# Patient Record
Sex: MALE | Race: WHITE | NOT HISPANIC OR LATINO | Employment: OTHER | URBAN - NONMETROPOLITAN AREA
[De-identification: names, ages, dates, MRNs, and addresses within clinical notes are randomized per-mention and may not be internally consistent; named-entity substitution may affect disease eponyms.]

---

## 2017-01-05 ENCOUNTER — DOCUMENTATION (OUTPATIENT)
Dept: CARDIOLOGY | Facility: CLINIC | Age: 82
End: 2017-01-05

## 2017-03-23 ENCOUNTER — TELEPHONE (OUTPATIENT)
Dept: FAMILY MEDICINE CLINIC | Facility: CLINIC | Age: 82
End: 2017-03-23

## 2017-03-23 RX ORDER — OXYCODONE HYDROCHLORIDE AND ACETAMINOPHEN 5; 325 MG/1; MG/1
TABLET ORAL
Qty: 60 TABLET | Refills: 0 | Status: SHIPPED | OUTPATIENT
Start: 2017-03-23 | End: 2017-05-08 | Stop reason: SDUPTHER

## 2017-03-23 NOTE — TELEPHONE ENCOUNTER
----- Message from Katrina Alanis MA sent at 3/23/2017 10:46 AM CDT -----  NEED REFILL    PERCOCET

## 2017-03-27 ENCOUNTER — LAB (OUTPATIENT)
Dept: LAB | Facility: CLINIC | Age: 82
End: 2017-03-27

## 2017-03-27 ENCOUNTER — OFFICE VISIT (OUTPATIENT)
Dept: FAMILY MEDICINE CLINIC | Facility: CLINIC | Age: 82
End: 2017-03-27

## 2017-03-27 VITALS
SYSTOLIC BLOOD PRESSURE: 142 MMHG | OXYGEN SATURATION: 92 % | TEMPERATURE: 97.8 F | DIASTOLIC BLOOD PRESSURE: 62 MMHG | HEIGHT: 72 IN | HEART RATE: 106 BPM

## 2017-03-27 DIAGNOSIS — R31.9 BLOOD IN URINE: Primary | ICD-10-CM

## 2017-03-27 DIAGNOSIS — R31.9 BLOOD IN URINE: ICD-10-CM

## 2017-03-27 DIAGNOSIS — L03.115 CELLULITIS OF RIGHT LOWER EXTREMITY: ICD-10-CM

## 2017-03-27 DIAGNOSIS — J18.9 PNEUMONIA DUE TO INFECTIOUS ORGANISM, UNSPECIFIED LATERALITY, UNSPECIFIED PART OF LUNG: ICD-10-CM

## 2017-03-27 DIAGNOSIS — R30.0 DYSURIA: ICD-10-CM

## 2017-03-27 PROCEDURE — 87086 URINE CULTURE/COLONY COUNT: CPT | Performed by: FAMILY MEDICINE

## 2017-03-27 PROCEDURE — 99214 OFFICE O/P EST MOD 30 MIN: CPT | Performed by: FAMILY MEDICINE

## 2017-03-27 RX ORDER — LORATADINE 10 MG/1
10 TABLET ORAL DAILY
Qty: 30 TABLET | Refills: 11 | Status: SHIPPED | OUTPATIENT
Start: 2017-03-27 | End: 2017-09-06

## 2017-03-27 RX ORDER — DOXYCYCLINE HYCLATE 100 MG/1
100 CAPSULE ORAL 2 TIMES DAILY
Qty: 30 CAPSULE | Refills: 0 | Status: SHIPPED | OUTPATIENT
Start: 2017-03-27 | End: 2017-04-18

## 2017-03-27 RX ORDER — PHENAZOPYRIDINE HYDROCHLORIDE 100 MG/1
100 TABLET, FILM COATED ORAL 3 TIMES DAILY PRN
Qty: 12 TABLET | Refills: 0 | Status: SHIPPED | OUTPATIENT
Start: 2017-03-27 | End: 2017-04-18

## 2017-03-27 NOTE — PROGRESS NOTES
Max Park is a 86 y.o. male.     History of Present Illness     Patient reports taking one dose of clindamycin for his cellulitis left leg, the powder of the capsule got into his lung and caused pneumonia.  He was admitted [facility] for pneumonia.  He things he is better.  Dr raines monitoring his inr.  Right anterilor lower leg he says is getting worse again.  He is havine burning with urination, taking otc something so cant dip his urine  Poor historian  Report hematuria  Review of Systems   Constitutional: Negative for chills, fatigue and fever.   HENT: Negative for congestion, ear discharge, ear pain, facial swelling, hearing loss, postnasal drip, rhinorrhea, sinus pressure, sore throat, trouble swallowing and voice change.    Eyes: Negative for discharge, redness and visual disturbance.   Respiratory: Negative for cough, chest tightness, shortness of breath and wheezing.    Cardiovascular: Negative for chest pain and palpitations.   Gastrointestinal: Negative for abdominal pain, blood in stool, constipation, diarrhea, nausea and vomiting.   Endocrine: Negative for polydipsia and polyuria.   Genitourinary: Positive for dysuria. Negative for flank pain, hematuria and urgency.   Musculoskeletal: Negative for arthralgias, back pain, joint swelling and myalgias.   Skin: Positive for wound. Negative for rash.   Neurological: Negative for dizziness, weakness, numbness and headaches.   Hematological: Negative for adenopathy.   Psychiatric/Behavioral: Negative for confusion and sleep disturbance. The patient is not nervous/anxious.        Objective   Physical Exam   Constitutional: He is oriented to person, place, and time. He appears well-developed and well-nourished.   HENT:   Head: Normocephalic and atraumatic.   Right Ear: External ear normal.   Left Ear: External ear normal.   Nose: Nose normal.   Mouth/Throat: Oropharynx is clear and moist.   Eyes: Conjunctivae and EOM are normal. Pupils are  equal, round, and reactive to light.   Neck: Normal range of motion. Neck supple.   Cardiovascular: Normal rate, regular rhythm and normal heart sounds.  Exam reveals no gallop and no friction rub.    No murmur heard.  Pulmonary/Chest: Effort normal and breath sounds normal.   Abdominal: Soft. Bowel sounds are normal. He exhibits no distension. There is no tenderness. There is no rebound and no guarding.   Musculoskeletal: Normal range of motion. He exhibits no edema or deformity.   Neurological: He is alert and oriented to person, place, and time. No cranial nerve deficit.   Skin: Skin is warm and dry. No rash noted. No erythema.   Right lower anterior leg, dime size open area with white/yellow adherant material.  Surrounding redness and warmth.    Psychiatric: He has a normal mood and affect. His behavior is normal. Judgment and thought content normal.   Nursing note and vitals reviewed.      Assessment/Plan   Ravi was seen today for difficulty urinating, back pain and leg injury.    Diagnoses and all orders for this visit:    Blood in urine  -     Urine culture (clean catch); Future    Cellulitis of right lower extremity    Dysuria    Pneumonia due to infectious organism, unspecified laterality, unspecified part of lung    Other orders  -     doxycycline (VIBRAMYCIN) 100 MG capsule; Take 1 capsule by mouth 2 (Two) Times a Day.  -     phenazopyridine (PYRIDIUM) 100 MG tablet; Take 1 tablet by mouth 3 (Three) Times a Day As Needed for bladder spasms.  -     loratadine (CLARITIN) 10 MG tablet; Take 1 tablet by mouth Daily.  -     mupirocin (BACTROBAN) 2 % ointment; Apply  topically 3 (Three) Times a Day.      Lungs sound fine.  Allergies contributing to cough.    Doxy for urine and wound, bactroban also.   Return Monday.  Go to er if wound worsens. Let dr raines know of new meds for inr.

## 2017-03-28 ENCOUNTER — APPOINTMENT (OUTPATIENT)
Dept: WOUND CARE | Facility: HOSPITAL | Age: 82
End: 2017-03-28
Attending: PLASTIC SURGERY

## 2017-03-28 ENCOUNTER — ANTICOAGULATION VISIT (OUTPATIENT)
Dept: CARDIOLOGY | Facility: CLINIC | Age: 82
End: 2017-03-28

## 2017-03-28 DIAGNOSIS — I48.20 CHRONIC ATRIAL FIBRILLATION (HCC): Primary | ICD-10-CM

## 2017-03-28 LAB — INR PPP: 1.4

## 2017-03-28 RX ORDER — LEVOTHYROXINE SODIUM 0.05 MG/1
50 TABLET ORAL DAILY
Refills: 5 | COMMUNITY
Start: 2017-02-07 | End: 2017-03-28 | Stop reason: SDUPTHER

## 2017-03-28 RX ORDER — LEVOTHYROXINE SODIUM 0.05 MG/1
50 TABLET ORAL DAILY
Qty: 30 TABLET | Refills: 5 | Status: SHIPPED | OUTPATIENT
Start: 2017-03-28

## 2017-03-28 NOTE — TELEPHONE ENCOUNTER
Refill Request came in by fax from   [facility] - [place], [place] - [address] - [phone]  - [phone] FX  [address] [facility] [address]  [facility] [address]  Phone: [phone] Fax: [phone]   Refill sent to pharmacy via e-scribe.

## 2017-03-29 LAB — BACTERIA SPEC AEROBE CULT: NORMAL

## 2017-03-29 NOTE — PROGRESS NOTES
I have reviewed the notes, assessments, and/or procedures performed by nurse, I concur with her/his documentation of Ravi Park.

## 2017-03-31 ENCOUNTER — OFFICE VISIT (OUTPATIENT)
Dept: PODIATRY | Facility: CLINIC | Age: 82
End: 2017-03-31

## 2017-03-31 VITALS
OXYGEN SATURATION: 94 % | BODY MASS INDEX: 31.07 KG/M2 | WEIGHT: 229.4 LBS | HEIGHT: 72 IN | HEART RATE: 111 BPM | DIASTOLIC BLOOD PRESSURE: 74 MMHG | SYSTOLIC BLOOD PRESSURE: 138 MMHG

## 2017-03-31 DIAGNOSIS — M79.674 PAIN IN TOES OF BOTH FEET: ICD-10-CM

## 2017-03-31 DIAGNOSIS — Z79.01 CHRONIC ANTICOAGULATION: ICD-10-CM

## 2017-03-31 DIAGNOSIS — I73.9 PAD (PERIPHERAL ARTERY DISEASE) (HCC): ICD-10-CM

## 2017-03-31 DIAGNOSIS — B35.1 ONYCHOMYCOSIS: Primary | ICD-10-CM

## 2017-03-31 DIAGNOSIS — M79.675 PAIN IN TOES OF BOTH FEET: ICD-10-CM

## 2017-03-31 PROCEDURE — 11721 DEBRIDE NAIL 6 OR MORE: CPT | Performed by: PODIATRIST

## 2017-03-31 PROCEDURE — 99203 OFFICE O/P NEW LOW 30 MIN: CPT | Performed by: PODIATRIST

## 2017-04-03 ENCOUNTER — OFFICE VISIT (OUTPATIENT)
Dept: FAMILY MEDICINE CLINIC | Facility: CLINIC | Age: 82
End: 2017-04-03

## 2017-04-03 VITALS
SYSTOLIC BLOOD PRESSURE: 110 MMHG | DIASTOLIC BLOOD PRESSURE: 68 MMHG | BODY MASS INDEX: 30.48 KG/M2 | TEMPERATURE: 96.4 F | HEART RATE: 76 BPM | WEIGHT: 225 LBS | HEIGHT: 72 IN | OXYGEN SATURATION: 88 %

## 2017-04-03 DIAGNOSIS — R30.0 DYSURIA: Primary | ICD-10-CM

## 2017-04-03 LAB
BILIRUB BLD-MCNC: NEGATIVE MG/DL
CLARITY, POC: CLEAR
COLOR UR: YELLOW
GLUCOSE UR STRIP-MCNC: NEGATIVE MG/DL
KETONES UR QL: NEGATIVE
LEUKOCYTE EST, POC: ABNORMAL
NITRITE UR-MCNC: NEGATIVE MG/ML
PH UR: 6 [PH] (ref 5–8)
PROT UR STRIP-MCNC: ABNORMAL MG/DL
RBC # UR STRIP: ABNORMAL /UL
SP GR UR: 1.01 (ref 1–1.03)
UROBILINOGEN UR QL: NORMAL

## 2017-04-03 PROCEDURE — 81002 URINALYSIS NONAUTO W/O SCOPE: CPT | Performed by: FAMILY MEDICINE

## 2017-04-03 PROCEDURE — 99213 OFFICE O/P EST LOW 20 MIN: CPT | Performed by: FAMILY MEDICINE

## 2017-04-03 RX ORDER — CEFUROXIME AXETIL 500 MG/1
500 TABLET ORAL 2 TIMES DAILY
Qty: 28 TABLET | Refills: 0 | Status: SHIPPED | OUTPATIENT
Start: 2017-04-03 | End: 2017-04-18

## 2017-04-03 RX ORDER — TAMSULOSIN HYDROCHLORIDE 0.4 MG/1
2 CAPSULE ORAL NIGHTLY
Qty: 60 CAPSULE | Refills: 11 | Status: SHIPPED | OUTPATIENT
Start: 2017-04-03 | End: 2018-01-01 | Stop reason: HOSPADM

## 2017-04-04 ENCOUNTER — DOCUMENTATION (OUTPATIENT)
Dept: CARDIOLOGY | Facility: CLINIC | Age: 82
End: 2017-04-04

## 2017-04-10 ENCOUNTER — LAB (OUTPATIENT)
Dept: LAB | Facility: CLINIC | Age: 82
End: 2017-04-10

## 2017-04-10 ENCOUNTER — OFFICE VISIT (OUTPATIENT)
Dept: FAMILY MEDICINE CLINIC | Facility: CLINIC | Age: 82
End: 2017-04-10

## 2017-04-10 VITALS
WEIGHT: 230.4 LBS | BODY MASS INDEX: 31.21 KG/M2 | OXYGEN SATURATION: 95 % | DIASTOLIC BLOOD PRESSURE: 70 MMHG | HEIGHT: 72 IN | HEART RATE: 94 BPM | TEMPERATURE: 97.4 F | SYSTOLIC BLOOD PRESSURE: 134 MMHG

## 2017-04-10 DIAGNOSIS — R31.9 BLOOD IN URINE: Primary | ICD-10-CM

## 2017-04-10 DIAGNOSIS — R31.9 BLOOD IN URINE: ICD-10-CM

## 2017-04-10 DIAGNOSIS — R30.0 DYSURIA: Primary | ICD-10-CM

## 2017-04-10 LAB
BILIRUB BLD-MCNC: NEGATIVE MG/DL
CLARITY, POC: CLEAR
COLOR UR: ABNORMAL
GLUCOSE UR STRIP-MCNC: NEGATIVE MG/DL
KETONES UR QL: NEGATIVE
LEUKOCYTE EST, POC: ABNORMAL
NITRITE UR-MCNC: NEGATIVE MG/ML
PH UR: 5 [PH] (ref 5–8)
PROT UR STRIP-MCNC: ABNORMAL MG/DL
RBC # UR STRIP: ABNORMAL /UL
SP GR UR: 1.02 (ref 1–1.03)
UROBILINOGEN UR QL: NORMAL

## 2017-04-10 PROCEDURE — 99213 OFFICE O/P EST LOW 20 MIN: CPT | Performed by: FAMILY MEDICINE

## 2017-04-10 PROCEDURE — 81002 URINALYSIS NONAUTO W/O SCOPE: CPT | Performed by: FAMILY MEDICINE

## 2017-04-10 PROCEDURE — 87086 URINE CULTURE/COLONY COUNT: CPT | Performed by: FAMILY MEDICINE

## 2017-04-10 NOTE — PROGRESS NOTES
Max Park is a 86 y.o. male.     History of Present Illness     On doxycyline, no b yvonne, placed on ceftin.  On coumadin as well.  May be a little better.  Still dysuria and back pain.  Blood still leuk esterase and blood.  increaed his flomax to 2 hs.  He says stream better but has to wear depends because when urge hits him he has to go.    Review of Systems   Constitutional: Negative for chills, fatigue and fever.   HENT: Negative for congestion, ear discharge, ear pain, facial swelling, hearing loss, postnasal drip, rhinorrhea, sinus pressure, sore throat, trouble swallowing and voice change.    Eyes: Negative for discharge, redness and visual disturbance.   Respiratory: Negative for cough, chest tightness, shortness of breath and wheezing.    Cardiovascular: Negative for chest pain and palpitations.   Gastrointestinal: Negative for abdominal pain, blood in stool, constipation, diarrhea, nausea and vomiting.   Endocrine: Negative for polydipsia and polyuria.   Genitourinary: Positive for dysuria. Negative for flank pain, hematuria and urgency.   Musculoskeletal: Positive for back pain. Negative for arthralgias, joint swelling and myalgias.   Skin: Negative for rash.   Neurological: Negative for dizziness, weakness, numbness and headaches.   Hematological: Negative for adenopathy.   Psychiatric/Behavioral: Negative for confusion and sleep disturbance. The patient is not nervous/anxious.        Objective   Physical Exam   Constitutional: He is oriented to person, place, and time. He appears well-developed and well-nourished.   HENT:   Head: Normocephalic and atraumatic.   Right Ear: External ear normal.   Left Ear: External ear normal.   Nose: Nose normal.   Eyes: Conjunctivae and EOM are normal. Pupils are equal, round, and reactive to light.   Neck: Normal range of motion.   Pulmonary/Chest: Effort normal.   Musculoskeletal: Normal range of motion.   Neurological: He is alert and oriented to  person, place, and time.   Psychiatric: He has a normal mood and affect. His behavior is normal. Judgment and thought content normal.   Nursing note and vitals reviewed.      Assessment/Plan   Ravi was seen today for follow-up.    Diagnoses and all orders for this visit:    Dysuria  -     Ambulatory Referral to Urology    Blood in urine  -     Ambulatory Referral to Urology    will send to diamico  Urine culture.

## 2017-04-12 LAB — BACTERIA SPEC AEROBE CULT: NORMAL

## 2017-04-18 ENCOUNTER — APPOINTMENT (OUTPATIENT)
Dept: LAB | Facility: HOSPITAL | Age: 82
End: 2017-04-18

## 2017-04-18 ENCOUNTER — OFFICE VISIT (OUTPATIENT)
Dept: CARDIOLOGY | Facility: CLINIC | Age: 82
End: 2017-04-18

## 2017-04-18 ENCOUNTER — ANTICOAGULATION VISIT (OUTPATIENT)
Dept: CARDIOLOGY | Facility: CLINIC | Age: 82
End: 2017-04-18

## 2017-04-18 VITALS
HEIGHT: 70 IN | DIASTOLIC BLOOD PRESSURE: 76 MMHG | HEART RATE: 92 BPM | BODY MASS INDEX: 32.35 KG/M2 | WEIGHT: 226 LBS | SYSTOLIC BLOOD PRESSURE: 142 MMHG

## 2017-04-18 DIAGNOSIS — I48.20 CHRONIC ATRIAL FIBRILLATION (HCC): Primary | ICD-10-CM

## 2017-04-18 DIAGNOSIS — I73.9 PERIPHERAL ARTERIAL DISEASE (HCC): ICD-10-CM

## 2017-04-18 DIAGNOSIS — I10 ESSENTIAL HYPERTENSION: ICD-10-CM

## 2017-04-18 LAB
INR PPP: 1.4
INR PPP: 1.4 (ref 0.8–1.2)

## 2017-04-18 PROCEDURE — 85610 PROTHROMBIN TIME: CPT | Performed by: INTERNAL MEDICINE

## 2017-04-18 PROCEDURE — 99213 OFFICE O/P EST LOW 20 MIN: CPT | Performed by: INTERNAL MEDICINE

## 2017-04-18 RX ORDER — DULOXETIN HYDROCHLORIDE 30 MG/1
30 CAPSULE, DELAYED RELEASE ORAL DAILY
COMMUNITY
End: 2017-09-06

## 2017-04-18 RX ORDER — ASPIRIN 81 MG/1
81 TABLET, CHEWABLE ORAL DAILY
Status: DISCONTINUED | OUTPATIENT
Start: 2017-04-18 | End: 2017-08-10

## 2017-04-18 NOTE — PROGRESS NOTES
[facility]  OFFICE NOTE    Ravi Park  86 y.o. male    04/18/2017  1. Chronic atrial fibrillation    2. Peripheral arterial disease    3. Essential hypertension        Chief complaint -follow-up PVD and atrial fibrillation      History of present Illness- 86-year-old gentleman with history of chronic atrial fibrillation on chronic Coumadin therapy [facility] [place]  He denies any chest pain or shortness of breath.  He recently had pneumonia after he had aspirated and had infection of the right foot after he had a laceration of the right shin.  He was admitted to the hospital and the has recovered and was on antibiotics for about 4-5 days.  He is doing well now he denies any claudication.  He has varicosities  on both legs asked him to wear compression stockings.              Allergies   Allergen Reactions   • Lipitor [Atorvastatin]      Pruritus   • Neomycin      Rash   • Simvastatin Itching     Itching  Zocor  -  Rash         Past Medical History:   Diagnosis Date   • Abnormal gait    • Acute bronchitis    • Acute confusion    • Adverse reaction to drug    • Allergic rhinitis    • Allergic rhinitis due to pollen    • Astigmatism     hyperopic    • Atrial fibrillation    • Backache    • Benign prostatic hyperplasia    • Cataract    • Cellulitis of lower leg    • Cerebrovascular accident    • Cervical arthritis     ?SPINOUS PROCESS?    • Common cold    • Contusion of thumb    • Cough    • Decreased platelet count     Platelet count below reference range    • Disorder of sacroiliac joint    • Dog bite of hand    • Essential hypertension    • Exposure to communicable disease     Risk of exposure to communicable disease    • Gout     vs arthritis    • Herpes zoster    • History of artificial eye lens     Artificial lens in position     • History of colon polyps    • History of colonoscopy 11/05/2013    Colon endoscopy 98537 (3) - Polyps in colon, 90  cm, 50 cm, & 70 cm from entry; all removed by cold biopsy polypectomy.  1 polypo in cecum. Multip[le biopsies taken. Melanosis in colon. Hemorrhoids found    • Hypertensive disorder    • Impacted cerumen    • Insect bite     wound     • Keratoconjunctivitis sicca    • Malaise and fatigue    • Multiple joint pain    • Need for immunization against influenza    • Need for prophylactic vaccination and inoculation against influenza    • Olecranon bursitis     LEFT ELBOW    • Open wound of lesser toe of left foot without damage to nail     initial encounter   • Open wound of lower leg    • Open wound of lower leg with complication    • Open wound of toe    • Otorrhea     post tympanostomy     • Pain in eye     etiology unknown    • Pain in joint involving right lower leg     Pain in right leg    • Pain in thumb joint with movement    • Pain in wrist    • Prostatitis    • Pseudophakia     bilateral    • Pure hypercholesterolemia    • Sensorineural hearing loss    • Serous otitis media    • Shoulder pain    • Sprain of shoulder and upper arm    • Upper respiratory infection    • Vitreous floaters          Past Surgical History:   Procedure Laterality Date   • CATARACT EXTRACTION WITH INTRAOCULAR LENS IMPLANT  12/01/2011    Phacoemulsification of a cataract with intraocular lens implant of left eye, Jin [id number]-[id number], serial no. [id number] diopter. Nuclear   • HERNIA REPAIR  12/20/1989    Inguinal hernia - Reduction and repair of right inguinal hernia. Bilateral inguinal hernias   • INJECTION OF MEDICATION  09/09/2016    Kenalog (5)     • OTHER SURGICAL HISTORY  12/14/2000    INCISION OF EARDRUM GENERAL ANESTHETIC 50863 (2)   • OTHER SURGICAL HISTORY  02/10/2016    Abdomen surgery procedure (1) - Abdominal aortogram with selective bilateral common femoral angiogram with runoff to the lower extremities   • OTHER SURGICAL HISTORY  07/09/2015    Apply Unna Boot 99272 (2) - COLLIN Arzate   • OTHER SURGICAL HISTORY  05/27/2016     Drain/Inject Major Joint 41142 (2) - COLLIN Arzate   • OTHER SURGICAL HISTORY  03/23/2016    Exploration, femoral artery (1) - Left common femoral artery access with contralateral stent placement to the right SGA using CSI atherectomy and stent placement   • OTHER SURGICAL HISTORY  06/03/1997    Forearm or wrist surgery (1) - Excision, Sebaceous cyst. Mass, dorsum, left wrist.   • OTHER SURGICAL HISTORY  03/15/1990    Partial excision of lip sutured 98833 (1) - Vermiliionectomy with advancement of buccal mucosa. Excision of right cheek lesion. Excision of uvula lesion         No family history on file.      Social History     Social History   • Marital status:      Spouse name: N/A   • Number of children: N/A   • Years of education: N/A     Occupational History   • Not on file.     Social History Main Topics   • Smoking status: Former Smoker   • Smokeless tobacco: Not on file   • Alcohol use 1.2 oz/week     1 Cans of beer, 1 Glasses of wine per week      Comment: 1 every other wk   • Drug use: No   • Sexual activity: No     Other Topics Concern   • Not on file     Social History Narrative         Current Outpatient Prescriptions   Medication Sig Dispense Refill   • DULoxetine (CYMBALTA) 30 MG capsule Take 30 mg by mouth Daily.     • albuterol (PROVENTIL HFA;VENTOLIN HFA) 108 (90 BASE) MCG/ACT inhaler Inhale. 2 Puffs 4 times per day as needed.     • finasteride (PROSCAR) 5 MG tablet Take  by mouth.     • fluticasone (FLONASE) 50 MCG/ACT nasal spray 1 spray into each nostril.     • levothyroxine (SYNTHROID, LEVOTHROID) 50 MCG tablet Take 1 tablet by mouth Daily. 30 tablet 5   • loratadine (CLARITIN) 10 MG tablet Take 1 tablet by mouth Daily. 30 tablet 11   • mupirocin (BACTROBAN) 2 % ointment Apply  topically 3 (Three) Times a Day. 1 each 0   • oxyCODONE-acetaminophen (PERCOCET) 5-325 MG per tablet Take 1 or 2 tablets daily as needed. 60 tablet 0   • RaNITidine HCl (ZANTAC PO) Take 1 tablet by mouth 2 (two) times a  "day.     • tamsulosin (FLOMAX) 0.4 MG capsule 24 hr capsule Take 2 capsules by mouth Every Night. 60 capsule 11   • warfarin (COUMADIN) 5 MG tablet Take 5 mg by mouth.       Current Facility-Administered Medications   Medication Dose Route Frequency Provider Last Rate Last Dose   • aspirin chewable tablet 81 mg  81 mg Oral Daily Mikey Hernández MD             Review of Systems     Constitution: Denies any fatigue, fever or chills    HENT: Denies any headache, hearing impairment,     Eyes: Denies any blurring of vision, or photophobia     Cardivascular - As per history of present illness     Respiratory system-denies any COPD, shortness of breath,   sleep apnea.     Endocrine:   history of hyperlipidemia                      Hypothyrodism       Musculoskeletal:  history of arthritis with musculoskeletal problems    Gastrointestinal: No nausea, vomiting, or melena    Genitourinary: History of BPH    Neurological:   No history of seizure disorder, stroke, he has some early memory problems    Psychiatric/Behavioral:        No history of depression,  No history of bipolar disorder or schizophrenia     Hematological- no history of easy bruising or any bleeding diathesis            OBJECTIVE    /76  Pulse 92  Ht 70\" (177.8 cm)  Wt 226 lb (103 kg)  BMI 32.43 kg/m2      Physical Exam     Constitutional: is oriented to person, place, and time.     Skin-warm and dry    Well developed and nourished in no acute distress      Head: Normocephalic and atraumatic.     Eyes: Pupils are equal, round, and reactive to light.     Neck: Neck supple. No bruit in the carotids, no elevation of JVD    Cardiovascular: [place] in the fifth intercostal space   irregular rate, and  Rhythm,    S1 greater than S2, no S3 or S4, no gallop     Pulmonary/Chest:   Air  Entry is equal on both sides  No wheezing or crackles,      Abdominal: Soft.  No hepatosplenomegaly, bowel sounds are present    Musculoskeletal: No kyphoscoliosis, no " significant thickening of the joints    Neurological: is alert and oriented to person, place, and time.    cranial nerve are intact .   No motor or sensory deficit    Extremities- there is varicosities of the veins on both legs with chronic venous stasis changes    Psychiatric: He has a normal mood and affect.                  His behavior is normal.           Procedures      Lab Results   Component Value Date    WBC 4.3 03/24/2016    HGB 12.6 (L) 03/24/2016    HCT 38.0 (L) 03/24/2016    MCV 89.4 03/24/2016     (L) 03/24/2016     Lab Results   Component Value Date    GLUCOSE 136 (H) 03/24/2016    BUN 16 03/24/2016    CREATININE 0.7 03/24/2016    CO2 24 03/24/2016    CALCIUM 8.1 (L) 03/24/2016    ALBUMIN 4.1 12/04/2015    AST 29 12/04/2015    ALT 32 12/04/2015     No results found for: CHOL  Lab Results   Component Value Date    TRIG 64 12/04/2015     No results found for: HDL  Lab Results   Component Value Date    LDLCALC 93 12/04/2015     No results found for: LDL  No results found for: HDLLDLRATIO  No components found for: CHOLHDL  Lab Results   Component Value Date    HGBA1C 5.6 12/04/2015     Lab Results   Component Value Date    TSH 9.95 (H) 12/04/2015                  A/P    Peripheral vascular disease with history of rotational atherectomy and stent placement to right SFA, doing well no claudication, recently had laceration of the right leg shin and was treated with antibiotics and recovered.    Varicose veins on both legs- him to wear compression stockings, takes Synthroid for hypothyroidism.    BPH on Flomax and is doing okay.  Follow-up in 8 months            This document has been electronically signed by Mikey Hernández MD on April 18, 2017 2:46 PM       EMR Dragon/Transcription disclaimer:   Some of this note may be an electronic transcription/translation of spoken language to printed text. The electronic translation of spoken language may permit erroneous, or at times, nonsensical words or  phrases to be inadvertently transcribed; Although I have reviewed the note for such errors, some may still exist.

## 2017-04-18 NOTE — PROGRESS NOTES
DX I48.2 INR 1.4 , pt only taking 1/2 dose due to antibiotics, will resume normal dosing regimen today

## 2017-05-08 ENCOUNTER — TELEPHONE (OUTPATIENT)
Dept: FAMILY MEDICINE CLINIC | Facility: CLINIC | Age: 82
End: 2017-05-08

## 2017-05-08 RX ORDER — OXYCODONE HYDROCHLORIDE AND ACETAMINOPHEN 5; 325 MG/1; MG/1
TABLET ORAL
Qty: 60 TABLET | Refills: 0 | Status: SHIPPED | OUTPATIENT
Start: 2017-05-08 | End: 2017-06-28 | Stop reason: SDUPTHER

## 2017-05-22 ENCOUNTER — HOSPITAL ENCOUNTER (OUTPATIENT)
Dept: CT IMAGING | Facility: HOSPITAL | Age: 82
Discharge: HOME | End: 2017-05-22
Admitting: UROLOGY

## 2017-05-22 DIAGNOSIS — R30.0 BURNING WITH URINATION: ICD-10-CM

## 2017-05-22 PROCEDURE — 74176 CT ABD & PELVIS W/O CONTRAST: CPT

## 2017-06-05 ENCOUNTER — HOSPITAL ENCOUNTER (OUTPATIENT)
Dept: GENERAL RADIOLOGY | Facility: HOSPITAL | Age: 82
Discharge: HOME | End: 2017-06-05
Admitting: UROLOGY

## 2017-06-05 ENCOUNTER — APPOINTMENT (OUTPATIENT)
Dept: LAB | Facility: HOSPITAL | Age: 82
End: 2017-06-05

## 2017-06-05 DIAGNOSIS — R30.0 BURNING WITH URINATION: ICD-10-CM

## 2017-06-05 LAB
BUN BLD-MCNC: 18 MG/DL (ref 7–21)
CREAT BLD-MCNC: 0.96 MG/DL (ref 0.7–1.3)
GFR SERPL CREATININE-BSD FRML MDRD: 74 ML/MIN/1.73 (ref 42–98)

## 2017-06-05 PROCEDURE — 84520 ASSAY OF UREA NITROGEN: CPT | Performed by: RADIOLOGY

## 2017-06-05 PROCEDURE — 82565 ASSAY OF CREATININE: CPT | Performed by: RADIOLOGY

## 2017-06-05 PROCEDURE — 74400 UROGRAPHY IV +-KUB TOMOG: CPT

## 2017-06-05 PROCEDURE — 0 IOPAMIDOL 61 % SOLUTION: Performed by: RADIOLOGY

## 2017-06-05 RX ADMIN — IOPAMIDOL 100 ML: 612 INJECTION, SOLUTION INTRAVENOUS at 10:30

## 2017-06-23 ENCOUNTER — DOCUMENTATION (OUTPATIENT)
Dept: CARDIOLOGY | Facility: CLINIC | Age: 82
End: 2017-06-23

## 2017-06-23 NOTE — PROGRESS NOTES
Pt called stating passing blood in urine, informed pt he was late in getting INR check, pt unable to come today for level, instructed to hold coumadin and come in on Monday and check level, verbalized understanding. Pt instructed to go ER if any problems occur.

## 2017-06-26 ENCOUNTER — ANTICOAGULATION VISIT (OUTPATIENT)
Dept: CARDIOLOGY | Facility: CLINIC | Age: 82
End: 2017-06-26

## 2017-06-26 LAB — INR PPP: 1.5

## 2017-06-26 NOTE — PROGRESS NOTES
I48.91 INR 1.5 pt instructed to go to ER has held coumadin over the weekend, and bleeding from urinary catheter. Will continue to monitor pts status

## 2017-06-27 ENCOUNTER — DOCUMENTATION (OUTPATIENT)
Dept: CARDIOLOGY | Facility: CLINIC | Age: 82
End: 2017-06-27

## 2017-06-27 NOTE — PROGRESS NOTES
Spoke with Dr Hernández about bleeding issues with pt, will hold coumadin until GI work-up complete.

## 2017-06-28 ENCOUNTER — TELEPHONE (OUTPATIENT)
Dept: FAMILY MEDICINE CLINIC | Facility: CLINIC | Age: 82
End: 2017-06-28

## 2017-06-28 RX ORDER — OXYCODONE HYDROCHLORIDE AND ACETAMINOPHEN 5; 325 MG/1; MG/1
TABLET ORAL
Qty: 60 TABLET | Refills: 0 | Status: SHIPPED | OUTPATIENT
Start: 2017-06-28 | End: 2017-08-10 | Stop reason: SDUPTHER

## 2017-08-10 ENCOUNTER — OFFICE VISIT (OUTPATIENT)
Dept: FAMILY MEDICINE CLINIC | Facility: CLINIC | Age: 82
End: 2017-08-10

## 2017-08-10 VITALS
SYSTOLIC BLOOD PRESSURE: 138 MMHG | TEMPERATURE: 97.8 F | OXYGEN SATURATION: 95 % | DIASTOLIC BLOOD PRESSURE: 70 MMHG | WEIGHT: 221 LBS | BODY MASS INDEX: 31.64 KG/M2 | HEIGHT: 70 IN | HEART RATE: 97 BPM

## 2017-08-10 DIAGNOSIS — G89.29 CHRONIC LEFT SHOULDER PAIN: Primary | ICD-10-CM

## 2017-08-10 DIAGNOSIS — M25.512 CHRONIC LEFT SHOULDER PAIN: Primary | ICD-10-CM

## 2017-08-10 PROCEDURE — 20610 DRAIN/INJ JOINT/BURSA W/O US: CPT | Performed by: FAMILY MEDICINE

## 2017-08-10 PROCEDURE — 99213 OFFICE O/P EST LOW 20 MIN: CPT | Performed by: FAMILY MEDICINE

## 2017-08-10 RX ORDER — ALBUTEROL SULFATE 90 UG/1
2 AEROSOL, METERED RESPIRATORY (INHALATION) EVERY 6 HOURS PRN
Qty: 1 INHALER | Refills: 11 | Status: SHIPPED | OUTPATIENT
Start: 2017-08-10

## 2017-08-10 RX ORDER — OXYCODONE HYDROCHLORIDE AND ACETAMINOPHEN 5; 325 MG/1; MG/1
TABLET ORAL
Qty: 60 TABLET | Refills: 0 | Status: SHIPPED | OUTPATIENT
Start: 2017-08-10 | End: 2017-10-19 | Stop reason: SDUPTHER

## 2017-08-10 RX ORDER — ASPIRIN 81 MG/1
81 TABLET, CHEWABLE ORAL DAILY
COMMUNITY
End: 2017-09-06

## 2017-08-10 RX ORDER — LISINOPRIL 5 MG/1
5 TABLET ORAL
COMMUNITY
End: 2017-09-06

## 2017-08-10 NOTE — PROGRESS NOTES
Max Park is a 86 y.o. male.     History of Present Illness     Continued left shoulder pain.  Needs refill percocet and inhaler  Doesn't want pt on shoulder yet or to see ortho.  Wants shot in left shoulder, he says had one in December    Review of Systems   Constitutional: Negative for chills, fatigue and fever.   HENT: Negative for congestion, ear discharge, ear pain, facial swelling, hearing loss, postnasal drip, rhinorrhea, sinus pressure, sore throat, trouble swallowing and voice change.    Eyes: Negative for discharge, redness and visual disturbance.   Respiratory: Negative for cough, chest tightness, shortness of breath and wheezing.    Cardiovascular: Negative for chest pain and palpitations.   Gastrointestinal: Negative for abdominal pain, blood in stool, constipation, diarrhea, nausea and vomiting.   Endocrine: Negative for polydipsia and polyuria.   Genitourinary: Negative for dysuria, flank pain, hematuria and urgency.   Musculoskeletal: Positive for arthralgias. Negative for back pain, joint swelling and myalgias.   Skin: Negative for rash.   Neurological: Negative for dizziness, weakness, numbness and headaches.   Hematological: Negative for adenopathy.   Psychiatric/Behavioral: Negative for confusion and sleep disturbance. The patient is not nervous/anxious.        Objective   Physical Exam   Constitutional: He is oriented to person, place, and time. He appears well-developed and well-nourished.   HENT:   Head: Normocephalic and atraumatic.   Right Ear: External ear normal.   Left Ear: External ear normal.   Nose: Nose normal.   Eyes: Conjunctivae and EOM are normal. Pupils are equal, round, and reactive to light.   Neck: Normal range of motion.   Pulmonary/Chest: Effort normal.   Musculoskeletal: Normal range of motion.   No swelling left shoulder, no erythema.     Neurological: He is alert and oriented to person, place, and time.   Psychiatric: He has a normal mood and affect. His  behavior is normal. Judgment and thought content normal.   Nursing note and vitals reviewed.      Assessment/Plan   Ravi was seen today for arm pain.    Diagnoses and all orders for this visit:    Chronic left shoulder pain    Other orders  -     oxyCODONE-acetaminophen (PERCOCET) 5-325 MG per tablet; Take 1 or 2 tablets daily as needed.  -     albuterol (PROVENTIL HFA;VENTOLIN HFA) 108 (90 BASE) MCG/ACT inhaler; Inhale 2 puffs Every 6 (Six) Hours As Needed for Wheezing. 2 Puffs 4 times per day as needed.      Jared:  [phone]    PROCEDURE NOTE:  LEFT SHOULDER PREPPED BETADINE AND ALLOWED TO DRY.  STERILE TECHNIQUE, 40MG KENALOG AND 4ML 1% LIDOCAINE INJECTED LEFT SHOULDER POSTERIOR APPROACH.  TOLERATED WELL    Offered pt or ortho.

## 2017-08-18 ENCOUNTER — DOCUMENTATION (OUTPATIENT)
Dept: CARDIOLOGY | Facility: CLINIC | Age: 82
End: 2017-08-18

## 2017-08-18 ENCOUNTER — TELEPHONE (OUTPATIENT)
Dept: CARDIOLOGY | Facility: CLINIC | Age: 82
End: 2017-08-18

## 2017-08-18 ENCOUNTER — LAB (OUTPATIENT)
Dept: LAB | Facility: HOSPITAL | Age: 82
End: 2017-08-18

## 2017-08-18 DIAGNOSIS — I48.20 CHRONIC ATRIAL FIBRILLATION (HCC): Primary | ICD-10-CM

## 2017-08-18 LAB
INR PPP: 3.65 (ref 0.8–1.2)
PROTHROMBIN TIME: 36.9 SECONDS (ref 11.1–15.3)

## 2017-08-18 PROCEDURE — 36415 COLL VENOUS BLD VENIPUNCTURE: CPT

## 2017-08-18 PROCEDURE — 85610 PROTHROMBIN TIME: CPT | Performed by: INTERNAL MEDICINE

## 2017-08-28 NOTE — TELEPHONE ENCOUNTER
PT is not getting INr's as ordered per Dr Hernández, matter discussed with pt, and he is not willing to come and get checked, medication discontinued per Dr Hernández. Pharmacy notified

## 2017-09-03 LAB — INR PPP: 1.5

## 2017-09-05 ENCOUNTER — TELEPHONE (OUTPATIENT)
Dept: CARDIOLOGY | Facility: CLINIC | Age: 82
End: 2017-09-05

## 2017-09-06 ENCOUNTER — OUTSIDE FACILITY SERVICE (OUTPATIENT)
Dept: FAMILY MEDICINE CLINIC | Facility: CLINIC | Age: 82
End: 2017-09-06

## 2017-09-06 PROCEDURE — 99304 1ST NF CARE SF/LOW MDM 25: CPT | Performed by: FAMILY MEDICINE

## 2017-09-06 RX ORDER — LOSARTAN POTASSIUM 25 MG/1
25 TABLET ORAL DAILY
Qty: 30 TABLET | Refills: 6 | Status: SHIPPED | OUTPATIENT
Start: 2017-09-06 | End: 2017-11-28 | Stop reason: SDUPTHER

## 2017-09-06 RX ORDER — ASPIRIN 325 MG
325 TABLET ORAL DAILY
Qty: 30 TABLET | Refills: 11 | Status: SHIPPED | OUTPATIENT
Start: 2017-09-06 | End: 2018-01-01 | Stop reason: HOSPADM

## 2017-09-06 RX ORDER — FUROSEMIDE 20 MG/1
20 TABLET ORAL DAILY
Qty: 30 TABLET | Refills: 11 | Status: SHIPPED | OUTPATIENT
Start: 2017-09-06 | End: 2017-12-21

## 2017-09-06 RX ORDER — METOPROLOL SUCCINATE 25 MG/1
25 TABLET, EXTENDED RELEASE ORAL DAILY
Qty: 30 TABLET | Refills: 11 | Status: SHIPPED | OUTPATIENT
Start: 2017-09-06

## 2017-09-06 RX ORDER — POTASSIUM CHLORIDE 750 MG/1
10 TABLET, FILM COATED, EXTENDED RELEASE ORAL DAILY
Qty: 90 TABLET | Refills: 3 | Status: SHIPPED | OUTPATIENT
Start: 2017-09-06 | End: 2017-12-21

## 2017-09-06 RX ORDER — DOXAZOSIN 2 MG/1
2 TABLET ORAL NIGHTLY
Qty: 30 TABLET | Refills: 6 | Status: ON HOLD | OUTPATIENT
Start: 2017-09-06 | End: 2018-01-01 | Stop reason: ALTCHOICE

## 2017-09-21 ENCOUNTER — DOCUMENTATION (OUTPATIENT)
Dept: CARDIOLOGY | Facility: CLINIC | Age: 82
End: 2017-09-21

## 2017-09-21 ENCOUNTER — ANTICOAGULATION VISIT (OUTPATIENT)
Dept: CARDIOLOGY | Facility: CLINIC | Age: 82
End: 2017-09-21

## 2017-09-30 ENCOUNTER — LAB REQUISITION (OUTPATIENT)
Dept: LAB | Facility: HOSPITAL | Age: 82
End: 2017-09-30

## 2017-09-30 DIAGNOSIS — R68.89 OTHER GENERAL SYMPTOMS AND SIGNS: ICD-10-CM

## 2017-09-30 DIAGNOSIS — Z13.0 ENCOUNTER FOR SCREENING FOR DISEASES OF THE BLOOD AND BLOOD-FORMING ORGANS AND CERTAIN DISORDERS INVOLVING THE IMMUNE MECHANISM: ICD-10-CM

## 2017-09-30 LAB
ALBUMIN SERPL-MCNC: 3.7 G/DL (ref 3.4–4.8)
ALBUMIN/GLOB SERPL: 1.2 G/DL (ref 1.1–1.8)
ALP SERPL-CCNC: 96 U/L (ref 38–126)
ALT SERPL W P-5'-P-CCNC: 30 U/L (ref 21–72)
ANION GAP SERPL CALCULATED.3IONS-SCNC: 10 MMOL/L (ref 5–15)
AST SERPL-CCNC: 24 U/L (ref 17–59)
BILIRUB SERPL-MCNC: 0.6 MG/DL (ref 0.2–1.3)
BUN BLD-MCNC: 22 MG/DL (ref 7–21)
BUN/CREAT SERPL: 20.6 (ref 7–25)
CALCIUM SPEC-SCNC: 9.2 MG/DL (ref 8.4–10.2)
CHLORIDE SERPL-SCNC: 99 MMOL/L (ref 95–110)
CO2 SERPL-SCNC: 32 MMOL/L (ref 22–31)
CREAT BLD-MCNC: 1.07 MG/DL (ref 0.7–1.3)
DEPRECATED RDW RBC AUTO: 50.4 FL (ref 35.1–43.9)
ERYTHROCYTE [DISTWIDTH] IN BLOOD BY AUTOMATED COUNT: 16.8 % (ref 11.5–14.5)
GFR SERPL CREATININE-BSD FRML MDRD: 65 ML/MIN/1.73 (ref 42–98)
GLOBULIN UR ELPH-MCNC: 3.1 GM/DL (ref 2.3–3.5)
GLUCOSE BLD-MCNC: 119 MG/DL (ref 60–100)
HCT VFR BLD AUTO: 30.2 % (ref 39–49)
HGB BLD-MCNC: 9.2 G/DL (ref 13.7–17.3)
MCH RBC QN AUTO: 24.9 PG (ref 26.5–34)
MCHC RBC AUTO-ENTMCNC: 30.5 G/DL (ref 31.5–36.3)
MCV RBC AUTO: 81.6 FL (ref 80–98)
PLATELET # BLD AUTO: 177 10*3/MM3 (ref 150–450)
PMV BLD AUTO: 10.3 FL (ref 8–12)
POTASSIUM BLD-SCNC: 4.2 MMOL/L (ref 3.5–5.1)
PROT SERPL-MCNC: 6.8 G/DL (ref 6.3–8.6)
RBC # BLD AUTO: 3.7 10*6/MM3 (ref 4.37–5.74)
SODIUM BLD-SCNC: 141 MMOL/L (ref 137–145)
WBC NRBC COR # BLD: 4.64 10*3/MM3 (ref 3.2–9.8)

## 2017-09-30 PROCEDURE — 85027 COMPLETE CBC AUTOMATED: CPT | Performed by: FAMILY MEDICINE

## 2017-09-30 PROCEDURE — 80053 COMPREHEN METABOLIC PANEL: CPT | Performed by: FAMILY MEDICINE

## 2017-10-19 ENCOUNTER — TELEPHONE (OUTPATIENT)
Dept: FAMILY MEDICINE CLINIC | Facility: CLINIC | Age: 82
End: 2017-10-19

## 2017-10-19 DIAGNOSIS — Z79.899 HIGH RISK MEDICATION USE: Primary | ICD-10-CM

## 2017-10-19 PROCEDURE — 80307 DRUG TEST PRSMV CHEM ANLYZR: CPT | Performed by: FAMILY MEDICINE

## 2017-10-19 PROCEDURE — G0481 DRUG TEST DEF 8-14 CLASSES: HCPCS | Performed by: FAMILY MEDICINE

## 2017-10-19 RX ORDER — OXYCODONE HYDROCHLORIDE AND ACETAMINOPHEN 5; 325 MG/1; MG/1
TABLET ORAL
Qty: 60 TABLET | Refills: 0 | Status: SHIPPED | OUTPATIENT
Start: 2017-10-19 | End: 2017-12-13 | Stop reason: SDUPTHER

## 2017-10-27 LAB — CONV REPORT SUMMARY: NORMAL

## 2017-11-09 ENCOUNTER — OFFICE VISIT (OUTPATIENT)
Dept: FAMILY MEDICINE CLINIC | Facility: CLINIC | Age: 82
End: 2017-11-09

## 2017-11-09 VITALS
HEART RATE: 116 BPM | DIASTOLIC BLOOD PRESSURE: 54 MMHG | HEIGHT: 69 IN | SYSTOLIC BLOOD PRESSURE: 112 MMHG | TEMPERATURE: 98.3 F | WEIGHT: 216.4 LBS | OXYGEN SATURATION: 98 % | BODY MASS INDEX: 32.05 KG/M2

## 2017-11-09 DIAGNOSIS — S81.802A WOUND OF LEFT LOWER EXTREMITY, INITIAL ENCOUNTER: Primary | ICD-10-CM

## 2017-11-09 DIAGNOSIS — R60.0 LEG EDEMA: ICD-10-CM

## 2017-11-09 PROCEDURE — 29580 STRAPPING UNNA BOOT: CPT | Performed by: FAMILY MEDICINE

## 2017-11-09 PROCEDURE — 99213 OFFICE O/P EST LOW 20 MIN: CPT | Performed by: FAMILY MEDICINE

## 2017-11-09 NOTE — PROGRESS NOTES
Max Park is a 87 y.o. male.     History of Present Illness     Home health nurse called, patient had ulcers on leg.  Wanted to know if I wanted to give antibiotic    Review of Systems   Constitutional: Negative for chills, fatigue and fever.   HENT: Negative for congestion, ear discharge, ear pain, facial swelling, hearing loss, postnasal drip, rhinorrhea, sinus pressure, sore throat, trouble swallowing and voice change.    Eyes: Negative for discharge, redness and visual disturbance.   Respiratory: Negative for cough, chest tightness, shortness of breath and wheezing.    Cardiovascular: Negative for chest pain and palpitations.   Gastrointestinal: Negative for abdominal pain, blood in stool, constipation, diarrhea, nausea and vomiting.   Endocrine: Negative for polydipsia and polyuria.   Genitourinary: Negative for dysuria, flank pain, hematuria and urgency.   Musculoskeletal: Negative for arthralgias, back pain, joint swelling and myalgias.   Skin: Negative for rash.   Neurological: Negative for dizziness, weakness, numbness and headaches.   Hematological: Negative for adenopathy.   Psychiatric/Behavioral: Negative for confusion and sleep disturbance. The patient is not nervous/anxious.        Objective   Physical Exam   Constitutional: He is oriented to person, place, and time. He appears well-developed and well-nourished.   HENT:   Head: Normocephalic and atraumatic.   Right Ear: External ear normal.   Left Ear: External ear normal.   Nose: Nose normal.   Eyes: Conjunctivae and EOM are normal. Pupils are equal, round, and reactive to light.   Neck: Normal range of motion.   Pulmonary/Chest: Effort normal.   Musculoskeletal: Normal range of motion.   Neurological: He is alert and oriented to person, place, and time.   Skin:   Posterior right leg, 2 dark scabs in center of moist ulceration one is about 5mm, other about 7mm  Leg has almost 1+ edema   Psychiatric: He has a normal mood and affect.  His behavior is normal. Judgment and thought content normal.   Nursing note and vitals reviewed.      Assessment/Plan   Ravi was seen today for blister.    Diagnoses and all orders for this visit:    Wound of left lower extremity, initial encounter    Leg edema        [place] boot applied right lower leg, mid foot up to knee.  Note for home health to change twice weekly.  He is to call and give us home health nurse name and phone number to also give order over the phone.

## 2017-11-28 RX ORDER — LOSARTAN POTASSIUM 25 MG/1
TABLET ORAL
Qty: 30 TABLET | Refills: 11 | Status: SHIPPED | OUTPATIENT
Start: 2017-11-28 | End: 2017-12-21 | Stop reason: ALTCHOICE

## 2017-12-13 RX ORDER — OXYCODONE HYDROCHLORIDE AND ACETAMINOPHEN 5; 325 MG/1; MG/1
TABLET ORAL
Qty: 60 TABLET | Refills: 0 | Status: SHIPPED | OUTPATIENT
Start: 2017-12-13 | End: 2018-01-01 | Stop reason: SDUPTHER

## 2017-12-19 ENCOUNTER — OFFICE VISIT (OUTPATIENT)
Dept: CARDIOLOGY | Facility: CLINIC | Age: 82
End: 2017-12-19

## 2017-12-19 VITALS
WEIGHT: 217 LBS | HEIGHT: 69 IN | HEART RATE: 88 BPM | BODY MASS INDEX: 32.14 KG/M2 | DIASTOLIC BLOOD PRESSURE: 88 MMHG | SYSTOLIC BLOOD PRESSURE: 130 MMHG

## 2017-12-19 DIAGNOSIS — I48.20 CHRONIC ATRIAL FIBRILLATION (HCC): ICD-10-CM

## 2017-12-19 DIAGNOSIS — I10 ESSENTIAL HYPERTENSION: Primary | ICD-10-CM

## 2017-12-19 DIAGNOSIS — S81.809A NON-HEALING WOUND OF LOWER EXTREMITY, UNSPECIFIED LATERALITY, INITIAL ENCOUNTER: Primary | ICD-10-CM

## 2017-12-19 DIAGNOSIS — I73.9 PERIPHERAL ARTERIAL DISEASE (HCC): ICD-10-CM

## 2017-12-19 PROCEDURE — 99214 OFFICE O/P EST MOD 30 MIN: CPT | Performed by: INTERNAL MEDICINE

## 2017-12-19 NOTE — PROGRESS NOTES
[facility] Cardiology  OFFICE NOTE    Ravi Park  87 y.o. male    12/19/2017  1. Essential hypertension    2. Peripheral arterial disease    3. Chronic atrial fibrillation        Chief complaint -follow-up PVD and Chronic atrial fibrillation      History of present Illness- 87-year-old gentleman with history of chronic atrial fibrillation on chronic Coumadin therapy and had his Coumadin stopped due to bleeding from bladder after seen by Dr. D'Amico.  She is off of Coumadin and only on aspirin for the chronic atrial fibrillation as he required blood transfusions couple of months ago.  Now he has a nonhealing ulcer on the back of the left calf possibly a venous ulcer.  I will refer him to the wound clinic to see Dr. Hamman.  He does have history of PVD and had prior stent placement to right SFA in the past, he does not have any claudication pain when he walks.  He has a lot of back pain in the lower back possibly due to sciatica.          Allergies   Allergen Reactions   • Lipitor [Atorvastatin]      Pruritus   • Neomycin      Rash   • Simvastatin Itching     Itching  Zocor  -  Rash         Past Medical History:   Diagnosis Date   • Abnormal gait    • Acute bronchitis    • Acute confusion    • Adverse reaction to drug    • Allergic rhinitis    • Allergic rhinitis due to pollen    • Astigmatism     hyperopic    • Atrial fibrillation    • Backache    • Benign prostatic hyperplasia    • Cataract    • Cellulitis of lower leg    • Cerebrovascular accident    • Cervical arthritis     ?SPINOUS PROCESS?    • Common cold    • Contusion of thumb    • Cough    • Decreased platelet count     Platelet count below reference range    • Disorder of sacroiliac joint    • Dog bite of hand    • Essential hypertension    • Exposure to communicable disease     Risk of exposure to communicable disease    • Gout     vs arthritis    • Herpes zoster    • History of artificial eye lens     Artificial lens in position     •  History of colon polyps    • History of colonoscopy 11/05/2013    Colon endoscopy 35186 (3) - Polyps in colon, 90 cm, 50 cm, & 70 cm from entry; all removed by cold biopsy polypectomy.  1 polypo in cecum. Multip[le biopsies taken. Melanosis in colon. Hemorrhoids found    • Hypertensive disorder    • Impacted cerumen    • Insect bite     wound     • Keratoconjunctivitis sicca    • Malaise and fatigue    • Multiple joint pain    • Need for immunization against influenza    • Need for prophylactic vaccination and inoculation against influenza    • Olecranon bursitis     LEFT ELBOW    • Open wound of lesser toe of left foot without damage to nail     initial encounter   • Open wound of lower leg    • Open wound of lower leg with complication    • Open wound of toe    • Otorrhea     post tympanostomy     • Pain in eye     etiology unknown    • Pain in joint involving right lower leg     Pain in right leg    • Pain in thumb joint with movement    • Pain in wrist    • Prostatitis    • Pseudophakia     bilateral    • Pure hypercholesterolemia    • Sensorineural hearing loss    • Serous otitis media    • Shoulder pain    • Sprain of shoulder and upper arm    • Upper respiratory infection    • Vitreous floaters          Past Surgical History:   Procedure Laterality Date   • CATARACT EXTRACTION WITH INTRAOCULAR LENS IMPLANT  12/01/2011    Phacoemulsification of a cataract with intraocular lens implant of left eye, Jin SN-60-WF, serial no. [id number]:21.5 diopter. Nuclear   • HERNIA REPAIR  12/20/1989    Inguinal hernia - Reduction and repair of right inguinal hernia. Bilateral inguinal hernias   • INJECTION OF MEDICATION  09/09/2016    Kenalog (5)     • OTHER SURGICAL HISTORY  12/14/2000    INCISION OF EARDRUM GENERAL ANESTHETIC 51915 (2)   • OTHER SURGICAL HISTORY  02/10/2016    Abdomen surgery procedure (1) - Abdominal aortogram with selective bilateral common femoral angiogram with runoff to the lower extremities   •  OTHER SURGICAL HISTORY  07/09/2015    Apply Unna Boot 07103 (2) - COLLIN Arzate   • OTHER SURGICAL HISTORY  05/27/2016    Drain/Inject Major Joint 45993 (2) - COLLIN Arzate   • OTHER SURGICAL HISTORY  03/23/2016    Exploration, femoral artery (1) - Left common femoral artery access with contralateral stent placement to the right SGA using CSI atherectomy and stent placement   • OTHER SURGICAL HISTORY  06/03/1997    Forearm or wrist surgery (1) - Excision, Sebaceous cyst. Mass, dorsum, left wrist.   • OTHER SURGICAL HISTORY  03/15/1990    Partial excision of lip sutured 06830 (1) - Vermiliionectomy with advancement of buccal mucosa. Excision of right cheek lesion. Excision of uvula lesion         History reviewed. No pertinent family history.      Social History     Social History   • Marital status:      Spouse name: N/A   • Number of children: N/A   • Years of education: N/A     Occupational History   • Not on file.     Social History Main Topics   • Smoking status: Former Smoker   • Smokeless tobacco: Never Used   • Alcohol use 1.2 oz/week     1 Glasses of wine, 1 Cans of beer per week      Comment: 1 every other wk   • Drug use: No   • Sexual activity: No     Other Topics Concern   • Not on file     Social History Narrative         Current Outpatient Prescriptions   Medication Sig Dispense Refill   • albuterol (PROVENTIL HFA;VENTOLIN HFA) 108 (90 BASE) MCG/ACT inhaler Inhale 2 puffs Every 6 (Six) Hours As Needed for Wheezing. 2 Puffs 4 times per day as needed. 1 inhaler 11   • aspirin 325 MG tablet Take 1 tablet by mouth Daily. 30 tablet 11   • doxazosin (CARDURA) 2 MG tablet Take 1 tablet by mouth Every Night. 30 tablet 6   • finasteride (PROSCAR) 5 MG tablet Take 5 mg by mouth Daily.     • fluticasone (FLONASE) 50 MCG/ACT nasal spray 1 spray into each nostril.     • furosemide (LASIX) 20 MG tablet Take 1 tablet by mouth Daily. 30 tablet 11   • levothyroxine (SYNTHROID, LEVOTHROID) 50 MCG tablet Take 1 tablet by  "mouth Daily. 30 tablet 5   • losartan (COZAAR) 25 MG tablet TAKE 1 TABLET BY MOUTH EVERY DAY FOR BLOOD PRESSURE 30 tablet 11   • metoprolol succinate XL (TOPROL-XL) 25 MG 24 hr tablet Take 1 tablet by mouth Daily. 30 tablet 11   • mupirocin (BACTROBAN) 2 % ointment Apply  topically 3 (Three) Times a Day. 1 each 0   • oxyCODONE-acetaminophen (PERCOCET) 5-325 MG per tablet Take 1 or 2 tablets daily as needed. 60 tablet 0   • potassium chloride (K-DUR) 10 MEQ CR tablet Take 1 tablet by mouth Daily. 90 tablet 3   • tamsulosin (FLOMAX) 0.4 MG capsule 24 hr capsule Take 2 capsules by mouth Every Night. 60 capsule 11     No current facility-administered medications for this visit.          Review of Systems     Constitution: Denies any fatigue, fever or chills    HENT: Denies any headache, Mild to moderate hearing impairment,     Eyes: Denies any blurring of vision, or photophobia     Cardivascular - As per history of present illness     Respiratory system-denies any COPD, shortness of breath,        Endocrine:   history of hyperlipidemia                      Hypothyrodism       Musculoskeletal:  history of arthritis with musculoskeletal problems Of the low back    Gastrointestinal: No nausea, vomiting, or melena    Genitourinary: History of BPH And had recurrent hematuria    Neurological:   No history of seizure disorder, stroke, he has some early memory problems    Psychiatric/Behavioral:        No history of depression,    Hematological- no history of easy bruising or any bleeding diathesis            OBJECTIVE    /88  Pulse 88  Ht 175.3 cm (69.02\")  Wt 98.4 kg (217 lb)  BMI 32.03 kg/m2      Physical Exam     Constitutional: is oriented to person, place, and time.     Skin-warm and dry    Well developed and nourished in no acute distress      Head: Normocephalic and atraumatic.     Eyes: Pupils are equal, round, and reactive to light.     Neck: Neck supple. No bruit in the carotids,    Cardiovascular: [place] in " the fifth intercostal space   irregular rate, and  Rhythm,    S1 greater than S2, no S3 or S4, no gallop     Pulmonary/Chest:   Air  Entry is equal on both sides  No wheezing or crackles,      Abdominal: Soft.  No hepatosplenomegaly,    Musculoskeletal: No kyphoscoliosis,    Neurological: is alert and oriented to person, place, and time.    cranial nerve are intact .   No motor or sensory deficit    Extremities- there is varicosities of the veins on both legs with chronic venous stasis changes, with a nonhealing ulcer on the posterior aspect of the calf on the right leg              Procedures      Lab Results   Component Value Date    WBC 4.64 09/30/2017    HGB 9.2 (L) 09/30/2017    HCT 30.2 (L) 09/30/2017    MCV 81.6 09/30/2017     09/30/2017     Lab Results   Component Value Date    GLUCOSE 119 (H) 09/30/2017    BUN 22 (H) 09/30/2017    CREATININE 1.07 09/30/2017    EGFRIFNONA 65 09/30/2017    BCR 20.6 09/30/2017    CO2 32.0 (H) 09/30/2017    CALCIUM 9.2 09/30/2017    ALBUMIN 3.70 09/30/2017    LABIL2 1.2 09/30/2017    AST 24 09/30/2017    ALT 30 09/30/2017     No results found for: CHOL  Lab Results   Component Value Date    TRIG 64 12/04/2015     No results found for: HDL  Lab Results   Component Value Date    LDLCALC 93 12/04/2015     No results found for: LDL  No results found for: HDLLDLRATIO  No components found for: CHOLHDL  Lab Results   Component Value Date    HGBA1C 5.6 12/04/2015     Lab Results   Component Value Date    TSH 9.95 (H) 12/04/2015                  A/P    Peripheral vascular disease with history of rotational atherectomy and stent placement to right SFA, doing well no claudication, With a nonhealing ulcer on the right posterior aspect of the calf.  Will refer to wound clinic  Varicose veins on both legs- him to wear compression stockings, takes Synthroid for hypothyroidism.    Chronic atrial fibrillation-off Coumadin due to hematuria requiring blood transfusion    BPH on Flomax and  is doing okay.      Follow-up in 6 months            This document has been electronically signed by Mikey Hernández MD on December 19, 2017 1:40 PM       EMR Dragon/Transcription disclaimer:   Some of this note may be an electronic transcription/translation of spoken language to printed text. The electronic translation of spoken language may permit erroneous, or at times, nonsensical words or phrases to be inadvertently transcribed; Although I have reviewed the note for such errors, some may still exist.

## 2017-12-21 ENCOUNTER — OFFICE VISIT (OUTPATIENT)
Dept: CARDIAC SURGERY | Facility: CLINIC | Age: 82
End: 2017-12-21

## 2017-12-21 VITALS
TEMPERATURE: 97.4 F | SYSTOLIC BLOOD PRESSURE: 120 MMHG | DIASTOLIC BLOOD PRESSURE: 85 MMHG | WEIGHT: 215 LBS | HEIGHT: 72 IN | OXYGEN SATURATION: 90 % | HEART RATE: 64 BPM | BODY MASS INDEX: 29.12 KG/M2

## 2017-12-21 DIAGNOSIS — I73.9 CLAUDICATION (HCC): Primary | ICD-10-CM

## 2017-12-21 DIAGNOSIS — R60.0 LOCALIZED EDEMA: ICD-10-CM

## 2017-12-21 PROCEDURE — 29580 STRAPPING UNNA BOOT: CPT | Performed by: THORACIC SURGERY (CARDIOTHORACIC VASCULAR SURGERY)

## 2017-12-21 PROCEDURE — 99201 PR OFFICE OUTPATIENT NEW 10 MINUTES: CPT | Performed by: THORACIC SURGERY (CARDIOTHORACIC VASCULAR SURGERY)

## 2017-12-24 LAB
BH CV LOWER ARTERIAL LEFT DORSALIS PEDIS SYS MAX: 255 MMHG
BH CV LOWER ARTERIAL LEFT GREAT TOE SYS MAX: 40 MMHG
BH CV LOWER ARTERIAL LEFT POST TIBIAL SYS MAX: 255 MMHG
BH CV LOWER ARTERIAL LEFT TBI RATIO: 0.23
BH CV LOWER ARTERIAL RIGHT DORSALIS PEDIS SYS MAX: 255 MMHG
BH CV LOWER ARTERIAL RIGHT GREAT TOE SYS MAX: 54 MMHG
BH CV LOWER ARTERIAL RIGHT POST TIBIAL SYS MAX: 255 MMHG
BH CV LOWER ARTERIAL RIGHT TBI RATIO: 0.31
BH CV LOWER VASCULAR RIGHT COMMON FEMORAL AUGMENT: NORMAL
BH CV LOWER VASCULAR RIGHT COMMON FEMORAL COMPETENT: NORMAL
BH CV LOWER VASCULAR RIGHT COMMON FEMORAL COMPRESS: NORMAL
BH CV LOWER VASCULAR RIGHT COMMON FEMORAL PHASIC: NORMAL
BH CV LOWER VASCULAR RIGHT COMMON FEMORAL SPONT: NORMAL
BH CV LOWER VASCULAR RIGHT DISTAL FEMORAL AUGMENT: NORMAL
BH CV LOWER VASCULAR RIGHT DISTAL FEMORAL COMPETENT: NORMAL
BH CV LOWER VASCULAR RIGHT DISTAL FEMORAL COMPRESS: NORMAL
BH CV LOWER VASCULAR RIGHT DISTAL FEMORAL PHASIC: NORMAL
BH CV LOWER VASCULAR RIGHT DISTAL FEMORAL SPONT: NORMAL
BH CV LOWER VASCULAR RIGHT GREATER SAPH AK AUGMENT: NORMAL
BH CV LOWER VASCULAR RIGHT GREATER SAPH AK COMPETENT: NORMAL
BH CV LOWER VASCULAR RIGHT GREATER SAPH AK COMPRESS: NORMAL
BH CV LOWER VASCULAR RIGHT GREATER SAPH AK PHASIC: NORMAL
BH CV LOWER VASCULAR RIGHT GREATER SAPH AK SPONT: NORMAL
BH CV LOWER VASCULAR RIGHT LESSER SAPH AUGMENT: NORMAL
BH CV LOWER VASCULAR RIGHT LESSER SAPH COMPETENT: NORMAL
BH CV LOWER VASCULAR RIGHT LESSER SAPH COMPRESS: NORMAL
BH CV LOWER VASCULAR RIGHT MID FEMORAL AUGMENT: NORMAL
BH CV LOWER VASCULAR RIGHT MID FEMORAL COMPETENT: NORMAL
BH CV LOWER VASCULAR RIGHT MID FEMORAL COMPRESS: NORMAL
BH CV LOWER VASCULAR RIGHT MID FEMORAL PHASIC: NORMAL
BH CV LOWER VASCULAR RIGHT MID FEMORAL SPONT: NORMAL
BH CV LOWER VASCULAR RIGHT PERONEAL AUGMENT: NORMAL
BH CV LOWER VASCULAR RIGHT PERONEAL COMPETENT: NORMAL
BH CV LOWER VASCULAR RIGHT PERONEAL COMPRESS: NORMAL
BH CV LOWER VASCULAR RIGHT PERONEAL PHASIC: NORMAL
BH CV LOWER VASCULAR RIGHT PERONEAL SPONT: NORMAL
BH CV LOWER VASCULAR RIGHT POPLITEAL AUGMENT: NORMAL
BH CV LOWER VASCULAR RIGHT POPLITEAL COMPETENT: NORMAL
BH CV LOWER VASCULAR RIGHT POPLITEAL COMPRESS: NORMAL
BH CV LOWER VASCULAR RIGHT POPLITEAL PHASIC: NORMAL
BH CV LOWER VASCULAR RIGHT POPLITEAL SPONT: NORMAL
BH CV LOWER VASCULAR RIGHT POSTERIOR TIBIAL AUGMENT: NORMAL
BH CV LOWER VASCULAR RIGHT POSTERIOR TIBIAL COMPETENT: NORMAL
BH CV LOWER VASCULAR RIGHT POSTERIOR TIBIAL COMPRESS: NORMAL
BH CV LOWER VASCULAR RIGHT POSTERIOR TIBIAL PHASIC: NORMAL
BH CV LOWER VASCULAR RIGHT POSTERIOR TIBIAL SPONT: NORMAL
BH CV LOWER VASCULAR RIGHT PROFUNDA FEMORAL AUGMENT: NORMAL
BH CV LOWER VASCULAR RIGHT PROFUNDA FEMORAL COMPETENT: NORMAL
BH CV LOWER VASCULAR RIGHT PROFUNDA FEMORAL COMPRESS: NORMAL
BH CV LOWER VASCULAR RIGHT PROFUNDA FEMORAL PHASIC: NORMAL
BH CV LOWER VASCULAR RIGHT PROFUNDA FEMORAL SPONT: NORMAL
BH CV LOWER VASCULAR RIGHT PROXIMAL FEMORAL AUGMENT: NORMAL
BH CV LOWER VASCULAR RIGHT PROXIMAL FEMORAL COMPETENT: NORMAL
BH CV LOWER VASCULAR RIGHT PROXIMAL FEMORAL COMPRESS: NORMAL
BH CV LOWER VASCULAR RIGHT PROXIMAL FEMORAL PHASIC: NORMAL
BH CV LOWER VASCULAR RIGHT PROXIMAL FEMORAL SPONT: NORMAL
BH CV LOWER VASCULAR RIGHT SAPHENOFEMORAL JUNCTION AUGMENT: NORMAL
BH CV LOWER VASCULAR RIGHT SAPHENOFEMORAL JUNCTION COMPETENT: NORMAL
BH CV LOWER VASCULAR RIGHT SAPHENOFEMORAL JUNCTION COMPRESS: NORMAL
BH CV LOWER VASCULAR RIGHT SAPHENOFEMORAL JUNCTION PHASIC: NORMAL
BH CV LOWER VASCULAR RIGHT SAPHENOFEMORAL JUNCTION SPONT: NORMAL
UPPER ARTERIAL LEFT ARM BRACHIAL SYS MAX: 168 MMHG
UPPER ARTERIAL RIGHT ARM BRACHIAL SYS MAX: 176 MMHG

## 2017-12-27 DIAGNOSIS — I73.9 PERIPHERAL ARTERIAL DISEASE (HCC): Primary | ICD-10-CM

## 2017-12-27 NOTE — PROGRESS NOTES
Ravi Park  9/9/1930            87 y.o.      12/21/2017    Chief Complaint   Patient presents with   • Leg Pain   PCP Dr Arzate [place]  Cardiology : Dr. Hernández   Patient is seen today for nonhealing ulcer posterior aspect right    The patient has been followed and treated by   for essential hypertension, peripheral arterial disease with previous stent right superficial femoral artery 3/23/16, and chronic atrial fibrillation the patient previously was on anticoagulants with warfarin but developed bleeding from his urinary bladder and warfarin was discontinued        HPI        ROS    Atrial fibrillation, backache due to chronic back pain due to probably due to osteoarthritis, benign prosthetic hyperplasia, essential hypertension, history of gout, history of herpes zoster, hypertension,  hypercholesterolemia,    Current Outpatient Prescriptions:   •  albuterol (PROVENTIL HFA;VENTOLIN HFA) 108 (90 BASE) MCG/ACT inhaler, Inhale 2 puffs Every 6 (Six) Hours As Needed for Wheezing. 2 Puffs 4 times per day as needed., Disp: 1 inhaler, Rfl: 11  •  aspirin 325 MG tablet, Take 1 tablet by mouth Daily., Disp: 30 tablet, Rfl: 11  •  doxazosin (CARDURA) 2 MG tablet, Take 1 tablet by mouth Every Night., Disp: 30 tablet, Rfl: 6  •  finasteride (PROSCAR) 5 MG tablet, Take 5 mg by mouth Daily., Disp: , Rfl:   •  fluticasone (FLONASE) 50 MCG/ACT nasal spray, 1 spray into each nostril., Disp: , Rfl:   •  levothyroxine (SYNTHROID, LEVOTHROID) 50 MCG tablet, Take 1 tablet by mouth Daily., Disp: 30 tablet, Rfl: 5  •  metoprolol succinate XL (TOPROL-XL) 25 MG 24 hr tablet, Take 1 tablet by mouth Daily., Disp: 30 tablet, Rfl: 11  •  oxyCODONE-acetaminophen (PERCOCET) 5-325 MG per tablet, Take 1 or 2 tablets daily as needed., Disp: 60 tablet, Rfl: 0  •  tamsulosin (FLOMAX) 0.4 MG capsule 24 hr capsule, Take 2 capsules by mouth Every Night., Disp: 60 capsule, Rfl: 11    The following portions of the patient's history  "were reviewed and updated as appropriate: allergies, current medications, past family history, past medical history, past social history, past surgical history and problem list.        Past Surgical History:   Procedure Laterality Date   • CATARACT EXTRACTION WITH INTRAOCULAR LENS IMPLANT  12/01/2011    Phacoemulsification of a cataract with intraocular lens implant of left eye, Jin SN-60-WF, serial no. [id number] diopter. Nuclear   • HERNIA REPAIR  12/20/1989    Inguinal hernia - Reduction and repair of right inguinal hernia. Bilateral inguinal hernias   • INJECTION OF MEDICATION  09/09/2016    Kenalog (5)     • OTHER SURGICAL HISTORY  12/14/2000    INCISION OF EARDRUM GENERAL ANESTHETIC 98365 (2)   • OTHER SURGICAL HISTORY  02/10/2016    Abdomen surgery procedure (1) - Abdominal aortogram with selective bilateral common femoral angiogram with runoff to the lower extremities   • OTHER SURGICAL HISTORY  07/09/2015    Apply Unna Boot 86052 (2) - COLLIN Arzate   • OTHER SURGICAL HISTORY  05/27/2016    Drain/Inject Major Joint 17871 (2) - COLLIN Arzate   • OTHER SURGICAL HISTORY  03/23/2016    Exploration, femoral artery (1) - Left common femoral artery access with contralateral stent placement to the right SGA using CSI atherectomy and stent placement   • OTHER SURGICAL HISTORY  06/03/1997    Forearm or wrist surgery (1) - Excision, Sebaceous cyst. Mass, dorsum, left wrist.   • OTHER SURGICAL HISTORY  03/15/1990    Partial excision of lip sutured 19538 (1) - Vermiliionectomy with advancement of buccal mucosa. Excision of right cheek lesion. Excision of uvula lesion           Physical Exam  /85 (BP Location: Left arm)  Pulse 64  Temp 97.4 °F (36.3 °C) (Oral)   Ht 182.9 cm (72\")  Wt 97.5 kg (215 lb)  SpO2 90%  BMI 29.16 kg/m2    HEENT:No masses soft bruits over the carotid arteries    CHEST: Clear to auscultation    HEART: Irregular rhythm    ABDOMEN:    EXTREMITIES: Pulses are not palpable at the ankle " aeration posterior right calf superficial probably resulting from blistering and when the blister  decompressed superficial ulcer remained    VASCULAR LAB STUDIES:RIGHT VENOUS DUPLEX SCAN (12-21-17) No DVT, NO superficial phlebitis    ALEKSANDRA (12-21-17)             ALEKSANDRA RATIOS CANNOT BE CALCULATED DUE TO CALCIFICATION    WAVE FORMS                                    RIGHT                   LEFT                                 CF                          Triphasic               Triphasic                               POP                        Biphasic                Triphasic                                 PT                         Biphasic                 Biphasic                                 DP                         Biphasic                 Biphasic            RADIOLOGY: CT abdomen and pelvis 5/22/17 NO AAA      Claudication [I73.9]    IMPRESSION:  Edema with blistering and subsequent ulceration posterior aspect right leg  12 to 15 days' duration  Distal arterial occlusive disease bilaterally  Status post atherectomy and stent right superficial femoral artery  Arterial perfusion is probably adequate both lower extremities though not  normal                Assessment/Plan  Ravi was seen today for leg pain.    Diagnoses and all orders for this visit:    Claudication  -     Doppler Ankle Brachial Index Single Level CAR    Localized edema  -     Duplex Venous Lower Extremity - Right CAR                Plan   Unna boot right lower extremity base of the toes to below the knee and Unna boots will be changed 3 times weekly by home health  Return 12/28/17              Jack L. Hamman, MD  12/27/2017

## 2017-12-28 ENCOUNTER — OFFICE VISIT (OUTPATIENT)
Dept: CARDIAC SURGERY | Facility: CLINIC | Age: 82
End: 2017-12-28

## 2017-12-28 VITALS
HEIGHT: 72 IN | HEART RATE: 100 BPM | DIASTOLIC BLOOD PRESSURE: 80 MMHG | BODY MASS INDEX: 29.39 KG/M2 | OXYGEN SATURATION: 98 % | WEIGHT: 217 LBS | TEMPERATURE: 98.3 F | SYSTOLIC BLOOD PRESSURE: 122 MMHG

## 2017-12-28 DIAGNOSIS — I73.9 PERIPHERAL ARTERIAL DISEASE (HCC): Primary | ICD-10-CM

## 2017-12-28 PROCEDURE — 29580 STRAPPING UNNA BOOT: CPT | Performed by: THORACIC SURGERY (CARDIOTHORACIC VASCULAR SURGERY)

## 2017-12-28 PROCEDURE — 99212 OFFICE O/P EST SF 10 MIN: CPT | Performed by: THORACIC SURGERY (CARDIOTHORACIC VASCULAR SURGERY)

## 2017-12-30 NOTE — PROGRESS NOTES
Ravi Park  9/9/1930            87 y.o.      12/28/2017    Chief Complaint   Patient presents with   • Claudication     1 week follow up     Chief Complaint   Patient presents with   • Leg Pain   PCP Hugh [facility]  Cardiology : Dr. Hernández   Patient is seen today for nonhealing ulcer posterior aspect right     The patient has been followed and treated by  for essential hypertension, peripheral arterial disease with previous stent right superficial femoral artery 3/23/16, and chronic atrial fibrillation the patient previously was on anticoagulants with warfarin but developed bleeding from his urinary bladder and warfarin was discontinued           HPI  He recently developed edema and blistering of posterior aspect of the right leg and when the blister decompressed left a superficial ulceration which remains present        ROS    Atrial fibrillation, backache with chronic back pain due to probably due to osteoarthritis, benign prosthetic hyperplasia, essential hypertension, history of gout, history of herpes zoster, hypertension,  hypercholesterolemia,          HPI        ROS       Current Outpatient Prescriptions:   •  albuterol (PROVENTIL HFA;VENTOLIN HFA) 108 (90 BASE) MCG/ACT inhaler, Inhale 2 puffs Every 6 (Six) Hours As Needed for Wheezing. 2 Puffs 4 times per day as needed., Disp: 1 inhaler, Rfl: 11  •  aspirin 325 MG tablet, Take 1 tablet by mouth Daily., Disp: 30 tablet, Rfl: 11  •  doxazosin (CARDURA) 2 MG tablet, Take 1 tablet by mouth Every Night., Disp: 30 tablet, Rfl: 6  •  finasteride (PROSCAR) 5 MG tablet, Take 5 mg by mouth Daily., Disp: , Rfl:   •  fluticasone (FLONASE) 50 MCG/ACT nasal spray, 1 spray into each nostril., Disp: , Rfl:   •  levothyroxine (SYNTHROID, LEVOTHROID) 50 MCG tablet, Take 1 tablet by mouth Daily., Disp: 30 tablet, Rfl: 5  •  metoprolol succinate XL (TOPROL-XL) 25 MG 24 hr tablet, Take 1 tablet by mouth Daily., Disp: 30 tablet, Rfl: 11  •   oxyCODONE-acetaminophen (PERCOCET) 5-325 MG per tablet, Take 1 or 2 tablets daily as needed., Disp: 60 tablet, Rfl: 0  •  tamsulosin (FLOMAX) 0.4 MG capsule 24 hr capsule, Take 2 capsules by mouth Every Night., Disp: 60 capsule, Rfl: 11    The following portions of the patient's history were reviewed and updated as appropriate: allergies, current medications, past family history, past medical history, past social history, past surgical history and problem list.        Past Surgical History:   Procedure Laterality Date   • CATARACT EXTRACTION WITH INTRAOCULAR LENS IMPLANT  12/01/2011    Phacoemulsification of a cataract with intraocular lens implant of left eye, Jin SN-60-WF, serial no. [id number] diopter. Nuclear   • HERNIA REPAIR  12/20/1989    Inguinal hernia - Reduction and repair of right inguinal hernia. Bilateral inguinal hernias   • INJECTION OF MEDICATION  09/09/2016    Kenalog (5)     • OTHER SURGICAL HISTORY  12/14/2000    INCISION OF EARDRUM GENERAL ANESTHETIC 76225 (2)   • OTHER SURGICAL HISTORY  02/10/2016    Abdomen surgery procedure (1) - Abdominal aortogram with selective bilateral common femoral angiogram with runoff to the lower extremities   • OTHER SURGICAL HISTORY  07/09/2015    Apply Unna Boot 71336 (2) - COLLIN Arzate   • OTHER SURGICAL HISTORY  05/27/2016    Drain/Inject Major Joint 76814 (2) - COLLIN Arzate   • OTHER SURGICAL HISTORY  03/23/2016    Exploration, femoral artery (1) - Left common femoral artery access with contralateral stent placement to the right SGA using CSI atherectomy and stent placement   • OTHER SURGICAL HISTORY  06/03/1997    Forearm or wrist surgery (1) - Excision, Sebaceous cyst. Mass, dorsum, left wrist.   • OTHER SURGICAL HISTORY  03/15/1990    Partial excision of lip sutured 75927 (1) - Vermiliionectomy with advancement of buccal mucosa. Excision of right cheek lesion. Excision of uvula lesion           Physical Exam  /80 (BP Location: Left arm)  Pulse 100   "Temp 98.3 °F (36.8 °C) (Oral)   Ht 182.9 cm (72\")  Wt 98.4 kg (217 lb)  SpO2 98%  BMI 29.43 kg/m2    HEENT:    CHEST:    HEART:    ABDOMEN:    EXTREMITIES:    VASCULAR LAB STUDIES:Please recall venous duplex scan 12/21/17 demonstrated normal lower extremity venous duplex scan right lower extremity    Surveillance of stent right superficial femoral artery placed 3-23-16 by  remains widely patent.    Recall that ALEKSANDRA dated 12/21/17 demonstrated  distal femoral- popliteal arterial occlusive disease of the right and infrapopliteal occlusive disease on the left.  Despite these occlusions patient's arterial perfusion of both lower extremities is probably adequate at the present time        RADIOLOGY:      Peripheral arterial disease [I73.9]    IMPRESSION:  Multifactorial edema right lower extremity with blistering and when blisters were decompressed resultant  ulceration remained  No evidence of deep vein thrombosis                Assessment/Plan  Ravi was seen today for claudication.    Diagnoses and all orders for this visit:    Peripheral arterial disease  -     Duplex Lower Extremity Art / Grafts - Right CAR                Plan   Continue external compressive wraps, Unna boot right lower extremity  (RIGHT UNNA BOOT PLACED TODAY)  Return 1-11-18  HOME HEALTH ([facility]) will change dressings 3x weekly until return visit 1-11-17    Jack L. Hamman, MD  12/30/2017  "

## 2018-01-01 ENCOUNTER — PATIENT OUTREACH (OUTPATIENT)
Dept: CASE MANAGEMENT | Facility: OTHER | Age: 83
End: 2018-01-01

## 2018-01-01 ENCOUNTER — APPOINTMENT (OUTPATIENT)
Dept: INTERVENTIONAL RADIOLOGY/VASCULAR | Facility: HOSPITAL | Age: 83
End: 2018-01-01

## 2018-01-01 ENCOUNTER — APPOINTMENT (OUTPATIENT)
Dept: ULTRASOUND IMAGING | Facility: HOSPITAL | Age: 83
End: 2018-01-01

## 2018-01-01 ENCOUNTER — PREP FOR SURGERY (OUTPATIENT)
Dept: OTHER | Facility: HOSPITAL | Age: 83
End: 2018-01-01

## 2018-01-01 ENCOUNTER — ANESTHESIA (OUTPATIENT)
Dept: PERIOP | Facility: HOSPITAL | Age: 83
End: 2018-01-01

## 2018-01-01 ENCOUNTER — TELEPHONE (OUTPATIENT)
Dept: FAMILY MEDICINE CLINIC | Facility: CLINIC | Age: 83
End: 2018-01-01

## 2018-01-01 ENCOUNTER — APPOINTMENT (OUTPATIENT)
Dept: GENERAL RADIOLOGY | Facility: HOSPITAL | Age: 83
End: 2018-01-01

## 2018-01-01 ENCOUNTER — HOSPITAL ENCOUNTER (INPATIENT)
Facility: HOSPITAL | Age: 83
LOS: 6 days | Discharge: HOME HEALTH | End: 2018-04-28
Attending: EMERGENCY MEDICINE | Admitting: FAMILY MEDICINE

## 2018-01-01 ENCOUNTER — HOSPITAL ENCOUNTER (OUTPATIENT)
Facility: HOSPITAL | Age: 83
Discharge: HOME | End: 2018-10-31
Attending: INTERNAL MEDICINE | Admitting: INTERNAL MEDICINE

## 2018-01-01 ENCOUNTER — HOSPITAL ENCOUNTER (OUTPATIENT)
Facility: HOSPITAL | Age: 83
Setting detail: OBSERVATION
Discharge: NURSING, REHAB OR LONG-TERM CARE | End: 2018-07-18
Attending: EMERGENCY MEDICINE | Admitting: EMERGENCY MEDICINE

## 2018-01-01 ENCOUNTER — HOSPITAL ENCOUNTER (OUTPATIENT)
Facility: HOSPITAL | Age: 83
Discharge: NURSING, REHAB OR LONG-TERM CARE | End: 2018-06-26
Attending: INTERNAL MEDICINE | Admitting: INTERNAL MEDICINE

## 2018-01-01 ENCOUNTER — APPOINTMENT (OUTPATIENT)
Dept: CT IMAGING | Facility: HOSPITAL | Age: 83
End: 2018-01-01

## 2018-01-01 ENCOUNTER — OFFICE VISIT (OUTPATIENT)
Dept: CARDIAC SURGERY | Facility: CLINIC | Age: 83
End: 2018-01-01

## 2018-01-01 ENCOUNTER — OFFICE VISIT (OUTPATIENT)
Dept: FAMILY MEDICINE CLINIC | Facility: CLINIC | Age: 83
End: 2018-01-01

## 2018-01-01 ENCOUNTER — HOSPITAL ENCOUNTER (INPATIENT)
Facility: HOSPITAL | Age: 83
LOS: 9 days | Discharge: NURSING, REHAB OR LONG-TERM CARE | End: 2018-09-25
Attending: EMERGENCY MEDICINE | Admitting: FAMILY MEDICINE

## 2018-01-01 ENCOUNTER — EPISODE CHANGES (OUTPATIENT)
Dept: CASE MANAGEMENT | Facility: OTHER | Age: 83
End: 2018-01-01

## 2018-01-01 ENCOUNTER — APPOINTMENT (OUTPATIENT)
Dept: NUCLEAR MEDICINE | Facility: HOSPITAL | Age: 83
End: 2018-01-01

## 2018-01-01 ENCOUNTER — ANESTHESIA EVENT (OUTPATIENT)
Dept: PERIOP | Facility: HOSPITAL | Age: 83
End: 2018-01-01

## 2018-01-01 ENCOUNTER — OFFICE VISIT (OUTPATIENT)
Dept: CARDIOLOGY | Facility: CLINIC | Age: 83
End: 2018-01-01

## 2018-01-01 ENCOUNTER — HOSPITAL ENCOUNTER (EMERGENCY)
Facility: HOSPITAL | Age: 83
Discharge: NURSING, REHAB OR LONG-TERM CARE | End: 2018-07-21
Attending: EMERGENCY MEDICINE | Admitting: EMERGENCY MEDICINE

## 2018-01-01 ENCOUNTER — LAB (OUTPATIENT)
Dept: LAB | Facility: CLINIC | Age: 83
End: 2018-01-01

## 2018-01-01 ENCOUNTER — OUTSIDE FACILITY SERVICE (OUTPATIENT)
Dept: FAMILY MEDICINE CLINIC | Facility: CLINIC | Age: 83
End: 2018-01-01

## 2018-01-01 ENCOUNTER — HOSPITAL ENCOUNTER (OUTPATIENT)
Facility: HOSPITAL | Age: 83
Setting detail: HOSPITAL OUTPATIENT SURGERY
Discharge: NURSING, REHAB OR LONG-TERM CARE | End: 2018-07-20
Attending: UROLOGY | Admitting: UROLOGY

## 2018-01-01 ENCOUNTER — APPOINTMENT (OUTPATIENT)
Dept: CARDIOLOGY | Facility: HOSPITAL | Age: 83
End: 2018-01-01

## 2018-01-01 ENCOUNTER — HOSPITAL ENCOUNTER (INPATIENT)
Facility: HOSPITAL | Age: 83
LOS: 13 days | Discharge: NURSING, REHAB OR LONG-TERM CARE | End: 2018-06-07
Attending: EMERGENCY MEDICINE | Admitting: INTERNAL MEDICINE

## 2018-01-01 ENCOUNTER — APPOINTMENT (OUTPATIENT)
Dept: PULMONOLOGY | Facility: HOSPITAL | Age: 83
End: 2018-01-01
Attending: INTERNAL MEDICINE

## 2018-01-01 ENCOUNTER — OFFICE VISIT (OUTPATIENT)
Dept: WOUND CARE | Facility: HOSPITAL | Age: 83
End: 2018-01-01

## 2018-01-01 ENCOUNTER — APPOINTMENT (OUTPATIENT)
Dept: CARDIOLOGY | Facility: HOSPITAL | Age: 83
End: 2018-01-01
Attending: INTERNAL MEDICINE

## 2018-01-01 ENCOUNTER — HOSPITAL ENCOUNTER (INPATIENT)
Facility: HOSPITAL | Age: 83
LOS: 5 days | Discharge: ANOTHER HOSPITAL | End: 2018-11-20
Attending: FAMILY MEDICINE | Admitting: INTERNAL MEDICINE

## 2018-01-01 ENCOUNTER — OFFICE VISIT (OUTPATIENT)
Dept: SLEEP MEDICINE | Facility: HOSPITAL | Age: 83
End: 2018-01-01

## 2018-01-01 ENCOUNTER — OFFICE VISIT (OUTPATIENT)
Dept: PODIATRY | Facility: CLINIC | Age: 83
End: 2018-01-01

## 2018-01-01 ENCOUNTER — LAB REQUISITION (OUTPATIENT)
Dept: LAB | Facility: HOSPITAL | Age: 83
End: 2018-01-01

## 2018-01-01 ENCOUNTER — APPOINTMENT (OUTPATIENT)
Dept: WOUND CARE | Facility: HOSPITAL | Age: 83
End: 2018-01-01

## 2018-01-01 ENCOUNTER — HOSPITAL ENCOUNTER (INPATIENT)
Facility: HOSPITAL | Age: 83
LOS: 6 days | Discharge: NURSING, REHAB OR LONG-TERM CARE | End: 2018-09-14
Attending: FAMILY MEDICINE | Admitting: FAMILY MEDICINE

## 2018-01-01 ENCOUNTER — HOSPITAL ENCOUNTER (INPATIENT)
Facility: HOSPITAL | Age: 83
LOS: 15 days | Discharge: NURSING, REHAB OR LONG-TERM CARE | End: 2018-08-31
Attending: FAMILY MEDICINE | Admitting: FAMILY MEDICINE

## 2018-01-01 ENCOUNTER — APPOINTMENT (OUTPATIENT)
Dept: SLEEP MEDICINE | Facility: HOSPITAL | Age: 83
End: 2018-01-01
Attending: INTERNAL MEDICINE

## 2018-01-01 VITALS
HEART RATE: 72 BPM | OXYGEN SATURATION: 94 % | SYSTOLIC BLOOD PRESSURE: 100 MMHG | RESPIRATION RATE: 18 BRPM | DIASTOLIC BLOOD PRESSURE: 53 MMHG

## 2018-01-01 VITALS
TEMPERATURE: 97.4 F | HEART RATE: 78 BPM | HEIGHT: 72 IN | RESPIRATION RATE: 16 BRPM | WEIGHT: 173.6 LBS | OXYGEN SATURATION: 91 % | BODY MASS INDEX: 23.51 KG/M2 | DIASTOLIC BLOOD PRESSURE: 68 MMHG | SYSTOLIC BLOOD PRESSURE: 134 MMHG

## 2018-01-01 VITALS
HEIGHT: 72 IN | TEMPERATURE: 98.4 F | SYSTOLIC BLOOD PRESSURE: 137 MMHG | DIASTOLIC BLOOD PRESSURE: 75 MMHG | OXYGEN SATURATION: 94 % | BODY MASS INDEX: 28.89 KG/M2 | WEIGHT: 213.3 LBS | HEART RATE: 109 BPM

## 2018-01-01 VITALS
WEIGHT: 159.7 LBS | TEMPERATURE: 97.4 F | OXYGEN SATURATION: 93 % | RESPIRATION RATE: 18 BRPM | SYSTOLIC BLOOD PRESSURE: 124 MMHG | DIASTOLIC BLOOD PRESSURE: 62 MMHG | BODY MASS INDEX: 21.63 KG/M2 | HEIGHT: 72 IN | HEART RATE: 70 BPM

## 2018-01-01 VITALS
OXYGEN SATURATION: 94 % | SYSTOLIC BLOOD PRESSURE: 148 MMHG | HEART RATE: 92 BPM | BODY MASS INDEX: 26.01 KG/M2 | WEIGHT: 192 LBS | RESPIRATION RATE: 18 BRPM | DIASTOLIC BLOOD PRESSURE: 68 MMHG | HEIGHT: 72 IN | TEMPERATURE: 97.4 F

## 2018-01-01 VITALS
SYSTOLIC BLOOD PRESSURE: 135 MMHG | BODY MASS INDEX: 28 KG/M2 | HEIGHT: 71 IN | DIASTOLIC BLOOD PRESSURE: 63 MMHG | RESPIRATION RATE: 18 BRPM | OXYGEN SATURATION: 92 % | WEIGHT: 200 LBS | HEART RATE: 80 BPM | TEMPERATURE: 96.8 F

## 2018-01-01 VITALS
BODY MASS INDEX: 29.39 KG/M2 | TEMPERATURE: 98.4 F | HEART RATE: 61 BPM | OXYGEN SATURATION: 92 % | SYSTOLIC BLOOD PRESSURE: 120 MMHG | WEIGHT: 217 LBS | HEIGHT: 72 IN | DIASTOLIC BLOOD PRESSURE: 65 MMHG

## 2018-01-01 VITALS
OXYGEN SATURATION: 96 % | HEIGHT: 72 IN | HEART RATE: 95 BPM | WEIGHT: 212 LBS | DIASTOLIC BLOOD PRESSURE: 63 MMHG | BODY MASS INDEX: 28.71 KG/M2 | SYSTOLIC BLOOD PRESSURE: 110 MMHG | TEMPERATURE: 97.7 F

## 2018-01-01 VITALS
HEART RATE: 67 BPM | SYSTOLIC BLOOD PRESSURE: 147 MMHG | DIASTOLIC BLOOD PRESSURE: 65 MMHG | OXYGEN SATURATION: 92 % | WEIGHT: 168.2 LBS | TEMPERATURE: 96.7 F | BODY MASS INDEX: 22.78 KG/M2 | RESPIRATION RATE: 18 BRPM | HEIGHT: 72 IN

## 2018-01-01 VITALS
HEART RATE: 53 BPM | HEIGHT: 71 IN | TEMPERATURE: 97 F | BODY MASS INDEX: 24.64 KG/M2 | DIASTOLIC BLOOD PRESSURE: 57 MMHG | RESPIRATION RATE: 18 BRPM | WEIGHT: 176 LBS | OXYGEN SATURATION: 91 % | SYSTOLIC BLOOD PRESSURE: 104 MMHG

## 2018-01-01 VITALS
DIASTOLIC BLOOD PRESSURE: 66 MMHG | HEART RATE: 59 BPM | SYSTOLIC BLOOD PRESSURE: 120 MMHG | OXYGEN SATURATION: 97 % | TEMPERATURE: 98.3 F

## 2018-01-01 VITALS — HEART RATE: 79 BPM | DIASTOLIC BLOOD PRESSURE: 55 MMHG | HEIGHT: 71 IN | SYSTOLIC BLOOD PRESSURE: 105 MMHG

## 2018-01-01 VITALS
DIASTOLIC BLOOD PRESSURE: 68 MMHG | OXYGEN SATURATION: 98 % | TEMPERATURE: 97.7 F | HEART RATE: 78 BPM | SYSTOLIC BLOOD PRESSURE: 140 MMHG

## 2018-01-01 VITALS
HEIGHT: 72 IN | TEMPERATURE: 96.5 F | DIASTOLIC BLOOD PRESSURE: 65 MMHG | RESPIRATION RATE: 18 BRPM | WEIGHT: 168.4 LBS | BODY MASS INDEX: 22.81 KG/M2 | OXYGEN SATURATION: 92 % | HEART RATE: 94 BPM | SYSTOLIC BLOOD PRESSURE: 134 MMHG

## 2018-01-01 VITALS
HEIGHT: 72 IN | BODY MASS INDEX: 28.71 KG/M2 | DIASTOLIC BLOOD PRESSURE: 65 MMHG | HEART RATE: 88 BPM | OXYGEN SATURATION: 96 % | SYSTOLIC BLOOD PRESSURE: 148 MMHG | WEIGHT: 212 LBS

## 2018-01-01 VITALS
WEIGHT: 190.2 LBS | HEART RATE: 78 BPM | HEIGHT: 72 IN | SYSTOLIC BLOOD PRESSURE: 130 MMHG | OXYGEN SATURATION: 94 % | TEMPERATURE: 97 F | BODY MASS INDEX: 25.76 KG/M2 | DIASTOLIC BLOOD PRESSURE: 60 MMHG | RESPIRATION RATE: 18 BRPM

## 2018-01-01 VITALS
OXYGEN SATURATION: 93 % | SYSTOLIC BLOOD PRESSURE: 112 MMHG | HEIGHT: 72 IN | BODY MASS INDEX: 23.98 KG/M2 | HEART RATE: 96 BPM | WEIGHT: 177 LBS | DIASTOLIC BLOOD PRESSURE: 58 MMHG

## 2018-01-01 VITALS
RESPIRATION RATE: 18 BRPM | HEIGHT: 72 IN | HEART RATE: 80 BPM | BODY MASS INDEX: 24.78 KG/M2 | WEIGHT: 182.98 LBS | TEMPERATURE: 97 F | DIASTOLIC BLOOD PRESSURE: 79 MMHG | SYSTOLIC BLOOD PRESSURE: 145 MMHG | OXYGEN SATURATION: 94 %

## 2018-01-01 VITALS
TEMPERATURE: 97.5 F | WEIGHT: 218.2 LBS | HEART RATE: 94 BPM | BODY MASS INDEX: 29.55 KG/M2 | HEIGHT: 72 IN | SYSTOLIC BLOOD PRESSURE: 130 MMHG | OXYGEN SATURATION: 93 % | DIASTOLIC BLOOD PRESSURE: 72 MMHG

## 2018-01-01 VITALS
SYSTOLIC BLOOD PRESSURE: 143 MMHG | WEIGHT: 187.6 LBS | BODY MASS INDEX: 25.41 KG/M2 | HEART RATE: 92 BPM | OXYGEN SATURATION: 94 % | DIASTOLIC BLOOD PRESSURE: 69 MMHG | RESPIRATION RATE: 16 BRPM | HEIGHT: 72 IN | TEMPERATURE: 96.6 F

## 2018-01-01 VITALS
SYSTOLIC BLOOD PRESSURE: 126 MMHG | HEIGHT: 71 IN | HEART RATE: 64 BPM | OXYGEN SATURATION: 93 % | DIASTOLIC BLOOD PRESSURE: 62 MMHG

## 2018-01-01 VITALS
OXYGEN SATURATION: 94 % | SYSTOLIC BLOOD PRESSURE: 110 MMHG | TEMPERATURE: 99.5 F | DIASTOLIC BLOOD PRESSURE: 60 MMHG | HEART RATE: 83 BPM

## 2018-01-01 DIAGNOSIS — Z79.01 CHRONIC ANTICOAGULATION: ICD-10-CM

## 2018-01-01 DIAGNOSIS — I48.20 CHRONIC ATRIAL FIBRILLATION (HCC): ICD-10-CM

## 2018-01-01 DIAGNOSIS — Z74.09 IMPAIRED MOBILITY AND ACTIVITIES OF DAILY LIVING: ICD-10-CM

## 2018-01-01 DIAGNOSIS — R60.0 LOCALIZED EDEMA: Primary | ICD-10-CM

## 2018-01-01 DIAGNOSIS — N20.0 CALCULUS OF KIDNEY: Primary | ICD-10-CM

## 2018-01-01 DIAGNOSIS — N17.9 AKI (ACUTE KIDNEY INJURY) (HCC): ICD-10-CM

## 2018-01-01 DIAGNOSIS — M79.674 PAIN IN TOES OF BOTH FEET: ICD-10-CM

## 2018-01-01 DIAGNOSIS — I48.20 CHRONIC ATRIAL FIBRILLATION (HCC): Primary | Chronic | ICD-10-CM

## 2018-01-01 DIAGNOSIS — M54.2 NECK PAIN: ICD-10-CM

## 2018-01-01 DIAGNOSIS — N18.30 CKD (CHRONIC KIDNEY DISEASE) STAGE 3, GFR 30-59 ML/MIN (HCC): Chronic | ICD-10-CM

## 2018-01-01 DIAGNOSIS — Z78.9 IMPAIRED MOBILITY AND ACTIVITIES OF DAILY LIVING: ICD-10-CM

## 2018-01-01 DIAGNOSIS — R13.12 OROPHARYNGEAL DYSPHAGIA: ICD-10-CM

## 2018-01-01 DIAGNOSIS — I50.9 ACUTE CONGESTIVE HEART FAILURE, UNSPECIFIED CONGESTIVE HEART FAILURE TYPE: Primary | ICD-10-CM

## 2018-01-01 DIAGNOSIS — G47.33 OBSTRUCTIVE SLEEP APNEA, ADULT: Primary | ICD-10-CM

## 2018-01-01 DIAGNOSIS — Z74.09 IMPAIRED FUNCTIONAL MOBILITY, BALANCE, GAIT, AND ENDURANCE: ICD-10-CM

## 2018-01-01 DIAGNOSIS — I50.31 ACUTE DIASTOLIC CONGESTIVE HEART FAILURE (HCC): Primary | ICD-10-CM

## 2018-01-01 DIAGNOSIS — T83.511A URINARY TRACT INFECTION ASSOCIATED WITH INDWELLING URETHRAL CATHETER, INITIAL ENCOUNTER (HCC): Primary | ICD-10-CM

## 2018-01-01 DIAGNOSIS — I70.343: ICD-10-CM

## 2018-01-01 DIAGNOSIS — N30.01 ACUTE CYSTITIS WITH HEMATURIA: Primary | ICD-10-CM

## 2018-01-01 DIAGNOSIS — I48.0 PAROXYSMAL ATRIAL FIBRILLATION (HCC): ICD-10-CM

## 2018-01-01 DIAGNOSIS — R06.02 SHORTNESS OF BREATH: ICD-10-CM

## 2018-01-01 DIAGNOSIS — Z78.9 IMPAIRED MOBILITY AND ADLS: ICD-10-CM

## 2018-01-01 DIAGNOSIS — Z74.09 IMPAIRED FUNCTIONAL MOBILITY AND ENDURANCE: ICD-10-CM

## 2018-01-01 DIAGNOSIS — I73.9 PERIPHERAL ARTERIAL DISEASE (HCC): ICD-10-CM

## 2018-01-01 DIAGNOSIS — N47.8 FORESKIN PROBLEM: Primary | ICD-10-CM

## 2018-01-01 DIAGNOSIS — N18.30 CHRONIC KIDNEY DISEASE, STAGE III (MODERATE) (HCC): ICD-10-CM

## 2018-01-01 DIAGNOSIS — S91.302S OPEN WOUND OF LEFT HEEL, SEQUELA: Primary | ICD-10-CM

## 2018-01-01 DIAGNOSIS — A41.9 SEPSIS, DUE TO UNSPECIFIED ORGANISM: Primary | ICD-10-CM

## 2018-01-01 DIAGNOSIS — J18.9 PNEUMONIA DUE TO INFECTIOUS ORGANISM, UNSPECIFIED LATERALITY, UNSPECIFIED PART OF LUNG: Primary | ICD-10-CM

## 2018-01-01 DIAGNOSIS — I73.9 PAD (PERIPHERAL ARTERY DISEASE) (HCC): ICD-10-CM

## 2018-01-01 DIAGNOSIS — N20.0 NEPHROLITHIASIS: Chronic | ICD-10-CM

## 2018-01-01 DIAGNOSIS — R77.8 ELEVATED TROPONIN: ICD-10-CM

## 2018-01-01 DIAGNOSIS — G89.29 HEEL PAIN, CHRONIC, LEFT: ICD-10-CM

## 2018-01-01 DIAGNOSIS — R13.13 PHARYNGEAL DYSPHAGIA: ICD-10-CM

## 2018-01-01 DIAGNOSIS — I73.9 PVD (PERIPHERAL VASCULAR DISEASE) WITH CLAUDICATION (HCC): ICD-10-CM

## 2018-01-01 DIAGNOSIS — M79.672 HEEL PAIN, CHRONIC, LEFT: ICD-10-CM

## 2018-01-01 DIAGNOSIS — N17.1 ACUTE RENAL FAILURE WITH ACUTE CORTICAL NECROSIS (HCC): ICD-10-CM

## 2018-01-01 DIAGNOSIS — E78.00 PURE HYPERCHOLESTEROLEMIA: ICD-10-CM

## 2018-01-01 DIAGNOSIS — N20.0 CALCULUS OF KIDNEY: ICD-10-CM

## 2018-01-01 DIAGNOSIS — R13.10 DYSPHAGIA, UNSPECIFIED TYPE: ICD-10-CM

## 2018-01-01 DIAGNOSIS — R79.89 ELEVATED BRAIN NATRIURETIC PEPTIDE (BNP) LEVEL: ICD-10-CM

## 2018-01-01 DIAGNOSIS — M54.2 NECK PAIN: Primary | ICD-10-CM

## 2018-01-01 DIAGNOSIS — N39.0 URINARY TRACT INFECTION: ICD-10-CM

## 2018-01-01 DIAGNOSIS — D72.829 LEUKOCYTOSIS, UNSPECIFIED TYPE: ICD-10-CM

## 2018-01-01 DIAGNOSIS — I50.33 ACUTE ON CHRONIC DIASTOLIC HEART FAILURE (HCC): ICD-10-CM

## 2018-01-01 DIAGNOSIS — N17.9 ACUTE RENAL FAILURE, UNSPECIFIED ACUTE RENAL FAILURE TYPE (HCC): ICD-10-CM

## 2018-01-01 DIAGNOSIS — I73.9 PVD (PERIPHERAL VASCULAR DISEASE) WITH CLAUDICATION (HCC): Primary | ICD-10-CM

## 2018-01-01 DIAGNOSIS — R41.82 ALTERED MENTAL STATUS, UNSPECIFIED ALTERED MENTAL STATUS TYPE: ICD-10-CM

## 2018-01-01 DIAGNOSIS — M79.675 PAIN IN TOES OF BOTH FEET: ICD-10-CM

## 2018-01-01 DIAGNOSIS — I21.4 NSTEMI (NON-ST ELEVATED MYOCARDIAL INFARCTION) (HCC): ICD-10-CM

## 2018-01-01 DIAGNOSIS — Z74.09 IMPAIRED MOBILITY AND ADLS: ICD-10-CM

## 2018-01-01 DIAGNOSIS — N13.39 OTHER HYDRONEPHROSIS: Chronic | ICD-10-CM

## 2018-01-01 DIAGNOSIS — T83.512A URINARY TRACT INFECTION ASSOCIATED WITH NEPHROSTOMY CATHETER, INITIAL ENCOUNTER (HCC): ICD-10-CM

## 2018-01-01 DIAGNOSIS — S91.302A NON-HEALING WOUND OF LEFT HEEL: Primary | ICD-10-CM

## 2018-01-01 DIAGNOSIS — S81.802S OPEN WOUND OF LEFT LOWER LEG, SEQUELA: ICD-10-CM

## 2018-01-01 DIAGNOSIS — I50.30 (HFPEF) HEART FAILURE WITH PRESERVED EJECTION FRACTION (HCC): ICD-10-CM

## 2018-01-01 DIAGNOSIS — B35.1 ONYCHOMYCOSIS: Primary | ICD-10-CM

## 2018-01-01 DIAGNOSIS — D64.9 ANEMIA, UNSPECIFIED TYPE: ICD-10-CM

## 2018-01-01 DIAGNOSIS — N39.0 URINARY TRACT INFECTION ASSOCIATED WITH NEPHROSTOMY CATHETER, INITIAL ENCOUNTER (HCC): ICD-10-CM

## 2018-01-01 DIAGNOSIS — Z46.6 ENCOUNTER FOR FOLEY CATHETER REPLACEMENT: ICD-10-CM

## 2018-01-01 DIAGNOSIS — N17.9 AKI (ACUTE KIDNEY INJURY) (HCC): Primary | ICD-10-CM

## 2018-01-01 DIAGNOSIS — R33.9 URINARY RETENTION: Primary | ICD-10-CM

## 2018-01-01 DIAGNOSIS — R53.83 LETHARGIC: ICD-10-CM

## 2018-01-01 DIAGNOSIS — N39.0 URINARY TRACT INFECTION ASSOCIATED WITH INDWELLING URETHRAL CATHETER, INITIAL ENCOUNTER (HCC): Primary | ICD-10-CM

## 2018-01-01 DIAGNOSIS — R09.02 HYPOXIA: ICD-10-CM

## 2018-01-01 LAB
25(OH)D3 SERPL-MCNC: 19.3 NG/ML (ref 30–100)
ABO + RH BLD: NORMAL
ABO GROUP BLD: NORMAL
ABO GROUP BLD: NORMAL
ALBUMIN SERPL-MCNC: 2.4 G/DL (ref 3.4–4.8)
ALBUMIN SERPL-MCNC: 2.6 G/DL (ref 3.4–4.8)
ALBUMIN SERPL-MCNC: 2.7 G/DL (ref 3.4–4.8)
ALBUMIN SERPL-MCNC: 2.8 G/DL (ref 3.4–4.8)
ALBUMIN SERPL-MCNC: 2.9 G/DL (ref 3.4–4.8)
ALBUMIN SERPL-MCNC: 3 G/DL (ref 3.4–4.8)
ALBUMIN SERPL-MCNC: 3.1 G/DL (ref 3.4–4.8)
ALBUMIN SERPL-MCNC: 3.2 G/DL (ref 3.4–4.8)
ALBUMIN SERPL-MCNC: 3.3 G/DL (ref 3.4–4.8)
ALBUMIN SERPL-MCNC: 3.4 G/DL (ref 3.4–4.8)
ALBUMIN SERPL-MCNC: 3.5 G/DL (ref 3.4–4.8)
ALBUMIN SERPL-MCNC: 3.5 G/DL (ref 3.4–4.8)
ALBUMIN SERPL-MCNC: 3.6 G/DL (ref 3.4–4.8)
ALBUMIN SERPL-MCNC: 3.7 G/DL (ref 3.4–4.8)
ALBUMIN SERPL-MCNC: 3.8 G/DL (ref 3.4–4.8)
ALBUMIN/GLOB SERPL: 0.7 G/DL (ref 1.1–1.8)
ALBUMIN/GLOB SERPL: 0.8 G/DL (ref 1.1–1.8)
ALBUMIN/GLOB SERPL: 0.9 G/DL (ref 1.1–1.8)
ALBUMIN/GLOB SERPL: 1 G/DL (ref 1.1–1.8)
ALBUMIN/GLOB SERPL: 1 G/DL (ref 1.1–1.8)
ALP SERPL-CCNC: 100 U/L (ref 38–126)
ALP SERPL-CCNC: 101 U/L (ref 38–126)
ALP SERPL-CCNC: 101 U/L (ref 38–126)
ALP SERPL-CCNC: 102 U/L (ref 38–126)
ALP SERPL-CCNC: 103 U/L (ref 38–126)
ALP SERPL-CCNC: 104 U/L (ref 38–126)
ALP SERPL-CCNC: 105 U/L (ref 38–126)
ALP SERPL-CCNC: 106 U/L (ref 38–126)
ALP SERPL-CCNC: 106 U/L (ref 38–126)
ALP SERPL-CCNC: 108 U/L (ref 38–126)
ALP SERPL-CCNC: 109 U/L (ref 38–126)
ALP SERPL-CCNC: 111 U/L (ref 38–126)
ALP SERPL-CCNC: 111 U/L (ref 38–126)
ALP SERPL-CCNC: 112 U/L (ref 38–126)
ALP SERPL-CCNC: 113 U/L (ref 38–126)
ALP SERPL-CCNC: 114 U/L (ref 38–126)
ALP SERPL-CCNC: 118 U/L (ref 38–126)
ALP SERPL-CCNC: 121 U/L (ref 38–126)
ALP SERPL-CCNC: 122 U/L (ref 38–126)
ALP SERPL-CCNC: 122 U/L (ref 38–126)
ALP SERPL-CCNC: 123 U/L (ref 38–126)
ALP SERPL-CCNC: 124 U/L (ref 38–126)
ALP SERPL-CCNC: 125 U/L (ref 38–126)
ALP SERPL-CCNC: 125 U/L (ref 38–126)
ALP SERPL-CCNC: 128 U/L (ref 38–126)
ALP SERPL-CCNC: 129 U/L (ref 38–126)
ALP SERPL-CCNC: 133 U/L (ref 38–126)
ALP SERPL-CCNC: 135 U/L (ref 38–126)
ALP SERPL-CCNC: 139 U/L (ref 38–126)
ALP SERPL-CCNC: 177 U/L (ref 38–126)
ALP SERPL-CCNC: 256 U/L (ref 38–126)
ALP SERPL-CCNC: 70 U/L (ref 38–126)
ALP SERPL-CCNC: 70 U/L (ref 38–126)
ALP SERPL-CCNC: 73 U/L (ref 38–126)
ALP SERPL-CCNC: 77 U/L (ref 38–126)
ALP SERPL-CCNC: 78 U/L (ref 38–126)
ALP SERPL-CCNC: 79 U/L (ref 38–126)
ALP SERPL-CCNC: 84 U/L (ref 38–126)
ALP SERPL-CCNC: 87 U/L (ref 38–126)
ALP SERPL-CCNC: 88 U/L (ref 38–126)
ALP SERPL-CCNC: 92 U/L (ref 38–126)
ALP SERPL-CCNC: 94 U/L (ref 38–126)
ALP SERPL-CCNC: 94 U/L (ref 38–126)
ALP SERPL-CCNC: 95 U/L (ref 38–126)
ALP SERPL-CCNC: 96 U/L (ref 38–126)
ALP SERPL-CCNC: 96 U/L (ref 38–126)
ALP SERPL-CCNC: 97 U/L (ref 38–126)
ALP SERPL-CCNC: 98 U/L (ref 38–126)
ALP SERPL-CCNC: 99 U/L (ref 38–126)
ALP SERPL-CCNC: 99 U/L (ref 38–126)
ALT SERPL W P-5'-P-CCNC: 10 U/L (ref 21–72)
ALT SERPL W P-5'-P-CCNC: 11 U/L (ref 21–72)
ALT SERPL W P-5'-P-CCNC: 11 U/L (ref 21–72)
ALT SERPL W P-5'-P-CCNC: 12 U/L (ref 21–72)
ALT SERPL W P-5'-P-CCNC: 13 U/L (ref 21–72)
ALT SERPL W P-5'-P-CCNC: 14 U/L (ref 21–72)
ALT SERPL W P-5'-P-CCNC: 15 U/L (ref 21–72)
ALT SERPL W P-5'-P-CCNC: 16 U/L (ref 21–72)
ALT SERPL W P-5'-P-CCNC: 17 U/L (ref 21–72)
ALT SERPL W P-5'-P-CCNC: 18 U/L (ref 21–72)
ALT SERPL W P-5'-P-CCNC: 19 U/L (ref 21–72)
ALT SERPL W P-5'-P-CCNC: 20 U/L (ref 21–72)
ALT SERPL W P-5'-P-CCNC: 21 U/L (ref 21–72)
ALT SERPL W P-5'-P-CCNC: 21 U/L (ref 21–72)
ALT SERPL W P-5'-P-CCNC: 22 U/L (ref 21–72)
ALT SERPL W P-5'-P-CCNC: 23 U/L (ref 21–72)
ALT SERPL W P-5'-P-CCNC: 23 U/L (ref 21–72)
ALT SERPL W P-5'-P-CCNC: 24 U/L (ref 21–72)
ALT SERPL W P-5'-P-CCNC: 25 U/L (ref 21–72)
ALT SERPL W P-5'-P-CCNC: 25 U/L (ref 21–72)
ALT SERPL W P-5'-P-CCNC: 26 U/L (ref 21–72)
ALT SERPL W P-5'-P-CCNC: 27 U/L (ref 21–72)
ALT SERPL W P-5'-P-CCNC: 29 U/L (ref 21–72)
ALT SERPL W P-5'-P-CCNC: 30 U/L (ref 21–72)
ALT SERPL W P-5'-P-CCNC: 30 U/L (ref 21–72)
ALT SERPL W P-5'-P-CCNC: 34 U/L (ref 21–72)
ALT SERPL W P-5'-P-CCNC: 35 U/L (ref 21–72)
ALT SERPL W P-5'-P-CCNC: 37 U/L (ref 21–72)
ALT SERPL W P-5'-P-CCNC: 38 U/L (ref 21–72)
ALT SERPL W P-5'-P-CCNC: 42 U/L (ref 21–72)
ALT SERPL W P-5'-P-CCNC: 48 U/L (ref 21–72)
ALT SERPL W P-5'-P-CCNC: 7 U/L (ref 21–72)
ALT SERPL W P-5'-P-CCNC: 8 U/L (ref 21–72)
ALT SERPL W P-5'-P-CCNC: 8 U/L (ref 21–72)
ALT SERPL W P-5'-P-CCNC: 85 U/L (ref 21–72)
ALT SERPL W P-5'-P-CCNC: <6 U/L (ref 21–72)
ALT SERPL W P-5'-P-CCNC: <6 U/L (ref 21–72)
AMORPH URATE CRY URNS QL MICRO: ABNORMAL /HPF
AMPHET+METHAMPHET UR QL: NEGATIVE
AMPHOTERICIN B ISLT MIC: NORMAL
ANIDULAFUNGIN ISLT MIC: NORMAL
ANION GAP SERPL CALCULATED.3IONS-SCNC: 10 MMOL/L (ref 5–15)
ANION GAP SERPL CALCULATED.3IONS-SCNC: 11 MMOL/L (ref 5–15)
ANION GAP SERPL CALCULATED.3IONS-SCNC: 12 MMOL/L (ref 5–15)
ANION GAP SERPL CALCULATED.3IONS-SCNC: 12 MMOL/L (ref 5–15)
ANION GAP SERPL CALCULATED.3IONS-SCNC: 13 MMOL/L (ref 5–15)
ANION GAP SERPL CALCULATED.3IONS-SCNC: 13 MMOL/L (ref 5–15)
ANION GAP SERPL CALCULATED.3IONS-SCNC: 14 MMOL/L (ref 5–15)
ANION GAP SERPL CALCULATED.3IONS-SCNC: 14 MMOL/L (ref 5–15)
ANION GAP SERPL CALCULATED.3IONS-SCNC: 15 MMOL/L (ref 5–15)
ANION GAP SERPL CALCULATED.3IONS-SCNC: 15 MMOL/L (ref 5–15)
ANION GAP SERPL CALCULATED.3IONS-SCNC: 18 MMOL/L (ref 5–15)
ANION GAP SERPL CALCULATED.3IONS-SCNC: 4 MMOL/L (ref 5–15)
ANION GAP SERPL CALCULATED.3IONS-SCNC: 4 MMOL/L (ref 5–15)
ANION GAP SERPL CALCULATED.3IONS-SCNC: 5 MMOL/L (ref 5–15)
ANION GAP SERPL CALCULATED.3IONS-SCNC: 6 MMOL/L (ref 5–15)
ANION GAP SERPL CALCULATED.3IONS-SCNC: 7 MMOL/L (ref 5–15)
ANION GAP SERPL CALCULATED.3IONS-SCNC: 8 MMOL/L (ref 5–15)
ANION GAP SERPL CALCULATED.3IONS-SCNC: 9 MMOL/L (ref 5–15)
ANISOCYTOSIS BLD QL: ABNORMAL
ANISOCYTOSIS BLD QL: NORMAL
AORTIC DIMENSIONLESS INDEX: 0.5 (DI)
ARTERIAL PATENCY WRIST A: ABNORMAL
ARTERIAL PATENCY WRIST A: POSITIVE
ARTICHOKE IGE QN: 43 MG/DL (ref 1–129)
AST SERPL-CCNC: 14 U/L (ref 17–59)
AST SERPL-CCNC: 14 U/L (ref 17–59)
AST SERPL-CCNC: 16 U/L (ref 17–59)
AST SERPL-CCNC: 17 U/L (ref 17–59)
AST SERPL-CCNC: 17 U/L (ref 17–59)
AST SERPL-CCNC: 18 U/L (ref 17–59)
AST SERPL-CCNC: 19 U/L (ref 17–59)
AST SERPL-CCNC: 20 U/L (ref 17–59)
AST SERPL-CCNC: 22 U/L (ref 17–59)
AST SERPL-CCNC: 23 U/L (ref 17–59)
AST SERPL-CCNC: 24 U/L (ref 17–59)
AST SERPL-CCNC: 25 U/L (ref 17–59)
AST SERPL-CCNC: 26 U/L (ref 17–59)
AST SERPL-CCNC: 27 U/L (ref 17–59)
AST SERPL-CCNC: 27 U/L (ref 17–59)
AST SERPL-CCNC: 28 U/L (ref 17–59)
AST SERPL-CCNC: 29 U/L (ref 17–59)
AST SERPL-CCNC: 29 U/L (ref 17–59)
AST SERPL-CCNC: 30 U/L (ref 17–59)
AST SERPL-CCNC: 31 U/L (ref 17–59)
AST SERPL-CCNC: 33 U/L (ref 17–59)
AST SERPL-CCNC: 34 U/L (ref 17–59)
AST SERPL-CCNC: 34 U/L (ref 17–59)
AST SERPL-CCNC: 35 U/L (ref 17–59)
AST SERPL-CCNC: 37 U/L (ref 17–59)
AST SERPL-CCNC: 37 U/L (ref 17–59)
AST SERPL-CCNC: 40 U/L (ref 17–59)
AST SERPL-CCNC: 41 U/L (ref 17–59)
AST SERPL-CCNC: 41 U/L (ref 17–59)
AST SERPL-CCNC: 42 U/L (ref 17–59)
AST SERPL-CCNC: 59 U/L (ref 17–59)
AST SERPL-CCNC: 61 U/L (ref 17–59)
ATMOSPHERIC PRESS: 745 MMHG
ATMOSPHERIC PRESS: 748 MMHG
ATMOSPHERIC PRESS: 749 MMHG
ATMOSPHERIC PRESS: 753 MMHG
ATMOSPHERIC PRESS: ABNORMAL MMHG
B PERT DNA SPEC QL NAA+PROBE: NOT DETECTED
BACTERIA BLD CULT: ABNORMAL
BACTERIA FLD CULT: ABNORMAL
BACTERIA SPEC AEROBE CULT: ABNORMAL
BACTERIA SPEC AEROBE CULT: NORMAL
BACTERIA UR QL AUTO: ABNORMAL /HPF
BARBITURATES UR QL SCN: NEGATIVE
BASE EXCESS BLDA CALC-SCNC: -0.9 MMOL/L (ref -2.4–2.4)
BASE EXCESS BLDA CALC-SCNC: -1.5 MMOL/L (ref 0–2)
BASE EXCESS BLDA CALC-SCNC: -2.5 MMOL/L (ref 0–2)
BASE EXCESS BLDA CALC-SCNC: 0.2 MMOL/L (ref 0–2)
BASE EXCESS BLDA CALC-SCNC: 4.8 MMOL/L (ref 0–2)
BASOPHILS # BLD AUTO: 0 10*3/MM3 (ref 0–0.2)
BASOPHILS # BLD AUTO: 0.01 10*3/MM3 (ref 0–0.2)
BASOPHILS # BLD AUTO: 0.02 10*3/MM3 (ref 0–0.2)
BASOPHILS # BLD AUTO: 0.03 10*3/MM3 (ref 0–0.2)
BASOPHILS # BLD MANUAL: 0.04 10*3/MM3 (ref 0–0.2)
BASOPHILS NFR BLD AUTO: 0 % (ref 0–2)
BASOPHILS NFR BLD AUTO: 0.1 % (ref 0–2)
BASOPHILS NFR BLD AUTO: 0.2 % (ref 0–2)
BASOPHILS NFR BLD AUTO: 0.3 % (ref 0–2)
BASOPHILS NFR BLD AUTO: 0.4 % (ref 0–2)
BASOPHILS NFR BLD AUTO: 0.4 % (ref 0–2)
BASOPHILS NFR BLD AUTO: 0.5 % (ref 0–2)
BASOPHILS NFR BLD AUTO: 0.6 % (ref 0–2)
BASOPHILS NFR BLD AUTO: 1 % (ref 0–2)
BDY SITE: ABNORMAL
BDY SITE: NORMAL
BENZODIAZ UR QL SCN: NEGATIVE
BH BB BLOOD EXPIRATION DATE: NORMAL
BH BB BLOOD TYPE BARCODE: 6200
BH BB DISPENSE STATUS: NORMAL
BH BB PRODUCT CODE: NORMAL
BH BB UNIT NUMBER: NORMAL
BH CV ECHO MEAS - ACS: 1 CM
BH CV ECHO MEAS - AO ISTHMUS: 2.1 CM
BH CV ECHO MEAS - AO MAX PG (FULL): 11 MMHG
BH CV ECHO MEAS - AO MAX PG: 18 MMHG
BH CV ECHO MEAS - AO MEAN PG (FULL): 7 MMHG
BH CV ECHO MEAS - AO MEAN PG: 10 MMHG
BH CV ECHO MEAS - AO ROOT AREA (BSA CORRECTED): 1.6
BH CV ECHO MEAS - AO ROOT AREA: 8.6 CM^2
BH CV ECHO MEAS - AO ROOT DIAM: 3.3 CM
BH CV ECHO MEAS - AO V2 MAX: 212 CM/SEC
BH CV ECHO MEAS - AO V2 MEAN: 147 CM/SEC
BH CV ECHO MEAS - AO V2 VTI: 41.9 CM
BH CV ECHO MEAS - AVA(I,A): 0.84 CM^2
BH CV ECHO MEAS - AVA(I,D): 1.7 CM^2
BH CV ECHO MEAS - AVA(V,A): 0.96 CM^2
BH CV ECHO MEAS - AVA(V,D): 0.96 CM^2
BH CV ECHO MEAS - BSA(HAYCOCK): 2.1 M^2
BH CV ECHO MEAS - BSA: 2.1 M^2
BH CV ECHO MEAS - BZI_BMI: 26.9 KILOGRAMS/M^2
BH CV ECHO MEAS - BZI_METRIC_HEIGHT: 182.9 CM
BH CV ECHO MEAS - BZI_METRIC_WEIGHT: 89.8 KG
BH CV ECHO MEAS - EDV(CUBED): 58 ML
BH CV ECHO MEAS - EDV(TEICH): 64.7 ML
BH CV ECHO MEAS - EF(CUBED): 93.3 %
BH CV ECHO MEAS - EF(TEICH): 89.5 %
BH CV ECHO MEAS - ESV(CUBED): 3.9 ML
BH CV ECHO MEAS - ESV(TEICH): 6.8 ML
BH CV ECHO MEAS - FS: 59.4 %
BH CV ECHO MEAS - IVS/LVPW: 1.5
BH CV ECHO MEAS - IVSD: 2 CM
BH CV ECHO MEAS - LA DIMENSION: 4.1 CM
BH CV ECHO MEAS - LA/AO: 1.2
BH CV ECHO MEAS - LV MASS(C)D: 259.1 GRAMS
BH CV ECHO MEAS - LV MASS(C)DI: 122.1 GRAMS/M^2
BH CV ECHO MEAS - LV MAX PG: 7 MMHG
BH CV ECHO MEAS - LV MEAN PG: 3 MMHG
BH CV ECHO MEAS - LV V1 MAX: 132 CM/SEC
BH CV ECHO MEAS - LV V1 MEAN: 76.9 CM/SEC
BH CV ECHO MEAS - LV V1 VTI: 22.8 CM
BH CV ECHO MEAS - LVIDD: 3.9 CM
BH CV ECHO MEAS - LVIDS: 1.6 CM
BH CV ECHO MEAS - LVOT AREA (M): 1.5 CM^2
BH CV ECHO MEAS - LVOT AREA: 1.5 CM^2
BH CV ECHO MEAS - LVOT DIAM: 2 CM
BH CV ECHO MEAS - LVPWD: 1.3 CM
BH CV ECHO MEAS - MV DEC SLOPE: 379 CM/SEC^2
BH CV ECHO MEAS - MV MAX PG: 11 MMHG
BH CV ECHO MEAS - MV MEAN PG: 4 MMHG
BH CV ECHO MEAS - MV P1/2T MAX VEL: 167 CM/SEC
BH CV ECHO MEAS - MV P1/2T: 129.1 MSEC
BH CV ECHO MEAS - MV V2 MAX: 166 CM/SEC
BH CV ECHO MEAS - MV V2 MEAN: 83.7 CM/SEC
BH CV ECHO MEAS - MV V2 VTI: 40.3 CM
BH CV ECHO MEAS - MVA P1/2T LCG: 1.3 CM^2
BH CV ECHO MEAS - MVA(P1/2T): 1.7 CM^2
BH CV ECHO MEAS - MVA(VTI): 0.87 CM^2
BH CV ECHO MEAS - PA MAX PG: 18.3 MMHG
BH CV ECHO MEAS - PA V2 MAX: 214 CM/SEC
BH CV ECHO MEAS - PI END-D VEL: 52.8 CM/SEC
BH CV ECHO MEAS - RAP SYSTOLE: 5 MMHG
BH CV ECHO MEAS - RVDD: 2.4 CM
BH CV ECHO MEAS - RVSP: 45 MMHG
BH CV ECHO MEAS - SI(AO): 168.9 ML/M^2
BH CV ECHO MEAS - SI(CUBED): 25.5 ML/M^2
BH CV ECHO MEAS - SI(LVOT): 16.5 ML/M^2
BH CV ECHO MEAS - SI(TEICH): 27.3 ML/M^2
BH CV ECHO MEAS - SV(AO): 358.4 ML
BH CV ECHO MEAS - SV(CUBED): 54.1 ML
BH CV ECHO MEAS - SV(LVOT): 35.1 ML
BH CV ECHO MEAS - SV(TEICH): 57.9 ML
BH CV ECHO MEAS - TAPSE (>1.6): 1.3 CM2
BH CV ECHO MEAS - TR MAX VEL: 317 CM/SEC
BH CV LEA RIGHT CFA PROX EDV: 68 CM/S
BH CV LEA RIGHT CFA PROX PSV: 272 CM/S
BH CV LEA RIGHT DFA PROX EDV: 0 CM/S
BH CV LEA RIGHT DFA PROX PSV: 131 CM/S
BH CV LEA RIGHT POPITEAL A  DISTAL EDV: 16 CM/S
BH CV LEA RIGHT POPITEAL A  DISTAL PSV: 92 CM/S
BH CV LEA RIGHT POPITEAL A  MID EDV: 9 CM/S
BH CV LEA RIGHT POPITEAL A  MID PSV: 51 CM/S
BH CV LEA RIGHT POPITEAL A  PROX EDV: 10 CM/S
BH CV LEA RIGHT POPITEAL A  PROX PSV: 89 CM/S
BH CV LEA RIGHT SFA DISTAL EDV: 11 CM/S
BH CV LEA RIGHT SFA DISTAL PSV: 94 CM/S
BH CV LEA RIGHT SFA MID EDV: 12 CM/S
BH CV LEA RIGHT SFA MID PSV: 130 CM/S
BH CV LEA RIGHT SFA PROX EDV: 28 CM/S
BH CV LEA RIGHT SFA PROX PSV: 246 CM/S
BH CV LOWER ARTERIAL LEFT DORSALIS PEDIS SYS MAX: 255 MMHG
BH CV LOWER ARTERIAL LEFT POST TIBIAL SYS MAX: 255 MMHG
BH CV LOWER ARTERIAL LEFT TBI RATIO: 0.15
BH CV LOWER ARTERIAL RIGHT DORSALIS PEDIS SYS MAX: 255 MMHG
BH CV LOWER ARTERIAL RIGHT POST TIBIAL SYS MAX: 255 MMHG
BH CV LOWER ARTERIAL RIGHT TBI RATIO: 0.32
BH CV STRESS BP STAGE 1: NORMAL
BH CV STRESS COMMENTS STAGE 1: NORMAL
BH CV STRESS DOSE REGADENOSON STAGE 1: 0.4
BH CV STRESS DURATION MIN STAGE 1: 0
BH CV STRESS DURATION SEC STAGE 1: 10
BH CV STRESS HR STAGE 1: 97
BH CV STRESS PROTOCOL 1: NORMAL
BH CV STRESS RECOVERY BP: NORMAL MMHG
BH CV STRESS RECOVERY HR: 108 BPM
BH CV STRESS STAGE 1: 1
BILIRUB SERPL-MCNC: 0.2 MG/DL (ref 0.2–1.3)
BILIRUB SERPL-MCNC: 0.3 MG/DL (ref 0.2–1.3)
BILIRUB SERPL-MCNC: 0.4 MG/DL (ref 0.2–1.3)
BILIRUB SERPL-MCNC: 0.5 MG/DL (ref 0.2–1.3)
BILIRUB SERPL-MCNC: 0.6 MG/DL (ref 0.2–1.3)
BILIRUB SERPL-MCNC: 0.7 MG/DL (ref 0.2–1.3)
BILIRUB SERPL-MCNC: 0.8 MG/DL (ref 0.2–1.3)
BILIRUB SERPL-MCNC: 0.8 MG/DL (ref 0.2–1.3)
BILIRUB SERPL-MCNC: 0.9 MG/DL (ref 0.2–1.3)
BILIRUB SERPL-MCNC: 0.9 MG/DL (ref 0.2–1.3)
BILIRUB SERPL-MCNC: 1 MG/DL (ref 0.2–1.3)
BILIRUB UR QL STRIP: ABNORMAL
BILIRUB UR QL STRIP: ABNORMAL
BILIRUB UR QL STRIP: NEGATIVE
BLD GP AB SCN SERPL QL: NEGATIVE
BLD GP AB SCN SERPL QL: NEGATIVE
BUN BLD-MCNC: 16 MG/DL (ref 7–21)
BUN BLD-MCNC: 17 MG/DL (ref 7–21)
BUN BLD-MCNC: 18 MG/DL (ref 7–21)
BUN BLD-MCNC: 19 MG/DL (ref 7–21)
BUN BLD-MCNC: 21 MG/DL (ref 7–21)
BUN BLD-MCNC: 22 MG/DL (ref 7–21)
BUN BLD-MCNC: 23 MG/DL (ref 7–21)
BUN BLD-MCNC: 24 MG/DL (ref 7–21)
BUN BLD-MCNC: 25 MG/DL (ref 7–21)
BUN BLD-MCNC: 26 MG/DL (ref 7–21)
BUN BLD-MCNC: 27 MG/DL (ref 7–21)
BUN BLD-MCNC: 28 MG/DL (ref 7–21)
BUN BLD-MCNC: 29 MG/DL (ref 7–21)
BUN BLD-MCNC: 29 MG/DL (ref 7–21)
BUN BLD-MCNC: 30 MG/DL (ref 7–21)
BUN BLD-MCNC: 31 MG/DL (ref 7–21)
BUN BLD-MCNC: 31 MG/DL (ref 7–21)
BUN BLD-MCNC: 32 MG/DL (ref 7–21)
BUN BLD-MCNC: 33 MG/DL (ref 7–21)
BUN BLD-MCNC: 34 MG/DL (ref 7–21)
BUN BLD-MCNC: 35 MG/DL (ref 7–21)
BUN BLD-MCNC: 36 MG/DL (ref 7–21)
BUN BLD-MCNC: 37 MG/DL (ref 7–21)
BUN BLD-MCNC: 39 MG/DL (ref 7–21)
BUN BLD-MCNC: 39 MG/DL (ref 7–21)
BUN BLD-MCNC: 40 MG/DL (ref 7–21)
BUN BLD-MCNC: 41 MG/DL (ref 7–21)
BUN BLD-MCNC: 43 MG/DL (ref 7–21)
BUN BLD-MCNC: 44 MG/DL (ref 7–21)
BUN BLD-MCNC: 45 MG/DL (ref 7–21)
BUN BLD-MCNC: 51 MG/DL (ref 7–21)
BUN BLD-MCNC: 52 MG/DL (ref 7–21)
BUN BLD-MCNC: 56 MG/DL (ref 7–21)
BUN BLD-MCNC: 62 MG/DL (ref 7–21)
BUN/CREAT SERPL: 14.5 (ref 7–25)
BUN/CREAT SERPL: 14.5 (ref 7–25)
BUN/CREAT SERPL: 15 (ref 7–25)
BUN/CREAT SERPL: 15.5 (ref 7–25)
BUN/CREAT SERPL: 15.8 (ref 7–25)
BUN/CREAT SERPL: 16.2 (ref 7–25)
BUN/CREAT SERPL: 16.5 (ref 7–25)
BUN/CREAT SERPL: 16.7 (ref 7–25)
BUN/CREAT SERPL: 16.8 (ref 7–25)
BUN/CREAT SERPL: 16.8 (ref 7–25)
BUN/CREAT SERPL: 17 (ref 7–25)
BUN/CREAT SERPL: 17.2 (ref 7–25)
BUN/CREAT SERPL: 17.3 (ref 7–25)
BUN/CREAT SERPL: 17.9 (ref 7–25)
BUN/CREAT SERPL: 18.1 (ref 7–25)
BUN/CREAT SERPL: 18.2 (ref 7–25)
BUN/CREAT SERPL: 18.5 (ref 7–25)
BUN/CREAT SERPL: 18.8 (ref 7–25)
BUN/CREAT SERPL: 19 (ref 7–25)
BUN/CREAT SERPL: 19.2 (ref 7–25)
BUN/CREAT SERPL: 19.4 (ref 7–25)
BUN/CREAT SERPL: 19.7 (ref 7–25)
BUN/CREAT SERPL: 20 (ref 7–25)
BUN/CREAT SERPL: 20.4 (ref 7–25)
BUN/CREAT SERPL: 20.4 (ref 7–25)
BUN/CREAT SERPL: 20.8 (ref 7–25)
BUN/CREAT SERPL: 21 (ref 7–25)
BUN/CREAT SERPL: 21.1 (ref 7–25)
BUN/CREAT SERPL: 21.5 (ref 7–25)
BUN/CREAT SERPL: 21.7 (ref 7–25)
BUN/CREAT SERPL: 21.7 (ref 7–25)
BUN/CREAT SERPL: 22.5 (ref 7–25)
BUN/CREAT SERPL: 22.6 (ref 7–25)
BUN/CREAT SERPL: 22.6 (ref 7–25)
BUN/CREAT SERPL: 22.9 (ref 7–25)
BUN/CREAT SERPL: 23 (ref 7–25)
BUN/CREAT SERPL: 23.1 (ref 7–25)
BUN/CREAT SERPL: 23.3 (ref 7–25)
BUN/CREAT SERPL: 23.3 (ref 7–25)
BUN/CREAT SERPL: 23.4 (ref 7–25)
BUN/CREAT SERPL: 23.9 (ref 7–25)
BUN/CREAT SERPL: 23.9 (ref 7–25)
BUN/CREAT SERPL: 24 (ref 7–25)
BUN/CREAT SERPL: 24.2 (ref 7–25)
BUN/CREAT SERPL: 24.5 (ref 7–25)
BUN/CREAT SERPL: 24.5 (ref 7–25)
BUN/CREAT SERPL: 24.7 (ref 7–25)
BUN/CREAT SERPL: 24.8 (ref 7–25)
BUN/CREAT SERPL: 25.2 (ref 7–25)
BUN/CREAT SERPL: 25.4 (ref 7–25)
BUN/CREAT SERPL: 25.5 (ref 7–25)
BUN/CREAT SERPL: 25.5 (ref 7–25)
BUN/CREAT SERPL: 25.8 (ref 7–25)
BUN/CREAT SERPL: 25.9 (ref 7–25)
BUN/CREAT SERPL: 26.5 (ref 7–25)
BUN/CREAT SERPL: 26.6 (ref 7–25)
BUN/CREAT SERPL: 27.5 (ref 7–25)
BUN/CREAT SERPL: 27.6 (ref 7–25)
BUN/CREAT SERPL: 28.4 (ref 7–25)
BUN/CREAT SERPL: 28.6 (ref 7–25)
BUN/CREAT SERPL: 28.7 (ref 7–25)
BUN/CREAT SERPL: 28.8 (ref 7–25)
BUN/CREAT SERPL: 30.4 (ref 7–25)
BUN/CREAT SERPL: 30.5 (ref 7–25)
BUN/CREAT SERPL: 30.9 (ref 7–25)
BUN/CREAT SERPL: 31.2 (ref 7–25)
BUN/CREAT SERPL: 32.8 (ref 7–25)
BUN/CREAT SERPL: 33 (ref 7–25)
BUN/CREAT SERPL: 33.9 (ref 7–25)
BUN/CREAT SERPL: 34.5 (ref 7–25)
BUN/CREAT SERPL: 35 (ref 7–25)
BUN/CREAT SERPL: 35.3 (ref 7–25)
BUN/CREAT SERPL: 37.1 (ref 7–25)
BUN/CREAT SERPL: 37.4 (ref 7–25)
BUN/CREAT SERPL: 40.2 (ref 7–25)
BUN/CREAT SERPL: 41.9 (ref 7–25)
BUN/CREAT SERPL: 42.5 (ref 7–25)
BUN/CREAT SERPL: 42.7 (ref 7–25)
BUN/CREAT SERPL: 48.8 (ref 7–25)
C PNEUM DNA NPH QL NAA+NON-PROBE: NOT DETECTED
CA-I BLD-MCNC: 4.5 MG/DL (ref 4.5–4.9)
CA-I BLD-MCNC: 4.69 MG/DL (ref 4.6–5.6)
CALCIUM SPEC-SCNC: 8.1 MG/DL (ref 8.4–10.2)
CALCIUM SPEC-SCNC: 8.2 MG/DL (ref 8.4–10.2)
CALCIUM SPEC-SCNC: 8.2 MG/DL (ref 8.4–10.2)
CALCIUM SPEC-SCNC: 8.3 MG/DL (ref 8.4–10.2)
CALCIUM SPEC-SCNC: 8.3 MG/DL (ref 8.4–10.2)
CALCIUM SPEC-SCNC: 8.4 MG/DL (ref 8.4–10.2)
CALCIUM SPEC-SCNC: 8.5 MG/DL (ref 8.4–10.2)
CALCIUM SPEC-SCNC: 8.6 MG/DL (ref 8.4–10.2)
CALCIUM SPEC-SCNC: 8.7 MG/DL (ref 8.4–10.2)
CALCIUM SPEC-SCNC: 8.8 MG/DL (ref 8.4–10.2)
CALCIUM SPEC-SCNC: 8.9 MG/DL (ref 8.4–10.2)
CALCIUM SPEC-SCNC: 9 MG/DL (ref 8.4–10.2)
CALCIUM SPEC-SCNC: 9.1 MG/DL (ref 8.4–10.2)
CALCIUM SPEC-SCNC: 9.2 MG/DL (ref 8.4–10.2)
CALCIUM SPEC-SCNC: 9.4 MG/DL (ref 8.4–10.2)
CALCIUM SPEC-SCNC: 9.7 MG/DL (ref 8.4–10.2)
CANNABINOIDS SERPL QL: NEGATIVE
CASPOFUNGIN [SUSCEPTIBILITY] BY MINIMUM INHIBITORY CONCENTRATION (MIC): NORMAL
CHLORIDE SERPL-SCNC: 100 MMOL/L (ref 95–110)
CHLORIDE SERPL-SCNC: 100 MMOL/L (ref 95–110)
CHLORIDE SERPL-SCNC: 101 MMOL/L (ref 95–110)
CHLORIDE SERPL-SCNC: 102 MMOL/L (ref 95–110)
CHLORIDE SERPL-SCNC: 103 MMOL/L (ref 95–110)
CHLORIDE SERPL-SCNC: 104 MMOL/L (ref 95–110)
CHLORIDE SERPL-SCNC: 105 MMOL/L (ref 95–110)
CHLORIDE SERPL-SCNC: 106 MMOL/L (ref 95–110)
CHLORIDE SERPL-SCNC: 107 MMOL/L (ref 95–110)
CHLORIDE SERPL-SCNC: 108 MMOL/L (ref 95–110)
CHLORIDE SERPL-SCNC: 109 MMOL/L (ref 95–110)
CHLORIDE SERPL-SCNC: 110 MMOL/L (ref 95–110)
CHLORIDE SERPL-SCNC: 111 MMOL/L (ref 95–110)
CHLORIDE SERPL-SCNC: 111 MMOL/L (ref 95–110)
CHLORIDE SERPL-SCNC: 115 MMOL/L (ref 95–110)
CHLORIDE SERPL-SCNC: 93 MMOL/L (ref 95–110)
CHLORIDE SERPL-SCNC: 94 MMOL/L (ref 95–110)
CHLORIDE SERPL-SCNC: 94 MMOL/L (ref 95–110)
CHLORIDE SERPL-SCNC: 96 MMOL/L (ref 95–110)
CHLORIDE SERPL-SCNC: 96 MMOL/L (ref 95–110)
CHLORIDE SERPL-SCNC: 98 MMOL/L (ref 95–110)
CHLORIDE SERPL-SCNC: 99 MMOL/L (ref 95–110)
CHOLEST SERPL-MCNC: 93 MG/DL (ref 0–199)
CK MB SERPL-CCNC: 0.9 NG/ML (ref 0–5)
CK MB SERPL-CCNC: 1.74 NG/ML (ref 0–5)
CK MB SERPL-CCNC: 1.92 NG/ML (ref 0–5)
CK MB SERPL-CCNC: 5.82 NG/ML (ref 0–5)
CK SERPL-CCNC: 185 U/L (ref 55–170)
CK SERPL-CCNC: 691 U/L (ref 55–170)
CK SERPL-CCNC: 85 U/L (ref 55–170)
CK SERPL-CCNC: <20 U/L (ref 55–170)
CLARITY UR: ABNORMAL
CLARITY UR: CLEAR
CO2 BLDA-SCNC: 23.7 MMOL/L (ref 23–27)
CO2 SERPL-SCNC: 20 MMOL/L (ref 22–31)
CO2 SERPL-SCNC: 21 MMOL/L (ref 22–31)
CO2 SERPL-SCNC: 21 MMOL/L (ref 22–31)
CO2 SERPL-SCNC: 22 MMOL/L (ref 22–31)
CO2 SERPL-SCNC: 23 MMOL/L (ref 22–31)
CO2 SERPL-SCNC: 24 MMOL/L (ref 22–31)
CO2 SERPL-SCNC: 25 MMOL/L (ref 22–31)
CO2 SERPL-SCNC: 26 MMOL/L (ref 22–31)
CO2 SERPL-SCNC: 27 MMOL/L (ref 22–31)
CO2 SERPL-SCNC: 28 MMOL/L (ref 22–31)
CO2 SERPL-SCNC: 29 MMOL/L (ref 22–31)
CO2 SERPL-SCNC: 30 MMOL/L (ref 22–31)
CO2 SERPL-SCNC: 31 MMOL/L (ref 22–31)
CO2 SERPL-SCNC: 31 MMOL/L (ref 22–31)
CO2 SERPL-SCNC: 32 MMOL/L (ref 22–31)
CO2 SERPL-SCNC: 34 MMOL/L (ref 22–31)
COCAINE UR QL: NEGATIVE
COHGB MFR BLD: 1 % (ref 0–5)
COLOR UR: ABNORMAL
COLOR UR: YELLOW
CONV REPORT SUMMARY: NORMAL
CRE SCREEN PCR: NOT DETECTED
CREAT BLD-MCNC: 0.94 MG/DL (ref 0.7–1.3)
CREAT BLD-MCNC: 0.97 MG/DL (ref 0.7–1.3)
CREAT BLD-MCNC: 0.97 MG/DL (ref 0.7–1.3)
CREAT BLD-MCNC: 0.98 MG/DL (ref 0.7–1.3)
CREAT BLD-MCNC: 1.02 MG/DL (ref 0.7–1.3)
CREAT BLD-MCNC: 1.02 MG/DL (ref 0.7–1.3)
CREAT BLD-MCNC: 1.03 MG/DL (ref 0.7–1.3)
CREAT BLD-MCNC: 1.03 MG/DL (ref 0.7–1.3)
CREAT BLD-MCNC: 1.04 MG/DL (ref 0.7–1.3)
CREAT BLD-MCNC: 1.04 MG/DL (ref 0.7–1.3)
CREAT BLD-MCNC: 1.05 MG/DL (ref 0.7–1.3)
CREAT BLD-MCNC: 1.07 MG/DL (ref 0.7–1.3)
CREAT BLD-MCNC: 1.09 MG/DL (ref 0.7–1.3)
CREAT BLD-MCNC: 1.1 MG/DL (ref 0.7–1.3)
CREAT BLD-MCNC: 1.1 MG/DL (ref 0.7–1.3)
CREAT BLD-MCNC: 1.12 MG/DL (ref 0.7–1.3)
CREAT BLD-MCNC: 1.14 MG/DL (ref 0.7–1.3)
CREAT BLD-MCNC: 1.15 MG/DL (ref 0.7–1.3)
CREAT BLD-MCNC: 1.16 MG/DL (ref 0.7–1.3)
CREAT BLD-MCNC: 1.17 MG/DL (ref 0.7–1.3)
CREAT BLD-MCNC: 1.19 MG/DL (ref 0.7–1.3)
CREAT BLD-MCNC: 1.2 MG/DL (ref 0.7–1.3)
CREAT BLD-MCNC: 1.2 MG/DL (ref 0.7–1.3)
CREAT BLD-MCNC: 1.21 MG/DL (ref 0.7–1.3)
CREAT BLD-MCNC: 1.24 MG/DL (ref 0.7–1.3)
CREAT BLD-MCNC: 1.25 MG/DL (ref 0.7–1.3)
CREAT BLD-MCNC: 1.26 MG/DL (ref 0.7–1.3)
CREAT BLD-MCNC: 1.27 MG/DL (ref 0.7–1.3)
CREAT BLD-MCNC: 1.28 MG/DL (ref 0.7–1.3)
CREAT BLD-MCNC: 1.29 MG/DL (ref 0.7–1.3)
CREAT BLD-MCNC: 1.3 MG/DL (ref 0.7–1.3)
CREAT BLD-MCNC: 1.31 MG/DL (ref 0.7–1.3)
CREAT BLD-MCNC: 1.33 MG/DL (ref 0.7–1.3)
CREAT BLD-MCNC: 1.35 MG/DL (ref 0.7–1.3)
CREAT BLD-MCNC: 1.36 MG/DL (ref 0.7–1.3)
CREAT BLD-MCNC: 1.36 MG/DL (ref 0.7–1.3)
CREAT BLD-MCNC: 1.37 MG/DL (ref 0.7–1.3)
CREAT BLD-MCNC: 1.39 MG/DL (ref 0.7–1.3)
CREAT BLD-MCNC: 1.39 MG/DL (ref 0.7–1.3)
CREAT BLD-MCNC: 1.4 MG/DL (ref 0.7–1.3)
CREAT BLD-MCNC: 1.43 MG/DL (ref 0.7–1.3)
CREAT BLD-MCNC: 1.43 MG/DL (ref 0.7–1.3)
CREAT BLD-MCNC: 1.45 MG/DL (ref 0.7–1.3)
CREAT BLD-MCNC: 1.47 MG/DL (ref 0.7–1.3)
CREAT BLD-MCNC: 1.48 MG/DL (ref 0.7–1.3)
CREAT BLD-MCNC: 1.49 MG/DL (ref 0.7–1.3)
CREAT BLD-MCNC: 1.51 MG/DL (ref 0.7–1.3)
CREAT BLD-MCNC: 1.52 MG/DL (ref 0.7–1.3)
CREAT BLD-MCNC: 1.55 MG/DL (ref 0.7–1.3)
CREAT BLD-MCNC: 1.6 MG/DL (ref 0.7–1.3)
CREAT BLD-MCNC: 1.61 MG/DL (ref 0.7–1.3)
CREAT BLD-MCNC: 1.63 MG/DL (ref 0.7–1.3)
CREAT BLD-MCNC: 1.67 MG/DL (ref 0.7–1.3)
CREAT BLD-MCNC: 1.75 MG/DL (ref 0.7–1.3)
CREAT BLD-MCNC: 1.76 MG/DL (ref 0.7–1.3)
CREAT BLD-MCNC: 1.79 MG/DL (ref 0.7–1.3)
CREAT BLD-MCNC: 2.04 MG/DL (ref 0.7–1.3)
CREAT BLD-MCNC: 2.18 MG/DL (ref 0.7–1.3)
CREAT BLD-MCNC: 2.21 MG/DL (ref 0.7–1.3)
CREAT BLD-MCNC: 2.49 MG/DL (ref 0.7–1.3)
CREAT UR-MCNC: 127.9 MG/DL
CREAT UR-MCNC: 18.2 MG/DL
CREAT UR-MCNC: 57.9 MG/DL
D-DIMER, QUANTITATIVE (MAD,POW, STR): >4000 NG/ML (FEU) (ref 0–470)
D-LACTATE SERPL-SCNC: 1 MMOL/L (ref 0.5–2)
D-LACTATE SERPL-SCNC: 1.2 MMOL/L (ref 0.5–2)
D-LACTATE SERPL-SCNC: 1.5 MMOL/L (ref 0.5–2)
D-LACTATE SERPL-SCNC: 1.7 MMOL/L (ref 0.5–2)
D-LACTATE SERPL-SCNC: 1.9 MMOL/L (ref 0.5–2)
D-LACTATE SERPL-SCNC: 2 MMOL/L (ref 0.5–2)
D-LACTATE SERPL-SCNC: 2.2 MMOL/L (ref 0.5–2)
D-LACTATE SERPL-SCNC: 2.3 MMOL/L (ref 0.5–2)
D-LACTATE SERPL-SCNC: 2.8 MMOL/L (ref 0.5–2)
D-LACTATE SERPL-SCNC: 2.9 MMOL/L (ref 0.5–2)
D-LACTATE SERPL-SCNC: 3 MMOL/L (ref 0.5–2)
D-LACTATE SERPL-SCNC: 3.9 MMOL/L (ref 0.5–2)
D-LACTATE SERPL-SCNC: 6.5 MMOL/L (ref 0.5–2)
DEPRECATED RDW RBC AUTO: 50 FL (ref 35.1–43.9)
DEPRECATED RDW RBC AUTO: 50.1 FL (ref 35.1–43.9)
DEPRECATED RDW RBC AUTO: 50.2 FL (ref 35.1–43.9)
DEPRECATED RDW RBC AUTO: 50.9 FL (ref 35.1–43.9)
DEPRECATED RDW RBC AUTO: 51.3 FL (ref 35.1–43.9)
DEPRECATED RDW RBC AUTO: 51.5 FL (ref 35.1–43.9)
DEPRECATED RDW RBC AUTO: 51.7 FL (ref 35.1–43.9)
DEPRECATED RDW RBC AUTO: 51.9 FL (ref 35.1–43.9)
DEPRECATED RDW RBC AUTO: 52 FL (ref 35.1–43.9)
DEPRECATED RDW RBC AUTO: 52.3 FL (ref 35.1–43.9)
DEPRECATED RDW RBC AUTO: 52.4 FL (ref 35.1–43.9)
DEPRECATED RDW RBC AUTO: 52.5 FL (ref 35.1–43.9)
DEPRECATED RDW RBC AUTO: 52.7 FL (ref 35.1–43.9)
DEPRECATED RDW RBC AUTO: 52.9 FL (ref 35.1–43.9)
DEPRECATED RDW RBC AUTO: 53 FL (ref 35.1–43.9)
DEPRECATED RDW RBC AUTO: 53.2 FL (ref 35.1–43.9)
DEPRECATED RDW RBC AUTO: 53.5 FL (ref 35.1–43.9)
DEPRECATED RDW RBC AUTO: 53.7 FL (ref 35.1–43.9)
DEPRECATED RDW RBC AUTO: 53.7 FL (ref 35.1–43.9)
DEPRECATED RDW RBC AUTO: 54.3 FL (ref 35.1–43.9)
DEPRECATED RDW RBC AUTO: 54.4 FL (ref 35.1–43.9)
DEPRECATED RDW RBC AUTO: 54.5 FL (ref 35.1–43.9)
DEPRECATED RDW RBC AUTO: 54.5 FL (ref 35.1–43.9)
DEPRECATED RDW RBC AUTO: 55.4 FL (ref 35.1–43.9)
DEPRECATED RDW RBC AUTO: 55.5 FL (ref 35.1–43.9)
DEPRECATED RDW RBC AUTO: 55.7 FL (ref 35.1–43.9)
DEPRECATED RDW RBC AUTO: 56.4 FL (ref 35.1–43.9)
DEPRECATED RDW RBC AUTO: 56.6 FL (ref 35.1–43.9)
DEPRECATED RDW RBC AUTO: 56.9 FL (ref 35.1–43.9)
DEPRECATED RDW RBC AUTO: 57 FL (ref 35.1–43.9)
DEPRECATED RDW RBC AUTO: 58.3 FL (ref 35.1–43.9)
DEPRECATED RDW RBC AUTO: 58.4 FL (ref 35.1–43.9)
DEPRECATED RDW RBC AUTO: 58.4 FL (ref 35.1–43.9)
DEPRECATED RDW RBC AUTO: 58.5 FL (ref 35.1–43.9)
DEPRECATED RDW RBC AUTO: 58.5 FL (ref 35.1–43.9)
DEPRECATED RDW RBC AUTO: 59.5 FL (ref 35.1–43.9)
DEPRECATED RDW RBC AUTO: 60.6 FL (ref 35.1–43.9)
DEPRECATED RDW RBC AUTO: 60.7 FL (ref 35.1–43.9)
DEPRECATED RDW RBC AUTO: 61.2 FL (ref 35.1–43.9)
DEPRECATED RDW RBC AUTO: 61.9 FL (ref 35.1–43.9)
DEPRECATED RDW RBC AUTO: 62.1 FL (ref 35.1–43.9)
DEPRECATED RDW RBC AUTO: 62.2 FL (ref 35.1–43.9)
DEPRECATED RDW RBC AUTO: 62.4 FL (ref 35.1–43.9)
DEPRECATED RDW RBC AUTO: 62.5 FL (ref 35.1–43.9)
DEPRECATED RDW RBC AUTO: 63.3 FL (ref 35.1–43.9)
DEPRECATED RDW RBC AUTO: 63.5 FL (ref 35.1–43.9)
DEPRECATED RDW RBC AUTO: 63.6 FL (ref 35.1–43.9)
DEPRECATED RDW RBC AUTO: 64.6 FL (ref 35.1–43.9)
DEPRECATED RDW RBC AUTO: 64.8 FL (ref 35.1–43.9)
DEPRECATED RDW RBC AUTO: 65.5 FL (ref 35.1–43.9)
DEPRECATED RDW RBC AUTO: 65.8 FL (ref 35.1–43.9)
DEPRECATED RDW RBC AUTO: 69.3 FL (ref 35.1–43.9)
DEPRECATED RDW RBC AUTO: 69.7 FL (ref 35.1–43.9)
DEPRECATED RDW RBC AUTO: 69.8 FL (ref 35.1–43.9)
DEPRECATED RDW RBC AUTO: 69.9 FL (ref 35.1–43.9)
DEPRECATED RDW RBC AUTO: 70.1 FL (ref 35.1–43.9)
DEPRECATED RDW RBC AUTO: 70.1 FL (ref 35.1–43.9)
DEPRECATED RDW RBC AUTO: 70.6 FL (ref 35.1–43.9)
DEPRECATED RDW RBC AUTO: 71.4 FL (ref 35.1–43.9)
DEPRECATED RDW RBC AUTO: 71.5 FL (ref 35.1–43.9)
DEPRECATED RDW RBC AUTO: 71.8 FL (ref 35.1–43.9)
DEPRECATED RDW RBC AUTO: 71.9 FL (ref 35.1–43.9)
DEPRECATED RDW RBC AUTO: 71.9 FL (ref 35.1–43.9)
DEPRECATED RDW RBC AUTO: 72.4 FL (ref 35.1–43.9)
DEPRECATED RDW RBC AUTO: 72.5 FL (ref 35.1–43.9)
DEPRECATED RDW RBC AUTO: 72.6 FL (ref 35.1–43.9)
DEPRECATED RDW RBC AUTO: 72.8 FL (ref 35.1–43.9)
DEPRECATED RDW RBC AUTO: 73.2 FL (ref 35.1–43.9)
DEPRECATED RDW RBC AUTO: 73.3 FL (ref 35.1–43.9)
DEPRECATED RDW RBC AUTO: 73.8 FL (ref 35.1–43.9)
DEPRECATED RDW RBC AUTO: 73.9 FL (ref 35.1–43.9)
DEPRECATED RDW RBC AUTO: 74.1 FL (ref 35.1–43.9)
DEPRECATED RDW RBC AUTO: 74.4 FL (ref 35.1–43.9)
DEPRECATED RDW RBC AUTO: 75.3 FL (ref 35.1–43.9)
DEPRECATED RDW RBC AUTO: 75.5 FL (ref 35.1–43.9)
EOSINOPHIL # BLD AUTO: 0 10*3/MM3 (ref 0–0.7)
EOSINOPHIL # BLD AUTO: 0 10*3/MM3 (ref 0–0.7)
EOSINOPHIL # BLD AUTO: 0.01 10*3/MM3 (ref 0–0.7)
EOSINOPHIL # BLD AUTO: 0.04 10*3/MM3 (ref 0–0.7)
EOSINOPHIL # BLD AUTO: 0.05 10*3/MM3 (ref 0–0.7)
EOSINOPHIL # BLD AUTO: 0.07 10*3/MM3 (ref 0–0.7)
EOSINOPHIL # BLD AUTO: 0.07 10*3/MM3 (ref 0–0.7)
EOSINOPHIL # BLD AUTO: 0.08 10*3/MM3 (ref 0–0.7)
EOSINOPHIL # BLD AUTO: 0.1 10*3/MM3 (ref 0–0.7)
EOSINOPHIL # BLD AUTO: 0.11 10*3/MM3 (ref 0–0.7)
EOSINOPHIL # BLD AUTO: 0.14 10*3/MM3 (ref 0–0.7)
EOSINOPHIL # BLD AUTO: 0.14 10*3/MM3 (ref 0–0.7)
EOSINOPHIL # BLD AUTO: 0.15 10*3/MM3 (ref 0–0.7)
EOSINOPHIL # BLD AUTO: 0.16 10*3/MM3 (ref 0–0.7)
EOSINOPHIL # BLD AUTO: 0.16 10*3/MM3 (ref 0–0.7)
EOSINOPHIL # BLD AUTO: 0.18 10*3/MM3 (ref 0–0.7)
EOSINOPHIL # BLD AUTO: 0.19 10*3/MM3 (ref 0–0.7)
EOSINOPHIL # BLD AUTO: 0.2 10*3/MM3 (ref 0–0.7)
EOSINOPHIL # BLD AUTO: 0.21 10*3/MM3 (ref 0–0.7)
EOSINOPHIL # BLD AUTO: 0.21 10*3/MM3 (ref 0–0.7)
EOSINOPHIL # BLD AUTO: 0.22 10*3/MM3 (ref 0–0.7)
EOSINOPHIL # BLD AUTO: 0.22 10*3/MM3 (ref 0–0.7)
EOSINOPHIL # BLD AUTO: 0.23 10*3/MM3 (ref 0–0.7)
EOSINOPHIL # BLD AUTO: 0.24 10*3/MM3 (ref 0–0.7)
EOSINOPHIL # BLD AUTO: 0.25 10*3/MM3 (ref 0–0.7)
EOSINOPHIL # BLD AUTO: 0.26 10*3/MM3 (ref 0–0.7)
EOSINOPHIL # BLD AUTO: 0.27 10*3/MM3 (ref 0–0.7)
EOSINOPHIL # BLD AUTO: 0.29 10*3/MM3 (ref 0–0.7)
EOSINOPHIL # BLD AUTO: 0.29 10*3/MM3 (ref 0–0.7)
EOSINOPHIL # BLD AUTO: 0.3 10*3/MM3 (ref 0–0.7)
EOSINOPHIL # BLD AUTO: 0.3 10*3/MM3 (ref 0–0.7)
EOSINOPHIL # BLD AUTO: 0.33 10*3/MM3 (ref 0–0.7)
EOSINOPHIL # BLD AUTO: 0.33 10*3/MM3 (ref 0–0.7)
EOSINOPHIL # BLD AUTO: 0.34 10*3/MM3 (ref 0–0.7)
EOSINOPHIL # BLD AUTO: 0.34 10*3/MM3 (ref 0–0.7)
EOSINOPHIL # BLD AUTO: 0.37 10*3/MM3 (ref 0–0.7)
EOSINOPHIL # BLD AUTO: 0.38 10*3/MM3 (ref 0–0.7)
EOSINOPHIL # BLD AUTO: 0.41 10*3/MM3 (ref 0–0.7)
EOSINOPHIL # BLD AUTO: 0.57 10*3/MM3 (ref 0–0.7)
EOSINOPHIL # BLD AUTO: 0.61 10*3/MM3 (ref 0–0.7)
EOSINOPHIL # BLD MANUAL: 0.08 10*3/MM3 (ref 0–0.7)
EOSINOPHIL # BLD MANUAL: 0.25 10*3/MM3 (ref 0–0.7)
EOSINOPHIL # BLD MANUAL: 0.34 10*3/MM3 (ref 0–0.7)
EOSINOPHIL NFR BLD AUTO: 0 % (ref 0–7)
EOSINOPHIL NFR BLD AUTO: 0 % (ref 0–7)
EOSINOPHIL NFR BLD AUTO: 0.1 % (ref 0–7)
EOSINOPHIL NFR BLD AUTO: 0.2 % (ref 0–7)
EOSINOPHIL NFR BLD AUTO: 0.4 % (ref 0–7)
EOSINOPHIL NFR BLD AUTO: 0.5 % (ref 0–7)
EOSINOPHIL NFR BLD AUTO: 0.7 % (ref 0–7)
EOSINOPHIL NFR BLD AUTO: 1 % (ref 0–7)
EOSINOPHIL NFR BLD AUTO: 1.1 % (ref 0–7)
EOSINOPHIL NFR BLD AUTO: 1.3 % (ref 0–7)
EOSINOPHIL NFR BLD AUTO: 1.5 % (ref 0–7)
EOSINOPHIL NFR BLD AUTO: 1.6 % (ref 0–7)
EOSINOPHIL NFR BLD AUTO: 1.6 % (ref 0–7)
EOSINOPHIL NFR BLD AUTO: 1.9 % (ref 0–7)
EOSINOPHIL NFR BLD AUTO: 1.9 % (ref 0–7)
EOSINOPHIL NFR BLD AUTO: 10.1 % (ref 0–7)
EOSINOPHIL NFR BLD AUTO: 2 % (ref 0–7)
EOSINOPHIL NFR BLD AUTO: 2.1 % (ref 0–7)
EOSINOPHIL NFR BLD AUTO: 2.2 % (ref 0–7)
EOSINOPHIL NFR BLD AUTO: 2.4 % (ref 0–7)
EOSINOPHIL NFR BLD AUTO: 2.4 % (ref 0–7)
EOSINOPHIL NFR BLD AUTO: 2.5 % (ref 0–7)
EOSINOPHIL NFR BLD AUTO: 2.5 % (ref 0–7)
EOSINOPHIL NFR BLD AUTO: 2.6 % (ref 0–7)
EOSINOPHIL NFR BLD AUTO: 2.7 % (ref 0–7)
EOSINOPHIL NFR BLD AUTO: 2.8 % (ref 0–7)
EOSINOPHIL NFR BLD AUTO: 2.9 % (ref 0–7)
EOSINOPHIL NFR BLD AUTO: 2.9 % (ref 0–7)
EOSINOPHIL NFR BLD AUTO: 3.3 % (ref 0–7)
EOSINOPHIL NFR BLD AUTO: 3.5 % (ref 0–7)
EOSINOPHIL NFR BLD AUTO: 3.6 % (ref 0–7)
EOSINOPHIL NFR BLD AUTO: 3.6 % (ref 0–7)
EOSINOPHIL NFR BLD AUTO: 3.7 % (ref 0–7)
EOSINOPHIL NFR BLD AUTO: 3.8 % (ref 0–7)
EOSINOPHIL NFR BLD AUTO: 3.8 % (ref 0–7)
EOSINOPHIL NFR BLD AUTO: 3.9 % (ref 0–7)
EOSINOPHIL NFR BLD AUTO: 3.9 % (ref 0–7)
EOSINOPHIL NFR BLD AUTO: 4 % (ref 0–7)
EOSINOPHIL NFR BLD AUTO: 4 % (ref 0–7)
EOSINOPHIL NFR BLD AUTO: 4.1 % (ref 0–7)
EOSINOPHIL NFR BLD AUTO: 4.3 % (ref 0–7)
EOSINOPHIL NFR BLD AUTO: 4.4 % (ref 0–7)
EOSINOPHIL NFR BLD AUTO: 4.6 % (ref 0–7)
EOSINOPHIL NFR BLD AUTO: 4.7 % (ref 0–7)
EOSINOPHIL NFR BLD AUTO: 5 % (ref 0–7)
EOSINOPHIL NFR BLD AUTO: 5.5 % (ref 0–7)
EOSINOPHIL NFR BLD AUTO: 5.5 % (ref 0–7)
EOSINOPHIL NFR BLD AUTO: 5.6 % (ref 0–7)
EOSINOPHIL NFR BLD AUTO: 5.7 % (ref 0–7)
EOSINOPHIL NFR BLD AUTO: 6.1 % (ref 0–7)
EOSINOPHIL NFR BLD AUTO: 6.7 % (ref 0–7)
EOSINOPHIL NFR BLD AUTO: 7.1 % (ref 0–7)
EOSINOPHIL NFR BLD AUTO: 8.1 % (ref 0–7)
EOSINOPHIL NFR BLD MANUAL: 1 % (ref 0–7)
EOSINOPHIL NFR BLD MANUAL: 2 % (ref 0–7)
EOSINOPHIL NFR BLD MANUAL: 7 % (ref 0–7)
ERYTHROCYTE [DISTWIDTH] IN BLOOD BY AUTOMATED COUNT: 15.7 % (ref 11.5–14.5)
ERYTHROCYTE [DISTWIDTH] IN BLOOD BY AUTOMATED COUNT: 15.7 % (ref 11.5–14.5)
ERYTHROCYTE [DISTWIDTH] IN BLOOD BY AUTOMATED COUNT: 15.8 % (ref 11.5–14.5)
ERYTHROCYTE [DISTWIDTH] IN BLOOD BY AUTOMATED COUNT: 15.9 % (ref 11.5–14.5)
ERYTHROCYTE [DISTWIDTH] IN BLOOD BY AUTOMATED COUNT: 16 % (ref 11.5–14.5)
ERYTHROCYTE [DISTWIDTH] IN BLOOD BY AUTOMATED COUNT: 16.2 % (ref 11.5–14.5)
ERYTHROCYTE [DISTWIDTH] IN BLOOD BY AUTOMATED COUNT: 16.3 % (ref 11.5–14.5)
ERYTHROCYTE [DISTWIDTH] IN BLOOD BY AUTOMATED COUNT: 16.4 % (ref 11.5–14.5)
ERYTHROCYTE [DISTWIDTH] IN BLOOD BY AUTOMATED COUNT: 16.6 % (ref 11.5–14.5)
ERYTHROCYTE [DISTWIDTH] IN BLOOD BY AUTOMATED COUNT: 16.6 % (ref 11.5–14.5)
ERYTHROCYTE [DISTWIDTH] IN BLOOD BY AUTOMATED COUNT: 16.9 % (ref 11.5–14.5)
ERYTHROCYTE [DISTWIDTH] IN BLOOD BY AUTOMATED COUNT: 17.4 % (ref 11.5–14.5)
ERYTHROCYTE [DISTWIDTH] IN BLOOD BY AUTOMATED COUNT: 17.4 % (ref 11.5–14.5)
ERYTHROCYTE [DISTWIDTH] IN BLOOD BY AUTOMATED COUNT: 17.6 % (ref 11.5–14.5)
ERYTHROCYTE [DISTWIDTH] IN BLOOD BY AUTOMATED COUNT: 17.7 % (ref 11.5–14.5)
ERYTHROCYTE [DISTWIDTH] IN BLOOD BY AUTOMATED COUNT: 17.8 % (ref 11.5–14.5)
ERYTHROCYTE [DISTWIDTH] IN BLOOD BY AUTOMATED COUNT: 17.8 % (ref 11.5–14.5)
ERYTHROCYTE [DISTWIDTH] IN BLOOD BY AUTOMATED COUNT: 17.9 % (ref 11.5–14.5)
ERYTHROCYTE [DISTWIDTH] IN BLOOD BY AUTOMATED COUNT: 18 % (ref 11.5–14.5)
ERYTHROCYTE [DISTWIDTH] IN BLOOD BY AUTOMATED COUNT: 18 % (ref 11.5–14.5)
ERYTHROCYTE [DISTWIDTH] IN BLOOD BY AUTOMATED COUNT: 18.3 % (ref 11.5–14.5)
ERYTHROCYTE [DISTWIDTH] IN BLOOD BY AUTOMATED COUNT: 18.5 % (ref 11.5–14.5)
ERYTHROCYTE [DISTWIDTH] IN BLOOD BY AUTOMATED COUNT: 18.9 % (ref 11.5–14.5)
ERYTHROCYTE [DISTWIDTH] IN BLOOD BY AUTOMATED COUNT: 19 % (ref 11.5–14.5)
ERYTHROCYTE [DISTWIDTH] IN BLOOD BY AUTOMATED COUNT: 19.2 % (ref 11.5–14.5)
ERYTHROCYTE [DISTWIDTH] IN BLOOD BY AUTOMATED COUNT: 19.3 % (ref 11.5–14.5)
ERYTHROCYTE [DISTWIDTH] IN BLOOD BY AUTOMATED COUNT: 19.4 % (ref 11.5–14.5)
ERYTHROCYTE [DISTWIDTH] IN BLOOD BY AUTOMATED COUNT: 19.5 % (ref 11.5–14.5)
ERYTHROCYTE [DISTWIDTH] IN BLOOD BY AUTOMATED COUNT: 19.5 % (ref 11.5–14.5)
ERYTHROCYTE [DISTWIDTH] IN BLOOD BY AUTOMATED COUNT: 19.7 % (ref 11.5–14.5)
ERYTHROCYTE [DISTWIDTH] IN BLOOD BY AUTOMATED COUNT: 19.8 % (ref 11.5–14.5)
ERYTHROCYTE [DISTWIDTH] IN BLOOD BY AUTOMATED COUNT: 19.8 % (ref 11.5–14.5)
ERYTHROCYTE [DISTWIDTH] IN BLOOD BY AUTOMATED COUNT: 20.1 % (ref 11.5–14.5)
ERYTHROCYTE [DISTWIDTH] IN BLOOD BY AUTOMATED COUNT: 20.3 % (ref 11.5–14.5)
ERYTHROCYTE [DISTWIDTH] IN BLOOD BY AUTOMATED COUNT: 20.4 % (ref 11.5–14.5)
ERYTHROCYTE [DISTWIDTH] IN BLOOD BY AUTOMATED COUNT: 20.8 % (ref 11.5–14.5)
ERYTHROCYTE [DISTWIDTH] IN BLOOD BY AUTOMATED COUNT: 24 % (ref 11.5–14.5)
ERYTHROCYTE [DISTWIDTH] IN BLOOD BY AUTOMATED COUNT: 24.1 % (ref 11.5–14.5)
ERYTHROCYTE [DISTWIDTH] IN BLOOD BY AUTOMATED COUNT: 24.3 % (ref 11.5–14.5)
ERYTHROCYTE [DISTWIDTH] IN BLOOD BY AUTOMATED COUNT: 24.4 % (ref 11.5–14.5)
ERYTHROCYTE [DISTWIDTH] IN BLOOD BY AUTOMATED COUNT: 24.5 % (ref 11.5–14.5)
ERYTHROCYTE [DISTWIDTH] IN BLOOD BY AUTOMATED COUNT: 24.7 % (ref 11.5–14.5)
ERYTHROCYTE [DISTWIDTH] IN BLOOD BY AUTOMATED COUNT: 24.7 % (ref 11.5–14.5)
ERYTHROCYTE [DISTWIDTH] IN BLOOD BY AUTOMATED COUNT: 24.8 % (ref 11.5–14.5)
ERYTHROCYTE [DISTWIDTH] IN BLOOD BY AUTOMATED COUNT: 24.9 % (ref 11.5–14.5)
ERYTHROCYTE [DISTWIDTH] IN BLOOD BY AUTOMATED COUNT: 25.4 % (ref 11.5–14.5)
ERYTHROCYTE [DISTWIDTH] IN BLOOD BY AUTOMATED COUNT: 25.5 % (ref 11.5–14.5)
ERYTHROCYTE [DISTWIDTH] IN BLOOD BY AUTOMATED COUNT: 25.6 % (ref 11.5–14.5)
ERYTHROCYTE [DISTWIDTH] IN BLOOD BY AUTOMATED COUNT: 25.6 % (ref 11.5–14.5)
ERYTHROCYTE [DISTWIDTH] IN BLOOD BY AUTOMATED COUNT: 25.7 % (ref 11.5–14.5)
ERYTHROCYTE [DISTWIDTH] IN BLOOD BY AUTOMATED COUNT: 25.8 % (ref 11.5–14.5)
ERYTHROCYTE [DISTWIDTH] IN BLOOD BY AUTOMATED COUNT: 25.9 % (ref 11.5–14.5)
ERYTHROCYTE [DISTWIDTH] IN BLOOD BY AUTOMATED COUNT: 26.2 % (ref 11.5–14.5)
ERYTHROCYTE [DISTWIDTH] IN BLOOD BY AUTOMATED COUNT: 26.3 % (ref 11.5–14.5)
FERRITIN SERPL-MCNC: 208 NG/ML (ref 17.9–464)
FERRITIN SERPL-MCNC: 53.5 NG/ML (ref 17.9–464)
FLUAV AG NPH QL: NEGATIVE
FLUAV AG NPH QL: NEGATIVE
FLUAV H1 2009 PAND RNA NPH QL NAA+PROBE: NOT DETECTED
FLUAV H1 HA GENE NPH QL NAA+PROBE: NOT DETECTED
FLUAV H3 RNA NPH QL NAA+PROBE: NOT DETECTED
FLUAV SUBTYP SPEC NAA+PROBE: NOT DETECTED
FLUBV AG NPH QL IA: NEGATIVE
FLUBV AG NPH QL IA: NEGATIVE
FLUBV RNA ISLT QL NAA+PROBE: NOT DETECTED
FLUCONAZOLE [SUSCEPTIBILITY] BY MINIMUM INHIBITORY CONCENTRATION (MIC): NORMAL
FOLATE SERPL-MCNC: 5.99 NG/ML (ref 2.76–21)
FOLATE SERPL-MCNC: >20 NG/ML (ref 2.76–21)
GAS FLOW AIRWAY: 2 LPM
GAS FLOW AIRWAY: 2.5 LPM
GAS FLOW AIRWAY: 3 LPM
GAS FLOW AIRWAY: 7 LPM
GFR SERPL CREATININE-BSD FRML MDRD: 25 ML/MIN/1.73 (ref 42–98)
GFR SERPL CREATININE-BSD FRML MDRD: 28 ML/MIN/1.73 (ref 42–98)
GFR SERPL CREATININE-BSD FRML MDRD: 29 ML/MIN/1.73 (ref 42–98)
GFR SERPL CREATININE-BSD FRML MDRD: 31 ML/MIN/1.73 (ref 42–98)
GFR SERPL CREATININE-BSD FRML MDRD: 36 ML/MIN/1.73
GFR SERPL CREATININE-BSD FRML MDRD: 37 ML/MIN/1.73
GFR SERPL CREATININE-BSD FRML MDRD: 37 ML/MIN/1.73 (ref 42–98)
GFR SERPL CREATININE-BSD FRML MDRD: 39 ML/MIN/1.73 (ref 42–98)
GFR SERPL CREATININE-BSD FRML MDRD: 40 ML/MIN/1.73 (ref 42–98)
GFR SERPL CREATININE-BSD FRML MDRD: 41 ML/MIN/1.73 (ref 42–98)
GFR SERPL CREATININE-BSD FRML MDRD: 41 ML/MIN/1.73 (ref 42–98)
GFR SERPL CREATININE-BSD FRML MDRD: 43 ML/MIN/1.73 (ref 42–98)
GFR SERPL CREATININE-BSD FRML MDRD: 44 ML/MIN/1.73 (ref 42–98)
GFR SERPL CREATININE-BSD FRML MDRD: 44 ML/MIN/1.73 (ref 42–98)
GFR SERPL CREATININE-BSD FRML MDRD: 45 ML/MIN/1.73 (ref 42–98)
GFR SERPL CREATININE-BSD FRML MDRD: 46 ML/MIN/1.73 (ref 60–98)
GFR SERPL CREATININE-BSD FRML MDRD: 47 ML/MIN/1.73 (ref 42–98)
GFR SERPL CREATININE-BSD FRML MDRD: 47 ML/MIN/1.73 (ref 42–98)
GFR SERPL CREATININE-BSD FRML MDRD: 48 ML/MIN/1.73 (ref 42–98)
GFR SERPL CREATININE-BSD FRML MDRD: 49 ML/MIN/1.73 (ref 42–98)
GFR SERPL CREATININE-BSD FRML MDRD: 49 ML/MIN/1.73 (ref 42–98)
GFR SERPL CREATININE-BSD FRML MDRD: 50 ML/MIN/1.73 (ref 42–98)
GFR SERPL CREATININE-BSD FRML MDRD: 50 ML/MIN/1.73 (ref 60–98)
GFR SERPL CREATININE-BSD FRML MDRD: 51 ML/MIN/1.73 (ref 42–98)
GFR SERPL CREATININE-BSD FRML MDRD: 52 ML/MIN/1.73 (ref 42–98)
GFR SERPL CREATININE-BSD FRML MDRD: 52 ML/MIN/1.73 (ref 42–98)
GFR SERPL CREATININE-BSD FRML MDRD: 53 ML/MIN/1.73 (ref 42–98)
GFR SERPL CREATININE-BSD FRML MDRD: 53 ML/MIN/1.73 (ref 42–98)
GFR SERPL CREATININE-BSD FRML MDRD: 54 ML/MIN/1.73 (ref 42–98)
GFR SERPL CREATININE-BSD FRML MDRD: 54 ML/MIN/1.73 (ref 42–98)
GFR SERPL CREATININE-BSD FRML MDRD: 55 ML/MIN/1.73 (ref 42–98)
GFR SERPL CREATININE-BSD FRML MDRD: 57 ML/MIN/1.73 (ref 42–98)
GFR SERPL CREATININE-BSD FRML MDRD: 58 ML/MIN/1.73 (ref 42–98)
GFR SERPL CREATININE-BSD FRML MDRD: 59 ML/MIN/1.73 (ref 42–98)
GFR SERPL CREATININE-BSD FRML MDRD: 59 ML/MIN/1.73 (ref 42–98)
GFR SERPL CREATININE-BSD FRML MDRD: 60 ML/MIN/1.73 (ref 42–98)
GFR SERPL CREATININE-BSD FRML MDRD: 61 ML/MIN/1.73 (ref 42–98)
GFR SERPL CREATININE-BSD FRML MDRD: 62 ML/MIN/1.73 (ref 42–98)
GFR SERPL CREATININE-BSD FRML MDRD: 63 ML/MIN/1.73 (ref 42–98)
GFR SERPL CREATININE-BSD FRML MDRD: 63 ML/MIN/1.73 (ref 42–98)
GFR SERPL CREATININE-BSD FRML MDRD: 64 ML/MIN/1.73 (ref 42–98)
GFR SERPL CREATININE-BSD FRML MDRD: 65 ML/MIN/1.73 (ref 42–98)
GFR SERPL CREATININE-BSD FRML MDRD: 67 ML/MIN/1.73 (ref 42–98)
GFR SERPL CREATININE-BSD FRML MDRD: 68 ML/MIN/1.73 (ref 42–98)
GFR SERPL CREATININE-BSD FRML MDRD: 68 ML/MIN/1.73 (ref 42–98)
GFR SERPL CREATININE-BSD FRML MDRD: 69 ML/MIN/1.73 (ref 42–98)
GFR SERPL CREATININE-BSD FRML MDRD: 69 ML/MIN/1.73 (ref 42–98)
GFR SERPL CREATININE-BSD FRML MDRD: 72 ML/MIN/1.73 (ref 42–98)
GFR SERPL CREATININE-BSD FRML MDRD: 73 ML/MIN/1.73 (ref 42–98)
GFR SERPL CREATININE-BSD FRML MDRD: 73 ML/MIN/1.73 (ref 42–98)
GFR SERPL CREATININE-BSD FRML MDRD: 76 ML/MIN/1.73 (ref 42–98)
GLOBULIN UR ELPH-MCNC: 3.3 GM/DL (ref 2.3–3.5)
GLOBULIN UR ELPH-MCNC: 3.4 GM/DL (ref 2.3–3.5)
GLOBULIN UR ELPH-MCNC: 3.5 GM/DL (ref 2.3–3.5)
GLOBULIN UR ELPH-MCNC: 3.6 GM/DL (ref 2.3–3.5)
GLOBULIN UR ELPH-MCNC: 3.7 GM/DL (ref 2.3–3.5)
GLOBULIN UR ELPH-MCNC: 3.8 GM/DL (ref 2.3–3.5)
GLOBULIN UR ELPH-MCNC: 3.9 GM/DL (ref 2.3–3.5)
GLOBULIN UR ELPH-MCNC: 4 GM/DL (ref 2.3–3.5)
GLOBULIN UR ELPH-MCNC: 4.1 GM/DL (ref 2.3–3.5)
GLOBULIN UR ELPH-MCNC: 4.2 GM/DL (ref 2.3–3.5)
GLOBULIN UR ELPH-MCNC: 4.2 GM/DL (ref 2.3–3.5)
GLOBULIN UR ELPH-MCNC: 4.3 GM/DL (ref 2.3–3.5)
GLOBULIN UR ELPH-MCNC: 4.4 GM/DL (ref 2.3–3.5)
GLOBULIN UR ELPH-MCNC: 5.1 GM/DL (ref 2.3–3.5)
GLUCOSE BLD-MCNC: 100 MG/DL (ref 60–100)
GLUCOSE BLD-MCNC: 101 MG/DL (ref 60–100)
GLUCOSE BLD-MCNC: 101 MG/DL (ref 60–100)
GLUCOSE BLD-MCNC: 103 MG/DL (ref 60–100)
GLUCOSE BLD-MCNC: 105 MG/DL (ref 60–100)
GLUCOSE BLD-MCNC: 105 MG/DL (ref 60–100)
GLUCOSE BLD-MCNC: 107 MG/DL (ref 60–100)
GLUCOSE BLD-MCNC: 108 MG/DL (ref 60–100)
GLUCOSE BLD-MCNC: 108 MG/DL (ref 60–100)
GLUCOSE BLD-MCNC: 109 MG/DL (ref 60–100)
GLUCOSE BLD-MCNC: 111 MG/DL (ref 60–100)
GLUCOSE BLD-MCNC: 112 MG/DL (ref 60–100)
GLUCOSE BLD-MCNC: 112 MG/DL (ref 60–100)
GLUCOSE BLD-MCNC: 113 MG/DL (ref 60–100)
GLUCOSE BLD-MCNC: 115 MG/DL (ref 60–100)
GLUCOSE BLD-MCNC: 121 MG/DL (ref 60–100)
GLUCOSE BLD-MCNC: 121 MG/DL (ref 60–100)
GLUCOSE BLD-MCNC: 124 MG/DL (ref 60–100)
GLUCOSE BLD-MCNC: 130 MG/DL (ref 60–100)
GLUCOSE BLD-MCNC: 134 MG/DL (ref 60–100)
GLUCOSE BLD-MCNC: 134 MG/DL (ref 60–100)
GLUCOSE BLD-MCNC: 138 MG/DL (ref 60–100)
GLUCOSE BLD-MCNC: 143 MG/DL (ref 60–100)
GLUCOSE BLD-MCNC: 144 MG/DL (ref 60–100)
GLUCOSE BLD-MCNC: 152 MG/DL (ref 60–100)
GLUCOSE BLD-MCNC: 154 MG/DL (ref 60–100)
GLUCOSE BLD-MCNC: 75 MG/DL (ref 60–100)
GLUCOSE BLD-MCNC: 76 MG/DL (ref 60–100)
GLUCOSE BLD-MCNC: 79 MG/DL (ref 60–100)
GLUCOSE BLD-MCNC: 80 MG/DL (ref 60–100)
GLUCOSE BLD-MCNC: 82 MG/DL (ref 60–100)
GLUCOSE BLD-MCNC: 82 MG/DL (ref 60–100)
GLUCOSE BLD-MCNC: 83 MG/DL (ref 60–100)
GLUCOSE BLD-MCNC: 83 MG/DL (ref 60–100)
GLUCOSE BLD-MCNC: 84 MG/DL (ref 60–100)
GLUCOSE BLD-MCNC: 85 MG/DL (ref 60–100)
GLUCOSE BLD-MCNC: 86 MG/DL (ref 60–100)
GLUCOSE BLD-MCNC: 87 MG/DL (ref 60–100)
GLUCOSE BLD-MCNC: 88 MG/DL (ref 60–100)
GLUCOSE BLD-MCNC: 89 MG/DL (ref 60–100)
GLUCOSE BLD-MCNC: 90 MG/DL (ref 60–100)
GLUCOSE BLD-MCNC: 90 MG/DL (ref 60–100)
GLUCOSE BLD-MCNC: 91 MG/DL (ref 60–100)
GLUCOSE BLD-MCNC: 91 MG/DL (ref 60–100)
GLUCOSE BLD-MCNC: 92 MG/DL (ref 60–100)
GLUCOSE BLD-MCNC: 93 MG/DL (ref 60–100)
GLUCOSE BLD-MCNC: 94 MG/DL (ref 60–100)
GLUCOSE BLD-MCNC: 95 MG/DL (ref 60–100)
GLUCOSE BLD-MCNC: 96 MG/DL (ref 60–100)
GLUCOSE BLD-MCNC: 97 MG/DL (ref 60–100)
GLUCOSE BLD-MCNC: 98 MG/DL (ref 60–100)
GLUCOSE BLD-MCNC: 98 MG/DL (ref 60–100)
GLUCOSE BLD-MCNC: 99 MG/DL (ref 60–100)
GLUCOSE BLD-MCNC: 99 MG/DL (ref 60–100)
GLUCOSE BLDA-MCNC: 120 MMOL/L (ref 65–95)
GLUCOSE BLDA-MCNC: 160 MMOL/L
GLUCOSE BLDC GLUCOMTR-MCNC: 103 MG/DL (ref 70–130)
GLUCOSE BLDC GLUCOMTR-MCNC: 105 MG/DL (ref 70–130)
GLUCOSE BLDC GLUCOMTR-MCNC: 108 MG/DL (ref 70–130)
GLUCOSE BLDC GLUCOMTR-MCNC: 118 MG/DL (ref 70–130)
GLUCOSE BLDC GLUCOMTR-MCNC: 120 MG/DL (ref 70–130)
GLUCOSE BLDC GLUCOMTR-MCNC: 123 MG/DL (ref 70–130)
GLUCOSE BLDC GLUCOMTR-MCNC: 126 MG/DL (ref 70–130)
GLUCOSE BLDC GLUCOMTR-MCNC: 127 MG/DL (ref 70–130)
GLUCOSE BLDC GLUCOMTR-MCNC: 127 MG/DL (ref 70–130)
GLUCOSE BLDC GLUCOMTR-MCNC: 128 MG/DL (ref 70–130)
GLUCOSE BLDC GLUCOMTR-MCNC: 133 MG/DL (ref 70–130)
GLUCOSE BLDC GLUCOMTR-MCNC: 144 MG/DL (ref 70–130)
GLUCOSE BLDC GLUCOMTR-MCNC: 146 MG/DL (ref 70–130)
GLUCOSE BLDC GLUCOMTR-MCNC: 149 MG/DL (ref 70–130)
GLUCOSE BLDC GLUCOMTR-MCNC: 151 MG/DL (ref 70–130)
GLUCOSE BLDC GLUCOMTR-MCNC: 157 MG/DL (ref 70–130)
GLUCOSE BLDC GLUCOMTR-MCNC: 166 MG/DL (ref 70–130)
GLUCOSE BLDC GLUCOMTR-MCNC: 166 MG/DL (ref 70–130)
GLUCOSE BLDC GLUCOMTR-MCNC: 170 MG/DL (ref 70–130)
GLUCOSE BLDC GLUCOMTR-MCNC: 173 MG/DL (ref 70–130)
GLUCOSE BLDC GLUCOMTR-MCNC: 180 MG/DL (ref 70–130)
GLUCOSE BLDC GLUCOMTR-MCNC: 181 MG/DL (ref 70–130)
GLUCOSE BLDC GLUCOMTR-MCNC: 188 MG/DL (ref 70–130)
GLUCOSE BLDC GLUCOMTR-MCNC: 221 MG/DL (ref 70–130)
GLUCOSE BLDC GLUCOMTR-MCNC: 242 MG/DL (ref 70–130)
GLUCOSE UR STRIP-MCNC: NEGATIVE MG/DL
GRAM STN SPEC: ABNORMAL
GRAM STN SPEC: NORMAL
GRAM STN SPEC: NORMAL
GRAN CASTS URNS QL MICRO: ABNORMAL /LPF
HADV DNA SPEC NAA+PROBE: NOT DETECTED
HANSEL STAIN: NEGATIVE
HANSEL STAIN: NEGATIVE
HBA1C MFR BLD: 5.9 % (ref 4–5.6)
HCO3 BLDA-SCNC: 21.9 MMOL/L (ref 20–26)
HCO3 BLDA-SCNC: 22.5 MMOL/L (ref 20–26)
HCO3 BLDA-SCNC: 22.7 MMOL/L (ref 22–26)
HCO3 BLDA-SCNC: 24.4 MMOL/L (ref 20–26)
HCO3 BLDA-SCNC: 29.2 MMOL/L (ref 20–26)
HCOV 229E RNA SPEC QL NAA+PROBE: NOT DETECTED
HCOV HKU1 RNA SPEC QL NAA+PROBE: NOT DETECTED
HCOV NL63 RNA SPEC QL NAA+PROBE: NOT DETECTED
HCOV OC43 RNA SPEC QL NAA+PROBE: NOT DETECTED
HCT VFR BLD AUTO: 22.4 % (ref 39–49)
HCT VFR BLD AUTO: 22.9 % (ref 39–49)
HCT VFR BLD AUTO: 23.3 % (ref 39–49)
HCT VFR BLD AUTO: 24.9 % (ref 39–49)
HCT VFR BLD AUTO: 25.3 % (ref 39–49)
HCT VFR BLD AUTO: 25.5 % (ref 39–49)
HCT VFR BLD AUTO: 25.6 % (ref 39–49)
HCT VFR BLD AUTO: 25.7 % (ref 39–49)
HCT VFR BLD AUTO: 25.8 % (ref 39–49)
HCT VFR BLD AUTO: 25.9 % (ref 39–49)
HCT VFR BLD AUTO: 25.9 % (ref 39–49)
HCT VFR BLD AUTO: 26 % (ref 39–49)
HCT VFR BLD AUTO: 26.1 % (ref 39–49)
HCT VFR BLD AUTO: 26.4 % (ref 39–49)
HCT VFR BLD AUTO: 26.6 % (ref 39–49)
HCT VFR BLD AUTO: 26.7 % (ref 39–49)
HCT VFR BLD AUTO: 27.3 % (ref 39–49)
HCT VFR BLD AUTO: 27.4 % (ref 39–49)
HCT VFR BLD AUTO: 27.4 % (ref 39–49)
HCT VFR BLD AUTO: 27.9 % (ref 39–49)
HCT VFR BLD AUTO: 28.1 % (ref 39–49)
HCT VFR BLD AUTO: 28.3 % (ref 39–49)
HCT VFR BLD AUTO: 28.4 % (ref 39–49)
HCT VFR BLD AUTO: 28.5 % (ref 39–49)
HCT VFR BLD AUTO: 28.5 % (ref 39–49)
HCT VFR BLD AUTO: 28.6 % (ref 39–49)
HCT VFR BLD AUTO: 28.7 % (ref 39–49)
HCT VFR BLD AUTO: 28.8 % (ref 39–49)
HCT VFR BLD AUTO: 28.8 % (ref 39–49)
HCT VFR BLD AUTO: 28.9 % (ref 39–49)
HCT VFR BLD AUTO: 29.2 % (ref 39–49)
HCT VFR BLD AUTO: 29.2 % (ref 39–49)
HCT VFR BLD AUTO: 29.3 % (ref 39–49)
HCT VFR BLD AUTO: 29.3 % (ref 39–49)
HCT VFR BLD AUTO: 29.5 % (ref 39–49)
HCT VFR BLD AUTO: 29.5 % (ref 39–49)
HCT VFR BLD AUTO: 29.6 % (ref 39–49)
HCT VFR BLD AUTO: 29.6 % (ref 39–49)
HCT VFR BLD AUTO: 29.7 % (ref 39–49)
HCT VFR BLD AUTO: 29.8 % (ref 39–49)
HCT VFR BLD AUTO: 29.9 % (ref 39–49)
HCT VFR BLD AUTO: 30.4 % (ref 39–49)
HCT VFR BLD AUTO: 30.4 % (ref 39–49)
HCT VFR BLD AUTO: 30.6 % (ref 39–49)
HCT VFR BLD AUTO: 30.6 % (ref 39–49)
HCT VFR BLD AUTO: 30.7 % (ref 39–49)
HCT VFR BLD AUTO: 30.8 % (ref 39–49)
HCT VFR BLD AUTO: 31 % (ref 39–49)
HCT VFR BLD AUTO: 31 % (ref 39–49)
HCT VFR BLD AUTO: 31.1 % (ref 39–49)
HCT VFR BLD AUTO: 31.1 % (ref 39–49)
HCT VFR BLD AUTO: 31.2 % (ref 39–49)
HCT VFR BLD AUTO: 31.2 % (ref 39–49)
HCT VFR BLD AUTO: 31.3 % (ref 39–49)
HCT VFR BLD AUTO: 31.5 % (ref 39–49)
HCT VFR BLD AUTO: 31.6 % (ref 39–49)
HCT VFR BLD AUTO: 31.7 % (ref 39–49)
HCT VFR BLD AUTO: 31.7 % (ref 39–49)
HCT VFR BLD AUTO: 31.9 % (ref 39–49)
HCT VFR BLD AUTO: 31.9 % (ref 39–49)
HCT VFR BLD AUTO: 32.3 % (ref 39–49)
HCT VFR BLD AUTO: 32.6 % (ref 39–49)
HCT VFR BLD AUTO: 32.8 % (ref 39–49)
HCT VFR BLD AUTO: 33 % (ref 39–49)
HCT VFR BLD AUTO: 33 % (ref 39–49)
HCT VFR BLD AUTO: 34.6 % (ref 39–49)
HCT VFR BLD CALC: 22 % (ref 40–54)
HCT VFR BLD CALC: 30 % (ref 38–51)
HDLC SERPL-MCNC: 24 MG/DL (ref 60–200)
HEMOCCULT STL QL: NEGATIVE
HGB BLD-MCNC: 10 G/DL (ref 13.7–17.3)
HGB BLD-MCNC: 10.1 G/DL (ref 13.7–17.3)
HGB BLD-MCNC: 10.2 G/DL (ref 13.7–17.3)
HGB BLD-MCNC: 10.2 G/DL (ref 13.7–17.3)
HGB BLD-MCNC: 10.4 G/DL (ref 13.7–17.3)
HGB BLD-MCNC: 10.7 G/DL (ref 13.7–17.3)
HGB BLD-MCNC: 10.8 G/DL (ref 13.7–17.3)
HGB BLD-MCNC: 10.8 G/DL (ref 13.7–17.3)
HGB BLD-MCNC: 11.2 G/DL (ref 13.7–17.3)
HGB BLD-MCNC: 6.8 G/DL (ref 13.7–17.3)
HGB BLD-MCNC: 7.2 G/DL (ref 13.7–17.3)
HGB BLD-MCNC: 7.2 G/DL (ref 13.7–17.3)
HGB BLD-MCNC: 7.7 G/DL (ref 13.7–17.3)
HGB BLD-MCNC: 7.8 G/DL (ref 13.7–17.3)
HGB BLD-MCNC: 7.9 G/DL (ref 13.7–17.3)
HGB BLD-MCNC: 8.1 G/DL (ref 13.7–17.3)
HGB BLD-MCNC: 8.3 G/DL (ref 13.7–17.3)
HGB BLD-MCNC: 8.4 G/DL (ref 13.7–17.3)
HGB BLD-MCNC: 8.5 G/DL (ref 13.7–17.3)
HGB BLD-MCNC: 8.7 G/DL (ref 13.7–17.3)
HGB BLD-MCNC: 8.8 G/DL (ref 13.7–17.3)
HGB BLD-MCNC: 8.8 G/DL (ref 13.7–17.3)
HGB BLD-MCNC: 8.9 G/DL (ref 13.7–17.3)
HGB BLD-MCNC: 9 G/DL (ref 13.7–17.3)
HGB BLD-MCNC: 9.1 G/DL (ref 13.7–17.3)
HGB BLD-MCNC: 9.2 G/DL (ref 13.7–17.3)
HGB BLD-MCNC: 9.3 G/DL (ref 13.7–17.3)
HGB BLD-MCNC: 9.3 G/DL (ref 13.7–17.3)
HGB BLD-MCNC: 9.4 G/DL (ref 13.7–17.3)
HGB BLD-MCNC: 9.5 G/DL (ref 13.7–17.3)
HGB BLD-MCNC: 9.6 G/DL (ref 13.7–17.3)
HGB BLD-MCNC: 9.7 G/DL (ref 13.7–17.3)
HGB BLD-MCNC: 9.7 G/DL (ref 13.7–17.3)
HGB BLD-MCNC: 9.8 G/DL (ref 13.7–17.3)
HGB BLD-MCNC: 9.9 G/DL (ref 13.7–17.3)
HGB BLDA-MCNC: 7.4 G/DL (ref 14–18)
HGB BLDA-MCNC: 9.8 G/DL (ref 14–18)
HGB UR QL STRIP.AUTO: ABNORMAL
HGB UR QL STRIP.AUTO: NEGATIVE
HMPV RNA NPH QL NAA+NON-PROBE: NOT DETECTED
HOLD SPECIMEN: NORMAL
HOROWITZ INDEX BLD+IHG-RTO: 24 %
HPIV1 RNA SPEC QL NAA+PROBE: NOT DETECTED
HPIV2 RNA SPEC QL NAA+PROBE: NOT DETECTED
HPIV3 RNA NPH QL NAA+PROBE: NOT DETECTED
HPIV4 P GENE NPH QL NAA+PROBE: NOT DETECTED
HYALINE CASTS UR QL AUTO: ABNORMAL /LPF
HYPOCHROMIA BLD QL: ABNORMAL
HYPOCHROMIA BLD QL: NORMAL
HYPOCHROMIA BLD QL: NORMAL
IMM GRANULOCYTES # BLD: 0.01 10*3/MM3 (ref 0–0.02)
IMM GRANULOCYTES # BLD: 0.02 10*3/MM3 (ref 0–0.02)
IMM GRANULOCYTES # BLD: 0.03 10*3/MM3 (ref 0–0.02)
IMM GRANULOCYTES # BLD: 0.04 10*3/MM3 (ref 0–0.02)
IMM GRANULOCYTES # BLD: 0.05 10*3/MM3 (ref 0–0.02)
IMM GRANULOCYTES # BLD: 0.06 10*3/MM3 (ref 0–0.02)
IMM GRANULOCYTES # BLD: 0.06 10*3/MM3 (ref 0–0.02)
IMM GRANULOCYTES # BLD: 0.07 10*3/MM3 (ref 0–0.02)
IMM GRANULOCYTES # BLD: 0.08 10*3/MM3 (ref 0–0.02)
IMM GRANULOCYTES # BLD: 0.09 10*3/MM3 (ref 0–0.02)
IMM GRANULOCYTES # BLD: 0.09 10*3/MM3 (ref 0–0.02)
IMM GRANULOCYTES # BLD: 0.1 10*3/MM3 (ref 0–0.02)
IMM GRANULOCYTES # BLD: 0.12 10*3/MM3 (ref 0–0.02)
IMM GRANULOCYTES # BLD: 0.22 10*3/MM3 (ref 0–0.02)
IMM GRANULOCYTES NFR BLD: 0.1 % (ref 0–0.5)
IMM GRANULOCYTES NFR BLD: 0.2 % (ref 0–0.5)
IMM GRANULOCYTES NFR BLD: 0.3 % (ref 0–0.5)
IMM GRANULOCYTES NFR BLD: 0.4 % (ref 0–0.5)
IMM GRANULOCYTES NFR BLD: 0.5 % (ref 0–0.5)
IMM GRANULOCYTES NFR BLD: 0.6 % (ref 0–0.5)
IMM GRANULOCYTES NFR BLD: 0.7 % (ref 0–0.5)
IMM GRANULOCYTES NFR BLD: 0.8 % (ref 0–0.5)
IMM GRANULOCYTES NFR BLD: 0.9 % (ref 0–0.5)
IMM GRANULOCYTES NFR BLD: 0.9 % (ref 0–0.5)
IMM GRANULOCYTES NFR BLD: 1.1 % (ref 0–0.5)
IMM GRANULOCYTES NFR BLD: 1.3 % (ref 0–0.5)
IMM GRANULOCYTES NFR BLD: 1.9 % (ref 0–0.5)
IMP STRAIN: NOT DETECTED
INR PPP: 1.14 (ref 0.8–1.2)
INR PPP: 1.17 (ref 0.8–1.2)
INR PPP: 1.24 (ref 0.8–1.2)
INR PPP: 1.25 (ref 0.8–1.2)
INR PPP: 1.43 (ref 0.8–1.2)
IRON 24H UR-MRATE: <10 MCG/DL (ref 49–181)
IRON 24H UR-MRATE: <10 MCG/DL (ref 49–181)
IRON SATN MFR SERPL: ABNORMAL % (ref 20–55)
IRON SATN MFR SERPL: ABNORMAL % (ref 20–55)
ISOLATED FROM: ABNORMAL
ITRACONAZOLE [SUSCEPTIBILITY] BY MINIMUM INHIBITORY CONCENTRATION (MIC): NORMAL
KETONES UR QL STRIP: ABNORMAL
KETONES UR QL STRIP: NEGATIVE
KPC STRAIN: NOT DETECTED
L PNEUMO1 AG UR QL IA: NEGATIVE
LAB AP CASE REPORT: NORMAL
LDLC/HDLC SERPL: 2.33 {RATIO} (ref 0–3.55)
LEFT ATRIUM VOLUME INDEX: 49 ML/M2
LEUKOCYTE ESTERASE UR QL STRIP.AUTO: ABNORMAL
LIPASE SERPL-CCNC: 44 U/L (ref 23–300)
LIPASE SERPL-CCNC: 65 U/L (ref 23–300)
LIPASE SERPL-CCNC: 71 U/L (ref 23–300)
LV EF NUC BP: 59 %
LYMPHOCYTES # BLD AUTO: 0.5 10*3/MM3 (ref 0.6–4.2)
LYMPHOCYTES # BLD AUTO: 0.52 10*3/MM3 (ref 0.6–4.2)
LYMPHOCYTES # BLD AUTO: 0.52 10*3/MM3 (ref 0.6–4.2)
LYMPHOCYTES # BLD AUTO: 0.58 10*3/MM3 (ref 0.6–4.2)
LYMPHOCYTES # BLD AUTO: 0.67 10*3/MM3 (ref 0.6–4.2)
LYMPHOCYTES # BLD AUTO: 0.73 10*3/MM3 (ref 0.6–4.2)
LYMPHOCYTES # BLD AUTO: 0.73 10*3/MM3 (ref 0.6–4.2)
LYMPHOCYTES # BLD AUTO: 0.74 10*3/MM3 (ref 0.6–4.2)
LYMPHOCYTES # BLD AUTO: 0.77 10*3/MM3 (ref 0.6–4.2)
LYMPHOCYTES # BLD AUTO: 0.77 10*3/MM3 (ref 0.6–4.2)
LYMPHOCYTES # BLD AUTO: 0.79 10*3/MM3 (ref 0.6–4.2)
LYMPHOCYTES # BLD AUTO: 0.81 10*3/MM3 (ref 0.6–4.2)
LYMPHOCYTES # BLD AUTO: 0.82 10*3/MM3 (ref 0.6–4.2)
LYMPHOCYTES # BLD AUTO: 0.84 10*3/MM3 (ref 0.6–4.2)
LYMPHOCYTES # BLD AUTO: 0.84 10*3/MM3 (ref 0.6–4.2)
LYMPHOCYTES # BLD AUTO: 0.87 10*3/MM3 (ref 0.6–4.2)
LYMPHOCYTES # BLD AUTO: 0.88 10*3/MM3 (ref 0.6–4.2)
LYMPHOCYTES # BLD AUTO: 0.96 10*3/MM3 (ref 0.6–4.2)
LYMPHOCYTES # BLD AUTO: 0.98 10*3/MM3 (ref 0.6–4.2)
LYMPHOCYTES # BLD AUTO: 1.01 10*3/MM3 (ref 0.6–4.2)
LYMPHOCYTES # BLD AUTO: 1.02 10*3/MM3 (ref 0.6–4.2)
LYMPHOCYTES # BLD AUTO: 1.08 10*3/MM3 (ref 0.6–4.2)
LYMPHOCYTES # BLD AUTO: 1.09 10*3/MM3 (ref 0.6–4.2)
LYMPHOCYTES # BLD AUTO: 1.12 10*3/MM3 (ref 0.6–4.2)
LYMPHOCYTES # BLD AUTO: 1.12 10*3/MM3 (ref 0.6–4.2)
LYMPHOCYTES # BLD AUTO: 1.15 10*3/MM3 (ref 0.6–4.2)
LYMPHOCYTES # BLD AUTO: 1.17 10*3/MM3 (ref 0.6–4.2)
LYMPHOCYTES # BLD AUTO: 1.18 10*3/MM3 (ref 0.6–4.2)
LYMPHOCYTES # BLD AUTO: 1.18 10*3/MM3 (ref 0.6–4.2)
LYMPHOCYTES # BLD AUTO: 1.2 10*3/MM3 (ref 0.6–4.2)
LYMPHOCYTES # BLD AUTO: 1.22 10*3/MM3 (ref 0.6–4.2)
LYMPHOCYTES # BLD AUTO: 1.22 10*3/MM3 (ref 0.6–4.2)
LYMPHOCYTES # BLD AUTO: 1.24 10*3/MM3 (ref 0.6–4.2)
LYMPHOCYTES # BLD AUTO: 1.25 10*3/MM3 (ref 0.6–4.2)
LYMPHOCYTES # BLD AUTO: 1.25 10*3/MM3 (ref 0.6–4.2)
LYMPHOCYTES # BLD AUTO: 1.29 10*3/MM3 (ref 0.6–4.2)
LYMPHOCYTES # BLD AUTO: 1.3 10*3/MM3 (ref 0.6–4.2)
LYMPHOCYTES # BLD AUTO: 1.32 10*3/MM3 (ref 0.6–4.2)
LYMPHOCYTES # BLD AUTO: 1.33 10*3/MM3 (ref 0.6–4.2)
LYMPHOCYTES # BLD AUTO: 1.37 10*3/MM3 (ref 0.6–4.2)
LYMPHOCYTES # BLD AUTO: 1.42 10*3/MM3 (ref 0.6–4.2)
LYMPHOCYTES # BLD AUTO: 1.44 10*3/MM3 (ref 0.6–4.2)
LYMPHOCYTES # BLD AUTO: 1.45 10*3/MM3 (ref 0.6–4.2)
LYMPHOCYTES # BLD AUTO: 1.45 10*3/MM3 (ref 0.6–4.2)
LYMPHOCYTES # BLD AUTO: 1.46 10*3/MM3 (ref 0.6–4.2)
LYMPHOCYTES # BLD AUTO: 1.47 10*3/MM3 (ref 0.6–4.2)
LYMPHOCYTES # BLD AUTO: 1.48 10*3/MM3 (ref 0.6–4.2)
LYMPHOCYTES # BLD AUTO: 1.54 10*3/MM3 (ref 0.6–4.2)
LYMPHOCYTES # BLD AUTO: 1.54 10*3/MM3 (ref 0.6–4.2)
LYMPHOCYTES # BLD AUTO: 1.57 10*3/MM3 (ref 0.6–4.2)
LYMPHOCYTES # BLD AUTO: 1.59 10*3/MM3 (ref 0.6–4.2)
LYMPHOCYTES # BLD AUTO: 1.6 10*3/MM3 (ref 0.6–4.2)
LYMPHOCYTES # BLD AUTO: 1.62 10*3/MM3 (ref 0.6–4.2)
LYMPHOCYTES # BLD AUTO: 1.64 10*3/MM3 (ref 0.6–4.2)
LYMPHOCYTES # BLD AUTO: 1.65 10*3/MM3 (ref 0.6–4.2)
LYMPHOCYTES # BLD AUTO: 1.66 10*3/MM3 (ref 0.6–4.2)
LYMPHOCYTES # BLD AUTO: 1.67 10*3/MM3 (ref 0.6–4.2)
LYMPHOCYTES # BLD AUTO: 1.73 10*3/MM3 (ref 0.6–4.2)
LYMPHOCYTES # BLD AUTO: 1.74 10*3/MM3 (ref 0.6–4.2)
LYMPHOCYTES # BLD AUTO: 1.83 10*3/MM3 (ref 0.6–4.2)
LYMPHOCYTES # BLD AUTO: 1.84 10*3/MM3 (ref 0.6–4.2)
LYMPHOCYTES # BLD AUTO: 1.9 10*3/MM3 (ref 0.6–4.2)
LYMPHOCYTES # BLD AUTO: 1.91 10*3/MM3 (ref 0.6–4.2)
LYMPHOCYTES # BLD AUTO: 2.41 10*3/MM3 (ref 0.6–4.2)
LYMPHOCYTES # BLD MANUAL: 0.48 10*3/MM3 (ref 0.6–4.2)
LYMPHOCYTES # BLD MANUAL: 0.67 10*3/MM3 (ref 0.6–4.2)
LYMPHOCYTES # BLD MANUAL: 0.7 10*3/MM3 (ref 0.6–4.2)
LYMPHOCYTES # BLD MANUAL: 1.06 10*3/MM3 (ref 0.6–4.2)
LYMPHOCYTES # BLD MANUAL: 1.31 10*3/MM3 (ref 0.6–4.2)
LYMPHOCYTES NFR BLD AUTO: 10.1 % (ref 10–50)
LYMPHOCYTES NFR BLD AUTO: 10.2 % (ref 10–50)
LYMPHOCYTES NFR BLD AUTO: 10.5 % (ref 10–50)
LYMPHOCYTES NFR BLD AUTO: 10.5 % (ref 10–50)
LYMPHOCYTES NFR BLD AUTO: 11.1 % (ref 10–50)
LYMPHOCYTES NFR BLD AUTO: 12.1 % (ref 10–50)
LYMPHOCYTES NFR BLD AUTO: 12.1 % (ref 10–50)
LYMPHOCYTES NFR BLD AUTO: 12.6 % (ref 10–50)
LYMPHOCYTES NFR BLD AUTO: 14.2 % (ref 10–50)
LYMPHOCYTES NFR BLD AUTO: 14.6 % (ref 10–50)
LYMPHOCYTES NFR BLD AUTO: 14.8 % (ref 10–50)
LYMPHOCYTES NFR BLD AUTO: 14.9 % (ref 10–50)
LYMPHOCYTES NFR BLD AUTO: 14.9 % (ref 10–50)
LYMPHOCYTES NFR BLD AUTO: 15.7 % (ref 10–50)
LYMPHOCYTES NFR BLD AUTO: 15.8 % (ref 10–50)
LYMPHOCYTES NFR BLD AUTO: 16 % (ref 10–50)
LYMPHOCYTES NFR BLD AUTO: 16.3 % (ref 10–50)
LYMPHOCYTES NFR BLD AUTO: 16.3 % (ref 10–50)
LYMPHOCYTES NFR BLD AUTO: 16.7 % (ref 10–50)
LYMPHOCYTES NFR BLD AUTO: 16.8 % (ref 10–50)
LYMPHOCYTES NFR BLD AUTO: 18.4 % (ref 10–50)
LYMPHOCYTES NFR BLD AUTO: 19 % (ref 10–50)
LYMPHOCYTES NFR BLD AUTO: 19.6 % (ref 10–50)
LYMPHOCYTES NFR BLD AUTO: 20.5 % (ref 10–50)
LYMPHOCYTES NFR BLD AUTO: 20.9 % (ref 10–50)
LYMPHOCYTES NFR BLD AUTO: 21.9 % (ref 10–50)
LYMPHOCYTES NFR BLD AUTO: 22 % (ref 10–50)
LYMPHOCYTES NFR BLD AUTO: 22.3 % (ref 10–50)
LYMPHOCYTES NFR BLD AUTO: 22.4 % (ref 10–50)
LYMPHOCYTES NFR BLD AUTO: 23.4 % (ref 10–50)
LYMPHOCYTES NFR BLD AUTO: 23.6 % (ref 10–50)
LYMPHOCYTES NFR BLD AUTO: 23.8 % (ref 10–50)
LYMPHOCYTES NFR BLD AUTO: 24.1 % (ref 10–50)
LYMPHOCYTES NFR BLD AUTO: 24.1 % (ref 10–50)
LYMPHOCYTES NFR BLD AUTO: 24.4 % (ref 10–50)
LYMPHOCYTES NFR BLD AUTO: 24.4 % (ref 10–50)
LYMPHOCYTES NFR BLD AUTO: 25.2 % (ref 10–50)
LYMPHOCYTES NFR BLD AUTO: 25.3 % (ref 10–50)
LYMPHOCYTES NFR BLD AUTO: 25.4 % (ref 10–50)
LYMPHOCYTES NFR BLD AUTO: 26 % (ref 10–50)
LYMPHOCYTES NFR BLD AUTO: 26.9 % (ref 10–50)
LYMPHOCYTES NFR BLD AUTO: 27.2 % (ref 10–50)
LYMPHOCYTES NFR BLD AUTO: 27.7 % (ref 10–50)
LYMPHOCYTES NFR BLD AUTO: 28.2 % (ref 10–50)
LYMPHOCYTES NFR BLD AUTO: 28.3 % (ref 10–50)
LYMPHOCYTES NFR BLD AUTO: 3.6 % (ref 10–50)
LYMPHOCYTES NFR BLD AUTO: 3.6 % (ref 10–50)
LYMPHOCYTES NFR BLD AUTO: 30 % (ref 10–50)
LYMPHOCYTES NFR BLD AUTO: 30.5 % (ref 10–50)
LYMPHOCYTES NFR BLD AUTO: 31.6 % (ref 10–50)
LYMPHOCYTES NFR BLD AUTO: 33 % (ref 10–50)
LYMPHOCYTES NFR BLD AUTO: 33.1 % (ref 10–50)
LYMPHOCYTES NFR BLD AUTO: 33.9 % (ref 10–50)
LYMPHOCYTES NFR BLD AUTO: 34.5 % (ref 10–50)
LYMPHOCYTES NFR BLD AUTO: 39.6 % (ref 10–50)
LYMPHOCYTES NFR BLD AUTO: 42.1 % (ref 10–50)
LYMPHOCYTES NFR BLD AUTO: 43.1 % (ref 10–50)
LYMPHOCYTES NFR BLD AUTO: 5 % (ref 10–50)
LYMPHOCYTES NFR BLD AUTO: 5.4 % (ref 10–50)
LYMPHOCYTES NFR BLD AUTO: 5.7 % (ref 10–50)
LYMPHOCYTES NFR BLD AUTO: 7.1 % (ref 10–50)
LYMPHOCYTES NFR BLD AUTO: 7.1 % (ref 10–50)
LYMPHOCYTES NFR BLD AUTO: 7.4 % (ref 10–50)
LYMPHOCYTES NFR BLD AUTO: 8 % (ref 10–50)
LYMPHOCYTES NFR BLD AUTO: 9 % (ref 10–50)
LYMPHOCYTES NFR BLD AUTO: 9.9 % (ref 10–50)
LYMPHOCYTES NFR BLD MANUAL: 1 % (ref 0–12)
LYMPHOCYTES NFR BLD MANUAL: 2 % (ref 0–12)
LYMPHOCYTES NFR BLD MANUAL: 2 % (ref 10–50)
LYMPHOCYTES NFR BLD MANUAL: 2 % (ref 10–50)
LYMPHOCYTES NFR BLD MANUAL: 23 % (ref 0–12)
LYMPHOCYTES NFR BLD MANUAL: 23 % (ref 0–12)
LYMPHOCYTES NFR BLD MANUAL: 30 % (ref 10–50)
LYMPHOCYTES NFR BLD MANUAL: 32 % (ref 10–50)
LYMPHOCYTES NFR BLD MANUAL: 5 % (ref 10–50)
LYMPHOCYTES NFR BLD MANUAL: 6 % (ref 0–12)
Lab: ABNORMAL
Lab: NORMAL
M PNEUMO IGG SER IA-ACNC: NOT DETECTED
MAGNESIUM SERPL-MCNC: 1.8 MG/DL (ref 1.6–2.3)
MAGNESIUM SERPL-MCNC: 1.8 MG/DL (ref 1.6–2.3)
MAGNESIUM SERPL-MCNC: 1.9 MG/DL (ref 1.6–2.3)
MAGNESIUM SERPL-MCNC: 2 MG/DL (ref 1.6–2.3)
MAGNESIUM SERPL-MCNC: 2.1 MG/DL (ref 1.6–2.3)
MAXIMAL PREDICTED HEART RATE: 133 BPM
MAXIMAL PREDICTED HEART RATE: 133 BPM
MCH RBC QN AUTO: 21.1 PG (ref 26.5–34)
MCH RBC QN AUTO: 21.4 PG (ref 26.5–34)
MCH RBC QN AUTO: 22.3 PG (ref 26.5–34)
MCH RBC QN AUTO: 22.4 PG (ref 26.5–34)
MCH RBC QN AUTO: 24.4 PG (ref 26.5–34)
MCH RBC QN AUTO: 24.5 PG (ref 26.5–34)
MCH RBC QN AUTO: 24.6 PG (ref 26.5–34)
MCH RBC QN AUTO: 24.6 PG (ref 26.5–34)
MCH RBC QN AUTO: 24.7 PG (ref 26.5–34)
MCH RBC QN AUTO: 24.7 PG (ref 26.5–34)
MCH RBC QN AUTO: 24.8 PG (ref 26.5–34)
MCH RBC QN AUTO: 25 PG (ref 26.5–34)
MCH RBC QN AUTO: 25 PG (ref 26.5–34)
MCH RBC QN AUTO: 25.1 PG (ref 26.5–34)
MCH RBC QN AUTO: 25.2 PG (ref 26.5–34)
MCH RBC QN AUTO: 25.3 PG (ref 26.5–34)
MCH RBC QN AUTO: 25.4 PG (ref 26.5–34)
MCH RBC QN AUTO: 25.4 PG (ref 26.5–34)
MCH RBC QN AUTO: 25.6 PG (ref 26.5–34)
MCH RBC QN AUTO: 25.7 PG (ref 26.5–34)
MCH RBC QN AUTO: 25.8 PG (ref 26.5–34)
MCH RBC QN AUTO: 26.1 PG (ref 26.5–34)
MCH RBC QN AUTO: 26.9 PG (ref 26.5–34)
MCH RBC QN AUTO: 27.2 PG (ref 26.5–34)
MCH RBC QN AUTO: 27.4 PG (ref 26.5–34)
MCH RBC QN AUTO: 27.4 PG (ref 26.5–34)
MCH RBC QN AUTO: 27.5 PG (ref 26.5–34)
MCH RBC QN AUTO: 27.7 PG (ref 26.5–34)
MCH RBC QN AUTO: 27.8 PG (ref 26.5–34)
MCH RBC QN AUTO: 27.9 PG (ref 26.5–34)
MCH RBC QN AUTO: 27.9 PG (ref 26.5–34)
MCH RBC QN AUTO: 28 PG (ref 26.5–34)
MCH RBC QN AUTO: 28.1 PG (ref 26.5–34)
MCH RBC QN AUTO: 28.2 PG (ref 26.5–34)
MCH RBC QN AUTO: 28.3 PG (ref 26.5–34)
MCH RBC QN AUTO: 28.3 PG (ref 26.5–34)
MCH RBC QN AUTO: 28.4 PG (ref 26.5–34)
MCH RBC QN AUTO: 28.5 PG (ref 26.5–34)
MCH RBC QN AUTO: 28.7 PG (ref 26.5–34)
MCH RBC QN AUTO: 28.8 PG (ref 26.5–34)
MCH RBC QN AUTO: 28.9 PG (ref 26.5–34)
MCH RBC QN AUTO: 29 PG (ref 26.5–34)
MCH RBC QN AUTO: 29.1 PG (ref 26.5–34)
MCH RBC QN AUTO: 29.2 PG (ref 26.5–34)
MCH RBC QN AUTO: 29.3 PG (ref 26.5–34)
MCH RBC QN AUTO: 29.4 PG (ref 26.5–34)
MCH RBC QN AUTO: 30 PG (ref 26.5–34)
MCHC RBC AUTO-ENTMCNC: 29.7 G/DL (ref 31.5–36.3)
MCHC RBC AUTO-ENTMCNC: 30.4 G/DL (ref 31.5–36.3)
MCHC RBC AUTO-ENTMCNC: 30.5 G/DL (ref 31.5–36.3)
MCHC RBC AUTO-ENTMCNC: 30.6 G/DL (ref 31.5–36.3)
MCHC RBC AUTO-ENTMCNC: 30.8 G/DL (ref 31.5–36.3)
MCHC RBC AUTO-ENTMCNC: 30.9 G/DL (ref 31.5–36.3)
MCHC RBC AUTO-ENTMCNC: 31 G/DL (ref 31.5–36.3)
MCHC RBC AUTO-ENTMCNC: 31.1 G/DL (ref 31.5–36.3)
MCHC RBC AUTO-ENTMCNC: 31.1 G/DL (ref 31.5–36.3)
MCHC RBC AUTO-ENTMCNC: 31.3 G/DL (ref 31.5–36.3)
MCHC RBC AUTO-ENTMCNC: 31.4 G/DL (ref 31.5–36.3)
MCHC RBC AUTO-ENTMCNC: 31.5 G/DL (ref 31.5–36.3)
MCHC RBC AUTO-ENTMCNC: 31.6 G/DL (ref 31.5–36.3)
MCHC RBC AUTO-ENTMCNC: 31.7 G/DL (ref 31.5–36.3)
MCHC RBC AUTO-ENTMCNC: 31.8 G/DL (ref 31.5–36.3)
MCHC RBC AUTO-ENTMCNC: 31.9 G/DL (ref 31.5–36.3)
MCHC RBC AUTO-ENTMCNC: 32 G/DL (ref 31.5–36.3)
MCHC RBC AUTO-ENTMCNC: 32.1 G/DL (ref 31.5–36.3)
MCHC RBC AUTO-ENTMCNC: 32.2 G/DL (ref 31.5–36.3)
MCHC RBC AUTO-ENTMCNC: 32.2 G/DL (ref 31.5–36.3)
MCHC RBC AUTO-ENTMCNC: 32.3 G/DL (ref 31.5–36.3)
MCHC RBC AUTO-ENTMCNC: 32.4 G/DL (ref 31.5–36.3)
MCHC RBC AUTO-ENTMCNC: 32.5 G/DL (ref 31.5–36.3)
MCHC RBC AUTO-ENTMCNC: 32.6 G/DL (ref 31.5–36.3)
MCHC RBC AUTO-ENTMCNC: 32.7 G/DL (ref 31.5–36.3)
MCHC RBC AUTO-ENTMCNC: 32.8 G/DL (ref 31.5–36.3)
MCHC RBC AUTO-ENTMCNC: 32.9 G/DL (ref 31.5–36.3)
MCHC RBC AUTO-ENTMCNC: 33.1 G/DL (ref 31.5–36.3)
MCHC RBC AUTO-ENTMCNC: 33.2 G/DL (ref 31.5–36.3)
MCHC RBC AUTO-ENTMCNC: 33.2 G/DL (ref 31.5–36.3)
MCHC RBC AUTO-ENTMCNC: 33.3 G/DL (ref 31.5–36.3)
MCHC RBC AUTO-ENTMCNC: 33.4 G/DL (ref 31.5–36.3)
MCHC RBC AUTO-ENTMCNC: 33.5 G/DL (ref 31.5–36.3)
MCHC RBC AUTO-ENTMCNC: 33.7 G/DL (ref 31.5–36.3)
MCV RBC AUTO: 70.4 FL (ref 80–98)
MCV RBC AUTO: 71 FL (ref 80–98)
MCV RBC AUTO: 71.2 FL (ref 80–98)
MCV RBC AUTO: 72.2 FL (ref 80–98)
MCV RBC AUTO: 77.7 FL (ref 80–98)
MCV RBC AUTO: 78.8 FL (ref 80–98)
MCV RBC AUTO: 78.8 FL (ref 80–98)
MCV RBC AUTO: 79.1 FL (ref 80–98)
MCV RBC AUTO: 79.1 FL (ref 80–98)
MCV RBC AUTO: 79.2 FL (ref 80–98)
MCV RBC AUTO: 79.4 FL (ref 80–98)
MCV RBC AUTO: 79.4 FL (ref 80–98)
MCV RBC AUTO: 79.6 FL (ref 80–98)
MCV RBC AUTO: 79.6 FL (ref 80–98)
MCV RBC AUTO: 80.1 FL (ref 80–98)
MCV RBC AUTO: 80.1 FL (ref 80–98)
MCV RBC AUTO: 80.2 FL (ref 80–98)
MCV RBC AUTO: 80.4 FL (ref 80–98)
MCV RBC AUTO: 81 FL (ref 80–98)
MCV RBC AUTO: 81 FL (ref 80–98)
MCV RBC AUTO: 81.1 FL (ref 80–98)
MCV RBC AUTO: 81.1 FL (ref 80–98)
MCV RBC AUTO: 81.2 FL (ref 80–98)
MCV RBC AUTO: 82 FL (ref 80–98)
MCV RBC AUTO: 82.9 FL (ref 80–98)
MCV RBC AUTO: 84 FL (ref 80–98)
MCV RBC AUTO: 84.6 FL (ref 80–98)
MCV RBC AUTO: 85.4 FL (ref 80–98)
MCV RBC AUTO: 85.7 FL (ref 80–98)
MCV RBC AUTO: 85.8 FL (ref 80–98)
MCV RBC AUTO: 86.5 FL (ref 80–98)
MCV RBC AUTO: 87 FL (ref 80–98)
MCV RBC AUTO: 87.2 FL (ref 80–98)
MCV RBC AUTO: 87.2 FL (ref 80–98)
MCV RBC AUTO: 87.4 FL (ref 80–98)
MCV RBC AUTO: 87.5 FL (ref 80–98)
MCV RBC AUTO: 87.6 FL (ref 80–98)
MCV RBC AUTO: 87.6 FL (ref 80–98)
MCV RBC AUTO: 87.8 FL (ref 80–98)
MCV RBC AUTO: 88 FL (ref 80–98)
MCV RBC AUTO: 88 FL (ref 80–98)
MCV RBC AUTO: 88.1 FL (ref 80–98)
MCV RBC AUTO: 88.2 FL (ref 80–98)
MCV RBC AUTO: 88.3 FL (ref 80–98)
MCV RBC AUTO: 88.4 FL (ref 80–98)
MCV RBC AUTO: 88.4 FL (ref 80–98)
MCV RBC AUTO: 88.5 FL (ref 80–98)
MCV RBC AUTO: 88.6 FL (ref 80–98)
MCV RBC AUTO: 88.7 FL (ref 80–98)
MCV RBC AUTO: 88.9 FL (ref 80–98)
MCV RBC AUTO: 89 FL (ref 80–98)
MCV RBC AUTO: 89.1 FL (ref 80–98)
MCV RBC AUTO: 89.3 FL (ref 80–98)
MCV RBC AUTO: 89.5 FL (ref 80–98)
MCV RBC AUTO: 89.5 FL (ref 80–98)
MCV RBC AUTO: 89.7 FL (ref 80–98)
MCV RBC AUTO: 89.9 FL (ref 80–98)
MCV RBC AUTO: 89.9 FL (ref 80–98)
MCV RBC AUTO: 90.1 FL (ref 80–98)
MCV RBC AUTO: 90.6 FL (ref 80–98)
MCV RBC AUTO: 91 FL (ref 80–98)
MCV RBC AUTO: 91.5 FL (ref 80–98)
MCV RBC AUTO: 92.1 FL (ref 80–98)
MCV RBC AUTO: 92.1 FL (ref 80–98)
METHADONE UR QL SCN: NEGATIVE
METHGB BLD QL: 0.5 % (ref 0–3)
MICAFUNGIN ISLT MIC: NORMAL
MICROCYTES BLD QL: ABNORMAL
MODALITY: ABNORMAL
MODALITY: NORMAL
MONOCYTES # BLD AUTO: 0.06 10*3/MM3 (ref 0–0.9)
MONOCYTES # BLD AUTO: 0.24 10*3/MM3 (ref 0–0.9)
MONOCYTES # BLD AUTO: 0.38 10*3/MM3 (ref 0–0.9)
MONOCYTES # BLD AUTO: 0.39 10*3/MM3 (ref 0–0.9)
MONOCYTES # BLD AUTO: 0.41 10*3/MM3 (ref 0–0.9)
MONOCYTES # BLD AUTO: 0.42 10*3/MM3 (ref 0–0.9)
MONOCYTES # BLD AUTO: 0.43 10*3/MM3 (ref 0–0.9)
MONOCYTES # BLD AUTO: 0.47 10*3/MM3 (ref 0–0.9)
MONOCYTES # BLD AUTO: 0.48 10*3/MM3 (ref 0–0.9)
MONOCYTES # BLD AUTO: 0.48 10*3/MM3 (ref 0–0.9)
MONOCYTES # BLD AUTO: 0.49 10*3/MM3 (ref 0–0.9)
MONOCYTES # BLD AUTO: 0.49 10*3/MM3 (ref 0–0.9)
MONOCYTES # BLD AUTO: 0.5 10*3/MM3 (ref 0–0.9)
MONOCYTES # BLD AUTO: 0.5 10*3/MM3 (ref 0–0.9)
MONOCYTES # BLD AUTO: 0.51 10*3/MM3 (ref 0–0.9)
MONOCYTES # BLD AUTO: 0.53 10*3/MM3 (ref 0–0.9)
MONOCYTES # BLD AUTO: 0.53 10*3/MM3 (ref 0–0.9)
MONOCYTES # BLD AUTO: 0.54 10*3/MM3 (ref 0–0.9)
MONOCYTES # BLD AUTO: 0.55 10*3/MM3 (ref 0–0.9)
MONOCYTES # BLD AUTO: 0.56 10*3/MM3 (ref 0–0.9)
MONOCYTES # BLD AUTO: 0.57 10*3/MM3 (ref 0–0.9)
MONOCYTES # BLD AUTO: 0.58 10*3/MM3 (ref 0–0.9)
MONOCYTES # BLD AUTO: 0.6 10*3/MM3 (ref 0–0.9)
MONOCYTES # BLD AUTO: 0.61 10*3/MM3 (ref 0–0.9)
MONOCYTES # BLD AUTO: 0.61 10*3/MM3 (ref 0–0.9)
MONOCYTES # BLD AUTO: 0.62 10*3/MM3 (ref 0–0.9)
MONOCYTES # BLD AUTO: 0.64 10*3/MM3 (ref 0–0.9)
MONOCYTES # BLD AUTO: 0.64 10*3/MM3 (ref 0–0.9)
MONOCYTES # BLD AUTO: 0.65 10*3/MM3 (ref 0–0.9)
MONOCYTES # BLD AUTO: 0.65 10*3/MM3 (ref 0–0.9)
MONOCYTES # BLD AUTO: 0.66 10*3/MM3 (ref 0–0.9)
MONOCYTES # BLD AUTO: 0.67 10*3/MM3 (ref 0–0.9)
MONOCYTES # BLD AUTO: 0.67 10*3/MM3 (ref 0–0.9)
MONOCYTES # BLD AUTO: 0.68 10*3/MM3 (ref 0–0.9)
MONOCYTES # BLD AUTO: 0.68 10*3/MM3 (ref 0–0.9)
MONOCYTES # BLD AUTO: 0.7 10*3/MM3 (ref 0–0.9)
MONOCYTES # BLD AUTO: 0.71 10*3/MM3 (ref 0–0.9)
MONOCYTES # BLD AUTO: 0.73 10*3/MM3 (ref 0–0.9)
MONOCYTES # BLD AUTO: 0.74 10*3/MM3 (ref 0–0.9)
MONOCYTES # BLD AUTO: 0.76 10*3/MM3 (ref 0–0.9)
MONOCYTES # BLD AUTO: 0.77 10*3/MM3 (ref 0–0.9)
MONOCYTES # BLD AUTO: 0.77 10*3/MM3 (ref 0–0.9)
MONOCYTES # BLD AUTO: 0.78 10*3/MM3 (ref 0–0.9)
MONOCYTES # BLD AUTO: 0.8 10*3/MM3 (ref 0–0.9)
MONOCYTES # BLD AUTO: 0.81 10*3/MM3 (ref 0–0.9)
MONOCYTES # BLD AUTO: 0.82 10*3/MM3 (ref 0–0.9)
MONOCYTES # BLD AUTO: 0.84 10*3/MM3 (ref 0–0.9)
MONOCYTES # BLD AUTO: 0.86 10*3/MM3 (ref 0–0.9)
MONOCYTES # BLD AUTO: 0.86 10*3/MM3 (ref 0–0.9)
MONOCYTES # BLD AUTO: 0.88 10*3/MM3 (ref 0–0.9)
MONOCYTES # BLD AUTO: 0.89 10*3/MM3 (ref 0–0.9)
MONOCYTES # BLD AUTO: 0.91 10*3/MM3 (ref 0–0.9)
MONOCYTES # BLD AUTO: 0.91 10*3/MM3 (ref 0–0.9)
MONOCYTES # BLD AUTO: 0.94 10*3/MM3 (ref 0–0.9)
MONOCYTES # BLD AUTO: 0.94 10*3/MM3 (ref 0–0.9)
MONOCYTES # BLD AUTO: 0.99 10*3/MM3 (ref 0–0.9)
MONOCYTES # BLD AUTO: 1 10*3/MM3 (ref 0–0.9)
MONOCYTES # BLD AUTO: 1.03 10*3/MM3 (ref 0–0.9)
MONOCYTES # BLD AUTO: 1.06 10*3/MM3 (ref 0–0.9)
MONOCYTES # BLD AUTO: 1.15 10*3/MM3 (ref 0–0.9)
MONOCYTES # BLD AUTO: 1.15 10*3/MM3 (ref 0–0.9)
MONOCYTES # BLD AUTO: 1.25 10*3/MM3 (ref 0–0.9)
MONOCYTES # BLD AUTO: 1.32 10*3/MM3 (ref 0–0.9)
MONOCYTES NFR BLD AUTO: 0.6 % (ref 0–12)
MONOCYTES NFR BLD AUTO: 10.2 % (ref 0–12)
MONOCYTES NFR BLD AUTO: 10.3 % (ref 0–12)
MONOCYTES NFR BLD AUTO: 10.6 % (ref 0–12)
MONOCYTES NFR BLD AUTO: 10.6 % (ref 0–12)
MONOCYTES NFR BLD AUTO: 10.9 % (ref 0–12)
MONOCYTES NFR BLD AUTO: 11 % (ref 0–12)
MONOCYTES NFR BLD AUTO: 11.2 % (ref 0–12)
MONOCYTES NFR BLD AUTO: 11.6 % (ref 0–12)
MONOCYTES NFR BLD AUTO: 12.4 % (ref 0–12)
MONOCYTES NFR BLD AUTO: 13 % (ref 0–12)
MONOCYTES NFR BLD AUTO: 13 % (ref 0–12)
MONOCYTES NFR BLD AUTO: 13.2 % (ref 0–12)
MONOCYTES NFR BLD AUTO: 13.2 % (ref 0–12)
MONOCYTES NFR BLD AUTO: 13.3 % (ref 0–12)
MONOCYTES NFR BLD AUTO: 13.3 % (ref 0–12)
MONOCYTES NFR BLD AUTO: 13.4 % (ref 0–12)
MONOCYTES NFR BLD AUTO: 13.9 % (ref 0–12)
MONOCYTES NFR BLD AUTO: 14 % (ref 0–12)
MONOCYTES NFR BLD AUTO: 14.2 % (ref 0–12)
MONOCYTES NFR BLD AUTO: 14.3 % (ref 0–12)
MONOCYTES NFR BLD AUTO: 14.3 % (ref 0–12)
MONOCYTES NFR BLD AUTO: 14.4 % (ref 0–12)
MONOCYTES NFR BLD AUTO: 15.7 % (ref 0–12)
MONOCYTES NFR BLD AUTO: 16 % (ref 0–12)
MONOCYTES NFR BLD AUTO: 16.7 % (ref 0–12)
MONOCYTES NFR BLD AUTO: 17.2 % (ref 0–12)
MONOCYTES NFR BLD AUTO: 17.8 % (ref 0–12)
MONOCYTES NFR BLD AUTO: 17.9 % (ref 0–12)
MONOCYTES NFR BLD AUTO: 20.5 % (ref 0–12)
MONOCYTES NFR BLD AUTO: 22.2 % (ref 0–12)
MONOCYTES NFR BLD AUTO: 3.5 % (ref 0–12)
MONOCYTES NFR BLD AUTO: 3.7 % (ref 0–12)
MONOCYTES NFR BLD AUTO: 4.7 % (ref 0–12)
MONOCYTES NFR BLD AUTO: 4.9 % (ref 0–12)
MONOCYTES NFR BLD AUTO: 5.3 % (ref 0–12)
MONOCYTES NFR BLD AUTO: 5.4 % (ref 0–12)
MONOCYTES NFR BLD AUTO: 5.4 % (ref 0–12)
MONOCYTES NFR BLD AUTO: 5.6 % (ref 0–12)
MONOCYTES NFR BLD AUTO: 5.9 % (ref 0–12)
MONOCYTES NFR BLD AUTO: 6.3 % (ref 0–12)
MONOCYTES NFR BLD AUTO: 6.6 % (ref 0–12)
MONOCYTES NFR BLD AUTO: 6.8 % (ref 0–12)
MONOCYTES NFR BLD AUTO: 7 % (ref 0–12)
MONOCYTES NFR BLD AUTO: 7.4 % (ref 0–12)
MONOCYTES NFR BLD AUTO: 7.5 % (ref 0–12)
MONOCYTES NFR BLD AUTO: 7.7 % (ref 0–12)
MONOCYTES NFR BLD AUTO: 7.8 % (ref 0–12)
MONOCYTES NFR BLD AUTO: 7.9 % (ref 0–12)
MONOCYTES NFR BLD AUTO: 7.9 % (ref 0–12)
MONOCYTES NFR BLD AUTO: 8.3 % (ref 0–12)
MONOCYTES NFR BLD AUTO: 8.3 % (ref 0–12)
MONOCYTES NFR BLD AUTO: 8.4 % (ref 0–12)
MONOCYTES NFR BLD AUTO: 8.8 % (ref 0–12)
MONOCYTES NFR BLD AUTO: 9 % (ref 0–12)
MONOCYTES NFR BLD AUTO: 9.2 % (ref 0–12)
MONOCYTES NFR BLD AUTO: 9.6 % (ref 0–12)
MONOCYTES NFR BLD AUTO: 9.6 % (ref 0–12)
MONOCYTES NFR BLD AUTO: 9.8 % (ref 0–12)
MRSA DNA SPEC QL NAA+PROBE: POSITIVE
NDM STRAIN: NOT DETECTED
NEUTROPHILS # BLD AUTO: 0.87 10*3/MM3 (ref 2–8.6)
NEUTROPHILS # BLD AUTO: 1.13 10*3/MM3 (ref 2–8.6)
NEUTROPHILS # BLD AUTO: 1.32 10*3/MM3 (ref 2–8.6)
NEUTROPHILS # BLD AUTO: 1.34 10*3/MM3 (ref 2–8.6)
NEUTROPHILS # BLD AUTO: 1.69 10*3/MM3 (ref 2–8.6)
NEUTROPHILS # BLD AUTO: 1.72 10*3/MM3 (ref 2–8.6)
NEUTROPHILS # BLD AUTO: 1.76 10*3/MM3 (ref 2–8.6)
NEUTROPHILS # BLD AUTO: 1.8 10*3/MM3 (ref 2–8.6)
NEUTROPHILS # BLD AUTO: 1.84 10*3/MM3 (ref 2–8.6)
NEUTROPHILS # BLD AUTO: 1.94 10*3/MM3 (ref 2–8.6)
NEUTROPHILS # BLD AUTO: 12.04 10*3/MM3 (ref 2–8.6)
NEUTROPHILS # BLD AUTO: 12.33 10*3/MM3 (ref 2–8.6)
NEUTROPHILS # BLD AUTO: 12.53 10*3/MM3 (ref 2–8.6)
NEUTROPHILS # BLD AUTO: 13.93 10*3/MM3 (ref 2–8.6)
NEUTROPHILS # BLD AUTO: 14.69 10*3/MM3 (ref 2–8.6)
NEUTROPHILS # BLD AUTO: 2.05 10*3/MM3 (ref 2–8.6)
NEUTROPHILS # BLD AUTO: 2.12 10*3/MM3 (ref 2–8.6)
NEUTROPHILS # BLD AUTO: 2.57 10*3/MM3 (ref 2–8.6)
NEUTROPHILS # BLD AUTO: 2.65 10*3/MM3 (ref 2–8.6)
NEUTROPHILS # BLD AUTO: 2.69 10*3/MM3 (ref 2–8.6)
NEUTROPHILS # BLD AUTO: 2.85 10*3/MM3 (ref 2–8.6)
NEUTROPHILS # BLD AUTO: 2.87 10*3/MM3 (ref 2–8.6)
NEUTROPHILS # BLD AUTO: 2.92 10*3/MM3 (ref 2–8.6)
NEUTROPHILS # BLD AUTO: 23.1 10*3/MM3 (ref 2–8.6)
NEUTROPHILS # BLD AUTO: 24.28 10*3/MM3 (ref 2–8.6)
NEUTROPHILS # BLD AUTO: 3.15 10*3/MM3 (ref 2–8.6)
NEUTROPHILS # BLD AUTO: 3.19 10*3/MM3 (ref 2–8.6)
NEUTROPHILS # BLD AUTO: 3.35 10*3/MM3 (ref 2–8.6)
NEUTROPHILS # BLD AUTO: 3.38 10*3/MM3 (ref 2–8.6)
NEUTROPHILS # BLD AUTO: 3.49 10*3/MM3 (ref 2–8.6)
NEUTROPHILS # BLD AUTO: 3.54 10*3/MM3 (ref 2–8.6)
NEUTROPHILS # BLD AUTO: 3.56 10*3/MM3 (ref 2–8.6)
NEUTROPHILS # BLD AUTO: 3.69 10*3/MM3 (ref 2–8.6)
NEUTROPHILS # BLD AUTO: 3.7 10*3/MM3 (ref 2–8.6)
NEUTROPHILS # BLD AUTO: 3.84 10*3/MM3 (ref 2–8.6)
NEUTROPHILS # BLD AUTO: 3.85 10*3/MM3 (ref 2–8.6)
NEUTROPHILS # BLD AUTO: 3.92 10*3/MM3 (ref 2–8.6)
NEUTROPHILS # BLD AUTO: 3.96 10*3/MM3 (ref 2–8.6)
NEUTROPHILS # BLD AUTO: 32.03 10*3/MM3 (ref 2–8.6)
NEUTROPHILS # BLD AUTO: 4.08 10*3/MM3 (ref 2–8.6)
NEUTROPHILS # BLD AUTO: 4.21 10*3/MM3 (ref 2–8.6)
NEUTROPHILS # BLD AUTO: 4.23 10*3/MM3 (ref 2–8.6)
NEUTROPHILS # BLD AUTO: 4.29 10*3/MM3 (ref 2–8.6)
NEUTROPHILS # BLD AUTO: 4.35 10*3/MM3 (ref 2–8.6)
NEUTROPHILS # BLD AUTO: 4.38 10*3/MM3 (ref 2–8.6)
NEUTROPHILS # BLD AUTO: 4.47 10*3/MM3 (ref 2–8.6)
NEUTROPHILS # BLD AUTO: 4.5 10*3/MM3 (ref 2–8.6)
NEUTROPHILS # BLD AUTO: 4.53 10*3/MM3 (ref 2–8.6)
NEUTROPHILS # BLD AUTO: 4.57 10*3/MM3 (ref 2–8.6)
NEUTROPHILS # BLD AUTO: 4.94 10*3/MM3 (ref 2–8.6)
NEUTROPHILS # BLD AUTO: 5.05 10*3/MM3 (ref 2–8.6)
NEUTROPHILS # BLD AUTO: 5.54 10*3/MM3 (ref 2–8.6)
NEUTROPHILS # BLD AUTO: 5.73 10*3/MM3 (ref 2–8.6)
NEUTROPHILS # BLD AUTO: 5.84 10*3/MM3 (ref 2–8.6)
NEUTROPHILS # BLD AUTO: 6.29 10*3/MM3 (ref 2–8.6)
NEUTROPHILS # BLD AUTO: 6.31 10*3/MM3 (ref 2–8.6)
NEUTROPHILS # BLD AUTO: 6.38 10*3/MM3 (ref 2–8.6)
NEUTROPHILS # BLD AUTO: 6.65 10*3/MM3 (ref 2–8.6)
NEUTROPHILS # BLD AUTO: 6.84 10*3/MM3 (ref 2–8.6)
NEUTROPHILS # BLD AUTO: 7.03 10*3/MM3 (ref 2–8.6)
NEUTROPHILS # BLD AUTO: 7.14 10*3/MM3 (ref 2–8.6)
NEUTROPHILS # BLD AUTO: 7.76 10*3/MM3 (ref 2–8.6)
NEUTROPHILS # BLD AUTO: 7.88 10*3/MM3 (ref 2–8.6)
NEUTROPHILS # BLD AUTO: 7.98 10*3/MM3 (ref 2–8.6)
NEUTROPHILS # BLD AUTO: 8 10*3/MM3 (ref 2–8.6)
NEUTROPHILS # BLD AUTO: 8.12 10*3/MM3 (ref 2–8.6)
NEUTROPHILS # BLD AUTO: 9.17 10*3/MM3 (ref 2–8.6)
NEUTROPHILS # BLD AUTO: 9.28 10*3/MM3 (ref 2–8.6)
NEUTROPHILS # BLD AUTO: 9.59 10*3/MM3 (ref 2–8.6)
NEUTROPHILS # BLD AUTO: 9.77 10*3/MM3 (ref 2–8.6)
NEUTROPHILS # BLD AUTO: 9.94 10*3/MM3 (ref 2–8.6)
NEUTROPHILS NFR BLD AUTO: 26 % (ref 37–80)
NEUTROPHILS NFR BLD AUTO: 33.4 % (ref 37–80)
NEUTROPHILS NFR BLD AUTO: 38.3 % (ref 37–80)
NEUTROPHILS NFR BLD AUTO: 39.9 % (ref 37–80)
NEUTROPHILS NFR BLD AUTO: 45.3 % (ref 37–80)
NEUTROPHILS NFR BLD AUTO: 46.7 % (ref 37–80)
NEUTROPHILS NFR BLD AUTO: 46.8 % (ref 37–80)
NEUTROPHILS NFR BLD AUTO: 53.2 % (ref 37–80)
NEUTROPHILS NFR BLD AUTO: 53.8 % (ref 37–80)
NEUTROPHILS NFR BLD AUTO: 54.3 % (ref 37–80)
NEUTROPHILS NFR BLD AUTO: 54.5 % (ref 37–80)
NEUTROPHILS NFR BLD AUTO: 55 % (ref 37–80)
NEUTROPHILS NFR BLD AUTO: 55.8 % (ref 37–80)
NEUTROPHILS NFR BLD AUTO: 55.8 % (ref 37–80)
NEUTROPHILS NFR BLD AUTO: 56.3 % (ref 37–80)
NEUTROPHILS NFR BLD AUTO: 56.6 % (ref 37–80)
NEUTROPHILS NFR BLD AUTO: 57.2 % (ref 37–80)
NEUTROPHILS NFR BLD AUTO: 57.4 % (ref 37–80)
NEUTROPHILS NFR BLD AUTO: 57.9 % (ref 37–80)
NEUTROPHILS NFR BLD AUTO: 58.6 % (ref 37–80)
NEUTROPHILS NFR BLD AUTO: 58.7 % (ref 37–80)
NEUTROPHILS NFR BLD AUTO: 59 % (ref 37–80)
NEUTROPHILS NFR BLD AUTO: 59.5 % (ref 37–80)
NEUTROPHILS NFR BLD AUTO: 60.2 % (ref 37–80)
NEUTROPHILS NFR BLD AUTO: 61.8 % (ref 37–80)
NEUTROPHILS NFR BLD AUTO: 62.2 % (ref 37–80)
NEUTROPHILS NFR BLD AUTO: 63.7 % (ref 37–80)
NEUTROPHILS NFR BLD AUTO: 63.7 % (ref 37–80)
NEUTROPHILS NFR BLD AUTO: 64.2 % (ref 37–80)
NEUTROPHILS NFR BLD AUTO: 64.2 % (ref 37–80)
NEUTROPHILS NFR BLD AUTO: 64.6 % (ref 37–80)
NEUTROPHILS NFR BLD AUTO: 65.2 % (ref 37–80)
NEUTROPHILS NFR BLD AUTO: 66.4 % (ref 37–80)
NEUTROPHILS NFR BLD AUTO: 66.8 % (ref 37–80)
NEUTROPHILS NFR BLD AUTO: 67 % (ref 37–80)
NEUTROPHILS NFR BLD AUTO: 67 % (ref 37–80)
NEUTROPHILS NFR BLD AUTO: 67.7 % (ref 37–80)
NEUTROPHILS NFR BLD AUTO: 67.8 % (ref 37–80)
NEUTROPHILS NFR BLD AUTO: 70.6 % (ref 37–80)
NEUTROPHILS NFR BLD AUTO: 71 % (ref 37–80)
NEUTROPHILS NFR BLD AUTO: 71.8 % (ref 37–80)
NEUTROPHILS NFR BLD AUTO: 72.6 % (ref 37–80)
NEUTROPHILS NFR BLD AUTO: 72.9 % (ref 37–80)
NEUTROPHILS NFR BLD AUTO: 74.4 % (ref 37–80)
NEUTROPHILS NFR BLD AUTO: 75.1 % (ref 37–80)
NEUTROPHILS NFR BLD AUTO: 75.2 % (ref 37–80)
NEUTROPHILS NFR BLD AUTO: 75.4 % (ref 37–80)
NEUTROPHILS NFR BLD AUTO: 75.6 % (ref 37–80)
NEUTROPHILS NFR BLD AUTO: 75.7 % (ref 37–80)
NEUTROPHILS NFR BLD AUTO: 76.4 % (ref 37–80)
NEUTROPHILS NFR BLD AUTO: 77.3 % (ref 37–80)
NEUTROPHILS NFR BLD AUTO: 78.6 % (ref 37–80)
NEUTROPHILS NFR BLD AUTO: 78.8 % (ref 37–80)
NEUTROPHILS NFR BLD AUTO: 79.5 % (ref 37–80)
NEUTROPHILS NFR BLD AUTO: 79.7 % (ref 37–80)
NEUTROPHILS NFR BLD AUTO: 80.4 % (ref 37–80)
NEUTROPHILS NFR BLD AUTO: 81.8 % (ref 37–80)
NEUTROPHILS NFR BLD AUTO: 82.4 % (ref 37–80)
NEUTROPHILS NFR BLD AUTO: 84.4 % (ref 37–80)
NEUTROPHILS NFR BLD AUTO: 84.8 % (ref 37–80)
NEUTROPHILS NFR BLD AUTO: 86.7 % (ref 37–80)
NEUTROPHILS NFR BLD AUTO: 87.1 % (ref 37–80)
NEUTROPHILS NFR BLD AUTO: 88.4 % (ref 37–80)
NEUTROPHILS NFR BLD AUTO: 90.4 % (ref 37–80)
NEUTROPHILS NFR BLD AUTO: 90.8 % (ref 37–80)
NEUTROPHILS NFR BLD AUTO: 91.3 % (ref 37–80)
NEUTROPHILS NFR BLD MANUAL: 38 % (ref 37–80)
NEUTROPHILS NFR BLD MANUAL: 42 % (ref 37–80)
NEUTROPHILS NFR BLD MANUAL: 89 % (ref 37–80)
NEUTROPHILS NFR BLD MANUAL: 90 % (ref 37–80)
NEUTROPHILS NFR BLD MANUAL: 91 % (ref 37–80)
NEUTS BAND NFR BLD MANUAL: 5 % (ref 0–5)
NEUTS BAND NFR BLD MANUAL: 6 % (ref 0–5)
NEUTS VAC BLD QL SMEAR: ABNORMAL
NITRITE UR QL STRIP: NEGATIVE
NITRITE UR QL STRIP: POSITIVE
NRBC BLD MANUAL-RTO: 0 /100 WBC (ref 0–0)
NRBC BLD MANUAL-RTO: 0.8 /100 WBC (ref 0–0)
NRBC BLD MANUAL-RTO: 1.1 /100 WBC (ref 0–0)
NT-PROBNP SERPL-MCNC: 2230 PG/ML (ref 0–1800)
NT-PROBNP SERPL-MCNC: 2700 PG/ML (ref 0–1800)
NT-PROBNP SERPL-MCNC: 4310 PG/ML (ref 0–1800)
NT-PROBNP SERPL-MCNC: 4470 PG/ML (ref 0–1800)
NT-PROBNP SERPL-MCNC: 5270 PG/ML (ref 0–1800)
OPIATES UR QL: NEGATIVE
OVALOCYTES BLD QL SMEAR: ABNORMAL
OXA 48 STRAIN: NOT DETECTED
OXYCODONE UR QL SCN: POSITIVE
OXYHGB MFR BLDV: 95.8 % (ref 94–99)
PATH REPORT.FINAL DX SPEC: NORMAL
PATH REPORT.GROSS SPEC: NORMAL
PCO2 BLDA: 32.7 MM HG (ref 35–45)
PCO2 BLDA: 32.8 MM HG (ref 35–45)
PCO2 BLDA: 37 MM HG (ref 35–45)
PCO2 BLDA: 38.6 MM HG (ref 35–45)
PCO2 BLDA: 41.6 MM HG (ref 35–45)
PCO2 TEMP ADJ BLD: ABNORMAL MM HG (ref 35–48)
PERCENT MAX PREDICTED HR: 84.96 %
PH BLDA: 7.37 PH UNITS (ref 7.35–7.45)
PH BLDA: 7.43 PH UNITS (ref 7.35–7.45)
PH BLDA: 7.43 PH UNITS (ref 7.35–7.45)
PH BLDA: 7.45 PH UNITS (ref 7.35–7.45)
PH BLDA: 7.46 PH UNITS (ref 7.35–7.45)
PH UR STRIP.AUTO: 5.5 [PH] (ref 5–9)
PH UR STRIP.AUTO: 6 [PH] (ref 5–9)
PH UR STRIP.AUTO: 6.5 [PH] (ref 5–9)
PH UR STRIP.AUTO: <=5 [PH] (ref 5–9)
PH, TEMP CORRECTED: ABNORMAL PH UNITS
PHOSPHATE SERPL-MCNC: 3.2 MG/DL (ref 2.4–4.4)
PLAT MORPH BLD: NORMAL
PLAT MORPH BLD: NORMAL
PLATELET # BLD AUTO: 147 10*3/MM3 (ref 150–450)
PLATELET # BLD AUTO: 151 10*3/MM3 (ref 150–450)
PLATELET # BLD AUTO: 158 10*3/MM3 (ref 150–450)
PLATELET # BLD AUTO: 160 10*3/MM3 (ref 150–450)
PLATELET # BLD AUTO: 167 10*3/MM3 (ref 150–450)
PLATELET # BLD AUTO: 171 10*3/MM3 (ref 150–450)
PLATELET # BLD AUTO: 176 10*3/MM3 (ref 150–450)
PLATELET # BLD AUTO: 177 10*3/MM3 (ref 150–450)
PLATELET # BLD AUTO: 177 10*3/MM3 (ref 150–450)
PLATELET # BLD AUTO: 183 10*3/MM3 (ref 150–450)
PLATELET # BLD AUTO: 184 10*3/MM3 (ref 150–450)
PLATELET # BLD AUTO: 184 10*3/MM3 (ref 150–450)
PLATELET # BLD AUTO: 186 10*3/MM3 (ref 150–450)
PLATELET # BLD AUTO: 186 10*3/MM3 (ref 150–450)
PLATELET # BLD AUTO: 189 10*3/MM3 (ref 150–450)
PLATELET # BLD AUTO: 190 10*3/MM3 (ref 150–450)
PLATELET # BLD AUTO: 190 10*3/MM3 (ref 150–450)
PLATELET # BLD AUTO: 192 10*3/MM3 (ref 150–450)
PLATELET # BLD AUTO: 195 10*3/MM3 (ref 150–450)
PLATELET # BLD AUTO: 196 10*3/MM3 (ref 150–450)
PLATELET # BLD AUTO: 197 10*3/MM3 (ref 150–450)
PLATELET # BLD AUTO: 203 10*3/MM3 (ref 150–450)
PLATELET # BLD AUTO: 203 10*3/MM3 (ref 150–450)
PLATELET # BLD AUTO: 205 10*3/MM3 (ref 150–450)
PLATELET # BLD AUTO: 206 10*3/MM3 (ref 150–450)
PLATELET # BLD AUTO: 207 10*3/MM3 (ref 150–450)
PLATELET # BLD AUTO: 209 10*3/MM3 (ref 150–450)
PLATELET # BLD AUTO: 210 10*3/MM3 (ref 150–450)
PLATELET # BLD AUTO: 211 10*3/MM3 (ref 150–450)
PLATELET # BLD AUTO: 211 10*3/MM3 (ref 150–450)
PLATELET # BLD AUTO: 212 10*3/MM3 (ref 150–450)
PLATELET # BLD AUTO: 214 10*3/MM3 (ref 150–450)
PLATELET # BLD AUTO: 214 10*3/MM3 (ref 150–450)
PLATELET # BLD AUTO: 215 10*3/MM3 (ref 150–450)
PLATELET # BLD AUTO: 219 10*3/MM3 (ref 150–450)
PLATELET # BLD AUTO: 219 10*3/MM3 (ref 150–450)
PLATELET # BLD AUTO: 220 10*3/MM3 (ref 150–450)
PLATELET # BLD AUTO: 222 10*3/MM3 (ref 150–450)
PLATELET # BLD AUTO: 223 10*3/MM3 (ref 150–450)
PLATELET # BLD AUTO: 224 10*3/MM3 (ref 150–450)
PLATELET # BLD AUTO: 227 10*3/MM3 (ref 150–450)
PLATELET # BLD AUTO: 227 10*3/MM3 (ref 150–450)
PLATELET # BLD AUTO: 228 10*3/MM3 (ref 150–450)
PLATELET # BLD AUTO: 228 10*3/MM3 (ref 150–450)
PLATELET # BLD AUTO: 229 10*3/MM3 (ref 150–450)
PLATELET # BLD AUTO: 232 10*3/MM3 (ref 150–450)
PLATELET # BLD AUTO: 234 10*3/MM3 (ref 150–450)
PLATELET # BLD AUTO: 237 10*3/MM3 (ref 150–450)
PLATELET # BLD AUTO: 238 10*3/MM3 (ref 150–450)
PLATELET # BLD AUTO: 239 10*3/MM3 (ref 150–450)
PLATELET # BLD AUTO: 242 10*3/MM3 (ref 150–450)
PLATELET # BLD AUTO: 243 10*3/MM3 (ref 150–450)
PLATELET # BLD AUTO: 245 10*3/MM3 (ref 150–450)
PLATELET # BLD AUTO: 246 10*3/MM3 (ref 150–450)
PLATELET # BLD AUTO: 247 10*3/MM3 (ref 150–450)
PLATELET # BLD AUTO: 248 10*3/MM3 (ref 150–450)
PLATELET # BLD AUTO: 250 10*3/MM3 (ref 150–450)
PLATELET # BLD AUTO: 252 10*3/MM3 (ref 150–450)
PLATELET # BLD AUTO: 260 10*3/MM3 (ref 150–450)
PLATELET # BLD AUTO: 269 10*3/MM3 (ref 150–450)
PLATELET # BLD AUTO: 270 10*3/MM3 (ref 150–450)
PLATELET # BLD AUTO: 276 10*3/MM3 (ref 150–450)
PLATELET # BLD AUTO: 282 10*3/MM3 (ref 150–450)
PLATELET # BLD AUTO: 282 10*3/MM3 (ref 150–450)
PLATELET # BLD AUTO: 285 10*3/MM3 (ref 150–450)
PLATELET # BLD AUTO: 288 10*3/MM3 (ref 150–450)
PLATELET # BLD AUTO: 289 10*3/MM3 (ref 150–450)
PLATELET # BLD AUTO: 299 10*3/MM3 (ref 150–450)
PLATELET # BLD AUTO: 317 10*3/MM3 (ref 150–450)
PLATELET # BLD AUTO: 330 10*3/MM3 (ref 150–450)
PLATELET # BLD AUTO: 335 10*3/MM3 (ref 150–450)
PMV BLD AUTO: 10 FL (ref 8–12)
PMV BLD AUTO: 10.1 FL (ref 8–12)
PMV BLD AUTO: 10.1 FL (ref 8–12)
PMV BLD AUTO: 8.3 FL (ref 8–12)
PMV BLD AUTO: 8.5 FL (ref 8–12)
PMV BLD AUTO: 8.6 FL (ref 8–12)
PMV BLD AUTO: 8.7 FL (ref 8–12)
PMV BLD AUTO: 8.7 FL (ref 8–12)
PMV BLD AUTO: 8.8 FL (ref 8–12)
PMV BLD AUTO: 8.9 FL (ref 8–12)
PMV BLD AUTO: 9 FL (ref 8–12)
PMV BLD AUTO: 9.1 FL (ref 8–12)
PMV BLD AUTO: 9.2 FL (ref 8–12)
PMV BLD AUTO: 9.3 FL (ref 8–12)
PMV BLD AUTO: 9.4 FL (ref 8–12)
PMV BLD AUTO: 9.5 FL (ref 8–12)
PMV BLD AUTO: 9.6 FL (ref 8–12)
PMV BLD AUTO: 9.7 FL (ref 8–12)
PMV BLD AUTO: 9.8 FL (ref 8–12)
PMV BLD AUTO: 9.9 FL (ref 8–12)
PO2 BLDA: 103 MM HG (ref 83–108)
PO2 BLDA: 54.7 MM HG (ref 83–108)
PO2 BLDA: 75.2 MM HG (ref 80–105)
PO2 BLDA: 84.3 MM HG (ref 83–108)
PO2 BLDA: 90.4 MM HG (ref 83–108)
PO2 TEMP ADJ BLD: ABNORMAL MM HG (ref 83–108)
POSACONAZOLE ISLT MIC: NORMAL
POTASSIUM BLD-SCNC: 3.2 MMOL/L (ref 3.5–5.1)
POTASSIUM BLD-SCNC: 3.4 MMOL/L (ref 3.5–5.1)
POTASSIUM BLD-SCNC: 3.5 MMOL/L (ref 3.5–5.1)
POTASSIUM BLD-SCNC: 3.5 MMOL/L (ref 3.5–5.1)
POTASSIUM BLD-SCNC: 3.6 MMOL/L (ref 3.5–5.1)
POTASSIUM BLD-SCNC: 3.7 MMOL/L (ref 3.5–5.1)
POTASSIUM BLD-SCNC: 3.8 MMOL/L (ref 3.5–5.1)
POTASSIUM BLD-SCNC: 3.8 MMOL/L (ref 3.5–5.1)
POTASSIUM BLD-SCNC: 3.9 MMOL/L (ref 3.5–5.1)
POTASSIUM BLD-SCNC: 3.9 MMOL/L (ref 3.5–5.1)
POTASSIUM BLD-SCNC: 4 MMOL/L (ref 3.5–5.1)
POTASSIUM BLD-SCNC: 4.1 MMOL/L (ref 3.5–5.1)
POTASSIUM BLD-SCNC: 4.2 MMOL/L (ref 3.5–5.1)
POTASSIUM BLD-SCNC: 4.3 MMOL/L (ref 3.5–5.1)
POTASSIUM BLD-SCNC: 4.4 MMOL/L (ref 3.5–5.1)
POTASSIUM BLD-SCNC: 4.5 MMOL/L (ref 3.5–5.1)
POTASSIUM BLD-SCNC: 4.6 MMOL/L (ref 3.5–5.1)
POTASSIUM BLD-SCNC: 4.7 MMOL/L (ref 3.5–5.1)
POTASSIUM BLD-SCNC: 4.7 MMOL/L (ref 3.5–5.1)
POTASSIUM BLD-SCNC: 4.8 MMOL/L (ref 3.5–5.1)
POTASSIUM BLD-SCNC: 4.8 MMOL/L (ref 3.5–5.1)
POTASSIUM BLD-SCNC: 4.9 MMOL/L (ref 3.5–5.1)
POTASSIUM BLD-SCNC: 5 MMOL/L (ref 3.5–5.1)
POTASSIUM BLD-SCNC: 5.3 MMOL/L (ref 3.5–5.1)
POTASSIUM BLDA-SCNC: 3.8 MMOL/L (ref 3.4–4.5)
POTASSIUM BLDA-SCNC: 4.11 MMOL/L (ref 3.6–4.9)
PROT SERPL-MCNC: 5.8 G/DL (ref 6.3–8.6)
PROT SERPL-MCNC: 6.1 G/DL (ref 6.3–8.6)
PROT SERPL-MCNC: 6.1 G/DL (ref 6.3–8.6)
PROT SERPL-MCNC: 6.3 G/DL (ref 6.3–8.6)
PROT SERPL-MCNC: 6.4 G/DL (ref 6.3–8.6)
PROT SERPL-MCNC: 6.5 G/DL (ref 6.3–8.6)
PROT SERPL-MCNC: 6.6 G/DL (ref 6.3–8.6)
PROT SERPL-MCNC: 6.7 G/DL (ref 6.3–8.6)
PROT SERPL-MCNC: 6.8 G/DL (ref 6.3–8.6)
PROT SERPL-MCNC: 6.9 G/DL (ref 6.3–8.6)
PROT SERPL-MCNC: 7 G/DL (ref 6.3–8.6)
PROT SERPL-MCNC: 7.1 G/DL (ref 6.3–8.6)
PROT SERPL-MCNC: 7.2 G/DL (ref 6.3–8.6)
PROT SERPL-MCNC: 7.2 G/DL (ref 6.3–8.6)
PROT SERPL-MCNC: 7.3 G/DL (ref 6.3–8.6)
PROT SERPL-MCNC: 7.3 G/DL (ref 6.3–8.6)
PROT SERPL-MCNC: 7.4 G/DL (ref 6.3–8.6)
PROT SERPL-MCNC: 7.6 G/DL (ref 6.3–8.6)
PROT SERPL-MCNC: 7.7 G/DL (ref 6.3–8.6)
PROT SERPL-MCNC: 7.9 G/DL (ref 6.3–8.6)
PROT SERPL-MCNC: 8 G/DL (ref 6.3–8.6)
PROT SERPL-MCNC: 8.7 G/DL (ref 6.3–8.6)
PROT UR QL STRIP: ABNORMAL
PROT UR QL STRIP: NEGATIVE
PROTHROMBIN TIME: 14.3 SECONDS (ref 11.1–15.3)
PROTHROMBIN TIME: 14.6 SECONDS (ref 11.1–15.3)
PROTHROMBIN TIME: 15.3 SECONDS (ref 11.1–15.3)
PROTHROMBIN TIME: 15.4 SECONDS (ref 11.1–15.3)
PROTHROMBIN TIME: 17 SECONDS (ref 11.1–15.3)
RBC # BLD AUTO: 2.56 10*6/MM3 (ref 4.37–5.74)
RBC # BLD AUTO: 2.56 10*6/MM3 (ref 4.37–5.74)
RBC # BLD AUTO: 2.78 10*6/MM3 (ref 4.37–5.74)
RBC # BLD AUTO: 2.8 10*6/MM3 (ref 4.37–5.74)
RBC # BLD AUTO: 2.82 10*6/MM3 (ref 4.37–5.74)
RBC # BLD AUTO: 2.86 10*6/MM3 (ref 4.37–5.74)
RBC # BLD AUTO: 2.87 10*6/MM3 (ref 4.37–5.74)
RBC # BLD AUTO: 2.89 10*6/MM3 (ref 4.37–5.74)
RBC # BLD AUTO: 2.9 10*6/MM3 (ref 4.37–5.74)
RBC # BLD AUTO: 2.96 10*6/MM3 (ref 4.37–5.74)
RBC # BLD AUTO: 2.97 10*6/MM3 (ref 4.37–5.74)
RBC # BLD AUTO: 3.02 10*6/MM3 (ref 4.37–5.74)
RBC # BLD AUTO: 3.07 10*6/MM3 (ref 4.37–5.74)
RBC # BLD AUTO: 3.1 10*6/MM3 (ref 4.37–5.74)
RBC # BLD AUTO: 3.12 10*6/MM3 (ref 4.37–5.74)
RBC # BLD AUTO: 3.16 10*6/MM3 (ref 4.37–5.74)
RBC # BLD AUTO: 3.17 10*6/MM3 (ref 4.37–5.74)
RBC # BLD AUTO: 3.18 10*6/MM3 (ref 4.37–5.74)
RBC # BLD AUTO: 3.21 10*6/MM3 (ref 4.37–5.74)
RBC # BLD AUTO: 3.23 10*6/MM3 (ref 4.37–5.74)
RBC # BLD AUTO: 3.23 10*6/MM3 (ref 4.37–5.74)
RBC # BLD AUTO: 3.24 10*6/MM3 (ref 4.37–5.74)
RBC # BLD AUTO: 3.26 10*6/MM3 (ref 4.37–5.74)
RBC # BLD AUTO: 3.27 10*6/MM3 (ref 4.37–5.74)
RBC # BLD AUTO: 3.28 10*6/MM3 (ref 4.37–5.74)
RBC # BLD AUTO: 3.31 10*6/MM3 (ref 4.37–5.74)
RBC # BLD AUTO: 3.32 10*6/MM3 (ref 4.37–5.74)
RBC # BLD AUTO: 3.32 10*6/MM3 (ref 4.37–5.74)
RBC # BLD AUTO: 3.34 10*6/MM3 (ref 4.37–5.74)
RBC # BLD AUTO: 3.34 10*6/MM3 (ref 4.37–5.74)
RBC # BLD AUTO: 3.35 10*6/MM3 (ref 4.37–5.74)
RBC # BLD AUTO: 3.36 10*6/MM3 (ref 4.37–5.74)
RBC # BLD AUTO: 3.36 10*6/MM3 (ref 4.37–5.74)
RBC # BLD AUTO: 3.4 10*6/MM3 (ref 4.37–5.74)
RBC # BLD AUTO: 3.42 10*6/MM3 (ref 4.37–5.74)
RBC # BLD AUTO: 3.44 10*6/MM3 (ref 4.37–5.74)
RBC # BLD AUTO: 3.46 10*6/MM3 (ref 4.37–5.74)
RBC # BLD AUTO: 3.47 10*6/MM3 (ref 4.37–5.74)
RBC # BLD AUTO: 3.49 10*6/MM3 (ref 4.37–5.74)
RBC # BLD AUTO: 3.51 10*6/MM3 (ref 4.37–5.74)
RBC # BLD AUTO: 3.53 10*6/MM3 (ref 4.37–5.74)
RBC # BLD AUTO: 3.54 10*6/MM3 (ref 4.37–5.74)
RBC # BLD AUTO: 3.55 10*6/MM3 (ref 4.37–5.74)
RBC # BLD AUTO: 3.58 10*6/MM3 (ref 4.37–5.74)
RBC # BLD AUTO: 3.64 10*6/MM3 (ref 4.37–5.74)
RBC # BLD AUTO: 3.65 10*6/MM3 (ref 4.37–5.74)
RBC # BLD AUTO: 3.65 10*6/MM3 (ref 4.37–5.74)
RBC # BLD AUTO: 3.67 10*6/MM3 (ref 4.37–5.74)
RBC # BLD AUTO: 3.72 10*6/MM3 (ref 4.37–5.74)
RBC # BLD AUTO: 3.72 10*6/MM3 (ref 4.37–5.74)
RBC # BLD AUTO: 3.73 10*6/MM3 (ref 4.37–5.74)
RBC # BLD AUTO: 3.75 10*6/MM3 (ref 4.37–5.74)
RBC # BLD AUTO: 3.82 10*6/MM3 (ref 4.37–5.74)
RBC # BLD AUTO: 3.82 10*6/MM3 (ref 4.37–5.74)
RBC # BLD AUTO: 3.84 10*6/MM3 (ref 4.37–5.74)
RBC # BLD AUTO: 3.88 10*6/MM3 (ref 4.37–5.74)
RBC # BLD AUTO: 3.88 10*6/MM3 (ref 4.37–5.74)
RBC # BLD AUTO: 3.89 10*6/MM3 (ref 4.37–5.74)
RBC # BLD AUTO: 3.91 10*6/MM3 (ref 4.37–5.74)
RBC # BLD AUTO: 3.92 10*6/MM3 (ref 4.37–5.74)
RBC # BLD AUTO: 3.94 10*6/MM3 (ref 4.37–5.74)
RBC # BLD AUTO: 3.94 10*6/MM3 (ref 4.37–5.74)
RBC # BLD AUTO: 3.96 10*6/MM3 (ref 4.37–5.74)
RBC # BLD AUTO: 3.98 10*6/MM3 (ref 4.37–5.74)
RBC # BLD AUTO: 3.99 10*6/MM3 (ref 4.37–5.74)
RBC # BLD AUTO: 3.99 10*6/MM3 (ref 4.37–5.74)
RBC # BLD AUTO: 4 10*6/MM3 (ref 4.37–5.74)
RBC # BLD AUTO: 4.02 10*6/MM3 (ref 4.37–5.74)
RBC # BLD AUTO: 4.07 10*6/MM3 (ref 4.37–5.74)
RBC # BLD AUTO: 4.08 10*6/MM3 (ref 4.37–5.74)
RBC # BLD AUTO: 4.1 10*6/MM3 (ref 4.37–5.74)
RBC # BLD AUTO: 4.12 10*6/MM3 (ref 4.37–5.74)
RBC # UR: ABNORMAL /HPF
RBC MORPH BLD: NORMAL
RBC MORPH BLD: NORMAL
REF LAB TEST METHOD: ABNORMAL
RENAL EPI CELLS #/AREA URNS HPF: ABNORMAL /HPF
RH BLD: POSITIVE
RH BLD: POSITIVE
RHINOVIRUS RNA SPEC NAA+PROBE: NOT DETECTED
RSV RNA NPH QL NAA+NON-PROBE: NOT DETECTED
S PNEUM AG SPEC QL LA: NEGATIVE
SAO2 % BLDCOA: 86.7 % (ref 94–99)
SAO2 % BLDCOA: 94 %
SAO2 % BLDCOA: 97.1 % (ref 94–99)
SAO2 % BLDCOA: 97.3 % (ref 94–99)
SAO2 % BLDCOA: 98.3 % (ref 94–99)
SMALL PLATELETS BLD QL SMEAR: ADEQUATE
SODIUM BLD-SCNC: 133 MMOL/L (ref 137–145)
SODIUM BLD-SCNC: 135 MMOL/L (ref 137–145)
SODIUM BLD-SCNC: 136 MMOL/L (ref 137–145)
SODIUM BLD-SCNC: 137 MMOL/L (ref 137–145)
SODIUM BLD-SCNC: 138 MMOL/L (ref 137–145)
SODIUM BLD-SCNC: 139 MMOL/L (ref 137–145)
SODIUM BLD-SCNC: 140 MMOL/L (ref 137–145)
SODIUM BLD-SCNC: 141 MMOL/L (ref 137–145)
SODIUM BLD-SCNC: 142 MMOL/L (ref 137–145)
SODIUM BLD-SCNC: 143 MMOL/L (ref 137–145)
SODIUM BLD-SCNC: 144 MMOL/L (ref 137–145)
SODIUM BLD-SCNC: 144 MMOL/L (ref 137–145)
SODIUM BLD-SCNC: 146 MMOL/L (ref 137–145)
SODIUM BLDA-SCNC: 135 MMOL/L (ref 136–146)
SODIUM BLDA-SCNC: 135.4 MMOL/L (ref 138–146)
SODIUM UR-SCNC: 61 MMOL/L (ref 30–90)
SODIUM UR-SCNC: 81 MMOL/L (ref 30–90)
SP GR UR STRIP: 1.01 (ref 1–1.03)
SP GR UR STRIP: 1.02 (ref 1–1.03)
SQUAMOUS #/AREA URNS HPF: ABNORMAL /HPF
STRESS BASELINE BP: NORMAL MMHG
STRESS BASELINE HR: 102 BPM
STRESS PERCENT HR: 100 %
STRESS POST ESTIMATED WORKLOAD: 1 METS
STRESS POST PEAK BP: NORMAL MMHG
STRESS POST PEAK HR: 113 BPM
STRESS TARGET HR: 113 BPM
STRESS TARGET HR: 113 BPM
T&S EXPIRATION DATE: NORMAL
T&S EXPIRATION DATE: NORMAL
TIBC SERPL-MCNC: 256 MCG/DL (ref 261–462)
TIBC SERPL-MCNC: 335 MCG/DL (ref 261–462)
TRIGL SERPL-MCNC: 65 MG/DL (ref 20–199)
TROPONIN I SERPL-MCNC: 0.02 NG/ML
TROPONIN I SERPL-MCNC: 0.03 NG/ML
TROPONIN I SERPL-MCNC: 0.03 NG/ML
TROPONIN I SERPL-MCNC: 0.04 NG/ML
TROPONIN I SERPL-MCNC: 0.06 NG/ML
TROPONIN I SERPL-MCNC: 0.07 NG/ML
TROPONIN I SERPL-MCNC: 0.07 NG/ML
TROPONIN I SERPL-MCNC: 0.08 NG/ML
TROPONIN I SERPL-MCNC: 0.11 NG/ML
TROPONIN I SERPL-MCNC: 0.12 NG/ML
TROPONIN I SERPL-MCNC: 0.21 NG/ML
TROPONIN I SERPL-MCNC: 0.81 NG/ML
TROPONIN I SERPL-MCNC: 0.89 NG/ML
TROPONIN I SERPL-MCNC: 0.97 NG/ML
TROPONIN I SERPL-MCNC: 1.05 NG/ML
TROPONIN I SERPL-MCNC: 1.44 NG/ML
TROPONIN I SERPL-MCNC: 1.53 NG/ML
TROPONIN I SERPL-MCNC: <0.012 NG/ML
TSH SERPL DL<=0.05 MIU/L-ACNC: 2.56 MIU/ML (ref 0.46–4.68)
UNIT  ABO: NORMAL
UNIT  RH: NORMAL
UPPER ARTERIAL LEFT ARM BRACHIAL SYS MAX: 161 MMHG
UPPER ARTERIAL RIGHT ARM BRACHIAL SYS MAX: 150 MMHG
URATE SERPL-MCNC: 6.1 MG/DL (ref 2.5–8.5)
UROBILINOGEN UR QL STRIP: ABNORMAL
UUN 24H UR-MCNC: 218 MG/DL
UUN 24H UR-MCNC: 800 MG/DL
VANCOMYCIN PEAK SERPL-MCNC: 25.56 MCG/ML (ref 30–40)
VANCOMYCIN TROUGH SERPL-MCNC: <5 MCG/ML (ref 10–15)
VARIANT LYMPHS NFR BLD MANUAL: 2 % (ref 0–5)
VENTILATOR MODE: ABNORMAL
VENTILATOR MODE: NORMAL
VIM STRAIN: NOT DETECTED
VIT B12 BLD-MCNC: 792 PG/ML (ref 239–931)
VIT B12 BLD-MCNC: 933 PG/ML (ref 239–931)
VORICONAZOLE SERPL-MCNC: NORMAL UG/ML
WAXY CASTS #/AREA URNS LPF: ABNORMAL /LPF
WBC MORPH BLD: NORMAL
WBC NRBC COR # BLD: 10.31 10*3/MM3 (ref 3.2–9.8)
WBC NRBC COR # BLD: 10.33 10*3/MM3 (ref 3.2–9.8)
WBC NRBC COR # BLD: 10.35 10*3/MM3 (ref 3.2–9.8)
WBC NRBC COR # BLD: 10.42 10*3/MM3 (ref 3.2–9.8)
WBC NRBC COR # BLD: 10.49 10*3/MM3 (ref 3.2–9.8)
WBC NRBC COR # BLD: 10.8 10*3/MM3 (ref 3.2–9.8)
WBC NRBC COR # BLD: 11.67 10*3/MM3 (ref 3.2–9.8)
WBC NRBC COR # BLD: 11.77 10*3/MM3 (ref 3.2–9.8)
WBC NRBC COR # BLD: 12.14 10*3/MM3 (ref 3.2–9.8)
WBC NRBC COR # BLD: 12.92 10*3/MM3 (ref 3.2–9.8)
WBC NRBC COR # BLD: 13.94 10*3/MM3 (ref 3.2–9.8)
WBC NRBC COR # BLD: 14.08 10*3/MM3 (ref 3.2–9.8)
WBC NRBC COR # BLD: 15.11 10*3/MM3 (ref 3.2–9.8)
WBC NRBC COR # BLD: 15.34 10*3/MM3 (ref 3.2–9.8)
WBC NRBC COR # BLD: 17.81 10*3/MM3 (ref 3.2–9.8)
WBC NRBC COR # BLD: 2.96 10*3/MM3 (ref 3.2–9.8)
WBC NRBC COR # BLD: 2.99 10*3/MM3 (ref 3.2–9.8)
WBC NRBC COR # BLD: 23.81 10*3/MM3 (ref 3.2–9.8)
WBC NRBC COR # BLD: 26.85 10*3/MM3 (ref 3.2–9.8)
WBC NRBC COR # BLD: 3.24 10*3/MM3 (ref 3.2–9.8)
WBC NRBC COR # BLD: 3.34 10*3/MM3 (ref 3.2–9.8)
WBC NRBC COR # BLD: 3.53 10*3/MM3 (ref 3.2–9.8)
WBC NRBC COR # BLD: 3.56 10*3/MM3 (ref 3.2–9.8)
WBC NRBC COR # BLD: 3.94 10*3/MM3 (ref 3.2–9.8)
WBC NRBC COR # BLD: 3.97 10*3/MM3 (ref 3.2–9.8)
WBC NRBC COR # BLD: 33.72 10*3/MM3 (ref 3.2–9.8)
WBC NRBC COR # BLD: 4.09 10*3/MM3 (ref 3.2–9.8)
WBC NRBC COR # BLD: 4.27 10*3/MM3 (ref 3.2–9.8)
WBC NRBC COR # BLD: 4.39 10*3/MM3 (ref 3.2–9.8)
WBC NRBC COR # BLD: 4.47 10*3/MM3 (ref 3.2–9.8)
WBC NRBC COR # BLD: 4.54 10*3/MM3 (ref 3.2–9.8)
WBC NRBC COR # BLD: 4.62 10*3/MM3 (ref 3.2–9.8)
WBC NRBC COR # BLD: 4.78 10*3/MM3 (ref 3.2–9.8)
WBC NRBC COR # BLD: 4.81 10*3/MM3 (ref 3.2–9.8)
WBC NRBC COR # BLD: 4.82 10*3/MM3 (ref 3.2–9.8)
WBC NRBC COR # BLD: 4.83 10*3/MM3 (ref 3.2–9.8)
WBC NRBC COR # BLD: 4.86 10*3/MM3 (ref 3.2–9.8)
WBC NRBC COR # BLD: 5 10*3/MM3 (ref 3.2–9.8)
WBC NRBC COR # BLD: 5.23 10*3/MM3 (ref 3.2–9.8)
WBC NRBC COR # BLD: 5.26 10*3/MM3 (ref 3.2–9.8)
WBC NRBC COR # BLD: 5.26 10*3/MM3 (ref 3.2–9.8)
WBC NRBC COR # BLD: 5.3 10*3/MM3 (ref 3.2–9.8)
WBC NRBC COR # BLD: 5.36 10*3/MM3 (ref 3.2–9.8)
WBC NRBC COR # BLD: 5.53 10*3/MM3 (ref 3.2–9.8)
WBC NRBC COR # BLD: 5.71 10*3/MM3 (ref 3.2–9.8)
WBC NRBC COR # BLD: 6.05 10*3/MM3 (ref 3.2–9.8)
WBC NRBC COR # BLD: 6.2 10*3/MM3 (ref 3.2–9.8)
WBC NRBC COR # BLD: 6.22 10*3/MM3 (ref 3.2–9.8)
WBC NRBC COR # BLD: 6.23 10*3/MM3 (ref 3.2–9.8)
WBC NRBC COR # BLD: 6.3 10*3/MM3 (ref 3.2–9.8)
WBC NRBC COR # BLD: 6.34 10*3/MM3 (ref 3.2–9.8)
WBC NRBC COR # BLD: 6.39 10*3/MM3 (ref 3.2–9.8)
WBC NRBC COR # BLD: 6.49 10*3/MM3 (ref 3.2–9.8)
WBC NRBC COR # BLD: 6.52 10*3/MM3 (ref 3.2–9.8)
WBC NRBC COR # BLD: 6.56 10*3/MM3 (ref 3.2–9.8)
WBC NRBC COR # BLD: 6.74 10*3/MM3 (ref 3.2–9.8)
WBC NRBC COR # BLD: 6.77 10*3/MM3 (ref 3.2–9.8)
WBC NRBC COR # BLD: 6.78 10*3/MM3 (ref 3.2–9.8)
WBC NRBC COR # BLD: 6.83 10*3/MM3 (ref 3.2–9.8)
WBC NRBC COR # BLD: 6.86 10*3/MM3 (ref 3.2–9.8)
WBC NRBC COR # BLD: 6.87 10*3/MM3 (ref 3.2–9.8)
WBC NRBC COR # BLD: 6.89 10*3/MM3 (ref 3.2–9.8)
WBC NRBC COR # BLD: 7.01 10*3/MM3 (ref 3.2–9.8)
WBC NRBC COR # BLD: 7.28 10*3/MM3 (ref 3.2–9.8)
WBC NRBC COR # BLD: 7.41 10*3/MM3 (ref 3.2–9.8)
WBC NRBC COR # BLD: 7.45 10*3/MM3 (ref 3.2–9.8)
WBC NRBC COR # BLD: 7.6 10*3/MM3 (ref 3.2–9.8)
WBC NRBC COR # BLD: 7.73 10*3/MM3 (ref 3.2–9.8)
WBC NRBC COR # BLD: 7.84 10*3/MM3 (ref 3.2–9.8)
WBC NRBC COR # BLD: 8.37 10*3/MM3 (ref 3.2–9.8)
WBC NRBC COR # BLD: 8.85 10*3/MM3 (ref 3.2–9.8)
WBC NRBC COR # BLD: 9.07 10*3/MM3 (ref 3.2–9.8)
WBC NRBC COR # BLD: 9.16 10*3/MM3 (ref 3.2–9.8)
WBC NRBC COR # BLD: 9.28 10*3/MM3 (ref 3.2–9.8)
WBC NRBC COR # BLD: 9.33 10*3/MM3 (ref 3.2–9.8)
WBC NRBC COR # BLD: 9.64 10*3/MM3 (ref 3.2–9.8)
WBC NRBC COR # BLD: 9.64 10*3/MM3 (ref 3.2–9.8)
WBC UR QL AUTO: ABNORMAL /HPF
WHOLE BLOOD HOLD SPECIMEN: NORMAL
YEAST SPEC QL CULT: NORMAL
YEAST URNS QL MICRO: ABNORMAL /HPF

## 2018-01-01 PROCEDURE — 85007 BL SMEAR W/DIFF WBC COUNT: CPT | Performed by: INTERNAL MEDICINE

## 2018-01-01 PROCEDURE — 87186 SC STD MICRODIL/AGAR DIL: CPT | Performed by: STUDENT IN AN ORGANIZED HEALTH CARE EDUCATION/TRAINING PROGRAM

## 2018-01-01 PROCEDURE — 80048 BASIC METABOLIC PNL TOTAL CA: CPT | Performed by: NURSE PRACTITIONER

## 2018-01-01 PROCEDURE — 63710000001 FINASTERIDE 5 MG TABLET: Performed by: INTERNAL MEDICINE

## 2018-01-01 PROCEDURE — 25010000002 MORPHINE SULFATE (PF) 8 MG/ML SOLUTION

## 2018-01-01 PROCEDURE — 87086 URINE CULTURE/COLONY COUNT: CPT | Performed by: EMERGENCY MEDICINE

## 2018-01-01 PROCEDURE — 87077 CULTURE AEROBIC IDENTIFY: CPT | Performed by: STUDENT IN AN ORGANIZED HEALTH CARE EDUCATION/TRAINING PROGRAM

## 2018-01-01 PROCEDURE — 97110 THERAPEUTIC EXERCISES: CPT

## 2018-01-01 PROCEDURE — 25010000002 MEROPENEM PER 100 MG: Performed by: NURSE PRACTITIONER

## 2018-01-01 PROCEDURE — 25010000002 CEFTRIAXONE: Performed by: NURSE PRACTITIONER

## 2018-01-01 PROCEDURE — 87040 BLOOD CULTURE FOR BACTERIA: CPT | Performed by: EMERGENCY MEDICINE

## 2018-01-01 PROCEDURE — 80053 COMPREHEN METABOLIC PANEL: CPT | Performed by: PHYSICIAN ASSISTANT

## 2018-01-01 PROCEDURE — 81001 URINALYSIS AUTO W/SCOPE: CPT | Performed by: NURSE PRACTITIONER

## 2018-01-01 PROCEDURE — 93005 ELECTROCARDIOGRAM TRACING: CPT | Performed by: FAMILY MEDICINE

## 2018-01-01 PROCEDURE — 97535 SELF CARE MNGMENT TRAINING: CPT

## 2018-01-01 PROCEDURE — 29580 STRAPPING UNNA BOOT: CPT | Performed by: THORACIC SURGERY (CARDIOTHORACIC VASCULAR SURGERY)

## 2018-01-01 PROCEDURE — 25010000002 CEFTRIAXONE PER 250 MG: Performed by: PHYSICIAN ASSISTANT

## 2018-01-01 PROCEDURE — 80053 COMPREHEN METABOLIC PANEL: CPT | Performed by: INTERNAL MEDICINE

## 2018-01-01 PROCEDURE — 25010000002 MEROPENEM: Performed by: NURSE PRACTITIONER

## 2018-01-01 PROCEDURE — 80048 BASIC METABOLIC PNL TOTAL CA: CPT | Performed by: INTERNAL MEDICINE

## 2018-01-01 PROCEDURE — G8979 MOBILITY GOAL STATUS: HCPCS

## 2018-01-01 PROCEDURE — G8997 SWALLOW GOAL STATUS: HCPCS | Performed by: SPEECH-LANGUAGE PATHOLOGIST

## 2018-01-01 PROCEDURE — 71045 X-RAY EXAM CHEST 1 VIEW: CPT

## 2018-01-01 PROCEDURE — 85025 COMPLETE CBC W/AUTO DIFF WBC: CPT | Performed by: INTERNAL MEDICINE

## 2018-01-01 PROCEDURE — G8988 SELF CARE GOAL STATUS: HCPCS

## 2018-01-01 PROCEDURE — C1758 CATHETER, URETERAL: HCPCS | Performed by: UROLOGY

## 2018-01-01 PROCEDURE — G0378 HOSPITAL OBSERVATION PER HR: HCPCS

## 2018-01-01 PROCEDURE — 80053 COMPREHEN METABOLIC PANEL: CPT | Performed by: FAMILY MEDICINE

## 2018-01-01 PROCEDURE — 97530 THERAPEUTIC ACTIVITIES: CPT

## 2018-01-01 PROCEDURE — 97166 OT EVAL MOD COMPLEX 45 MIN: CPT

## 2018-01-01 PROCEDURE — 87086 URINE CULTURE/COLONY COUNT: CPT | Performed by: NURSE PRACTITIONER

## 2018-01-01 PROCEDURE — G8978 MOBILITY CURRENT STATUS: HCPCS

## 2018-01-01 PROCEDURE — 87086 URINE CULTURE/COLONY COUNT: CPT | Performed by: UROLOGY

## 2018-01-01 PROCEDURE — 99232 SBSQ HOSP IP/OBS MODERATE 35: CPT | Performed by: INTERNAL MEDICINE

## 2018-01-01 PROCEDURE — 97116 GAIT TRAINING THERAPY: CPT

## 2018-01-01 PROCEDURE — A9270 NON-COVERED ITEM OR SERVICE: HCPCS | Performed by: INTERNAL MEDICINE

## 2018-01-01 PROCEDURE — 88300 SURGICAL PATH GROSS: CPT | Performed by: PATHOLOGY

## 2018-01-01 PROCEDURE — 94760 N-INVAS EAR/PLS OXIMETRY 1: CPT

## 2018-01-01 PROCEDURE — 99232 SBSQ HOSP IP/OBS MODERATE 35: CPT | Performed by: NURSE PRACTITIONER

## 2018-01-01 PROCEDURE — 83605 ASSAY OF LACTIC ACID: CPT | Performed by: FAMILY MEDICINE

## 2018-01-01 PROCEDURE — 92526 ORAL FUNCTION THERAPY: CPT | Performed by: SPEECH-LANGUAGE PATHOLOGIST

## 2018-01-01 PROCEDURE — 87086 URINE CULTURE/COLONY COUNT: CPT | Performed by: FAMILY MEDICINE

## 2018-01-01 PROCEDURE — 85025 COMPLETE CBC W/AUTO DIFF WBC: CPT | Performed by: HOSPITALIST

## 2018-01-01 PROCEDURE — 25010000002 HEPARIN LOCK FLUSH 10 UNIT/ML SOLUTION: Performed by: INTERNAL MEDICINE

## 2018-01-01 PROCEDURE — C1729 CATH, DRAINAGE: HCPCS

## 2018-01-01 PROCEDURE — 80307 DRUG TEST PRSMV CHEM ANLYZR: CPT | Performed by: STUDENT IN AN ORGANIZED HEALTH CARE EDUCATION/TRAINING PROGRAM

## 2018-01-01 PROCEDURE — 99203 OFFICE O/P NEW LOW 30 MIN: CPT | Performed by: INTERNAL MEDICINE

## 2018-01-01 PROCEDURE — 94799 UNLISTED PULMONARY SVC/PX: CPT

## 2018-01-01 PROCEDURE — 25010000002 LEVOFLOXACIN PER 250 MG: Performed by: EMERGENCY MEDICINE

## 2018-01-01 PROCEDURE — 85025 COMPLETE CBC W/AUTO DIFF WBC: CPT | Performed by: NURSE PRACTITIONER

## 2018-01-01 PROCEDURE — C1894 INTRO/SHEATH, NON-LASER: HCPCS | Performed by: INTERNAL MEDICINE

## 2018-01-01 PROCEDURE — 99285 EMERGENCY DEPT VISIT HI MDM: CPT

## 2018-01-01 PROCEDURE — 78452 HT MUSCLE IMAGE SPECT MULT: CPT | Performed by: INTERNAL MEDICINE

## 2018-01-01 PROCEDURE — 84484 ASSAY OF TROPONIN QUANT: CPT | Performed by: HOSPITALIST

## 2018-01-01 PROCEDURE — 83735 ASSAY OF MAGNESIUM: CPT | Performed by: INTERNAL MEDICINE

## 2018-01-01 PROCEDURE — 80053 COMPREHEN METABOLIC PANEL: CPT | Performed by: NURSE PRACTITIONER

## 2018-01-01 PROCEDURE — 85025 COMPLETE CBC W/AUTO DIFF WBC: CPT | Performed by: EMERGENCY MEDICINE

## 2018-01-01 PROCEDURE — 85025 COMPLETE CBC W/AUTO DIFF WBC: CPT | Performed by: PHYSICIAN ASSISTANT

## 2018-01-01 PROCEDURE — 25010000002 CEFEPIME PER 500 MG: Performed by: NURSE PRACTITIONER

## 2018-01-01 PROCEDURE — 25010000002 FENTANYL CITRATE (PF) 100 MCG/2ML SOLUTION: Performed by: RADIOLOGY

## 2018-01-01 PROCEDURE — G8987 SELF CARE CURRENT STATUS: HCPCS

## 2018-01-01 PROCEDURE — 82803 BLOOD GASES ANY COMBINATION: CPT

## 2018-01-01 PROCEDURE — 25010000002 VANCOMYCIN: Performed by: FAMILY MEDICINE

## 2018-01-01 PROCEDURE — 94729 DIFFUSING CAPACITY: CPT

## 2018-01-01 PROCEDURE — 25010000002 IOPAMIDOL 61 % SOLUTION: Performed by: UROLOGY

## 2018-01-01 PROCEDURE — 84300 ASSAY OF URINE SODIUM: CPT | Performed by: NURSE PRACTITIONER

## 2018-01-01 PROCEDURE — 80053 COMPREHEN METABOLIC PANEL: CPT | Performed by: HOSPITALIST

## 2018-01-01 PROCEDURE — 83605 ASSAY OF LACTIC ACID: CPT | Performed by: EMERGENCY MEDICINE

## 2018-01-01 PROCEDURE — 29580 STRAPPING UNNA BOOT: CPT | Performed by: NURSE PRACTITIONER

## 2018-01-01 PROCEDURE — 87641 MR-STAPH DNA AMP PROBE: CPT | Performed by: NURSE PRACTITIONER

## 2018-01-01 PROCEDURE — C1769 GUIDE WIRE: HCPCS | Performed by: UROLOGY

## 2018-01-01 PROCEDURE — 51702 INSERT TEMP BLADDER CATH: CPT

## 2018-01-01 PROCEDURE — 87077 CULTURE AEROBIC IDENTIFY: CPT | Performed by: FAMILY MEDICINE

## 2018-01-01 PROCEDURE — 83605 ASSAY OF LACTIC ACID: CPT | Performed by: NURSE PRACTITIONER

## 2018-01-01 PROCEDURE — 76775 US EXAM ABDO BACK WALL LIM: CPT

## 2018-01-01 PROCEDURE — 25010000002 LEVOFLOXACIN PER 250 MG: Performed by: NURSE PRACTITIONER

## 2018-01-01 PROCEDURE — 82803 BLOOD GASES ANY COMBINATION: CPT | Performed by: FAMILY MEDICINE

## 2018-01-01 PROCEDURE — 87186 SC STD MICRODIL/AGAR DIL: CPT | Performed by: UROLOGY

## 2018-01-01 PROCEDURE — 87186 SC STD MICRODIL/AGAR DIL: CPT | Performed by: FAMILY MEDICINE

## 2018-01-01 PROCEDURE — 25010000002 FUROSEMIDE PER 20 MG: Performed by: INTERNAL MEDICINE

## 2018-01-01 PROCEDURE — 87205 SMEAR GRAM STAIN: CPT | Performed by: NURSE PRACTITIONER

## 2018-01-01 PROCEDURE — 82962 GLUCOSE BLOOD TEST: CPT

## 2018-01-01 PROCEDURE — 82570 ASSAY OF URINE CREATININE: CPT | Performed by: NURSE PRACTITIONER

## 2018-01-01 PROCEDURE — 84484 ASSAY OF TROPONIN QUANT: CPT | Performed by: EMERGENCY MEDICINE

## 2018-01-01 PROCEDURE — 25010000002 CEFTRIAXONE PER 250 MG: Performed by: NURSE PRACTITIONER

## 2018-01-01 PROCEDURE — 25010000002 NA FERRIC GLUC CPLX PER 12.5 MG: Performed by: INTERNAL MEDICINE

## 2018-01-01 PROCEDURE — 63710000001 LEVOTHYROXINE 50 MCG TABLET: Performed by: INTERNAL MEDICINE

## 2018-01-01 PROCEDURE — 99215 OFFICE O/P EST HI 40 MIN: CPT | Performed by: INTERNAL MEDICINE

## 2018-01-01 PROCEDURE — 25010000002 LEVOFLOXACIN PER 250 MG: Performed by: HOSPITALIST

## 2018-01-01 PROCEDURE — 75716 ARTERY X-RAYS ARMS/LEGS: CPT | Performed by: INTERNAL MEDICINE

## 2018-01-01 PROCEDURE — 86923 COMPATIBILITY TEST ELECTRIC: CPT

## 2018-01-01 PROCEDURE — 83540 ASSAY OF IRON: CPT | Performed by: PHYSICIAN ASSISTANT

## 2018-01-01 PROCEDURE — 25010000002 PIPERACILLIN SOD-TAZOBACTAM PER 1 G: Performed by: FAMILY MEDICINE

## 2018-01-01 PROCEDURE — 83690 ASSAY OF LIPASE: CPT | Performed by: EMERGENCY MEDICINE

## 2018-01-01 PROCEDURE — 86900 BLOOD TYPING SEROLOGIC ABO: CPT | Performed by: PHYSICIAN ASSISTANT

## 2018-01-01 PROCEDURE — 36600 WITHDRAWAL OF ARTERIAL BLOOD: CPT

## 2018-01-01 PROCEDURE — 82746 ASSAY OF FOLIC ACID SERUM: CPT | Performed by: PHYSICIAN ASSISTANT

## 2018-01-01 PROCEDURE — 83880 ASSAY OF NATRIURETIC PEPTIDE: CPT | Performed by: EMERGENCY MEDICINE

## 2018-01-01 PROCEDURE — 87798 DETECT AGENT NOS DNA AMP: CPT | Performed by: NURSE PRACTITIONER

## 2018-01-01 PROCEDURE — 81001 URINALYSIS AUTO W/SCOPE: CPT | Performed by: EMERGENCY MEDICINE

## 2018-01-01 PROCEDURE — 0T778DZ DILATION OF LEFT URETER WITH INTRALUMINAL DEVICE, VIA NATURAL OR ARTIFICIAL OPENING ENDOSCOPIC: ICD-10-PCS | Performed by: UROLOGY

## 2018-01-01 PROCEDURE — C1887 CATHETER, GUIDING: HCPCS | Performed by: INTERNAL MEDICINE

## 2018-01-01 PROCEDURE — 82553 CREATINE MB FRACTION: CPT | Performed by: PHYSICIAN ASSISTANT

## 2018-01-01 PROCEDURE — 25010000002 HEPARIN (PORCINE) PER 1000 UNITS: Performed by: HOSPITALIST

## 2018-01-01 PROCEDURE — 25010000002 MORPHINE PER 10 MG: Performed by: FAMILY MEDICINE

## 2018-01-01 PROCEDURE — 25010000002 PROPOFOL 10 MG/ML EMULSION: Performed by: NURSE ANESTHETIST, CERTIFIED REGISTERED

## 2018-01-01 PROCEDURE — 25010000002 INFLUENZA VAC SUBUNIT QUAD 0.5 ML SUSPENSION PREFILLED SYRINGE: Performed by: NURSE PRACTITIONER

## 2018-01-01 PROCEDURE — 80202 ASSAY OF VANCOMYCIN: CPT | Performed by: FAMILY MEDICINE

## 2018-01-01 PROCEDURE — C1894 INTRO/SHEATH, NON-LASER: HCPCS | Performed by: UROLOGY

## 2018-01-01 PROCEDURE — 37228 PR REVSC OPN/PRQ TIB/PERO W/ANGIOPLASTY UNI: CPT | Performed by: INTERNAL MEDICINE

## 2018-01-01 PROCEDURE — 25010000002 FENTANYL CITRATE (PF) 100 MCG/2ML SOLUTION: Performed by: NURSE ANESTHETIST, CERTIFIED REGISTERED

## 2018-01-01 PROCEDURE — 63710000001 CLOPIDOGREL 75 MG TABLET: Performed by: INTERNAL MEDICINE

## 2018-01-01 PROCEDURE — P9612 CATHETERIZE FOR URINE SPEC: HCPCS

## 2018-01-01 PROCEDURE — 80053 COMPREHEN METABOLIC PANEL: CPT | Performed by: EMERGENCY MEDICINE

## 2018-01-01 PROCEDURE — 0TP98DZ REMOVAL OF INTRALUMINAL DEVICE FROM URETER, VIA NATURAL OR ARTIFICIAL OPENING ENDOSCOPIC: ICD-10-PCS | Performed by: UROLOGY

## 2018-01-01 PROCEDURE — 63710000001 ASPIRIN 81 MG CHEWABLE TABLET: Performed by: INTERNAL MEDICINE

## 2018-01-01 PROCEDURE — 93017 CV STRESS TEST TRACING ONLY: CPT

## 2018-01-01 PROCEDURE — 93306 TTE W/DOPPLER COMPLETE: CPT | Performed by: INTERNAL MEDICINE

## 2018-01-01 PROCEDURE — 80053 COMPREHEN METABOLIC PANEL: CPT | Performed by: STUDENT IN AN ORGANIZED HEALTH CARE EDUCATION/TRAINING PROGRAM

## 2018-01-01 PROCEDURE — 25010000002 VANCOMYCIN PER 500 MG: Performed by: PHYSICIAN ASSISTANT

## 2018-01-01 PROCEDURE — 85379 FIBRIN DEGRADATION QUANT: CPT | Performed by: INTERNAL MEDICINE

## 2018-01-01 PROCEDURE — 83880 ASSAY OF NATRIURETIC PEPTIDE: CPT | Performed by: INTERNAL MEDICINE

## 2018-01-01 PROCEDURE — C1751 CATH, INF, PER/CENT/MIDLINE: HCPCS

## 2018-01-01 PROCEDURE — 25010000002 ONDANSETRON PER 1 MG: Performed by: NURSE ANESTHETIST, CERTIFIED REGISTERED

## 2018-01-01 PROCEDURE — P9016 RBC LEUKOCYTES REDUCED: HCPCS

## 2018-01-01 PROCEDURE — 0 IOPAMIDOL PER 1 ML: Performed by: INTERNAL MEDICINE

## 2018-01-01 PROCEDURE — 87899 AGENT NOS ASSAY W/OPTIC: CPT | Performed by: NURSE PRACTITIONER

## 2018-01-01 PROCEDURE — 87075 CULTR BACTERIA EXCEPT BLOOD: CPT | Performed by: RADIOLOGY

## 2018-01-01 PROCEDURE — 82550 ASSAY OF CK (CPK): CPT | Performed by: PHYSICIAN ASSISTANT

## 2018-01-01 PROCEDURE — 82306 VITAMIN D 25 HYDROXY: CPT | Performed by: INTERNAL MEDICINE

## 2018-01-01 PROCEDURE — 25010000002 MEROPENEM PER 100 MG: Performed by: FAMILY MEDICINE

## 2018-01-01 PROCEDURE — G8978 MOBILITY CURRENT STATUS: HCPCS | Performed by: PHYSICAL THERAPIST

## 2018-01-01 PROCEDURE — 37225 PR REVSC OPN/PRQ FEM/POP W/ATHRC/ANGIOP SM VSL: CPT | Performed by: INTERNAL MEDICINE

## 2018-01-01 PROCEDURE — 25010000002 LEVOFLOXACIN PER 250 MG: Performed by: UROLOGY

## 2018-01-01 PROCEDURE — 93970 EXTREMITY STUDY: CPT

## 2018-01-01 PROCEDURE — 83050 HGB METHEMOGLOBIN QUAN: CPT

## 2018-01-01 PROCEDURE — 25010000002 MEROPENEM PER 100 MG: Performed by: HOSPITALIST

## 2018-01-01 PROCEDURE — 81001 URINALYSIS AUTO W/SCOPE: CPT | Performed by: FAMILY MEDICINE

## 2018-01-01 PROCEDURE — 74176 CT ABD & PELVIS W/O CONTRAST: CPT

## 2018-01-01 PROCEDURE — 84484 ASSAY OF TROPONIN QUANT: CPT | Performed by: PHYSICIAN ASSISTANT

## 2018-01-01 PROCEDURE — 25010000002 CEFTRIAXONE: Performed by: INTERNAL MEDICINE

## 2018-01-01 PROCEDURE — 85007 BL SMEAR W/DIFF WBC COUNT: CPT | Performed by: NURSE PRACTITIONER

## 2018-01-01 PROCEDURE — 25010000002 PHENYLEPHRINE PER 1 ML: Performed by: NURSE ANESTHETIST, CERTIFIED REGISTERED

## 2018-01-01 PROCEDURE — 76937 US GUIDE VASCULAR ACCESS: CPT

## 2018-01-01 PROCEDURE — 81001 URINALYSIS AUTO W/SCOPE: CPT | Performed by: UROLOGY

## 2018-01-01 PROCEDURE — 99233 SBSQ HOSP IP/OBS HIGH 50: CPT | Performed by: INTERNAL MEDICINE

## 2018-01-01 PROCEDURE — 85018 HEMOGLOBIN: CPT | Performed by: FAMILY MEDICINE

## 2018-01-01 PROCEDURE — 25010000002 VANCOMYCIN: Performed by: NURSE PRACTITIONER

## 2018-01-01 PROCEDURE — 93005 ELECTROCARDIOGRAM TRACING: CPT | Performed by: EMERGENCY MEDICINE

## 2018-01-01 PROCEDURE — 93005 ELECTROCARDIOGRAM TRACING: CPT

## 2018-01-01 PROCEDURE — G0008 ADMIN INFLUENZA VIRUS VAC: HCPCS | Performed by: NURSE PRACTITIONER

## 2018-01-01 PROCEDURE — 87633 RESP VIRUS 12-25 TARGETS: CPT | Performed by: NURSE PRACTITIONER

## 2018-01-01 PROCEDURE — 84484 ASSAY OF TROPONIN QUANT: CPT | Performed by: STUDENT IN AN ORGANIZED HEALTH CARE EDUCATION/TRAINING PROGRAM

## 2018-01-01 PROCEDURE — 63710000001 INSULIN ASPART PER 5 UNITS: Performed by: PHYSICIAN ASSISTANT

## 2018-01-01 PROCEDURE — 25010000002 PIPERACILLIN SOD-TAZOBACTAM PER 1 G: Performed by: EMERGENCY MEDICINE

## 2018-01-01 PROCEDURE — G8989 SELF CARE D/C STATUS: HCPCS

## 2018-01-01 PROCEDURE — 25010000002 PIPERACILLIN SOD-TAZOBACTAM PER 1 G: Performed by: INTERNAL MEDICINE

## 2018-01-01 PROCEDURE — 85014 HEMATOCRIT: CPT | Performed by: FAMILY MEDICINE

## 2018-01-01 PROCEDURE — 87081 CULTURE SCREEN ONLY: CPT | Performed by: INTERNAL MEDICINE

## 2018-01-01 PROCEDURE — 83735 ASSAY OF MAGNESIUM: CPT | Performed by: FAMILY MEDICINE

## 2018-01-01 PROCEDURE — 87804 INFLUENZA ASSAY W/OPTIC: CPT | Performed by: FAMILY MEDICINE

## 2018-01-01 PROCEDURE — 83880 ASSAY OF NATRIURETIC PEPTIDE: CPT | Performed by: STUDENT IN AN ORGANIZED HEALTH CARE EDUCATION/TRAINING PROGRAM

## 2018-01-01 PROCEDURE — 82553 CREATINE MB FRACTION: CPT | Performed by: INTERNAL MEDICINE

## 2018-01-01 PROCEDURE — 99213 OFFICE O/P EST LOW 20 MIN: CPT | Performed by: FAMILY MEDICINE

## 2018-01-01 PROCEDURE — 87015 SPECIMEN INFECT AGNT CONCNTJ: CPT | Performed by: RADIOLOGY

## 2018-01-01 PROCEDURE — 94010 BREATHING CAPACITY TEST: CPT

## 2018-01-01 PROCEDURE — 84443 ASSAY THYROID STIM HORMONE: CPT | Performed by: PHYSICIAN ASSISTANT

## 2018-01-01 PROCEDURE — C2623 CATH, TRANSLUMIN, DRUG-COAT: HCPCS | Performed by: INTERNAL MEDICINE

## 2018-01-01 PROCEDURE — 25010000002 DEXAMETHASONE PER 1 MG: Performed by: NURSE ANESTHETIST, CERTIFIED REGISTERED

## 2018-01-01 PROCEDURE — 87081 CULTURE SCREEN ONLY: CPT | Performed by: FAMILY MEDICINE

## 2018-01-01 PROCEDURE — 94640 AIRWAY INHALATION TREATMENT: CPT

## 2018-01-01 PROCEDURE — 0TF78ZZ FRAGMENTATION IN LEFT URETER, VIA NATURAL OR ARTIFICIAL OPENING ENDOSCOPIC: ICD-10-PCS | Performed by: UROLOGY

## 2018-01-01 PROCEDURE — 74018 RADEX ABDOMEN 1 VIEW: CPT

## 2018-01-01 PROCEDURE — 25010000002 HEPARIN (PORCINE) PER 1000 UNITS: Performed by: INTERNAL MEDICINE

## 2018-01-01 PROCEDURE — 25010000002 VANCOMYCIN PER 500 MG: Performed by: HOSPITALIST

## 2018-01-01 PROCEDURE — 0T9130Z DRAINAGE OF LEFT KIDNEY WITH DRAINAGE DEVICE, PERCUTANEOUS APPROACH: ICD-10-PCS | Performed by: INTERNAL MEDICINE

## 2018-01-01 PROCEDURE — C1724 CATH, TRANS ATHEREC,ROTATION: HCPCS | Performed by: INTERNAL MEDICINE

## 2018-01-01 PROCEDURE — 73650 X-RAY EXAM OF HEEL: CPT

## 2018-01-01 PROCEDURE — 36415 COLL VENOUS BLD VENIPUNCTURE: CPT | Performed by: FAMILY MEDICINE

## 2018-01-01 PROCEDURE — 82375 ASSAY CARBOXYHB QUANT: CPT

## 2018-01-01 PROCEDURE — 0 TECHNETIUM SESTAMIBI: Performed by: INTERNAL MEDICINE

## 2018-01-01 PROCEDURE — 82272 OCCULT BLD FECES 1-3 TESTS: CPT | Performed by: FAMILY MEDICINE

## 2018-01-01 PROCEDURE — 96523 IRRIG DRUG DELIVERY DEVICE: CPT

## 2018-01-01 PROCEDURE — 84484 ASSAY OF TROPONIN QUANT: CPT | Performed by: INTERNAL MEDICINE

## 2018-01-01 PROCEDURE — 85018 HEMOGLOBIN: CPT | Performed by: NURSE PRACTITIONER

## 2018-01-01 PROCEDURE — 36415 COLL VENOUS BLD VENIPUNCTURE: CPT

## 2018-01-01 PROCEDURE — 84484 ASSAY OF TROPONIN QUANT: CPT | Performed by: FAMILY MEDICINE

## 2018-01-01 PROCEDURE — 71275 CT ANGIOGRAPHY CHEST: CPT

## 2018-01-01 PROCEDURE — 96365 THER/PROPH/DIAG IV INF INIT: CPT

## 2018-01-01 PROCEDURE — 25010000002 AZITHROMYCIN PER 500 MG: Performed by: EMERGENCY MEDICINE

## 2018-01-01 PROCEDURE — 90661 CCIIV3 VAC ABX FR 0.5 ML IM: CPT | Performed by: NURSE PRACTITIONER

## 2018-01-01 PROCEDURE — 93005 ELECTROCARDIOGRAM TRACING: CPT | Performed by: INTERNAL MEDICINE

## 2018-01-01 PROCEDURE — 82728 ASSAY OF FERRITIN: CPT | Performed by: PHYSICIAN ASSISTANT

## 2018-01-01 PROCEDURE — 82553 CREATINE MB FRACTION: CPT | Performed by: EMERGENCY MEDICINE

## 2018-01-01 PROCEDURE — 80307 DRUG TEST PRSMV CHEM ANLYZR: CPT | Performed by: FAMILY MEDICINE

## 2018-01-01 PROCEDURE — 96366 THER/PROPH/DIAG IV INF ADDON: CPT

## 2018-01-01 PROCEDURE — P9021 RED BLOOD CELLS UNIT: HCPCS

## 2018-01-01 PROCEDURE — 86900 BLOOD TYPING SEROLOGIC ABO: CPT

## 2018-01-01 PROCEDURE — 63710000001 OXYCODONE-ACETAMINOPHEN 7.5-325 MG TABLET: Performed by: INTERNAL MEDICINE

## 2018-01-01 PROCEDURE — 02HV33Z INSERTION OF INFUSION DEVICE INTO SUPERIOR VENA CAVA, PERCUTANEOUS APPROACH: ICD-10-PCS | Performed by: UROLOGY

## 2018-01-01 PROCEDURE — 93971 EXTREMITY STUDY: CPT

## 2018-01-01 PROCEDURE — 85027 COMPLETE CBC AUTOMATED: CPT | Performed by: INTERNAL MEDICINE

## 2018-01-01 PROCEDURE — 87081 CULTURE SCREEN ONLY: CPT | Performed by: NURSE PRACTITIONER

## 2018-01-01 PROCEDURE — 99304 1ST NF CARE SF/LOW MDM 25: CPT | Performed by: FAMILY MEDICINE

## 2018-01-01 PROCEDURE — 25010000002 VANCOMYCIN 1 G RECONSTITUTED SOLUTION: Performed by: NURSE PRACTITIONER

## 2018-01-01 PROCEDURE — 97162 PT EVAL MOD COMPLEX 30 MIN: CPT | Performed by: PHYSICAL THERAPIST

## 2018-01-01 PROCEDURE — 0TJB8ZZ INSPECTION OF BLADDER, VIA NATURAL OR ARTIFICIAL OPENING ENDOSCOPIC: ICD-10-PCS | Performed by: UROLOGY

## 2018-01-01 PROCEDURE — 99212 OFFICE O/P EST SF 10 MIN: CPT | Performed by: THORACIC SURGERY (CARDIOTHORACIC VASCULAR SURGERY)

## 2018-01-01 PROCEDURE — 85014 HEMATOCRIT: CPT | Performed by: NURSE PRACTITIONER

## 2018-01-01 PROCEDURE — C1769 GUIDE WIRE: HCPCS | Performed by: INTERNAL MEDICINE

## 2018-01-01 PROCEDURE — 63710000001 TAMSULOSIN 0.4 MG CAPSULE: Performed by: INTERNAL MEDICINE

## 2018-01-01 PROCEDURE — BT1FZZZ FLUOROSCOPY OF LEFT KIDNEY, URETER AND BLADDER: ICD-10-PCS | Performed by: UROLOGY

## 2018-01-01 PROCEDURE — 70450 CT HEAD/BRAIN W/O DYE: CPT

## 2018-01-01 PROCEDURE — 25010000002 ENOXAPARIN PER 10 MG: Performed by: NURSE PRACTITIONER

## 2018-01-01 PROCEDURE — 82607 VITAMIN B-12: CPT | Performed by: PHYSICIAN ASSISTANT

## 2018-01-01 PROCEDURE — 92610 EVALUATE SWALLOWING FUNCTION: CPT | Performed by: SPEECH-LANGUAGE PATHOLOGIST

## 2018-01-01 PROCEDURE — 25010000002 LEVOFLOXACIN PER 250 MG: Performed by: INTERNAL MEDICINE

## 2018-01-01 PROCEDURE — 85610 PROTHROMBIN TIME: CPT | Performed by: INTERNAL MEDICINE

## 2018-01-01 PROCEDURE — 80048 BASIC METABOLIC PNL TOTAL CA: CPT | Performed by: EMERGENCY MEDICINE

## 2018-01-01 PROCEDURE — 93010 ELECTROCARDIOGRAM REPORT: CPT | Performed by: INTERNAL MEDICINE

## 2018-01-01 PROCEDURE — 25010000002 CEFTRIAXONE PER 250 MG: Performed by: EMERGENCY MEDICINE

## 2018-01-01 PROCEDURE — 87186 SC STD MICRODIL/AGAR DIL: CPT | Performed by: NURSE PRACTITIONER

## 2018-01-01 PROCEDURE — 25010000002 MIDAZOLAM PER 1 MG: Performed by: RADIOLOGY

## 2018-01-01 PROCEDURE — 85007 BL SMEAR W/DIFF WBC COUNT: CPT | Performed by: EMERGENCY MEDICINE

## 2018-01-01 PROCEDURE — 25010000002 FUROSEMIDE PER 20 MG: Performed by: EMERGENCY MEDICINE

## 2018-01-01 PROCEDURE — B548ZZA ULTRASONOGRAPHY OF SUPERIOR VENA CAVA, GUIDANCE: ICD-10-PCS | Performed by: UROLOGY

## 2018-01-01 PROCEDURE — 87186 SC STD MICRODIL/AGAR DIL: CPT | Performed by: RADIOLOGY

## 2018-01-01 PROCEDURE — 82550 ASSAY OF CK (CPK): CPT | Performed by: FAMILY MEDICINE

## 2018-01-01 PROCEDURE — 63710000001 FAMOTIDINE 40 MG TABLET: Performed by: INTERNAL MEDICINE

## 2018-01-01 PROCEDURE — 83550 IRON BINDING TEST: CPT | Performed by: PHYSICIAN ASSISTANT

## 2018-01-01 PROCEDURE — 73630 X-RAY EXAM OF FOOT: CPT

## 2018-01-01 PROCEDURE — 25010000002 ZIPRASIDONE MESYLATE PER 10 MG: Performed by: INTERNAL MEDICINE

## 2018-01-01 PROCEDURE — 84540 ASSAY OF URINE/UREA-N: CPT | Performed by: NURSE PRACTITIONER

## 2018-01-01 PROCEDURE — 11721 DEBRIDE NAIL 6 OR MORE: CPT | Performed by: PODIATRIST

## 2018-01-01 PROCEDURE — 63710000001 METOPROLOL SUCCINATE XL 25 MG TABLET SUSTAINED-RELEASE 24 HOUR: Performed by: INTERNAL MEDICINE

## 2018-01-01 PROCEDURE — 99212 OFFICE O/P EST SF 10 MIN: CPT | Performed by: INTERNAL MEDICINE

## 2018-01-01 PROCEDURE — 87040 BLOOD CULTURE FOR BACTERIA: CPT | Performed by: NURSE PRACTITIONER

## 2018-01-01 PROCEDURE — 84550 ASSAY OF BLOOD/URIC ACID: CPT | Performed by: PHYSICIAN ASSISTANT

## 2018-01-01 PROCEDURE — 25010000002 PIPERACILLIN-TAZOBACTAM: Performed by: STUDENT IN AN ORGANIZED HEALTH CARE EDUCATION/TRAINING PROGRAM

## 2018-01-01 PROCEDURE — 82805 BLOOD GASES W/O2 SATURATION: CPT

## 2018-01-01 PROCEDURE — 87040 BLOOD CULTURE FOR BACTERIA: CPT | Performed by: FAMILY MEDICINE

## 2018-01-01 PROCEDURE — C2617 STENT, NON-COR, TEM W/O DEL: HCPCS | Performed by: UROLOGY

## 2018-01-01 PROCEDURE — 82550 ASSAY OF CK (CPK): CPT | Performed by: EMERGENCY MEDICINE

## 2018-01-01 PROCEDURE — 25010000002 MIDAZOLAM PER 1 MG: Performed by: INTERNAL MEDICINE

## 2018-01-01 PROCEDURE — 63710000001 DIPHENHYDRAMINE PER 50 MG: Performed by: HOSPITALIST

## 2018-01-01 PROCEDURE — 80202 ASSAY OF VANCOMYCIN: CPT | Performed by: NURSE PRACTITIONER

## 2018-01-01 PROCEDURE — 25010000002 CEFTRIAXONE: Performed by: EMERGENCY MEDICINE

## 2018-01-01 PROCEDURE — 0TP5X0Z REMOVAL OF DRAINAGE DEVICE FROM KIDNEY, EXTERNAL APPROACH: ICD-10-PCS | Performed by: UROLOGY

## 2018-01-01 PROCEDURE — 25010000002 FENTANYL CITRATE (PF) 100 MCG/2ML SOLUTION: Performed by: INTERNAL MEDICINE

## 2018-01-01 PROCEDURE — G8996 SWALLOW CURRENT STATUS: HCPCS | Performed by: SPEECH-LANGUAGE PATHOLOGIST

## 2018-01-01 PROCEDURE — 99284 EMERGENCY DEPT VISIT MOD MDM: CPT

## 2018-01-01 PROCEDURE — 83036 HEMOGLOBIN GLYCOSYLATED A1C: CPT | Performed by: PHYSICIAN ASSISTANT

## 2018-01-01 PROCEDURE — 0T9B80Z DRAINAGE OF BLADDER WITH DRAINAGE DEVICE, VIA NATURAL OR ARTIFICIAL OPENING ENDOSCOPIC: ICD-10-PCS | Performed by: UROLOGY

## 2018-01-01 PROCEDURE — 74420 UROGRAPHY RTRGR +-KUB: CPT

## 2018-01-01 PROCEDURE — 86901 BLOOD TYPING SEROLOGIC RH(D): CPT | Performed by: PHYSICIAN ASSISTANT

## 2018-01-01 PROCEDURE — 97163 PT EVAL HIGH COMPLEX 45 MIN: CPT

## 2018-01-01 PROCEDURE — 71046 X-RAY EXAM CHEST 2 VIEWS: CPT

## 2018-01-01 PROCEDURE — 36430 TRANSFUSION BLD/BLD COMPNT: CPT

## 2018-01-01 PROCEDURE — 85610 PROTHROMBIN TIME: CPT | Performed by: RADIOLOGY

## 2018-01-01 PROCEDURE — 87077 CULTURE AEROBIC IDENTIFY: CPT | Performed by: UROLOGY

## 2018-01-01 PROCEDURE — 86901 BLOOD TYPING SEROLOGIC RH(D): CPT | Performed by: FAMILY MEDICINE

## 2018-01-01 PROCEDURE — 0T9430Z DRAINAGE OF LEFT KIDNEY PELVIS WITH DRAINAGE DEVICE, PERCUTANEOUS APPROACH: ICD-10-PCS | Performed by: RADIOLOGY

## 2018-01-01 PROCEDURE — 85610 PROTHROMBIN TIME: CPT | Performed by: EMERGENCY MEDICINE

## 2018-01-01 PROCEDURE — 87205 SMEAR GRAM STAIN: CPT | Performed by: RADIOLOGY

## 2018-01-01 PROCEDURE — BT1F1ZZ FLUOROSCOPY OF LEFT KIDNEY, URETER AND BLADDER USING LOW OSMOLAR CONTRAST: ICD-10-PCS | Performed by: UROLOGY

## 2018-01-01 PROCEDURE — 93016 CV STRESS TEST SUPVJ ONLY: CPT | Performed by: INTERNAL MEDICINE

## 2018-01-01 PROCEDURE — 81003 URINALYSIS AUTO W/O SCOPE: CPT | Performed by: FAMILY MEDICINE

## 2018-01-01 PROCEDURE — 86850 RBC ANTIBODY SCREEN: CPT | Performed by: PHYSICIAN ASSISTANT

## 2018-01-01 PROCEDURE — 85025 COMPLETE CBC W/AUTO DIFF WBC: CPT | Performed by: STUDENT IN AN ORGANIZED HEALTH CARE EDUCATION/TRAINING PROGRAM

## 2018-01-01 PROCEDURE — 99214 OFFICE O/P EST MOD 30 MIN: CPT | Performed by: INTERNAL MEDICINE

## 2018-01-01 PROCEDURE — 87077 CULTURE AEROBIC IDENTIFY: CPT | Performed by: RADIOLOGY

## 2018-01-01 PROCEDURE — 85610 PROTHROMBIN TIME: CPT | Performed by: UROLOGY

## 2018-01-01 PROCEDURE — 87077 CULTURE AEROBIC IDENTIFY: CPT | Performed by: NURSE PRACTITIONER

## 2018-01-01 PROCEDURE — 87581 M.PNEUMON DNA AMP PROBE: CPT | Performed by: NURSE PRACTITIONER

## 2018-01-01 PROCEDURE — 93923 UPR/LXTR ART STDY 3+ LVLS: CPT

## 2018-01-01 PROCEDURE — C1725 CATH, TRANSLUMIN NON-LASER: HCPCS | Performed by: INTERNAL MEDICINE

## 2018-01-01 PROCEDURE — 87486 CHLMYD PNEUM DNA AMP PROBE: CPT | Performed by: NURSE PRACTITIONER

## 2018-01-01 PROCEDURE — G0180 MD CERTIFICATION HHA PATIENT: HCPCS | Performed by: FAMILY MEDICINE

## 2018-01-01 PROCEDURE — 96372 THER/PROPH/DIAG INJ SC/IM: CPT

## 2018-01-01 PROCEDURE — 99497 ADVNCD CARE PLAN 30 MIN: CPT | Performed by: INTERNAL MEDICINE

## 2018-01-01 PROCEDURE — 85027 COMPLETE CBC AUTOMATED: CPT | Performed by: NURSE PRACTITIONER

## 2018-01-01 PROCEDURE — 99223 1ST HOSP IP/OBS HIGH 75: CPT | Performed by: INTERNAL MEDICINE

## 2018-01-01 PROCEDURE — 97162 PT EVAL MOD COMPLEX 30 MIN: CPT

## 2018-01-01 PROCEDURE — 81001 URINALYSIS AUTO W/SCOPE: CPT | Performed by: STUDENT IN AN ORGANIZED HEALTH CARE EDUCATION/TRAINING PROGRAM

## 2018-01-01 PROCEDURE — 94010 BREATHING CAPACITY TEST: CPT | Performed by: INTERNAL MEDICINE

## 2018-01-01 PROCEDURE — 83735 ASSAY OF MAGNESIUM: CPT | Performed by: STUDENT IN AN ORGANIZED HEALTH CARE EDUCATION/TRAINING PROGRAM

## 2018-01-01 PROCEDURE — 86850 RBC ANTIBODY SCREEN: CPT | Performed by: FAMILY MEDICINE

## 2018-01-01 PROCEDURE — 87086 URINE CULTURE/COLONY COUNT: CPT | Performed by: STUDENT IN AN ORGANIZED HEALTH CARE EDUCATION/TRAINING PROGRAM

## 2018-01-01 PROCEDURE — 80069 RENAL FUNCTION PANEL: CPT | Performed by: FAMILY MEDICINE

## 2018-01-01 PROCEDURE — G8979 MOBILITY GOAL STATUS: HCPCS | Performed by: PHYSICAL THERAPIST

## 2018-01-01 PROCEDURE — 25010000002 ALTEPLASE 2 MG RECONSTITUTED SOLUTION: Performed by: NURSE PRACTITIONER

## 2018-01-01 PROCEDURE — 93306 TTE W/DOPPLER COMPLETE: CPT

## 2018-01-01 PROCEDURE — 82565 ASSAY OF CREATININE: CPT | Performed by: NURSE PRACTITIONER

## 2018-01-01 PROCEDURE — 85025 COMPLETE CBC W/AUTO DIFF WBC: CPT | Performed by: FAMILY MEDICINE

## 2018-01-01 PROCEDURE — 86900 BLOOD TYPING SEROLOGIC ABO: CPT | Performed by: FAMILY MEDICINE

## 2018-01-01 PROCEDURE — 99308 SBSQ NF CARE LOW MDM 20: CPT | Performed by: FAMILY MEDICINE

## 2018-01-01 PROCEDURE — 94729 DIFFUSING CAPACITY: CPT | Performed by: INTERNAL MEDICINE

## 2018-01-01 PROCEDURE — 80061 LIPID PANEL: CPT | Performed by: PHYSICIAN ASSISTANT

## 2018-01-01 PROCEDURE — A9500 TC99M SESTAMIBI: HCPCS | Performed by: INTERNAL MEDICINE

## 2018-01-01 PROCEDURE — 99213 OFFICE O/P EST LOW 20 MIN: CPT | Performed by: THORACIC SURGERY (CARDIOTHORACIC VASCULAR SURGERY)

## 2018-01-01 PROCEDURE — 25010000002 METHYLPREDNISOLONE PER 125 MG: Performed by: EMERGENCY MEDICINE

## 2018-01-01 PROCEDURE — 93005 ELECTROCARDIOGRAM TRACING: CPT | Performed by: STUDENT IN AN ORGANIZED HEALTH CARE EDUCATION/TRAINING PROGRAM

## 2018-01-01 PROCEDURE — 85007 BL SMEAR W/DIFF WBC COUNT: CPT | Performed by: FAMILY MEDICINE

## 2018-01-01 PROCEDURE — 25010000002 MEROPENEM PER 100 MG: Performed by: PHYSICIAN ASSISTANT

## 2018-01-01 PROCEDURE — 87150 DNA/RNA AMPLIFIED PROBE: CPT | Performed by: FAMILY MEDICINE

## 2018-01-01 PROCEDURE — 82550 ASSAY OF CK (CPK): CPT | Performed by: INTERNAL MEDICINE

## 2018-01-01 PROCEDURE — 93018 CV STRESS TEST I&R ONLY: CPT | Performed by: INTERNAL MEDICINE

## 2018-01-01 PROCEDURE — 78452 HT MUSCLE IMAGE SPECT MULT: CPT

## 2018-01-01 PROCEDURE — G0463 HOSPITAL OUTPT CLINIC VISIT: HCPCS

## 2018-01-01 PROCEDURE — 74022 RADEX COMPL AQT ABD SERIES: CPT

## 2018-01-01 PROCEDURE — 88300 SURGICAL PATH GROSS: CPT | Performed by: UROLOGY

## 2018-01-01 PROCEDURE — 25010000002 REGADENOSON 0.4 MG/5ML SOLUTION: Performed by: INTERNAL MEDICINE

## 2018-01-01 PROCEDURE — 82553 CREATINE MB FRACTION: CPT | Performed by: FAMILY MEDICINE

## 2018-01-01 PROCEDURE — 83735 ASSAY OF MAGNESIUM: CPT | Performed by: NURSE PRACTITIONER

## 2018-01-01 PROCEDURE — G0481 DRUG TEST DEF 8-14 CLASSES: HCPCS | Performed by: FAMILY MEDICINE

## 2018-01-01 PROCEDURE — 87070 CULTURE OTHR SPECIMN AEROBIC: CPT | Performed by: RADIOLOGY

## 2018-01-01 DEVICE — URETERAL STENT
Type: IMPLANTABLE DEVICE | Status: FUNCTIONAL
Brand: PERCUFLEX™ PLUS

## 2018-01-01 RX ORDER — OXYCODONE AND ACETAMINOPHEN 7.5; 325 MG/1; MG/1
1 TABLET ORAL EVERY 6 HOURS PRN
Status: DISCONTINUED | OUTPATIENT
Start: 2018-01-01 | End: 2018-01-01 | Stop reason: HOSPADM

## 2018-01-01 RX ORDER — ACETAMINOPHEN 650 MG/1
650 SUPPOSITORY RECTAL ONCE AS NEEDED
Status: DISCONTINUED | OUTPATIENT
Start: 2018-01-01 | End: 2018-01-01 | Stop reason: HOSPADM

## 2018-01-01 RX ORDER — METOPROLOL SUCCINATE 25 MG/1
25 TABLET, EXTENDED RELEASE ORAL DAILY
Status: DISCONTINUED | OUTPATIENT
Start: 2018-01-01 | End: 2018-01-01 | Stop reason: HOSPADM

## 2018-01-01 RX ORDER — ASPIRIN 81 MG/1
81 TABLET ORAL DAILY
Status: DISCONTINUED | OUTPATIENT
Start: 2018-01-01 | End: 2018-01-01

## 2018-01-01 RX ORDER — LEVOFLOXACIN 5 MG/ML
250 INJECTION, SOLUTION INTRAVENOUS EVERY 24 HOURS
Status: COMPLETED | OUTPATIENT
Start: 2018-01-01 | End: 2018-01-01

## 2018-01-01 RX ORDER — ONDANSETRON 2 MG/ML
4 INJECTION INTRAMUSCULAR; INTRAVENOUS EVERY 6 HOURS PRN
Status: DISCONTINUED | OUTPATIENT
Start: 2018-01-01 | End: 2018-01-01 | Stop reason: HOSPADM

## 2018-01-01 RX ORDER — NITROGLYCERIN 0.4 MG/1
0.4 TABLET SUBLINGUAL
Status: DISCONTINUED | OUTPATIENT
Start: 2018-01-01 | End: 2018-01-01

## 2018-01-01 RX ORDER — DEXTROSE MONOHYDRATE 50 MG/ML
75 INJECTION, SOLUTION INTRAVENOUS CONTINUOUS
Status: DISPENSED | OUTPATIENT
Start: 2018-01-01 | End: 2018-01-01

## 2018-01-01 RX ORDER — FAMOTIDINE 40 MG/1
40 TABLET, FILM COATED ORAL DAILY
Status: DISCONTINUED | OUTPATIENT
Start: 2018-01-01 | End: 2018-01-01 | Stop reason: HOSPADM

## 2018-01-01 RX ORDER — TIZANIDINE 4 MG/1
2-4 TABLET ORAL NIGHTLY PRN
Qty: 15 TABLET | Refills: 0 | Status: SHIPPED | OUTPATIENT
Start: 2018-01-01 | End: 2018-01-01

## 2018-01-01 RX ORDER — SODIUM CHLORIDE 9 MG/ML
INJECTION, SOLUTION INTRAVENOUS
Status: DISPENSED
Start: 2018-01-01 | End: 2018-01-01

## 2018-01-01 RX ORDER — SODIUM CHLORIDE 9 MG/ML
125 INJECTION, SOLUTION INTRAVENOUS CONTINUOUS
Start: 2018-01-01

## 2018-01-01 RX ORDER — FENTANYL CITRATE 50 UG/ML
INJECTION, SOLUTION INTRAMUSCULAR; INTRAVENOUS AS NEEDED
Status: DISCONTINUED | OUTPATIENT
Start: 2018-01-01 | End: 2018-01-01 | Stop reason: SURG

## 2018-01-01 RX ORDER — OXYCODONE HYDROCHLORIDE AND ACETAMINOPHEN 5; 325 MG/1; MG/1
1 TABLET ORAL EVERY 4 HOURS PRN
Status: DISCONTINUED | OUTPATIENT
Start: 2018-01-01 | End: 2018-01-01

## 2018-01-01 RX ORDER — FINASTERIDE 5 MG/1
5 TABLET, FILM COATED ORAL DAILY
Status: DISCONTINUED | OUTPATIENT
Start: 2018-01-01 | End: 2018-01-01 | Stop reason: HOSPADM

## 2018-01-01 RX ORDER — SODIUM CHLORIDE 0.9 % (FLUSH) 0.9 %
10 SYRINGE (ML) INJECTION EVERY 8 HOURS
Status: DISCONTINUED | OUTPATIENT
Start: 2018-01-01 | End: 2018-01-01 | Stop reason: HOSPADM

## 2018-01-01 RX ORDER — ASPIRIN 81 MG/1
81 TABLET, CHEWABLE ORAL DAILY
Status: DISCONTINUED | OUTPATIENT
Start: 2018-01-01 | End: 2018-01-01 | Stop reason: HOSPADM

## 2018-01-01 RX ORDER — SODIUM CHLORIDE 0.9 % (FLUSH) 0.9 %
1-10 SYRINGE (ML) INJECTION AS NEEDED
Status: DISCONTINUED | OUTPATIENT
Start: 2018-01-01 | End: 2018-01-01 | Stop reason: HOSPADM

## 2018-01-01 RX ORDER — MORPHINE SULFATE 2 MG/ML
1 INJECTION, SOLUTION INTRAMUSCULAR; INTRAVENOUS EVERY 6 HOURS PRN
Status: DISCONTINUED | OUTPATIENT
Start: 2018-01-01 | End: 2018-01-01 | Stop reason: HOSPADM

## 2018-01-01 RX ORDER — SODIUM CHLORIDE 0.9 % (FLUSH) 0.9 %
10 SYRINGE (ML) INJECTION AS NEEDED
Status: DISCONTINUED | OUTPATIENT
Start: 2018-01-01 | End: 2018-01-01 | Stop reason: HOSPADM

## 2018-01-01 RX ORDER — FUROSEMIDE 10 MG/ML
20 INJECTION INTRAMUSCULAR; INTRAVENOUS ONCE
Status: COMPLETED | OUTPATIENT
Start: 2018-01-01 | End: 2018-01-01

## 2018-01-01 RX ORDER — FENTANYL CITRATE 50 UG/ML
INJECTION, SOLUTION INTRAMUSCULAR; INTRAVENOUS AS NEEDED
Status: DISCONTINUED | OUTPATIENT
Start: 2018-01-01 | End: 2018-01-01 | Stop reason: HOSPADM

## 2018-01-01 RX ORDER — CLOPIDOGREL BISULFATE 75 MG/1
75 TABLET ORAL DAILY
Qty: 30 TABLET | Refills: 3 | Status: SHIPPED | OUTPATIENT
Start: 2018-01-01

## 2018-01-01 RX ORDER — ZIPRASIDONE MESYLATE 20 MG/ML
10 INJECTION, POWDER, LYOPHILIZED, FOR SOLUTION INTRAMUSCULAR ONCE
Status: COMPLETED | OUTPATIENT
Start: 2018-01-01 | End: 2018-01-01

## 2018-01-01 RX ORDER — DIPHENHYDRAMINE HCL 25 MG
25 CAPSULE ORAL ONCE
Status: COMPLETED | OUTPATIENT
Start: 2018-01-01 | End: 2018-01-01

## 2018-01-01 RX ORDER — BISACODYL 10 MG
10 SUPPOSITORY, RECTAL RECTAL DAILY
Status: DISCONTINUED | OUTPATIENT
Start: 2018-01-01 | End: 2018-01-01 | Stop reason: HOSPADM

## 2018-01-01 RX ORDER — OXYCODONE HYDROCHLORIDE AND ACETAMINOPHEN 5; 325 MG/1; MG/1
1 TABLET ORAL EVERY 4 HOURS PRN
Status: DISCONTINUED | OUTPATIENT
Start: 2018-01-01 | End: 2018-01-01 | Stop reason: HOSPADM

## 2018-01-01 RX ORDER — ERGOCALCIFEROL 1.25 MG/1
50000 CAPSULE ORAL
COMMUNITY

## 2018-01-01 RX ORDER — ACETAMINOPHEN 325 MG/1
650 TABLET ORAL ONCE AS NEEDED
Status: DISCONTINUED | OUTPATIENT
Start: 2018-01-01 | End: 2018-01-01 | Stop reason: HOSPADM

## 2018-01-01 RX ORDER — TIZANIDINE 2 MG/1
2 TABLET ORAL EVERY 8 HOURS PRN
Status: DISCONTINUED | OUTPATIENT
Start: 2018-01-01 | End: 2018-01-01 | Stop reason: HOSPADM

## 2018-01-01 RX ORDER — OXYCODONE AND ACETAMINOPHEN 7.5; 325 MG/1; MG/1
1 TABLET ORAL EVERY 4 HOURS PRN
Status: DISPENSED | OUTPATIENT
Start: 2018-01-01 | End: 2018-01-01

## 2018-01-01 RX ORDER — FLUTICASONE PROPIONATE 50 MCG
1 SPRAY, SUSPENSION (ML) NASAL DAILY
Status: DISCONTINUED | OUTPATIENT
Start: 2018-01-01 | End: 2018-01-01 | Stop reason: HOSPADM

## 2018-01-01 RX ORDER — ESMOLOL HYDROCHLORIDE 10 MG/ML
50-300 INJECTION, SOLUTION INTRAVENOUS
Status: DISCONTINUED | OUTPATIENT
Start: 2018-01-01 | End: 2018-01-01

## 2018-01-01 RX ORDER — NALOXONE HCL 0.4 MG/ML
0.4 VIAL (ML) INJECTION
Status: DISCONTINUED | OUTPATIENT
Start: 2018-01-01 | End: 2018-01-01 | Stop reason: HOSPADM

## 2018-01-01 RX ORDER — BISACODYL 10 MG
10 SUPPOSITORY, RECTAL RECTAL ONCE
Status: COMPLETED | OUTPATIENT
Start: 2018-01-01 | End: 2018-01-01

## 2018-01-01 RX ORDER — BISACODYL 10 MG
10 SUPPOSITORY, RECTAL RECTAL DAILY PRN
Status: DISCONTINUED | OUTPATIENT
Start: 2018-01-01 | End: 2018-01-01 | Stop reason: HOSPADM

## 2018-01-01 RX ORDER — IPRATROPIUM BROMIDE AND ALBUTEROL SULFATE 2.5; .5 MG/3ML; MG/3ML
3 SOLUTION RESPIRATORY (INHALATION)
Status: DISCONTINUED | OUTPATIENT
Start: 2018-01-01 | End: 2018-01-01 | Stop reason: HOSPADM

## 2018-01-01 RX ORDER — TIZANIDINE 2 MG/1
2 TABLET ORAL DAILY PRN
Status: DISCONTINUED | OUTPATIENT
Start: 2018-01-01 | End: 2018-01-01 | Stop reason: HOSPADM

## 2018-01-01 RX ORDER — SODIUM CHLORIDE 9 MG/ML
80 INJECTION, SOLUTION INTRAVENOUS CONTINUOUS
Status: DISCONTINUED | OUTPATIENT
Start: 2018-01-01 | End: 2018-01-01

## 2018-01-01 RX ORDER — CLOPIDOGREL BISULFATE 75 MG/1
TABLET ORAL AS NEEDED
Status: DISCONTINUED | OUTPATIENT
Start: 2018-01-01 | End: 2018-01-01 | Stop reason: HOSPADM

## 2018-01-01 RX ORDER — ONDANSETRON 2 MG/ML
INJECTION INTRAMUSCULAR; INTRAVENOUS AS NEEDED
Status: DISCONTINUED | OUTPATIENT
Start: 2018-01-01 | End: 2018-01-01 | Stop reason: SURG

## 2018-01-01 RX ORDER — OXYCODONE AND ACETAMINOPHEN 7.5; 325 MG/1; MG/1
1 TABLET ORAL EVERY 6 HOURS PRN
Qty: 18 TABLET | Refills: 0 | Status: SHIPPED | OUTPATIENT
Start: 2018-01-01 | End: 2018-01-01 | Stop reason: SDUPTHER

## 2018-01-01 RX ORDER — MEPERIDINE HYDROCHLORIDE 50 MG/ML
12.5 INJECTION INTRAMUSCULAR; INTRAVENOUS; SUBCUTANEOUS
Status: DISCONTINUED | OUTPATIENT
Start: 2018-01-01 | End: 2018-01-01 | Stop reason: HOSPADM

## 2018-01-01 RX ORDER — SODIUM CHLORIDE 9 MG/ML
80 INJECTION, SOLUTION INTRAVENOUS CONTINUOUS
Status: CANCELLED | OUTPATIENT
Start: 2018-01-01

## 2018-01-01 RX ORDER — LANOLIN ALCOHOL/MO/W.PET/CERES
1000 CREAM (GRAM) TOPICAL DAILY
Status: DISCONTINUED | OUTPATIENT
Start: 2018-01-01 | End: 2018-01-01 | Stop reason: HOSPADM

## 2018-01-01 RX ORDER — SODIUM CHLORIDE 0.9 % (FLUSH) 0.9 %
3 SYRINGE (ML) INJECTION EVERY 12 HOURS
Status: DISCONTINUED | OUTPATIENT
Start: 2018-01-01 | End: 2018-01-01 | Stop reason: HOSPADM

## 2018-01-01 RX ORDER — LEVOTHYROXINE SODIUM 0.05 MG/1
50 TABLET ORAL
Status: DISCONTINUED | OUTPATIENT
Start: 2018-01-01 | End: 2018-01-01 | Stop reason: HOSPADM

## 2018-01-01 RX ORDER — ALBUTEROL SULFATE 2.5 MG/3ML
2.5 SOLUTION RESPIRATORY (INHALATION) EVERY 6 HOURS PRN
Status: DISCONTINUED | OUTPATIENT
Start: 2018-01-01 | End: 2018-01-01 | Stop reason: HOSPADM

## 2018-01-01 RX ORDER — METOPROLOL SUCCINATE 25 MG/1
25 TABLET, EXTENDED RELEASE ORAL
Status: DISCONTINUED | OUTPATIENT
Start: 2018-01-01 | End: 2018-01-01 | Stop reason: HOSPADM

## 2018-01-01 RX ORDER — TAMSULOSIN HYDROCHLORIDE 0.4 MG/1
0.8 CAPSULE ORAL NIGHTLY
Status: DISCONTINUED | OUTPATIENT
Start: 2018-01-01 | End: 2018-01-01 | Stop reason: HOSPADM

## 2018-01-01 RX ORDER — TERAZOSIN 2 MG/1
2 CAPSULE ORAL NIGHTLY
Status: DISCONTINUED | OUTPATIENT
Start: 2018-01-01 | End: 2018-01-01 | Stop reason: HOSPADM

## 2018-01-01 RX ORDER — SODIUM CHLORIDE 0.9 % (FLUSH) 0.9 %
10 SYRINGE (ML) INJECTION EVERY 8 HOURS SCHEDULED
Status: DISCONTINUED | OUTPATIENT
Start: 2018-01-01 | End: 2018-01-01 | Stop reason: HOSPADM

## 2018-01-01 RX ORDER — ASPIRIN 81 MG/1
81 TABLET, CHEWABLE ORAL DAILY
Start: 2018-01-01

## 2018-01-01 RX ORDER — FOLIC ACID 1 MG/1
1 TABLET ORAL DAILY
Status: DISCONTINUED | OUTPATIENT
Start: 2018-01-01 | End: 2018-01-01 | Stop reason: HOSPADM

## 2018-01-01 RX ORDER — ASPIRIN 81 MG/1
324 TABLET, CHEWABLE ORAL ONCE
Status: COMPLETED | OUTPATIENT
Start: 2018-01-01 | End: 2018-01-01

## 2018-01-01 RX ORDER — DIPHENHYDRAMINE HYDROCHLORIDE 50 MG/ML
12.5 INJECTION INTRAMUSCULAR; INTRAVENOUS
Status: DISCONTINUED | OUTPATIENT
Start: 2018-01-01 | End: 2018-01-01 | Stop reason: HOSPADM

## 2018-01-01 RX ORDER — SODIUM CHLORIDE 9 MG/ML
50 INJECTION, SOLUTION INTRAVENOUS CONTINUOUS
Status: DISCONTINUED | OUTPATIENT
Start: 2018-01-01 | End: 2018-01-01 | Stop reason: HOSPADM

## 2018-01-01 RX ORDER — CETIRIZINE HYDROCHLORIDE 10 MG/1
10 TABLET ORAL DAILY
Status: DISCONTINUED | OUTPATIENT
Start: 2018-01-01 | End: 2018-01-01 | Stop reason: HOSPADM

## 2018-01-01 RX ORDER — DILTIAZEM HYDROCHLORIDE 120 MG/1
120 CAPSULE, COATED, EXTENDED RELEASE ORAL
Status: DISCONTINUED | OUTPATIENT
Start: 2018-01-01 | End: 2018-01-01 | Stop reason: HOSPADM

## 2018-01-01 RX ORDER — FERROUS SULFATE TAB EC 324 MG (65 MG FE EQUIVALENT) 324 (65 FE) MG
324 TABLET DELAYED RESPONSE ORAL
Status: DISCONTINUED | OUTPATIENT
Start: 2018-01-01 | End: 2018-01-01 | Stop reason: HOSPADM

## 2018-01-01 RX ORDER — DILTIAZEM HYDROCHLORIDE 60 MG/1
60 TABLET, FILM COATED ORAL 4 TIMES DAILY
Qty: 60 TABLET | Refills: 0 | Status: SHIPPED | OUTPATIENT
Start: 2018-01-01 | End: 2018-01-01

## 2018-01-01 RX ORDER — FENTANYL CITRATE 50 UG/ML
INJECTION, SOLUTION INTRAMUSCULAR; INTRAVENOUS
Status: COMPLETED | OUTPATIENT
Start: 2018-01-01 | End: 2018-01-01

## 2018-01-01 RX ORDER — DILTIAZEM HYDROCHLORIDE 60 MG/1
60 TABLET, FILM COATED ORAL EVERY 6 HOURS SCHEDULED
Status: DISCONTINUED | OUTPATIENT
Start: 2018-01-01 | End: 2018-01-01 | Stop reason: HOSPADM

## 2018-01-01 RX ORDER — SODIUM CHLORIDE 9 MG/ML
75 INJECTION, SOLUTION INTRAVENOUS CONTINUOUS
Status: DISCONTINUED | OUTPATIENT
Start: 2018-01-01 | End: 2018-01-01

## 2018-01-01 RX ORDER — BUMETANIDE 1 MG/1
1 TABLET ORAL DAILY
Status: DISCONTINUED | OUTPATIENT
Start: 2018-01-01 | End: 2018-01-01

## 2018-01-01 RX ORDER — SODIUM CHLORIDE 9 MG/ML
100 INJECTION, SOLUTION INTRAVENOUS CONTINUOUS
Status: DISCONTINUED | OUTPATIENT
Start: 2018-01-01 | End: 2018-01-01

## 2018-01-01 RX ORDER — LEVOFLOXACIN 5 MG/ML
500 INJECTION, SOLUTION INTRAVENOUS EVERY 24 HOURS
Status: DISCONTINUED | OUTPATIENT
Start: 2018-01-01 | End: 2018-01-01

## 2018-01-01 RX ORDER — POTASSIUM CHLORIDE 750 MG/1
20 CAPSULE, EXTENDED RELEASE ORAL ONCE
Status: COMPLETED | OUTPATIENT
Start: 2018-01-01 | End: 2018-01-01

## 2018-01-01 RX ORDER — ASPIRIN 325 MG
325 TABLET ORAL ONCE
Status: DISCONTINUED | OUTPATIENT
Start: 2018-01-01 | End: 2018-01-01

## 2018-01-01 RX ORDER — ACETAMINOPHEN 325 MG/1
650 TABLET ORAL EVERY 6 HOURS PRN
Status: DISCONTINUED | OUTPATIENT
Start: 2018-01-01 | End: 2018-01-01 | Stop reason: HOSPADM

## 2018-01-01 RX ORDER — MUSCLE RUB CREAM 100; 150 MG/G; MG/G
CREAM TOPICAL
Status: DISCONTINUED | OUTPATIENT
Start: 2018-01-01 | End: 2018-01-01 | Stop reason: HOSPADM

## 2018-01-01 RX ORDER — DILTIAZEM HYDROCHLORIDE 60 MG/1
60 TABLET, FILM COATED ORAL 4 TIMES DAILY
Status: DISCONTINUED | OUTPATIENT
Start: 2018-01-01 | End: 2018-01-01 | Stop reason: HOSPADM

## 2018-01-01 RX ORDER — DOBUTAMINE HYDROCHLORIDE 200 MG/100ML
2-20 INJECTION INTRAVENOUS
Status: DISCONTINUED | OUTPATIENT
Start: 2018-01-01 | End: 2018-01-01

## 2018-01-01 RX ORDER — MORPHINE SULFATE 2 MG/ML
1 INJECTION, SOLUTION INTRAMUSCULAR; INTRAVENOUS EVERY 4 HOURS PRN
Status: DISPENSED | OUTPATIENT
Start: 2018-01-01 | End: 2018-01-01

## 2018-01-01 RX ORDER — EPHEDRINE SULFATE 50 MG/ML
5 INJECTION, SOLUTION INTRAVENOUS ONCE AS NEEDED
Status: DISCONTINUED | OUTPATIENT
Start: 2018-01-01 | End: 2018-01-01 | Stop reason: HOSPADM

## 2018-01-01 RX ORDER — SODIUM CHLORIDE 0.9 % (FLUSH) 0.9 %
3-10 SYRINGE (ML) INJECTION AS NEEDED
Status: DISCONTINUED | OUTPATIENT
Start: 2018-01-01 | End: 2018-01-01 | Stop reason: HOSPADM

## 2018-01-01 RX ORDER — DOCUSATE SODIUM 100 MG/1
100 CAPSULE, LIQUID FILLED ORAL 2 TIMES DAILY
Status: DISCONTINUED | OUTPATIENT
Start: 2018-01-01 | End: 2018-01-01 | Stop reason: HOSPADM

## 2018-01-01 RX ORDER — METHYLPREDNISOLONE SODIUM SUCCINATE 125 MG/2ML
125 INJECTION, POWDER, LYOPHILIZED, FOR SOLUTION INTRAMUSCULAR; INTRAVENOUS ONCE
Status: COMPLETED | OUTPATIENT
Start: 2018-01-01 | End: 2018-01-01

## 2018-01-01 RX ORDER — NITROFURANTOIN 25; 75 MG/1; MG/1
100 CAPSULE ORAL 2 TIMES DAILY
COMMUNITY
Start: 2018-01-01 | End: 2018-01-01 | Stop reason: HOSPADM

## 2018-01-01 RX ORDER — MIDAZOLAM HYDROCHLORIDE 1 MG/ML
INJECTION INTRAMUSCULAR; INTRAVENOUS
Status: COMPLETED | OUTPATIENT
Start: 2018-01-01 | End: 2018-01-01

## 2018-01-01 RX ORDER — SODIUM CHLORIDE 9 MG/ML
50 INJECTION, SOLUTION INTRAVENOUS CONTINUOUS
Status: DISCONTINUED | OUTPATIENT
Start: 2018-01-01 | End: 2018-01-01

## 2018-01-01 RX ORDER — CYCLOBENZAPRINE HCL 5 MG
5 TABLET ORAL 2 TIMES DAILY PRN
Status: DISCONTINUED | OUTPATIENT
Start: 2018-01-01 | End: 2018-01-01 | Stop reason: HOSPADM

## 2018-01-01 RX ORDER — ONDANSETRON 2 MG/ML
4 INJECTION INTRAMUSCULAR; INTRAVENOUS ONCE AS NEEDED
Status: DISCONTINUED | OUTPATIENT
Start: 2018-01-01 | End: 2018-01-01 | Stop reason: HOSPADM

## 2018-01-01 RX ORDER — BUMETANIDE 1 MG/1
1 TABLET ORAL DAILY
Status: DISCONTINUED | OUTPATIENT
Start: 2018-01-01 | End: 2018-01-01 | Stop reason: HOSPADM

## 2018-01-01 RX ORDER — METOPROLOL SUCCINATE 25 MG/1
25 TABLET, EXTENDED RELEASE ORAL
Status: DISCONTINUED | OUTPATIENT
Start: 2018-01-01 | End: 2018-01-01

## 2018-01-01 RX ORDER — FUROSEMIDE 10 MG/ML
40 INJECTION INTRAMUSCULAR; INTRAVENOUS ONCE
Status: COMPLETED | OUTPATIENT
Start: 2018-01-01 | End: 2018-01-01

## 2018-01-01 RX ORDER — NALOXONE HCL 0.4 MG/ML
0.2 VIAL (ML) INJECTION AS NEEDED
Status: DISCONTINUED | OUTPATIENT
Start: 2018-01-01 | End: 2018-01-01 | Stop reason: HOSPADM

## 2018-01-01 RX ORDER — FLUCONAZOLE 100 MG/1
100 TABLET ORAL DAILY
COMMUNITY
Start: 2018-01-01 | End: 2018-01-01

## 2018-01-01 RX ORDER — TAMSULOSIN HYDROCHLORIDE 0.4 MG/1
0.8 CAPSULE ORAL NIGHTLY
Qty: 30 CAPSULE
Start: 2018-01-01

## 2018-01-01 RX ORDER — LABETALOL HYDROCHLORIDE 5 MG/ML
5 INJECTION, SOLUTION INTRAVENOUS
Status: DISCONTINUED | OUTPATIENT
Start: 2018-01-01 | End: 2018-01-01 | Stop reason: HOSPADM

## 2018-01-01 RX ORDER — OXYCODONE HYDROCHLORIDE AND ACETAMINOPHEN 5; 325 MG/1; MG/1
1 TABLET ORAL EVERY 4 HOURS PRN
Qty: 18 TABLET | Refills: 0 | Status: SHIPPED | OUTPATIENT
Start: 2018-01-01 | End: 2018-01-01 | Stop reason: SDUPTHER

## 2018-01-01 RX ORDER — FLUCONAZOLE 200 MG/1
200 TABLET ORAL DAILY
Qty: 4 TABLET | Refills: 0 | Status: SHIPPED | OUTPATIENT
Start: 2018-01-01 | End: 2018-01-01

## 2018-01-01 RX ORDER — PANTOPRAZOLE SODIUM 40 MG/1
40 TABLET, DELAYED RELEASE ORAL
Status: DISCONTINUED | OUTPATIENT
Start: 2018-01-01 | End: 2018-01-01 | Stop reason: HOSPADM

## 2018-01-01 RX ORDER — ALBUTEROL SULFATE 2.5 MG/3ML
2.5 SOLUTION RESPIRATORY (INHALATION) EVERY 4 HOURS PRN
Status: DISCONTINUED | OUTPATIENT
Start: 2018-01-01 | End: 2018-01-01 | Stop reason: HOSPADM

## 2018-01-01 RX ORDER — OXYCODONE HYDROCHLORIDE AND ACETAMINOPHEN 5; 325 MG/1; MG/1
TABLET ORAL
Qty: 60 TABLET | Refills: 0 | Status: SHIPPED | OUTPATIENT
Start: 2018-01-01 | End: 2018-01-01 | Stop reason: SDUPTHER

## 2018-01-01 RX ORDER — LIDOCAINE HYDROCHLORIDE 20 MG/ML
INJECTION, SOLUTION INFILTRATION; PERINEURAL AS NEEDED
Status: DISCONTINUED | OUTPATIENT
Start: 2018-01-01 | End: 2018-01-01 | Stop reason: SURG

## 2018-01-01 RX ORDER — DIAPER,BRIEF,INFANT-TODD,DISP
EACH MISCELLANEOUS DAILY
Start: 2018-01-01 | End: 2018-01-01

## 2018-01-01 RX ORDER — LANOLIN ALCOHOL/MO/W.PET/CERES
1000 CREAM (GRAM) TOPICAL DAILY
COMMUNITY

## 2018-01-01 RX ORDER — LEVOFLOXACIN 5 MG/ML
750 INJECTION, SOLUTION INTRAVENOUS ONCE
Status: COMPLETED | OUTPATIENT
Start: 2018-01-01 | End: 2018-01-01

## 2018-01-01 RX ORDER — BUMETANIDE 0.25 MG/ML
0.5 INJECTION INTRAMUSCULAR; INTRAVENOUS AS NEEDED
Status: DISCONTINUED | OUTPATIENT
Start: 2018-01-01 | End: 2018-01-01

## 2018-01-01 RX ORDER — IPRATROPIUM BROMIDE AND ALBUTEROL SULFATE 2.5; .5 MG/3ML; MG/3ML
3 SOLUTION RESPIRATORY (INHALATION)
Status: DISCONTINUED | OUTPATIENT
Start: 2018-01-01 | End: 2018-01-01 | Stop reason: SDUPTHER

## 2018-01-01 RX ORDER — MEROPENEM 1 G/1
1 INJECTION, POWDER, FOR SOLUTION INTRAVENOUS EVERY 8 HOURS SCHEDULED
Qty: 27000 MG | Refills: 0 | Status: SHIPPED | OUTPATIENT
Start: 2018-01-01 | End: 2018-01-01 | Stop reason: HOSPADM

## 2018-01-01 RX ORDER — LEVOFLOXACIN 5 MG/ML
750 INJECTION, SOLUTION INTRAVENOUS
Status: DISCONTINUED | OUTPATIENT
Start: 2018-01-01 | End: 2018-01-01 | Stop reason: HOSPADM

## 2018-01-01 RX ORDER — FLUCONAZOLE 150 MG/1
150 TABLET ORAL DAILY
Status: DISCONTINUED | OUTPATIENT
Start: 2018-01-01 | End: 2018-01-01

## 2018-01-01 RX ORDER — PROPOFOL 10 MG/ML
VIAL (ML) INTRAVENOUS AS NEEDED
Status: DISCONTINUED | OUTPATIENT
Start: 2018-01-01 | End: 2018-01-01 | Stop reason: SURG

## 2018-01-01 RX ORDER — LEVOFLOXACIN 5 MG/ML
500 INJECTION, SOLUTION INTRAVENOUS ONCE
Status: CANCELLED | OUTPATIENT
Start: 2018-01-01 | End: 2018-01-01

## 2018-01-01 RX ORDER — BUMETANIDE 1 MG/1
1 TABLET ORAL DAILY
Qty: 30 TABLET | Refills: 1 | Status: SHIPPED | OUTPATIENT
Start: 2018-01-01

## 2018-01-01 RX ORDER — DOXYCYCLINE HYCLATE 100 MG/1
100 CAPSULE ORAL DAILY
Qty: 7 CAPSULE | Refills: 0 | Status: SHIPPED | OUTPATIENT
Start: 2018-01-01 | End: 2018-01-01

## 2018-01-01 RX ORDER — LIDOCAINE HYDROCHLORIDE 20 MG/ML
INJECTION, SOLUTION INFILTRATION; PERINEURAL AS NEEDED
Status: DISCONTINUED | OUTPATIENT
Start: 2018-01-01 | End: 2018-01-01 | Stop reason: HOSPADM

## 2018-01-01 RX ORDER — OXYCODONE HYDROCHLORIDE AND ACETAMINOPHEN 5; 325 MG/1; MG/1
1 TABLET ORAL EVERY 4 HOURS PRN
Qty: 18 TABLET | Refills: 0 | Status: SHIPPED | OUTPATIENT
Start: 2018-01-01 | End: 2018-01-01 | Stop reason: DRUGHIGH

## 2018-01-01 RX ORDER — FLUMAZENIL 0.1 MG/ML
0.2 INJECTION INTRAVENOUS AS NEEDED
Status: DISCONTINUED | OUTPATIENT
Start: 2018-01-01 | End: 2018-01-01 | Stop reason: HOSPADM

## 2018-01-01 RX ORDER — LEVOFLOXACIN 5 MG/ML
250 INJECTION, SOLUTION INTRAVENOUS EVERY 24 HOURS
Status: DISCONTINUED | OUTPATIENT
Start: 2018-01-01 | End: 2018-01-01

## 2018-01-01 RX ORDER — FINASTERIDE 5 MG/1
5 TABLET, FILM COATED ORAL DAILY
COMMUNITY

## 2018-01-01 RX ORDER — LEVOTHYROXINE SODIUM 0.05 MG/1
50 TABLET ORAL DAILY
Status: DISCONTINUED | OUTPATIENT
Start: 2018-01-01 | End: 2018-01-01 | Stop reason: HOSPADM

## 2018-01-01 RX ORDER — BUMETANIDE 0.25 MG/ML
1 INJECTION INTRAMUSCULAR; INTRAVENOUS DAILY
Status: DISCONTINUED | OUTPATIENT
Start: 2018-01-01 | End: 2018-01-01

## 2018-01-01 RX ORDER — FLUCONAZOLE 100 MG/1
100 TABLET ORAL DAILY
Status: DISPENSED | OUTPATIENT
Start: 2018-01-01 | End: 2018-01-01

## 2018-01-01 RX ORDER — SODIUM CHLORIDE 9 MG/ML
1000 INJECTION, SOLUTION INTRAVENOUS CONTINUOUS
Status: DISCONTINUED | OUTPATIENT
Start: 2018-01-01 | End: 2018-01-01 | Stop reason: HOSPADM

## 2018-01-01 RX ORDER — OXYCODONE AND ACETAMINOPHEN 7.5; 325 MG/1; MG/1
1-2 TABLET ORAL EVERY 4 HOURS PRN
Qty: 10 TABLET | Refills: 0 | Status: SHIPPED | OUTPATIENT
Start: 2018-01-01 | End: 2018-01-01 | Stop reason: HOSPADM

## 2018-01-01 RX ORDER — ECHINACEA PURPUREA EXTRACT 125 MG
2 TABLET ORAL AS NEEDED
Status: DISCONTINUED | OUTPATIENT
Start: 2018-01-01 | End: 2018-01-01 | Stop reason: HOSPADM

## 2018-01-01 RX ORDER — ACETAMINOPHEN 325 MG/1
650 TABLET ORAL EVERY 4 HOURS PRN
Status: DISCONTINUED | OUTPATIENT
Start: 2018-01-01 | End: 2018-01-01 | Stop reason: HOSPADM

## 2018-01-01 RX ORDER — POTASSIUM CHLORIDE 750 MG/1
10 CAPSULE, EXTENDED RELEASE ORAL DAILY
Status: DISCONTINUED | OUTPATIENT
Start: 2018-01-01 | End: 2018-01-01 | Stop reason: SDUPTHER

## 2018-01-01 RX ORDER — OXYCODONE AND ACETAMINOPHEN 7.5; 325 MG/1; MG/1
1 TABLET ORAL EVERY 6 HOURS PRN
Status: DISPENSED | OUTPATIENT
Start: 2018-01-01 | End: 2018-01-01

## 2018-01-01 RX ORDER — HEPARIN SODIUM 5000 [USP'U]/ML
5000 INJECTION, SOLUTION INTRAVENOUS; SUBCUTANEOUS EVERY 12 HOURS SCHEDULED
Status: DISCONTINUED | OUTPATIENT
Start: 2018-01-01 | End: 2018-01-01 | Stop reason: HOSPADM

## 2018-01-01 RX ORDER — FLUCONAZOLE 100 MG/1
100 TABLET ORAL EVERY 24 HOURS
Status: DISCONTINUED | OUTPATIENT
Start: 2018-01-01 | End: 2018-01-01 | Stop reason: HOSPADM

## 2018-01-01 RX ORDER — SODIUM CHLORIDE, SODIUM GLUCONATE, SODIUM ACETATE, POTASSIUM CHLORIDE, AND MAGNESIUM CHLORIDE 526; 502; 368; 37; 30 MG/100ML; MG/100ML; MG/100ML; MG/100ML; MG/100ML
INJECTION, SOLUTION INTRAVENOUS CONTINUOUS PRN
Status: DISCONTINUED | OUTPATIENT
Start: 2018-01-01 | End: 2018-01-01 | Stop reason: SURG

## 2018-01-01 RX ORDER — SODIUM CHLORIDE 9 MG/ML
INJECTION, SOLUTION INTRAVENOUS
Status: COMPLETED
Start: 2018-01-01 | End: 2018-01-01

## 2018-01-01 RX ORDER — CEFUROXIME AXETIL 500 MG/1
500 TABLET ORAL 2 TIMES DAILY
COMMUNITY
Start: 2018-01-01 | End: 2018-01-01 | Stop reason: HOSPADM

## 2018-01-01 RX ORDER — HEPARIN SODIUM,PORCINE 10 UNIT/ML
30 VIAL (ML) INTRAVENOUS EVERY 8 HOURS SCHEDULED
Status: DISCONTINUED | OUTPATIENT
Start: 2018-01-01 | End: 2018-01-01 | Stop reason: HOSPADM

## 2018-01-01 RX ORDER — TIZANIDINE HYDROCHLORIDE 2 MG/1
2 CAPSULE, GELATIN COATED ORAL DAILY PRN
COMMUNITY

## 2018-01-01 RX ORDER — LIDOCAINE HYDROCHLORIDE 10 MG/ML
INJECTION, SOLUTION EPIDURAL; INFILTRATION; INTRACAUDAL; PERINEURAL AS NEEDED
Status: DISCONTINUED | OUTPATIENT
Start: 2018-01-01 | End: 2018-01-01 | Stop reason: HOSPADM

## 2018-01-01 RX ORDER — DEXTROSE MONOHYDRATE 25 G/50ML
25 INJECTION, SOLUTION INTRAVENOUS
Status: DISCONTINUED | OUTPATIENT
Start: 2018-01-01 | End: 2018-01-01 | Stop reason: HOSPADM

## 2018-01-01 RX ORDER — BISACODYL 5 MG/1
5 TABLET, DELAYED RELEASE ORAL DAILY PRN
Status: DISCONTINUED | OUTPATIENT
Start: 2018-01-01 | End: 2018-01-01 | Stop reason: HOSPADM

## 2018-01-01 RX ORDER — FUROSEMIDE 10 MG/ML
20 INJECTION INTRAMUSCULAR; INTRAVENOUS ONCE
Status: DISCONTINUED | OUTPATIENT
Start: 2018-01-01 | End: 2018-01-01 | Stop reason: HOSPADM

## 2018-01-01 RX ORDER — ALBUTEROL SULFATE 2.5 MG/3ML
2.5 SOLUTION RESPIRATORY (INHALATION) ONCE AS NEEDED
Status: DISCONTINUED | OUTPATIENT
Start: 2018-01-01 | End: 2018-01-01 | Stop reason: HOSPADM

## 2018-01-01 RX ORDER — MORPHINE SULFATE 8 MG/ML
INJECTION, SOLUTION INTRAMUSCULAR; INTRAVENOUS
Status: COMPLETED
Start: 2018-01-01 | End: 2018-01-01

## 2018-01-01 RX ORDER — ONDANSETRON 4 MG/1
4 TABLET, FILM COATED ORAL EVERY 6 HOURS PRN
Status: DISCONTINUED | OUTPATIENT
Start: 2018-01-01 | End: 2018-01-01 | Stop reason: HOSPADM

## 2018-01-01 RX ORDER — POTASSIUM CHLORIDE 750 MG/1
20 CAPSULE, EXTENDED RELEASE ORAL DAILY
Status: DISCONTINUED | OUTPATIENT
Start: 2018-01-01 | End: 2018-01-01 | Stop reason: HOSPADM

## 2018-01-01 RX ORDER — MIDAZOLAM HYDROCHLORIDE 1 MG/ML
INJECTION INTRAMUSCULAR; INTRAVENOUS AS NEEDED
Status: DISCONTINUED | OUTPATIENT
Start: 2018-01-01 | End: 2018-01-01 | Stop reason: HOSPADM

## 2018-01-01 RX ORDER — ACETAMINOPHEN 650 MG/1
650 SUPPOSITORY RECTAL EVERY 4 HOURS PRN
Status: DISCONTINUED | OUTPATIENT
Start: 2018-01-01 | End: 2018-01-01 | Stop reason: HOSPADM

## 2018-01-01 RX ORDER — ASPIRIN 81 MG/1
81 TABLET ORAL DAILY
Status: DISCONTINUED | OUTPATIENT
Start: 2018-01-01 | End: 2018-01-01 | Stop reason: HOSPADM

## 2018-01-01 RX ORDER — LORATADINE 10 MG/1
10 TABLET ORAL DAILY
COMMUNITY

## 2018-01-01 RX ORDER — ASPIRIN 325 MG
325 TABLET ORAL DAILY
Status: DISCONTINUED | OUTPATIENT
Start: 2018-01-01 | End: 2018-01-01

## 2018-01-01 RX ORDER — BISACODYL 5 MG/1
10 TABLET, DELAYED RELEASE ORAL DAILY
Status: DISCONTINUED | OUTPATIENT
Start: 2018-01-01 | End: 2018-01-01 | Stop reason: HOSPADM

## 2018-01-01 RX ORDER — CLOPIDOGREL BISULFATE 75 MG/1
75 TABLET ORAL DAILY
Status: DISCONTINUED | OUTPATIENT
Start: 2018-01-01 | End: 2018-01-01 | Stop reason: HOSPADM

## 2018-01-01 RX ORDER — FLUCONAZOLE 200 MG/1
200 TABLET ORAL DAILY
Status: DISCONTINUED | OUTPATIENT
Start: 2018-01-01 | End: 2018-01-01 | Stop reason: HOSPADM

## 2018-01-01 RX ORDER — FUROSEMIDE 10 MG/ML
40 INJECTION INTRAMUSCULAR; INTRAVENOUS EVERY 12 HOURS
Status: DISCONTINUED | OUTPATIENT
Start: 2018-01-01 | End: 2018-01-01

## 2018-01-01 RX ORDER — DOBUTAMINE HYDROCHLORIDE 100 MG/100ML
2-20 INJECTION INTRAVENOUS
Status: DISCONTINUED | OUTPATIENT
Start: 2018-01-01 | End: 2018-01-01

## 2018-01-01 RX ORDER — ACETAMINOPHEN 325 MG/1
650 TABLET ORAL EVERY 6 HOURS PRN
COMMUNITY

## 2018-01-01 RX ORDER — OXYCODONE AND ACETAMINOPHEN 7.5; 325 MG/1; MG/1
1 TABLET ORAL EVERY 6 HOURS PRN
Qty: 120 TABLET | Refills: 0 | Status: SHIPPED | OUTPATIENT
Start: 2018-01-01

## 2018-01-01 RX ORDER — ASPIRIN 81 MG/1
81 TABLET, CHEWABLE ORAL DAILY
Status: DISCONTINUED | OUTPATIENT
Start: 2018-01-01 | End: 2018-01-01 | Stop reason: SDUPTHER

## 2018-01-01 RX ORDER — FLUCONAZOLE 100 MG/1
100 TABLET ORAL EVERY 24 HOURS
Qty: 5 TABLET | Refills: 0 | Status: SHIPPED | OUTPATIENT
Start: 2018-01-01 | End: 2018-01-01

## 2018-01-01 RX ORDER — HYDRALAZINE HYDROCHLORIDE 20 MG/ML
10 INJECTION INTRAMUSCULAR; INTRAVENOUS EVERY 6 HOURS PRN
Status: DISCONTINUED | OUTPATIENT
Start: 2018-01-01 | End: 2018-01-01

## 2018-01-01 RX ORDER — SODIUM CHLORIDE 0.9 % (FLUSH) 0.9 %
3 SYRINGE (ML) INJECTION EVERY 12 HOURS SCHEDULED
Status: DISCONTINUED | OUTPATIENT
Start: 2018-01-01 | End: 2018-01-01 | Stop reason: HOSPADM

## 2018-01-01 RX ORDER — FOLIC ACID 1 MG/1
1 TABLET ORAL DAILY
Qty: 30 TABLET | Refills: 1 | Status: SHIPPED | OUTPATIENT
Start: 2018-01-01

## 2018-01-01 RX ORDER — ACETAMINOPHEN 325 MG/1
650 TABLET ORAL EVERY 6 HOURS PRN
Status: DISCONTINUED | OUTPATIENT
Start: 2018-01-01 | End: 2018-01-01

## 2018-01-01 RX ORDER — PSEUDOEPHEDRINE HCL 30 MG
100 TABLET ORAL 2 TIMES DAILY
Start: 2018-01-01

## 2018-01-01 RX ORDER — METOPROLOL SUCCINATE 50 MG/1
50 TABLET, EXTENDED RELEASE ORAL DAILY
Status: DISCONTINUED | OUTPATIENT
Start: 2018-01-01 | End: 2018-01-01

## 2018-01-01 RX ORDER — SODIUM CHLORIDE 0.9 % (FLUSH) 0.9 %
3 SYRINGE (ML) INJECTION AS NEEDED
Status: DISCONTINUED | OUTPATIENT
Start: 2018-01-01 | End: 2018-01-01 | Stop reason: HOSPADM

## 2018-01-01 RX ORDER — OXYCODONE AND ACETAMINOPHEN 7.5; 325 MG/1; MG/1
1 TABLET ORAL EVERY 6 HOURS PRN
COMMUNITY
End: 2018-01-01 | Stop reason: HOSPADM

## 2018-01-01 RX ORDER — SODIUM CHLORIDE 9 MG/ML
125 INJECTION, SOLUTION INTRAVENOUS CONTINUOUS
Status: DISCONTINUED | OUTPATIENT
Start: 2018-01-01 | End: 2018-01-01 | Stop reason: HOSPADM

## 2018-01-01 RX ORDER — NICOTINE POLACRILEX 4 MG
15 LOZENGE BUCCAL
Status: DISCONTINUED | OUTPATIENT
Start: 2018-01-01 | End: 2018-01-01 | Stop reason: HOSPADM

## 2018-01-01 RX ORDER — FAMOTIDINE 40 MG/1
40 TABLET, FILM COATED ORAL DAILY
Start: 2018-01-01

## 2018-01-01 RX ORDER — OXYCODONE HYDROCHLORIDE AND ACETAMINOPHEN 5; 325 MG/1; MG/1
1 TABLET ORAL ONCE
Status: COMPLETED | OUTPATIENT
Start: 2018-01-01 | End: 2018-01-01

## 2018-01-01 RX ORDER — DEXAMETHASONE SODIUM PHOSPHATE 4 MG/ML
INJECTION, SOLUTION INTRA-ARTICULAR; INTRALESIONAL; INTRAMUSCULAR; INTRAVENOUS; SOFT TISSUE AS NEEDED
Status: DISCONTINUED | OUTPATIENT
Start: 2018-01-01 | End: 2018-01-01 | Stop reason: SURG

## 2018-01-01 RX ORDER — 0.9 % SODIUM CHLORIDE 0.9 %
10 VIAL (ML) INJECTION AS NEEDED
Status: DISCONTINUED | OUTPATIENT
Start: 2018-01-01 | End: 2018-01-01 | Stop reason: HOSPADM

## 2018-01-01 RX ORDER — POVIDONE-IODINE 10 MG/G
OINTMENT TOPICAL DAILY
Status: DISCONTINUED | OUTPATIENT
Start: 2018-01-01 | End: 2018-01-01 | Stop reason: HOSPADM

## 2018-01-01 RX ORDER — OXYCODONE HYDROCHLORIDE AND ACETAMINOPHEN 5; 325 MG/1; MG/1
TABLET ORAL
Qty: 60 TABLET | Refills: 0 | Status: SHIPPED | OUTPATIENT
Start: 2018-01-01 | End: 2018-01-01 | Stop reason: HOSPADM

## 2018-01-01 RX ORDER — HEPARIN SODIUM 1000 [USP'U]/ML
INJECTION, SOLUTION INTRAVENOUS; SUBCUTANEOUS AS NEEDED
Status: DISCONTINUED | OUTPATIENT
Start: 2018-01-01 | End: 2018-01-01 | Stop reason: HOSPADM

## 2018-01-01 RX ORDER — MORPHINE SULFATE 8 MG/ML
2 INJECTION INTRAMUSCULAR; INTRAVENOUS; SUBCUTANEOUS ONCE
Status: COMPLETED | OUTPATIENT
Start: 2018-01-01 | End: 2018-01-01

## 2018-01-01 RX ORDER — CEPHALEXIN 500 MG/1
500 CAPSULE ORAL 3 TIMES DAILY
Qty: 15 CAPSULE | Refills: 0 | Status: SHIPPED | OUTPATIENT
Start: 2018-01-01 | End: 2018-01-01

## 2018-01-01 RX ORDER — NITROGLYCERIN 20 MG/100ML
5-200 INJECTION INTRAVENOUS
Status: DISCONTINUED | OUTPATIENT
Start: 2018-01-01 | End: 2018-01-01

## 2018-01-01 RX ORDER — TIZANIDINE 2 MG/1
2 TABLET ORAL EVERY 6 HOURS PRN
Status: DISCONTINUED | OUTPATIENT
Start: 2018-01-01 | End: 2018-01-01 | Stop reason: HOSPADM

## 2018-01-01 RX ORDER — LEVOFLOXACIN 5 MG/ML
500 INJECTION, SOLUTION INTRAVENOUS ONCE
Status: COMPLETED | OUTPATIENT
Start: 2018-01-01 | End: 2018-01-01

## 2018-01-01 RX ORDER — FLUTICASONE PROPIONATE 50 MCG
2 SPRAY, SUSPENSION (ML) NASAL DAILY
Status: DISCONTINUED | OUTPATIENT
Start: 2018-01-01 | End: 2018-01-01 | Stop reason: HOSPADM

## 2018-01-01 RX ORDER — ONDANSETRON 2 MG/ML
4 INJECTION INTRAMUSCULAR; INTRAVENOUS EVERY 6 HOURS PRN
Status: DISCONTINUED | OUTPATIENT
Start: 2018-01-01 | End: 2018-01-01

## 2018-01-01 RX ORDER — ENALAPRIL MALEATE 2.5 MG/1
2.5 TABLET ORAL
Status: DISCONTINUED | OUTPATIENT
Start: 2018-01-01 | End: 2018-01-01

## 2018-01-01 RX ORDER — DOXYCYCLINE 100 MG/1
100 CAPSULE ORAL EVERY 12 HOURS SCHEDULED
Status: DISCONTINUED | OUTPATIENT
Start: 2018-01-01 | End: 2018-01-01

## 2018-01-01 RX ORDER — DILTIAZEM HYDROCHLORIDE 120 MG/1
120 CAPSULE, COATED, EXTENDED RELEASE ORAL
Status: DISCONTINUED | OUTPATIENT
Start: 2018-01-01 | End: 2018-01-01

## 2018-01-01 RX ORDER — CEPHALEXIN 500 MG/1
500 CAPSULE ORAL 3 TIMES DAILY
Status: DISCONTINUED | OUTPATIENT
Start: 2018-01-01 | End: 2018-01-01 | Stop reason: HOSPADM

## 2018-01-01 RX ORDER — HEPARIN SODIUM 5000 [USP'U]/ML
5000 INJECTION, SOLUTION INTRAVENOUS; SUBCUTANEOUS EVERY 8 HOURS SCHEDULED
Status: DISCONTINUED | OUTPATIENT
Start: 2018-01-01 | End: 2018-01-01 | Stop reason: HOSPADM

## 2018-01-01 RX ORDER — DOXYCYCLINE HYCLATE 100 MG/1
100 TABLET, DELAYED RELEASE ORAL 2 TIMES DAILY
Qty: 14 TABLET | Refills: 0 | Status: SHIPPED | OUTPATIENT
Start: 2018-01-01 | End: 2018-01-01 | Stop reason: HOSPADM

## 2018-01-01 RX ORDER — OXYCODONE HYDROCHLORIDE AND ACETAMINOPHEN 5; 325 MG/1; MG/1
1 TABLET ORAL EVERY 4 HOURS PRN
Qty: 20 TABLET | Refills: 0 | Status: ON HOLD | OUTPATIENT
Start: 2018-01-01 | End: 2018-01-01

## 2018-01-01 RX ORDER — POTASSIUM CHLORIDE 750 MG/1
5 TABLET, EXTENDED RELEASE ORAL DAILY
COMMUNITY

## 2018-01-01 RX ORDER — DILTIAZEM HYDROCHLORIDE 120 MG/1
120 CAPSULE, COATED, EXTENDED RELEASE ORAL
Qty: 30 CAPSULE | Refills: 1 | Status: SHIPPED | OUTPATIENT
Start: 2018-01-01 | End: 2018-01-01 | Stop reason: HOSPADM

## 2018-01-01 RX ORDER — SODIUM CHLORIDE 0.9 % (FLUSH) 0.9 %
3 SYRINGE (ML) INJECTION EVERY 12 HOURS SCHEDULED
Status: CANCELLED | OUTPATIENT
Start: 2018-01-01

## 2018-01-01 RX ORDER — OXYCODONE HYDROCHLORIDE AND ACETAMINOPHEN 5; 325 MG/1; MG/1
1 TABLET ORAL EVERY 6 HOURS PRN
Status: DISCONTINUED | OUTPATIENT
Start: 2018-01-01 | End: 2018-01-01 | Stop reason: HOSPADM

## 2018-01-01 RX ORDER — POTASSIUM CHLORIDE 1.5 G/1.77G
40 POWDER, FOR SOLUTION ORAL DAILY
Status: DISCONTINUED | OUTPATIENT
Start: 2018-01-01 | End: 2018-01-01 | Stop reason: HOSPADM

## 2018-01-01 RX ORDER — IPRATROPIUM BROMIDE AND ALBUTEROL SULFATE 2.5; .5 MG/3ML; MG/3ML
3 SOLUTION RESPIRATORY (INHALATION) ONCE
Status: COMPLETED | OUTPATIENT
Start: 2018-01-01 | End: 2018-01-01

## 2018-01-01 RX ORDER — POTASSIUM CHLORIDE 750 MG/1
40 CAPSULE, EXTENDED RELEASE ORAL DAILY
Status: DISCONTINUED | OUTPATIENT
Start: 2018-01-01 | End: 2018-01-01

## 2018-01-01 RX ORDER — ACETAMINOPHEN 325 MG/1
650 TABLET ORAL ONCE
Status: DISCONTINUED | OUTPATIENT
Start: 2018-01-01 | End: 2018-01-01

## 2018-01-01 RX ORDER — SODIUM CHLORIDE 0.9 % (FLUSH) 0.9 %
3-10 SYRINGE (ML) INJECTION AS NEEDED
Status: CANCELLED | OUTPATIENT
Start: 2018-01-01

## 2018-01-01 RX ORDER — SODIUM CHLORIDE 0.9 % (FLUSH) 0.9 %
20 SYRINGE (ML) INJECTION AS NEEDED
Status: DISCONTINUED | OUTPATIENT
Start: 2018-01-01 | End: 2018-01-01 | Stop reason: HOSPADM

## 2018-01-01 RX ORDER — SODIUM CHLORIDE 9 MG/ML
75 INJECTION, SOLUTION INTRAVENOUS CONTINUOUS
Status: ACTIVE | OUTPATIENT
Start: 2018-01-01 | End: 2018-01-01

## 2018-01-01 RX ORDER — DIAPER,BRIEF,INFANT-TODD,DISP
EACH MISCELLANEOUS DAILY
Status: DISCONTINUED | OUTPATIENT
Start: 2018-01-01 | End: 2018-01-01 | Stop reason: HOSPADM

## 2018-01-01 RX ORDER — MORPHINE SULFATE 8 MG/ML
1 INJECTION INTRAMUSCULAR; INTRAVENOUS; SUBCUTANEOUS ONCE
Status: COMPLETED | OUTPATIENT
Start: 2018-01-01 | End: 2018-01-01

## 2018-01-01 RX ORDER — MORPHINE SULFATE 2 MG/ML
1 INJECTION, SOLUTION INTRAMUSCULAR; INTRAVENOUS EVERY 4 HOURS PRN
Status: DISCONTINUED | OUTPATIENT
Start: 2018-01-01 | End: 2018-01-01 | Stop reason: HOSPADM

## 2018-01-01 RX ADMIN — OXYCODONE HYDROCHLORIDE AND ACETAMINOPHEN 1 TABLET: 5; 325 TABLET ORAL at 08:13

## 2018-01-01 RX ADMIN — POTASSIUM CHLORIDE 40 MEQ: 1.5 POWDER, FOR SOLUTION ORAL at 08:59

## 2018-01-01 RX ADMIN — FUROSEMIDE 40 MG: 10 INJECTION, SOLUTION INTRAMUSCULAR; INTRAVENOUS at 17:19

## 2018-01-01 RX ADMIN — ACETAMINOPHEN 650 MG: 325 TABLET ORAL at 20:53

## 2018-01-01 RX ADMIN — FOLIC ACID 1 MG: 1 TABLET ORAL at 09:47

## 2018-01-01 RX ADMIN — OXYCODONE HYDROCHLORIDE AND ACETAMINOPHEN 1 TABLET: 7.5; 325 TABLET ORAL at 10:13

## 2018-01-01 RX ADMIN — ACETAMINOPHEN 650 MG: 325 TABLET ORAL at 21:11

## 2018-01-01 RX ADMIN — METOPROLOL TARTRATE 25 MG: 25 TABLET ORAL at 20:23

## 2018-01-01 RX ADMIN — FENTANYL CITRATE 25 MCG: 50 INJECTION, SOLUTION INTRAMUSCULAR; INTRAVENOUS at 19:56

## 2018-01-01 RX ADMIN — BISACODYL 5 MG: 5 TABLET, COATED ORAL at 20:14

## 2018-01-01 RX ADMIN — FINASTERIDE 5 MG: 5 TABLET, FILM COATED ORAL at 09:50

## 2018-01-01 RX ADMIN — FINASTERIDE 5 MG: 5 TABLET, FILM COATED ORAL at 08:00

## 2018-01-01 RX ADMIN — METOPROLOL SUCCINATE 25 MG: 50 TABLET, EXTENDED RELEASE ORAL at 09:13

## 2018-01-01 RX ADMIN — SODIUM CHLORIDE 50 ML/HR: 9 INJECTION, SOLUTION INTRAVENOUS at 06:41

## 2018-01-01 RX ADMIN — Medication 1 APPLICATION: at 08:53

## 2018-01-01 RX ADMIN — NYSTATIN: 100000 CREAM TOPICAL at 09:17

## 2018-01-01 RX ADMIN — OXYCODONE HYDROCHLORIDE AND ACETAMINOPHEN 1 TABLET: 5; 325 TABLET ORAL at 07:44

## 2018-01-01 RX ADMIN — Medication: at 10:17

## 2018-01-01 RX ADMIN — DILTIAZEM HYDROCHLORIDE 60 MG: 60 TABLET, FILM COATED ORAL at 17:30

## 2018-01-01 RX ADMIN — FLUTICASONE PROPIONATE 1 SPRAY: 50 SPRAY, METERED NASAL at 10:58

## 2018-01-01 RX ADMIN — FENTANYL CITRATE 25 MCG: 50 INJECTION, SOLUTION INTRAMUSCULAR; INTRAVENOUS at 16:35

## 2018-01-01 RX ADMIN — ASPIRIN 81 MG CHEWABLE TABLET 81 MG: 81 TABLET CHEWABLE at 09:44

## 2018-01-01 RX ADMIN — FENTANYL CITRATE 50 MCG: 50 INJECTION, SOLUTION INTRAMUSCULAR; INTRAVENOUS at 18:46

## 2018-01-01 RX ADMIN — MEROPENEM 1 G: 1 INJECTION, POWDER, FOR SOLUTION INTRAVENOUS at 22:19

## 2018-01-01 RX ADMIN — POLYETHYLENE GLYCOL (3350) 17 G: 17 POWDER, FOR SOLUTION ORAL at 14:46

## 2018-01-01 RX ADMIN — MEROPENEM 1 G: 1 INJECTION, POWDER, FOR SOLUTION INTRAVENOUS at 12:59

## 2018-01-01 RX ADMIN — METOPROLOL SUCCINATE 25 MG: 25 TABLET, EXTENDED RELEASE ORAL at 08:34

## 2018-01-01 RX ADMIN — BISACODYL 10 MG: 10 SUPPOSITORY RECTAL at 08:34

## 2018-01-01 RX ADMIN — TERAZOSIN HYDROCHLORIDE ANHYDROUS 2 MG: 2 CAPSULE ORAL at 20:59

## 2018-01-01 RX ADMIN — FLUCONAZOLE 100 MG: 100 TABLET ORAL at 15:57

## 2018-01-01 RX ADMIN — FAMOTIDINE 40 MG: 40 TABLET ORAL at 09:42

## 2018-01-01 RX ADMIN — DILTIAZEM HYDROCHLORIDE 60 MG: 60 TABLET, FILM COATED ORAL at 21:32

## 2018-01-01 RX ADMIN — IPRATROPIUM BROMIDE AND ALBUTEROL SULFATE 3 ML: 2.5; .5 SOLUTION RESPIRATORY (INHALATION) at 06:59

## 2018-01-01 RX ADMIN — OXYCODONE HYDROCHLORIDE AND ACETAMINOPHEN 1 TABLET: 7.5; 325 TABLET ORAL at 18:36

## 2018-01-01 RX ADMIN — OXYCODONE HYDROCHLORIDE AND ACETAMINOPHEN 1 TABLET: 5; 325 TABLET ORAL at 00:02

## 2018-01-01 RX ADMIN — HYDROCORTISONE: 1 CREAM TOPICAL at 09:50

## 2018-01-01 RX ADMIN — DILTIAZEM HYDROCHLORIDE 120 MG: 120 CAPSULE, COATED, EXTENDED RELEASE ORAL at 09:29

## 2018-01-01 RX ADMIN — MEROPENEM 500 MG: 500 INJECTION, POWDER, FOR SOLUTION INTRAVENOUS at 16:24

## 2018-01-01 RX ADMIN — POLYETHYLENE GLYCOL 3350 17 G: 17 POWDER, FOR SOLUTION ORAL at 09:01

## 2018-01-01 RX ADMIN — OXYCODONE HYDROCHLORIDE AND ACETAMINOPHEN 1 TABLET: 7.5; 325 TABLET ORAL at 09:37

## 2018-01-01 RX ADMIN — MUPIROCIN: 20 OINTMENT TOPICAL at 08:33

## 2018-01-01 RX ADMIN — FLUTICASONE PROPIONATE 1 SPRAY: 50 SPRAY, METERED NASAL at 09:14

## 2018-01-01 RX ADMIN — LEVOTHYROXINE SODIUM 50 MCG: 50 TABLET ORAL at 05:50

## 2018-01-01 RX ADMIN — ASPIRIN 81 MG: 81 TABLET, COATED ORAL at 12:55

## 2018-01-01 RX ADMIN — DOCUSATE SODIUM 100 MG: 100 CAPSULE, LIQUID FILLED ORAL at 09:46

## 2018-01-01 RX ADMIN — TERAZOSIN HYDROCHLORIDE ANHYDROUS 2 MG: 2 CAPSULE ORAL at 20:27

## 2018-01-01 RX ADMIN — DILTIAZEM HYDROCHLORIDE 60 MG: 60 TABLET, FILM COATED ORAL at 13:51

## 2018-01-01 RX ADMIN — SODIUM CHLORIDE 500 ML: 9 INJECTION, SOLUTION INTRAVENOUS at 16:45

## 2018-01-01 RX ADMIN — FINASTERIDE 5 MG: 5 TABLET, FILM COATED ORAL at 10:17

## 2018-01-01 RX ADMIN — CYANOCOBALAMIN TAB 1000 MCG 1000 MCG: 1000 TAB at 08:00

## 2018-01-01 RX ADMIN — FOLIC ACID 1 MG: 1 TABLET ORAL at 08:58

## 2018-01-01 RX ADMIN — DOCUSATE SODIUM 100 MG: 100 CAPSULE, LIQUID FILLED ORAL at 20:26

## 2018-01-01 RX ADMIN — NITROGLYCERIN 0.4 MG: 0.4 TABLET SUBLINGUAL at 16:45

## 2018-01-01 RX ADMIN — IPRATROPIUM BROMIDE AND ALBUTEROL SULFATE 3 ML: 2.5; .5 SOLUTION RESPIRATORY (INHALATION) at 18:47

## 2018-01-01 RX ADMIN — METOPROLOL SUCCINATE 25 MG: 25 TABLET, EXTENDED RELEASE ORAL at 16:26

## 2018-01-01 RX ADMIN — METOPROLOL SUCCINATE 25 MG: 25 TABLET, EXTENDED RELEASE ORAL at 09:08

## 2018-01-01 RX ADMIN — INSULIN ASPART 2 UNITS: 100 INJECTION, SOLUTION INTRAVENOUS; SUBCUTANEOUS at 21:51

## 2018-01-01 RX ADMIN — OXYCODONE HYDROCHLORIDE AND ACETAMINOPHEN 1 TABLET: 5; 325 TABLET ORAL at 02:34

## 2018-01-01 RX ADMIN — HEPARIN SODIUM 5000 UNITS: 5000 INJECTION, SOLUTION INTRAVENOUS; SUBCUTANEOUS at 13:46

## 2018-01-01 RX ADMIN — SODIUM CHLORIDE 125 MG: 9 INJECTION, SOLUTION INTRAVENOUS at 17:05

## 2018-01-01 RX ADMIN — MEROPENEM 500 MG: 500 INJECTION, POWDER, FOR SOLUTION INTRAVENOUS at 23:30

## 2018-01-01 RX ADMIN — OXYCODONE HYDROCHLORIDE AND ACETAMINOPHEN 1 TABLET: 5; 325 TABLET ORAL at 22:47

## 2018-01-01 RX ADMIN — TAMSULOSIN HYDROCHLORIDE 0.8 MG: 0.4 CAPSULE ORAL at 20:28

## 2018-01-01 RX ADMIN — SODIUM CHLORIDE 50 ML/HR: 900 INJECTION, SOLUTION INTRAVENOUS at 23:55

## 2018-01-01 RX ADMIN — CYCLOBENZAPRINE HYDROCHLORIDE 5 MG: 5 TABLET, FILM COATED ORAL at 21:38

## 2018-01-01 RX ADMIN — METOPROLOL SUCCINATE 25 MG: 25 TABLET, EXTENDED RELEASE ORAL at 10:56

## 2018-01-01 RX ADMIN — IPRATROPIUM BROMIDE AND ALBUTEROL SULFATE 3 ML: 2.5; .5 SOLUTION RESPIRATORY (INHALATION) at 20:23

## 2018-01-01 RX ADMIN — FINASTERIDE 5 MG: 5 TABLET, FILM COATED ORAL at 09:46

## 2018-01-01 RX ADMIN — CYANOCOBALAMIN TAB 1000 MCG 1000 MCG: 1000 TAB at 09:12

## 2018-01-01 RX ADMIN — SODIUM CHLORIDE 50 ML/HR: 9 INJECTION, SOLUTION INTRAVENOUS at 14:52

## 2018-01-01 RX ADMIN — MEROPENEM 500 MG: 500 INJECTION, POWDER, FOR SOLUTION INTRAVENOUS at 23:22

## 2018-01-01 RX ADMIN — FINASTERIDE 5 MG: 5 TABLET, FILM COATED ORAL at 08:34

## 2018-01-01 RX ADMIN — ASPIRIN 81 MG CHEWABLE TABLET 81 MG: 81 TABLET CHEWABLE at 09:01

## 2018-01-01 RX ADMIN — FUROSEMIDE 40 MG: 10 INJECTION, SOLUTION INTRAMUSCULAR; INTRAVENOUS at 06:21

## 2018-01-01 RX ADMIN — MEROPENEM 500 MG: 500 INJECTION, POWDER, FOR SOLUTION INTRAVENOUS at 15:52

## 2018-01-01 RX ADMIN — TERAZOSIN HYDROCHLORIDE ANHYDROUS 2 MG: 2 CAPSULE ORAL at 22:02

## 2018-01-01 RX ADMIN — FINASTERIDE 5 MG: 5 TABLET, FILM COATED ORAL at 10:19

## 2018-01-01 RX ADMIN — OXYCODONE HYDROCHLORIDE AND ACETAMINOPHEN 1 TABLET: 5; 325 TABLET ORAL at 20:28

## 2018-01-01 RX ADMIN — CLOPIDOGREL BISULFATE 75 MG: 75 TABLET ORAL at 08:48

## 2018-01-01 RX ADMIN — POTASSIUM CHLORIDE 40 MEQ: 1.5 POWDER, FOR SOLUTION ORAL at 09:48

## 2018-01-01 RX ADMIN — MEROPENEM 500 MG: 500 INJECTION, POWDER, FOR SOLUTION INTRAVENOUS at 15:17

## 2018-01-01 RX ADMIN — DOCUSATE SODIUM 100 MG: 100 CAPSULE, LIQUID FILLED ORAL at 08:34

## 2018-01-01 RX ADMIN — TAMSULOSIN HYDROCHLORIDE 0.8 MG: 0.4 CAPSULE ORAL at 20:52

## 2018-01-01 RX ADMIN — FAMOTIDINE 40 MG: 40 TABLET ORAL at 09:00

## 2018-01-01 RX ADMIN — OXYCODONE HYDROCHLORIDE AND ACETAMINOPHEN 1 TABLET: 5; 325 TABLET ORAL at 20:52

## 2018-01-01 RX ADMIN — POLYETHYLENE GLYCOL 3350 17 G: 17 POWDER, FOR SOLUTION ORAL at 10:24

## 2018-01-01 RX ADMIN — BACITRACIN: 500 OINTMENT TOPICAL at 09:55

## 2018-01-01 RX ADMIN — HYDROCORTISONE: 1 CREAM TOPICAL at 08:30

## 2018-01-01 RX ADMIN — SODIUM CHLORIDE 250 MG: 9 INJECTION, SOLUTION INTRAVENOUS at 11:53

## 2018-01-01 RX ADMIN — POTASSIUM CHLORIDE 20 MEQ: 750 CAPSULE, EXTENDED RELEASE ORAL at 11:23

## 2018-01-01 RX ADMIN — FOLIC ACID 1 MG: 1 TABLET ORAL at 09:52

## 2018-01-01 RX ADMIN — MEROPENEM 1 G: 1 INJECTION, POWDER, FOR SOLUTION INTRAVENOUS at 13:59

## 2018-01-01 RX ADMIN — OXYCODONE HYDROCHLORIDE AND ACETAMINOPHEN 1 TABLET: 5; 325 TABLET ORAL at 15:04

## 2018-01-01 RX ADMIN — DOCUSATE SODIUM 100 MG: 100 CAPSULE, LIQUID FILLED ORAL at 20:01

## 2018-01-01 RX ADMIN — OXYCODONE HYDROCHLORIDE AND ACETAMINOPHEN 1 TABLET: 5; 325 TABLET ORAL at 06:05

## 2018-01-01 RX ADMIN — SODIUM CHLORIDE 50 ML/HR: 900 INJECTION, SOLUTION INTRAVENOUS at 06:12

## 2018-01-01 RX ADMIN — METOPROLOL SUCCINATE 25 MG: 25 TABLET, EXTENDED RELEASE ORAL at 08:28

## 2018-01-01 RX ADMIN — OXYCODONE HYDROCHLORIDE AND ACETAMINOPHEN 1 TABLET: 5; 325 TABLET ORAL at 10:23

## 2018-01-01 RX ADMIN — PANTOPRAZOLE SODIUM 40 MG: 40 TABLET, DELAYED RELEASE ORAL at 06:41

## 2018-01-01 RX ADMIN — DOCUSATE SODIUM 100 MG: 100 CAPSULE, LIQUID FILLED ORAL at 20:45

## 2018-01-01 RX ADMIN — FOLIC ACID 1 MG: 1 TABLET ORAL at 08:46

## 2018-01-01 RX ADMIN — METOPROLOL TARTRATE 25 MG: 25 TABLET ORAL at 08:01

## 2018-01-01 RX ADMIN — FAMOTIDINE 40 MG: 40 TABLET ORAL at 09:08

## 2018-01-01 RX ADMIN — OXYCODONE HYDROCHLORIDE AND ACETAMINOPHEN 1 TABLET: 5; 325 TABLET ORAL at 22:10

## 2018-01-01 RX ADMIN — FLUCONAZOLE 200 MG: 200 TABLET ORAL at 09:29

## 2018-01-01 RX ADMIN — DILTIAZEM HYDROCHLORIDE 60 MG: 60 TABLET, FILM COATED ORAL at 11:17

## 2018-01-01 RX ADMIN — HEPARIN SODIUM 5000 UNITS: 5000 INJECTION, SOLUTION INTRAVENOUS; SUBCUTANEOUS at 06:40

## 2018-01-01 RX ADMIN — FINASTERIDE 5 MG: 5 TABLET, FILM COATED ORAL at 09:48

## 2018-01-01 RX ADMIN — METOPROLOL SUCCINATE 25 MG: 25 TABLET, EXTENDED RELEASE ORAL at 10:02

## 2018-01-01 RX ADMIN — CETIRIZINE HYDROCHLORIDE 10 MG: 10 TABLET, FILM COATED ORAL at 08:48

## 2018-01-01 RX ADMIN — CETIRIZINE HYDROCHLORIDE 10 MG: 10 TABLET, FILM COATED ORAL at 08:59

## 2018-01-01 RX ADMIN — HYDROCORTISONE: 1 CREAM TOPICAL at 21:08

## 2018-01-01 RX ADMIN — FINASTERIDE 5 MG: 5 TABLET, FILM COATED ORAL at 09:07

## 2018-01-01 RX ADMIN — CLOPIDOGREL BISULFATE 75 MG: 75 TABLET ORAL at 09:36

## 2018-01-01 RX ADMIN — FLUCONAZOLE 100 MG: 100 TABLET ORAL at 14:52

## 2018-01-01 RX ADMIN — MEROPENEM 500 MG: 500 INJECTION, POWDER, FOR SOLUTION INTRAVENOUS at 00:00

## 2018-01-01 RX ADMIN — HYDROCORTISONE: 1 CREAM TOPICAL at 10:12

## 2018-01-01 RX ADMIN — BUMETANIDE 1 MG: 1 TABLET ORAL at 10:19

## 2018-01-01 RX ADMIN — OXYCODONE HYDROCHLORIDE AND ACETAMINOPHEN 1 TABLET: 5; 325 TABLET ORAL at 18:37

## 2018-01-01 RX ADMIN — BISACODYL 5 MG: 5 TABLET, COATED ORAL at 21:11

## 2018-01-01 RX ADMIN — SODIUM CHLORIDE 2721 ML: 9 INJECTION, SOLUTION INTRAVENOUS at 15:23

## 2018-01-01 RX ADMIN — PHENYLEPHRINE HYDROCHLORIDE 100 MCG: 10 INJECTION INTRAVENOUS at 19:18

## 2018-01-01 RX ADMIN — TERAZOSIN HYDROCHLORIDE ANHYDROUS 2 MG: 2 CAPSULE ORAL at 20:42

## 2018-01-01 RX ADMIN — CETIRIZINE HYDROCHLORIDE 10 MG: 10 TABLET, FILM COATED ORAL at 08:25

## 2018-01-01 RX ADMIN — PHENYLEPHRINE HYDROCHLORIDE 100 MCG: 10 INJECTION INTRAVENOUS at 18:56

## 2018-01-01 RX ADMIN — FLUTICASONE PROPIONATE 1 SPRAY: 50 SPRAY, METERED NASAL at 08:44

## 2018-01-01 RX ADMIN — NYSTATIN: 100000 CREAM TOPICAL at 09:01

## 2018-01-01 RX ADMIN — FINASTERIDE 5 MG: 5 TABLET, FILM COATED ORAL at 08:45

## 2018-01-01 RX ADMIN — POTASSIUM CHLORIDE 20 MEQ: 750 CAPSULE, EXTENDED RELEASE ORAL at 08:52

## 2018-01-01 RX ADMIN — CYANOCOBALAMIN TAB 1000 MCG 1000 MCG: 1000 TAB at 08:13

## 2018-01-01 RX ADMIN — IPRATROPIUM BROMIDE AND ALBUTEROL SULFATE 3 ML: 2.5; .5 SOLUTION RESPIRATORY (INHALATION) at 23:12

## 2018-01-01 RX ADMIN — Medication 1 G: at 08:48

## 2018-01-01 RX ADMIN — OXYCODONE HYDROCHLORIDE AND ACETAMINOPHEN 1 TABLET: 7.5; 325 TABLET ORAL at 01:15

## 2018-01-01 RX ADMIN — LEVOFLOXACIN 750 MG: 5 INJECTION, SOLUTION INTRAVENOUS at 01:38

## 2018-01-01 RX ADMIN — Medication: at 08:24

## 2018-01-01 RX ADMIN — TAMSULOSIN HYDROCHLORIDE 0.8 MG: 0.4 CAPSULE ORAL at 21:49

## 2018-01-01 RX ADMIN — OXYCODONE HYDROCHLORIDE AND ACETAMINOPHEN 1 TABLET: 7.5; 325 TABLET ORAL at 17:25

## 2018-01-01 RX ADMIN — SODIUM CHLORIDE 2505 ML: 900 INJECTION, SOLUTION INTRAVENOUS at 11:29

## 2018-01-01 RX ADMIN — PROPOFOL 80 MG: 10 INJECTION, EMULSION INTRAVENOUS at 10:16

## 2018-01-01 RX ADMIN — METOPROLOL SUCCINATE 25 MG: 25 TABLET, EXTENDED RELEASE ORAL at 08:52

## 2018-01-01 RX ADMIN — Medication 1 APPLICATION: at 08:31

## 2018-01-01 RX ADMIN — POTASSIUM CHLORIDE 20 MEQ: 750 CAPSULE, EXTENDED RELEASE ORAL at 09:12

## 2018-01-01 RX ADMIN — METHYLPREDNISOLONE SODIUM SUCCINATE 125 MG: 125 INJECTION, POWDER, FOR SOLUTION INTRAMUSCULAR; INTRAVENOUS at 23:04

## 2018-01-01 RX ADMIN — MEROPENEM 1 G: 1 INJECTION, POWDER, FOR SOLUTION INTRAVENOUS at 06:04

## 2018-01-01 RX ADMIN — TERAZOSIN HYDROCHLORIDE ANHYDROUS 2 MG: 2 CAPSULE ORAL at 21:37

## 2018-01-01 RX ADMIN — IPRATROPIUM BROMIDE AND ALBUTEROL SULFATE 3 ML: 2.5; .5 SOLUTION RESPIRATORY (INHALATION) at 20:12

## 2018-01-01 RX ADMIN — METOPROLOL SUCCINATE 25 MG: 25 TABLET, EXTENDED RELEASE ORAL at 09:12

## 2018-01-01 RX ADMIN — ASPIRIN 81 MG CHEWABLE TABLET 81 MG: 81 TABLET CHEWABLE at 09:37

## 2018-01-01 RX ADMIN — POTASSIUM CHLORIDE 10 MEQ: 750 CAPSULE, EXTENDED RELEASE ORAL at 09:48

## 2018-01-01 RX ADMIN — OXYCODONE HYDROCHLORIDE AND ACETAMINOPHEN 1 TABLET: 5; 325 TABLET ORAL at 13:12

## 2018-01-01 RX ADMIN — TERAZOSIN HYDROCHLORIDE ANHYDROUS 2 MG: 2 CAPSULE ORAL at 20:13

## 2018-01-01 RX ADMIN — PIPERACILLIN SODIUM,TAZOBACTAM SODIUM 3.38 G: 3; .375 INJECTION, POWDER, FOR SOLUTION INTRAVENOUS at 12:25

## 2018-01-01 RX ADMIN — MUPIROCIN: 20 OINTMENT TOPICAL at 20:31

## 2018-01-01 RX ADMIN — MORPHINE SULFATE 2 MG: 8 INJECTION INTRAMUSCULAR; INTRAVENOUS; SUBCUTANEOUS at 02:27

## 2018-01-01 RX ADMIN — LEVOTHYROXINE SODIUM 50 MCG: 50 TABLET ORAL at 08:57

## 2018-01-01 RX ADMIN — MEROPENEM 1 G: 1 INJECTION, POWDER, FOR SOLUTION INTRAVENOUS at 21:00

## 2018-01-01 RX ADMIN — FOLIC ACID 1 MG: 1 TABLET ORAL at 10:53

## 2018-01-01 RX ADMIN — IPRATROPIUM BROMIDE AND ALBUTEROL SULFATE 3 ML: 2.5; .5 SOLUTION RESPIRATORY (INHALATION) at 19:44

## 2018-01-01 RX ADMIN — DILTIAZEM HYDROCHLORIDE 60 MG: 60 TABLET, FILM COATED ORAL at 00:55

## 2018-01-01 RX ADMIN — MEROPENEM 500 MG: 500 INJECTION, POWDER, FOR SOLUTION INTRAVENOUS at 15:47

## 2018-01-01 RX ADMIN — DOCUSATE SODIUM 100 MG: 100 CAPSULE, LIQUID FILLED ORAL at 20:33

## 2018-01-01 RX ADMIN — NYSTATIN: 100000 CREAM TOPICAL at 20:13

## 2018-01-01 RX ADMIN — IPRATROPIUM BROMIDE AND ALBUTEROL SULFATE 3 ML: 2.5; .5 SOLUTION RESPIRATORY (INHALATION) at 14:30

## 2018-01-01 RX ADMIN — TERAZOSIN HYDROCHLORIDE ANHYDROUS 2 MG: 2 CAPSULE ORAL at 03:42

## 2018-01-01 RX ADMIN — METOPROLOL SUCCINATE 25 MG: 25 TABLET, EXTENDED RELEASE ORAL at 09:29

## 2018-01-01 RX ADMIN — POTASSIUM CHLORIDE 10 MEQ: 750 CAPSULE, EXTENDED RELEASE ORAL at 09:57

## 2018-01-01 RX ADMIN — OXYCODONE HYDROCHLORIDE AND ACETAMINOPHEN 1 TABLET: 5; 325 TABLET ORAL at 18:11

## 2018-01-01 RX ADMIN — OXYCODONE HYDROCHLORIDE AND ACETAMINOPHEN 1 TABLET: 5; 325 TABLET ORAL at 20:30

## 2018-01-01 RX ADMIN — POLYETHYLENE GLYCOL 3350 17 G: 17 POWDER, FOR SOLUTION ORAL at 10:20

## 2018-01-01 RX ADMIN — LEVOTHYROXINE SODIUM 50 MCG: 50 TABLET ORAL at 05:37

## 2018-01-01 RX ADMIN — TAMSULOSIN HYDROCHLORIDE 0.8 MG: 0.4 CAPSULE ORAL at 21:23

## 2018-01-01 RX ADMIN — FINASTERIDE 5 MG: 5 TABLET, FILM COATED ORAL at 09:13

## 2018-01-01 RX ADMIN — FERROUS SULFATE TAB EC 324 MG (65 MG FE EQUIVALENT) 324 MG: 324 (65 FE) TABLET DELAYED RESPONSE at 09:01

## 2018-01-01 RX ADMIN — DIPHENHYDRAMINE HYDROCHLORIDE 25 MG: 25 CAPSULE ORAL at 16:30

## 2018-01-01 RX ADMIN — LEVOTHYROXINE SODIUM 50 MCG: 50 TABLET ORAL at 06:24

## 2018-01-01 RX ADMIN — METOPROLOL SUCCINATE 25 MG: 25 TABLET, EXTENDED RELEASE ORAL at 08:59

## 2018-01-01 RX ADMIN — DILTIAZEM HYDROCHLORIDE 60 MG: 60 TABLET, FILM COATED ORAL at 05:59

## 2018-01-01 RX ADMIN — FLUTICASONE PROPIONATE 1 SPRAY: 50 SPRAY, METERED NASAL at 08:58

## 2018-01-01 RX ADMIN — CYANOCOBALAMIN TAB 1000 MCG 1000 MCG: 1000 TAB at 09:57

## 2018-01-01 RX ADMIN — POLYETHYLENE GLYCOL 3350 17 G: 17 POWDER, FOR SOLUTION ORAL at 08:53

## 2018-01-01 RX ADMIN — NYSTATIN: 100000 CREAM TOPICAL at 21:27

## 2018-01-01 RX ADMIN — Medication: at 08:34

## 2018-01-01 RX ADMIN — DOCUSATE SODIUM 100 MG: 100 CAPSULE, LIQUID FILLED ORAL at 12:13

## 2018-01-01 RX ADMIN — CEPHALEXIN 500 MG: 500 CAPSULE ORAL at 21:51

## 2018-01-01 RX ADMIN — ASPIRIN 81 MG CHEWABLE TABLET 81 MG: 81 TABLET CHEWABLE at 08:25

## 2018-01-01 RX ADMIN — ENALAPRIL MALEATE 2.5 MG: 2.5 TABLET ORAL at 11:23

## 2018-01-01 RX ADMIN — METOPROLOL SUCCINATE 25 MG: 25 TABLET, EXTENDED RELEASE ORAL at 09:49

## 2018-01-01 RX ADMIN — FLUCONAZOLE 100 MG: 100 TABLET ORAL at 16:18

## 2018-01-01 RX ADMIN — FAMOTIDINE 40 MG: 40 TABLET ORAL at 10:56

## 2018-01-01 RX ADMIN — FOLIC ACID 1 MG: 1 TABLET ORAL at 08:48

## 2018-01-01 RX ADMIN — PROPOFOL 30 MG: 10 INJECTION, EMULSION INTRAVENOUS at 20:02

## 2018-01-01 RX ADMIN — FUROSEMIDE 5 MG/HR: 10 INJECTION, SOLUTION INTRAVENOUS at 09:20

## 2018-01-01 RX ADMIN — OXYCODONE HYDROCHLORIDE AND ACETAMINOPHEN 1 TABLET: 5; 325 TABLET ORAL at 01:01

## 2018-01-01 RX ADMIN — Medication 3 ML: at 21:36

## 2018-01-01 RX ADMIN — HYDROCORTISONE: 1 CREAM TOPICAL at 22:00

## 2018-01-01 RX ADMIN — FINASTERIDE 5 MG: 5 TABLET, FILM COATED ORAL at 09:29

## 2018-01-01 RX ADMIN — MORPHINE SULFATE 1 MG: 2 INJECTION, SOLUTION INTRAMUSCULAR; INTRAVENOUS at 03:37

## 2018-01-01 RX ADMIN — LIDOCAINE HYDROCHLORIDE 60 MG: 20 INJECTION, SOLUTION INFILTRATION; PERINEURAL at 16:26

## 2018-01-01 RX ADMIN — CETIRIZINE HYDROCHLORIDE 10 MG: 10 TABLET, FILM COATED ORAL at 09:45

## 2018-01-01 RX ADMIN — DILTIAZEM HYDROCHLORIDE 60 MG: 60 TABLET, FILM COATED ORAL at 10:17

## 2018-01-01 RX ADMIN — Medication 3 ML: at 20:53

## 2018-01-01 RX ADMIN — OXYCODONE HYDROCHLORIDE AND ACETAMINOPHEN 1 TABLET: 7.5; 325 TABLET ORAL at 02:54

## 2018-01-01 RX ADMIN — OXYCODONE HYDROCHLORIDE AND ACETAMINOPHEN 1 TABLET: 5; 325 TABLET ORAL at 15:38

## 2018-01-01 RX ADMIN — MUPIROCIN: 20 OINTMENT TOPICAL at 08:41

## 2018-01-01 RX ADMIN — DILTIAZEM HYDROCHLORIDE 120 MG: 120 CAPSULE, COATED, EXTENDED RELEASE ORAL at 08:51

## 2018-01-01 RX ADMIN — TAZOBACTAM SODIUM AND PIPERACILLIN SODIUM 2.25 G: 250; 2 INJECTION, SOLUTION INTRAVENOUS at 02:18

## 2018-01-01 RX ADMIN — Medication 1 G: at 21:35

## 2018-01-01 RX ADMIN — DILTIAZEM HYDROCHLORIDE 60 MG: 60 TABLET, FILM COATED ORAL at 05:48

## 2018-01-01 RX ADMIN — Medication 10 ML: at 15:57

## 2018-01-01 RX ADMIN — OXYCODONE HYDROCHLORIDE AND ACETAMINOPHEN 1 TABLET: 5; 325 TABLET ORAL at 05:59

## 2018-01-01 RX ADMIN — LEVOTHYROXINE SODIUM 50 MCG: 50 TABLET ORAL at 06:33

## 2018-01-01 RX ADMIN — FOLIC ACID 1 MG: 1 TABLET ORAL at 08:32

## 2018-01-01 RX ADMIN — DILTIAZEM HYDROCHLORIDE 120 MG: 120 CAPSULE, COATED, EXTENDED RELEASE ORAL at 09:36

## 2018-01-01 RX ADMIN — TERAZOSIN HYDROCHLORIDE ANHYDROUS 2 MG: 2 CAPSULE ORAL at 21:51

## 2018-01-01 RX ADMIN — FERROUS SULFATE TAB EC 324 MG (65 MG FE EQUIVALENT) 324 MG: 324 (65 FE) TABLET DELAYED RESPONSE at 12:20

## 2018-01-01 RX ADMIN — FOLIC ACID 1 MG: 1 TABLET ORAL at 09:01

## 2018-01-01 RX ADMIN — TAZOBACTAM SODIUM AND PIPERACILLIN SODIUM 2.25 G: 250; 2 INJECTION, SOLUTION INTRAVENOUS at 06:06

## 2018-01-01 RX ADMIN — CEFEPIME HYDROCHLORIDE 2 G: 2 INJECTION, POWDER, FOR SOLUTION INTRAVENOUS at 12:20

## 2018-01-01 RX ADMIN — METOPROLOL SUCCINATE 25 MG: 50 TABLET, EXTENDED RELEASE ORAL at 09:48

## 2018-01-01 RX ADMIN — OXYCODONE HYDROCHLORIDE AND ACETAMINOPHEN 1 TABLET: 5; 325 TABLET ORAL at 13:03

## 2018-01-01 RX ADMIN — OXYCODONE HYDROCHLORIDE AND ACETAMINOPHEN 1 TABLET: 5; 325 TABLET ORAL at 04:11

## 2018-01-01 RX ADMIN — NYSTATIN: 100000 CREAM TOPICAL at 20:25

## 2018-01-01 RX ADMIN — TERAZOSIN HYDROCHLORIDE ANHYDROUS 2 MG: 2 CAPSULE ORAL at 20:32

## 2018-01-01 RX ADMIN — OXYCODONE HYDROCHLORIDE AND ACETAMINOPHEN 1 TABLET: 5; 325 TABLET ORAL at 22:30

## 2018-01-01 RX ADMIN — DOCUSATE SODIUM 100 MG: 100 CAPSULE, LIQUID FILLED ORAL at 20:47

## 2018-01-01 RX ADMIN — POLYETHYLENE GLYCOL (3350) 17 G: 17 POWDER, FOR SOLUTION ORAL at 08:35

## 2018-01-01 RX ADMIN — POTASSIUM CHLORIDE 10 MEQ: 750 CAPSULE, EXTENDED RELEASE ORAL at 10:56

## 2018-01-01 RX ADMIN — HEPARIN SODIUM 5000 UNITS: 5000 INJECTION, SOLUTION INTRAVENOUS; SUBCUTANEOUS at 06:04

## 2018-01-01 RX ADMIN — LEVOTHYROXINE SODIUM 50 MCG: 50 TABLET ORAL at 06:41

## 2018-01-01 RX ADMIN — TERAZOSIN HYDROCHLORIDE ANHYDROUS 2 MG: 2 CAPSULE ORAL at 20:47

## 2018-01-01 RX ADMIN — OXYCODONE HYDROCHLORIDE AND ACETAMINOPHEN 1 TABLET: 5; 325 TABLET ORAL at 09:07

## 2018-01-01 RX ADMIN — OXYCODONE HYDROCHLORIDE AND ACETAMINOPHEN 1 TABLET: 5; 325 TABLET ORAL at 05:47

## 2018-01-01 RX ADMIN — METOPROLOL SUCCINATE 25 MG: 25 TABLET, EXTENDED RELEASE ORAL at 17:13

## 2018-01-01 RX ADMIN — TAMSULOSIN HYDROCHLORIDE 0.8 MG: 0.4 CAPSULE ORAL at 20:12

## 2018-01-01 RX ADMIN — MEROPENEM 500 MG: 500 INJECTION, POWDER, FOR SOLUTION INTRAVENOUS at 15:15

## 2018-01-01 RX ADMIN — DOCUSATE SODIUM 100 MG: 100 CAPSULE, LIQUID FILLED ORAL at 08:19

## 2018-01-01 RX ADMIN — IPRATROPIUM BROMIDE AND ALBUTEROL SULFATE 3 ML: 2.5; .5 SOLUTION RESPIRATORY (INHALATION) at 14:47

## 2018-01-01 RX ADMIN — LEVOTHYROXINE SODIUM 50 MCG: 50 TABLET ORAL at 05:52

## 2018-01-01 RX ADMIN — TAMSULOSIN HYDROCHLORIDE 0.8 MG: 0.4 CAPSULE ORAL at 20:40

## 2018-01-01 RX ADMIN — CEFTRIAXONE SODIUM 1 G: 1 INJECTION, POWDER, FOR SOLUTION INTRAMUSCULAR; INTRAVENOUS at 10:20

## 2018-01-01 RX ADMIN — MEROPENEM 1 G: 1 INJECTION, POWDER, FOR SOLUTION INTRAVENOUS at 00:00

## 2018-01-01 RX ADMIN — POVIDONE-IODINE: 100 OINTMENT TOPICAL at 09:49

## 2018-01-01 RX ADMIN — CEFEPIME HYDROCHLORIDE 2 G: 2 INJECTION, POWDER, FOR SOLUTION INTRAVENOUS at 01:10

## 2018-01-01 RX ADMIN — FINASTERIDE 5 MG: 5 TABLET, FILM COATED ORAL at 09:11

## 2018-01-01 RX ADMIN — CEFTRIAXONE 1 G: 1 INJECTION, POWDER, FOR SOLUTION INTRAMUSCULAR; INTRAVENOUS at 01:16

## 2018-01-01 RX ADMIN — CETIRIZINE HYDROCHLORIDE 10 MG: 10 TABLET, FILM COATED ORAL at 09:48

## 2018-01-01 RX ADMIN — IPRATROPIUM BROMIDE AND ALBUTEROL SULFATE 3 ML: 2.5; .5 SOLUTION RESPIRATORY (INHALATION) at 14:18

## 2018-01-01 RX ADMIN — POTASSIUM CHLORIDE 20 MEQ: 750 CAPSULE, EXTENDED RELEASE ORAL at 08:12

## 2018-01-01 RX ADMIN — OXYCODONE HYDROCHLORIDE AND ACETAMINOPHEN 1 TABLET: 5; 325 TABLET ORAL at 13:34

## 2018-01-01 RX ADMIN — METOPROLOL SUCCINATE 25 MG: 25 TABLET, EXTENDED RELEASE ORAL at 09:53

## 2018-01-01 RX ADMIN — POTASSIUM CHLORIDE 20 MEQ: 750 CAPSULE, EXTENDED RELEASE ORAL at 10:19

## 2018-01-01 RX ADMIN — IPRATROPIUM BROMIDE AND ALBUTEROL SULFATE 3 ML: 2.5; .5 SOLUTION RESPIRATORY (INHALATION) at 11:28

## 2018-01-01 RX ADMIN — FOLIC ACID 1 MG: 1 TABLET ORAL at 09:57

## 2018-01-01 RX ADMIN — TERAZOSIN HYDROCHLORIDE ANHYDROUS 2 MG: 2 CAPSULE ORAL at 20:01

## 2018-01-01 RX ADMIN — CEFTRIAXONE SODIUM 1 G: 1 INJECTION, POWDER, FOR SOLUTION INTRAMUSCULAR; INTRAVENOUS at 09:12

## 2018-01-01 RX ADMIN — IPRATROPIUM BROMIDE AND ALBUTEROL SULFATE 3 ML: 2.5; .5 SOLUTION RESPIRATORY (INHALATION) at 07:15

## 2018-01-01 RX ADMIN — DOCUSATE SODIUM 100 MG: 100 CAPSULE, LIQUID FILLED ORAL at 09:44

## 2018-01-01 RX ADMIN — SODIUM CHLORIDE 75 ML/HR: 900 INJECTION, SOLUTION INTRAVENOUS at 01:38

## 2018-01-01 RX ADMIN — POTASSIUM CHLORIDE 20 MEQ: 750 CAPSULE, EXTENDED RELEASE ORAL at 08:33

## 2018-01-01 RX ADMIN — CLOPIDOGREL BISULFATE 75 MG: 75 TABLET ORAL at 09:49

## 2018-01-01 RX ADMIN — SODIUM CHLORIDE 125 MG: 9 INJECTION, SOLUTION INTRAVENOUS at 17:48

## 2018-01-01 RX ADMIN — CYANOCOBALAMIN TAB 1000 MCG 1000 MCG: 1000 TAB at 09:09

## 2018-01-01 RX ADMIN — MEROPENEM 500 MG: 500 INJECTION, POWDER, FOR SOLUTION INTRAVENOUS at 00:30

## 2018-01-01 RX ADMIN — OXYCODONE HYDROCHLORIDE AND ACETAMINOPHEN 1 TABLET: 5; 325 TABLET ORAL at 22:02

## 2018-01-01 RX ADMIN — IPRATROPIUM BROMIDE AND ALBUTEROL SULFATE 3 ML: 2.5; .5 SOLUTION RESPIRATORY (INHALATION) at 20:07

## 2018-01-01 RX ADMIN — ASPIRIN 81 MG: 81 TABLET, COATED ORAL at 08:13

## 2018-01-01 RX ADMIN — LEVOFLOXACIN 250 MG: 5 INJECTION, SOLUTION INTRAVENOUS at 02:00

## 2018-01-01 RX ADMIN — DOCUSATE SODIUM 100 MG: 100 CAPSULE, LIQUID FILLED ORAL at 21:38

## 2018-01-01 RX ADMIN — Medication: at 08:51

## 2018-01-01 RX ADMIN — FOLIC ACID 1 MG: 1 TABLET ORAL at 08:28

## 2018-01-01 RX ADMIN — Medication: at 08:45

## 2018-01-01 RX ADMIN — BISACODYL 10 MG: 10 SUPPOSITORY RECTAL at 10:20

## 2018-01-01 RX ADMIN — TECHNETIUM TC 99M SESTAMIBI 1 DOSE: 1 INJECTION INTRAVENOUS at 11:59

## 2018-01-01 RX ADMIN — Medication 1 G: at 20:56

## 2018-01-01 RX ADMIN — FAMOTIDINE 40 MG: 40 TABLET ORAL at 08:25

## 2018-01-01 RX ADMIN — IPRATROPIUM BROMIDE AND ALBUTEROL SULFATE 3 ML: 2.5; .5 SOLUTION RESPIRATORY (INHALATION) at 19:39

## 2018-01-01 RX ADMIN — DOCUSATE SODIUM 100 MG: 100 CAPSULE, LIQUID FILLED ORAL at 10:20

## 2018-01-01 RX ADMIN — CLOPIDOGREL BISULFATE 75 MG: 75 TABLET ORAL at 08:54

## 2018-01-01 RX ADMIN — DILTIAZEM HYDROCHLORIDE 60 MG: 60 TABLET, FILM COATED ORAL at 15:08

## 2018-01-01 RX ADMIN — DILTIAZEM HYDROCHLORIDE 60 MG: 60 TABLET, FILM COATED ORAL at 11:58

## 2018-01-01 RX ADMIN — CETIRIZINE HYDROCHLORIDE 10 MG: 10 TABLET, FILM COATED ORAL at 08:54

## 2018-01-01 RX ADMIN — Medication 3 ML: at 20:45

## 2018-01-01 RX ADMIN — LEVOTHYROXINE SODIUM 50 MCG: 50 TABLET ORAL at 06:36

## 2018-01-01 RX ADMIN — FLUTICASONE PROPIONATE 2 SPRAY: 50 SPRAY, METERED NASAL at 09:30

## 2018-01-01 RX ADMIN — LEVOTHYROXINE SODIUM 50 MCG: 50 TABLET ORAL at 06:04

## 2018-01-01 RX ADMIN — TAMSULOSIN HYDROCHLORIDE 0.8 MG: 0.4 CAPSULE ORAL at 20:34

## 2018-01-01 RX ADMIN — FAMOTIDINE 40 MG: 40 TABLET ORAL at 09:57

## 2018-01-01 RX ADMIN — METOPROLOL SUCCINATE 25 MG: 25 TABLET, EXTENDED RELEASE ORAL at 09:11

## 2018-01-01 RX ADMIN — OXYCODONE HYDROCHLORIDE AND ACETAMINOPHEN 1 TABLET: 7.5; 325 TABLET ORAL at 13:05

## 2018-01-01 RX ADMIN — SODIUM CHLORIDE 1000 ML: 900 INJECTION, SOLUTION INTRAVENOUS at 13:59

## 2018-01-01 RX ADMIN — POTASSIUM CHLORIDE 20 MEQ: 750 CAPSULE, EXTENDED RELEASE ORAL at 07:43

## 2018-01-01 RX ADMIN — SODIUM CHLORIDE 75 ML/HR: 900 INJECTION, SOLUTION INTRAVENOUS at 06:41

## 2018-01-01 RX ADMIN — MEROPENEM 500 MG: 500 INJECTION, POWDER, FOR SOLUTION INTRAVENOUS at 19:58

## 2018-01-01 RX ADMIN — SODIUM CHLORIDE, SODIUM GLUCONATE, SODIUM ACETATE, POTASSIUM CHLORIDE, AND MAGNESIUM CHLORIDE: 526; 502; 368; 37; 30 INJECTION, SOLUTION INTRAVENOUS at 10:13

## 2018-01-01 RX ADMIN — SODIUM CHLORIDE 50 ML/HR: 900 INJECTION, SOLUTION INTRAVENOUS at 01:45

## 2018-01-01 RX ADMIN — TAMSULOSIN HYDROCHLORIDE 0.8 MG: 0.4 CAPSULE ORAL at 20:47

## 2018-01-01 RX ADMIN — Medication: at 08:02

## 2018-01-01 RX ADMIN — OXYCODONE HYDROCHLORIDE AND ACETAMINOPHEN 1 TABLET: 5; 325 TABLET ORAL at 06:50

## 2018-01-01 RX ADMIN — HYDROCORTISONE: 1 CREAM TOPICAL at 09:34

## 2018-01-01 RX ADMIN — DOCUSATE SODIUM 100 MG: 100 CAPSULE, LIQUID FILLED ORAL at 20:50

## 2018-01-01 RX ADMIN — FOLIC ACID 1 MG: 1 TABLET ORAL at 09:07

## 2018-01-01 RX ADMIN — OXYCODONE HYDROCHLORIDE AND ACETAMINOPHEN 1 TABLET: 5; 325 TABLET ORAL at 20:27

## 2018-01-01 RX ADMIN — METOPROLOL SUCCINATE 25 MG: 25 TABLET, FILM COATED, EXTENDED RELEASE ORAL at 08:28

## 2018-01-01 RX ADMIN — MEROPENEM 500 MG: 500 INJECTION, POWDER, FOR SOLUTION INTRAVENOUS at 23:55

## 2018-01-01 RX ADMIN — FENTANYL CITRATE 50 MCG: 50 INJECTION, SOLUTION INTRAMUSCULAR; INTRAVENOUS at 18:42

## 2018-01-01 RX ADMIN — Medication 3 ML: at 20:40

## 2018-01-01 RX ADMIN — MEROPENEM 500 MG: 500 INJECTION, POWDER, FOR SOLUTION INTRAVENOUS at 09:47

## 2018-01-01 RX ADMIN — FOLIC ACID 1 MG: 1 TABLET ORAL at 10:10

## 2018-01-01 RX ADMIN — BUMETANIDE 1 MG: 1 TABLET ORAL at 08:00

## 2018-01-01 RX ADMIN — MEROPENEM 500 MG: 500 INJECTION, POWDER, FOR SOLUTION INTRAVENOUS at 08:40

## 2018-01-01 RX ADMIN — CEPHALEXIN 500 MG: 500 CAPSULE ORAL at 09:31

## 2018-01-01 RX ADMIN — POTASSIUM CHLORIDE 20 MEQ: 750 CAPSULE, EXTENDED RELEASE ORAL at 08:47

## 2018-01-01 RX ADMIN — FAMOTIDINE 40 MG: 40 TABLET ORAL at 08:59

## 2018-01-01 RX ADMIN — NYSTATIN: 100000 CREAM TOPICAL at 21:37

## 2018-01-01 RX ADMIN — TERAZOSIN HYDROCHLORIDE ANHYDROUS 2 MG: 2 CAPSULE ORAL at 20:26

## 2018-01-01 RX ADMIN — LEVOTHYROXINE SODIUM 50 MCG: 50 TABLET ORAL at 09:20

## 2018-01-01 RX ADMIN — Medication 3 ML: at 09:48

## 2018-01-01 RX ADMIN — MEROPENEM 1 G: 1 INJECTION, POWDER, FOR SOLUTION INTRAVENOUS at 12:44

## 2018-01-01 RX ADMIN — TAMSULOSIN HYDROCHLORIDE 0.8 MG: 0.4 CAPSULE ORAL at 21:46

## 2018-01-01 RX ADMIN — MEROPENEM 1 G: 1 INJECTION, POWDER, FOR SOLUTION INTRAVENOUS at 22:10

## 2018-01-01 RX ADMIN — LEVOFLOXACIN 250 MG: 5 INJECTION, SOLUTION INTRAVENOUS at 02:23

## 2018-01-01 RX ADMIN — Medication 1 G: at 20:35

## 2018-01-01 RX ADMIN — METOPROLOL SUCCINATE 25 MG: 25 TABLET, EXTENDED RELEASE ORAL at 08:14

## 2018-01-01 RX ADMIN — TERAZOSIN HYDROCHLORIDE ANHYDROUS 2 MG: 2 CAPSULE ORAL at 22:19

## 2018-01-01 RX ADMIN — ASPIRIN 81 MG: 81 TABLET, COATED ORAL at 08:34

## 2018-01-01 RX ADMIN — MUPIROCIN: 20 OINTMENT TOPICAL at 10:20

## 2018-01-01 RX ADMIN — PANTOPRAZOLE SODIUM 40 MG: 40 TABLET, DELAYED RELEASE ORAL at 06:21

## 2018-01-01 RX ADMIN — IPRATROPIUM BROMIDE AND ALBUTEROL SULFATE 3 ML: 2.5; .5 SOLUTION RESPIRATORY (INHALATION) at 07:54

## 2018-01-01 RX ADMIN — MEROPENEM 1 G: 1 INJECTION, POWDER, FOR SOLUTION INTRAVENOUS at 12:45

## 2018-01-01 RX ADMIN — AZITHROMYCIN 500 MG: 500 INJECTION, POWDER, LYOPHILIZED, FOR SOLUTION INTRAVENOUS at 18:07

## 2018-01-01 RX ADMIN — MUPIROCIN: 20 OINTMENT TOPICAL at 21:35

## 2018-01-01 RX ADMIN — OXYCODONE HYDROCHLORIDE AND ACETAMINOPHEN 1 TABLET: 5; 325 TABLET ORAL at 14:06

## 2018-01-01 RX ADMIN — TAMSULOSIN HYDROCHLORIDE 0.8 MG: 0.4 CAPSULE ORAL at 21:00

## 2018-01-01 RX ADMIN — FINASTERIDE 5 MG: 5 TABLET, FILM COATED ORAL at 10:09

## 2018-01-01 RX ADMIN — FINASTERIDE 5 MG: 5 TABLET, FILM COATED ORAL at 09:12

## 2018-01-01 RX ADMIN — DILTIAZEM HYDROCHLORIDE 120 MG: 120 CAPSULE, COATED, EXTENDED RELEASE ORAL at 10:24

## 2018-01-01 RX ADMIN — PANTOPRAZOLE SODIUM 40 MG: 40 TABLET, DELAYED RELEASE ORAL at 06:38

## 2018-01-01 RX ADMIN — LEVOTHYROXINE SODIUM 50 MCG: 50 TABLET ORAL at 09:07

## 2018-01-01 RX ADMIN — OXYCODONE HYDROCHLORIDE AND ACETAMINOPHEN 1 TABLET: 5; 325 TABLET ORAL at 22:20

## 2018-01-01 RX ADMIN — CYANOCOBALAMIN TAB 1000 MCG 1000 MCG: 1000 TAB at 09:13

## 2018-01-01 RX ADMIN — LEVOTHYROXINE SODIUM 50 MCG: 50 TABLET ORAL at 05:44

## 2018-01-01 RX ADMIN — POLYETHYLENE GLYCOL 3350 17 G: 17 POWDER, FOR SOLUTION ORAL at 12:13

## 2018-01-01 RX ADMIN — ENOXAPARIN SODIUM 40 MG: 40 INJECTION SUBCUTANEOUS at 13:30

## 2018-01-01 RX ADMIN — CEFEPIME HYDROCHLORIDE 2 G: 2 INJECTION, POWDER, FOR SOLUTION INTRAVENOUS at 10:27

## 2018-01-01 RX ADMIN — CLOPIDOGREL BISULFATE 75 MG: 75 TABLET ORAL at 10:55

## 2018-01-01 RX ADMIN — OXYCODONE HYDROCHLORIDE AND ACETAMINOPHEN 1 TABLET: 7.5; 325 TABLET ORAL at 20:40

## 2018-01-01 RX ADMIN — TERAZOSIN HYDROCHLORIDE ANHYDROUS 2 MG: 2 CAPSULE ORAL at 21:09

## 2018-01-01 RX ADMIN — FOLIC ACID 1 MG: 1 TABLET ORAL at 08:55

## 2018-01-01 RX ADMIN — OXYCODONE HYDROCHLORIDE AND ACETAMINOPHEN 1 TABLET: 5; 325 TABLET ORAL at 09:12

## 2018-01-01 RX ADMIN — BISACODYL 10 MG: 5 TABLET, COATED ORAL at 10:25

## 2018-01-01 RX ADMIN — LEVOTHYROXINE SODIUM 50 MCG: 50 TABLET ORAL at 06:17

## 2018-01-01 RX ADMIN — ASPIRIN 81 MG CHEWABLE TABLET 81 MG: 81 TABLET CHEWABLE at 10:03

## 2018-01-01 RX ADMIN — MEROPENEM 500 MG: 500 INJECTION, POWDER, FOR SOLUTION INTRAVENOUS at 22:43

## 2018-01-01 RX ADMIN — FINASTERIDE 5 MG: 5 TABLET, FILM COATED ORAL at 08:52

## 2018-01-01 RX ADMIN — DOCUSATE SODIUM 100 MG: 100 CAPSULE, LIQUID FILLED ORAL at 20:53

## 2018-01-01 RX ADMIN — FOLIC ACID 1 MG: 1 TABLET ORAL at 09:48

## 2018-01-01 RX ADMIN — TERAZOSIN HYDROCHLORIDE ANHYDROUS 2 MG: 2 CAPSULE ORAL at 21:26

## 2018-01-01 RX ADMIN — OXYCODONE HYDROCHLORIDE AND ACETAMINOPHEN 1 TABLET: 7.5; 325 TABLET ORAL at 15:02

## 2018-01-01 RX ADMIN — LEVOTHYROXINE SODIUM 50 MCG: 50 TABLET ORAL at 09:47

## 2018-01-01 RX ADMIN — CYANOCOBALAMIN TAB 1000 MCG 1000 MCG: 1000 TAB at 08:31

## 2018-01-01 RX ADMIN — FERROUS SULFATE TAB EC 324 MG (65 MG FE EQUIVALENT) 324 MG: 324 (65 FE) TABLET DELAYED RESPONSE at 18:02

## 2018-01-01 RX ADMIN — HEPARIN SODIUM 5000 UNITS: 5000 INJECTION, SOLUTION INTRAVENOUS; SUBCUTANEOUS at 06:00

## 2018-01-01 RX ADMIN — VANCOMYCIN HYDROCHLORIDE 1250 MG: 5 INJECTION, POWDER, LYOPHILIZED, FOR SOLUTION INTRAVENOUS at 20:53

## 2018-01-01 RX ADMIN — BISACODYL 10 MG: 5 TABLET, COATED ORAL at 09:04

## 2018-01-01 RX ADMIN — TERAZOSIN HYDROCHLORIDE ANHYDROUS 2 MG: 2 CAPSULE ORAL at 20:50

## 2018-01-01 RX ADMIN — SODIUM CHLORIDE 75 ML/HR: 900 INJECTION, SOLUTION INTRAVENOUS at 13:40

## 2018-01-01 RX ADMIN — ASPIRIN 81 MG: 81 TABLET, COATED ORAL at 18:28

## 2018-01-01 RX ADMIN — LEVOTHYROXINE SODIUM 50 MCG: 50 TABLET ORAL at 16:26

## 2018-01-01 RX ADMIN — OXYCODONE HYDROCHLORIDE AND ACETAMINOPHEN 1 TABLET: 7.5; 325 TABLET ORAL at 00:47

## 2018-01-01 RX ADMIN — Medication: at 16:51

## 2018-01-01 RX ADMIN — MEROPENEM 500 MG: 500 INJECTION, POWDER, FOR SOLUTION INTRAVENOUS at 00:04

## 2018-01-01 RX ADMIN — CETIRIZINE HYDROCHLORIDE 10 MG: 10 TABLET, FILM COATED ORAL at 09:51

## 2018-01-01 RX ADMIN — POTASSIUM CHLORIDE 10 MEQ: 750 CAPSULE, EXTENDED RELEASE ORAL at 09:12

## 2018-01-01 RX ADMIN — OXYCODONE HYDROCHLORIDE AND ACETAMINOPHEN 1 TABLET: 5; 325 TABLET ORAL at 13:23

## 2018-01-01 RX ADMIN — FLUCONAZOLE 200 MG: 200 TABLET ORAL at 09:13

## 2018-01-01 RX ADMIN — HYDROCORTISONE: 1 CREAM TOPICAL at 20:53

## 2018-01-01 RX ADMIN — HYDROCORTISONE: 1 CREAM TOPICAL at 20:30

## 2018-01-01 RX ADMIN — CYANOCOBALAMIN TAB 1000 MCG 1000 MCG: 1000 TAB at 08:45

## 2018-01-01 RX ADMIN — DOCUSATE SODIUM 100 MG: 100 CAPSULE, LIQUID FILLED ORAL at 20:52

## 2018-01-01 RX ADMIN — ASPIRIN 81 MG CHEWABLE TABLET 81 MG: 81 TABLET CHEWABLE at 08:19

## 2018-01-01 RX ADMIN — METOPROLOL SUCCINATE 25 MG: 25 TABLET, EXTENDED RELEASE ORAL at 09:30

## 2018-01-01 RX ADMIN — BACITRACIN: 500 OINTMENT TOPICAL at 10:23

## 2018-01-01 RX ADMIN — PHENYLEPHRINE HYDROCHLORIDE 100 MCG: 10 INJECTION INTRAVENOUS at 16:40

## 2018-01-01 RX ADMIN — DILTIAZEM HYDROCHLORIDE 120 MG: 120 CAPSULE, COATED, EXTENDED RELEASE ORAL at 08:50

## 2018-01-01 RX ADMIN — Medication: at 09:32

## 2018-01-01 RX ADMIN — CEPHALEXIN 500 MG: 500 CAPSULE ORAL at 17:13

## 2018-01-01 RX ADMIN — FAMOTIDINE 40 MG: 40 TABLET ORAL at 09:51

## 2018-01-01 RX ADMIN — Medication: at 08:30

## 2018-01-01 RX ADMIN — BUMETANIDE 1 MG: 1 TABLET ORAL at 10:24

## 2018-01-01 RX ADMIN — FOLIC ACID 1 MG: 1 TABLET ORAL at 08:25

## 2018-01-01 RX ADMIN — BUMETANIDE 1 MG: 1 TABLET ORAL at 09:53

## 2018-01-01 RX ADMIN — COLLAGENASE SANTYL: 250 OINTMENT TOPICAL at 08:52

## 2018-01-01 RX ADMIN — HYDROCORTISONE: 1 CREAM TOPICAL at 21:21

## 2018-01-01 RX ADMIN — Medication: at 17:29

## 2018-01-01 RX ADMIN — CLOPIDOGREL BISULFATE 75 MG: 75 TABLET ORAL at 08:45

## 2018-01-01 RX ADMIN — MEROPENEM 500 MG: 500 INJECTION, POWDER, FOR SOLUTION INTRAVENOUS at 15:23

## 2018-01-01 RX ADMIN — Medication: at 08:00

## 2018-01-01 RX ADMIN — DOCUSATE SODIUM 100 MG: 100 CAPSULE, LIQUID FILLED ORAL at 08:00

## 2018-01-01 RX ADMIN — BISACODYL 10 MG: 5 TABLET, COATED ORAL at 08:54

## 2018-01-01 RX ADMIN — LEVOTHYROXINE SODIUM 50 MCG: 50 TABLET ORAL at 05:45

## 2018-01-01 RX ADMIN — ASPIRIN 81 MG CHEWABLE TABLET 81 MG: 81 TABLET CHEWABLE at 08:54

## 2018-01-01 RX ADMIN — TAMSULOSIN HYDROCHLORIDE 0.8 MG: 0.4 CAPSULE ORAL at 22:00

## 2018-01-01 RX ADMIN — POTASSIUM CHLORIDE 20 MEQ: 750 CAPSULE, EXTENDED RELEASE ORAL at 09:57

## 2018-01-01 RX ADMIN — TIZANIDINE 2 MG: 2 TABLET ORAL at 11:45

## 2018-01-01 RX ADMIN — OXYCODONE HYDROCHLORIDE AND ACETAMINOPHEN 1 TABLET: 5; 325 TABLET ORAL at 13:02

## 2018-01-01 RX ADMIN — FERROUS SULFATE TAB EC 324 MG (65 MG FE EQUIVALENT) 324 MG: 324 (65 FE) TABLET DELAYED RESPONSE at 11:29

## 2018-01-01 RX ADMIN — DILTIAZEM HYDROCHLORIDE 120 MG: 120 CAPSULE, COATED, EXTENDED RELEASE ORAL at 08:32

## 2018-01-01 RX ADMIN — OXYCODONE HYDROCHLORIDE AND ACETAMINOPHEN 1 TABLET: 5; 325 TABLET ORAL at 10:28

## 2018-01-01 RX ADMIN — CYANOCOBALAMIN TAB 1000 MCG 1000 MCG: 1000 TAB at 08:54

## 2018-01-01 RX ADMIN — CYANOCOBALAMIN TAB 1000 MCG 1000 MCG: 1000 TAB at 12:54

## 2018-01-01 RX ADMIN — MUPIROCIN: 20 OINTMENT TOPICAL at 21:00

## 2018-01-01 RX ADMIN — FAMOTIDINE 40 MG: 40 TABLET ORAL at 08:55

## 2018-01-01 RX ADMIN — CYANOCOBALAMIN TAB 1000 MCG 1000 MCG: 1000 TAB at 09:30

## 2018-01-01 RX ADMIN — FINASTERIDE 5 MG: 5 TABLET, FILM COATED ORAL at 10:20

## 2018-01-01 RX ADMIN — SODIUM CHLORIDE 50 ML/HR: 900 INJECTION, SOLUTION INTRAVENOUS at 19:09

## 2018-01-01 RX ADMIN — TAMSULOSIN HYDROCHLORIDE 0.8 MG: 0.4 CAPSULE ORAL at 20:45

## 2018-01-01 RX ADMIN — NITROGLYCERIN 10 MCG/MIN: 20 INJECTION INTRAVENOUS at 10:44

## 2018-01-01 RX ADMIN — HYDROCORTISONE: 1 CREAM TOPICAL at 20:28

## 2018-01-01 RX ADMIN — BACITRACIN: 500 OINTMENT TOPICAL at 08:34

## 2018-01-01 RX ADMIN — OXYCODONE HYDROCHLORIDE AND ACETAMINOPHEN 1 TABLET: 7.5; 325 TABLET ORAL at 18:24

## 2018-01-01 RX ADMIN — NYSTATIN: 100000 CREAM TOPICAL at 22:10

## 2018-01-01 RX ADMIN — DILTIAZEM HYDROCHLORIDE 60 MG: 60 TABLET, FILM COATED ORAL at 22:02

## 2018-01-01 RX ADMIN — MEROPENEM 1 G: 1 INJECTION, POWDER, FOR SOLUTION INTRAVENOUS at 05:48

## 2018-01-01 RX ADMIN — Medication 3 ML: at 08:30

## 2018-01-01 RX ADMIN — CLOPIDOGREL BISULFATE 75 MG: 75 TABLET ORAL at 09:45

## 2018-01-01 RX ADMIN — DEXAMETHASONE SODIUM PHOSPHATE 4 MG: 4 INJECTION, SOLUTION INTRAMUSCULAR; INTRAVENOUS at 10:58

## 2018-01-01 RX ADMIN — Medication 1 APPLICATION: at 07:44

## 2018-01-01 RX ADMIN — TERAZOSIN HYDROCHLORIDE ANHYDROUS 2 MG: 2 CAPSULE ORAL at 20:31

## 2018-01-01 RX ADMIN — TAMSULOSIN HYDROCHLORIDE 0.8 MG: 0.4 CAPSULE ORAL at 20:13

## 2018-01-01 RX ADMIN — TERAZOSIN HYDROCHLORIDE ANHYDROUS 2 MG: 2 CAPSULE ORAL at 20:52

## 2018-01-01 RX ADMIN — BUMETANIDE 1 MG: 1 TABLET ORAL at 10:42

## 2018-01-01 RX ADMIN — TAMSULOSIN HYDROCHLORIDE 0.8 MG: 0.4 CAPSULE ORAL at 20:59

## 2018-01-01 RX ADMIN — MEROPENEM 500 MG: 500 INJECTION, POWDER, FOR SOLUTION INTRAVENOUS at 08:31

## 2018-01-01 RX ADMIN — FERROUS SULFATE TAB EC 324 MG (65 MG FE EQUIVALENT) 324 MG: 324 (65 FE) TABLET DELAYED RESPONSE at 11:13

## 2018-01-01 RX ADMIN — OXYCODONE HYDROCHLORIDE AND ACETAMINOPHEN 1 TABLET: 5; 325 TABLET ORAL at 05:49

## 2018-01-01 RX ADMIN — FAMOTIDINE 40 MG: 40 TABLET ORAL at 09:37

## 2018-01-01 RX ADMIN — OXYCODONE HYDROCHLORIDE AND ACETAMINOPHEN 1 TABLET: 7.5; 325 TABLET ORAL at 17:43

## 2018-01-01 RX ADMIN — FOLIC ACID 1 MG: 1 TABLET ORAL at 08:34

## 2018-01-01 RX ADMIN — BUMETANIDE 1 MG: 1 TABLET ORAL at 13:03

## 2018-01-01 RX ADMIN — METOPROLOL SUCCINATE 25 MG: 25 TABLET, EXTENDED RELEASE ORAL at 09:00

## 2018-01-01 RX ADMIN — SODIUM CHLORIDE 250 ML: 9 INJECTION, SOLUTION INTRAVENOUS at 00:27

## 2018-01-01 RX ADMIN — OXYCODONE HYDROCHLORIDE AND ACETAMINOPHEN 1 TABLET: 5; 325 TABLET ORAL at 20:19

## 2018-01-01 RX ADMIN — OXYCODONE HYDROCHLORIDE AND ACETAMINOPHEN 1 TABLET: 5; 325 TABLET ORAL at 10:16

## 2018-01-01 RX ADMIN — Medication 1 G: at 08:30

## 2018-01-01 RX ADMIN — FINASTERIDE 5 MG: 5 TABLET, FILM COATED ORAL at 08:13

## 2018-01-01 RX ADMIN — Medication: at 08:29

## 2018-01-01 RX ADMIN — BUMETANIDE 1 MG: 1 TABLET ORAL at 08:53

## 2018-01-01 RX ADMIN — FINASTERIDE 5 MG: 5 TABLET, FILM COATED ORAL at 09:37

## 2018-01-01 RX ADMIN — MEROPENEM 500 MG: 500 INJECTION, POWDER, FOR SOLUTION INTRAVENOUS at 15:40

## 2018-01-01 RX ADMIN — BUMETANIDE 1 MG: 1 TABLET ORAL at 09:57

## 2018-01-01 RX ADMIN — TAMSULOSIN HYDROCHLORIDE 0.8 MG: 0.4 CAPSULE ORAL at 20:33

## 2018-01-01 RX ADMIN — BISACODYL 10 MG: 5 TABLET, COATED ORAL at 09:29

## 2018-01-01 RX ADMIN — OXYCODONE HYDROCHLORIDE AND ACETAMINOPHEN 1 TABLET: 5; 325 TABLET ORAL at 14:15

## 2018-01-01 RX ADMIN — FAMOTIDINE 40 MG: 40 TABLET ORAL at 09:53

## 2018-01-01 RX ADMIN — TERAZOSIN HYDROCHLORIDE ANHYDROUS 2 MG: 2 CAPSULE ORAL at 21:19

## 2018-01-01 RX ADMIN — FERROUS SULFATE TAB EC 324 MG (65 MG FE EQUIVALENT) 324 MG: 324 (65 FE) TABLET DELAYED RESPONSE at 13:31

## 2018-01-01 RX ADMIN — DILTIAZEM HYDROCHLORIDE 60 MG: 60 TABLET, FILM COATED ORAL at 17:54

## 2018-01-01 RX ADMIN — POLYETHYLENE GLYCOL 3350 17 G: 17 POWDER, FOR SOLUTION ORAL at 08:54

## 2018-01-01 RX ADMIN — LEVOTHYROXINE SODIUM 50 MCG: 50 TABLET ORAL at 06:30

## 2018-01-01 RX ADMIN — LEVOTHYROXINE SODIUM 50 MCG: 50 TABLET ORAL at 05:54

## 2018-01-01 RX ADMIN — Medication 1 G: at 20:50

## 2018-01-01 RX ADMIN — Medication 1 G: at 20:29

## 2018-01-01 RX ADMIN — MEROPENEM 1 G: 1 INJECTION, POWDER, FOR SOLUTION INTRAVENOUS at 23:20

## 2018-01-01 RX ADMIN — HYDROCORTISONE: 1 CREAM TOPICAL at 09:44

## 2018-01-01 RX ADMIN — LEVOTHYROXINE SODIUM 50 MCG: 50 TABLET ORAL at 05:55

## 2018-01-01 RX ADMIN — LEVOTHYROXINE SODIUM 50 MCG: 50 TABLET ORAL at 05:27

## 2018-01-01 RX ADMIN — BUMETANIDE 1 MG: 1 TABLET ORAL at 08:48

## 2018-01-01 RX ADMIN — POTASSIUM CHLORIDE 10 MEQ: 750 CAPSULE, EXTENDED RELEASE ORAL at 08:55

## 2018-01-01 RX ADMIN — DOCUSATE SODIUM 100 MG: 100 CAPSULE, LIQUID FILLED ORAL at 07:44

## 2018-01-01 RX ADMIN — POLYETHYLENE GLYCOL 3350 17 G: 17 POWDER, FOR SOLUTION ORAL at 09:44

## 2018-01-01 RX ADMIN — ACETAMINOPHEN 650 MG: 325 TABLET ORAL at 07:10

## 2018-01-01 RX ADMIN — Medication: at 08:49

## 2018-01-01 RX ADMIN — CYANOCOBALAMIN TAB 1000 MCG 1000 MCG: 1000 TAB at 08:28

## 2018-01-01 RX ADMIN — TAMSULOSIN HYDROCHLORIDE 0.8 MG: 0.4 CAPSULE ORAL at 20:31

## 2018-01-01 RX ADMIN — FAMOTIDINE 40 MG: 40 TABLET ORAL at 08:38

## 2018-01-01 RX ADMIN — TERAZOSIN HYDROCHLORIDE ANHYDROUS 2 MG: 2 CAPSULE ORAL at 20:46

## 2018-01-01 RX ADMIN — TAMSULOSIN HYDROCHLORIDE 0.8 MG: 0.4 CAPSULE ORAL at 22:19

## 2018-01-01 RX ADMIN — FOLIC ACID 1 MG: 1 TABLET ORAL at 08:45

## 2018-01-01 RX ADMIN — Medication 1 G: at 08:31

## 2018-01-01 RX ADMIN — OXYCODONE HYDROCHLORIDE AND ACETAMINOPHEN 1 TABLET: 7.5; 325 TABLET ORAL at 18:12

## 2018-01-01 RX ADMIN — Medication 1 APPLICATION: at 08:48

## 2018-01-01 RX ADMIN — TAMSULOSIN HYDROCHLORIDE 0.8 MG: 0.4 CAPSULE ORAL at 20:42

## 2018-01-01 RX ADMIN — Medication 1 G: at 08:07

## 2018-01-01 RX ADMIN — CEFTRIAXONE SODIUM 1 G: 1 INJECTION, POWDER, FOR SOLUTION INTRAMUSCULAR; INTRAVENOUS at 14:02

## 2018-01-01 RX ADMIN — MEROPENEM 1 G: 1 INJECTION, POWDER, FOR SOLUTION INTRAVENOUS at 13:46

## 2018-01-01 RX ADMIN — SODIUM CHLORIDE 50 ML/HR: 900 INJECTION, SOLUTION INTRAVENOUS at 19:58

## 2018-01-01 RX ADMIN — IPRATROPIUM BROMIDE AND ALBUTEROL SULFATE 3 ML: 2.5; .5 SOLUTION RESPIRATORY (INHALATION) at 15:48

## 2018-01-01 RX ADMIN — FOLIC ACID 1 MG: 1 TABLET ORAL at 08:00

## 2018-01-01 RX ADMIN — ACETAMINOPHEN 650 MG: 325 TABLET ORAL at 14:53

## 2018-01-01 RX ADMIN — DOCUSATE SODIUM 100 MG: 100 CAPSULE, LIQUID FILLED ORAL at 10:58

## 2018-01-01 RX ADMIN — CEFTRIAXONE SODIUM 1 G: 1 INJECTION, POWDER, FOR SOLUTION INTRAMUSCULAR; INTRAVENOUS at 16:00

## 2018-01-01 RX ADMIN — DOCUSATE SODIUM 100 MG: 100 CAPSULE, LIQUID FILLED ORAL at 08:24

## 2018-01-01 RX ADMIN — SODIUM CHLORIDE 75 ML/HR: 9 INJECTION, SOLUTION INTRAVENOUS at 09:01

## 2018-01-01 RX ADMIN — POLYETHYLENE GLYCOL 3350 17 G: 17 POWDER, FOR SOLUTION ORAL at 08:33

## 2018-01-01 RX ADMIN — OXYCODONE HYDROCHLORIDE AND ACETAMINOPHEN 1 TABLET: 7.5; 325 TABLET ORAL at 07:57

## 2018-01-01 RX ADMIN — MEROPENEM 500 MG: 500 INJECTION, POWDER, FOR SOLUTION INTRAVENOUS at 08:51

## 2018-01-01 RX ADMIN — DILTIAZEM HYDROCHLORIDE 60 MG: 60 TABLET, FILM COATED ORAL at 23:21

## 2018-01-01 RX ADMIN — TERAZOSIN HYDROCHLORIDE ANHYDROUS 2 MG: 2 CAPSULE ORAL at 22:37

## 2018-01-01 RX ADMIN — DILTIAZEM HYDROCHLORIDE 120 MG: 120 CAPSULE, COATED, EXTENDED RELEASE ORAL at 09:53

## 2018-01-01 RX ADMIN — DILTIAZEM HYDROCHLORIDE 60 MG: 60 TABLET, FILM COATED ORAL at 11:21

## 2018-01-01 RX ADMIN — HYDROCORTISONE: 1 CREAM TOPICAL at 09:48

## 2018-01-01 RX ADMIN — DOCUSATE SODIUM 100 MG: 100 CAPSULE, LIQUID FILLED ORAL at 21:19

## 2018-01-01 RX ADMIN — IPRATROPIUM BROMIDE AND ALBUTEROL SULFATE 3 ML: 2.5; .5 SOLUTION RESPIRATORY (INHALATION) at 14:44

## 2018-01-01 RX ADMIN — FLUCONAZOLE 100 MG: 100 TABLET ORAL at 09:47

## 2018-01-01 RX ADMIN — IPRATROPIUM BROMIDE AND ALBUTEROL SULFATE 3 ML: 2.5; .5 SOLUTION RESPIRATORY (INHALATION) at 06:45

## 2018-01-01 RX ADMIN — Medication: at 09:39

## 2018-01-01 RX ADMIN — SODIUM CHLORIDE 100 ML/HR: 9 INJECTION, SOLUTION INTRAVENOUS at 16:26

## 2018-01-01 RX ADMIN — MEROPENEM 500 MG: 500 INJECTION, POWDER, FOR SOLUTION INTRAVENOUS at 08:33

## 2018-01-01 RX ADMIN — HYDROCORTISONE: 1 CREAM TOPICAL at 20:45

## 2018-01-01 RX ADMIN — ASPIRIN 81 MG CHEWABLE TABLET 81 MG: 81 TABLET CHEWABLE at 08:57

## 2018-01-01 RX ADMIN — METOPROLOL SUCCINATE 25 MG: 25 TABLET, EXTENDED RELEASE ORAL at 09:37

## 2018-01-01 RX ADMIN — DOCUSATE SODIUM 100 MG: 100 CAPSULE, LIQUID FILLED ORAL at 10:55

## 2018-01-01 RX ADMIN — TERAZOSIN HYDROCHLORIDE ANHYDROUS 2 MG: 2 CAPSULE ORAL at 21:00

## 2018-01-01 RX ADMIN — BISACODYL 10 MG: 10 SUPPOSITORY RECTAL at 09:11

## 2018-01-01 RX ADMIN — FAMOTIDINE 40 MG: 40 TABLET ORAL at 08:52

## 2018-01-01 RX ADMIN — FLUTICASONE PROPIONATE 1 SPRAY: 50 SPRAY, METERED NASAL at 09:01

## 2018-01-01 RX ADMIN — BUMETANIDE 1 MG: 1 TABLET ORAL at 09:51

## 2018-01-01 RX ADMIN — HEPARIN SODIUM 5000 UNITS: 5000 INJECTION, SOLUTION INTRAVENOUS; SUBCUTANEOUS at 00:45

## 2018-01-01 RX ADMIN — HYDROCORTISONE: 1 CREAM TOPICAL at 08:56

## 2018-01-01 RX ADMIN — Medication: at 08:40

## 2018-01-01 RX ADMIN — LIDOCAINE HYDROCHLORIDE 50 MG: 20 INJECTION, SOLUTION INFILTRATION; PERINEURAL at 18:48

## 2018-01-01 RX ADMIN — BUMETANIDE 1 MG: 1 TABLET ORAL at 08:46

## 2018-01-01 RX ADMIN — FOLIC ACID 1 MG: 1 TABLET ORAL at 09:50

## 2018-01-01 RX ADMIN — FLUCONAZOLE 100 MG: 100 TABLET ORAL at 09:07

## 2018-01-01 RX ADMIN — DILTIAZEM HYDROCHLORIDE 60 MG: 60 TABLET, FILM COATED ORAL at 17:18

## 2018-01-01 RX ADMIN — FAMOTIDINE 40 MG: 40 TABLET ORAL at 08:53

## 2018-01-01 RX ADMIN — OXYCODONE HYDROCHLORIDE AND ACETAMINOPHEN 1 TABLET: 5; 325 TABLET ORAL at 16:30

## 2018-01-01 RX ADMIN — DOCUSATE SODIUM 100 MG: 100 CAPSULE, LIQUID FILLED ORAL at 21:00

## 2018-01-01 RX ADMIN — Medication: at 09:48

## 2018-01-01 RX ADMIN — MEROPENEM 500 MG: 500 INJECTION, POWDER, FOR SOLUTION INTRAVENOUS at 08:44

## 2018-01-01 RX ADMIN — TAZOBACTAM SODIUM AND PIPERACILLIN SODIUM 2.25 G: 250; 2 INJECTION, SOLUTION INTRAVENOUS at 06:09

## 2018-01-01 RX ADMIN — SODIUM CHLORIDE 1000 ML: 9 INJECTION, SOLUTION INTRAVENOUS at 14:22

## 2018-01-01 RX ADMIN — FOLIC ACID 1 MG: 1 TABLET ORAL at 19:09

## 2018-01-01 RX ADMIN — MEROPENEM 500 MG: 500 INJECTION, POWDER, FOR SOLUTION INTRAVENOUS at 00:22

## 2018-01-01 RX ADMIN — CYANOCOBALAMIN TAB 1000 MCG 1000 MCG: 1000 TAB at 08:52

## 2018-01-01 RX ADMIN — OXYCODONE HYDROCHLORIDE AND ACETAMINOPHEN 1 TABLET: 5; 325 TABLET ORAL at 12:13

## 2018-01-01 RX ADMIN — LEVOTHYROXINE SODIUM 50 MCG: 50 TABLET ORAL at 05:26

## 2018-01-01 RX ADMIN — NYSTATIN: 100000 CREAM TOPICAL at 10:18

## 2018-01-01 RX ADMIN — LEVOTHYROXINE SODIUM 50 MCG: 50 TABLET ORAL at 05:40

## 2018-01-01 RX ADMIN — DILTIAZEM HYDROCHLORIDE 60 MG: 60 TABLET, FILM COATED ORAL at 21:26

## 2018-01-01 RX ADMIN — DILTIAZEM HYDROCHLORIDE 120 MG: 120 CAPSULE, COATED, EXTENDED RELEASE ORAL at 09:08

## 2018-01-01 RX ADMIN — MEROPENEM 1 G: 1 INJECTION, POWDER, FOR SOLUTION INTRAVENOUS at 23:50

## 2018-01-01 RX ADMIN — METOPROLOL SUCCINATE 25 MG: 25 TABLET, EXTENDED RELEASE ORAL at 08:50

## 2018-01-01 RX ADMIN — FLUCONAZOLE 100 MG: 100 TABLET ORAL at 09:13

## 2018-01-01 RX ADMIN — POLYETHYLENE GLYCOL 3350 17 G: 17 POWDER, FOR SOLUTION ORAL at 09:12

## 2018-01-01 RX ADMIN — Medication 3 ML: at 10:57

## 2018-01-01 RX ADMIN — OXYCODONE HYDROCHLORIDE AND ACETAMINOPHEN 1 TABLET: 7.5; 325 TABLET ORAL at 10:54

## 2018-01-01 RX ADMIN — FAMOTIDINE 40 MG: 40 TABLET ORAL at 09:01

## 2018-01-01 RX ADMIN — NYSTATIN: 100000 CREAM TOPICAL at 21:50

## 2018-01-01 RX ADMIN — FINASTERIDE 5 MG: 5 TABLET, FILM COATED ORAL at 08:37

## 2018-01-01 RX ADMIN — ASPIRIN 81 MG CHEWABLE TABLET 81 MG: 81 TABLET CHEWABLE at 09:10

## 2018-01-01 RX ADMIN — AZITHROMYCIN MONOHYDRATE 500 MG: 500 INJECTION, POWDER, LYOPHILIZED, FOR SOLUTION INTRAVENOUS at 04:15

## 2018-01-01 RX ADMIN — LEVOTHYROXINE SODIUM 50 MCG: 50 TABLET ORAL at 06:34

## 2018-01-01 RX ADMIN — ASPIRIN 81 MG CHEWABLE TABLET 81 MG: 81 TABLET CHEWABLE at 08:45

## 2018-01-01 RX ADMIN — BISACODYL 10 MG: 5 TABLET, COATED ORAL at 08:36

## 2018-01-01 RX ADMIN — Medication 3 ML: at 22:14

## 2018-01-01 RX ADMIN — ASPIRIN 81 MG CHEWABLE TABLET 81 MG: 81 TABLET CHEWABLE at 08:52

## 2018-01-01 RX ADMIN — BACITRACIN: 500 OINTMENT TOPICAL at 17:26

## 2018-01-01 RX ADMIN — MEROPENEM 1 G: 1 INJECTION, POWDER, FOR SOLUTION INTRAVENOUS at 13:09

## 2018-01-01 RX ADMIN — INSULIN ASPART 2 UNITS: 100 INJECTION, SOLUTION INTRAVENOUS; SUBCUTANEOUS at 21:09

## 2018-01-01 RX ADMIN — MUPIROCIN: 20 OINTMENT TOPICAL at 09:15

## 2018-01-01 RX ADMIN — Medication: at 09:56

## 2018-01-01 RX ADMIN — FOLIC ACID 1 MG: 1 TABLET ORAL at 09:29

## 2018-01-01 RX ADMIN — LEVOTHYROXINE SODIUM 50 MCG: 50 TABLET ORAL at 05:15

## 2018-01-01 RX ADMIN — CYANOCOBALAMIN TAB 1000 MCG 1000 MCG: 1000 TAB at 07:43

## 2018-01-01 RX ADMIN — POLYETHYLENE GLYCOL 3350 17 G: 17 POWDER, FOR SOLUTION ORAL at 11:19

## 2018-01-01 RX ADMIN — FLUTICASONE PROPIONATE 1 SPRAY: 50 SPRAY, METERED NASAL at 09:44

## 2018-01-01 RX ADMIN — MEROPENEM 500 MG: 500 INJECTION, POWDER, FOR SOLUTION INTRAVENOUS at 10:42

## 2018-01-01 RX ADMIN — MEROPENEM 1 G: 1 INJECTION, POWDER, FOR SOLUTION INTRAVENOUS at 11:37

## 2018-01-01 RX ADMIN — CYCLOBENZAPRINE HYDROCHLORIDE 5 MG: 5 TABLET, FILM COATED ORAL at 23:07

## 2018-01-01 RX ADMIN — OXYCODONE HYDROCHLORIDE AND ACETAMINOPHEN 1 TABLET: 5; 325 TABLET ORAL at 06:24

## 2018-01-01 RX ADMIN — HEPARIN SODIUM 5000 UNITS: 5000 INJECTION, SOLUTION INTRAVENOUS; SUBCUTANEOUS at 21:19

## 2018-01-01 RX ADMIN — CEFTRIAXONE 1 G: 1 INJECTION, POWDER, FOR SOLUTION INTRAMUSCULAR; INTRAVENOUS at 15:56

## 2018-01-01 RX ADMIN — FAMOTIDINE 40 MG: 40 TABLET ORAL at 10:55

## 2018-01-01 RX ADMIN — LEVOTHYROXINE SODIUM 50 MCG: 50 TABLET ORAL at 05:58

## 2018-01-01 RX ADMIN — HYDROCORTISONE: 1 CREAM TOPICAL at 20:04

## 2018-01-01 RX ADMIN — FINASTERIDE 5 MG: 5 TABLET, FILM COATED ORAL at 12:55

## 2018-01-01 RX ADMIN — PIPERACILLIN SODIUM,TAZOBACTAM SODIUM 3.38 G: 3; .375 INJECTION, POWDER, FOR SOLUTION INTRAVENOUS at 19:40

## 2018-01-01 RX ADMIN — OXYCODONE HYDROCHLORIDE AND ACETAMINOPHEN 1 TABLET: 5; 325 TABLET ORAL at 21:08

## 2018-01-01 RX ADMIN — TAMSULOSIN HYDROCHLORIDE 0.8 MG: 0.4 CAPSULE ORAL at 20:32

## 2018-01-01 RX ADMIN — MEROPENEM 1 G: 1 INJECTION, POWDER, FOR SOLUTION INTRAVENOUS at 12:20

## 2018-01-01 RX ADMIN — FLUTICASONE PROPIONATE 1 SPRAY: 50 SPRAY, METERED NASAL at 09:57

## 2018-01-01 RX ADMIN — METOPROLOL SUCCINATE 25 MG: 25 TABLET, EXTENDED RELEASE ORAL at 10:17

## 2018-01-01 RX ADMIN — METOPROLOL SUCCINATE 25 MG: 25 TABLET, EXTENDED RELEASE ORAL at 08:45

## 2018-01-01 RX ADMIN — OXYCODONE HYDROCHLORIDE AND ACETAMINOPHEN 1 TABLET: 7.5; 325 TABLET ORAL at 03:00

## 2018-01-01 RX ADMIN — SODIUM CHLORIDE 75 ML/HR: 900 INJECTION, SOLUTION INTRAVENOUS at 23:07

## 2018-01-01 RX ADMIN — ASPIRIN 81 MG: 81 TABLET, COATED ORAL at 08:00

## 2018-01-01 RX ADMIN — FOLIC ACID 1 MG: 1 TABLET ORAL at 08:52

## 2018-01-01 RX ADMIN — BUMETANIDE 1 MG: 1 TABLET ORAL at 09:08

## 2018-01-01 RX ADMIN — FERROUS SULFATE TAB EC 324 MG (65 MG FE EQUIVALENT) 324 MG: 324 (65 FE) TABLET DELAYED RESPONSE at 09:48

## 2018-01-01 RX ADMIN — TAMSULOSIN HYDROCHLORIDE 0.8 MG: 0.4 CAPSULE ORAL at 21:05

## 2018-01-01 RX ADMIN — TERAZOSIN HYDROCHLORIDE ANHYDROUS 2 MG: 2 CAPSULE ORAL at 21:24

## 2018-01-01 RX ADMIN — METOPROLOL SUCCINATE 25 MG: 25 TABLET, EXTENDED RELEASE ORAL at 08:32

## 2018-01-01 RX ADMIN — TAMSULOSIN HYDROCHLORIDE 0.8 MG: 0.4 CAPSULE ORAL at 20:49

## 2018-01-01 RX ADMIN — DOCUSATE SODIUM 100 MG: 100 CAPSULE, LIQUID FILLED ORAL at 20:13

## 2018-01-01 RX ADMIN — METOPROLOL SUCCINATE 25 MG: 50 TABLET, EXTENDED RELEASE ORAL at 10:20

## 2018-01-01 RX ADMIN — FERROUS SULFATE TAB EC 324 MG (65 MG FE EQUIVALENT) 324 MG: 324 (65 FE) TABLET DELAYED RESPONSE at 20:20

## 2018-01-01 RX ADMIN — FINASTERIDE 5 MG: 5 TABLET, FILM COATED ORAL at 09:01

## 2018-01-01 RX ADMIN — OXYCODONE HYDROCHLORIDE AND ACETAMINOPHEN 1 TABLET: 7.5; 325 TABLET ORAL at 15:12

## 2018-01-01 RX ADMIN — OXYCODONE HYDROCHLORIDE AND ACETAMINOPHEN 1 TABLET: 5; 325 TABLET ORAL at 04:22

## 2018-01-01 RX ADMIN — OXYCODONE HYDROCHLORIDE AND ACETAMINOPHEN 1 TABLET: 5; 325 TABLET ORAL at 05:42

## 2018-01-01 RX ADMIN — Medication 2 SPRAY: at 08:23

## 2018-01-01 RX ADMIN — FLUTICASONE PROPIONATE 1 SPRAY: 50 SPRAY, METERED NASAL at 09:50

## 2018-01-01 RX ADMIN — MEROPENEM 1 G: 1 INJECTION, POWDER, FOR SOLUTION INTRAVENOUS at 21:38

## 2018-01-01 RX ADMIN — BUMETANIDE 1 MG: 1 TABLET ORAL at 08:34

## 2018-01-01 RX ADMIN — FERROUS SULFATE TAB EC 324 MG (65 MG FE EQUIVALENT) 324 MG: 324 (65 FE) TABLET DELAYED RESPONSE at 09:50

## 2018-01-01 RX ADMIN — ONDANSETRON 4 MG: 2 INJECTION INTRAMUSCULAR; INTRAVENOUS at 10:58

## 2018-01-01 RX ADMIN — OXYCODONE HYDROCHLORIDE AND ACETAMINOPHEN 1 TABLET: 5; 325 TABLET ORAL at 12:38

## 2018-01-01 RX ADMIN — CEFTRIAXONE SODIUM 1 G: 1 INJECTION, POWDER, FOR SOLUTION INTRAMUSCULAR; INTRAVENOUS at 17:33

## 2018-01-01 RX ADMIN — DOCUSATE SODIUM 100 MG: 100 CAPSULE, LIQUID FILLED ORAL at 11:19

## 2018-01-01 RX ADMIN — FINASTERIDE 5 MG: 5 TABLET, FILM COATED ORAL at 08:12

## 2018-01-01 RX ADMIN — BUMETANIDE 1 MG: 1 TABLET ORAL at 09:30

## 2018-01-01 RX ADMIN — DOCUSATE SODIUM 100 MG: 100 CAPSULE, LIQUID FILLED ORAL at 20:32

## 2018-01-01 RX ADMIN — TERAZOSIN HYDROCHLORIDE ANHYDROUS 2 MG: 2 CAPSULE ORAL at 20:49

## 2018-01-01 RX ADMIN — POTASSIUM CHLORIDE 20 MEQ: 750 CAPSULE, EXTENDED RELEASE ORAL at 09:10

## 2018-01-01 RX ADMIN — LEVOFLOXACIN 500 MG: 5 INJECTION, SOLUTION INTRAVENOUS at 16:31

## 2018-01-01 RX ADMIN — IPRATROPIUM BROMIDE AND ALBUTEROL SULFATE 3 ML: 2.5; .5 SOLUTION RESPIRATORY (INHALATION) at 20:44

## 2018-01-01 RX ADMIN — POLYETHYLENE GLYCOL (3350) 17 G: 17 POWDER, FOR SOLUTION ORAL at 09:57

## 2018-01-01 RX ADMIN — HEPARIN SODIUM 5000 UNITS: 5000 INJECTION, SOLUTION INTRAVENOUS; SUBCUTANEOUS at 20:44

## 2018-01-01 RX ADMIN — OXYCODONE HYDROCHLORIDE AND ACETAMINOPHEN 1 TABLET: 7.5; 325 TABLET ORAL at 00:52

## 2018-01-01 RX ADMIN — DOCUSATE SODIUM 100 MG: 100 CAPSULE, LIQUID FILLED ORAL at 20:46

## 2018-01-01 RX ADMIN — LIDOCAINE HYDROCHLORIDE 50 MG: 20 INJECTION, SOLUTION INFILTRATION; PERINEURAL at 10:16

## 2018-01-01 RX ADMIN — OXYCODONE HYDROCHLORIDE AND ACETAMINOPHEN 1 TABLET: 7.5; 325 TABLET ORAL at 04:28

## 2018-01-01 RX ADMIN — TERAZOSIN HYDROCHLORIDE ANHYDROUS 2 MG: 2 CAPSULE ORAL at 20:05

## 2018-01-01 RX ADMIN — CYANOCOBALAMIN TAB 1000 MCG 1000 MCG: 1000 TAB at 09:47

## 2018-01-01 RX ADMIN — BUMETANIDE 1 MG: 1 TABLET ORAL at 08:12

## 2018-01-01 RX ADMIN — TERAZOSIN HYDROCHLORIDE ANHYDROUS 2 MG: 2 CAPSULE ORAL at 22:03

## 2018-01-01 RX ADMIN — OXYCODONE HYDROCHLORIDE AND ACETAMINOPHEN 1 TABLET: 5; 325 TABLET ORAL at 14:47

## 2018-01-01 RX ADMIN — METOPROLOL SUCCINATE 25 MG: 25 TABLET, EXTENDED RELEASE ORAL at 09:42

## 2018-01-01 RX ADMIN — POLYETHYLENE GLYCOL 3350 17 G: 17 POWDER, FOR SOLUTION ORAL at 09:48

## 2018-01-01 RX ADMIN — TAMSULOSIN HYDROCHLORIDE 0.8 MG: 0.4 CAPSULE ORAL at 22:02

## 2018-01-01 RX ADMIN — FINASTERIDE 5 MG: 5 TABLET, FILM COATED ORAL at 09:31

## 2018-01-01 RX ADMIN — POLYETHYLENE GLYCOL 3350 17 G: 17 POWDER, FOR SOLUTION ORAL at 09:04

## 2018-01-01 RX ADMIN — TECHNETIUM TC 99M SESTAMIBI 1 DOSE: 1 INJECTION INTRAVENOUS at 09:55

## 2018-01-01 RX ADMIN — DOCUSATE SODIUM 100 MG: 100 CAPSULE, LIQUID FILLED ORAL at 09:57

## 2018-01-01 RX ADMIN — OXYCODONE HYDROCHLORIDE AND ACETAMINOPHEN 1 TABLET: 7.5; 325 TABLET ORAL at 10:25

## 2018-01-01 RX ADMIN — DILTIAZEM HYDROCHLORIDE 60 MG: 60 TABLET, FILM COATED ORAL at 09:42

## 2018-01-01 RX ADMIN — ASPIRIN 81 MG: 81 TABLET, COATED ORAL at 10:00

## 2018-01-01 RX ADMIN — MIDAZOLAM 0.5 MG: 1 INJECTION INTRAMUSCULAR; INTRAVENOUS at 13:33

## 2018-01-01 RX ADMIN — MUPIROCIN: 20 OINTMENT TOPICAL at 08:32

## 2018-01-01 RX ADMIN — FINASTERIDE 5 MG: 5 TABLET, FILM COATED ORAL at 10:25

## 2018-01-01 RX ADMIN — LEVOTHYROXINE SODIUM 50 MCG: 50 TABLET ORAL at 06:40

## 2018-01-01 RX ADMIN — FLUTICASONE PROPIONATE 2 SPRAY: 50 SPRAY, METERED NASAL at 08:13

## 2018-01-01 RX ADMIN — FAMOTIDINE 40 MG: 40 TABLET ORAL at 08:19

## 2018-01-01 RX ADMIN — OXYCODONE HYDROCHLORIDE AND ACETAMINOPHEN 1 TABLET: 7.5; 325 TABLET ORAL at 09:17

## 2018-01-01 RX ADMIN — FINASTERIDE 5 MG: 5 TABLET, FILM COATED ORAL at 08:28

## 2018-01-01 RX ADMIN — VANCOMYCIN HYDROCHLORIDE 1000 MG: 1 INJECTION, POWDER, LYOPHILIZED, FOR SOLUTION INTRAVENOUS at 16:41

## 2018-01-01 RX ADMIN — MEROPENEM 500 MG: 500 INJECTION, POWDER, FOR SOLUTION INTRAVENOUS at 15:12

## 2018-01-01 RX ADMIN — IPRATROPIUM BROMIDE AND ALBUTEROL SULFATE 3 ML: 2.5; .5 SOLUTION RESPIRATORY (INHALATION) at 10:52

## 2018-01-01 RX ADMIN — IOPAMIDOL 92 ML: 755 INJECTION, SOLUTION INTRAVENOUS at 15:29

## 2018-01-01 RX ADMIN — TERAZOSIN HYDROCHLORIDE ANHYDROUS 2 MG: 2 CAPSULE ORAL at 21:49

## 2018-01-01 RX ADMIN — Medication: at 09:01

## 2018-01-01 RX ADMIN — BUMETANIDE 1 MG: 1 TABLET ORAL at 09:13

## 2018-01-01 RX ADMIN — IPRATROPIUM BROMIDE AND ALBUTEROL SULFATE 3 ML: 2.5; .5 SOLUTION RESPIRATORY (INHALATION) at 06:30

## 2018-01-01 RX ADMIN — FAMOTIDINE 40 MG: 40 TABLET ORAL at 09:49

## 2018-01-01 RX ADMIN — BACITRACIN: 500 OINTMENT TOPICAL at 08:46

## 2018-01-01 RX ADMIN — METOPROLOL SUCCINATE 25 MG: 25 TABLET, EXTENDED RELEASE ORAL at 08:47

## 2018-01-01 RX ADMIN — INSULIN ASPART 2 UNITS: 100 INJECTION, SOLUTION INTRAVENOUS; SUBCUTANEOUS at 20:28

## 2018-01-01 RX ADMIN — IPRATROPIUM BROMIDE AND ALBUTEROL SULFATE 3 ML: 2.5; .5 SOLUTION RESPIRATORY (INHALATION) at 07:11

## 2018-01-01 RX ADMIN — FINASTERIDE 5 MG: 5 TABLET, FILM COATED ORAL at 09:47

## 2018-01-01 RX ADMIN — LEVOTHYROXINE SODIUM 50 MCG: 50 TABLET ORAL at 05:51

## 2018-01-01 RX ADMIN — TERAZOSIN HYDROCHLORIDE ANHYDROUS 2 MG: 2 CAPSULE ORAL at 21:30

## 2018-01-01 RX ADMIN — CLOPIDOGREL BISULFATE 75 MG: 75 TABLET ORAL at 10:09

## 2018-01-01 RX ADMIN — TAZOBACTAM SODIUM AND PIPERACILLIN SODIUM 4.5 G: 500; 4 INJECTION, SOLUTION INTRAVENOUS at 15:25

## 2018-01-01 RX ADMIN — POLYETHYLENE GLYCOL 3350 17 G: 17 POWDER, FOR SOLUTION ORAL at 08:32

## 2018-01-01 RX ADMIN — MEROPENEM 1 G: 1 INJECTION, POWDER, FOR SOLUTION INTRAVENOUS at 13:12

## 2018-01-01 RX ADMIN — Medication 1 G: at 08:13

## 2018-01-01 RX ADMIN — IPRATROPIUM BROMIDE AND ALBUTEROL SULFATE 3 ML: 2.5; .5 SOLUTION RESPIRATORY (INHALATION) at 14:46

## 2018-01-01 RX ADMIN — LEVOFLOXACIN 500 MG: 5 INJECTION, SOLUTION INTRAVENOUS at 04:15

## 2018-01-01 RX ADMIN — Medication 3 ML: at 21:21

## 2018-01-01 RX ADMIN — METOPROLOL TARTRATE 25 MG: 25 TABLET ORAL at 15:30

## 2018-01-01 RX ADMIN — CEPHALEXIN 500 MG: 500 CAPSULE ORAL at 17:48

## 2018-01-01 RX ADMIN — FINASTERIDE 5 MG: 5 TABLET, FILM COATED ORAL at 10:54

## 2018-01-01 RX ADMIN — CEFTRIAXONE SODIUM 1 G: 1 INJECTION, POWDER, FOR SOLUTION INTRAMUSCULAR; INTRAVENOUS at 08:34

## 2018-01-01 RX ADMIN — CEFTRIAXONE 1 G: 1 INJECTION, POWDER, FOR SOLUTION INTRAMUSCULAR; INTRAVENOUS at 02:22

## 2018-01-01 RX ADMIN — BUMETANIDE 1 MG: 1 TABLET ORAL at 09:48

## 2018-01-01 RX ADMIN — IPRATROPIUM BROMIDE AND ALBUTEROL SULFATE 3 ML: 2.5; .5 SOLUTION RESPIRATORY (INHALATION) at 10:41

## 2018-01-01 RX ADMIN — Medication 3 ML: at 09:52

## 2018-01-01 RX ADMIN — OXYCODONE HYDROCHLORIDE AND ACETAMINOPHEN 1 TABLET: 5; 325 TABLET ORAL at 08:51

## 2018-01-01 RX ADMIN — MEROPENEM 500 MG: 500 INJECTION, POWDER, FOR SOLUTION INTRAVENOUS at 16:21

## 2018-01-01 RX ADMIN — OXYCODONE HYDROCHLORIDE AND ACETAMINOPHEN 1 TABLET: 5; 325 TABLET ORAL at 21:46

## 2018-01-01 RX ADMIN — TERAZOSIN HYDROCHLORIDE ANHYDROUS 2 MG: 2 CAPSULE ORAL at 20:00

## 2018-01-01 RX ADMIN — DILTIAZEM HYDROCHLORIDE 60 MG: 60 TABLET, FILM COATED ORAL at 06:04

## 2018-01-01 RX ADMIN — MEROPENEM 500 MG: 500 INJECTION, POWDER, FOR SOLUTION INTRAVENOUS at 08:53

## 2018-01-01 RX ADMIN — DOCUSATE SODIUM 100 MG: 100 CAPSULE, LIQUID FILLED ORAL at 21:30

## 2018-01-01 RX ADMIN — MUPIROCIN: 20 OINTMENT TOPICAL at 21:31

## 2018-01-01 RX ADMIN — ACETAMINOPHEN 650 MG: 650 SUPPOSITORY RECTAL at 01:10

## 2018-01-01 RX ADMIN — POTASSIUM CHLORIDE 20 MEQ: 750 CAPSULE, EXTENDED RELEASE ORAL at 08:34

## 2018-01-01 RX ADMIN — INSULIN ASPART 4 UNITS: 100 INJECTION, SOLUTION INTRAVENOUS; SUBCUTANEOUS at 21:05

## 2018-01-01 RX ADMIN — TAMSULOSIN HYDROCHLORIDE 0.8 MG: 0.4 CAPSULE ORAL at 20:53

## 2018-01-01 RX ADMIN — CYANOCOBALAMIN TAB 1000 MCG 1000 MCG: 1000 TAB at 08:35

## 2018-01-01 RX ADMIN — SODIUM CHLORIDE 100 ML/HR: 9 INJECTION, SOLUTION INTRAVENOUS at 02:30

## 2018-01-01 RX ADMIN — BACITRACIN: 500 OINTMENT TOPICAL at 09:09

## 2018-01-01 RX ADMIN — BACITRACIN: 500 OINTMENT TOPICAL at 15:51

## 2018-01-01 RX ADMIN — FLUCONAZOLE 100 MG: 100 TABLET ORAL at 13:31

## 2018-01-01 RX ADMIN — DOCUSATE SODIUM 100 MG: 100 CAPSULE, LIQUID FILLED ORAL at 21:26

## 2018-01-01 RX ADMIN — FENTANYL CITRATE 25 MCG: 50 INJECTION, SOLUTION INTRAMUSCULAR; INTRAVENOUS at 13:33

## 2018-01-01 RX ADMIN — Medication 3 ML: at 20:34

## 2018-01-01 RX ADMIN — MORPHINE SULFATE 1 MG: 2 INJECTION, SOLUTION INTRAMUSCULAR; INTRAVENOUS at 20:58

## 2018-01-01 RX ADMIN — HEPARIN SODIUM 5000 UNITS: 5000 INJECTION, SOLUTION INTRAVENOUS; SUBCUTANEOUS at 05:48

## 2018-01-01 RX ADMIN — FINASTERIDE 5 MG: 5 TABLET, FILM COATED ORAL at 08:48

## 2018-01-01 RX ADMIN — FLUTICASONE PROPIONATE 1 SPRAY: 50 SPRAY, METERED NASAL at 08:59

## 2018-01-01 RX ADMIN — HEPARIN SODIUM 5000 UNITS: 5000 INJECTION, SOLUTION INTRAVENOUS; SUBCUTANEOUS at 21:37

## 2018-01-01 RX ADMIN — MEROPENEM 1 G: 1 INJECTION, POWDER, FOR SOLUTION INTRAVENOUS at 18:00

## 2018-01-01 RX ADMIN — OXYCODONE HYDROCHLORIDE AND ACETAMINOPHEN 1 TABLET: 7.5; 325 TABLET ORAL at 21:20

## 2018-01-01 RX ADMIN — FINASTERIDE 5 MG: 5 TABLET, FILM COATED ORAL at 13:28

## 2018-01-01 RX ADMIN — CEFTRIAXONE SODIUM 1 G: 1 INJECTION, POWDER, FOR SOLUTION INTRAMUSCULAR; INTRAVENOUS at 11:59

## 2018-01-01 RX ADMIN — DILTIAZEM HYDROCHLORIDE 60 MG: 60 TABLET, FILM COATED ORAL at 09:37

## 2018-01-01 RX ADMIN — TERAZOSIN HYDROCHLORIDE ANHYDROUS 2 MG: 2 CAPSULE ORAL at 20:53

## 2018-01-01 RX ADMIN — FINASTERIDE 5 MG: 5 TABLET, FILM COATED ORAL at 08:47

## 2018-01-01 RX ADMIN — DILTIAZEM HYDROCHLORIDE 60 MG: 60 TABLET, FILM COATED ORAL at 13:12

## 2018-01-01 RX ADMIN — DILTIAZEM HYDROCHLORIDE 60 MG: 60 TABLET, FILM COATED ORAL at 09:10

## 2018-01-01 RX ADMIN — IPRATROPIUM BROMIDE AND ALBUTEROL SULFATE 3 ML: 2.5; .5 SOLUTION RESPIRATORY (INHALATION) at 11:53

## 2018-01-01 RX ADMIN — ESMOLOL HYDROCHLORIDE 50 MCG/KG/MIN: 10 INJECTION INTRAVENOUS at 12:13

## 2018-01-01 RX ADMIN — CYANOCOBALAMIN TAB 1000 MCG 1000 MCG: 1000 TAB at 19:09

## 2018-01-01 RX ADMIN — CEFTRIAXONE 1 G: 1 INJECTION, POWDER, FOR SOLUTION INTRAMUSCULAR; INTRAVENOUS at 14:52

## 2018-01-01 RX ADMIN — METOPROLOL SUCCINATE 25 MG: 25 TABLET, EXTENDED RELEASE ORAL at 09:47

## 2018-01-01 RX ADMIN — FINASTERIDE 5 MG: 5 TABLET, FILM COATED ORAL at 08:50

## 2018-01-01 RX ADMIN — IPRATROPIUM BROMIDE AND ALBUTEROL SULFATE 3 ML: 2.5; .5 SOLUTION RESPIRATORY (INHALATION) at 14:48

## 2018-01-01 RX ADMIN — IPRATROPIUM BROMIDE AND ALBUTEROL SULFATE 3 ML: 2.5; .5 SOLUTION RESPIRATORY (INHALATION) at 08:04

## 2018-01-01 RX ADMIN — IPRATROPIUM BROMIDE AND ALBUTEROL SULFATE 3 ML: 2.5; .5 SOLUTION RESPIRATORY (INHALATION) at 08:33

## 2018-01-01 RX ADMIN — LEVOTHYROXINE SODIUM 50 MCG: 50 TABLET ORAL at 06:26

## 2018-01-01 RX ADMIN — FERROUS SULFATE TAB EC 324 MG (65 MG FE EQUIVALENT) 324 MG: 324 (65 FE) TABLET DELAYED RESPONSE at 10:10

## 2018-01-01 RX ADMIN — METOPROLOL SUCCINATE 25 MG: 50 TABLET, EXTENDED RELEASE ORAL at 09:20

## 2018-01-01 RX ADMIN — HYDROCORTISONE: 1 CREAM TOPICAL at 21:37

## 2018-01-01 RX ADMIN — BISACODYL 10 MG: 5 TABLET, COATED ORAL at 08:52

## 2018-01-01 RX ADMIN — FENTANYL CITRATE 50 MCG: 50 INJECTION, SOLUTION INTRAMUSCULAR; INTRAVENOUS at 10:16

## 2018-01-01 RX ADMIN — MUPIROCIN: 20 OINTMENT TOPICAL at 21:40

## 2018-01-01 RX ADMIN — LEVOTHYROXINE SODIUM 50 MCG: 50 TABLET ORAL at 05:39

## 2018-01-01 RX ADMIN — HYDROCORTISONE: 1 CREAM TOPICAL at 22:14

## 2018-01-01 RX ADMIN — ASPIRIN 81 MG CHEWABLE TABLET 81 MG: 81 TABLET CHEWABLE at 10:23

## 2018-01-01 RX ADMIN — ALBUTEROL SULFATE 10 MG: 2.5 SOLUTION RESPIRATORY (INHALATION) at 09:43

## 2018-01-01 RX ADMIN — DOCUSATE SODIUM 100 MG: 100 CAPSULE, LIQUID FILLED ORAL at 08:49

## 2018-01-01 RX ADMIN — CLOPIDOGREL BISULFATE 75 MG: 75 TABLET ORAL at 09:48

## 2018-01-01 RX ADMIN — FAMOTIDINE 40 MG: 40 TABLET ORAL at 08:45

## 2018-01-01 RX ADMIN — CEFTRIAXONE SODIUM 1 G: 1 INJECTION, POWDER, FOR SOLUTION INTRAMUSCULAR; INTRAVENOUS at 12:30

## 2018-01-01 RX ADMIN — FINASTERIDE 5 MG: 5 TABLET, FILM COATED ORAL at 08:19

## 2018-01-01 RX ADMIN — LEVOTHYROXINE SODIUM 50 MCG: 50 TABLET ORAL at 10:21

## 2018-01-01 RX ADMIN — BACITRACIN: 500 OINTMENT TOPICAL at 09:30

## 2018-01-01 RX ADMIN — BUMETANIDE 1 MG: 1 TABLET ORAL at 08:52

## 2018-01-01 RX ADMIN — IPRATROPIUM BROMIDE AND ALBUTEROL SULFATE 3 ML: 2.5; .5 SOLUTION RESPIRATORY (INHALATION) at 06:54

## 2018-01-01 RX ADMIN — ZIPRASIDONE MESYLATE 10 MG: 20 INJECTION, POWDER, LYOPHILIZED, FOR SOLUTION INTRAMUSCULAR at 21:48

## 2018-01-01 RX ADMIN — DOCUSATE SODIUM 100 MG: 100 CAPSULE, LIQUID FILLED ORAL at 09:20

## 2018-01-01 RX ADMIN — TAMSULOSIN HYDROCHLORIDE 0.8 MG: 0.4 CAPSULE ORAL at 20:00

## 2018-01-01 RX ADMIN — DILTIAZEM HYDROCHLORIDE 120 MG: 120 CAPSULE, COATED, EXTENDED RELEASE ORAL at 19:08

## 2018-01-01 RX ADMIN — CYANOCOBALAMIN TAB 1000 MCG 1000 MCG: 1000 TAB at 08:12

## 2018-01-01 RX ADMIN — HEPARIN SODIUM 5000 UNITS: 5000 INJECTION, SOLUTION INTRAVENOUS; SUBCUTANEOUS at 08:58

## 2018-01-01 RX ADMIN — DILTIAZEM HYDROCHLORIDE 60 MG: 60 TABLET, FILM COATED ORAL at 01:04

## 2018-01-01 RX ADMIN — LEVOTHYROXINE SODIUM 50 MCG: 50 TABLET ORAL at 09:46

## 2018-01-01 RX ADMIN — MEROPENEM 500 MG: 500 INJECTION, POWDER, FOR SOLUTION INTRAVENOUS at 15:53

## 2018-01-01 RX ADMIN — HYDROCORTISONE: 1 CREAM TOPICAL at 08:52

## 2018-01-01 RX ADMIN — CYANOCOBALAMIN TAB 1000 MCG 1000 MCG: 1000 TAB at 09:00

## 2018-01-01 RX ADMIN — LEVOTHYROXINE SODIUM 50 MCG: 50 TABLET ORAL at 05:43

## 2018-01-01 RX ADMIN — MEROPENEM 1 G: 1 INJECTION, POWDER, FOR SOLUTION INTRAVENOUS at 21:19

## 2018-01-01 RX ADMIN — FOLIC ACID 1 MG: 1 TABLET ORAL at 10:24

## 2018-01-01 RX ADMIN — DOCUSATE SODIUM 100 MG: 100 CAPSULE, LIQUID FILLED ORAL at 08:52

## 2018-01-01 RX ADMIN — TAMSULOSIN HYDROCHLORIDE 0.8 MG: 0.4 CAPSULE ORAL at 21:14

## 2018-01-01 RX ADMIN — ACETAMINOPHEN 650 MG: 325 TABLET ORAL at 12:13

## 2018-01-01 RX ADMIN — SODIUM CHLORIDE 75 ML/HR: 9 INJECTION, SOLUTION INTRAVENOUS at 12:27

## 2018-01-01 RX ADMIN — Medication 1 APPLICATION: at 08:13

## 2018-01-01 RX ADMIN — OXYCODONE HYDROCHLORIDE AND ACETAMINOPHEN 1 TABLET: 5; 325 TABLET ORAL at 16:14

## 2018-01-01 RX ADMIN — HEPARIN SODIUM 5000 UNITS: 5000 INJECTION, SOLUTION INTRAVENOUS; SUBCUTANEOUS at 15:49

## 2018-01-01 RX ADMIN — FLUCONAZOLE 100 MG: 100 TABLET ORAL at 13:54

## 2018-01-01 RX ADMIN — POLYETHYLENE GLYCOL (3350) 17 G: 17 POWDER, FOR SOLUTION ORAL at 09:12

## 2018-01-01 RX ADMIN — Medication 10 ML: at 09:00

## 2018-01-01 RX ADMIN — NYSTATIN: 100000 CREAM TOPICAL at 08:23

## 2018-01-01 RX ADMIN — VANCOMYCIN HYDROCHLORIDE 1000 MG: 1 INJECTION, POWDER, LYOPHILIZED, FOR SOLUTION INTRAVENOUS at 13:30

## 2018-01-01 RX ADMIN — BACITRACIN: 500 OINTMENT TOPICAL at 09:06

## 2018-01-01 RX ADMIN — FERROUS SULFATE TAB EC 324 MG (65 MG FE EQUIVALENT) 324 MG: 324 (65 FE) TABLET DELAYED RESPONSE at 12:11

## 2018-01-01 RX ADMIN — TERAZOSIN HYDROCHLORIDE ANHYDROUS 2 MG: 2 CAPSULE ORAL at 20:56

## 2018-01-01 RX ADMIN — FINASTERIDE 5 MG: 5 TABLET, FILM COATED ORAL at 07:43

## 2018-01-01 RX ADMIN — FAMOTIDINE 40 MG: 40 TABLET ORAL at 09:48

## 2018-01-01 RX ADMIN — TAMSULOSIN HYDROCHLORIDE 0.8 MG: 0.4 CAPSULE ORAL at 20:26

## 2018-01-01 RX ADMIN — DILTIAZEM HYDROCHLORIDE 120 MG: 120 CAPSULE, COATED, EXTENDED RELEASE ORAL at 09:14

## 2018-01-01 RX ADMIN — Medication 1 G: at 08:49

## 2018-01-01 RX ADMIN — METOPROLOL TARTRATE 25 MG: 25 TABLET ORAL at 08:13

## 2018-01-01 RX ADMIN — HYDROCORTISONE: 1 CREAM TOPICAL at 22:56

## 2018-01-01 RX ADMIN — METOPROLOL SUCCINATE 25 MG: 25 TABLET, EXTENDED RELEASE ORAL at 08:46

## 2018-01-01 RX ADMIN — Medication 1 G: at 09:57

## 2018-01-01 RX ADMIN — POTASSIUM CHLORIDE 10 MEQ: 750 CAPSULE, EXTENDED RELEASE ORAL at 09:01

## 2018-01-01 RX ADMIN — LEVOTHYROXINE SODIUM 50 MCG: 50 TABLET ORAL at 05:23

## 2018-01-01 RX ADMIN — DILTIAZEM HYDROCHLORIDE 60 MG: 60 TABLET, FILM COATED ORAL at 06:40

## 2018-01-01 RX ADMIN — IPRATROPIUM BROMIDE AND ALBUTEROL SULFATE 3 ML: 2.5; .5 SOLUTION RESPIRATORY (INHALATION) at 20:50

## 2018-01-01 RX ADMIN — TAMSULOSIN HYDROCHLORIDE 0.8 MG: 0.4 CAPSULE ORAL at 22:37

## 2018-01-01 RX ADMIN — Medication: at 08:56

## 2018-01-01 RX ADMIN — FOLIC ACID 1 MG: 1 TABLET ORAL at 08:47

## 2018-01-01 RX ADMIN — NYSTATIN: 100000 CREAM TOPICAL at 09:44

## 2018-01-01 RX ADMIN — FINASTERIDE 5 MG: 5 TABLET, FILM COATED ORAL at 08:58

## 2018-01-01 RX ADMIN — HEPARIN SODIUM 5000 UNITS: 5000 INJECTION, SOLUTION INTRAVENOUS; SUBCUTANEOUS at 21:46

## 2018-01-01 RX ADMIN — IPRATROPIUM BROMIDE AND ALBUTEROL SULFATE 3 ML: 2.5; .5 SOLUTION RESPIRATORY (INHALATION) at 15:03

## 2018-01-01 RX ADMIN — METOPROLOL SUCCINATE 25 MG: 25 TABLET, EXTENDED RELEASE ORAL at 19:08

## 2018-01-01 RX ADMIN — DILTIAZEM HYDROCHLORIDE 60 MG: 60 TABLET, FILM COATED ORAL at 10:03

## 2018-01-01 RX ADMIN — Medication 10 ML: at 08:34

## 2018-01-01 RX ADMIN — DILTIAZEM HYDROCHLORIDE 60 MG: 60 TABLET, FILM COATED ORAL at 05:56

## 2018-01-01 RX ADMIN — LEVOTHYROXINE SODIUM 50 MCG: 50 TABLET ORAL at 20:42

## 2018-01-01 RX ADMIN — MUPIROCIN: 20 OINTMENT TOPICAL at 08:00

## 2018-01-01 RX ADMIN — FERROUS SULFATE TAB EC 324 MG (65 MG FE EQUIVALENT) 324 MG: 324 (65 FE) TABLET DELAYED RESPONSE at 14:22

## 2018-01-01 RX ADMIN — DOCUSATE SODIUM 100 MG: 100 CAPSULE, LIQUID FILLED ORAL at 08:37

## 2018-01-01 RX ADMIN — ACETAMINOPHEN 650 MG: 325 TABLET ORAL at 21:51

## 2018-01-01 RX ADMIN — TAMSULOSIN HYDROCHLORIDE 0.8 MG: 0.4 CAPSULE ORAL at 21:26

## 2018-01-01 RX ADMIN — TERAZOSIN HYDROCHLORIDE ANHYDROUS 2 MG: 2 CAPSULE ORAL at 21:32

## 2018-01-01 RX ADMIN — SODIUM CHLORIDE 50 ML/HR: 900 INJECTION, SOLUTION INTRAVENOUS at 00:46

## 2018-01-01 RX ADMIN — FOLIC ACID 1 MG: 1 TABLET ORAL at 08:13

## 2018-01-01 RX ADMIN — DILTIAZEM HYDROCHLORIDE 60 MG: 60 TABLET, FILM COATED ORAL at 12:59

## 2018-01-01 RX ADMIN — CYANOCOBALAMIN TAB 1000 MCG 1000 MCG: 1000 TAB at 09:40

## 2018-01-01 RX ADMIN — OXYCODONE HYDROCHLORIDE AND ACETAMINOPHEN 1 TABLET: 5; 325 TABLET ORAL at 09:11

## 2018-01-01 RX ADMIN — IPRATROPIUM BROMIDE AND ALBUTEROL SULFATE 3 ML: 2.5; .5 SOLUTION RESPIRATORY (INHALATION) at 15:10

## 2018-01-01 RX ADMIN — CEFEPIME HYDROCHLORIDE 2 G: 2 INJECTION, POWDER, FOR SOLUTION INTRAVENOUS at 12:25

## 2018-01-01 RX ADMIN — Medication 1 G: at 21:21

## 2018-01-01 RX ADMIN — TAMSULOSIN HYDROCHLORIDE 0.8 MG: 0.4 CAPSULE ORAL at 21:32

## 2018-01-01 RX ADMIN — FOLIC ACID 1 MG: 1 TABLET ORAL at 10:57

## 2018-01-01 RX ADMIN — DILTIAZEM HYDROCHLORIDE 60 MG: 60 TABLET, FILM COATED ORAL at 17:28

## 2018-01-01 RX ADMIN — POLYETHYLENE GLYCOL 3350 17 G: 17 POWDER, FOR SOLUTION ORAL at 10:42

## 2018-01-01 RX ADMIN — IPRATROPIUM BROMIDE AND ALBUTEROL SULFATE 3 ML: 2.5; .5 SOLUTION RESPIRATORY (INHALATION) at 10:53

## 2018-01-01 RX ADMIN — BACITRACIN: 500 OINTMENT TOPICAL at 08:55

## 2018-01-01 RX ADMIN — FLUTICASONE PROPIONATE 1 SPRAY: 50 SPRAY, METERED NASAL at 08:49

## 2018-01-01 RX ADMIN — DILTIAZEM HYDROCHLORIDE 60 MG: 60 TABLET, FILM COATED ORAL at 13:52

## 2018-01-01 RX ADMIN — BUMETANIDE 1 MG: 1 TABLET ORAL at 08:32

## 2018-01-01 RX ADMIN — FINASTERIDE 5 MG: 5 TABLET, FILM COATED ORAL at 09:57

## 2018-01-01 RX ADMIN — DILTIAZEM HYDROCHLORIDE 60 MG: 60 TABLET, FILM COATED ORAL at 12:20

## 2018-01-01 RX ADMIN — FAMOTIDINE 40 MG: 40 TABLET ORAL at 10:17

## 2018-01-01 RX ADMIN — FOLIC ACID 1 MG: 1 TABLET ORAL at 12:55

## 2018-01-01 RX ADMIN — Medication 10 ML: at 10:19

## 2018-01-01 RX ADMIN — LEVOTHYROXINE SODIUM 50 MCG: 50 TABLET ORAL at 08:28

## 2018-01-01 RX ADMIN — HEPARIN SODIUM 5000 UNITS: 5000 INJECTION, SOLUTION INTRAVENOUS; SUBCUTANEOUS at 21:24

## 2018-01-01 RX ADMIN — FAMOTIDINE 40 MG: 40 TABLET ORAL at 09:11

## 2018-01-01 RX ADMIN — POLYETHYLENE GLYCOL 3350 17 G: 17 POWDER, FOR SOLUTION ORAL at 09:15

## 2018-01-01 RX ADMIN — DOCUSATE SODIUM 100 MG: 100 CAPSULE, LIQUID FILLED ORAL at 08:53

## 2018-01-01 RX ADMIN — BISACODYL 10 MG: 5 TABLET, COATED ORAL at 08:33

## 2018-01-01 RX ADMIN — DILTIAZEM HYDROCHLORIDE 60 MG: 60 TABLET, FILM COATED ORAL at 12:43

## 2018-01-01 RX ADMIN — IPRATROPIUM BROMIDE AND ALBUTEROL SULFATE 3 ML: 2.5; .5 SOLUTION RESPIRATORY (INHALATION) at 10:13

## 2018-01-01 RX ADMIN — ASPIRIN 81 MG: 81 TABLET, COATED ORAL at 09:29

## 2018-01-01 RX ADMIN — LEVOTHYROXINE SODIUM 50 MCG: 50 TABLET ORAL at 06:05

## 2018-01-01 RX ADMIN — OXYCODONE HYDROCHLORIDE AND ACETAMINOPHEN 1 TABLET: 5; 325 TABLET ORAL at 09:42

## 2018-01-01 RX ADMIN — HEPARIN SODIUM 5000 UNITS: 5000 INJECTION, SOLUTION INTRAVENOUS; SUBCUTANEOUS at 06:42

## 2018-01-01 RX ADMIN — Medication 3 ML: at 08:56

## 2018-01-01 RX ADMIN — FINASTERIDE 5 MG: 5 TABLET, FILM COATED ORAL at 08:59

## 2018-01-01 RX ADMIN — ASPIRIN 81 MG CHEWABLE TABLET 81 MG: 81 TABLET CHEWABLE at 09:42

## 2018-01-01 RX ADMIN — DILTIAZEM HYDROCHLORIDE 60 MG: 60 TABLET, FILM COATED ORAL at 17:21

## 2018-01-01 RX ADMIN — FLUCONAZOLE 100 MG: 100 TABLET ORAL at 15:04

## 2018-01-01 RX ADMIN — BACITRACIN: 500 OINTMENT TOPICAL at 10:24

## 2018-01-01 RX ADMIN — Medication 10 ML: at 09:15

## 2018-01-01 RX ADMIN — FLUCONAZOLE 150 MG: 150 TABLET ORAL at 09:24

## 2018-01-01 RX ADMIN — CYANOCOBALAMIN TAB 1000 MCG 1000 MCG: 1000 TAB at 09:20

## 2018-01-01 RX ADMIN — FAMOTIDINE 40 MG: 40 TABLET ORAL at 08:48

## 2018-01-01 RX ADMIN — MEROPENEM 500 MG: 500 INJECTION, POWDER, FOR SOLUTION INTRAVENOUS at 23:26

## 2018-01-01 RX ADMIN — FLUTICASONE PROPIONATE 1 SPRAY: 50 SPRAY, METERED NASAL at 09:47

## 2018-01-01 RX ADMIN — DOCUSATE SODIUM 100 MG: 100 CAPSULE, LIQUID FILLED ORAL at 08:45

## 2018-01-01 RX ADMIN — MEROPENEM 500 MG: 500 INJECTION, POWDER, FOR SOLUTION INTRAVENOUS at 06:30

## 2018-01-01 RX ADMIN — CYANOCOBALAMIN TAB 1000 MCG 1000 MCG: 1000 TAB at 08:36

## 2018-01-01 RX ADMIN — FINASTERIDE 5 MG: 5 TABLET, FILM COATED ORAL at 08:54

## 2018-01-01 RX ADMIN — MEROPENEM 500 MG: 500 INJECTION, POWDER, FOR SOLUTION INTRAVENOUS at 10:24

## 2018-01-01 RX ADMIN — POTASSIUM CHLORIDE 40 MEQ: 1.5 POWDER, FOR SOLUTION ORAL at 10:08

## 2018-01-01 RX ADMIN — METOPROLOL SUCCINATE 25 MG: 25 TABLET, EXTENDED RELEASE ORAL at 09:01

## 2018-01-01 RX ADMIN — CETIRIZINE HYDROCHLORIDE 10 MG: 10 TABLET, FILM COATED ORAL at 09:49

## 2018-01-01 RX ADMIN — METOPROLOL SUCCINATE 25 MG: 25 TABLET, EXTENDED RELEASE ORAL at 09:51

## 2018-01-01 RX ADMIN — OXYCODONE HYDROCHLORIDE AND ACETAMINOPHEN 1 TABLET: 7.5; 325 TABLET ORAL at 10:01

## 2018-01-01 RX ADMIN — METOPROLOL SUCCINATE 25 MG: 25 TABLET, EXTENDED RELEASE ORAL at 08:19

## 2018-01-01 RX ADMIN — DILTIAZEM HYDROCHLORIDE 60 MG: 60 TABLET, FILM COATED ORAL at 18:00

## 2018-01-01 RX ADMIN — FINASTERIDE 5 MG: 5 TABLET, FILM COATED ORAL at 16:26

## 2018-01-01 RX ADMIN — HYDROCORTISONE: 1 CREAM TOPICAL at 10:26

## 2018-01-01 RX ADMIN — ASPIRIN 81 MG CHEWABLE TABLET 81 MG: 81 TABLET CHEWABLE at 10:55

## 2018-01-01 RX ADMIN — METOPROLOL SUCCINATE 25 MG: 25 TABLET, EXTENDED RELEASE ORAL at 08:48

## 2018-01-01 RX ADMIN — MEROPENEM 500 MG: 500 INJECTION, POWDER, FOR SOLUTION INTRAVENOUS at 22:30

## 2018-01-01 RX ADMIN — MORPHINE SULFATE 1 MG: 8 INJECTION INTRAVENOUS at 11:02

## 2018-01-01 RX ADMIN — SODIUM CHLORIDE, PRESERVATIVE FREE 10 ML: 5 INJECTION INTRAVENOUS at 11:58

## 2018-01-01 RX ADMIN — FAMOTIDINE 40 MG: 40 TABLET ORAL at 10:04

## 2018-01-01 RX ADMIN — DILTIAZEM HYDROCHLORIDE 60 MG: 60 TABLET, FILM COATED ORAL at 13:09

## 2018-01-01 RX ADMIN — ACETAMINOPHEN 650 MG: 325 TABLET ORAL at 21:03

## 2018-01-01 RX ADMIN — Medication 1 G: at 21:30

## 2018-01-01 RX ADMIN — BUMETANIDE 1 MG: 1 TABLET ORAL at 09:49

## 2018-01-01 RX ADMIN — BACITRACIN: 500 OINTMENT TOPICAL at 08:32

## 2018-01-01 RX ADMIN — POTASSIUM CHLORIDE 20 MEQ: 750 CAPSULE, EXTENDED RELEASE ORAL at 08:28

## 2018-01-01 RX ADMIN — OXYCODONE HYDROCHLORIDE AND ACETAMINOPHEN 1 TABLET: 7.5; 325 TABLET ORAL at 20:44

## 2018-01-01 RX ADMIN — CEPHALEXIN 500 MG: 500 CAPSULE ORAL at 08:50

## 2018-01-01 RX ADMIN — Medication: at 09:49

## 2018-01-01 RX ADMIN — POTASSIUM CHLORIDE 20 MEQ: 750 CAPSULE, EXTENDED RELEASE ORAL at 08:00

## 2018-01-01 RX ADMIN — FOLIC ACID 1 MG: 1 TABLET ORAL at 09:13

## 2018-01-01 RX ADMIN — DOCUSATE SODIUM 100 MG: 100 CAPSULE, LIQUID FILLED ORAL at 09:50

## 2018-01-01 RX ADMIN — Medication: at 09:02

## 2018-01-01 RX ADMIN — IPRATROPIUM BROMIDE AND ALBUTEROL SULFATE 3 ML: 2.5; .5 SOLUTION RESPIRATORY (INHALATION) at 20:20

## 2018-01-01 RX ADMIN — REGADENOSON 0.4 MG: 0.08 INJECTION, SOLUTION INTRAVENOUS at 11:58

## 2018-01-01 RX ADMIN — VANCOMYCIN HYDROCHLORIDE 1000 MG: 1 INJECTION, POWDER, LYOPHILIZED, FOR SOLUTION INTRAVENOUS at 15:11

## 2018-01-01 RX ADMIN — DILTIAZEM HYDROCHLORIDE 60 MG: 60 TABLET, FILM COATED ORAL at 21:46

## 2018-01-01 RX ADMIN — POLYETHYLENE GLYCOL 3350 17 G: 17 POWDER, FOR SOLUTION ORAL at 08:45

## 2018-01-01 RX ADMIN — FINASTERIDE 5 MG: 5 TABLET, FILM COATED ORAL at 09:09

## 2018-01-01 RX ADMIN — SODIUM CHLORIDE 125 MG: 9 INJECTION, SOLUTION INTRAVENOUS at 17:13

## 2018-01-01 RX ADMIN — DOCUSATE SODIUM 100 MG: 100 CAPSULE, LIQUID FILLED ORAL at 20:28

## 2018-01-01 RX ADMIN — DILTIAZEM HYDROCHLORIDE 60 MG: 60 TABLET, FILM COATED ORAL at 08:45

## 2018-01-01 RX ADMIN — OXYCODONE HYDROCHLORIDE AND ACETAMINOPHEN 1 TABLET: 5; 325 TABLET ORAL at 08:19

## 2018-01-01 RX ADMIN — FINASTERIDE 5 MG: 5 TABLET, FILM COATED ORAL at 08:55

## 2018-01-01 RX ADMIN — LEVOTHYROXINE SODIUM 50 MCG: 50 TABLET ORAL at 06:00

## 2018-01-01 RX ADMIN — DOCUSATE SODIUM 100 MG: 100 CAPSULE, LIQUID FILLED ORAL at 09:52

## 2018-01-01 RX ADMIN — FLUCONAZOLE 100 MG: 100 TABLET ORAL at 18:02

## 2018-01-01 RX ADMIN — Medication: at 10:11

## 2018-01-01 RX ADMIN — DOCUSATE SODIUM 100 MG: 100 CAPSULE, LIQUID FILLED ORAL at 09:12

## 2018-01-01 RX ADMIN — OXYCODONE HYDROCHLORIDE AND ACETAMINOPHEN 1 TABLET: 5; 325 TABLET ORAL at 19:06

## 2018-01-01 RX ADMIN — POTASSIUM CHLORIDE 10 MEQ: 750 CAPSULE, EXTENDED RELEASE ORAL at 08:25

## 2018-01-01 RX ADMIN — MEROPENEM 500 MG: 500 INJECTION, POWDER, FOR SOLUTION INTRAVENOUS at 09:07

## 2018-01-01 RX ADMIN — TERAZOSIN HYDROCHLORIDE ANHYDROUS 2 MG: 2 CAPSULE ORAL at 20:25

## 2018-01-01 RX ADMIN — DOCUSATE SODIUM 100 MG: 100 CAPSULE, LIQUID FILLED ORAL at 20:59

## 2018-01-01 RX ADMIN — MUPIROCIN: 20 OINTMENT TOPICAL at 09:58

## 2018-01-01 RX ADMIN — MUPIROCIN: 20 OINTMENT TOPICAL at 21:21

## 2018-01-01 RX ADMIN — ASPIRIN 81 MG 324 MG: 81 TABLET ORAL at 16:47

## 2018-01-01 RX ADMIN — FAMOTIDINE 40 MG: 40 TABLET ORAL at 08:47

## 2018-01-01 RX ADMIN — CYANOCOBALAMIN TAB 1000 MCG 1000 MCG: 1000 TAB at 09:50

## 2018-01-01 RX ADMIN — TAZOBACTAM SODIUM AND PIPERACILLIN SODIUM 2.25 G: 250; 2 INJECTION, SOLUTION INTRAVENOUS at 14:36

## 2018-01-01 RX ADMIN — CEFTRIAXONE SODIUM 1 G: 1 INJECTION, POWDER, FOR SOLUTION INTRAMUSCULAR; INTRAVENOUS at 14:22

## 2018-01-01 RX ADMIN — FAMOTIDINE 40 MG: 40 TABLET ORAL at 09:29

## 2018-01-01 RX ADMIN — FOLIC ACID 1 MG: 1 TABLET ORAL at 09:08

## 2018-01-01 RX ADMIN — MORPHINE SULFATE 1 MG: 2 INJECTION, SOLUTION INTRAMUSCULAR; INTRAVENOUS at 01:44

## 2018-01-01 RX ADMIN — OXYCODONE HYDROCHLORIDE AND ACETAMINOPHEN 1 TABLET: 7.5; 325 TABLET ORAL at 14:30

## 2018-01-01 RX ADMIN — IPRATROPIUM BROMIDE AND ALBUTEROL SULFATE 3 ML: 2.5; .5 SOLUTION RESPIRATORY (INHALATION) at 19:52

## 2018-01-01 RX ADMIN — BISACODYL 10 MG: 10 SUPPOSITORY RECTAL at 13:15

## 2018-01-01 RX ADMIN — IPRATROPIUM BROMIDE AND ALBUTEROL SULFATE 3 ML: 2.5; .5 SOLUTION RESPIRATORY (INHALATION) at 21:52

## 2018-01-01 RX ADMIN — DILTIAZEM HYDROCHLORIDE 60 MG: 60 TABLET, FILM COATED ORAL at 20:13

## 2018-01-01 RX ADMIN — POLYETHYLENE GLYCOL (3350) 17 G: 17 POWDER, FOR SOLUTION ORAL at 09:00

## 2018-01-01 RX ADMIN — FLUCONAZOLE 100 MG: 100 TABLET ORAL at 13:30

## 2018-01-01 RX ADMIN — LEVOTHYROXINE SODIUM 50 MCG: 50 TABLET ORAL at 05:42

## 2018-01-01 RX ADMIN — TAMSULOSIN HYDROCHLORIDE 0.8 MG: 0.4 CAPSULE ORAL at 20:39

## 2018-01-01 RX ADMIN — FENTANYL CITRATE 25 MCG: 50 INJECTION, SOLUTION INTRAMUSCULAR; INTRAVENOUS at 09:24

## 2018-01-01 RX ADMIN — OXYCODONE HYDROCHLORIDE AND ACETAMINOPHEN 1 TABLET: 7.5; 325 TABLET ORAL at 03:03

## 2018-01-01 RX ADMIN — POTASSIUM CHLORIDE 10 MEQ: 750 CAPSULE, EXTENDED RELEASE ORAL at 09:52

## 2018-01-01 RX ADMIN — IPRATROPIUM BROMIDE AND ALBUTEROL SULFATE 3 ML: 2.5; .5 SOLUTION RESPIRATORY (INHALATION) at 07:45

## 2018-01-01 RX ADMIN — LEVOTHYROXINE SODIUM 50 MCG: 50 TABLET ORAL at 05:29

## 2018-01-01 RX ADMIN — MUPIROCIN: 20 OINTMENT TOPICAL at 20:50

## 2018-01-01 RX ADMIN — METOPROLOL TARTRATE 25 MG: 25 TABLET ORAL at 12:55

## 2018-01-01 RX ADMIN — BUMETANIDE 1 MG: 1 TABLET ORAL at 08:45

## 2018-01-01 RX ADMIN — HYDROCORTISONE: 1 CREAM TOPICAL at 09:56

## 2018-01-01 RX ADMIN — FINASTERIDE 5 MG: 5 TABLET, FILM COATED ORAL at 10:04

## 2018-01-01 RX ADMIN — BUMETANIDE 1 MG: 1 TABLET ORAL at 08:50

## 2018-01-01 RX ADMIN — OXYCODONE HYDROCHLORIDE AND ACETAMINOPHEN 1 TABLET: 7.5; 325 TABLET ORAL at 08:53

## 2018-01-01 RX ADMIN — FINASTERIDE 5 MG: 5 TABLET, FILM COATED ORAL at 19:08

## 2018-01-01 RX ADMIN — DOCUSATE SODIUM 100 MG: 100 CAPSULE, LIQUID FILLED ORAL at 22:18

## 2018-01-01 RX ADMIN — MUPIROCIN: 20 OINTMENT TOPICAL at 08:53

## 2018-01-01 RX ADMIN — BISACODYL 10 MG: 5 TABLET, COATED ORAL at 17:06

## 2018-01-01 RX ADMIN — IPRATROPIUM BROMIDE AND ALBUTEROL SULFATE 3 ML: 2.5; .5 SOLUTION RESPIRATORY (INHALATION) at 16:02

## 2018-01-01 RX ADMIN — METOPROLOL SUCCINATE 25 MG: 50 TABLET, EXTENDED RELEASE ORAL at 09:46

## 2018-01-01 RX ADMIN — NYSTATIN: 100000 CREAM TOPICAL at 09:09

## 2018-01-01 RX ADMIN — DILTIAZEM HYDROCHLORIDE 60 MG: 60 TABLET, FILM COATED ORAL at 16:51

## 2018-01-01 RX ADMIN — HYDROCORTISONE: 1 CREAM TOPICAL at 22:18

## 2018-01-01 RX ADMIN — HYDROCORTISONE: 1 CREAM TOPICAL at 08:15

## 2018-01-01 RX ADMIN — DILTIAZEM HYDROCHLORIDE 60 MG: 60 TABLET, FILM COATED ORAL at 18:11

## 2018-01-01 RX ADMIN — TAMSULOSIN HYDROCHLORIDE 0.8 MG: 0.4 CAPSULE ORAL at 23:13

## 2018-01-01 RX ADMIN — IPRATROPIUM BROMIDE AND ALBUTEROL SULFATE 3 ML: 2.5; .5 SOLUTION RESPIRATORY (INHALATION) at 15:06

## 2018-01-01 RX ADMIN — ASPIRIN 81 MG CHEWABLE TABLET 81 MG: 81 TABLET CHEWABLE at 09:16

## 2018-01-01 RX ADMIN — FINASTERIDE 5 MG: 5 TABLET, FILM COATED ORAL at 21:24

## 2018-01-01 RX ADMIN — DILTIAZEM HYDROCHLORIDE 60 MG: 60 TABLET, FILM COATED ORAL at 00:07

## 2018-01-01 RX ADMIN — LEVOTHYROXINE SODIUM 50 MCG: 50 TABLET ORAL at 06:37

## 2018-01-01 RX ADMIN — IPRATROPIUM BROMIDE AND ALBUTEROL SULFATE 3 ML: 2.5; .5 SOLUTION RESPIRATORY (INHALATION) at 13:53

## 2018-01-01 RX ADMIN — TERAZOSIN HYDROCHLORIDE ANHYDROUS 2 MG: 2 CAPSULE ORAL at 21:35

## 2018-01-01 RX ADMIN — FUROSEMIDE 40 MG: 10 INJECTION, SOLUTION INTRAMUSCULAR; INTRAVENOUS at 16:07

## 2018-01-01 RX ADMIN — CEPHALEXIN 500 MG: 500 CAPSULE ORAL at 15:05

## 2018-01-01 RX ADMIN — OXYCODONE HYDROCHLORIDE AND ACETAMINOPHEN 1 TABLET: 5; 325 TABLET ORAL at 16:37

## 2018-01-01 RX ADMIN — FLUTICASONE PROPIONATE 1 SPRAY: 50 SPRAY, METERED NASAL at 08:55

## 2018-01-01 RX ADMIN — SODIUM CHLORIDE 50 ML/HR: 900 INJECTION, SOLUTION INTRAVENOUS at 09:08

## 2018-01-01 RX ADMIN — PANTOPRAZOLE SODIUM 40 MG: 40 TABLET, DELAYED RELEASE ORAL at 06:33

## 2018-01-01 RX ADMIN — OXYCODONE HYDROCHLORIDE AND ACETAMINOPHEN 1 TABLET: 5; 325 TABLET ORAL at 11:46

## 2018-01-01 RX ADMIN — POLYETHYLENE GLYCOL 3350 17 G: 17 POWDER, FOR SOLUTION ORAL at 09:51

## 2018-01-01 RX ADMIN — METOPROLOL SUCCINATE 25 MG: 25 TABLET, EXTENDED RELEASE ORAL at 08:57

## 2018-01-01 RX ADMIN — Medication 1 G: at 09:00

## 2018-01-01 RX ADMIN — FERROUS SULFATE TAB EC 324 MG (65 MG FE EQUIVALENT) 324 MG: 324 (65 FE) TABLET DELAYED RESPONSE at 12:25

## 2018-01-01 RX ADMIN — CEFTRIAXONE SODIUM 1 G: 1 INJECTION, POWDER, FOR SOLUTION INTRAMUSCULAR; INTRAVENOUS at 08:48

## 2018-01-01 RX ADMIN — DEXTROSE MONOHYDRATE 75 ML/HR: 50 INJECTION, SOLUTION INTRAVENOUS at 12:17

## 2018-01-01 RX ADMIN — CYANOCOBALAMIN TAB 1000 MCG 1000 MCG: 1000 TAB at 09:53

## 2018-01-01 RX ADMIN — OXYCODONE HYDROCHLORIDE AND ACETAMINOPHEN 1 TABLET: 5; 325 TABLET ORAL at 08:52

## 2018-01-01 RX ADMIN — MEROPENEM 1 G: 1 INJECTION, POWDER, FOR SOLUTION INTRAVENOUS at 06:18

## 2018-01-01 RX ADMIN — CYANOCOBALAMIN TAB 1000 MCG 1000 MCG: 1000 TAB at 08:50

## 2018-01-01 RX ADMIN — HEPARIN SODIUM 5000 UNITS: 5000 INJECTION, SOLUTION INTRAVENOUS; SUBCUTANEOUS at 20:47

## 2018-01-01 RX ADMIN — IPRATROPIUM BROMIDE AND ALBUTEROL SULFATE 3 ML: 2.5; .5 SOLUTION RESPIRATORY (INHALATION) at 11:25

## 2018-01-01 RX ADMIN — Medication 1 G: at 08:35

## 2018-01-01 RX ADMIN — POVIDONE-IODINE: 100 OINTMENT TOPICAL at 09:08

## 2018-01-01 RX ADMIN — FERROUS SULFATE TAB EC 324 MG (65 MG FE EQUIVALENT) 324 MG: 324 (65 FE) TABLET DELAYED RESPONSE at 10:55

## 2018-01-01 RX ADMIN — FENTANYL CITRATE 25 MCG: 50 INJECTION, SOLUTION INTRAMUSCULAR; INTRAVENOUS at 19:50

## 2018-01-01 RX ADMIN — MEROPENEM 500 MG: 500 INJECTION, POWDER, FOR SOLUTION INTRAVENOUS at 09:33

## 2018-01-01 RX ADMIN — FOLIC ACID 1 MG: 1 TABLET ORAL at 09:49

## 2018-01-01 RX ADMIN — SODIUM CHLORIDE 125 MG: 9 INJECTION, SOLUTION INTRAVENOUS at 18:02

## 2018-01-01 RX ADMIN — POTASSIUM CHLORIDE 20 MEQ: 750 CAPSULE, EXTENDED RELEASE ORAL at 08:35

## 2018-01-01 RX ADMIN — ASPIRIN 81 MG CHEWABLE TABLET 81 MG: 81 TABLET CHEWABLE at 08:59

## 2018-01-01 RX ADMIN — MUPIROCIN: 20 OINTMENT TOPICAL at 20:23

## 2018-01-01 RX ADMIN — FERROUS SULFATE TAB EC 324 MG (65 MG FE EQUIVALENT) 324 MG: 324 (65 FE) TABLET DELAYED RESPONSE at 08:55

## 2018-01-01 RX ADMIN — METOPROLOL SUCCINATE 25 MG: 25 TABLET, EXTENDED RELEASE ORAL at 09:48

## 2018-01-01 RX ADMIN — DEXTROSE MONOHYDRATE 75 ML/HR: 50 INJECTION, SOLUTION INTRAVENOUS at 13:34

## 2018-01-01 RX ADMIN — DOCUSATE SODIUM 100 MG: 100 CAPSULE, LIQUID FILLED ORAL at 22:37

## 2018-01-01 RX ADMIN — SODIUM CHLORIDE 50 ML/HR: 900 INJECTION, SOLUTION INTRAVENOUS at 16:54

## 2018-01-01 RX ADMIN — BISACODYL 10 MG: 5 TABLET, COATED ORAL at 10:42

## 2018-01-01 RX ADMIN — DOCUSATE SODIUM 100 MG: 100 CAPSULE, LIQUID FILLED ORAL at 20:42

## 2018-01-01 RX ADMIN — DOCUSATE SODIUM 100 MG: 100 CAPSULE, LIQUID FILLED ORAL at 10:24

## 2018-01-01 RX ADMIN — LEVOTHYROXINE SODIUM 50 MCG: 50 TABLET ORAL at 09:37

## 2018-01-01 RX ADMIN — POLYETHYLENE GLYCOL 3350 17 G: 17 POWDER, FOR SOLUTION ORAL at 09:47

## 2018-01-01 RX ADMIN — MORPHINE SULFATE 2 MG: 8 INJECTION INTRAVENOUS at 02:27

## 2018-01-01 RX ADMIN — METOPROLOL SUCCINATE 25 MG: 25 TABLET, EXTENDED RELEASE ORAL at 08:55

## 2018-01-01 RX ADMIN — TIZANIDINE 2 MG: 2 TABLET ORAL at 07:10

## 2018-01-01 RX ADMIN — POTASSIUM CHLORIDE 10 MEQ: 750 CAPSULE, EXTENDED RELEASE ORAL at 08:48

## 2018-01-01 RX ADMIN — FLUCONAZOLE 100 MG: 100 TABLET ORAL at 09:46

## 2018-01-01 RX ADMIN — DILTIAZEM HYDROCHLORIDE 60 MG: 60 TABLET, FILM COATED ORAL at 20:25

## 2018-01-01 RX ADMIN — OXYCODONE HYDROCHLORIDE AND ACETAMINOPHEN 1 TABLET: 7.5; 325 TABLET ORAL at 22:13

## 2018-01-01 RX ADMIN — FAMOTIDINE 40 MG: 40 TABLET ORAL at 09:46

## 2018-01-01 RX ADMIN — MEROPENEM 1 G: 1 INJECTION, POWDER, FOR SOLUTION INTRAVENOUS at 21:05

## 2018-01-01 RX ADMIN — OXYCODONE HYDROCHLORIDE AND ACETAMINOPHEN 1 TABLET: 5; 325 TABLET ORAL at 03:11

## 2018-01-01 RX ADMIN — DOCUSATE SODIUM 100 MG: 100 CAPSULE, LIQUID FILLED ORAL at 20:34

## 2018-01-01 RX ADMIN — MEROPENEM 1 G: 1 INJECTION, POWDER, FOR SOLUTION INTRAVENOUS at 06:41

## 2018-01-01 RX ADMIN — FINASTERIDE 5 MG: 5 TABLET, FILM COATED ORAL at 09:08

## 2018-01-01 RX ADMIN — SODIUM CHLORIDE 75 ML/HR: 900 INJECTION, SOLUTION INTRAVENOUS at 04:58

## 2018-01-01 RX ADMIN — CEFTRIAXONE SODIUM 1 G: 1 INJECTION, POWDER, FOR SOLUTION INTRAMUSCULAR; INTRAVENOUS at 10:56

## 2018-01-01 RX ADMIN — OXYCODONE HYDROCHLORIDE AND ACETAMINOPHEN 1 TABLET: 5; 325 TABLET ORAL at 20:23

## 2018-01-01 RX ADMIN — DOCUSATE SODIUM 100 MG: 100 CAPSULE, LIQUID FILLED ORAL at 22:58

## 2018-01-01 RX ADMIN — CYANOCOBALAMIN TAB 1000 MCG 1000 MCG: 1000 TAB at 10:24

## 2018-01-01 RX ADMIN — TAMSULOSIN HYDROCHLORIDE 0.8 MG: 0.4 CAPSULE ORAL at 20:46

## 2018-01-01 RX ADMIN — BACITRACIN: 500 OINTMENT TOPICAL at 09:51

## 2018-01-01 RX ADMIN — OXYCODONE HYDROCHLORIDE AND ACETAMINOPHEN 1 TABLET: 7.5; 325 TABLET ORAL at 22:34

## 2018-01-01 RX ADMIN — CLOPIDOGREL BISULFATE 75 MG: 75 TABLET ORAL at 09:12

## 2018-01-01 RX ADMIN — CETIRIZINE HYDROCHLORIDE 10 MG: 10 TABLET, FILM COATED ORAL at 08:46

## 2018-01-01 RX ADMIN — FERROUS SULFATE TAB EC 324 MG (65 MG FE EQUIVALENT) 324 MG: 324 (65 FE) TABLET DELAYED RESPONSE at 18:36

## 2018-01-01 RX ADMIN — FINASTERIDE 5 MG: 5 TABLET, FILM COATED ORAL at 08:57

## 2018-01-01 RX ADMIN — PIPERACILLIN SODIUM,TAZOBACTAM SODIUM 3.38 G: 3; .375 INJECTION, POWDER, FOR SOLUTION INTRAVENOUS at 02:10

## 2018-01-01 RX ADMIN — SODIUM CHLORIDE, SODIUM GLUCONATE, SODIUM ACETATE, POTASSIUM CHLORIDE, AND MAGNESIUM CHLORIDE: 526; 502; 368; 37; 30 INJECTION, SOLUTION INTRAVENOUS at 19:47

## 2018-01-01 RX ADMIN — Medication 1 G: at 21:00

## 2018-01-01 RX ADMIN — IPRATROPIUM BROMIDE AND ALBUTEROL SULFATE 3 ML: 2.5; .5 SOLUTION RESPIRATORY (INHALATION) at 07:44

## 2018-01-01 RX ADMIN — FERROUS SULFATE TAB EC 324 MG (65 MG FE EQUIVALENT) 324 MG: 324 (65 FE) TABLET DELAYED RESPONSE at 11:58

## 2018-01-01 RX ADMIN — EPHEDRINE SULFATE 10 MG: 50 INJECTION INTRAMUSCULAR; INTRAVENOUS; SUBCUTANEOUS at 18:59

## 2018-01-01 RX ADMIN — DOCUSATE SODIUM 100 MG: 100 CAPSULE, LIQUID FILLED ORAL at 09:49

## 2018-01-01 RX ADMIN — ACETAMINOPHEN 650 MG: 325 TABLET ORAL at 12:54

## 2018-01-01 RX ADMIN — LEVOFLOXACIN 250 MG: 5 INJECTION, SOLUTION INTRAVENOUS at 13:11

## 2018-01-01 RX ADMIN — Medication 1 G: at 10:20

## 2018-01-01 RX ADMIN — METOPROLOL TARTRATE 25 MG: 25 TABLET ORAL at 16:10

## 2018-01-01 RX ADMIN — DILTIAZEM HYDROCHLORIDE 60 MG: 60 TABLET, FILM COATED ORAL at 09:07

## 2018-01-01 RX ADMIN — Medication: at 08:15

## 2018-01-01 RX ADMIN — OXYCODONE HYDROCHLORIDE AND ACETAMINOPHEN 1 TABLET: 5; 325 TABLET ORAL at 09:00

## 2018-01-01 RX ADMIN — OXYCODONE HYDROCHLORIDE AND ACETAMINOPHEN 1 TABLET: 5; 325 TABLET ORAL at 02:08

## 2018-01-01 RX ADMIN — Medication 3 ML: at 09:14

## 2018-01-01 RX ADMIN — NYSTATIN: 100000 CREAM TOPICAL at 09:00

## 2018-01-01 RX ADMIN — Medication 2 SPRAY: at 21:27

## 2018-01-01 RX ADMIN — DILTIAZEM HYDROCHLORIDE 60 MG: 60 TABLET, FILM COATED ORAL at 12:44

## 2018-01-01 RX ADMIN — DILTIAZEM HYDROCHLORIDE 60 MG: 60 TABLET, FILM COATED ORAL at 22:00

## 2018-01-01 RX ADMIN — FAMOTIDINE 40 MG: 40 TABLET ORAL at 09:36

## 2018-01-01 RX ADMIN — OXYCODONE HYDROCHLORIDE AND ACETAMINOPHEN 1 TABLET: 7.5; 325 TABLET ORAL at 20:27

## 2018-01-01 RX ADMIN — OXYCODONE HYDROCHLORIDE AND ACETAMINOPHEN 1 TABLET: 5; 325 TABLET ORAL at 22:39

## 2018-01-01 RX ADMIN — OXYCODONE HYDROCHLORIDE AND ACETAMINOPHEN 1 TABLET: 5; 325 TABLET ORAL at 16:35

## 2018-01-01 RX ADMIN — Medication 1 G: at 20:23

## 2018-01-01 RX ADMIN — FERROUS SULFATE TAB EC 324 MG (65 MG FE EQUIVALENT) 324 MG: 324 (65 FE) TABLET DELAYED RESPONSE at 08:59

## 2018-01-01 RX ADMIN — SODIUM CHLORIDE 125 MG: 9 INJECTION, SOLUTION INTRAVENOUS at 17:19

## 2018-01-01 RX ADMIN — FERROUS SULFATE TAB EC 324 MG (65 MG FE EQUIVALENT) 324 MG: 324 (65 FE) TABLET DELAYED RESPONSE at 17:37

## 2018-01-01 RX ADMIN — POVIDONE-IODINE: 100 OINTMENT TOPICAL at 17:55

## 2018-01-01 RX ADMIN — OXYCODONE HYDROCHLORIDE AND ACETAMINOPHEN 1 TABLET: 7.5; 325 TABLET ORAL at 10:35

## 2018-01-01 RX ADMIN — MENTHOL, METHYL SALICYLATE: 10; 15 CREAM TOPICAL at 05:45

## 2018-01-01 RX ADMIN — TAMSULOSIN HYDROCHLORIDE 0.8 MG: 0.4 CAPSULE ORAL at 22:58

## 2018-01-01 RX ADMIN — IPRATROPIUM BROMIDE AND ALBUTEROL SULFATE 3 ML: 2.5; .5 SOLUTION RESPIRATORY (INHALATION) at 19:50

## 2018-01-01 RX ADMIN — FINASTERIDE 5 MG: 5 TABLET, FILM COATED ORAL at 08:33

## 2018-01-01 RX ADMIN — CYANOCOBALAMIN TAB 1000 MCG 1000 MCG: 1000 TAB at 18:04

## 2018-01-01 RX ADMIN — TERAZOSIN HYDROCHLORIDE ANHYDROUS 2 MG: 2 CAPSULE ORAL at 22:00

## 2018-01-01 RX ADMIN — OXYCODONE HYDROCHLORIDE AND ACETAMINOPHEN 1 TABLET: 7.5; 325 TABLET ORAL at 15:23

## 2018-01-01 RX ADMIN — DOCUSATE SODIUM 100 MG: 100 CAPSULE, LIQUID FILLED ORAL at 09:07

## 2018-01-01 RX ADMIN — FINASTERIDE 5 MG: 5 TABLET, FILM COATED ORAL at 09:20

## 2018-01-01 RX ADMIN — FOLIC ACID 1 MG: 1 TABLET ORAL at 09:12

## 2018-01-01 RX ADMIN — BUMETANIDE 1 MG: 1 TABLET ORAL at 09:00

## 2018-01-01 RX ADMIN — DILTIAZEM HYDROCHLORIDE 120 MG: 120 CAPSULE, COATED, EXTENDED RELEASE ORAL at 08:33

## 2018-01-01 RX ADMIN — ASPIRIN 81 MG CHEWABLE TABLET 81 MG: 81 TABLET CHEWABLE at 09:36

## 2018-01-01 RX ADMIN — FINASTERIDE 5 MG: 5 TABLET, FILM COATED ORAL at 09:42

## 2018-01-01 RX ADMIN — BISACODYL 10 MG: 5 TABLET, COATED ORAL at 11:19

## 2018-01-01 RX ADMIN — DOCUSATE SODIUM 100 MG: 100 CAPSULE, LIQUID FILLED ORAL at 08:33

## 2018-01-01 RX ADMIN — FAMOTIDINE 40 MG: 40 TABLET ORAL at 08:32

## 2018-01-01 RX ADMIN — TAMSULOSIN HYDROCHLORIDE 0.8 MG: 0.4 CAPSULE ORAL at 00:45

## 2018-01-01 RX ADMIN — HYDROCORTISONE: 1 CREAM TOPICAL at 08:45

## 2018-01-01 RX ADMIN — DOCUSATE SODIUM 100 MG: 100 CAPSULE, LIQUID FILLED ORAL at 09:11

## 2018-01-01 RX ADMIN — METOPROLOL SUCCINATE 25 MG: 25 TABLET, EXTENDED RELEASE ORAL at 10:24

## 2018-01-01 RX ADMIN — HYDROCORTISONE: 1 CREAM TOPICAL at 03:09

## 2018-01-01 RX ADMIN — Medication: at 10:26

## 2018-01-01 RX ADMIN — OXYCODONE HYDROCHLORIDE AND ACETAMINOPHEN 1 TABLET: 5; 325 TABLET ORAL at 17:18

## 2018-01-01 RX ADMIN — TAMSULOSIN HYDROCHLORIDE 0.8 MG: 0.4 CAPSULE ORAL at 21:37

## 2018-01-01 RX ADMIN — SODIUM CHLORIDE 75 ML/HR: 900 INJECTION, SOLUTION INTRAVENOUS at 22:32

## 2018-01-01 RX ADMIN — FLUTICASONE PROPIONATE 1 SPRAY: 50 SPRAY, METERED NASAL at 10:11

## 2018-01-01 RX ADMIN — Medication 1 G: at 08:53

## 2018-01-01 RX ADMIN — VANCOMYCIN HYDROCHLORIDE 1250 MG: 5 INJECTION, POWDER, LYOPHILIZED, FOR SOLUTION INTRAVENOUS at 21:38

## 2018-01-01 RX ADMIN — MUPIROCIN: 20 OINTMENT TOPICAL at 07:45

## 2018-01-01 RX ADMIN — METOPROLOL SUCCINATE 25 MG: 25 TABLET, EXTENDED RELEASE ORAL at 10:09

## 2018-01-01 RX ADMIN — ASPIRIN 81 MG CHEWABLE TABLET 81 MG: 81 TABLET CHEWABLE at 09:54

## 2018-01-01 RX ADMIN — Medication 3 ML: at 22:19

## 2018-01-01 RX ADMIN — LEVOFLOXACIN 750 MG: 5 INJECTION, SOLUTION INTRAVENOUS at 11:29

## 2018-01-01 RX ADMIN — OXYCODONE HYDROCHLORIDE AND ACETAMINOPHEN 1 TABLET: 5; 325 TABLET ORAL at 09:33

## 2018-01-01 RX ADMIN — OXYCODONE HYDROCHLORIDE AND ACETAMINOPHEN 1 TABLET: 7.5; 325 TABLET ORAL at 13:58

## 2018-01-01 RX ADMIN — MEROPENEM 500 MG: 500 INJECTION, POWDER, FOR SOLUTION INTRAVENOUS at 00:15

## 2018-01-01 RX ADMIN — BACITRACIN: 500 OINTMENT TOPICAL at 10:55

## 2018-01-01 RX ADMIN — HEPARIN SODIUM 5000 UNITS: 5000 INJECTION, SOLUTION INTRAVENOUS; SUBCUTANEOUS at 05:56

## 2018-01-01 RX ADMIN — FOLIC ACID 1 MG: 1 TABLET ORAL at 08:59

## 2018-01-01 RX ADMIN — FINASTERIDE 5 MG: 5 TABLET, FILM COATED ORAL at 09:24

## 2018-01-01 RX ADMIN — Medication 3 ML: at 20:13

## 2018-01-01 RX ADMIN — Medication 3 ML: at 08:47

## 2018-01-01 RX ADMIN — LEVOTHYROXINE SODIUM 50 MCG: 50 TABLET ORAL at 06:19

## 2018-01-01 RX ADMIN — DOCUSATE SODIUM 100 MG: 100 CAPSULE, LIQUID FILLED ORAL at 20:05

## 2018-01-01 RX ADMIN — ASPIRIN 81 MG CHEWABLE TABLET 81 MG: 81 TABLET CHEWABLE at 09:48

## 2018-01-01 RX ADMIN — POVIDONE-IODINE: 100 OINTMENT TOPICAL at 09:23

## 2018-01-01 RX ADMIN — Medication: at 18:00

## 2018-01-01 RX ADMIN — CYANOCOBALAMIN TAB 1000 MCG 1000 MCG: 1000 TAB at 08:34

## 2018-01-01 RX ADMIN — IPRATROPIUM BROMIDE AND ALBUTEROL SULFATE 3 ML: 2.5; .5 SOLUTION RESPIRATORY (INHALATION) at 11:07

## 2018-01-01 RX ADMIN — HEPARIN SODIUM 5000 UNITS: 5000 INJECTION, SOLUTION INTRAVENOUS; SUBCUTANEOUS at 13:09

## 2018-01-01 RX ADMIN — TAMSULOSIN HYDROCHLORIDE 0.8 MG: 0.4 CAPSULE ORAL at 20:50

## 2018-01-01 RX ADMIN — LEVOTHYROXINE SODIUM 50 MCG: 50 TABLET ORAL at 06:50

## 2018-01-01 RX ADMIN — BISACODYL 10 MG: 10 SUPPOSITORY RECTAL at 08:35

## 2018-01-01 RX ADMIN — Medication 1 APPLICATION: at 10:20

## 2018-01-01 RX ADMIN — FINASTERIDE 5 MG: 5 TABLET, FILM COATED ORAL at 09:56

## 2018-01-01 RX ADMIN — TAMSULOSIN HYDROCHLORIDE 0.8 MG: 0.4 CAPSULE ORAL at 22:13

## 2018-01-01 RX ADMIN — MEROPENEM 1 G: 1 INJECTION, POWDER, FOR SOLUTION INTRAVENOUS at 10:28

## 2018-01-01 RX ADMIN — SODIUM CHLORIDE 500 ML/HR: 900 INJECTION, SOLUTION INTRAVENOUS at 19:40

## 2018-01-01 RX ADMIN — POLYETHYLENE GLYCOL 3350 17 G: 17 POWDER, FOR SOLUTION ORAL at 08:24

## 2018-01-01 RX ADMIN — CEFTRIAXONE 1 G: 1 INJECTION, POWDER, FOR SOLUTION INTRAMUSCULAR; INTRAVENOUS at 14:48

## 2018-01-01 RX ADMIN — VANCOMYCIN HYDROCHLORIDE 1250 MG: 5 INJECTION, POWDER, LYOPHILIZED, FOR SOLUTION INTRAVENOUS at 21:20

## 2018-01-01 RX ADMIN — IPRATROPIUM BROMIDE AND ALBUTEROL SULFATE 3 ML: 2.5; .5 SOLUTION RESPIRATORY (INHALATION) at 14:58

## 2018-01-01 RX ADMIN — PANTOPRAZOLE SODIUM 40 MG: 40 TABLET, DELAYED RELEASE ORAL at 06:19

## 2018-01-01 RX ADMIN — LEVOTHYROXINE SODIUM 50 MCG: 50 TABLET ORAL at 05:57

## 2018-01-01 RX ADMIN — NYSTATIN: 100000 CREAM TOPICAL at 10:01

## 2018-01-01 RX ADMIN — BISACODYL 10 MG: 10 SUPPOSITORY RECTAL at 08:19

## 2018-01-01 RX ADMIN — LEVOTHYROXINE SODIUM 50 MCG: 50 TABLET ORAL at 05:35

## 2018-01-01 RX ADMIN — PHENYLEPHRINE HYDROCHLORIDE 100 MCG: 10 INJECTION INTRAVENOUS at 19:02

## 2018-01-01 RX ADMIN — FERROUS SULFATE TAB EC 324 MG (65 MG FE EQUIVALENT) 324 MG: 324 (65 FE) TABLET DELAYED RESPONSE at 08:46

## 2018-01-01 RX ADMIN — OXYCODONE HYDROCHLORIDE AND ACETAMINOPHEN 1 TABLET: 5; 325 TABLET ORAL at 08:59

## 2018-01-01 RX ADMIN — INSULIN ASPART 2 UNITS: 100 INJECTION, SOLUTION INTRAVENOUS; SUBCUTANEOUS at 12:22

## 2018-01-01 RX ADMIN — BUMETANIDE 1 MG: 1 TABLET ORAL at 09:29

## 2018-01-01 RX ADMIN — LEVOTHYROXINE SODIUM 50 MCG: 50 TABLET ORAL at 06:32

## 2018-01-01 RX ADMIN — LEVOTHYROXINE SODIUM 50 MCG: 50 TABLET ORAL at 05:41

## 2018-01-01 RX ADMIN — MEROPENEM 500 MG: 500 INJECTION, POWDER, FOR SOLUTION INTRAVENOUS at 14:38

## 2018-01-01 RX ADMIN — COLLAGENASE SANTYL: 250 OINTMENT TOPICAL at 17:13

## 2018-01-01 RX ADMIN — CYANOCOBALAMIN TAB 1000 MCG 1000 MCG: 1000 TAB at 10:20

## 2018-01-01 RX ADMIN — FLUCONAZOLE 100 MG: 100 TABLET ORAL at 10:21

## 2018-01-01 RX ADMIN — OXYCODONE HYDROCHLORIDE AND ACETAMINOPHEN 1 TABLET: 5; 325 TABLET ORAL at 17:49

## 2018-01-01 RX ADMIN — CLOPIDOGREL BISULFATE 75 MG: 75 TABLET ORAL at 08:59

## 2018-01-01 RX ADMIN — TAMSULOSIN HYDROCHLORIDE 0.8 MG: 0.4 CAPSULE ORAL at 21:19

## 2018-01-01 RX ADMIN — DOCUSATE SODIUM 100 MG: 100 CAPSULE, LIQUID FILLED ORAL at 20:39

## 2018-01-01 RX ADMIN — DOCUSATE SODIUM 100 MG: 100 CAPSULE, LIQUID FILLED ORAL at 09:04

## 2018-01-01 RX ADMIN — LEVOFLOXACIN 500 MG: 5 INJECTION, SOLUTION INTRAVENOUS at 14:46

## 2018-01-01 RX ADMIN — FINASTERIDE 5 MG: 5 TABLET, FILM COATED ORAL at 10:57

## 2018-01-01 RX ADMIN — MUPIROCIN: 20 OINTMENT TOPICAL at 21:50

## 2018-01-01 RX ADMIN — FOLIC ACID 1 MG: 1 TABLET ORAL at 18:04

## 2018-01-01 RX ADMIN — MEROPENEM 500 MG: 500 INJECTION, POWDER, FOR SOLUTION INTRAVENOUS at 23:16

## 2018-01-01 RX ADMIN — ACETAMINOPHEN 650 MG: 325 TABLET ORAL at 16:51

## 2018-01-01 RX ADMIN — FLUCONAZOLE 200 MG: 200 TABLET ORAL at 10:22

## 2018-01-01 RX ADMIN — BUMETANIDE 1 MG: 1 TABLET ORAL at 08:51

## 2018-01-01 RX ADMIN — FINASTERIDE 5 MG: 5 TABLET, FILM COATED ORAL at 08:25

## 2018-01-01 RX ADMIN — METOPROLOL SUCCINATE 25 MG: 25 TABLET, EXTENDED RELEASE ORAL at 07:44

## 2018-01-01 RX ADMIN — POLYETHYLENE GLYCOL 3350 17 G: 17 POWDER, FOR SOLUTION ORAL at 08:48

## 2018-01-01 RX ADMIN — OXYCODONE HYDROCHLORIDE AND ACETAMINOPHEN 1 TABLET: 7.5; 325 TABLET ORAL at 06:00

## 2018-01-01 RX ADMIN — DILTIAZEM HYDROCHLORIDE 120 MG: 120 CAPSULE, COATED, EXTENDED RELEASE ORAL at 09:04

## 2018-01-01 RX ADMIN — IPRATROPIUM BROMIDE AND ALBUTEROL SULFATE 3 ML: 2.5; .5 SOLUTION RESPIRATORY (INHALATION) at 07:38

## 2018-01-01 RX ADMIN — FERROUS SULFATE TAB EC 324 MG (65 MG FE EQUIVALENT) 324 MG: 324 (65 FE) TABLET DELAYED RESPONSE at 12:00

## 2018-01-01 RX ADMIN — Medication 3 ML: at 20:39

## 2018-01-01 RX ADMIN — VANCOMYCIN HYDROCHLORIDE 1000 MG: 1 INJECTION, POWDER, LYOPHILIZED, FOR SOLUTION INTRAVENOUS at 15:44

## 2018-01-01 RX ADMIN — DILTIAZEM HYDROCHLORIDE 60 MG: 60 TABLET, FILM COATED ORAL at 21:49

## 2018-01-01 RX ADMIN — OXYCODONE HYDROCHLORIDE AND ACETAMINOPHEN 1 TABLET: 5; 325 TABLET ORAL at 22:07

## 2018-01-01 RX ADMIN — LEVOFLOXACIN 500 MG: 5 INJECTION, SOLUTION INTRAVENOUS at 12:38

## 2018-01-01 RX ADMIN — LEVOTHYROXINE SODIUM 50 MCG: 50 TABLET ORAL at 06:25

## 2018-01-01 RX ADMIN — FERROUS SULFATE TAB EC 324 MG (65 MG FE EQUIVALENT) 324 MG: 324 (65 FE) TABLET DELAYED RESPONSE at 13:53

## 2018-01-01 RX ADMIN — Medication: at 09:50

## 2018-01-01 RX ADMIN — DILTIAZEM HYDROCHLORIDE 120 MG: 120 CAPSULE, COATED, EXTENDED RELEASE ORAL at 08:46

## 2018-01-01 RX ADMIN — TERAZOSIN HYDROCHLORIDE ANHYDROUS 2 MG: 2 CAPSULE ORAL at 21:46

## 2018-01-01 RX ADMIN — MAGNESIUM HYDROXIDE 10 ML: 2400 SUSPENSION ORAL at 12:48

## 2018-01-01 RX ADMIN — LEVOTHYROXINE SODIUM 50 MCG: 50 TABLET ORAL at 05:38

## 2018-01-01 RX ADMIN — FINASTERIDE 5 MG: 5 TABLET, FILM COATED ORAL at 08:46

## 2018-01-01 RX ADMIN — POLYETHYLENE GLYCOL (3350) 17 G: 17 POWDER, FOR SOLUTION ORAL at 08:34

## 2018-01-01 RX ADMIN — MEROPENEM 1 G: 1 INJECTION, POWDER, FOR SOLUTION INTRAVENOUS at 14:17

## 2018-01-01 RX ADMIN — MUPIROCIN: 20 OINTMENT TOPICAL at 20:29

## 2018-01-01 RX ADMIN — FINASTERIDE 5 MG: 5 TABLET, FILM COATED ORAL at 09:36

## 2018-01-01 RX ADMIN — MUPIROCIN: 20 OINTMENT TOPICAL at 08:29

## 2018-01-01 RX ADMIN — CEPHALEXIN 500 MG: 500 CAPSULE ORAL at 22:03

## 2018-01-01 RX ADMIN — FLUCONAZOLE 200 MG: 200 TABLET ORAL at 08:52

## 2018-01-01 RX ADMIN — IPRATROPIUM BROMIDE AND ALBUTEROL SULFATE 3 ML: 2.5; .5 SOLUTION RESPIRATORY (INHALATION) at 10:34

## 2018-01-01 RX ADMIN — HYDROCORTISONE: 1 CREAM TOPICAL at 08:51

## 2018-01-01 RX ADMIN — POTASSIUM CHLORIDE 20 MEQ: 750 CAPSULE, EXTENDED RELEASE ORAL at 16:10

## 2018-01-01 RX ADMIN — Medication 10 ML: at 08:01

## 2018-01-01 RX ADMIN — POVIDONE-IODINE: 100 OINTMENT TOPICAL at 09:12

## 2018-01-01 RX ADMIN — CETIRIZINE HYDROCHLORIDE 10 MG: 10 TABLET, FILM COATED ORAL at 10:55

## 2018-01-01 RX ADMIN — DILTIAZEM HYDROCHLORIDE 60 MG: 60 TABLET, FILM COATED ORAL at 18:03

## 2018-01-01 RX ADMIN — HYDROCORTISONE: 1 CREAM TOPICAL at 20:41

## 2018-01-01 RX ADMIN — NYSTATIN: 100000 CREAM TOPICAL at 08:47

## 2018-01-01 RX ADMIN — ASPIRIN 81 MG CHEWABLE TABLET 81 MG: 81 TABLET CHEWABLE at 10:10

## 2018-01-01 RX ADMIN — METOPROLOL TARTRATE 25 MG: 25 TABLET ORAL at 20:01

## 2018-01-01 RX ADMIN — NYSTATIN: 100000 CREAM TOPICAL at 09:38

## 2018-01-01 RX ADMIN — FENTANYL CITRATE 25 MCG: 50 INJECTION, SOLUTION INTRAMUSCULAR; INTRAVENOUS at 16:40

## 2018-01-01 RX ADMIN — FOLIC ACID 1 MG: 1 TABLET ORAL at 09:00

## 2018-01-01 RX ADMIN — OXYCODONE HYDROCHLORIDE AND ACETAMINOPHEN 1 TABLET: 5; 325 TABLET ORAL at 21:32

## 2018-01-01 RX ADMIN — CETIRIZINE HYDROCHLORIDE 10 MG: 10 TABLET, FILM COATED ORAL at 09:57

## 2018-01-01 RX ADMIN — CETIRIZINE HYDROCHLORIDE 10 MG: 10 TABLET, FILM COATED ORAL at 09:13

## 2018-01-01 RX ADMIN — FUROSEMIDE 20 MG: 10 INJECTION, SOLUTION INTRAMUSCULAR; INTRAVENOUS at 17:31

## 2018-01-01 RX ADMIN — DOCUSATE SODIUM 100 MG: 100 CAPSULE, LIQUID FILLED ORAL at 09:47

## 2018-01-01 RX ADMIN — TERAZOSIN HYDROCHLORIDE ANHYDROUS 2 MG: 2 CAPSULE ORAL at 21:14

## 2018-01-01 RX ADMIN — LEVOFLOXACIN 750 MG: 5 INJECTION, SOLUTION INTRAVENOUS at 14:27

## 2018-01-01 RX ADMIN — DOCUSATE SODIUM 100 MG: 100 CAPSULE, LIQUID FILLED ORAL at 09:29

## 2018-01-01 RX ADMIN — SODIUM CHLORIDE 75 ML/HR: 9 INJECTION, SOLUTION INTRAVENOUS at 02:28

## 2018-01-01 RX ADMIN — TERAZOSIN HYDROCHLORIDE ANHYDROUS 2 MG: 2 CAPSULE ORAL at 20:23

## 2018-01-01 RX ADMIN — OXYCODONE HYDROCHLORIDE AND ACETAMINOPHEN 1 TABLET: 5; 325 TABLET ORAL at 17:39

## 2018-01-01 RX ADMIN — ACETAMINOPHEN 650 MG: 325 TABLET ORAL at 06:00

## 2018-01-01 RX ADMIN — DILTIAZEM HYDROCHLORIDE 120 MG: 120 CAPSULE, COATED, EXTENDED RELEASE ORAL at 08:28

## 2018-01-01 RX ADMIN — CYANOCOBALAMIN TAB 1000 MCG 1000 MCG: 1000 TAB at 09:10

## 2018-01-01 RX ADMIN — OXYCODONE HYDROCHLORIDE AND ACETAMINOPHEN 1 TABLET: 5; 325 TABLET ORAL at 15:19

## 2018-01-01 RX ADMIN — SODIUM CHLORIDE 125 ML/HR: 9 INJECTION, SOLUTION INTRAVENOUS at 21:27

## 2018-01-01 RX ADMIN — CEFTRIAXONE SODIUM 1 G: 1 INJECTION, POWDER, FOR SOLUTION INTRAMUSCULAR; INTRAVENOUS at 11:13

## 2018-01-01 RX ADMIN — OXYCODONE HYDROCHLORIDE AND ACETAMINOPHEN 1 TABLET: 5; 325 TABLET ORAL at 09:27

## 2018-01-01 RX ADMIN — LEVOTHYROXINE SODIUM 50 MCG: 50 TABLET ORAL at 05:28

## 2018-01-01 RX ADMIN — FOLIC ACID 1 MG: 1 TABLET ORAL at 09:31

## 2018-01-01 RX ADMIN — DILTIAZEM HYDROCHLORIDE 60 MG: 60 TABLET, FILM COATED ORAL at 11:38

## 2018-01-01 RX ADMIN — IPRATROPIUM BROMIDE AND ALBUTEROL SULFATE 3 ML: .5; 3 SOLUTION RESPIRATORY (INHALATION) at 16:14

## 2018-01-01 RX ADMIN — LEVOTHYROXINE SODIUM 50 MCG: 50 TABLET ORAL at 09:13

## 2018-01-01 RX ADMIN — COLLAGENASE SANTYL: 250 OINTMENT TOPICAL at 08:53

## 2018-01-01 RX ADMIN — INFLUENZA A VIRUS A/SINGAPORE/GP1908/2015 IVR-180 (H1N1) ANTIGEN (MDCK CELL DERIVED, PROPIOLACTONE INACTIVATED), INFLUENZA A VIRUS A/NORTH CAROLINA/04/2016 (H3N2) HEMAGGLUTININ ANTIGEN (MDCK CELL DERIVED, PROPIOLACTONE INACTIVATED), INFLUENZA B VIRUS B/IOWA/06/2017 HEMAGGLUTININ ANTIGEN (MDCK CELL DERIVED, PROPIOLACTONE INACTIVATED), INFLUENZA B VIRUS B/SINGAPORE/INFTT-16-0610/2016 HEMAGGLUTININ ANTIGEN (MDCK CELL DERIVED, PROPIOLACTONE INACTIVATED) 0.5 ML: 15; 15; 15; 15 INJECTION, SUSPENSION INTRAMUSCULAR at 13:22

## 2018-01-01 RX ADMIN — LEVOTHYROXINE SODIUM 50 MCG: 50 TABLET ORAL at 05:36

## 2018-01-01 RX ADMIN — FLUTICASONE PROPIONATE 1 SPRAY: 50 SPRAY, METERED NASAL at 09:11

## 2018-01-01 RX ADMIN — FLUCONAZOLE 100 MG: 100 TABLET ORAL at 15:11

## 2018-01-01 RX ADMIN — HYDROCORTISONE: 1 CREAM TOPICAL at 08:01

## 2018-01-01 RX ADMIN — DILTIAZEM HYDROCHLORIDE 60 MG: 60 TABLET, FILM COATED ORAL at 17:32

## 2018-01-01 RX ADMIN — FOLIC ACID 1 MG: 1 TABLET ORAL at 08:37

## 2018-01-01 RX ADMIN — FINASTERIDE 5 MG: 5 TABLET, FILM COATED ORAL at 08:32

## 2018-01-01 RX ADMIN — FAMOTIDINE 40 MG: 40 TABLET ORAL at 09:13

## 2018-01-01 RX ADMIN — VANCOMYCIN HYDROCHLORIDE 1750 MG: 1 INJECTION, POWDER, LYOPHILIZED, FOR SOLUTION INTRAVENOUS at 14:14

## 2018-01-01 RX ADMIN — LEVOTHYROXINE SODIUM 50 MCG: 50 TABLET ORAL at 06:01

## 2018-01-01 RX ADMIN — DOCUSATE SODIUM 100 MG: 100 CAPSULE, LIQUID FILLED ORAL at 12:48

## 2018-01-01 RX ADMIN — TAZOBACTAM SODIUM AND PIPERACILLIN SODIUM 2.25 G: 250; 2 INJECTION, SOLUTION INTRAVENOUS at 23:05

## 2018-01-01 RX ADMIN — FERROUS SULFATE TAB EC 324 MG (65 MG FE EQUIVALENT) 324 MG: 324 (65 FE) TABLET DELAYED RESPONSE at 08:25

## 2018-01-01 RX ADMIN — HYDROCORTISONE: 1 CREAM TOPICAL at 09:49

## 2018-01-01 RX ADMIN — Medication 10 ML: at 18:05

## 2018-01-01 RX ADMIN — MEROPENEM 500 MG: 500 INJECTION, POWDER, FOR SOLUTION INTRAVENOUS at 09:01

## 2018-01-01 RX ADMIN — HYDROCORTISONE: 1 CREAM TOPICAL at 20:13

## 2018-01-01 RX ADMIN — DILTIAZEM HYDROCHLORIDE 120 MG: 120 CAPSULE, COATED, EXTENDED RELEASE ORAL at 08:54

## 2018-01-01 RX ADMIN — HYDROCORTISONE: 1 CREAM TOPICAL at 21:26

## 2018-01-01 RX ADMIN — DOCUSATE SODIUM 100 MG: 100 CAPSULE, LIQUID FILLED ORAL at 09:01

## 2018-01-01 RX ADMIN — NYSTATIN: 100000 CREAM TOPICAL at 22:03

## 2018-01-01 RX ADMIN — ACETAMINOPHEN 650 MG: 325 TABLET ORAL at 00:27

## 2018-01-01 RX ADMIN — CEFTRIAXONE 1 G: 1 INJECTION, POWDER, FOR SOLUTION INTRAMUSCULAR; INTRAVENOUS at 01:21

## 2018-01-01 RX ADMIN — DOCUSATE SODIUM 100 MG: 100 CAPSULE, LIQUID FILLED ORAL at 09:13

## 2018-01-01 RX ADMIN — FLUTICASONE PROPIONATE 1 SPRAY: 50 SPRAY, METERED NASAL at 08:24

## 2018-01-01 RX ADMIN — IPRATROPIUM BROMIDE AND ALBUTEROL SULFATE 3 ML: 2.5; .5 SOLUTION RESPIRATORY (INHALATION) at 08:55

## 2018-01-01 RX ADMIN — POLYETHYLENE GLYCOL (3350) 17 G: 17 POWDER, FOR SOLUTION ORAL at 07:43

## 2018-01-01 RX ADMIN — OXYCODONE HYDROCHLORIDE AND ACETAMINOPHEN 1 TABLET: 5; 325 TABLET ORAL at 14:50

## 2018-01-01 RX ADMIN — FOLIC ACID 1 MG: 1 TABLET ORAL at 09:20

## 2018-01-01 RX ADMIN — DILTIAZEM HYDROCHLORIDE 120 MG: 120 CAPSULE, COATED, EXTENDED RELEASE ORAL at 08:45

## 2018-01-01 RX ADMIN — TERAZOSIN HYDROCHLORIDE ANHYDROUS 2 MG: 2 CAPSULE ORAL at 20:55

## 2018-01-01 RX ADMIN — FAMOTIDINE 40 MG: 40 TABLET ORAL at 10:25

## 2018-01-01 RX ADMIN — FINASTERIDE 5 MG: 5 TABLET, FILM COATED ORAL at 08:35

## 2018-01-01 RX ADMIN — FERROUS SULFATE TAB EC 324 MG (65 MG FE EQUIVALENT) 324 MG: 324 (65 FE) TABLET DELAYED RESPONSE at 17:07

## 2018-01-01 RX ADMIN — CLOPIDOGREL BISULFATE 75 MG: 75 TABLET ORAL at 08:25

## 2018-01-01 RX ADMIN — OXYCODONE HYDROCHLORIDE AND ACETAMINOPHEN 1 TABLET: 7.5; 325 TABLET ORAL at 01:27

## 2018-01-01 RX ADMIN — MEROPENEM 1 G: 1 INJECTION, POWDER, FOR SOLUTION INTRAVENOUS at 20:46

## 2018-01-01 RX ADMIN — OXYCODONE HYDROCHLORIDE AND ACETAMINOPHEN 1 TABLET: 5; 325 TABLET ORAL at 08:28

## 2018-01-01 RX ADMIN — FERROUS SULFATE TAB EC 324 MG (65 MG FE EQUIVALENT) 324 MG: 324 (65 FE) TABLET DELAYED RESPONSE at 18:01

## 2018-01-01 RX ADMIN — METOPROLOL SUCCINATE 25 MG: 25 TABLET, EXTENDED RELEASE ORAL at 08:25

## 2018-01-01 RX ADMIN — PROPOFOL 50 MG: 10 INJECTION, EMULSION INTRAVENOUS at 19:56

## 2018-01-01 RX ADMIN — PROPOFOL 20 MG: 10 INJECTION, EMULSION INTRAVENOUS at 19:58

## 2018-01-01 RX ADMIN — CLOPIDOGREL BISULFATE 75 MG: 75 TABLET ORAL at 09:57

## 2018-01-01 RX ADMIN — FAMOTIDINE 40 MG: 40 TABLET ORAL at 16:27

## 2018-01-01 RX ADMIN — CYANOCOBALAMIN TAB 1000 MCG 1000 MCG: 1000 TAB at 09:29

## 2018-01-01 RX ADMIN — DOCUSATE SODIUM 100 MG: 100 CAPSULE, LIQUID FILLED ORAL at 21:06

## 2018-01-01 RX ADMIN — OXYCODONE HYDROCHLORIDE AND ACETAMINOPHEN 1 TABLET: 5; 325 TABLET ORAL at 03:35

## 2018-01-01 RX ADMIN — BUMETANIDE 1 MG: 1 TABLET ORAL at 08:25

## 2018-01-01 RX ADMIN — LEVOTHYROXINE SODIUM 50 MCG: 50 TABLET ORAL at 06:14

## 2018-01-01 RX ADMIN — MEROPENEM 500 MG: 500 INJECTION, POWDER, FOR SOLUTION INTRAVENOUS at 00:18

## 2018-01-01 RX ADMIN — DOCUSATE SODIUM 100 MG: 100 CAPSULE, LIQUID FILLED ORAL at 08:32

## 2018-01-01 RX ADMIN — BUMETANIDE 1 MG: 1 TABLET ORAL at 17:17

## 2018-01-01 RX ADMIN — ENOXAPARIN SODIUM 40 MG: 40 INJECTION SUBCUTANEOUS at 10:09

## 2018-01-01 RX ADMIN — LEVOTHYROXINE SODIUM 50 MCG: 50 TABLET ORAL at 06:38

## 2018-01-01 RX ADMIN — BUMETANIDE 1 MG: 1 TABLET ORAL at 08:28

## 2018-01-01 RX ADMIN — HYDROCORTISONE: 1 CREAM TOPICAL at 09:01

## 2018-01-01 RX ADMIN — MUPIROCIN: 20 OINTMENT TOPICAL at 08:13

## 2018-01-01 RX ADMIN — NYSTATIN: 100000 CREAM TOPICAL at 22:00

## 2018-01-01 RX ADMIN — FAMOTIDINE 40 MG: 40 TABLET ORAL at 19:09

## 2018-01-01 RX ADMIN — BISACODYL 10 MG: 10 SUPPOSITORY RECTAL at 18:49

## 2018-01-01 RX ADMIN — ASPIRIN 81 MG CHEWABLE TABLET 81 MG: 81 TABLET CHEWABLE at 09:12

## 2018-01-01 RX ADMIN — MUPIROCIN: 20 OINTMENT TOPICAL at 08:35

## 2018-01-01 RX ADMIN — BISACODYL 10 MG: 5 TABLET, COATED ORAL at 09:08

## 2018-01-01 RX ADMIN — MEROPENEM 500 MG: 500 INJECTION, POWDER, FOR SOLUTION INTRAVENOUS at 14:44

## 2018-01-01 RX ADMIN — IPRATROPIUM BROMIDE AND ALBUTEROL SULFATE 3 ML: 2.5; .5 SOLUTION RESPIRATORY (INHALATION) at 11:48

## 2018-01-01 RX ADMIN — OXYCODONE HYDROCHLORIDE AND ACETAMINOPHEN 1 TABLET: 7.5; 325 TABLET ORAL at 12:29

## 2018-01-01 RX ADMIN — DOCUSATE SODIUM 100 MG: 100 CAPSULE, LIQUID FILLED ORAL at 20:00

## 2018-01-01 RX ADMIN — POLYETHYLENE GLYCOL 3350 17 G: 17 POWDER, FOR SOLUTION ORAL at 09:29

## 2018-01-01 RX ADMIN — Medication 1 APPLICATION: at 08:32

## 2018-01-01 RX ADMIN — CETIRIZINE HYDROCHLORIDE 10 MG: 10 TABLET, FILM COATED ORAL at 10:09

## 2018-01-01 RX ADMIN — BUMETANIDE 1 MG: 1 TABLET ORAL at 10:57

## 2018-01-01 RX ADMIN — FLUTICASONE PROPIONATE 1 SPRAY: 50 SPRAY, METERED NASAL at 09:13

## 2018-01-01 RX ADMIN — MUPIROCIN: 20 OINTMENT TOPICAL at 20:55

## 2018-01-01 RX ADMIN — DOCUSATE SODIUM 100 MG: 100 CAPSULE, LIQUID FILLED ORAL at 21:37

## 2018-01-01 RX ADMIN — METOPROLOL SUCCINATE 25 MG: 25 TABLET, EXTENDED RELEASE ORAL at 08:00

## 2018-01-01 RX ADMIN — Medication 1 APPLICATION: at 08:35

## 2018-01-01 RX ADMIN — Medication 1 G: at 08:41

## 2018-01-01 RX ADMIN — Medication 10 ML: at 08:37

## 2018-01-01 RX ADMIN — COLLAGENASE SANTYL: 250 OINTMENT TOPICAL at 09:34

## 2018-01-01 RX ADMIN — Medication 1 APPLICATION: at 09:57

## 2018-01-01 RX ADMIN — MEROPENEM 1 G: 1 INJECTION, POWDER, FOR SOLUTION INTRAVENOUS at 22:30

## 2018-01-01 RX ADMIN — METOPROLOL SUCCINATE 25 MG: 25 TABLET, EXTENDED RELEASE ORAL at 09:04

## 2018-01-01 RX ADMIN — ACETAMINOPHEN 650 MG: 325 TABLET ORAL at 21:37

## 2018-01-01 RX ADMIN — INSULIN ASPART 2 UNITS: 100 INJECTION, SOLUTION INTRAVENOUS; SUBCUTANEOUS at 08:13

## 2018-01-01 RX ADMIN — IPRATROPIUM BROMIDE AND ALBUTEROL SULFATE 3 ML: 2.5; .5 SOLUTION RESPIRATORY (INHALATION) at 07:01

## 2018-01-01 RX ADMIN — ASPIRIN 81 MG CHEWABLE TABLET 81 MG: 81 TABLET CHEWABLE at 09:51

## 2018-01-01 RX ADMIN — HYDROCORTISONE: 1 CREAM TOPICAL at 09:02

## 2018-01-01 RX ADMIN — FUROSEMIDE 40 MG: 10 INJECTION, SOLUTION INTRAMUSCULAR; INTRAVENOUS at 09:21

## 2018-01-01 RX ADMIN — PROPOFOL 60 MG: 10 INJECTION, EMULSION INTRAVENOUS at 18:48

## 2018-01-01 RX ADMIN — SODIUM CHLORIDE 80 ML/HR: 9 INJECTION, SOLUTION INTRAVENOUS at 07:17

## 2018-01-01 RX ADMIN — DOCUSATE SODIUM 100 MG: 100 CAPSULE, LIQUID FILLED ORAL at 21:14

## 2018-01-01 RX ADMIN — CEFEPIME HYDROCHLORIDE 2 G: 2 INJECTION, POWDER, FOR SOLUTION INTRAVENOUS at 22:45

## 2018-01-01 RX ADMIN — FAMOTIDINE 40 MG: 40 TABLET ORAL at 10:09

## 2018-01-01 RX ADMIN — CYANOCOBALAMIN TAB 1000 MCG 1000 MCG: 1000 TAB at 10:19

## 2018-01-01 RX ADMIN — HEPARIN SODIUM 5000 UNITS: 5000 INJECTION, SOLUTION INTRAVENOUS; SUBCUTANEOUS at 21:05

## 2018-01-01 RX ADMIN — MUPIROCIN: 20 OINTMENT TOPICAL at 08:48

## 2018-01-01 RX ADMIN — INSULIN ASPART 4 UNITS: 100 INJECTION, SOLUTION INTRAVENOUS; SUBCUTANEOUS at 11:23

## 2018-01-01 RX ADMIN — FERROUS SULFATE TAB EC 324 MG (65 MG FE EQUIVALENT) 324 MG: 324 (65 FE) TABLET DELAYED RESPONSE at 09:46

## 2018-01-01 RX ADMIN — FAMOTIDINE 40 MG: 40 TABLET ORAL at 09:12

## 2018-01-01 RX ADMIN — DOCUSATE SODIUM 100 MG: 100 CAPSULE, LIQUID FILLED ORAL at 20:31

## 2018-01-01 RX ADMIN — OXYCODONE HYDROCHLORIDE AND ACETAMINOPHEN 1 TABLET: 7.5; 325 TABLET ORAL at 03:20

## 2018-01-01 RX ADMIN — Medication: at 09:15

## 2018-01-01 RX ADMIN — DILTIAZEM HYDROCHLORIDE 60 MG: 60 TABLET, FILM COATED ORAL at 16:35

## 2018-01-01 RX ADMIN — Medication 1 G: at 07:44

## 2018-01-01 RX ADMIN — BISACODYL 10 MG: 5 TABLET, COATED ORAL at 10:00

## 2018-01-01 RX ADMIN — FOLIC ACID 1 MG: 1 TABLET ORAL at 08:54

## 2018-01-01 RX ADMIN — METOPROLOL SUCCINATE 25 MG: 25 TABLET, EXTENDED RELEASE ORAL at 10:20

## 2018-01-01 RX ADMIN — SODIUM CHLORIDE 500 ML: 9 INJECTION, SOLUTION INTRAVENOUS at 09:00

## 2018-01-01 RX ADMIN — PHENYLEPHRINE HYDROCHLORIDE 50 MCG: 10 INJECTION INTRAVENOUS at 16:50

## 2018-01-01 RX ADMIN — POLYETHYLENE GLYCOL (3350) 17 G: 17 POWDER, FOR SOLUTION ORAL at 08:05

## 2018-01-01 RX ADMIN — POLYETHYLENE GLYCOL 3350 17 G: 17 POWDER, FOR SOLUTION ORAL at 09:07

## 2018-01-01 RX ADMIN — OXYCODONE HYDROCHLORIDE AND ACETAMINOPHEN 1 TABLET: 7.5; 325 TABLET ORAL at 22:57

## 2018-01-01 RX ADMIN — OXYCODONE HYDROCHLORIDE AND ACETAMINOPHEN 1 TABLET: 5; 325 TABLET ORAL at 01:04

## 2018-01-01 RX ADMIN — DILTIAZEM HYDROCHLORIDE 60 MG: 60 TABLET, FILM COATED ORAL at 08:19

## 2018-01-01 RX ADMIN — CLOPIDOGREL BISULFATE 75 MG: 75 TABLET ORAL at 09:51

## 2018-01-01 RX ADMIN — FINASTERIDE 5 MG: 5 TABLET, FILM COATED ORAL at 09:53

## 2018-01-01 RX ADMIN — HYDROCORTISONE: 1 CREAM TOPICAL at 20:39

## 2018-01-01 RX ADMIN — LEVOFLOXACIN 250 MG: 5 INJECTION, SOLUTION INTRAVENOUS at 13:15

## 2018-01-01 RX ADMIN — CYANOCOBALAMIN TAB 1000 MCG 1000 MCG: 1000 TAB at 09:07

## 2018-01-01 RX ADMIN — SODIUM CHLORIDE 125 ML/HR: 9 INJECTION, SOLUTION INTRAVENOUS at 06:20

## 2018-01-01 RX ADMIN — DOCUSATE SODIUM 100 MG: 100 CAPSULE, LIQUID FILLED ORAL at 21:51

## 2018-01-01 RX ADMIN — HYDROCORTISONE: 1 CREAM TOPICAL at 08:24

## 2018-01-01 RX ADMIN — Medication 10 ML: at 09:56

## 2018-01-01 RX ADMIN — CLOPIDOGREL BISULFATE 75 MG: 75 TABLET ORAL at 09:01

## 2018-01-01 RX ADMIN — DILTIAZEM HYDROCHLORIDE 60 MG: 60 TABLET, FILM COATED ORAL at 08:59

## 2018-01-01 RX ADMIN — DOCUSATE SODIUM 100 MG: 100 CAPSULE, LIQUID FILLED ORAL at 20:49

## 2018-01-01 RX ADMIN — DILTIAZEM HYDROCHLORIDE 60 MG: 60 TABLET, FILM COATED ORAL at 17:22

## 2018-01-01 RX ADMIN — CETIRIZINE HYDROCHLORIDE 10 MG: 10 TABLET, FILM COATED ORAL at 09:01

## 2018-01-01 RX ADMIN — METOPROLOL SUCCINATE 25 MG: 25 TABLET, EXTENDED RELEASE ORAL at 08:53

## 2018-01-01 RX ADMIN — TERAZOSIN HYDROCHLORIDE ANHYDROUS 2 MG: 2 CAPSULE ORAL at 21:06

## 2018-01-01 RX ADMIN — DOCUSATE SODIUM 100 MG: 100 CAPSULE, LIQUID FILLED ORAL at 08:55

## 2018-01-01 RX ADMIN — TAZOBACTAM SODIUM AND PIPERACILLIN SODIUM 2.25 G: 250; 2 INJECTION, SOLUTION INTRAVENOUS at 14:55

## 2018-01-01 RX ADMIN — Medication 10 ML: at 08:51

## 2018-01-01 RX ADMIN — PHENYLEPHRINE HYDROCHLORIDE 50 MCG: 10 INJECTION INTRAVENOUS at 16:55

## 2018-01-01 RX ADMIN — Medication 10 ML: at 18:01

## 2018-01-01 RX ADMIN — METOPROLOL SUCCINATE 25 MG: 25 TABLET, EXTENDED RELEASE ORAL at 09:57

## 2018-01-01 RX ADMIN — CYANOCOBALAMIN TAB 1000 MCG 1000 MCG: 1000 TAB at 10:54

## 2018-01-01 RX ADMIN — FUROSEMIDE 40 MG: 10 INJECTION, SOLUTION INTRAMUSCULAR; INTRAVENOUS at 06:33

## 2018-01-01 RX ADMIN — MEROPENEM 1 G: 1 INJECTION, POWDER, FOR SOLUTION INTRAVENOUS at 02:38

## 2018-01-01 RX ADMIN — MEROPENEM 1 G: 1 INJECTION, POWDER, FOR SOLUTION INTRAVENOUS at 16:42

## 2018-01-01 RX ADMIN — COLLAGENASE SANTYL: 250 OINTMENT TOPICAL at 10:49

## 2018-01-01 RX ADMIN — HEPARIN SODIUM 5000 UNITS: 5000 INJECTION, SOLUTION INTRAVENOUS; SUBCUTANEOUS at 08:28

## 2018-01-01 RX ADMIN — ASPIRIN 81 MG CHEWABLE TABLET 81 MG: 81 TABLET CHEWABLE at 09:57

## 2018-01-01 RX ADMIN — OXYCODONE HYDROCHLORIDE AND ACETAMINOPHEN 1 TABLET: 5; 325 TABLET ORAL at 10:55

## 2018-01-01 RX ADMIN — FAMOTIDINE 40 MG: 40 TABLET ORAL at 08:57

## 2018-01-01 RX ADMIN — MEROPENEM 1 G: 1 INJECTION, POWDER, FOR SOLUTION INTRAVENOUS at 12:10

## 2018-01-01 RX ADMIN — OXYCODONE HYDROCHLORIDE AND ACETAMINOPHEN 1 TABLET: 5; 325 TABLET ORAL at 22:37

## 2018-01-01 RX ADMIN — TAMSULOSIN HYDROCHLORIDE 0.8 MG: 0.4 CAPSULE ORAL at 20:56

## 2018-01-01 RX ADMIN — Medication 1 G: at 21:50

## 2018-01-01 RX ADMIN — FERROUS SULFATE TAB EC 324 MG (65 MG FE EQUIVALENT) 324 MG: 324 (65 FE) TABLET DELAYED RESPONSE at 11:00

## 2018-01-01 RX ADMIN — METOPROLOL SUCCINATE 50 MG: 50 TABLET, EXTENDED RELEASE ORAL at 21:24

## 2018-01-01 RX ADMIN — FLUTICASONE PROPIONATE 2 SPRAY: 50 SPRAY, METERED NASAL at 08:50

## 2018-01-01 RX ADMIN — POLYETHYLENE GLYCOL 3350 17 G: 17 POWDER, FOR SOLUTION ORAL at 10:56

## 2018-01-01 RX ADMIN — DOCUSATE SODIUM 100 MG: 100 CAPSULE, LIQUID FILLED ORAL at 09:08

## 2018-01-01 RX ADMIN — BUMETANIDE 1 MG: 1 TABLET ORAL at 08:55

## 2018-01-01 RX ADMIN — Medication 10 ML: at 08:49

## 2018-01-01 RX ADMIN — FLUTICASONE PROPIONATE 2 SPRAY: 50 SPRAY, METERED NASAL at 20:23

## 2018-01-01 RX ADMIN — MIDAZOLAM 0.5 MG: 1 INJECTION INTRAMUSCULAR; INTRAVENOUS at 09:24

## 2018-01-01 RX ADMIN — FAMOTIDINE 40 MG: 40 TABLET ORAL at 08:34

## 2018-01-01 RX ADMIN — FINASTERIDE 5 MG: 5 TABLET, FILM COATED ORAL at 09:51

## 2018-01-01 RX ADMIN — DOCUSATE SODIUM 100 MG: 100 CAPSULE, LIQUID FILLED ORAL at 14:53

## 2018-01-01 RX ADMIN — Medication 3 ML: at 20:04

## 2018-01-01 RX ADMIN — FOLIC ACID 1 MG: 1 TABLET ORAL at 10:20

## 2018-01-01 RX ADMIN — DILTIAZEM HYDROCHLORIDE 60 MG: 60 TABLET, FILM COATED ORAL at 11:07

## 2018-01-01 RX ADMIN — OXYCODONE HYDROCHLORIDE AND ACETAMINOPHEN 1 TABLET: 5; 325 TABLET ORAL at 05:35

## 2018-01-01 RX ADMIN — ASPIRIN 81 MG: 81 TABLET, COATED ORAL at 09:31

## 2018-01-01 RX ADMIN — METOPROLOL SUCCINATE 25 MG: 25 TABLET, EXTENDED RELEASE ORAL at 08:33

## 2018-01-01 RX ADMIN — POLYETHYLENE GLYCOL 3350 17 G: 17 POWDER, FOR SOLUTION ORAL at 08:36

## 2018-01-01 RX ADMIN — TERAZOSIN HYDROCHLORIDE ANHYDROUS 2 MG: 2 CAPSULE ORAL at 21:38

## 2018-01-01 RX ADMIN — Medication: at 17:22

## 2018-01-01 RX ADMIN — POTASSIUM CHLORIDE 40 MEQ: 1.5 POWDER, FOR SOLUTION ORAL at 10:01

## 2018-01-01 RX ADMIN — DILTIAZEM HYDROCHLORIDE 60 MG: 60 TABLET, FILM COATED ORAL at 08:57

## 2018-01-01 RX ADMIN — CYANOCOBALAMIN TAB 1000 MCG 1000 MCG: 1000 TAB at 08:48

## 2018-01-01 RX ADMIN — METOPROLOL SUCCINATE 25 MG: 50 TABLET, EXTENDED RELEASE ORAL at 09:07

## 2018-01-01 RX ADMIN — LEVOTHYROXINE SODIUM 50 MCG: 50 TABLET ORAL at 05:47

## 2018-01-01 RX ADMIN — PROPOFOL 100 MG: 10 INJECTION, EMULSION INTRAVENOUS at 16:26

## 2018-01-01 RX ADMIN — SODIUM CHLORIDE 75 ML/HR: 900 INJECTION, SOLUTION INTRAVENOUS at 13:11

## 2018-01-01 RX ADMIN — LEVOTHYROXINE SODIUM 50 MCG: 50 TABLET ORAL at 06:06

## 2018-01-01 RX ADMIN — DILTIAZEM HYDROCHLORIDE 60 MG: 60 TABLET, FILM COATED ORAL at 12:06

## 2018-01-01 RX ADMIN — BISACODYL 10 MG: 5 TABLET, COATED ORAL at 08:31

## 2018-01-01 RX ADMIN — SODIUM CHLORIDE 50 ML/HR: 900 INJECTION, SOLUTION INTRAVENOUS at 04:38

## 2018-01-12 NOTE — PROGRESS NOTES
Ravi Park  9/9/1930            87 y.o.      1/11/2018    Chief Complaint   Patient presents with   • Peripheral Vascular Disease     follow up   The patient is followed for having  Developed edema both lower extremities multifactorial but due to the large part tumor congestive heart failure with blistering and resultant superficial ulceration of the right leg and tiny ulceration of the left leg. Edema persists right leg with ulceration which is now healing with residual crusts  Measures about 2x2 cm.and tiny ulceration anterior left leg 0.5x0.5cm      HPI        ROS       Current Outpatient Prescriptions:   •  albuterol (PROVENTIL HFA;VENTOLIN HFA) 108 (90 BASE) MCG/ACT inhaler, Inhale 2 puffs Every 6 (Six) Hours As Needed for Wheezing. 2 Puffs 4 times per day as needed., Disp: 1 inhaler, Rfl: 11  •  aspirin 325 MG tablet, Take 1 tablet by mouth Daily., Disp: 30 tablet, Rfl: 11  •  doxazosin (CARDURA) 2 MG tablet, Take 1 tablet by mouth Every Night., Disp: 30 tablet, Rfl: 6  •  finasteride (PROSCAR) 5 MG tablet, Take 5 mg by mouth Daily., Disp: , Rfl:   •  fluticasone (FLONASE) 50 MCG/ACT nasal spray, 1 spray into each nostril., Disp: , Rfl:   •  levothyroxine (SYNTHROID, LEVOTHROID) 50 MCG tablet, Take 1 tablet by mouth Daily., Disp: 30 tablet, Rfl: 5  •  metoprolol succinate XL (TOPROL-XL) 25 MG 24 hr tablet, Take 1 tablet by mouth Daily., Disp: 30 tablet, Rfl: 11  •  oxyCODONE-acetaminophen (PERCOCET) 5-325 MG per tablet, Take 1 or 2 tablets daily as needed., Disp: 60 tablet, Rfl: 0  •  tamsulosin (FLOMAX) 0.4 MG capsule 24 hr capsule, Take 2 capsules by mouth Every Night., Disp: 60 capsule, Rfl: 11    The following portions of the patient's history were reviewed and updated as appropriate: allergies, current medications, past family history, past medical history, past social history, past surgical history and problem list.        Past Surgical History:   Procedure Laterality Date   • CATARACT EXTRACTION WITH  "INTRAOCULAR LENS IMPLANT  12/01/2011    Phacoemulsification of a cataract with intraocular lens implant of left eye, Jin SN-60-WF, serial no. [id number] diopter. Nuclear   • HERNIA REPAIR  12/20/1989    Inguinal hernia - Reduction and repair of right inguinal hernia. Bilateral inguinal hernias   • INJECTION OF MEDICATION  09/09/2016    Kenalog (5)     • OTHER SURGICAL HISTORY  12/14/2000    INCISION OF EARDRUM GENERAL ANESTHETIC 94234 (2)   • OTHER SURGICAL HISTORY  02/10/2016    Abdomen surgery procedure (1) - Abdominal aortogram with selective bilateral common femoral angiogram with runoff to the lower extremities   • OTHER SURGICAL HISTORY  07/09/2015    Apply Unna Boot 29869 (2) - COLLIN Arzate   • OTHER SURGICAL HISTORY  05/27/2016    Drain/Inject Major Joint 29537 (2) - COLLIN Arzate   • OTHER SURGICAL HISTORY  03/23/2016    Exploration, femoral artery (1) - Left common femoral artery access with contralateral stent placement to the right SGA using CSI atherectomy and stent placement   • OTHER SURGICAL HISTORY  06/03/1997    Forearm or wrist surgery (1) - Excision, Sebaceous cyst. Mass, dorsum, left wrist.   • OTHER SURGICAL HISTORY  03/15/1990    Partial excision of lip sutured 71740 (1) - Vermiliionectomy with advancement of buccal mucosa. Excision of right cheek lesion. Excision of uvula lesion           Physical Exam  /65 (BP Location: Right arm)  Pulse 61  Temp 98.4 °F (36.9 °C) (Oral)   Ht 182.9 cm (72\")  Wt 98.4 kg (217 lb)  SpO2 92%  BMI 29.43 kg/m2    HEENT:    CHEST:    HEART:    ABDOMEN:    EXTREMITIES:Arterial perfusion both lower extremities adequate.  Patient measured bilaterally for Circ-Aid external compression garments     VASCULAR LAB STUDIES:            RADIOLOGY:      Localized edema [R60.0]    IMPRESSION:  Edema with ulceration right lower extremity  Edema with tiny tiny ulceration left lower extremity edema not as  severe as on the right                Assessment/Plan  Rodrigues was " seen today for peripheral vascular disease.    Diagnoses and all orders for this visit:    Localized edema                Plan   1.  Measurement for Circ-Aid devices for both lower extremities  2. Application of unna boot right leg. (UNNA BOOT APPLIED RIGHT LEG TODAY)  3.RETURN 1-15-18              Jack L. Hamman, MD  1/12/2018

## 2018-01-18 NOTE — PROGRESS NOTES
Ravi Park  9/9/1930            87 y.o.      1/18/2018    Chief Complaint   Patient presents with   • Dressing Change     Belle Solorio R leg     Chief Complaint   Patient presents with   • Peripheral Vascular Disease       follow up   The patient is followed for having  developed edema both lower extremities multifactorial but due to the large part to  congestive heart failure with blistering and resultant superficial ulceration of the right leg and tiny ulceration of the left leg. Edema persists right leg with ulceration which is now healing with residual crusts  Measures about 2x2 cm.and tiny ulceration anterior left leg 0.5x0.5cm      Upon return 1-18-18 wounds of the right leg are now completely healed.  Circ-Aid devices have been ordered for long term copmpression therapy but have not yet arrived.  Belle solorio reapplied today right lower extremityh. for edema control until his Circ-aid devices are available/.All wounds are healed        HPI        ROS       Current Outpatient Prescriptions:   •  albuterol (PROVENTIL HFA;VENTOLIN HFA) 108 (90 BASE) MCG/ACT inhaler, Inhale 2 puffs Every 6 (Six) Hours As Needed for Wheezing. 2 Puffs 4 times per day as needed., Disp: 1 inhaler, Rfl: 11  •  aspirin 325 MG tablet, Take 1 tablet by mouth Daily., Disp: 30 tablet, Rfl: 11  •  doxazosin (CARDURA) 2 MG tablet, Take 1 tablet by mouth Every Night., Disp: 30 tablet, Rfl: 6  •  finasteride (PROSCAR) 5 MG tablet, Take 5 mg by mouth Daily., Disp: , Rfl:   •  fluticasone (FLONASE) 50 MCG/ACT nasal spray, 1 spray into each nostril., Disp: , Rfl:   •  levothyroxine (SYNTHROID, LEVOTHROID) 50 MCG tablet, Take 1 tablet by mouth Daily., Disp: 30 tablet, Rfl: 5  •  metoprolol succinate XL (TOPROL-XL) 25 MG 24 hr tablet, Take 1 tablet by mouth Daily., Disp: 30 tablet, Rfl: 11  •  oxyCODONE-acetaminophen (PERCOCET) 5-325 MG per tablet, Take 1 or 2 tablets daily as needed., Disp: 60 tablet, Rfl: 0  •  tamsulosin (FLOMAX) 0.4 MG capsule 24  "hr capsule, Take 2 capsules by mouth Every Night., Disp: 60 capsule, Rfl: 11    The following portions of the patient's history were reviewed and updated as appropriate: allergies, current medications, past family history, past medical history, past social history, past surgical history and problem list.        Past Surgical History:   Procedure Laterality Date   • CATARACT EXTRACTION WITH INTRAOCULAR LENS IMPLANT  12/01/2011    Phacoemulsification of a cataract with intraocular lens implant of left eye, Jin SN-60-WF, serial no. [id number] diopter. Nuclear   • HERNIA REPAIR  12/20/1989    Inguinal hernia - Reduction and repair of right inguinal hernia. Bilateral inguinal hernias   • INJECTION OF MEDICATION  09/09/2016    Kenalog (5)     • OTHER SURGICAL HISTORY  12/14/2000    INCISION OF EARDRUM GENERAL ANESTHETIC 78067 (2)   • OTHER SURGICAL HISTORY  02/10/2016    Abdomen surgery procedure (1) - Abdominal aortogram with selective bilateral common femoral angiogram with runoff to the lower extremities   • OTHER SURGICAL HISTORY  07/09/2015    Apply Unna Boot 87244 (2) - COLLIN Arzate   • OTHER SURGICAL HISTORY  05/27/2016    Drain/Inject Major Joint 41062 (2) - COLLIN Arzate   • OTHER SURGICAL HISTORY  03/23/2016    Exploration, femoral artery (1) - Left common femoral artery access with contralateral stent placement to the right SGA using CSI atherectomy and stent placement   • OTHER SURGICAL HISTORY  06/03/1997    Forearm or wrist surgery (1) - Excision, Sebaceous cyst. Mass, dorsum, left wrist.   • OTHER SURGICAL HISTORY  03/15/1990    Partial excision of lip sutured 24561 (1) - Vermiliionectomy with advancement of buccal mucosa. Excision of right cheek lesion. Excision of uvula lesion           Physical Exam  /75 (BP Location: Left arm)  Pulse 109  Temp 98.4 °F (36.9 °C) (Temporal Artery )   Ht 182.9 cm (72\")  Wt 96.8 kg (213 lb 4.8 oz)  SpO2 94%  BMI 28.93 " kg/m2    HEENT:    CHEST:    HEART:    ABDOMEN:    EXTREMITIES:arterial perfusion satisfactory both lower extremities/.    VASCULAR LAB STUDIES:            RADIOLOGY:      Localized edema [R60.0]    IMPRESSION:                  Assessment/Plan  Ravi was seen today for dressing change.    Diagnoses and all orders for this visit:    Localized edema                Plan     Application of unna boot right. (UNNA BOOT APPLIED RIGHT LOWER EXTREMITY TODAY(  Chief Complaint   Patient presents with   • Peripheral Vascular Disease       follow up                       Jack L. Hamman, MD  1/18/2018

## 2018-01-26 NOTE — PROGRESS NOTES
Ravi Park  9/9/1930            87 y.o.      1/25/2018    Chief Complaint   Patient presents with   • Leg Swelling     right leg unnaboot      The patient is followed for having  developed edema both lower extremities multifactorial but due to the large part to  congestive heart failure with blistering and resultant superficial ulceration of the right leg and tiny ulceration of the left leg. Edema persists right leg with ulceration which is now healing with residual crusts  Measures about 2x2 cm.and tiny ulceration anterior left leg 0.5x0.5cm Circ-Aid devices have been ordered for each lower extremity         Upon return 1-18-18 wounds of the right leg are now completely healed.  Circ-Aid devices have been ordered for long term copmpression therapy but have not yet arrived.  Belle solorio reapplied today right lower extremityh. for edema control until his Circ-aid devices are available/.All wounds are healed         HPI        ROS       Current Outpatient Prescriptions:   •  albuterol (PROVENTIL HFA;VENTOLIN HFA) 108 (90 BASE) MCG/ACT inhaler, Inhale 2 puffs Every 6 (Six) Hours As Needed for Wheezing. 2 Puffs 4 times per day as needed., Disp: 1 inhaler, Rfl: 11  •  aspirin 325 MG tablet, Take 1 tablet by mouth Daily., Disp: 30 tablet, Rfl: 11  •  doxazosin (CARDURA) 2 MG tablet, Take 1 tablet by mouth Every Night., Disp: 30 tablet, Rfl: 6  •  finasteride (PROSCAR) 5 MG tablet, Take 5 mg by mouth Daily., Disp: , Rfl:   •  fluticasone (FLONASE) 50 MCG/ACT nasal spray, 1 spray into each nostril., Disp: , Rfl:   •  levothyroxine (SYNTHROID, LEVOTHROID) 50 MCG tablet, Take 1 tablet by mouth Daily., Disp: 30 tablet, Rfl: 5  •  metoprolol succinate XL (TOPROL-XL) 25 MG 24 hr tablet, Take 1 tablet by mouth Daily., Disp: 30 tablet, Rfl: 11  •  oxyCODONE-acetaminophen (PERCOCET) 5-325 MG per tablet, Take 1 or 2 tablets daily as needed., Disp: 60 tablet, Rfl: 0  •  tamsulosin (FLOMAX) 0.4 MG capsule 24 hr capsule, Take 2  "capsules by mouth Every Night., Disp: 60 capsule, Rfl: 11    The following portions of the patient's history were reviewed and updated as appropriate: allergies, current medications, past family history, past medical history, past social history, past surgical history and problem list.        Past Surgical History:   Procedure Laterality Date   • CATARACT EXTRACTION WITH INTRAOCULAR LENS IMPLANT  12/01/2011    Phacoemulsification of a cataract with intraocular lens implant of left eye, Jin SN-60-WF, serial no. [id number]:21.5 diopter. Nuclear   • HERNIA REPAIR  12/20/1989    Inguinal hernia - Reduction and repair of right inguinal hernia. Bilateral inguinal hernias   • INJECTION OF MEDICATION  09/09/2016    Kenalog (5)     • OTHER SURGICAL HISTORY  12/14/2000    INCISION OF EARDRUM GENERAL ANESTHETIC 09170 (2)   • OTHER SURGICAL HISTORY  02/10/2016    Abdomen surgery procedure (1) - Abdominal aortogram with selective bilateral common femoral angiogram with runoff to the lower extremities   • OTHER SURGICAL HISTORY  07/09/2015    Apply Unna Boot 03630 (2) - COLLIN Arzate   • OTHER SURGICAL HISTORY  05/27/2016    Drain/Inject Major Joint 95239 (2) - COLLIN Arzate   • OTHER SURGICAL HISTORY  03/23/2016    Exploration, femoral artery (1) - Left common femoral artery access with contralateral stent placement to the right SGA using CSI atherectomy and stent placement   • OTHER SURGICAL HISTORY  06/03/1997    Forearm or wrist surgery (1) - Excision, Sebaceous cyst. Mass, dorsum, left wrist.   • OTHER SURGICAL HISTORY  03/15/1990    Partial excision of lip sutured 88188 (1) - Vermiliionectomy with advancement of buccal mucosa. Excision of right cheek lesion. Excision of uvula lesion           Physical Exam  /63 (BP Location: Left arm)  Pulse 95  Temp 97.7 °F (36.5 °C) (Oral)   Ht 182.9 cm (72\")  Wt 96.2 kg (212 lb)  SpO2 96%  BMI 28.75 kg/m2    HEENT:    CHEST:    HEART:    ABDOMEN:    EXTREMITIES:Edema right leg well " controlled with external wraps.  Patient will attempt to wear more loosely fitted compression stocking for the left leg until his CircAid devices are available    VASCULAR LAB STUDIES:            RADIOLOGY:      Localized edema [R60.0]    IMPRESSION:  Edema multifactorial both lower extremities previous bilateral ulcerations which are now healed                    Assessment/Plan  Ravi was seen today for leg swelling.    Diagnoses and all orders for this visit:    Localized edema                Plan   Unna boot reapplied right lower extremity base of toes below the knee until his CircAid devices are available (RIGHT UNNA BOOT APPLIED TODAY)  Return 1-29-18              Jack L. Hamman, MD  1/25/2018

## 2018-02-02 NOTE — PROGRESS NOTES
"Ravi Park  9/9/1930            87 y.o.      2/2/2018    No chief complaint on file.  EDEMA  Complaint   Patient presents with   • Leg Swelling       right leg unnaboot     Patient returns for now long-term care of edema both lower extremities which is multifactorial with a but related to chronic chronic congestive heart failure.  We had ordered Circ-aid devices for both lower extremities for edema control./  Patient brought in a parcel which he had received at home. m It proved to be a knee brace which after length questioning it now seems that the patient ordered the brace himself after being \"merchandized by a telephone call\"  We will once again attempt to 9r isrrael CIRC-aid devices for both lower extremities.      The patient is followed for having  developed edema both lower extremities multifactorial but due to the large part to  congestive heart failure with blistering and resultant superficial ulceration of the right leg and tiny ulceration of the left leg. Edema persists right leg with ulceration which is now healing with residual crusts  Measures about 2x2 cm.and tiny ulceration anterior left leg 0.5x0.5cm Circ-Aid devices have been ordered for each lower extremity          Upon return 1-18-18 wounds of the right leg are now completely healed.  Circ-Aid devices have been ordered for long term copmpression therapy but have not yet arrived.  Belle solorio reapplied today right lower extremityh. for edema control until his Circ-aid devices are available/.All wounds are healed           HPI           HPI        ROS       Current Outpatient Prescriptions:   •  albuterol (PROVENTIL HFA;VENTOLIN HFA) 108 (90 BASE) MCG/ACT inhaler, Inhale 2 puffs Every 6 (Six) Hours As Needed for Wheezing. 2 Puffs 4 times per day as needed., Disp: 1 inhaler, Rfl: 11  •  aspirin 325 MG tablet, Take 1 tablet by mouth Daily., Disp: 30 tablet, Rfl: 11  •  doxazosin (CARDURA) 2 MG tablet, Take 1 tablet by mouth Every Night., Disp: 30 " tablet, Rfl: 6  •  finasteride (PROSCAR) 5 MG tablet, Take 5 mg by mouth Daily., Disp: , Rfl:   •  fluticasone (FLONASE) 50 MCG/ACT nasal spray, 1 spray into each nostril., Disp: , Rfl:   •  levothyroxine (SYNTHROID, LEVOTHROID) 50 MCG tablet, Take 1 tablet by mouth Daily., Disp: 30 tablet, Rfl: 5  •  metoprolol succinate XL (TOPROL-XL) 25 MG 24 hr tablet, Take 1 tablet by mouth Daily., Disp: 30 tablet, Rfl: 11  •  oxyCODONE-acetaminophen (PERCOCET) 5-325 MG per tablet, Take 1 or 2 tablets daily as needed., Disp: 60 tablet, Rfl: 0  •  tamsulosin (FLOMAX) 0.4 MG capsule 24 hr capsule, Take 2 capsules by mouth Every Night., Disp: 60 capsule, Rfl: 11    The following portions of the patient's history were reviewed and updated as appropriate: allergies, current medications, past family history, past medical history, past social history, past surgical history and problem list.        Past Surgical History:   Procedure Laterality Date   • CATARACT EXTRACTION WITH INTRAOCULAR LENS IMPLANT  12/01/2011    Phacoemulsification of a cataract with intraocular lens implant of left eye, Jin SN-60-WF, serial no. [id number] diopter. Nuclear   • HERNIA REPAIR  12/20/1989    Inguinal hernia - Reduction and repair of right inguinal hernia. Bilateral inguinal hernias   • INJECTION OF MEDICATION  09/09/2016    Kenalog (5)     • OTHER SURGICAL HISTORY  12/14/2000    INCISION OF EARDRUM GENERAL ANESTHETIC 66616 (2)   • OTHER SURGICAL HISTORY  02/10/2016    Abdomen surgery procedure (1) - Abdominal aortogram with selective bilateral common femoral angiogram with runoff to the lower extremities   • OTHER SURGICAL HISTORY  07/09/2015    Apply Unna Boot 18195 (2) - COLLIN Arzate   • OTHER SURGICAL HISTORY  05/27/2016    Drain/Inject Major Joint 67623 (2) - COLLIN Arzate   • OTHER SURGICAL HISTORY  03/23/2016    Exploration, femoral artery (1) - Left common femoral artery access with contralateral stent placement to the right SGA using CSI  atherectomy and stent placement   • OTHER SURGICAL HISTORY  06/03/1997    Forearm or wrist surgery (1) - Excision, Sebaceous cyst. Mass, dorsum, left wrist.   • OTHER SURGICAL HISTORY  03/15/1990    Partial excision of lip sutured 66239 (1) - Vermiliionectomy with advancement of buccal mucosa. Excision of right cheek lesion. Excision of uvula lesion           Physical Exam  There were no vitals taken for this visit.    HEENT:    CHEST:    HEART:    ABDOMEN:    EXTREMITIES:Arterial perfusion both lower extremities is adequate.  He  is fitted with knee-length compression stockings of the Tien variety which he can wear until his CircAid devices become available    VASCULAR LAB STUDIES:            RADIOLOGY:      Localized edema [R60.0]    IMPRESSION:  Continue lower extremity multifactorial lower extremity edema primarily due to congestive heart failure                Assessment/Plan  Diagnoses and all orders for this visit:    Localized edema                Plan     Continue to wear large size TEDs stockings until his CircAid devices are available which time he will notify us by telephone in return on when necessary basis.            Jack L. Hamman, MD  2/2/2018

## 2018-02-07 NOTE — PROGRESS NOTES
Max Park is a 87 y.o. male.     History of Present Illness     Crick in neck.  3 weeks ago a rocking chair fell on him?  No numbness/tingling/weakness extremities.     Review of Systems   Constitutional: Negative for chills, fatigue and fever.   HENT: Negative for congestion, ear discharge, ear pain, facial swelling, hearing loss, postnasal drip, rhinorrhea, sinus pressure, sore throat, trouble swallowing and voice change.    Eyes: Negative for discharge, redness and visual disturbance.   Respiratory: Negative for cough, chest tightness, shortness of breath and wheezing.    Cardiovascular: Negative for chest pain and palpitations.   Gastrointestinal: Negative for abdominal pain, blood in stool, constipation, diarrhea, nausea and vomiting.   Endocrine: Negative for polydipsia and polyuria.   Genitourinary: Negative for dysuria, flank pain, hematuria and urgency.   Musculoskeletal: Positive for neck pain. Negative for arthralgias, back pain, joint swelling and myalgias.   Skin: Negative for rash.   Neurological: Negative for dizziness, weakness, numbness and headaches.   Hematological: Negative for adenopathy.   Psychiatric/Behavioral: Negative for confusion and sleep disturbance. The patient is not nervous/anxious.        Objective   Physical Exam   Constitutional: He is oriented to person, place, and time. He appears well-developed and well-nourished.   HENT:   Head: Normocephalic and atraumatic.   Right Ear: External ear normal.   Left Ear: External ear normal.   Nose: Nose normal.   Eyes: Conjunctivae and EOM are normal. Pupils are equal, round, and reactive to light.   Neck: Normal range of motion.   Pulmonary/Chest: Effort normal.   Musculoskeletal: Normal range of motion.   Neurological: He is alert and oriented to person, place, and time.   Psychiatric: He has a normal mood and affect. His behavior is normal. Judgment and thought content normal.   Nursing note and vitals  reviewed.      Assessment/Plan   Ravi was seen today for sore throat and neck pain.    Diagnoses and all orders for this visit:    Neck pain  -     XR Spine Cervical Complete 4 or 5 View    Other orders  -     tiZANidine (ZANAFLEX) 4 MG tablet; Take 0.5-1 tablets by mouth At Night As Needed for Muscle Spasms.      Degenerative changes in neck essentially same as 2013    Cervical collar.  If makes worse, stop.     zanaflex only at night.

## 2018-02-13 NOTE — PROGRESS NOTES
Ravi Park  9/9/1930  87 y.o. male   Patient presents for nail care on bilateral feet today.    02/13/2018  Chief Complaint   Patient presents with   • Left Foot - Nail care   • Right Foot - Nail care           History of Present Illness    Ravi Park is a 87 y.o. male            Past Medical History:   Diagnosis Date   • Abnormal gait    • Acute bronchitis    • Acute confusion    • Adverse reaction to drug    • Allergic rhinitis    • Allergic rhinitis due to pollen    • Astigmatism     hyperopic    • Atrial fibrillation    • Backache    • Benign prostatic hyperplasia    • Cataract    • Cellulitis of lower leg    • Cerebrovascular accident    • Cervical arthritis     ?SPINOUS PROCESS?    • Common cold    • Contusion of thumb    • Cough    • Decreased platelet count     Platelet count below reference range    • Disorder of sacroiliac joint    • Dog bite of hand    • Essential hypertension    • Exposure to communicable disease     Risk of exposure to communicable disease    • Gout     vs arthritis    • Herpes zoster    • History of artificial eye lens     Artificial lens in position     • History of colon polyps    • History of colonoscopy 11/05/2013    Colon endoscopy 37600 (3) - Polyps in colon, 90 cm, 50 cm, & 70 cm from entry; all removed by cold biopsy polypectomy.  1 polypo in cecum. Multip[le biopsies taken. Melanosis in colon. Hemorrhoids found    • Hypertensive disorder    • Impacted cerumen    • Insect bite     wound     • Keratoconjunctivitis sicca    • Malaise and fatigue    • Multiple joint pain    • Need for immunization against influenza    • Need for prophylactic vaccination and inoculation against influenza    • Olecranon bursitis     LEFT ELBOW    • Open wound of lesser toe of left foot without damage to nail     initial encounter   • Open wound of lower leg    • Open wound of lower leg with complication    • Open wound of toe    • Otorrhea     post tympanostomy     • Pain in eye     etiology  unknown    • Pain in joint involving right lower leg     Pain in right leg    • Pain in thumb joint with movement    • Pain in wrist    • Prostatitis    • Pseudophakia     bilateral    • Pure hypercholesterolemia    • Sensorineural hearing loss    • Serous otitis media    • Shoulder pain    • Sprain of shoulder and upper arm    • Upper respiratory infection    • Vitreous floaters          Past Surgical History:   Procedure Laterality Date   • CATARACT EXTRACTION WITH INTRAOCULAR LENS IMPLANT  12/01/2011    Phacoemulsification of a cataract with intraocular lens implant of left eye, Jin SN-60-WF, serial no. [id number] diopter. Nuclear   • HERNIA REPAIR  12/20/1989    Inguinal hernia - Reduction and repair of right inguinal hernia. Bilateral inguinal hernias   • INJECTION OF MEDICATION  09/09/2016    Kenalog (5)     • OTHER SURGICAL HISTORY  12/14/2000    INCISION OF EARDRUM GENERAL ANESTHETIC 31234 (2)   • OTHER SURGICAL HISTORY  02/10/2016    Abdomen surgery procedure (1) - Abdominal aortogram with selective bilateral common femoral angiogram with runoff to the lower extremities   • OTHER SURGICAL HISTORY  07/09/2015    Apply Unna Boot 46786 (2) - COLLIN Arzate   • OTHER SURGICAL HISTORY  05/27/2016    Drain/Inject Major Joint 00800 (2) - COLLIN Arzate   • OTHER SURGICAL HISTORY  03/23/2016    Exploration, femoral artery (1) - Left common femoral artery access with contralateral stent placement to the right SGA using CSI atherectomy and stent placement   • OTHER SURGICAL HISTORY  06/03/1997    Forearm or wrist surgery (1) - Excision, Sebaceous cyst. Mass, dorsum, left wrist.   • OTHER SURGICAL HISTORY  03/15/1990    Partial excision of lip sutured 27859 (1) - Vermiliionectomy with advancement of buccal mucosa. Excision of right cheek lesion. Excision of uvula lesion         Family History   Problem Relation Age of Onset   • Cancer Mother          Social History     Social History   • Marital status:      Spouse  "name: N/A   • Number of children: N/A   • Years of education: N/A     Occupational History   • Not on file.     Social History Main Topics   • Smoking status: Former Smoker   • Smokeless tobacco: Never Used   • Alcohol use 1.2 oz/week     1 Glasses of wine, 1 Cans of beer per week      Comment: 1 every other wk   • Drug use: No   • Sexual activity: No     Other Topics Concern   • Not on file     Social History Narrative         Current Outpatient Prescriptions   Medication Sig Dispense Refill   • albuterol (PROVENTIL HFA;VENTOLIN HFA) 108 (90 BASE) MCG/ACT inhaler Inhale 2 puffs Every 6 (Six) Hours As Needed for Wheezing. 2 Puffs 4 times per day as needed. 1 inhaler 11   • aspirin 325 MG tablet Take 1 tablet by mouth Daily. 30 tablet 11   • doxazosin (CARDURA) 2 MG tablet Take 1 tablet by mouth Every Night. 30 tablet 6   • finasteride (PROSCAR) 5 MG tablet Take 5 mg by mouth Daily.     • fluticasone (FLONASE) 50 MCG/ACT nasal spray 1 spray into each nostril.     • levothyroxine (SYNTHROID, LEVOTHROID) 50 MCG tablet Take 1 tablet by mouth Daily. 30 tablet 5   • metoprolol succinate XL (TOPROL-XL) 25 MG 24 hr tablet Take 1 tablet by mouth Daily. 30 tablet 11   • oxyCODONE-acetaminophen (PERCOCET) 5-325 MG per tablet Take 1 or 2 tablets daily as needed. 60 tablet 0   • tamsulosin (FLOMAX) 0.4 MG capsule 24 hr capsule Take 2 capsules by mouth Every Night. 60 capsule 11   • tiZANidine (ZANAFLEX) 4 MG tablet Take 0.5-1 tablets by mouth At Night As Needed for Muscle Spasms. 15 tablet 0     No current facility-administered medications for this visit.          OBJECTIVE    /65  Pulse 88  Ht 182.9 cm (72\")  Wt 96.2 kg (212 lb)  SpO2 96%  BMI 28.75 kg/m2      Review of Systems   Constitutional: Negative.    HENT: Positive for trouble swallowing.    Eyes: Negative.    Respiratory: Negative.    Cardiovascular: Negative.    Gastrointestinal: Negative.    Endocrine: Negative.    Genitourinary: Positive for frequency.   "   Musculoskeletal: Positive for arthralgias.   Allergic/Immunologic: Negative.    Neurological: Negative.    Hematological: Negative.    Psychiatric/Behavioral: Negative.          ***            ASSESSMENT AND PLAN    There are no diagnoses linked to this encounter.          This document has been electronically signed by Katie Cavazos MA on February 13, 2018 1:32 PM     EMR Dragon/Transcription disclaimer:   Much of this encounter note is an electronic transcription/translation of spoken language to printed text. The electronic translation of spoken language may permit erroneous, or at times, nonsensical words or phrases to be inadvertently transcribed; Although I have reviewed the note for such errors, some may still exist.    Katie Cavazos MA  2/13/2018  1:32 PM

## 2018-02-19 NOTE — PROGRESS NOTES
Ravi Park  9/9/1930  87 y.o. male   Patient presents today for nail care.    02/13/2018    Chief Complaint   Patient presents with   • Left Foot - Nail care   • Right Foot - Nail care           History of Present Illness    Mr Park is a 88 y/o male who presents for foot exam and complaint of painful nails. States his nails are thickened and elongated, causing pain in closed toed shoes. The pain is achy. He does have difficulty caring for his feet on his own. He is not diabetic, does have history of PAD and lower extremity wounds.         Past Medical History:   Diagnosis Date   • Abnormal gait    • Acute bronchitis    • Acute confusion    • Adverse reaction to drug    • Allergic rhinitis    • Allergic rhinitis due to pollen    • Astigmatism     hyperopic    • Atrial fibrillation    • Backache    • Benign prostatic hyperplasia    • Cataract    • Cellulitis of lower leg    • Cerebrovascular accident    • Cervical arthritis     ?SPINOUS PROCESS?    • Common cold    • Contusion of thumb    • Cough    • Decreased platelet count     Platelet count below reference range    • Disorder of sacroiliac joint    • Dog bite of hand    • Essential hypertension    • Exposure to communicable disease     Risk of exposure to communicable disease    • Gout     vs arthritis    • Herpes zoster    • History of artificial eye lens     Artificial lens in position     • History of colon polyps    • History of colonoscopy 11/05/2013    Colon endoscopy 74841 (3) - Polyps in colon, 90 cm, 50 cm, & 70 cm from entry; all removed by cold biopsy polypectomy.  1 polypo in cecum. Multip[le biopsies taken. Melanosis in colon. Hemorrhoids found    • Hypertensive disorder    • Impacted cerumen    • Insect bite     wound     • Keratoconjunctivitis sicca    • Malaise and fatigue    • Multiple joint pain    • Need for immunization against influenza    • Need for prophylactic vaccination and inoculation against influenza    • Olecranon bursitis      LEFT ELBOW    • Open wound of lesser toe of left foot without damage to nail     initial encounter   • Open wound of lower leg    • Open wound of lower leg with complication    • Open wound of toe    • Otorrhea     post tympanostomy     • Pain in eye     etiology unknown    • Pain in joint involving right lower leg     Pain in right leg    • Pain in thumb joint with movement    • Pain in wrist    • Prostatitis    • Pseudophakia     bilateral    • Pure hypercholesterolemia    • Sensorineural hearing loss    • Serous otitis media    • Shoulder pain    • Sprain of shoulder and upper arm    • Upper respiratory infection    • Vitreous floaters          Past Surgical History:   Procedure Laterality Date   • CATARACT EXTRACTION WITH INTRAOCULAR LENS IMPLANT  12/01/2011    Phacoemulsification of a cataract with intraocular lens implant of left eye, Jin SN-60-WF, serial no. [id number] diopter. Nuclear   • HERNIA REPAIR  12/20/1989    Inguinal hernia - Reduction and repair of right inguinal hernia. Bilateral inguinal hernias   • INJECTION OF MEDICATION  09/09/2016    Kenalog (5)     • OTHER SURGICAL HISTORY  12/14/2000    INCISION OF EARDRUM GENERAL ANESTHETIC 40187 (2)   • OTHER SURGICAL HISTORY  02/10/2016    Abdomen surgery procedure (1) - Abdominal aortogram with selective bilateral common femoral angiogram with runoff to the lower extremities   • OTHER SURGICAL HISTORY  07/09/2015    Apply Unna Boot 12960 (2) - COLLIN Arzate   • OTHER SURGICAL HISTORY  05/27/2016    Drain/Inject Major Joint 54563 (2) - COLLIN Arzate   • OTHER SURGICAL HISTORY  03/23/2016    Exploration, femoral artery (1) - Left common femoral artery access with contralateral stent placement to the right SGA using CSI atherectomy and stent placement   • OTHER SURGICAL HISTORY  06/03/1997    Forearm or wrist surgery (1) - Excision, Sebaceous cyst. Mass, dorsum, left wrist.   • OTHER SURGICAL HISTORY  03/15/1990    Partial excision of lip sutured 07431 (1) -  "Vermiliionectomy with advancement of buccal mucosa. Excision of right cheek lesion. Excision of uvula lesion         Family History   Problem Relation Age of Onset   • Cancer Mother          Social History     Social History   • Marital status:      Spouse name: N/A   • Number of children: N/A   • Years of education: N/A     Occupational History   • Not on file.     Social History Main Topics   • Smoking status: Former Smoker   • Smokeless tobacco: Never Used   • Alcohol use 1.2 oz/week     1 Glasses of wine, 1 Cans of beer per week      Comment: 1 every other wk   • Drug use: No   • Sexual activity: No     Other Topics Concern   • Not on file     Social History Narrative         Current Outpatient Prescriptions   Medication Sig Dispense Refill   • albuterol (PROVENTIL HFA;VENTOLIN HFA) 108 (90 BASE) MCG/ACT inhaler Inhale 2 puffs Every 6 (Six) Hours As Needed for Wheezing. 2 Puffs 4 times per day as needed. 1 inhaler 11   • aspirin 325 MG tablet Take 1 tablet by mouth Daily. 30 tablet 11   • doxazosin (CARDURA) 2 MG tablet Take 1 tablet by mouth Every Night. 30 tablet 6   • finasteride (PROSCAR) 5 MG tablet Take 5 mg by mouth Daily.     • fluticasone (FLONASE) 50 MCG/ACT nasal spray 1 spray into each nostril.     • levothyroxine (SYNTHROID, LEVOTHROID) 50 MCG tablet Take 1 tablet by mouth Daily. 30 tablet 5   • metoprolol succinate XL (TOPROL-XL) 25 MG 24 hr tablet Take 1 tablet by mouth Daily. 30 tablet 11   • oxyCODONE-acetaminophen (PERCOCET) 5-325 MG per tablet Take 1 or 2 tablets daily as needed. 60 tablet 0   • tamsulosin (FLOMAX) 0.4 MG capsule 24 hr capsule Take 2 capsules by mouth Every Night. 60 capsule 11   • tiZANidine (ZANAFLEX) 4 MG tablet Take 0.5-1 tablets by mouth At Night As Needed for Muscle Spasms. 15 tablet 0     No current facility-administered medications for this visit.          OBJECTIVE    /65  Pulse 88  Ht 182.9 cm (72\")  Wt 96.2 kg (212 lb)  SpO2 96%  BMI 28.75 " kg/m2      Review of Systems   Constitutional:  Denies recent weight loss, weight gain, fever or chills, no change in exercise tolerance  Musculoskeletal: Toe pain.   Skin:  Thickened nails. Shin discoloration.  Neurological:  Burning sensations to feet b/l.  Psychiatric/Behavioral: Denies depression      Physical Exam   Constitutional: he appears well-developed and well-nourished.   HEENT: Normocephalic. Atraumatic  CV: No tenderness. RRR  Resp: Non-labored respiration. No wheezes.   Psychiatric: he has a normal mood and affect. his   behavior is normal.      Lower Extremity Exam:  Vascular: DP pulses palpable 1+. PT non-palpable   Negative hair growth.   Minimal perimalleolar edema  Toes cool  Neuro: Protective sensation diminished to lesser toes, b/l.  DTRs intact  Integument: No open wounds or lesions.  Atrophic skin noted b/l with chronic venous stasis dermatitis changes  Web spaces c/d/i  No masses  Musculoskeletal: LE muscle strength 5/5.   Gaitslowed  Semi-rigid, mild hammertoe deformity toes 2-5 b/l.  Nails 1-5 b/l thickened, elongated with subungual debris. +pain on palpation              ASSESSMENT AND PLAN    Ravi was seen today for nail care and nail care.    Diagnoses and all orders for this visit:    Onychomycosis    Pain in toes of both feet    PAD (peripheral artery disease)    Chronic anticoagulation      -Comprehensive foot exam performed. Pt educated on importance of daily foot checks.   -Pt educated on proper motion control shoe gear  -Nails 1-5 b/l debrided in length and thickness with nail nipper to decrease pain, fungal load and risk of infection  -Follow up 3 months PRN          This document has been electronically signed by Terrell Reyes DPM on February 18, 2018 9:33 PM     Terrell Reyes DPM  2/18/2018  9:33 PM

## 2018-03-08 NOTE — PROGRESS NOTES
Subjective   Ravi Park is a 87 y.o. male.     History of Present Illness     Still has neck pain.  Like a crick in neck.  Has pain medicine.  Not using cervical collar?  Went to chiropractor and wouldn't touch his neck.   xr shows degenerative changes.    Review of Systems   Constitutional: Negative for chills, fatigue and fever.   HENT: Negative for congestion, ear discharge, ear pain, facial swelling, hearing loss, postnasal drip, rhinorrhea, sinus pressure, sore throat, trouble swallowing and voice change.    Eyes: Negative for discharge, redness and visual disturbance.   Respiratory: Negative for cough, chest tightness, shortness of breath and wheezing.    Cardiovascular: Negative for chest pain and palpitations.   Gastrointestinal: Negative for abdominal pain, blood in stool, constipation, diarrhea, nausea and vomiting.   Endocrine: Negative for polydipsia and polyuria.   Genitourinary: Negative for dysuria, flank pain, hematuria and urgency.   Musculoskeletal: Positive for neck pain. Negative for arthralgias, back pain, joint swelling and myalgias.   Skin: Negative for rash.   Neurological: Negative for dizziness, weakness, numbness and headaches.   Hematological: Negative for adenopathy.   Psychiatric/Behavioral: Negative for confusion and sleep disturbance. The patient is not nervous/anxious.        Objective   Physical Exam   Constitutional: He is oriented to person, place, and time. He appears well-developed and well-nourished.   HENT:   Head: Normocephalic and atraumatic.   Right Ear: External ear normal.   Left Ear: External ear normal.   Nose: Nose normal.   Eyes: Conjunctivae and EOM are normal. Pupils are equal, round, and reactive to light.   Neck: Normal range of motion.   Pulmonary/Chest: Effort normal.   Musculoskeletal: Normal range of motion.   Neurological: He is alert and oriented to person, place, and time.   Psychiatric: He has a normal mood and affect. His behavior is normal. Judgment  and thought content normal.   Nursing note and vitals reviewed.      Assessment/Plan   Ravi was seen today for neck pain.    Diagnoses and all orders for this visit:    Neck pain      Recommend massage therapy.  Name of person given to him.

## 2018-03-26 NOTE — TELEPHONE ENCOUNTER
Wants a referral to Dr Mcdonald/ was told by urologist that he needs to be cirumsized to help him urinate  Call [phone]

## 2018-04-22 PROBLEM — I50.9 ACUTE CONGESTIVE HEART FAILURE (HCC): Status: ACTIVE | Noted: 2018-01-01

## 2018-04-23 PROBLEM — I50.33 ACUTE ON CHRONIC DIASTOLIC HEART FAILURE (HCC): Status: ACTIVE | Noted: 2018-01-01

## 2018-04-23 PROBLEM — N17.9 AKI (ACUTE KIDNEY INJURY) (HCC): Status: ACTIVE | Noted: 2018-01-01

## 2018-04-23 PROBLEM — D62 ACUTE BLOOD LOSS ANEMIA: Status: ACTIVE | Noted: 2018-01-01

## 2018-05-21 PROBLEM — Z87.891 PERSONAL HISTORY OF TOBACCO USE, PRESENTING HAZARDS TO HEALTH: Status: ACTIVE | Noted: 2018-01-01

## 2018-05-21 PROBLEM — R33.9 URINARY RETENTION: Status: ACTIVE | Noted: 2018-01-01

## 2018-05-21 PROBLEM — I50.30 (HFPEF) HEART FAILURE WITH PRESERVED EJECTION FRACTION (HCC): Status: ACTIVE | Noted: 2018-01-01

## 2018-05-24 NOTE — PROGRESS NOTES
Max Park is a 87 y.o. male.     History of Present Illness   /60 (BP Location: Left arm, Patient Position: Sitting, Cuff Size: Adult)   Pulse 83   Temp 99.5 °F (37.5 °C) (Temporal Artery )   SpO2 94%     Released [facility] (from where patient described location).  Admitted 4/22/18 chf.  He says nursing home to build strength and gave him option to go home.  He has home health and says he is doing fine.  He is frail, he has a [place], he is to see fellows this coming Thursday to have removed  He didn't bring his medicines, he doesn't know what he is taking  He does not want to be in nursing home    Review of Systems   Constitutional: Negative for chills, fatigue and fever.   HENT: Negative for congestion, ear discharge, ear pain, facial swelling, hearing loss, postnasal drip, rhinorrhea, sinus pressure, sore throat, trouble swallowing and voice change.    Eyes: Negative for discharge, redness and visual disturbance.   Respiratory: Negative for cough, chest tightness, shortness of breath and wheezing.    Cardiovascular: Negative for chest pain and palpitations.   Gastrointestinal: Negative for abdominal pain, blood in stool, constipation, diarrhea, nausea and vomiting.   Endocrine: Negative for polydipsia and polyuria.   Genitourinary: Negative for dysuria, flank pain, hematuria and urgency.   Musculoskeletal: Negative for arthralgias, back pain, joint swelling and myalgias.   Skin: Negative for rash.   Neurological: Negative for dizziness, weakness, numbness and headaches.   Hematological: Negative for adenopathy.   Psychiatric/Behavioral: Negative for confusion and sleep disturbance. The patient is not nervous/anxious.        Objective   Physical Exam   Constitutional: He is oriented to person, place, and time. He appears well-developed and well-nourished.   HENT:   Head: Normocephalic and atraumatic.   Right Ear: External ear normal.   Left Ear: External ear normal.   Nose: Nose  normal.   Eyes: Conjunctivae and EOM are normal. Pupils are equal, round, and reactive to light.   Neck: Normal range of motion.   Pulmonary/Chest: Effort normal.   Musculoskeletal: Normal range of motion.   Neurological: He is alert and oriented to person, place, and time.   Skin:   Tense edema lower legs, mild redness (about baseline)      Psychiatric: He has a normal mood and affect. His behavior is normal. Judgment and thought content normal.   Nursing note and vitals reviewed.      Assessment/Plan   Ravi was seen today for follow-up.    Diagnoses and all orders for this visit:    Acute diastolic congestive heart failure    I told him I would put him back in our nursing home if he thought he needed to?  He declined.  He is to have his home health nurse call and go over his meds with me.

## 2018-05-25 PROBLEM — N13.30 HYDRONEPHROSIS: Chronic | Status: ACTIVE | Noted: 2018-01-01

## 2018-05-25 PROBLEM — R33.9 URINARY RETENTION: Chronic | Status: ACTIVE | Noted: 2018-01-01

## 2018-05-25 PROBLEM — T83.511A URINARY TRACT INFECTION ASSOCIATED WITH INDWELLING URETHRAL CATHETER (HCC): Status: ACTIVE | Noted: 2018-01-01

## 2018-05-25 PROBLEM — N39.0 URINARY TRACT INFECTION ASSOCIATED WITH INDWELLING URETHRAL CATHETER (HCC): Status: ACTIVE | Noted: 2018-01-01

## 2018-05-25 PROBLEM — N20.0 NEPHROLITHIASIS: Chronic | Status: ACTIVE | Noted: 2018-01-01

## 2018-05-27 NOTE — ANESTHESIA PROCEDURE NOTES
Airway  Urgency: elective    Airway not difficult    General Information and Staff    Patient location during procedure: OR  CRNA: ESTEBAN COREAS    Indications and Patient Condition  Indications for airway management: airway protection    Preoxygenated: yes  Mask difficulty assessment: 1 - vent by mask    Final Airway Details  Final airway type: supraglottic airway      Successful airway: classic  Size 4    Number of attempts at approach: 1

## 2018-05-27 NOTE — ANESTHESIA PREPROCEDURE EVALUATION
" Anesthesia Evaluation     Patient summary reviewed and Nursing notes reviewed   no history of anesthetic complications:  NPO Solid Status: > 8 hours  NPO Liquid Status: > 8 hours           Airway   Mallampati: II  TM distance: >3 FB  Neck ROM: full  possible difficult intubation  Dental    (+) poor dentation    Comment: Few remaining upper and lower hopeless dentition;\"Snags\".    Pulmonary - normal exam    breath sounds clear to auscultation  (+) a smoker Former, COPD mild, recent URI resolved, decreased breath sounds,   Cardiovascular - normal exam    Rhythm: irregular  Rate: abnormal    (+) hypertension 2 medications or greater, dysrhythmias Atrial Fib, CHF, murmur (Grade II-III/VI systolic), PVD, hyperlipidemia,   (-) carotid bruits      Neuro/Psych  (+) CVA residual symptoms, numbness (Lumbar radiculopathy.),     GI/Hepatic/Renal/Endo    (+)   hypothyroidism,     Musculoskeletal     (+) back pain, neck pain,       ROS comment: Previous posterior cervical and lumbar discectomy.  Abdominal    Substance History - negative use     OB/GYN negative ob/gyn ROS         Other   (+) arthritis (DDD cervical and lumbar.)                     Anesthesia Plan    ASA 4     general     intravenous induction   Anesthetic plan and risks discussed with patient.      "

## 2018-05-27 NOTE — ANESTHESIA POSTPROCEDURE EVALUATION
Patient: Ravi Kirkpatrickgley    Procedure Summary     Date:  05/27/18 Room / Location:  [facility] OR 02 / [facility] OR    Anesthesia Start:  1013 Anesthesia Stop:  1110    Procedure:  CYSTOSCOPY URETEROSCOPY RETROGRADE PYELOGRAM HOLMIUM LASER STENT INSERTION (Left ) Diagnosis:       Nephrolithiasis      (Nephrolithiasis [N20.0])    Surgeon:  Stephen Serrano MD Provider:  Rodney Ruby MD    Anesthesia Type:  general ASA Status:  4          Anesthesia Type: general  Last vitals  BP   139/62 (05/27/18 0747)   Temp   97.6 °F (36.4 °C) (05/27/18 0747)   Pulse   80 (05/27/18 0841)   Resp   18 (05/27/18 0747)     SpO2   92 % (05/27/18 0747)     Post Anesthesia Care and Evaluation    Patient location during evaluation: PACU  Patient participation: complete - patient participated  Level of consciousness: awake  Pain score: 0  Pain management: adequate  Airway patency: patent  Anesthetic complications: No anesthetic complications  PONV Status: none  Cardiovascular status: acceptable  Respiratory status: acceptable  Hydration status: acceptable

## 2018-06-01 NOTE — THERAPY TREATMENT NOTE
Acute Care - Occupational Therapy Treatment Note  [facility]     Patient Name: Ravi Park  : 1930  MRN: [id number]  Today's Date: 2018  Onset of Illness/Injury or Date of Surgery: 18  Date of Referral to OT: 18  Referring Physician: JUSTIN Russo    Admit Date: 2018       ICD-10-CM ICD-9-CM   1. Urinary tract infection associated with indwelling urethral catheter, initial encounter T83.511A 996.64    N39.0 599.0   2. Hypoxia R09.02 799.02   3. Chronic atrial fibrillation I48.2 427.31   4. Impaired functional mobility and endurance Z74.09 V49.89   5. Nephrolithiasis N20.0 592.0   6. Impaired mobility and activities of daily living Z74.09 799.89     Patient Active Problem List   Diagnosis   • Peripheral arterial disease   • Chronic atrial fibrillation   • Backache   • Lumbar radiculopathy   • Edema   • Acute congestive heart failure   • Acute blood loss anemia   • Acute on chronic diastolic heart failure   • YESENIA (acute kidney injury)   • (HFpEF) heart failure with preserved ejection fraction   • Urinary retention   • Personal history of tobacco use, presenting hazards to health   • Vitreous floaters   • Upper respiratory infection   • Sprain of shoulder and upper arm   • Shoulder pain   • Serous otitis media   • Sensorineural hearing loss   • Pure hypercholesterolemia   • Pseudophakia   • Prostatitis   • Pain in wrist   • Pain in thumb joint with movement   • Pain in joint involving right lower leg   • Pain in eye   • Otorrhea   • Open wound of toe   • Open wound of lower leg with complication   • Open wound of lower leg   • Open wound of lesser toe of left foot without damage to nail   • Olecranon bursitis   • Need for prophylactic vaccination and inoculation against influenza   • Need for immunization against influenza   • Multiple joint pain   • Malaise and fatigue   • Keratoconjunctivitis sicca   • Insect bite   • Impacted cerumen   • Hypertensive disorder   • History of  colonoscopy   • History of colon polyps   • History of artificial eye lens   • Herpes zoster   • Gout   • Exposure to communicable disease   • Dog bite of hand   • Disorder of sacroiliac joint   • Decreased platelet count   • Cough   • Contusion of thumb   • Common cold   • Cervical arthritis   • Cerebrovascular accident   • Cellulitis of lower leg   • Cataract   • Benign prostatic hyperplasia   • Atrial fibrillation   • Astigmatism   • Allergic rhinitis due to pollen   • Allergic rhinitis   • Adverse reaction to drug   • Acute confusion   • Acute bronchitis   • Abnormal gait   • Urinary tract infection associated with indwelling urethral catheter   • Hydronephrosis   • Nephrolithiasis     Past Medical History:   Diagnosis Date   • Allergic rhinitis    • Benign prostatic hyperplasia    • Cerebrovascular accident    • Chronic anemia    • Chronic atrial fibrillation    • CKD (chronic kidney disease) stage 3, GFR 30-59 ml/min    • Elevated cholesterol    • GERD (gastroesophageal reflux disease)    • Gout    • Herpes zoster    • History of transfusion    • Hypercholesterolemia    • Hypertension    • Peripheral arterial disease    • Sensorineural hearing loss      Past Surgical History:   Procedure Laterality Date   • CARDIAC CATHETERIZATION     • COLONOSCOPY     • HERNIA REPAIR         Therapy Treatment          Rehabilitation Treatment Summary     Row Name 06/01/18 0845             Treatment Time/Intention    Discipline occupational therapy assistant  -BB      Document Type therapy note (daily note)  -BB      Subjective Information no complaints  -BB      Mode of Treatment individual therapy;occupational therapy  -BB      Patient/Family Observations No family present  -BB      Care Plan Review care plan/treatment goals reviewed  -BB      Total Minutes, Occupational Therapy Treatment 55  -BB      Therapy Frequency (OT Eval) other (see comments)   3-14 tx's/wk  -BB      Patient Effort good  -BB      Existing  Precautions/Restrictions fall  -BB      Recorded by [BB] CASSIE MoreA/L 06/01/18 1122      Row Name 06/01/18 0845             Cognitive Assessment/Intervention- PT/OT    Affect/Mental Status (Cognitive) WFL  -BB      Orientation Status (Cognition) oriented x 4  -BB      Follows Commands (Cognition) WNL;WFL  -BB      Cognitive Function (Cognitive) WNL;WFL  -BB      Safety Deficit (Cognitive) mild deficit  -BB      Personal Safety Interventions gait belt;fall prevention program maintained;nonskid shoes/slippers when out of bed;muscle strengthening facilitated  -BB      Recorded by [BB] CASSIE MoreA/L 06/01/18 1122      Row Name 06/01/18 0845             Safety Issues, Functional Mobility    Comment, Safety Issues/Impairments (Mobility) discussed home safety, EC/WS, AE, HEP  -BB      Recorded by [BB] CASSIE MoreA/L 06/01/18 1127      Row Name 06/01/18 0845             Bed Mobility Assessment/Treatment    Supine-Sit [place] (Bed Mobility) supervision  -BB      Sit-Supine [place] (Bed Mobility) supervision  -BB      Assistive Device (Bed Mobility) bed rails   HOB down  -BB      Comment (Bed Mobility) logroll  -BB      Recorded by [BB] CASSIE MoreA/L 06/01/18 1122      Row Name 06/01/18 0845             ADL Assessment/Intervention    Additional Documentation --   pt already had a bath  -BB      Recorded by [BB] CASSIE MoreA/L 06/01/18 1122      Row Name 06/01/18 0845             Therapeutic Exercise    Upper Extremity Range of Motion (Therapeutic Exercise) shoulder flexion/extension, bilateral;elbow flexion/extension, bilateral;forearm supination/pronation, bilateral;wrist flexion/extension, bilateral   shoulder shrugs, all exercises to pt's tolerance,  -BB      Hand (Therapeutic Exercise) finger flexion/extension, bilateral  -BB      Exercise Type (Therapeutic Exercise) AROM (active range of motion)  -BB      Position (Therapeutic Exercise) seated  -BB       Sets/Reps (Therapeutic Exercise) 2 sets x20  -BB      Recorded by [BB] ERIK More/L 06/01/18 1127      Row Name 06/01/18 0845             Positioning and Restraints    Pre-Treatment Position in bed  -BB      Post Treatment Position bed  -BB      In Bed side lying right;call light within reach;encouraged to call for assist;exit alarm on  -BB      Recorded by [BB] ERIK More/L 06/01/18 1127      Row Name 06/01/18 0845             Pain Assessment    Additional Documentation Pain Scale: Numbers Pre/Post-Treatment (Group)  -BB      Recorded by [BB] CASSIE MoreA/L 06/01/18 1127      Row Name 06/01/18 0845             Pain Scale: Numbers Pre/Post-Treatment    Pain Scale: Numbers, Pretreatment 5/10  -BB      Pain Scale: Numbers, Post-Treatment 5/10  -BB      Pain Location back   kidney area  -BB      Pain Intervention(s) Medication (See MAR)  -BB      Recorded by [BB] ERIK More/NICOLE 06/01/18 1127      Row Name 06/01/18 0845             Plan of Care Review    Plan of Care Reviewed With patient  -BB      Recorded by [BB] CASSIE MoreA/L 06/01/18 1127      Row Name 06/01/18 0845             Outcome Summary/Treatment Plan (OT)    Daily Summary of Progress (OT) progress toward functional goals is good  -BB      Plan for Continued Treatment (OT) continue POC  -BB      Anticipated Discharge Disposition (OT) home with home health  -BB      Recorded by [BB] ERIK More/NICOLE 06/01/18 1127        User Key  (r) = Recorded By, (t) = Taken By, (c) = Cosigned By    Initials Name Effective Dates Discipline    BB CASSIE MoreA/L 03/07/18 -  OT                   OT Rehab Goals     Row Name 06/01/18 0845             Bathing Goal 1 (OT)    Activity/Assistive Device (Bathing Goal 1, OT) bathing skills, all   AE PRN  -BB      [place] Level/Cues Needed (Bathing Goal 1, OT) supervision required  -BB      Time Frame (Bathing Goal 1, OT) long term goal  (LTG);by discharge  -BB      Progress/Outcomes (Bathing Goal 1, OT) goal not met;continuing progress toward goal  -BB         Dressing Goal 1 (OT)    Activity/Assistive Device (Dressing Goal 1, OT) dressing skills, all   AE PRN  -BB      [place]/Cues Needed (Dressing Goal 1, OT) supervision required  -BB      Time Frame (Dressing Goal 1, OT) long term goal (LTG);by discharge  -BB      Progress/Outcome (Dressing Goal 1, OT) goal not met;continuing progress toward goal  -BB         Patient Education Goal (OT)    Activity (Patient Education Goal, OT) AE management, home safety, BUE HEP, EC/WS  -BB      [place]/Cues/Accuracy (Memory Goal 2, OT) demonstrates adequately;independent;verbalizes understanding  -BB      Time Frame (Patient Education Goal, OT) long term goal (LTG);by discharge  -BB      Progress/Outcome (Patient Education Goal, OT) goal not met;continuing progress toward goal  -BB        User Key  (r) = Recorded By, (t) = Taken By, (c) = Cosigned By    Initials Name Provider Type Discipline    BB ERIK More/L Occupational Therapy Assistant OT        Occupational Therapy Education     Title: PT OT SLP Therapies (Active)     Topic: Occupational Therapy (Active)     Point: ADL training (Done)     Description: Instruct learner(s) on proper safety adaptation and remediation techniques during self care or transfers.   Instruct in proper use of assistive devices.   Learning Progress Summary     Learner Status Readiness Method Response Comment Documented by    Patient Done Acceptance E,TB,H VU Educated pt on HEP and Home safety LW 05/31/18 1321     Done Acceptance E VU Educated on role of OT and POC. Educated on benefit of activity and safety with t/f and functional mobility in room. Educated to call for help and not to get OOB on his own. MR 05/28/18 1142          Point: Home exercise program (Done)     Description: Instruct learner(s) on appropriate technique for monitoring, assisting and/or  progressing therapeutic exercises/activities.   Learning Progress Summary     Learner Status Readiness Method Response Comment Documented by    Patient Done Acceptance E,TB,H VU Educated pt on HEP and Home safety LW 05/31/18 1321          Point: Precautions (Done)     Description: Instruct learner(s) on prescribed precautions during self-care and functional transfers.   Learning Progress Summary     Learner Status Readiness Method Response Comment Documented by    Patient Done Acceptance E,TB,H VU Educated pt on HEP and Home safety LW 05/31/18 1321     Active Acceptance E NR   05/30/18 1435     Active Acceptance E NR   05/29/18 0927     Done Acceptance E VU Educated on role of OT and POC. Educated on benefit of activity and safety with t/f and functional mobility in room. Educated to call for help and not to get OOB on his own. MR 05/28/18 1142          Point: Body mechanics (Active)     Description: Instruct learner(s) on proper positioning and spine alignment during self-care, functional mobility activities and/or exercises.   Learning Progress Summary     Learner Status Readiness Method Response Comment Documented by    Patient Active Acceptance E NR  BB 06/01/18 1130     Done Acceptance E,TB,H VU Educated pt on HEP and Home safety LW 05/31/18 1321     Active Acceptance E NR   05/30/18 1435     Active Acceptance E NR   05/29/18 0927     Done Acceptance E VU Educated on role of OT and POC. Educated on benefit of activity and safety with t/f and functional mobility in room. Educated to call for help and not to get OOB on his own. MR 05/28/18 1142                      User Key     Initials Effective Dates Name Provider Type Discipline     03/07/18 -  ERIK More/L Occupational Therapy Assistant OT     03/07/18 -  ERIK Forbes/L Occupational Therapy Assistant OT     03/07/18 -  Deisy Sullivan VALENCIA/L Occupational Therapy Assistant OT    MR 04/03/18 -  Katie Silvestre OT  Occupational Therapist OT                OT Recommendation and Plan  Outcome Summary/Treatment Plan (OT)  Daily Summary of Progress (OT): progress toward functional goals is good  Plan for Continued Treatment (OT): continue POC  Anticipated Discharge Disposition (OT): home with home health  Therapy Frequency (OT Eval): other (see comments) (3-14 tx's/wk)  Daily Summary of Progress (OT): progress toward functional goals is good  Plan of Care Review  Plan of Care Reviewed With: patient  Plan of Care Reviewed With: patient  Outcome Summary: Pt SBA for supine-sit EOB. Pt performed B UE exercises with RB's as needed. No goals met at this time.        Outcome Measures     Row Name 06/01/18 0845 05/31/18 1020 05/30/18 1315       How much help from another is currently needed...    Putting on and taking off regular lower body clothing? 2  -BB 2  -LW 2  -RC    Bathing (including washing, rinsing, and drying) 2  -BB 2  -LW 2  -RC    Toileting (which includes using toilet bed pan or urinal) 2  -BB 2  -LW 2  -RC    Putting on and taking off regular upper body clothing 3  -BB 3  -LW 3  -RC    Taking care of personal grooming (such as brushing teeth) 3  -BB 3  -LW 3  -RC    Eating meals 4  -BB 4  -LW 4  -RC    Score 16  -BB 16  -LW 16  -RC    Row Name 05/30/18 1010             How much help from another person do you currently need...    Turning from your back to your side while in flat bed without using bedrails? 4  -LALA      Moving from lying on back to sitting on the side of a flat bed without bedrails? 4  -LALA      Moving to and from a bed to a chair (including a wheelchair)? 4  -LALA      Standing up from a chair using your arms (e.g., wheelchair, bedside chair)? 4  -LALA      Climbing 3-5 steps with a railing? 3  -LALA      To walk in hospital room? 3  -LALA      AM-PAC 6 Clicks Score 22  -LALA        User Key  (r) = Recorded By, (t) = Taken By, (c) = Cosigned By    Initials Name Provider Type    LALA Roberson PTA Physical Therapy  Assistant    BB ERIK More/L Occupational Therapy Assistant    ERIK Cormier/L Occupational Therapy Assistant    ERIK Purvis/L Occupational Therapy Assistant       Non-skid socks and gait belt in place. Toileting offered. Call light and needs within reach. Pt advised to not get up alone and call the nurse for assistance.  Bed alarm on.       Time Calculation:         Time Calculation- OT     Row Name 06/01/18 1131             Time Calculation- OT    OT Start Time 0845  -BB      OT Stop Time 0940  -BB      OT Time Calculation (min) 55 min  -BB      Total Timed Code Minutes- OT 55 minute(s)  -BB      OT Received On 06/01/18  -BB        User Key  (r) = Recorded By, (t) = Taken By, (c) = Cosigned By    Initials Name Provider Type    ERIK Durham/L Occupational Therapy Assistant           Therapy Charges for Today     Code Description Service Date Service Provider Modifiers Qty    09549671064 HC OT THERAPEUTIC ACT EA 15 MIN 6/1/2018 ERIK More/L GO 2    [id number] HC OT SELF CARE/MGMT/TRAIN EA 15 MIN 6/1/2018 ERIK More/L GO 2          OT G-codes  OT Professional Judgement Used?: Yes  OT Functional Scales Options: AM-PAC 6 Clicks Daily Activity (OT)  Score: 16  Functional Limitation: Self care  Self Care Current Status (): At least 40 percent but less than 60 percent impaired, limited or restricted  Self Care Goal Status (): At least 20 percent but less than 40 percent impaired, limited or restricted    VIANEY More  6/1/2018

## 2018-06-01 NOTE — SIGNIFICANT NOTE
06/01/18 1317   Rehab Treatment   Discipline physical therapy assistant   Reason Treatment Not Performed patient/family declined treatment

## 2018-06-01 NOTE — PLAN OF CARE
Problem: Patient Care Overview  Goal: Plan of Care Review  Outcome: Ongoing (interventions implemented as appropriate)   06/01/18 1130   Coping/Psychosocial   Plan of Care Reviewed With patient   Plan of Care Review   Progress improving   OTHER   Outcome Summary Pt SBA for supine-sit EOB. Pt performed B UE exercises with RB's as needed. No goals met at this time.

## 2018-06-01 NOTE — PROGRESS NOTES
[facility] Medicine Services  INPATIENT PROGRESS NOTE    Length of Stay: 7  Date of Admission: 5/24/2018  Primary Care Physician: Terrell Arzate MD    Subjective   Chief Complaint:  Weakness  HPI:   Has some back pain after the procedure.  Didn't sleep well.    Review of Systems   Constitutional: Positive for fatigue. Negative for appetite change, chills and fever.   Respiratory: Negative for chest tightness and shortness of breath.    Cardiovascular: Negative for chest pain, palpitations and leg swelling.   Gastrointestinal: Positive for constipation. Negative for abdominal pain, diarrhea, nausea and vomiting.   Musculoskeletal: Positive for back pain and neck pain.   Skin: Negative for wound.   Neurological: Negative for dizziness, weakness, light-headedness, numbness and headaches.        All pertinent negatives and positives are as above. All other systems have been reviewed and are negative unless otherwise stated.     Objective    Temp:  [98 °F (36.7 °C)-98.6 °F (37 °C)] 98.6 °F (37 °C)  Heart Rate:  [75-93] 78  Resp:  [18] 18  BP: (106-177)/(57-86) 106/57    Physical Exam   Constitutional: He appears well-developed and well-nourished.   HENT:   Head: Normocephalic and atraumatic.   Eyes: EOM are normal. Pupils are equal, round, and reactive to light.   Neck: Normal range of motion. Neck supple.   Cardiovascular: Normal rate, regular rhythm, normal heart sounds and intact distal pulses.  Exam reveals no gallop and no friction rub.    No murmur heard.  Pulmonary/Chest: Effort normal and breath sounds normal. No respiratory distress. He has no wheezes. He has no rales. He exhibits no tenderness.   Abdominal: Soft. Bowel sounds are normal. He exhibits no distension. There is no tenderness.   Psychiatric: He has a normal mood and affect. His behavior is normal.   Vitals reviewed.          Results Review:  I have reviewed the labs, radiology results, and diagnostic  studies.    Laboratory Data:     Results from last 7 days  Lab Units 06/01/18  0538 05/31/18  0607 05/30/18  0543   SODIUM mmol/L 139 140 141   POTASSIUM mmol/L 4.0 4.4 4.3   CHLORIDE mmol/L 99 102 101   CO2 mmol/L 28.0 29.0 30.0   BUN mg/dL 33* 35* 37*   CREATININE mg/dL 1.52* 1.43* 1.49*   GLUCOSE mg/dL 134* 93 84   CALCIUM mg/dL 8.9 8.9 9.0   BILIRUBIN mg/dL 0.4 0.4 0.4   ALK PHOS U/L 101 111 106   ALT (SGPT) U/L 22 26 25   AST (SGOT) U/L 19 24 29   ANION GAP mmol/L 12.0 9.0 10.0     Estimated Creatinine Clearance: 41.2 mL/min (A) (by C-G formula based on SCr of 1.52 mg/dL (H)).            Results from last 7 days  Lab Units 06/01/18  0538 05/31/18  0607 05/30/18  0543 05/29/18  0605 05/28/18  0525   WBC 10*3/mm3 3.24 4.78 4.83 6.56 7.28   HEMOGLOBIN g/dL 10.0* 10.1* 9.8* 9.6* 9.5*   HEMATOCRIT % 31.2* 32.3* 31.7* 31.0* 30.8*   PLATELETS 10*3/mm3 214 239 247 237 210       Radiology Data:   Imaging Results (last 24 hours)     Procedure Component Value Units Date/Time    CT Guided Nephrostomy Tube Placement [[id number]] Collected:  05/31/18 1510     Updated:  05/31/18 1631    Narrative:       CT-guided left nephrostomy, tube placement.    HISTORY: 87-year-old male with hydronephrotic changes left kidney  due to obstructing ureteral calculi. The patient is referred for  CT-guided left kidney nephrostomy.    Multiple noncontrast axial CT sections were obtained. This exam  was performed using radiation doses that are As Low As Reasonably  Achievable (ALARA).This exam was performed according to our  departmental dose optimization program, which includes automated  exposure control, adjustment of the mA and/or KV according to  patient size and/or use of iterative reconstruction technique.    After informed consent and using sterile technique a 21-gauge  needle was advanced under CT guidance into the left renal pelvis.  A thin 0.018 guidewire was then positioned  into the proximal  ureter. A transition sheath was then  advanced over the guidewire  followed by a larger 0.038 guidewire. An 8 [place] dilator was  then placed over the guidewire followed by positioning of an 8  [place] nephrostomy catheter. The catheter was then positioned  into the proximal right ureter near the junction of the renal  pelvis and locked in position with an internal locking suture  loop. Adequate urine drainage was obtained. CT images  demonstrates good positioning of the catheter.    The patient tolerated the procedure well no evidence of any  immediate complications. The patient left the CT suite in stable  condition with normal vital signs.      Impression:       CONCLUSION: Technically successful and uneventful CT-guided left  nephrostomy placement.    Electronically signed by:  Dragan Aleman MD  5/31/2018 4:30 PM CDT  Workstation: [id number]          I have reviewed the patient current medications.     Assessment/Plan     Hospital Problem List     * (Principal)Urinary tract infection associated with indwelling urethral catheter    Urinary retention (Chronic)    Benign prostatic hyperplasia (Chronic)    Hydronephrosis (Chronic)    Nephrolithiasis (Chronic)          Plan:    1.  UTI:  Continue levaquin.  2.  Distal right ureteral stone at left UPJ:  Dr. Serrano following.  Renal ultrasound shows moderate bilateral hydronephrosis and hydroureter due to bilateral ureteral calculi.  Left nephrostomy tube placed yesterday.  3.  BPH  4.  CKD 3:  Worse today.  5.  Chronic atrial fibrillation:  Rate controlled with beta blocker.  No anticoagulation due to bleeding disorder.  6.  Deconditioning:  PT/OT.  7.  Constipation:  Better.        Discharge Planning: I expect patient to be discharged to home in 3-4 days.        This document has been electronically signed by Viet Davis MD on June 1, 2018 8:52 AM

## 2018-06-01 NOTE — PROGRESS NOTES
"   LOS: 7 days   Patient Care Team:  Terrell Arzate MD as PCP - General  Terrell Arzate MD as PCP - Claims Attributed  Terrell Reyes DPM as Consulting Physician (Podiatry)  Bethany Loco RN as Care Coordinator (Population Health)    Subjective     Subjective:  Symptoms:  Stable.  (Urine clear and yellow in Dye's gravity bag. Urine pink in left nephrostomy tube. Eating breakfast, no nausea or vomiting.).    Diet:  No nausea or vomiting.    Pain:  He complains of pain that is mild.  He reports pain is unchanged.        History taken from: patient chart    Objective     Vital Signs  Temp:  [98 °F (36.7 °C)-98.6 °F (37 °C)] 98 °F (36.7 °C)  Heart Rate:  [75-93] 80  Resp:  [18] 18  BP: (106-177)/(54-86) 115/54    Objective:  General Appearance:  In no acute distress.    Vital signs: (most recent): Blood pressure 115/54, pulse 80, temperature 98 °F (36.7 °C), resp. rate 18, height 182.9 cm (72\"), weight 85.1 kg (187 lb 9.6 oz), SpO2 94 %.  Vital signs are normal.  No fever.    Output: Producing urine (Dye, Left nephrostomy).    HEENT: Normal HEENT exam.    Lungs:  Normal effort and normal respiratory rate.  Breath sounds clear to auscultation.  He is not in respiratory distress.    Heart: Normal rate.  Irregular rhythm.    Chest: Symmetric chest wall expansion.   Abdomen: Abdomen is soft.  Bowel sounds are normal.   (LEFT CVA tender).   (Phimosis).   Extremities: There is no deformity or dependent edema.    Pulses: Distal pulses are intact.    Neurological: Patient is alert and oriented to person, place and time.  GCS score is 15.    Pupils:  Pupils are equal, round, and reactive to light.    Skin:  Warm, dry and pale.  No rash, ecchymosis or cyanosis.             Results Review:    Lab Results (last 24 hours)     Procedure Component Value Units Date/Time    Comprehensive Metabolic Panel [[id number]]  (Abnormal) Collected:  06/01/18 0538    Specimen:  Blood Updated:  06/01/18 0624     Glucose 134 (H) mg/dL      " BUN 33 (H) mg/dL      Creatinine 1.52 (H) mg/dL      Sodium 139 mmol/L      Potassium 4.0 mmol/L      Chloride 99 mmol/L      CO2 28.0 mmol/L      Calcium 8.9 mg/dL      Total Protein 6.7 g/dL      Albumin 3.10 (L) g/dL      ALT (SGPT) 22 U/L      AST (SGOT) 19 U/L      Alkaline Phosphatase 101 U/L      Total Bilirubin 0.4 mg/dL      eGFR Non African Amer 44 mL/min/1.73      Globulin 3.6 (H) gm/dL      A/G Ratio 0.9 (L) g/dL      BUN/Creatinine Ratio 21.7     Anion Gap 12.0 mmol/L     Narrative:       The MDRD GFR formula is only valid for adults with stable renal function between ages 18 and 70.    CBC & Differential [[id number]] Collected:  06/01/18 0538    Specimen:  Blood Updated:  06/01/18 0614    Narrative:       The following orders were created for panel order CBC & Differential.  Procedure                               Abnormality         Status                     ---------                               -----------         ------                     CBC Auto Differential[[id number]]        Abnormal            Final result                 Please view results for these tests on the individual orders.    CBC Auto Differential [[id number]]  (Abnormal) Collected:  06/01/18 0538    Specimen:  Blood Updated:  06/01/18 0614     WBC 3.24 10*3/mm3      RBC 3.96 (L) 10*6/mm3      Hemoglobin 10.0 (L) g/dL      Hematocrit 31.2 (L) %      MCV 78.8 (L) fL      MCH 25.3 (L) pg      MCHC 32.1 g/dL      RDW 25.6 (H) %      RDW-SD 72.5 (H) fl      MPV 8.9 fL      Platelets 214 10*3/mm3      Neutrophil % 54.3 %      Lymphocyte % 23.8 %      Monocyte % 15.7 (H) %      Eosinophil % 5.6 %      Basophil % 0.3 %      Immature Grans % 0.3 %      Neutrophils, Absolute 1.76 (L) 10*3/mm3      Lymphocytes, Absolute 0.77 10*3/mm3      Monocytes, Absolute 0.51 10*3/mm3      Eosinophils, Absolute 0.18 10*3/mm3      Basophils, Absolute 0.01 10*3/mm3      Immature Grans, Absolute 0.01 10*3/mm3     Protime-INR [[id number]]  (Abnormal) Collected:  " 05/31/18 1142    Specimen:  Blood Updated:  05/31/18 1235     Protime 15.4 (H) Seconds      INR 1.25 (H)    Narrative:       Therapeutic range for most indications is 2.0-3.0 INR,  or 2.5-3.5 for mechanical heart valves.             I reviewed the patient's new clinical results.  I reviewed the patient's new imaging results and agree with the interpretation.  I reviewed the patient's other test results and agree with the interpretation      Assessment/Plan     Principal Problem:    Urinary tract infection associated with indwelling urethral catheter  Active Problems:    Urinary retention    Benign prostatic hyperplasia    Hydronephrosis    Nephrolithiasis      Assessment & Plan  #1. LEFT HYDRONEPHROSIS related to   #2. 1 cm proximal LEFT ureteric stone  --CT abdomen/pelvis 5/24/18 reads,\" Mild-to-moderate left hydronephrosis and proximal hydroureter to the level of an irregular 1 cm stone in the proximal left ureter versus two adjacent stones. 1.5 cm non-obstructing right distal ureteral stone  -Estimated Creatinine Clearance: 41.2 mL/min (A) (by C-G formula based on SCr of 1.52 mg/dL (H)).   -POSTOP attempted CRULLS on 5/27/18, J-stents NOT placed. Findings: Inflamed trigone obscure ureters, fish hooking of bilateral ureters.   -Renal ultrasound 5/29/18 showed Bilateral kidney moderate hydronephrosis and hydroureter secondary to bilateral ureter calculi. Bilateral kidney nonobstructive renal calculi.  -LEFT nephrostomy tube placement on 5/31/18     #3. 1.5 cm nonobstructing right distal ureteral stone on 5/24/18, chronic, present on previous imaging     #4. UTI   -WBC 5.53-->4.81-->5.26-->6.56-->4.83-->3.24  -Lactate 1.2  -Levaquin given for 5 days  -UA + 5/24/18     5. 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 Phimosis, physiologic  -due for follow up with Dr. Serrano to discuss circumcision     Plan: Adjusted Levaquin. Monitor renal function.    Peter Lai, " JUSTIN  06/01/18  8:57 AM

## 2018-06-01 NOTE — PLAN OF CARE
Problem: Patient Care Overview  Goal: Plan of Care Review  Outcome: Ongoing (interventions implemented as appropriate)    Goal: Individualization and Mutuality  Outcome: Ongoing (interventions implemented as appropriate)    Goal: Discharge Needs Assessment  Outcome: Ongoing (interventions implemented as appropriate)      Problem: Fall Risk (Adult)  Goal: Identify Related Risk Factors and Signs and Symptoms  Outcome: Outcome(s) achieved Date Met: 06/01/18      Problem: Urinary Tract Infection (Adult)  Goal: Signs and Symptoms of Listed Potential Problems Will be Absent, Minimized or Managed (Urinary Tract Infection)  Outcome: Ongoing (interventions implemented as appropriate)      Problem: Skin Injury Risk (Adult)  Goal: Skin Health and Integrity  Outcome: Ongoing (interventions implemented as appropriate)

## 2018-06-01 NOTE — PLAN OF CARE
Problem: Patient Care Overview  Goal: Plan of Care Review  Outcome: Ongoing (interventions implemented as appropriate)   06/01/18 1010   Coping/Psychosocial   Plan of Care Reviewed With patient   Plan of Care Review   Progress improving   OTHER   Outcome Summary Patient stated feeling somewhat better today. Patient is complaining of crick in neck pain medication administered

## 2018-06-02 NOTE — CONSULTS
[facility]  [address] [place] [address]. [id number]   - [phone]  F  [phone]     Consultation         PATIENT  DEMOGRAPHICS   PATIENT NAME: Ravi Park                      PHYSICIAN: Buzz Sandra MD  : 1930  MRN: [id number]    Subjective   SUBJECTIVE   Referring Provider: Dr Bolton  Reason for Consultation: YESENIA ckd 3   History of present illness:     Mr. Park is a 87 year gentleman with a past medical history of atrial fibrillation hypertension dyslipidemia peripheral arterial disease nephrolithiasis history of CVA who was seen back in the end of April for acute kidney injury.  He has bilateral nephrolithiasis and left hydronephrosis at that time.  He also has acute on chronic possible diastolic heart failure in the setting of acute kidney injury.  He was also severely anemic and required 2 units of packed RBC.  His baseline creatinine used to be close to 1 but after the last admission it is now holding at 1.2-1.3.  His ultrasound did suggest echogenic kidneys.  He was discharged on Bumex. Patient also received IV iron series along with B12 in the last admission    Patient came here after he was found unresponsive in the car.  Daughter brought down to the ED.  He was febrile and his urine is growing gram-negative rods.  He also has left hydronephrosis and stone in the ureter.  He also has stone in the right ureter.  Patient underwent NILDA procedure on May 27 and has left nephrostomy tube now.  He also has a transurethral Dye as well. He Is also in the process of getting circumcision with Dr. Serrano..we have been asked to evaluate for increasing creatinine up to 1.6.  It was 1.4 on admission and now slowly climbing.      Past Medical History:   Diagnosis Date   • Allergic rhinitis    • Benign prostatic hyperplasia    • Cerebrovascular accident    • Chronic anemia    • Chronic atrial fibrillation    • CKD (chronic kidney disease) stage 3, GFR 30-59 ml/min    •  "Elevated cholesterol    • GERD (gastroesophageal reflux disease)    • Gout    • Herpes zoster    • History of transfusion    • Hypercholesterolemia    • Hypertension    • Peripheral arterial disease    • Sensorineural hearing loss      Past Surgical History:   Procedure Laterality Date   • CARDIAC CATHETERIZATION     • COLONOSCOPY     • CYSTOSCOPY, URETEROSCOPY, RETROGRADE PYELOGRAM, STENT INSERTION Left 5/27/2018    Procedure: CYSTOSCOPY URETEROSCOPY RETROGRADE PYELOGRAM HOLMIUM LASER STENT INSERTION;  Surgeon: Stephen Serrano MD;  Location: [facility];  Service: Urology   • HERNIA REPAIR       Family History   Problem Relation Age of Onset   • Hypertension Father      Social History   Substance Use Topics   • Smoking status: Former Smoker   • Smokeless tobacco: Never Used   • Alcohol use Yes     Allergies:  Lipitor [atorvastatin]; Neomycin; and Simvastatin     REVIEW OF SYSTEMS    Review of Systems   Constitutional: Negative for chills and fever.   Respiratory: Negative for chest tightness and shortness of breath.    Cardiovascular: Negative for chest pain and leg swelling.   Gastrointestinal: Positive for abdominal pain. Negative for diarrhea and nausea.   Genitourinary: Positive for hematuria and penile pain. Negative for dysuria and flank pain.   Neurological: Negative for dizziness, syncope and weakness.       Objective   OBJECTIVE   Vital Signs  Temp:  [97.6 °F (36.4 °C)-98.5 °F (36.9 °C)] 97.6 °F (36.4 °C)  Heart Rate:  [70-82] 70  Resp:  [18] 18  BP: (109-136)/(55-76) 109/55    Flowsheet Rows      First Filed Value   Admission Height  182.9 cm (72\") Documented at 05/24/2018 2134   Admission Weight  86.2 kg (190 lb) Documented at 05/24/2018 2134           I/O last 3 completed shifts:  In: 2280 [P.O.:2280]  Out: 1820 [Urine:1820]    PHYSICAL EXAM    Physical Exam   Constitutional: He is oriented to person, place, and time. He appears well-developed.   HENT:   Head: Normocephalic.   Eyes: Pupils are equal, " round, and reactive to light.   Cardiovascular: Normal rate, regular rhythm and normal heart sounds.    Pulmonary/Chest: Effort normal and breath sounds normal.   Abdominal: Soft. Bowel sounds are normal.   Neurological: He is alert and oriented to person, place, and time.       RESULTS   Results Review:      Results from last 7 days  Lab Units 06/02/18  0556 06/01/18  0538 05/31/18  0607   SODIUM mmol/L 138 139 140   POTASSIUM mmol/L 3.9 4.0 4.4   CHLORIDE mmol/L 102 99 102   CO2 mmol/L 28.0 28.0 29.0   BUN mg/dL 28* 33* 35*   CREATININE mg/dL 1.63* 1.52* 1.43*   CALCIUM mg/dL 9.1 8.9 8.9   BILIRUBIN mg/dL 0.5 0.4 0.4   ALK PHOS U/L 104 101 111   ALT (SGPT) U/L 22 22 26   AST (SGOT) U/L 18 19 24   GLUCOSE mg/dL 87 134* 93       Estimated Creatinine Clearance: 38.4 mL/min (A) (by C-G formula based on SCr of 1.63 mg/dL (H)).                  Results from last 7 days  Lab Units 06/02/18  0556 06/01/18  0538 05/31/18  0607 05/30/18  0543 05/29/18  0605   WBC 10*3/mm3 4.09 3.24 4.78 4.83 6.56   HEMOGLOBIN g/dL 9.9* 10.0* 10.1* 9.8* 9.6*   PLATELETS 10*3/mm3 223 214 239 247 237         Results from last 7 days  Lab Units 05/31/18  1142   INR  1.25*        MEDICATIONS      8-hydroxyquinoline sulfate 1 application Apply externally Daily   8-hydroxyquinoline sulfate  Apply externally Daily   bisacodyl 10 mg Rectal Daily   collagenase  Topical Daily   docusate sodium 100 mg Oral BID   finasteride 5 mg Oral Daily   levoFLOXacin 250 mg Intravenous Q24H   levothyroxine 50 mcg Oral Q AM   magic butt ointment 1 g Topical BID   metoprolol succinate XL 25 mg Oral Daily   mupirocin  Topical Q12H   polyethylene glycol 17 g Oral Daily   potassium chloride 20 mEq Oral Daily   terazosin 2 mg Oral Nightly   cyanocobalamin 1,000 mcg Oral Daily       sodium chloride 75 mL/hr     Prescriptions Prior to Admission   Medication Sig Dispense Refill Last Dose   • 8-hydroxyquinoline sulfate (BAG BALM) ointment Apply 1 application topically Daily.  1 package 1 Unknown   • albuterol (PROVENTIL HFA;VENTOLIN HFA) 108 (90 BASE) MCG/ACT inhaler Inhale 2 puffs Every 6 (Six) Hours As Needed for Wheezing. 2 Puffs 4 times per day as needed. 1 inhaler 11 Unknown   • Bacitracin Zinc (MAGIC BUTT OINTMENT) Apply 1 g topically 2 (Two) Times a Day. 120 g 1 Unknown   • bumetanide (BUMEX) 1 MG tablet Take 1 tablet by mouth Daily. 30 tablet 1 Unknown   • collagenase 250 UNIT/GM ointment Apply  topically Daily. 90 g 1 Unknown   • diltiaZEM CD (CARDIZEM CD) 120 MG 24 hr capsule Take 1 capsule by mouth Daily. 30 capsule 1 Unknown   • doxazosin (CARDURA) 2 MG tablet Take 1 tablet by mouth Every Night. 30 tablet 6 Unknown   • finasteride (PROSCAR) 5 MG tablet Take 5 mg by mouth Daily.   Unknown   • fluticasone (FLONASE) 50 MCG/ACT nasal spray 1 spray into each nostril.   Unknown   • folic acid (FOLVITE) 1 MG tablet Take 1 tablet by mouth Daily. 30 tablet 1 Unknown   • levothyroxine (SYNTHROID, LEVOTHROID) 50 MCG tablet Take 1 tablet by mouth Daily. 30 tablet 5 Unknown   • metoprolol succinate XL (TOPROL-XL) 25 MG 24 hr tablet Take 1 tablet by mouth Daily. 30 tablet 11 Unknown   • oxyCODONE-acetaminophen (PERCOCET) 5-325 MG per tablet Take 1 or 2 tablets daily as needed. 60 tablet 0 Unknown   • potassium chloride (K-DUR,KLOR-CON) 10 MEQ CR tablet Take 5 mEq by mouth Daily.   Unknown   • tamsulosin (FLOMAX) 0.4 MG capsule 24 hr capsule Take 2 capsules by mouth Every Night. 60 capsule 11 Unknown   • tiZANidine (ZANAFLEX) 4 MG tablet Take 0.5-1 tablets by mouth At Night As Needed for Muscle Spasms. 15 tablet 0 Unknown   • vitamin B-12 (VITAMIN B-12) 1000 MCG tablet Take 1 tablet by mouth Daily. 30 tablet 1 Unknown     Assessment/Plan   ASSESSMENT / PLAN    Principal Problem:    Urinary tract infection associated with indwelling urethral catheter  Active Problems:    Urinary retention    Benign prostatic hyperplasia    Hydronephrosis    Nephrolithiasis    1- acute kidney injury on ckd 3  baseline creatinine close to 1.2-1.3.  His acute kidney injury could be related to post-obstruction plus diuretic use.  He doesn't have any marked fluid overload and his lung exam appears to be clear.  I will hold the Bumex and give him gentle IV fluid for another day or 2.    2.bilateral nephrolithiasis with ureteral stone with left hydronephrosis status post KTULL procedure on May 27 and also has a left nephrostomy tube placed.  He has a good urine output.  He has lori hematuria on the nephrostomy tube.    3.history of urinary retention currently on Terazosin and finasteride     4.gram-negative rods in the urine patient is currently on Levaquin    5.atrial fibrillation chronic    Thank you for the referral will continue follow the patient during this hospital stay         I discussed the patients findings and my recommendations with patient and consulting provider         This document has been electronically signed by Buzz Sandra MD on June 2, 2018 11:58 AM

## 2018-06-02 NOTE — PLAN OF CARE
Problem: Patient Care Overview  Goal: Plan of Care Review  Outcome: Ongoing (interventions implemented as appropriate)   06/02/18 1129   Coping/Psychosocial   Plan of Care Reviewed With patient   Plan of Care Review   Progress improving   OTHER   Outcome Summary pt. tolerated tx. well and pt. ambulated 150' with RW and SBA/CGA this tx. and no LOB. pt. did have some fatigue with gait

## 2018-06-02 NOTE — THERAPY TREATMENT NOTE
Acute Care - Physical Therapy Treatment Note  [facility]     Patient Name: Ravi Park  : 1930  MRN: [id number]  Today's Date: 2018  Onset of Illness/Injury or Date of Surgery: 18  Date of Referral to PT: 18  Referring Physician: JUSTIN Russo    Admit Date: 2018    Visit Dx:    ICD-10-CM ICD-9-CM   1. Urinary tract infection associated with indwelling urethral catheter, initial encounter T83.511A 996.64    N39.0 599.0   2. Hypoxia R09.02 799.02   3. Chronic atrial fibrillation I48.2 427.31   4. Impaired functional mobility and endurance Z74.09 V49.89   5. Nephrolithiasis N20.0 592.0   6. Impaired mobility and activities of daily living Z74.09 799.89     Patient Active Problem List   Diagnosis   • Peripheral arterial disease   • Chronic atrial fibrillation   • Backache   • Lumbar radiculopathy   • Edema   • Acute congestive heart failure   • Acute blood loss anemia   • Acute on chronic diastolic heart failure   • YESENIA (acute kidney injury)   • (HFpEF) heart failure with preserved ejection fraction   • Urinary retention   • Personal history of tobacco use, presenting hazards to health   • Vitreous floaters   • Upper respiratory infection   • Sprain of shoulder and upper arm   • Shoulder pain   • Serous otitis media   • Sensorineural hearing loss   • Pure hypercholesterolemia   • Pseudophakia   • Prostatitis   • Pain in wrist   • Pain in thumb joint with movement   • Pain in joint involving right lower leg   • Pain in eye   • Otorrhea   • Open wound of toe   • Open wound of lower leg with complication   • Open wound of lower leg   • Open wound of lesser toe of left foot without damage to nail   • Olecranon bursitis   • Need for prophylactic vaccination and inoculation against influenza   • Need for immunization against influenza   • Multiple joint pain   • Malaise and fatigue   • Keratoconjunctivitis sicca   • Insect bite   • Impacted cerumen   • Hypertensive disorder   • History of  colonoscopy   • History of colon polyps   • History of artificial eye lens   • Herpes zoster   • Gout   • Exposure to communicable disease   • Dog bite of hand   • Disorder of sacroiliac joint   • Decreased platelet count   • Cough   • Contusion of thumb   • Common cold   • Cervical arthritis   • Cerebrovascular accident   • Cellulitis of lower leg   • Cataract   • Benign prostatic hyperplasia   • Atrial fibrillation   • Astigmatism   • Allergic rhinitis due to pollen   • Allergic rhinitis   • Adverse reaction to drug   • Acute confusion   • Acute bronchitis   • Abnormal gait   • Urinary tract infection associated with indwelling urethral catheter   • Hydronephrosis   • Nephrolithiasis       Therapy Treatment          Rehabilitation Treatment Summary     Row Name 06/02/18 0833             Treatment Time/Intention    Discipline physical therapy assistant  -CA      Document Type therapy note (daily note)  -CA      Subjective Information no complaints  -CA      Mode of Treatment individual therapy;physical therapy  -CA      Patient/Family Observations no family present  -CA      Existing Precautions/Restrictions fall  -CA      Recorded by [CA] Toro Jorgensen PTA 06/02/18 0840      Row Name 06/02/18 0833             Vital Signs    Pre SpO2 (%) 91  -CA      O2 Delivery Pre Treatment supplemental O2  -CA      Post SpO2 (%) 91  -CA      O2 Delivery Post Treatment supplemental O2  -CA      Recorded by [CA] Toro Jorgensen PTA 06/02/18 0908      Row Name 06/02/18 0833             Cognitive Assessment/Intervention- PT/OT    Affect/Mental Status (Cognitive) WFL  -CA      Orientation Status (Cognition) oriented x 4  -CA      Follows Commands (Cognition) WNL  -CA      Cognitive Function (Cognitive) WNL  -CA      Safety Deficit (Cognitive) mild deficit  -CA      Personal Safety Interventions gait belt;fall prevention program maintained;muscle strengthening facilitated;nonskid shoes/slippers when out of bed  -CA      Recorded by [CA]  Toro Jorgensen, PTA 06/02/18 0840      Row Name 06/02/18 0833             Bed Mobility Assessment/Treatment    Bed Mobility Assessment/Treatment supine-sit-supine  -CA      Supine-Sit [place] (Bed Mobility) supervision  -CA      Sit-Supine [place] (Bed Mobility) supervision  -CA      Assistive Device (Bed Mobility) bed rails;head of bed elevated  -CA      Comment (Bed Mobility) --  -CA      Recorded by [CA] Toro Jorgensen, PTA 06/02/18 0908      Row Name 06/02/18 0833             Transfer Assessment/Treatment    Transfer Assessment/Treatment sit-stand transfer;stand-sit transfer  -CA      Maintains Weight-bearing Status (Transfers) able to maintain  -CA      Sit-Stand [place] (Transfers) stand by assist  -CA      Stand-Sit [place] (Transfers) stand by assist  -CA      Recorded by [CA] Toro Jorgensen, PTA 06/02/18 0908      Row Name 06/02/18 0833             Sit-Stand Transfer    Assistive Device (Sit-Stand Transfers) walker, front-wheeled  -CA      Recorded by [CA] Toro Jorgensen, JOSHUA 06/02/18 0908      Row Name 06/02/18 0833             Stand-Sit Transfer    Assistive Device (Stand-Sit Transfers) walker, front-wheeled  -CA      Recorded by [CA] Toro Jorgensen, JOSHUA 06/02/18 0908      Row Name 06/02/18 0833             Gait/Stairs Assessment/Training    Gait/Stairs Assessment/Training gait/ambulation independence  -CA      [place] Level (Gait) stand by assist;contact guard  -CA      Assistive Device (Gait) walker, front-wheeled  -CA      Distance in Feet (Gait) 150  -CA      Pattern (Gait) step-through  -CA      Deviations/Abnormal Patterns (Gait) jarrett decreased  -CA      Recorded by [CA] Toro Jorgensen, PTA 06/02/18 0908      Row Name 06/02/18 0833             Positioning and Restraints    Pre-Treatment Position in bed  -CA      Post Treatment Position bed  -CA      In Bed supine;call light within reach;encouraged to call for assist  -CA      Recorded by [CA] Toro Jorgensen, PTA 06/02/18 0908      Row  Name 06/02/18 0833             Pain Scale: Numbers Pre/Post-Treatment    Pain Scale: Numbers, Pretreatment 0/10 - no pain  -CA      Pain Scale: Numbers, Post-Treatment 0/10 - no pain  -CA      Recorded by [CA] Toro Jorgensen PTA 06/02/18 0908        User Key  (r) = Recorded By, (t) = Taken By, (c) = Cosigned By    Initials Name Effective Dates Discipline    CA Toro Jorgensen PTA 03/07/18 -  PT                       PT Rehab Goals     Row Name 06/02/18 0833             Transfer Goal 1 (PT)    Activity/Assistive Device (Transfer Goal 1, PT) sit-to-stand/stand-to-sit;bed-to-chair/chair-to-bed  -CA      [place] Level/Cues Needed (Transfer Goal 1, PT) supervision required  -CA      Time Frame (Transfer Goal 1, PT) 1 week  -CA      Progress/Outcome (Transfer Goal 1, PT) goal met  -CA         Gait Training Goal 1 (PT)    [place] Level (Gait Training Goal 1, PT) supervision required  -CA      Distance (Gait Goal 1, PT) 50 ft with supplemental O2 with O2 sats > 90%  -CA      Time Frame (Gait Training Goal 1, PT) 1 week  -CA      Barriers (Gait Training Goal 1, PT) dyspnea, pain  -CA      Progress/Outcome (Gait Training Goal 1, PT) continuing progress toward goal  -CA        User Key  (r) = Recorded By, (t) = Taken By, (c) = Cosigned By    Initials Name Provider Type Discipline    CA Toro Jorgensen PTA Physical Therapy Assistant PT          Physical Therapy Education     Title: PT OT SLP Therapies (Active)     Topic: Physical Therapy (Active)     Point: Mobility training (Active)    Learning Progress Summary     Learner Status Readiness Method Response Comment Documented by    Patient Active Acceptance E NR requires cues for positioning self with transfers and pacing self with endurance status.  05/26/18 1410                      User Key     Initials Effective Dates Name Provider Type Discipline     04/08/18 -  Mikael Reed, PT Physical Therapist PT                    PT Recommendation and Plan     Plan of Care  Reviewed With: patient  Progress: improving  Outcome Summary: pt. tolerated tx. well and pt. ambulated 150' with RW and SBA/CGA this tx. and no LOB.  pt. did have some fatigue with gait          Outcome Measures     Row Name 06/02/18 0833 06/01/18 0845 05/31/18 1020       How much help from another person do you currently need...    Turning from your back to your side while in flat bed without using bedrails? 4  -CA  --  --    Moving from lying on back to sitting on the side of a flat bed without bedrails? 4  -CA  --  --    Moving to and from a bed to a chair (including a wheelchair)? 4  -CA  --  --    Standing up from a chair using your arms (e.g., wheelchair, bedside chair)? 4  -CA  --  --    Climbing 3-5 steps with a railing? 3  -CA  --  --    To walk in hospital room? 3  -CA  --  --    AM-PAC 6 Clicks Score 22  -CA  --  --       How much help from another is currently needed...    Putting on and taking off regular lower body clothing?  -- 2  -BB 2  -LW    Bathing (including washing, rinsing, and drying)  -- 2  -BB 2  -LW    Toileting (which includes using toilet bed pan or urinal)  -- 2  -BB 2  -LW    Putting on and taking off regular upper body clothing  -- 3  -BB 3  -LW    Taking care of personal grooming (such as brushing teeth)  -- 3  -BB 3  -LW    Eating meals  -- 4  -BB 4  -LW    Score  -- 16  -BB 16  -LW       Functional Assessment    Outcome Measure Options AM-PAC 6 Clicks Basic Mobility (PT)  -CA  --  --    Row Name 05/30/18 1315             How much help from another is currently needed...    Putting on and taking off regular lower body clothing? 2  -RC      Bathing (including washing, rinsing, and drying) 2  -RC      Toileting (which includes using toilet bed pan or urinal) 2  -RC      Putting on and taking off regular upper body clothing 3  -RC      Taking care of personal grooming (such as brushing teeth) 3  -RC      Eating meals 4  -RC      Score 16  -RC        User Key  (r) = Recorded By, (t) =  Taken By, (c) = Cosigned By    Initials Name Provider Type    CA Toro Jorgensen PTA Physical Therapy Assistant    BB Carla Lutz, VALENCIA/L Occupational Therapy Assistant    OLLIE Darby, VALENCIA/L Occupational Therapy Assistant    LW Deisy Sullivan, VALENCIA/L Occupational Therapy Assistant           Time Calculation:         PT Charges     Row Name 06/02/18 1130             Time Calculation    Start Time 0833  -CA      Stop Time 0858  -CA      Time Calculation (min) 25 min  -CA      PT Received On 06/02/18  -CA         Time Calculation- PT    Total Timed Code Minutes- PT 25 minute(s)  -CA        User Key  (r) = Recorded By, (t) = Taken By, (c) = Cosigned By    Initials Name Provider Type    ZACHARY Jorgensen PTA Physical Therapy Assistant          Therapy Charges for Today     Code Description Service Date Service Provider Modifiers Qty    09001308914 HC PT THERAPEUTIC ACT EA 15 MIN 6/2/2018 Toro Jorgensen PTA GP 1    39874475415 HC GAIT TRAINING EA 15 MIN 6/2/2018 Toro Jorgensen PTA GP 1          PT G-Codes  PT Professional Judgement Used?: Yes  Outcome Measure Options: AM-PAC 6 Clicks Basic Mobility (PT)  Score: 16  Functional Limitation: Mobility: Walking and moving around  Mobility: Walking and Moving Around Current Status (): At least 40 percent but less than 60 percent impaired, limited or restricted  Mobility: Walking and Moving Around Goal Status (): At least 20 percent but less than 40 percent impaired, limited or restricted    Toro Jorgensen PTA  6/2/2018

## 2018-06-02 NOTE — PROGRESS NOTES
LOS: 8 days     Patient Care Team:  Terrell Arzate MD as PCP - General  Terrell Arzate MD as PCP - Claims Attributed  Terrell Reyes DPM as Consulting Physician (Podiatry)  Bethany Loco RN as Care Coordinator (Population Health)      Subjective     Sleeping with left nephrostomy tube patent    Objective       Vital Signs  Temp:  [97.6 °F (36.4 °C)-98.5 °F (36.9 °C)] 97.6 °F (36.4 °C)  Heart Rate:  [70-82] 70  Resp:  [18] 18  BP: (109-136)/(55-76) 109/55    Physical Exam:        General Appearance:    No acute distress     Respiratory:    UNLABORED RESPIRATIONS.     Abdomen:     SOFT.       Genitourinary:   Dye and nephrostomy tube okay     Rectal:     DEFERRED       Results Review:       Imaging Results (last 24 hours)     ** No results found for the last 24 hours. **        Lab Results (last 24 hours)     Procedure Component Value Units Date/Time    Urine Culture - Urine, [[id number]]  (Abnormal) Collected:  06/01/18 1506    Specimen:  Urine from Urine, Catheter Updated:  06/02/18 0744     Urine Culture >100,000 CFU/mL Gram Negative Bacilli (A)    CBC & Differential [[id number]] Collected:  06/02/18 0556    Specimen:  Blood Updated:  06/02/18 0722    Narrative:       The following orders were created for panel order CBC & Differential.  Procedure                               Abnormality         Status                     ---------                               -----------         ------                     Manual Differential[[id number]]          Abnormal            Final result               Scan Slide[[id number]]                                                                  CBC Auto Differential[[id number]]        Abnormal            Final result                 Please view results for these tests on the individual orders.    Manual Differential [[id number]]  (Abnormal) Collected:  06/02/18 0556    Specimen:  Blood Updated:  06/02/18 0722     Neutrophil % 42.0 %      Lymphocyte % 32.0 %      Monocyte %  23.0 (H) %      Eosinophil % 2.0 %      Basophil % 1.0 %      Neutrophils Absolute 1.72 (L) 10*3/mm3      Lymphocytes Absolute 1.31 10*3/mm3      Monocytes Absolute 0.94 (H) 10*3/mm3      Eosinophils Absolute 0.08 10*3/mm3      Basophils Absolute 0.04 10*3/mm3      RBC Morphology Normal     WBC Morphology Normal     Platelet Morphology Normal    Comprehensive Metabolic Panel [[id number]]  (Abnormal) Collected:  06/02/18 0556    Specimen:  Blood Updated:  06/02/18 0636     Glucose 87 mg/dL      BUN 28 (H) mg/dL      Creatinine 1.63 (H) mg/dL      Sodium 138 mmol/L      Potassium 3.9 mmol/L      Chloride 102 mmol/L      CO2 28.0 mmol/L      Calcium 9.1 mg/dL      Total Protein 6.9 g/dL      Albumin 3.30 (L) g/dL      ALT (SGPT) 22 U/L      AST (SGOT) 18 U/L      Alkaline Phosphatase 104 U/L      Total Bilirubin 0.5 mg/dL      eGFR Non African Amer 40 (L) mL/min/1.73      Globulin 3.6 (H) gm/dL      A/G Ratio 0.9 (L) g/dL      BUN/Creatinine Ratio 17.2     Anion Gap 8.0 mmol/L     Narrative:       The MDRD GFR formula is only valid for adults with stable renal function between ages 18 and 70.    CBC Auto Differential [[id number]]  (Abnormal) Collected:  06/02/18 0556    Specimen:  Blood Updated:  06/02/18 0630     WBC 4.09 10*3/mm3      RBC 4.02 (L) 10*6/mm3      Hemoglobin 9.9 (L) g/dL      Hematocrit 31.9 (L) %      MCV 79.4 (L) fL      MCH 24.6 (L) pg      MCHC 31.0 (L) g/dL      RDW 25.8 (H) %      RDW-SD 73.8 (H) fl      MPV 9.2 fL      Platelets 223 10*3/mm3     Urinalysis, Microscopic Only - Urine, Clean Catch [[id number]]  (Abnormal) Collected:  06/01/18 1506    Specimen:  Urine from Urine, Catheter Updated:  06/01/18 1550     RBC, UA 6-12 (A) /HPF      WBC, UA 13-20 (A) /HPF      Bacteria, UA 1+ (A) /HPF      Squamous Epithelial Cells, UA 3-5 (A) /HPF      Hyaline Casts, UA 3-6 /LPF      Methodology Automated Microscopy    Urinalysis With / Culture If Indicated - Urine, Catheter [[id number]]  (Abnormal) Collected:   06/01/18 1506    Specimen:  Urine from Urine, Catheter Updated:  06/01/18 1548     Color, UA Yellow     Appearance, UA Clear     pH, UA 6.5     Specific Gravity, UA 1.011     Glucose, UA Negative     Ketones, UA Negative     Bilirubin, UA Negative     Blood, UA Moderate (2+) (A)     Protein, UA Trace (A)     Leuk Esterase, UA Moderate (2+) (A)     Nitrite, UA Negative     Urobilinogen, UA 1.0 E.U./dL            I reviewed the patient's new clinical results.  I reviewed the patient's new imaging results and agree with the interpretation.  I reviewed the patient's other test results and agree with the interpretation        Assessment/Plan     Principal Problem:    Urinary tract infection associated with indwelling urethral catheter  Active Problems:    Urinary retention    Benign prostatic hyperplasia    Hydronephrosis    Nephrolithiasis      Outpatient ESWL while nephrostomy tube is in place      Stephen Serrano MD  06/02/18  2:00 PM

## 2018-06-02 NOTE — PROGRESS NOTES
[facility] Medicine Services  INPATIENT PROGRESS NOTE    Length of Stay: 8  Date of Admission: 5/24/2018  Primary Care Physician: Terrell Arzate MD    Subjective   Chief Complaint: hydronephrosis  HPI:    Patient reports feeling fine.  Has some neck pain.  Had procedure done yesterday.     Review of Systems   Constitutional: Negative for activity change.   Respiratory: Negative for shortness of breath.    Cardiovascular: Negative for chest pain.        All pertinent negatives and positives are as above. All other systems have been reviewed and are negative unless otherwise stated.     Objective    Temp:  [97.6 °F (36.4 °C)-98.5 °F (36.9 °C)] 97.6 °F (36.4 °C)  Heart Rate:  [70-82] 70  Resp:  [18] 18  BP: (109-136)/(55-76) 109/55  Physical Exam   Constitutional: He appears well-developed and well-nourished. No distress.   HENT:   Head: Normocephalic and atraumatic.   Cardiovascular: Normal rate.    Pulmonary/Chest: Effort normal. No respiratory distress. He has no wheezes.   Abdominal: Soft. He exhibits no distension.   Musculoskeletal: Normal range of motion. He exhibits no edema.   Neurological: He is alert. He has normal strength. No cranial nerve deficit.   Skin: Skin is warm and dry. He is not diaphoretic.   Psychiatric: He has a normal mood and affect. His behavior is normal. Judgment and thought content normal.       Results Review:  I have reviewed the labs, radiology results, and diagnostic studies.    Laboratory Data:   Lab Results (last 24 hours)     Procedure Component Value Units Date/Time    Urine Culture - Urine, [[id number]]  (Abnormal) Collected:  06/01/18 1506    Specimen:  Urine from Urine, Catheter Updated:  06/02/18 0744     Urine Culture >100,000 CFU/mL Gram Negative Bacilli (A)    CBC & Differential [[id number]] Collected:  06/02/18 0556    Specimen:  Blood Updated:  06/02/18 0722    Narrative:       The following orders were created for panel order CBC  & Differential.  Procedure                               Abnormality         Status                     ---------                               -----------         ------                     Manual Differential[[id number]]          Abnormal            Final result               Scan Slide[[id number]]                                                                  CBC Auto Differential[[id number]]        Abnormal            Final result                 Please view results for these tests on the individual orders.    Manual Differential [[id number]]  (Abnormal) Collected:  06/02/18 0556    Specimen:  Blood Updated:  06/02/18 0722     Neutrophil % 42.0 %      Lymphocyte % 32.0 %      Monocyte % 23.0 (H) %      Eosinophil % 2.0 %      Basophil % 1.0 %      Neutrophils Absolute 1.72 (L) 10*3/mm3      Lymphocytes Absolute 1.31 10*3/mm3      Monocytes Absolute 0.94 (H) 10*3/mm3      Eosinophils Absolute 0.08 10*3/mm3      Basophils Absolute 0.04 10*3/mm3      RBC Morphology Normal     WBC Morphology Normal     Platelet Morphology Normal    Comprehensive Metabolic Panel [[id number]]  (Abnormal) Collected:  06/02/18 0556    Specimen:  Blood Updated:  06/02/18 0636     Glucose 87 mg/dL      BUN 28 (H) mg/dL      Creatinine 1.63 (H) mg/dL      Sodium 138 mmol/L      Potassium 3.9 mmol/L      Chloride 102 mmol/L      CO2 28.0 mmol/L      Calcium 9.1 mg/dL      Total Protein 6.9 g/dL      Albumin 3.30 (L) g/dL      ALT (SGPT) 22 U/L      AST (SGOT) 18 U/L      Alkaline Phosphatase 104 U/L      Total Bilirubin 0.5 mg/dL      eGFR Non African Amer 40 (L) mL/min/1.73      Globulin 3.6 (H) gm/dL      A/G Ratio 0.9 (L) g/dL      BUN/Creatinine Ratio 17.2     Anion Gap 8.0 mmol/L     Narrative:       The MDRD GFR formula is only valid for adults with stable renal function between ages 18 and 70.    CBC Auto Differential [[id number]]  (Abnormal) Collected:  06/02/18 0556    Specimen:  Blood Updated:  06/02/18 0630     WBC 4.09 10*3/mm3       RBC 4.02 (L) 10*6/mm3      Hemoglobin 9.9 (L) g/dL      Hematocrit 31.9 (L) %      MCV 79.4 (L) fL      MCH 24.6 (L) pg      MCHC 31.0 (L) g/dL      RDW 25.8 (H) %      RDW-SD 73.8 (H) fl      MPV 9.2 fL      Platelets 223 10*3/mm3     Urinalysis, Microscopic Only - Urine, Clean Catch [[id number]]  (Abnormal) Collected:  06/01/18 1506    Specimen:  Urine from Urine, Catheter Updated:  06/01/18 1550     RBC, UA 6-12 (A) /HPF      WBC, UA 13-20 (A) /HPF      Bacteria, UA 1+ (A) /HPF      Squamous Epithelial Cells, UA 3-5 (A) /HPF      Hyaline Casts, UA 3-6 /LPF      Methodology Automated Microscopy    Urinalysis With / Culture If Indicated - Urine, Catheter [[id number]]  (Abnormal) Collected:  06/01/18 1506    Specimen:  Urine from Urine, Catheter Updated:  06/01/18 1548     Color, UA Yellow     Appearance, UA Clear     pH, UA 6.5     Specific Gravity, UA 1.011     Glucose, UA Negative     Ketones, UA Negative     Bilirubin, UA Negative     Blood, UA Moderate (2+) (A)     Protein, UA Trace (A)     Leuk Esterase, UA Moderate (2+) (A)     Nitrite, UA Negative     Urobilinogen, UA 1.0 E.U./dL          Culture Data:   No results found for: BLOODCX  Urine Culture   Date Value Ref Range Status   06/01/2018 >100,000 CFU/mL Gram Negative Bacilli (A)  Preliminary     No results found for: RESPCX  No results found for: WOUNDCX  No results found for: STOOLCX  No components found for: BODYFLD    Radiology Data:   Imaging Results (last 24 hours)     ** No results found for the last 24 hours. **          I have reviewed the patient current medications.     Assessment/Plan     Hospital Problem List     * (Principal)Urinary tract infection associated with indwelling urethral catheter    Urinary retention (Chronic)    Benign prostatic hyperplasia (Chronic)    Hydronephrosis (Chronic)    Nephrolithiasis (Chronic)        UTI - levaquin, culture pending  Urinary retention - [place] catheter  Hydronephrosis/distal right ureteral stone at left  PHILOMENA -  [place] managing  CKD III - creatinine slightly worse than baseline  Chronic atrial fibrillation - rate controlled  Deconditioning - PT/OT              Connor Bolton MD   06/02/18   9:06 AM

## 2018-06-02 NOTE — PLAN OF CARE
Problem: Patient Care Overview  Goal: Plan of Care Review  Outcome: Ongoing (interventions implemented as appropriate)   06/02/18 1219   Coping/Psychosocial   Plan of Care Reviewed With patient   Plan of Care Review   Progress improving   OTHER   Outcome Summary Nephrostomy draining pale red. No discomfort verbilzed at this time.

## 2018-06-02 NOTE — PLAN OF CARE
Problem: Patient Care Overview  Goal: Plan of Care Review  Outcome: Ongoing (interventions implemented as appropriate)   06/02/18 0428   Coping/Psychosocial   Plan of Care Reviewed With patient   Plan of Care Review   Progress improving   OTHER   Outcome Summary Patient has rested somewhat well during the night. vital signs have maintained stable. nephrostomy tube has been draining. will continue to monitor.     Goal: Individualization and Mutuality  Outcome: Ongoing (interventions implemented as appropriate)    Goal: Discharge Needs Assessment  Outcome: Ongoing (interventions implemented as appropriate)      Problem: Fall Risk (Adult)  Goal: Absence of Fall  Outcome: Ongoing (interventions implemented as appropriate)      Problem: Pain, Acute (Adult)  Goal: Acceptable Pain Control/Comfort Level  Outcome: Ongoing (interventions implemented as appropriate)

## 2018-06-03 NOTE — PROGRESS NOTES
"[facility]  [address] [place] [address]. [address]  T - [phone]  F - [phone]     Progress Note          PATIENT  DEMOGRAPHICS   PATIENT NAME: Ravi Park                      PHYSICIAN: Buzz Sandra MD  : 1930  MRN: [id number]   LOS: 9 days    Patient Care Team:  Terrell Arzate MD as PCP - General  Terrell Arzate MD as PCP - Claims Attributed  Terrell Reyes DPM as Consulting Physician (Podiatry)  Bethany Loco RN as Care Coordinator ([facility])  Subjective   SUBJECTIVE   Foot pain no soa         Objective   OBJECTIVE   Vital Signs  Temp:  [98.4 °F (36.9 °C)-98.7 °F (37.1 °C)] 98.4 °F (36.9 °C)  Heart Rate:  [60-85] 85  Resp:  [20] 20  BP: (107-136)/(44-63) 133/63    Flowsheet Rows      First Filed Value   Admission Height  182.9 cm (72\") Documented at 2018   Admission Weight  86.2 kg (190 lb) Documented at 2018           I/O last 3 completed shifts:  In: 2760 [P.O.:2760]  Out: 2770 [Urine:2770]    PHYSICAL EXAM    Physical Exam   Constitutional: He is oriented to person, place, and time. He appears well-developed.   HENT:   Head: Normocephalic.   Eyes: Pupils are equal, round, and reactive to light.   Cardiovascular: Normal rate, regular rhythm and normal heart sounds.    Pulmonary/Chest: Effort normal and breath sounds normal.   Abdominal: Soft. Bowel sounds are normal.   Musculoskeletal: He exhibits no edema.   Neurological: He is alert and oriented to person, place, and time.       RESULTS   Results Review:      Results from last 7 days  Lab Units 18  0607 18  0556 18  0538   SODIUM mmol/L 139 138 139   POTASSIUM mmol/L 4.0 3.9 4.0   CHLORIDE mmol/L 104 102 99   CO2 mmol/L 26.0 28.0 28.0   BUN mg/dL 30* 28* 33*   CREATININE mg/dL 1.47* 1.63* 1.52*   CALCIUM mg/dL 8.8 9.1 8.9   BILIRUBIN mg/dL 0.5 0.5 0.4   ALK PHOS U/L 105 104 101   ALT (SGPT) U/L 24 22 22   AST (SGOT) U/L 19 18 19   GLUCOSE mg/dL 94 87 134* "       Estimated Creatinine Clearance: 42.7 mL/min (A) (by C-G formula based on SCr of 1.47 mg/dL (H)).                  Results from last 7 days  Lab Units 06/03/18  0607 06/02/18  0556 06/01/18  0538 05/31/18  0607 05/30/18  0543   WBC 10*3/mm3 4.82 4.09 3.24 4.78 4.83   HEMOGLOBIN g/dL 10.0* 9.9* 10.0* 10.1* 9.8*   PLATELETS 10*3/mm3 215 223 214 239 247         Results from last 7 days  Lab Units 05/31/18  1142   INR  1.25*         Imaging Results (last 24 hours)     ** No results found for the last 24 hours. **           MEDICATIONS      8-hydroxyquinoline sulfate 1 application Apply externally Daily   8-hydroxyquinoline sulfate  Apply externally Daily   bisacodyl 10 mg Rectal Daily   ceftriaxone 1 g Intravenous Q24H   collagenase  Topical Daily   docusate sodium 100 mg Oral BID   finasteride 5 mg Oral Daily   levothyroxine 50 mcg Oral Q AM   magic butt ointment 1 g Topical BID   metoprolol succinate XL 25 mg Oral Daily   mupirocin  Topical Q12H   polyethylene glycol 17 g Oral Daily   potassium chloride 20 mEq Oral Daily   terazosin 2 mg Oral Nightly   cyanocobalamin 1,000 mcg Oral Daily       sodium chloride 75 mL/hr Last Rate: 75 mL/hr (06/03/18 0458)       Assessment/Plan   ASSESSMENT / PLAN    Principal Problem:    Urinary tract infection associated with indwelling urethral catheter  Active Problems:    Urinary retention    Benign prostatic hyperplasia    Hydronephrosis    Nephrolithiasis    1- acute kidney injury on ckd 3 baseline creatinine close to 1.2-1.3.  His acute kidney injury could be related to post-obstruction plus diuretic use. Euvolemic. Hold bumex fo rnow. Keep ivf for another day      2.bilateral nephrolithiasis with ureteral stone with left hydronephrosis status post NILDA procedure on May 27 and also has a left nephrostomy tube placed.  He has a good urine output.  He has lori hematuria on the nephrostomy tube.     3.history of urinary retention currently on Terazosin and finasteride      4.  Morganella UTI now on ceftriaxone     5.atrial fibrillation chronic                This document has been electronically signed by Buzz Sandra MD on Jada 3, 2018 10:58 AM

## 2018-06-03 NOTE — PLAN OF CARE
Problem: Patient Care Overview  Goal: Plan of Care Review  Outcome: Ongoing (interventions implemented as appropriate)   06/03/18 0251   Coping/Psychosocial   Plan of Care Reviewed With patient   Plan of Care Review   Progress no change       Problem: Fall Risk (Adult)  Goal: Absence of Fall  Outcome: Ongoing (interventions implemented as appropriate)      Problem: Urinary Tract Infection (Adult)  Goal: Signs and Symptoms of Listed Potential Problems Will be Absent, Minimized or Managed (Urinary Tract Infection)  Outcome: Ongoing (interventions implemented as appropriate)      Problem: Skin Injury Risk (Adult)  Goal: Skin Health and Integrity  Outcome: Ongoing (interventions implemented as appropriate)      Problem: Pain, Acute (Adult)  Goal: Identify Related Risk Factors and Signs and Symptoms  Outcome: Ongoing (interventions implemented as appropriate)    Goal: Acceptable Pain Control/Comfort Level  Outcome: Ongoing (interventions implemented as appropriate)

## 2018-06-03 NOTE — PROGRESS NOTES
[facility] Medicine Services  INPATIENT PROGRESS NOTE    Length of Stay: 9  Date of Admission: 5/24/2018  Primary Care Physician: Terrell Arzate MD    Subjective   Chief Complaint/HPI:  This 87-year-old  male reported to the emergency Department was subsequently admitted to Hospital services secondary to weakness and subjective fever.  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  Dye was to be changed in the office during the month previous, however patient was unable to make his appointment.    In addition to urinary tract infection, patient was found to have left hydronephrosis related to a 1 cm proximal left ureteral stone.  Dye catheter was changed.  CRULLS procedure attempted on 5/27/18, but was unsuccessful.  No J stents placed.  Patient demonstrated trigone obscure ureters with fish hooking with inflammation.  Patient was treated with Levaquin, however cultures now reveal Morganella species resistant to Levaquin.  Is sensitive to ceftriaxone and antibiotics changed appropriately.    At this time urology recommending outpatient ESWL while nephrostomy tube is in place.    Patient does complain of left heel pain and some ankle and leg pain.  Duplex ultrasound ordered and plain films.  Pain medication adjusted.    Review of Systems   Constitutional: Negative for chills and fever.   Respiratory: Negative for shortness of breath.    Cardiovascular: Negative for chest pain.   Gastrointestinal: Negative for abdominal pain, nausea and vomiting.   Genitourinary: Positive for dysuria.   Musculoskeletal: Positive for arthralgias and myalgias.   Psychiatric/Behavioral: Negative for confusion.      All pertinent negatives and positives are as above. All other systems have been reviewed and are negative unless otherwise stated.     Objective    Temp:  [98.4 °F (36.9  °C)-98.7 °F (37.1 °C)] 98.4 °F (36.9 °C)  Heart Rate:  [60-85] 85  Resp:  [20] 20  BP: (107-136)/(44-63) 133/63    Physical Exam   Constitutional: He is oriented to person, place, and time. He appears well-developed and well-nourished. No distress.   HENT:   Head: Normocephalic and atraumatic.   Cardiovascular: Normal rate and regular rhythm.    Pulmonary/Chest: Effort normal. No respiratory distress. He has no wheezes.   Abdominal: Soft. Bowel sounds are normal. He exhibits no distension. There is no tenderness.   Neurological: He is alert and oriented to person, place, and time.   Skin: Skin is warm and dry. He is not diaphoretic.     Results Review:  I have reviewed the labs, radiology results, and diagnostic studies.    Laboratory Data:     Results from last 7 days  Lab Units 06/03/18  0607 06/02/18  0556 06/01/18  0538   SODIUM mmol/L 139 138 139   POTASSIUM mmol/L 4.0 3.9 4.0   CHLORIDE mmol/L 104 102 99   CO2 mmol/L 26.0 28.0 28.0   BUN mg/dL 30* 28* 33*   CREATININE mg/dL 1.47* 1.63* 1.52*   GLUCOSE mg/dL 94 87 134*   CALCIUM mg/dL 8.8 9.1 8.9   BILIRUBIN mg/dL 0.5 0.5 0.4   ALK PHOS U/L 105 104 101   ALT (SGPT) U/L 24 22 22   AST (SGOT) U/L 19 18 19   ANION GAP mmol/L 9.0 8.0 12.0     Estimated Creatinine Clearance: 42.7 mL/min (A) (by C-G formula based on SCr of 1.47 mg/dL (H)).            Results from last 7 days  Lab Units 06/03/18  0607 06/02/18  0556 06/01/18  0538 05/31/18  0607 05/30/18  0543   WBC 10*3/mm3 4.82 4.09 3.24 4.78 4.83   HEMOGLOBIN g/dL 10.0* 9.9* 10.0* 10.1* 9.8*   HEMATOCRIT % 31.5* 31.9* 31.2* 32.3* 31.7*   PLATELETS 10*3/mm3 215 223 214 239 247       Results from last 7 days  Lab Units 05/31/18  1142   INR  1.25*       Culture Data:   No results found for: BLOODCX  Urine Culture   Date Value Ref Range Status   06/01/2018 >100,000 CFU/mL Morganella morganii (A)  Final     Comment:     Multi Drug Resistant Organism  contact precautions requested       No results found for: RESPCX  No  results found for: WOUNDCX  No results found for: STOOLCX  No components found for: BODYFLD    Radiology Data:   Imaging Results (last 24 hours)     ** No results found for the last 24 hours. **          I have reviewed the patient current medications.     Assessment/Plan     Hospital Problem List     * (Principal)Urinary tract infection associated with indwelling urethral catheter    Urinary retention (Chronic)    Benign prostatic hyperplasia (Chronic)    Hydronephrosis (Chronic)    Nephrolithiasis (Chronic)          Plan:  As noted above.                This document has been electronically signed by TRACIE De Jesus on Jada 3, 2018 10:32 AM

## 2018-06-03 NOTE — THERAPY TREATMENT NOTE
Acute Care - Occupational Therapy Treatment Note  [facility]     Patient Name: Ravi Park  : 1930  MRN: [id number]  Today's Date: 6/3/2018  Onset of Illness/Injury or Date of Surgery: 18  Date of Referral to OT: 18  Referring Physician: JUSTIN Russo    Admit Date: 2018       ICD-10-CM ICD-9-CM   1. Urinary tract infection associated with indwelling urethral catheter, initial encounter T83.511A 996.64    N39.0 599.0   2. Hypoxia R09.02 799.02   3. Chronic atrial fibrillation I48.2 427.31   4. Impaired functional mobility and endurance Z74.09 V49.89   5. Nephrolithiasis N20.0 592.0   6. Impaired mobility and activities of daily living Z74.09 799.89     Patient Active Problem List   Diagnosis   • Peripheral arterial disease   • Chronic atrial fibrillation   • Backache   • Lumbar radiculopathy   • Edema   • Acute congestive heart failure   • Acute blood loss anemia   • Acute on chronic diastolic heart failure   • YESENIA (acute kidney injury)   • (HFpEF) heart failure with preserved ejection fraction   • Urinary retention   • Personal history of tobacco use, presenting hazards to health   • Vitreous floaters   • Upper respiratory infection   • Sprain of shoulder and upper arm   • Shoulder pain   • Serous otitis media   • Sensorineural hearing loss   • Pure hypercholesterolemia   • Pseudophakia   • Prostatitis   • Pain in wrist   • Pain in thumb joint with movement   • Pain in joint involving right lower leg   • Pain in eye   • Otorrhea   • Open wound of toe   • Open wound of lower leg with complication   • Open wound of lower leg   • Open wound of lesser toe of left foot without damage to nail   • Olecranon bursitis   • Need for prophylactic vaccination and inoculation against influenza   • Need for immunization against influenza   • Multiple joint pain   • Malaise and fatigue   • Keratoconjunctivitis sicca   • Insect bite   • Impacted cerumen   • Hypertensive disorder   • History of  colonoscopy   • History of colon polyps   • History of artificial eye lens   • Herpes zoster   • Gout   • Exposure to communicable disease   • Dog bite of hand   • Disorder of sacroiliac joint   • Decreased platelet count   • Cough   • Contusion of thumb   • Common cold   • Cervical arthritis   • Cerebrovascular accident   • Cellulitis of lower leg   • Cataract   • Benign prostatic hyperplasia   • Atrial fibrillation   • Astigmatism   • Allergic rhinitis due to pollen   • Allergic rhinitis   • Adverse reaction to drug   • Acute confusion   • Acute bronchitis   • Abnormal gait   • Urinary tract infection associated with indwelling urethral catheter   • Hydronephrosis   • Nephrolithiasis     Past Medical History:   Diagnosis Date   • Allergic rhinitis    • Benign prostatic hyperplasia    • Cerebrovascular accident    • Chronic anemia    • Chronic atrial fibrillation    • CKD (chronic kidney disease) stage 3, GFR 30-59 ml/min    • Elevated cholesterol    • GERD (gastroesophageal reflux disease)    • Gout    • Herpes zoster    • History of transfusion    • Hypercholesterolemia    • Hypertension    • Peripheral arterial disease    • Sensorineural hearing loss      Past Surgical History:   Procedure Laterality Date   • CARDIAC CATHETERIZATION     • COLONOSCOPY     • CYSTOSCOPY, URETEROSCOPY, RETROGRADE PYELOGRAM, STENT INSERTION Left 5/27/2018    Procedure: CYSTOSCOPY URETEROSCOPY RETROGRADE PYELOGRAM HOLMIUM LASER STENT INSERTION;  Surgeon: Stephen Serrano MD;  Location: [facility];  Service: Urology   • HERNIA REPAIR         Therapy Treatment          Rehabilitation Treatment Summary     Row Name 06/03/18 0900             Treatment Time/Intention    Discipline occupational therapy assistant  -KD      Document Type therapy note (daily note)  -KD      Subjective Information complains of;pain   L ankle pain  -KD2      Mode of Treatment occupational therapy  -KD      Patient/Family Observations no family present  -KD       Care Plan Review care plan/treatment goals reviewed  -KD      Total Minutes, Occupational Therapy Treatment 54  -KD      Therapy Frequency (OT Eval) other (see comments)   3-14x/wk  -KD      Patient Effort good  -KD      Existing Precautions/Restrictions fall  -KD      Equipment Issued to Patient gait belt  -KD      Recorded by [KD] CASSIE ZeeA/L 06/03/18 1432  [KD2] CASSIE ZeeA/L 06/03/18 1447      Row Name 06/03/18 0900             Vital Signs    Pre Systolic BP Rehab 133  -KD      Pre Treatment Diastolic BP 63  -KD      Pretreatment Heart Rate (beats/min) 85  -KD      Posttreatment Heart Rate (beats/min) 90  -KD      Pre SpO2 (%) 91  -KD      O2 Delivery Pre Treatment supplemental O2  -KD      Post SpO2 (%) 93  -KD      O2 Delivery Post Treatment supplemental O2  -KD      Pre Patient Position Sitting  -KD      Intra Patient Position Standing  -KD      Post Patient Position Sitting  -KD      Recorded by [KD] CASSIE ZeeA/L 06/03/18 1447      Row Name 06/03/18 0900             Cognitive Assessment/Intervention- PT/OT    Affect/Mental Status (Cognitive) WFL  -KD      Orientation Status (Cognition) oriented x 4  -KD      Follows Commands (Cognition) WNL  -KD      Cognitive Function (Cognitive) WNL  -KD      Personal Safety Interventions gait belt;fall prevention program maintained  -KD      Recorded by [KD] CASSIE ZeeA/L 06/03/18 1447      Row Name 06/03/18 0900             Bed Mobility Assessment/Treatment    Bed Mobility Assessment/Treatment supine-sit  -KD      Supine-Sit [place] (Bed Mobility) supervision  -KD      Sit-Supine [place] (Bed Mobility) supervision  -KD      Assistive Device (Bed Mobility) bed rails;head of bed elevated  -KD      Recorded by [KD] Luiza Ha VALENCIA/L 06/03/18 1447      Row Name 06/03/18 0900             Transfer Assessment/Treatment    Transfer Assessment/Treatment squat pivot transfer  -KD      Maintains Weight-bearing Status (Transfers)  able to maintain  -KD      Comment (Transfers) --   CGA  -KD      Sit-Stand [place] (Transfers) minimum assist (75% patient effort)   Pt stood to T/F to toilet but sat immediately 2' L foot pain  -KD      Stand-Sit [place] (Transfers) contact guard  -KD      Recorded by [KD] Luiza Ha VALENCIA/L 06/03/18 1447      Row Name 06/03/18 0900             Sit-Stand Transfer    Assistive Device (Sit-Stand Transfers) --   BSC placed bedside for squat pivot t/f. MD aware and ok'd tf  -KD      Recorded by [KD] Luiza Ha VALENCIA/L 06/03/18 1447      Row Name 06/03/18 0900             Stand-Sit Transfer    Assistive Device (Stand-Sit Transfers) walker, front-wheeled  -KD      Recorded by [KD] Luiza Ha VALENCIA/L 06/03/18 1447      Row Name 06/03/18 0900             Squat Pivot Transfer    Squat Pivot [place] (Transfers) contact guard  -KD      Recorded by [KD] Luiza Ha VALENCIA/L 06/03/18 1447      Row Name 06/03/18 0900             Toileting Assessment/Training    [place] Level (Toileting) toileting skills;minimum assist (75% patient effort)  -KD      Assistive Devices (Toileting) commode, 3-in-1  -KD      Toileting Position supported sitting  -KD      Recorded by [KD] Luiza Ha VALENCIA/L 06/03/18 1454      Row Name 06/03/18 0900             Upper Extremity Seated Therapeutic Exercise    Performed, Seated Upper Extremity (Therapeutic Exercise) shoulder flexion/extension;shoulder abduction/adduction;shoulder external/internal rotation;shoulder horizontal abduction/adduction;elbow flexion/extension;forearm supination/pronation  -KD      Device, Seated Upper Extremity (Therapeutic Exercise) elastic bands/tubing  -KD      Exercise Type, Seated Upper Extremity (Therapeutic Exercise) AROM (active range of motion)  -KD      Expected Outcomes, Seated Upper Extremity (Therapeutic Exercise) improve functional tolerance, household activity;improve functional tolerance, self-care activity  -KD       Sets/Reps Detail, Seated Upper Extremity (Therapeutic Exercise) --   20  -KD      Recorded by [KD] ERIK Zee/L 06/03/18 1447      Row Name 06/03/18 0900             Static Sitting Balance    Level of [place] (Unsupported Sitting, Static Balance) independent  -KD      Sitting Position (Unsupported Sitting, Static Balance) sitting on edge of bed  -KD      Time Able to Maintain Position (Unsupported Sitting, Static Balance) more than 5 minutes  -KD      Comment (Supported Sitting, Static Balance) Pt reported less pain in LLE while sitting EOB vs supine in bed  -KD      Recorded by [KD] ERIK Zee/L 06/03/18 1447      Row Name 06/03/18 0900             Positioning and Restraints    Pre-Treatment Position in bed  -KD      Post Treatment Position bed  -KD      In Bed fowlers;call light within reach;encouraged to call for assist;exit alarm on  -KD      Recorded by [ALLYSSA] ERIK Zee/L 06/03/18 1447      Row Name 06/03/18 0900             Pain Scale: Numbers Pre/Post-Treatment    Pain Scale: Numbers, Pretreatment 10/10  -KD      Pain Scale: Numbers, Post-Treatment 8/10  -KD      Pain Location - Side Left  -KD      Pain Location ankle  -KD      Recorded by [KD] ERIK Zee/L 06/03/18 1447      Row Name 06/03/18 0900             Outcome Summary/Treatment Plan (OT)    Daily Summary of Progress (OT) progress toward functional goals as expected  -KD      Plan for Continued Treatment (OT) --   cont ot poc  -KD      Anticipated Discharge Disposition (OT) home with home health  -KD      Recorded by [ALLYSSA] ERIK Zee/NICOLE 06/03/18 1443        User Key  (r) = Recorded By, (t) = Taken By, (c) = Cosigned By    Initials Name Effective Dates Discipline    KD ERIK Zee/NICOLE 03/07/18 -  OT                   OT Rehab Goals     Row Name 06/03/18 0900             Bathing Goal 1 (OT)    Activity/Assistive Device (Bathing Goal 1, OT) bathing skills, all   AE PRN  -KD      [place]  Level/Cues Needed (Bathing Goal 1, OT) supervision required  -KD      Time Frame (Bathing Goal 1, OT) long term goal (LTG);by discharge  -KD      Progress/Outcomes (Bathing Goal 1, OT) goal not met;continuing progress toward goal  -KD         Dressing Goal 1 (OT)    Activity/Assistive Device (Dressing Goal 1, OT) dressing skills, all   AE PRN  -KD      [place]/Cues Needed (Dressing Goal 1, OT) supervision required  -KD      Time Frame (Dressing Goal 1, OT) long term goal (LTG);by discharge  -KD      Progress/Outcome (Dressing Goal 1, OT) goal not met;continuing progress toward goal  -KD         Patient Education Goal (OT)    Activity (Patient Education Goal, OT) AE management, home safety, BUE HEP, EC/WS  -KD      [place]/Cues/Accuracy (Memory Goal 2, OT) demonstrates adequately;independent;verbalizes understanding  -KD      Time Frame (Patient Education Goal, OT) long term goal (LTG);by discharge  -KD      Progress/Outcome (Patient Education Goal, OT) goal not met;continuing progress toward goal  -KD        User Key  (r) = Recorded By, (t) = Taken By, (c) = Cosigned By    Initials Name Provider Type Discipline    KD ERIK Zee/L Occupational Therapy Assistant OT        Occupational Therapy Education     Title: PT OT SLP Therapies (Active)     Topic: Occupational Therapy (Active)     Point: ADL training (Done)     Description: Instruct learner(s) on proper safety adaptation and remediation techniques during self care or transfers.   Instruct in proper use of assistive devices.   Learning Progress Summary     Learner Status Readiness Method Response Comment Documented by    Patient Done Acceptance E,TB,H VU Educated pt on HEP and Home safety LW 05/31/18 1321     Done Acceptance E VU Educated on role of OT and POC. Educated on benefit of activity and safety with t/f and functional mobility in room. Educated to call for help and not to get OOB on his own. MR 05/28/18 1142          Point: Home  exercise program (Done)     Description: Instruct learner(s) on appropriate technique for monitoring, assisting and/or progressing therapeutic exercises/activities.   Learning Progress Summary     Learner Status Readiness Method Response Comment Documented by    Patient Done Acceptance KANWAL,JOHNATHAN REYNOSO Educated pt on HEP and Home safety LW 05/31/18 1321          Point: Precautions (Done)     Description: Instruct learner(s) on prescribed precautions during self-care and functional transfers.   Learning Progress Summary     Learner Status Readiness Method Response Comment Documented by    Patient Done Acceptance ANGELES SCHOFIELDH LEEANN Educated pt on HEP and Home safety LW 05/31/18 1321     Active Acceptance E NR   05/30/18 1435     Active Acceptance E NR   05/29/18 0927     Done Acceptance E VU Educated on role of OT and POC. Educated on benefit of activity and safety with t/f and functional mobility in room. Educated to call for help and not to get OOB on his own. MR 05/28/18 1142          Point: Body mechanics (Active)     Description: Instruct learner(s) on proper positioning and spine alignment during self-care, functional mobility activities and/or exercises.   Learning Progress Summary     Learner Status Readiness Method Response Comment Documented by    Patient Active Acceptance E NR  BB 06/01/18 1130     Done Acceptance EANGELESH LEEANN Educated pt on HEP and Home safety LW 05/31/18 1321     Active Acceptance E NR   05/30/18 1435     Active Acceptance E NR   05/29/18 0927     Done Acceptance E VU Educated on role of OT and POC. Educated on benefit of activity and safety with t/f and functional mobility in room. Educated to call for help and not to get OOB on his own. MR 05/28/18 1142                      User Key     Initials Effective Dates Name Provider Type Discipline    BB 03/07/18 -  ERIK More/L Occupational Therapy Assistant OT     03/07/18 -  ERIK Forbes/L Occupational Therapy Assistant OT     LW 03/07/18 -  ERIK Evans/L Occupational Therapy Assistant OT    MR 04/03/18 -  Katie Silvestre, ALEXANDRA Occupational Therapist OT                OT Recommendation and Plan  Outcome Summary/Treatment Plan (OT)  Daily Summary of Progress (OT): progress toward functional goals as expected  Plan for Continued Treatment (OT):  (cont ot poc)  Anticipated Discharge Disposition (OT): home with home health  Therapy Frequency (OT Eval): other (see comments) (3-14x/wk)  Daily Summary of Progress (OT): progress toward functional goals as expected  Plan of Care Review  Plan of Care Reviewed With: patient  Plan of Care Reviewed With: patient  Outcome Summary: Pt with c/o L ankle pain throughout tx. Pt agreeable to sitting EOB. Once  EOB pt reported less pain. VSS throughout tx.        Outcome Measures     Row Name 06/02/18 0833 06/01/18 0845          How much help from another person do you currently need...    Turning from your back to your side while in flat bed without using bedrails? 4  -CA  --     Moving from lying on back to sitting on the side of a flat bed without bedrails? 4  -CA  --     Moving to and from a bed to a chair (including a wheelchair)? 4  -CA  --     Standing up from a chair using your arms (e.g., wheelchair, bedside chair)? 4  -CA  --     Climbing 3-5 steps with a railing? 3  -CA  --     To walk in hospital room? 3  -CA  --     AM-PAC 6 Clicks Score 22  -CA  --        How much help from another is currently needed...    Putting on and taking off regular lower body clothing?  -- 2  -BB     Bathing (including washing, rinsing, and drying)  -- 2  -BB     Toileting (which includes using toilet bed pan or urinal)  -- 2  -BB     Putting on and taking off regular upper body clothing  -- 3  -BB     Taking care of personal grooming (such as brushing teeth)  -- 3  -BB     Eating meals  -- 4  -BB     Score  -- 16  -BB        Functional Assessment    Outcome Measure Options AM-PAC 6 Clicks Basic Mobility (PT)   -CA  --       User Key  (r) = Recorded By, (t) = Taken By, (c) = Cosigned By    Initials Name Provider Type    CA Toro Jorgensen PTA Physical Therapy Assistant    ROHAN Lutz VALENCIA/L Occupational Therapy Assistant           Time Calculation:         Time Calculation- OT     Row Name 06/03/18 1452             Time Calculation- OT    OT Start Time 0900  -KD      OT Stop Time 0954  -KD      OT Time Calculation (min) 54 min  -KD      Total Timed Code Minutes- OT 54 minute(s)  -KD      OT Received On 06/03/18  -KD        User Key  (r) = Recorded By, (t) = Taken By, (c) = Cosigned By    Initials Name Provider Type    CASSIE ArriagaA/L Occupational Therapy Assistant           Therapy Charges for Today     Code Description Service Date Service Provider Modifiers Qty    69762342686 HC OT THER PROC EA 15 MIN 6/3/2018 ERIK Zee/L GO 2    65682471173 HC OT SELF CARE/MGMT/TRAIN EA 15 MIN 6/3/2018 ERIK Zee/L GO 2          OT G-codes  OT Professional Judgement Used?: Yes  OT Functional Scales Options: AM-PAC 6 Clicks Daily Activity (OT)  Score: 16  Functional Limitation: Self care  Self Care Current Status (): At least 40 percent but less than 60 percent impaired, limited or restricted  Self Care Goal Status (): At least 20 percent but less than 40 percent impaired, limited or restricted    ERIK Zee/NICOLE  6/3/2018

## 2018-06-03 NOTE — PLAN OF CARE
Problem: Patient Care Overview  Goal: Plan of Care Review  Outcome: Ongoing (interventions implemented as appropriate)   06/03/18 0900   Coping/Psychosocial   Plan of Care Reviewed With patient   Plan of Care Review   Progress improving   OTHER   Outcome Summary Pt with c/o R ankle pain throughout tx. Pt agreeable to sitting EOB. Once EOB pt reported less pain. VSS throughout tx.     L ankle Pain

## 2018-06-03 NOTE — PROGRESS NOTES
LOS: 9 days     Patient Care Team:  Terrell Arzate MD as PCP - General  Terrell Arzate MD as PCP - Claims Attributed  Terrell Reyes DPM as Consulting Physician (Podiatry)  Bethany Loco RN as Care Coordinator (Population Health)      Subjective     Left UPJ stone with nephrostomy tube distal right ureteral stone chronic    Objective       Vital Signs  Temp:  [98.4 °F (36.9 °C)-98.7 °F (37.1 °C)] 98.4 °F (36.9 °C)  Heart Rate:  [60-89] 89  Resp:  [18-20] 18  BP: (133-141)/(58-63) 141/58    Physical Exam:        General Appearance:    Sleeping     Respiratory:    UNLABORED RESPIRATIONS.     Abdomen:     SOFT.       Genitourinary:   Dye and nephrostomy tubes draining well     Rectal:     DEFERRED       Results Review:       Imaging Results (last 24 hours)     Procedure Component Value Units Date/Time    US Venous Doppler Lower Extremity Bilateral (duplex) [[id number]] Collected:  06/03/18 1424     Updated:  06/03/18 1525    Narrative:       EXAM DESCRIPTION: US VENOUS DOPPLER LOWER EXTREMITY BILATERAL  (DUPLEX)    PROCEDURE: Bilateral Lower Extremity Venous Duplex    COMPARISON: None    HISTORY: Bilateral lower extremity pain and swelling    FINDINGS: Realtime grayscale and color-flow imaging were  performed of both lower extremities.    The right common femoral vein, profunda femoral vein, femoral  vein and popliteal vein have normal venous flow on color and  duplex Doppler. The veins augment well compress, and appear to be  patent with no filling defects visible on grayscale or color  Doppler imaging. The infrapopliteal veins also appear patent with  no evidence of thrombus. The greater saphenous vein is patent.     The left common femoral vein, profunda femoral vein, femoral vein  , and popliteal vein have normal venous flow on color and duplex  Doppler. The veins augment well, compress, and appear to be  patent with no filling defects visible on grayscale or color  Doppler imaging. The infrapopliteal  veins also appear patent with  no evidence of thrombus. The greater saphenous vein is patent.      Impression:       Negative for DVT bilateral lower extremities.    Electronically signed by:  Andres Roland MD  6/3/2018 3:24 PM CDT  Workstation: [id number]    XR Calcaneus 2+ View Left [[id number]] Collected:  06/03/18 1101     Updated:  06/03/18 1155    Narrative:       PROCEDURE: Two-view calcaneus    COMPARISON: No comparison    HISTORY: Left heel pain    FINDINGS: Lateral and axial views of the calcaneus are obtained.  There is a plantar calcaneal spur. Small calcifications are noted  in the soft tissues located plantar to the calcaneus, possibly  vascular or due to plantar fasciitis. Atherosclerotic  calcifications are noted in the ankle and along the dorsal and  posterior aspects of the foot. No radiographic evidence of acute  fracture is seen.      Impression:       CONCLUSION:    No radiographic evidence of acute fracture.    Electronically signed by:  Jamel Wiseman MD  6/3/2018 11:54 AM CDT  Workstation: [id number]        Lab Results (last 24 hours)     Procedure Component Value Units Date/Time    Urine Culture - Urine, [[id number]]  (Abnormal)  (Susceptibility) Collected:  06/01/18 1506    Specimen:  Urine from Urine, Catheter Updated:  06/03/18 0713     Urine Culture >100,000 CFU/mL Morganella morganii (A)     Comment: Multi Drug Resistant Organism  contact precautions requested         Susceptibility      Morganella morganii     ANYA     Amoxicillin + Clavulanate <=8/4 ug/ml Susceptible     Ampicillin <=8 ug/ml Susceptible     Ampicillin + Sulbactam <=8/4 ug/ml Susceptible     Cefazolin 16 ug/ml Intermediate     Cefepime <=8 ug/ml Susceptible     Cefoxitin <=8 ug/ml Susceptible     Ceftazidime <=1 ug/ml Susceptible     Ceftriaxone <=8 ug/ml Susceptible     Cefuroxime sodium 8 ug/ml Susceptible     Levofloxacin >4 ug/ml Resistant     Nitrofurantoin >64 ug/ml Resistant     Piperacillin + Tazobactam <=16 ug/ml  Susceptible     Tetracycline >8 ug/ml Resistant     Trimethoprim + Sulfamethoxazole >2/38 ug/ml Resistant                    Comprehensive Metabolic Panel [[id number]]  (Abnormal) Collected:  06/03/18 0607    Specimen:  Blood Updated:  06/03/18 0645     Glucose 94 mg/dL      BUN 30 (H) mg/dL      Creatinine 1.47 (H) mg/dL      Sodium 139 mmol/L      Potassium 4.0 mmol/L      Chloride 104 mmol/L      CO2 26.0 mmol/L      Calcium 8.8 mg/dL      Total Protein 6.8 g/dL      Albumin 3.20 (L) g/dL      ALT (SGPT) 24 U/L      AST (SGOT) 19 U/L      Alkaline Phosphatase 105 U/L      Total Bilirubin 0.5 mg/dL      eGFR Non African Amer 45 mL/min/1.73      Globulin 3.6 (H) gm/dL      A/G Ratio 0.9 (L) g/dL      BUN/Creatinine Ratio 20.4     Anion Gap 9.0 mmol/L     Narrative:       The MDRD GFR formula is only valid for adults with stable renal function between ages 18 and 70.    CBC & Differential [[id number]] Collected:  06/03/18 0607    Specimen:  Blood Updated:  06/03/18 0636    Narrative:       The following orders were created for panel order CBC & Differential.  Procedure                               Abnormality         Status                     ---------                               -----------         ------                     CBC Auto Differential[[id number]]        Abnormal            Final result                 Please view results for these tests on the individual orders.    CBC Auto Differential [[id number]]  (Abnormal) Collected:  06/03/18 0607    Specimen:  Blood Updated:  06/03/18 0636     WBC 4.82 10*3/mm3      RBC 4.00 (L) 10*6/mm3      Hemoglobin 10.0 (L) g/dL      Hematocrit 31.5 (L) %      MCV 78.8 (L) fL      MCH 25.0 (L) pg      MCHC 31.7 g/dL      RDW 25.9 (H) %      RDW-SD 72.8 (H) fl      MPV 9.0 fL      Platelets 215 10*3/mm3      Neutrophil % 55.0 %      Lymphocyte % 25.3 %      Monocyte % 16.0 (H) %      Eosinophil % 3.3 %      Basophil % 0.2 %      Immature Grans % 0.2 %      Neutrophils,  Absolute 2.65 10*3/mm3      Lymphocytes, Absolute 1.22 10*3/mm3      Monocytes, Absolute 0.77 10*3/mm3      Eosinophils, Absolute 0.16 10*3/mm3      Basophils, Absolute 0.01 10*3/mm3      Immature Grans, Absolute 0.01 10*3/mm3             I reviewed the patient's new clinical results.  I reviewed the patient's new imaging results and agree with the interpretation.  I reviewed the patient's other test results and agree with the interpretation        Assessment/Plan     Principal Problem:    Urinary tract infection associated with indwelling urethral catheter  Active Problems:    Urinary retention    Benign prostatic hyperplasia    Hydronephrosis    Nephrolithiasis      No change recommendations      Stephen Serrano MD  06/03/18  5:52 PM

## 2018-06-04 NOTE — SIGNIFICANT NOTE
06/04/18 1410   Rehab Treatment   Discipline physical therapy assistant   Reason Treatment Not Performed patient/family declined treatment  (Pt reports his ankle is hurting too bad to do therapy right now. Pt has cardiac consult and is against performing therapy today.)

## 2018-06-04 NOTE — PLAN OF CARE
Problem: Patient Care Overview  Goal: Plan of Care Review  Outcome: Ongoing (interventions implemented as appropriate)    Goal: Individualization and Mutuality  Outcome: Ongoing (interventions implemented as appropriate)    Goal: Discharge Needs Assessment  Outcome: Ongoing (interventions implemented as appropriate)      Problem: Fall Risk (Adult)  Goal: Absence of Fall  Outcome: Ongoing (interventions implemented as appropriate)      Problem: Urinary Tract Infection (Adult)  Goal: Signs and Symptoms of Listed Potential Problems Will be Absent, Minimized or Managed (Urinary Tract Infection)  Outcome: Ongoing (interventions implemented as appropriate)      Problem: Skin Injury Risk (Adult)  Goal: Skin Health and Integrity  Outcome: Ongoing (interventions implemented as appropriate)      Problem: Pain, Acute (Adult)  Goal: Identify Related Risk Factors and Signs and Symptoms  Outcome: Outcome(s) achieved Date Met: 06/04/18    Goal: Acceptable Pain Control/Comfort Level  Outcome: Ongoing (interventions implemented as appropriate)

## 2018-06-04 NOTE — CONSULTS
Adult Nutrition  Assessment    Patient Name:  Ravi Park  YOB: 1930  MRN: [id number]  Admit Date:  5/24/2018    Assessment Date:  6/4/2018    Comments:  Pt averages 50% intake at meals. Continues to c/o constipation despite meds. Appetite low as a result. Drinks milk very well. RD enc fruits/vegs-- pt voices understanding. Vascular surgery consulted d/t lower extremity pain and poor pulses. Will monitor.          Reason for Assessment     Row Name 06/04/18 1136          Reason for Assessment    Reason For Assessment follow-up protocol               Nutrition/Diet History     Row Name 06/04/18 1136          Nutrition/Diet History    Typical Food/Fluid Intake Pt says he doesn't have much of an appetite. Last BM 3 days ago. Drinks milk very well.              Anthropometrics     Row Name 06/04/18 0538          Anthropometrics    Weight 85.5 kg (188 lb 9.6 oz)             Labs/Tests/Procedures/Meds     Row Name 06/04/18 1137          Labs/Procedures/Meds    Lab Results Reviewed reviewed, pertinent                 Nutrition Prescription Ordered     Row Name 06/04/18 1137          Nutrition Prescription PO    Current PO Diet Regular     Supplement Milk     Supplement Frequency 3 times a day     Common Modifiers Cardiac             Evaluation of Received Nutrient/Fluid Intake     Row Name 06/04/18 1139          PO Evaluation    Number of Meals 4     % PO Intake 50%             Electronically signed by:  Christina Bustillo RD  06/04/18 11:41 AM

## 2018-06-04 NOTE — PROGRESS NOTES
[facility] Medicine Services  INPATIENT PROGRESS NOTE    Length of Stay: 10  Date of Admission: 5/24/2018  Primary Care Physician: Terrell Arzate MD    Subjective   Pleased with all previous progress notes, lab findings, radiographic findings, medication changes, and physical exam findings have been noted and updated as appropriate.    6/4/2018: Patient continues to have lower extremity pain.  Patient now has bilateral rubor and cyanosis.  Pulses are poor.  Stat arterial duplex ultrasounds have been ordered and CT/vascular surgery has been consulted.    Chief Complaint/HPI:  This 87-year-old  male reported to the emergency Department was subsequently admitted to Hospital services secondary to weakness and subjective fever.  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  Dye was to be changed in the office during the month previous, however patient was unable to make his appointment.    In addition to urinary tract infection, patient was found to have left hydronephrosis related to a 1 cm proximal left ureteral stone.  Dye catheter was changed.  CRULLS procedure attempted on 5/27/18, but was unsuccessful.  No J stents placed.  Patient demonstrated trigone obscure ureters with fish hooking with inflammation.  Patient was treated with Levaquin, however cultures now reveal Morganella species resistant to Levaquin.  Is sensitive to ceftriaxone and antibiotics changed appropriately.    At this time urology recommending outpatient ESWL while nephrostomy tube is in place.    Patient does complain of left heel pain and some ankle and leg pain.  Duplex ultrasound ordered and plain films.  Pain medication adjusted.    Review of Systems   Constitutional: Negative for chills and fever.   Respiratory: Negative for shortness of breath.    Cardiovascular: Negative for chest pain.    Gastrointestinal: Negative for abdominal pain, nausea and vomiting.   Genitourinary: Positive for dysuria.   Musculoskeletal: Positive for arthralgias and myalgias.   Skin: Positive for color change.   Psychiatric/Behavioral: Negative for confusion.      All pertinent negatives and positives are as above. All other systems have been reviewed and are negative unless otherwise stated.     Objective    Temp:  [97.6 °F (36.4 °C)-98.6 °F (37 °C)] 97.6 °F (36.4 °C)  Heart Rate:  [77-95] 80  Resp:  [18] 18  BP: (127-141)/(56-98) 140/56    Physical Exam   Constitutional: He is oriented to person, place, and time. He appears well-developed and well-nourished. No distress.   HENT:   Head: Normocephalic and atraumatic.   Cardiovascular: Normal rate and regular rhythm.    Pulmonary/Chest: Effort normal. No respiratory distress. He has no wheezes.   Abdominal: Soft. Bowel sounds are normal. He exhibits no distension. There is no tenderness.   Neurological: He is alert and oriented to person, place, and time.   Skin: Skin is warm and dry. He is not diaphoretic.     Results Review:  I have reviewed the labs, radiology results, and diagnostic studies.    Laboratory Data:     Results from last 7 days  Lab Units 06/04/18  0633 06/03/18  0607 06/02/18  0556   SODIUM mmol/L 142 139 138   POTASSIUM mmol/L 4.0 4.0 3.9   CHLORIDE mmol/L 106 104 102   CO2 mmol/L 25.0 26.0 28.0   BUN mg/dL 26* 30* 28*   CREATININE mg/dL 1.25 1.47* 1.63*   GLUCOSE mg/dL 90 94 87   CALCIUM mg/dL 8.7 8.8 9.1   BILIRUBIN mg/dL 0.7 0.5 0.5   ALK PHOS U/L 94 105 104   ALT (SGPT) U/L 20* 24 22   AST (SGOT) U/L 18 19 18   ANION GAP mmol/L 11.0 9.0 8.0     Estimated Creatinine Clearance: 50.4 mL/min (by C-G formula based on SCr of 1.25 mg/dL).            Results from last 7 days  Lab Units 06/04/18  0633 06/03/18  0607 06/02/18  0556 06/01/18  0538 05/31/18  0607   WBC 10*3/mm3 5.00 4.82 4.09 3.24 4.78   HEMOGLOBIN g/dL 9.2* 10.0* 9.9* 10.0* 10.1*   HEMATOCRIT %  29.9* 31.5* 31.9* 31.2* 32.3*   PLATELETS 10*3/mm3 209 215 223 214 239       Results from last 7 days  Lab Units 05/31/18  1142   INR  1.25*       Culture Data:   No results found for: BLOODCX  Urine Culture   Date Value Ref Range Status   06/01/2018 >100,000 CFU/mL Morganella morganii (A)  Final     Comment:     Multi Drug Resistant Organism  contact precautions requested       No results found for: RESPCX  No results found for: WOUNDCX  No results found for: STOOLCX  No components found for: BODYFLD    Radiology Data:   Imaging Results (last 24 hours)     Procedure Component Value Units Date/Time    US Venous Doppler Lower Extremity Bilateral (duplex) [[id number]] Collected:  06/03/18 1424     Updated:  06/03/18 1525    Narrative:       EXAM DESCRIPTION: US VENOUS DOPPLER LOWER EXTREMITY BILATERAL  (DUPLEX)    PROCEDURE: Bilateral Lower Extremity Venous Duplex    COMPARISON: None    HISTORY: Bilateral lower extremity pain and swelling    FINDINGS: Realtime grayscale and color-flow imaging were  performed of both lower extremities.    The right common femoral vein, profunda femoral vein, femoral  vein and popliteal vein have normal venous flow on color and  duplex Doppler. The veins augment well compress, and appear to be  patent with no filling defects visible on grayscale or color  Doppler imaging. The infrapopliteal veins also appear patent with  no evidence of thrombus. The greater saphenous vein is patent.     The left common femoral vein, profunda femoral vein, femoral vein  , and popliteal vein have normal venous flow on color and duplex  Doppler. The veins augment well, compress, and appear to be  patent with no filling defects visible on grayscale or color  Doppler imaging. The infrapopliteal veins also appear patent with  no evidence of thrombus. The greater saphenous vein is patent.      Impression:       Negative for DVT bilateral lower extremities.    Electronically signed by:  Andres Roland MD   6/3/2018 3:24 PM CDT  Workstation: [id number]    XR Calcaneus 2+ View Left [[id number]] Collected:  06/03/18 1101     Updated:  06/03/18 1155    Narrative:       PROCEDURE: Two-view calcaneus    COMPARISON: No comparison    HISTORY: Left heel pain    FINDINGS: Lateral and axial views of the calcaneus are obtained.  There is a plantar calcaneal spur. Small calcifications are noted  in the soft tissues located plantar to the calcaneus, possibly  vascular or due to plantar fasciitis. Atherosclerotic  calcifications are noted in the ankle and along the dorsal and  posterior aspects of the foot. No radiographic evidence of acute  fracture is seen.      Impression:       CONCLUSION:    No radiographic evidence of acute fracture.    Electronically signed by:  Jamel Wiseman MD  6/3/2018 11:54 AM CDT  Workstation: [id number]          I have reviewed the patient current medications.     Assessment/Plan     Hospital Problem List     * (Principal)Urinary tract infection associated with indwelling urethral catheter    Urinary retention (Chronic)    Benign prostatic hyperplasia (Chronic)    Hydronephrosis (Chronic)    Nephrolithiasis (Chronic)          Plan:  As noted above.                This document has been electronically signed by TRACIE De Jesus on June 4, 2018 10:36 AM

## 2018-06-04 NOTE — THERAPY TREATMENT NOTE
Acute Care - Occupational Therapy Treatment Note  [facility]     Patient Name: Ravi Park  : 1930  MRN: [id number]  Today's Date: 2018  Onset of Illness/Injury or Date of Surgery: 18  Date of Referral to OT: 18  Referring Physician: JUSTIN Russo    Admit Date: 2018       ICD-10-CM ICD-9-CM   1. Urinary tract infection associated with indwelling urethral catheter, initial encounter T83.511A 996.64    N39.0 599.0   2. Hypoxia R09.02 799.02   3. Chronic atrial fibrillation I48.2 427.31   4. Impaired functional mobility and endurance Z74.09 V49.89   5. Nephrolithiasis N20.0 592.0   6. Impaired mobility and activities of daily living Z74.09 799.89     Patient Active Problem List   Diagnosis   • Peripheral arterial disease   • Chronic atrial fibrillation   • Backache   • Lumbar radiculopathy   • Edema   • Acute congestive heart failure   • Acute blood loss anemia   • Acute on chronic diastolic heart failure   • YESENIA (acute kidney injury)   • (HFpEF) heart failure with preserved ejection fraction   • Urinary retention   • Personal history of tobacco use, presenting hazards to health   • Vitreous floaters   • Upper respiratory infection   • Sprain of shoulder and upper arm   • Shoulder pain   • Serous otitis media   • Sensorineural hearing loss   • Pure hypercholesterolemia   • Pseudophakia   • Prostatitis   • Pain in wrist   • Pain in thumb joint with movement   • Pain in joint involving right lower leg   • Pain in eye   • Otorrhea   • Open wound of toe   • Open wound of lower leg with complication   • Open wound of lower leg   • Open wound of lesser toe of left foot without damage to nail   • Olecranon bursitis   • Need for prophylactic vaccination and inoculation against influenza   • Need for immunization against influenza   • Multiple joint pain   • Malaise and fatigue   • Keratoconjunctivitis sicca   • Insect bite   • Impacted cerumen   • Hypertensive disorder   • History of  colonoscopy   • History of colon polyps   • History of artificial eye lens   • Herpes zoster   • Gout   • Exposure to communicable disease   • Dog bite of hand   • Disorder of sacroiliac joint   • Decreased platelet count   • Cough   • Contusion of thumb   • Common cold   • Cervical arthritis   • Cerebrovascular accident   • Cellulitis of lower leg   • Cataract   • Benign prostatic hyperplasia   • Atrial fibrillation   • Astigmatism   • Allergic rhinitis due to pollen   • Allergic rhinitis   • Adverse reaction to drug   • Acute confusion   • Acute bronchitis   • Abnormal gait   • Urinary tract infection associated with indwelling urethral catheter   • Hydronephrosis   • Nephrolithiasis     Past Medical History:   Diagnosis Date   • Allergic rhinitis    • Benign prostatic hyperplasia    • Cerebrovascular accident    • Chronic anemia    • Chronic atrial fibrillation    • CKD (chronic kidney disease) stage 3, GFR 30-59 ml/min    • Elevated cholesterol    • GERD (gastroesophageal reflux disease)    • Gout    • Herpes zoster    • History of transfusion    • Hypercholesterolemia    • Hypertension    • Peripheral arterial disease    • Sensorineural hearing loss      Past Surgical History:   Procedure Laterality Date   • CARDIAC CATHETERIZATION     • COLONOSCOPY     • CYSTOSCOPY, URETEROSCOPY, RETROGRADE PYELOGRAM, STENT INSERTION Left 5/27/2018    Procedure: CYSTOSCOPY URETEROSCOPY RETROGRADE PYELOGRAM HOLMIUM LASER STENT INSERTION;  Surgeon: Stephen Serrano MD;  Location: [facility];  Service: Urology   • HERNIA REPAIR         Therapy Treatment          Rehabilitation Treatment Summary     Row Name 06/04/18 0838             Treatment Time/Intention    Discipline occupational therapy assistant  -KD      Document Type therapy note (daily note)  -KD      Subjective Information complains of;pain   LLE  -KD      Mode of Treatment occupational therapy  -KD      Patient/Family Observations no family present #@ this time.  -KD       Total Minutes, Occupational Therapy Treatment 57  -KD      Therapy Frequency (OT Eval) other (see comments)   3-14x/wk  -KD      Patient Effort good  -KD      Existing Precautions/Restrictions fall  -KD      Equipment Issued to Patient gait belt  -KD      Recorded by [KD] ERIK Zee/L 06/04/18 1011      Row Name 06/04/18 0838             Vital Signs    Pre Systolic BP Rehab 140  -KD      Pre Treatment Diastolic BP 56  -KD      Intratreatment Heart Rate (beats/min) 80  -KD      Pretreatment Resp Rate (breaths/min) 86  -KD      Pre SpO2 (%) 96  -KD      O2 Delivery Pre Treatment room air  -KD      Post SpO2 (%) 97  -KD      O2 Delivery Post Treatment room air  -KD      Pre Patient Position Supine  -KD      Intra Patient Position Sitting  -KD      Post Patient Position Sitting  -KD      Recorded by [KD] ERIK Zee/L 06/04/18 1017      Row Name 06/04/18 0838             Cognitive Assessment/Intervention- PT/OT    Affect/Mental Status (Cognitive) WFL  -KD      Orientation Status (Cognition) oriented x 4  -KD      Follows Commands (Cognition) WNL  -KD      Cognitive Function (Cognitive) WNL  -KD      Safety Deficit (Cognitive) mild deficit  -KD      Personal Safety Interventions gait belt;fall prevention program maintained  -KD      Recorded by [KD] ERIK Zee/L 06/04/18 1017      Row Name 06/04/18 0838             Bed Mobility Assessment/Treatment    Bed Mobility Assessment/Treatment supine-sit-supine  -KD      Supine-Sit [place] (Bed Mobility) contact guard   to aide w/ LLE  -KD      Bed Mobility, Safety Issues decreased use of arms for pushing/pulling  -KD      Assistive Device (Bed Mobility) bed rails  -KD      Recorded by [KD] ERIK Zee/L 06/04/18 1017      Row Name 06/04/18 0838             Upper Body Dressing Assessment/Training    Upper Body Dressing [place] Level upper body dressing skills;doff;don;contact guard assist   gown  -KD      Upper Body Dressing  Position edge of bed sitting  -KD      Recorded by [KD] CASSIE ZeeA/L 06/04/18 1017      Row Name 06/04/18 0838             Lower Body Dressing Assessment/Training    Lower Body Dressing [place] Level lower body dressing skills;doff;don;socks;supervision  -KD      Lower Body Dressing Position edge of bed sitting  -KD      Recorded by [KD] CASSIE ZeeA/L 06/04/18 1017      Row Name 06/04/18 0838             Grooming Assessment/Training    [place] Level (Grooming) hair care, combing/brushing;shave face;wash face, hands;grooming skills;set up;supervision  -KD      Grooming Position edge of bed sitting  -KD      Recorded by [KD] CASSIE ZeeA/L 06/04/18 1017      Row Name 06/04/18 0838             Positioning and Restraints    Pre-Treatment Position in bed  -KD      Post Treatment Position bed  -KD      In Bed sitting;call light within reach;encouraged to call for assist;exit alarm on  -KD      Recorded by [KD] CASSIE ZeeA/L 06/04/18 1017      Row Name 06/04/18 0838             Pain Scale: Numbers Pre/Post-Treatment    Pain Scale: Numbers, Pretreatment 7/10  -KD      Pain Scale: Numbers, Post-Treatment 8/10  -KD      Pain Location - Side Left  -KD      Pain Location ankle  -KD      Pain Intervention(s) Medication (See MAR)  -KD      Recorded by [KD] CASSIE ZeeA/L 06/04/18 1017      Row Name                Wound 04/24/18 1440 Left lower leg    Wound - Properties Group Date first assessed: 04/24/18 [DL] Time first assessed: 1440 [DL2] Present On Admission : picture taken [DL] Side: Left [DL] Orientation: lower [DL] Location: leg [DL] Recorded by:  [DL] Soco Montes RN 04/24/18 1813 [DL2] Soco Montes RN 04/24/18 1814    Row Name                Wound 06/01/18 0800 Right lower leg ulceration, venous    Wound - Properties Group Date first assessed: 06/01/18 [CC] Time first assessed: 0800 [CC] Present On Admission : yes [CC] Side: Right [CC] Orientation: lower [CC]  Location: leg [CC] Type: ulceration, venous [CC] Recorded by:  [CC] Jeanne Coley RN 06/03/18 1829    Row Name 06/04/18 0838             Outcome Summary/Treatment Plan (OT)    Daily Summary of Progress (OT) progress toward functional goals as expected  -KD      Plan for Continued Treatment (OT) cont ot poc  -KD      Anticipated Discharge Disposition (OT) home with home health  -KD      Recorded by [KD] Luiza Ha VALENCIA/L 06/04/18 1017        User Key  (r) = Recorded By, (t) = Taken By, (c) = Cosigned By    Initials Name Effective Dates Discipline    DL Soco Montes RN 10/17/16 -  Nurse    KD CASSIE ZeeA/L 03/07/18 -  OT    CC Jeanne Coley RN 10/17/16 -  Nurse        Wound 04/24/18 1440 Left lower leg (Active)   Drainage Amount none 6/3/2018  6:12 PM   Dressing Care, Wound open to air 6/3/2018  8:01 PM       Wound 06/01/18 0800 Right lower leg ulceration, venous (Active)   Dressing Appearance dry;intact;no drainage 6/4/2018  7:16 AM   Drainage Amount none 6/4/2018  7:16 AM   Dressing Care, Wound dressing changed 6/4/2018  7:16 AM             OT Rehab Goals     Row Name 06/04/18 0838             Bathing Goal 1 (OT)    Activity/Assistive Device (Bathing Goal 1, OT) bathing skills, all   AE PRN  -KD      [place] Level/Cues Needed (Bathing Goal 1, OT) supervision required  -KD      Time Frame (Bathing Goal 1, OT) long term goal (LTG);by discharge  -KD      Progress/Outcomes (Bathing Goal 1, OT) goal not met;continuing progress toward goal  -KD         Dressing Goal 1 (OT)    Activity/Assistive Device (Dressing Goal 1, OT) dressing skills, all   AE PRN  -KD      [place]/Cues Needed (Dressing Goal 1, OT) supervision required  -KD      Time Frame (Dressing Goal 1, OT) long term goal (LTG);by discharge  -KD      Progress/Outcome (Dressing Goal 1, OT) goal not met;continuing progress toward goal  -KD         Patient Education Goal (OT)    Activity (Patient Education Goal, OT) AE management,  home safety, BUE HEP, EC/WS  -KD      [place]/Cues/Accuracy (Memory Goal 2, OT) demonstrates adequately;independent;verbalizes understanding  -KD      Time Frame (Patient Education Goal, OT) long term goal (LTG);by discharge  -KD      Progress/Outcome (Patient Education Goal, OT) goal not met;continuing progress toward goal  -KD        User Key  (r) = Recorded By, (t) = Taken By, (c) = Cosigned By    Initials Name Provider Type Discipline    KD ERIK Zee/L Occupational Therapy Assistant OT        Occupational Therapy Education     Title: PT OT SLP Therapies (Active)     Topic: Occupational Therapy (Active)     Point: ADL training (Done)     Description: Instruct learner(s) on proper safety adaptation and remediation techniques during self care or transfers.   Instruct in proper use of assistive devices.   Learning Progress Summary     Learner Status Readiness Method Response Comment Documented by    Patient Done Acceptance KANWAL,ANGELES,H LEEANN Educated pt on HEP and Home safety LW 05/31/18 1321     Done Acceptance E VU Educated on role of OT and POC. Educated on benefit of activity and safety with t/f and functional mobility in room. Educated to call for help and not to get OOB on his own. MR 05/28/18 1142          Point: Home exercise program (Done)     Description: Instruct learner(s) on appropriate technique for monitoring, assisting and/or progressing therapeutic exercises/activities.   Learning Progress Summary     Learner Status Readiness Method Response Comment Documented by    Patient Done Acceptance KANWAL,ANGELES,H LEEANN Educated pt on HEP and Home safety LW 05/31/18 1321          Point: Precautions (Done)     Description: Instruct learner(s) on prescribed precautions during self-care and functional transfers.   Learning Progress Summary     Learner Status Readiness Method Response Comment Documented by    Patient Done Acceptance KANWAL,ANGELES,H LEEANN Educated pt on HEP and Home safety LW 05/31/18 1321     Active Acceptance E  NR   05/30/18 1435     Active Acceptance E NR   05/29/18 0927     Done Acceptance E VU Educated on role of OT and POC. Educated on benefit of activity and safety with t/f and functional mobility in room. Educated to call for help and not to get OOB on his own. MR 05/28/18 1142          Point: Body mechanics (Active)     Description: Instruct learner(s) on proper positioning and spine alignment during self-care, functional mobility activities and/or exercises.   Learning Progress Summary     Learner Status Readiness Method Response Comment Documented by    Patient Active Acceptance E NR  BB 06/01/18 1130     Done Acceptance E,TB,H VU Educated pt on HEP and Home safety LW 05/31/18 1321     Active Acceptance E NR   05/30/18 1435     Active Acceptance E NR   05/29/18 0927     Done Acceptance E VU Educated on role of OT and POC. Educated on benefit of activity and safety with t/f and functional mobility in room. Educated to call for help and not to get OOB on his own. MR 05/28/18 1142                      User Key     Initials Effective Dates Name Provider Type Discipline     03/07/18 -  ERIK More/L Occupational Therapy Assistant OT     03/07/18 -  ERIK Forbes/L Occupational Therapy Assistant OT     03/07/18 -  ERIK Evans/L Occupational Therapy Assistant OT    MR 04/03/18 -  Katie Silvestre OT Occupational Therapist OT                OT Recommendation and Plan  Outcome Summary/Treatment Plan (OT)  Daily Summary of Progress (OT): progress toward functional goals as expected  Plan for Continued Treatment (OT): cont ot poc  Anticipated Discharge Disposition (OT): home with home health  Therapy Frequency (OT Eval): other (see comments) (3-14x/wk)  Daily Summary of Progress (OT): progress toward functional goals as expected  Plan of Care Review  Plan of Care Reviewed With: patient  Plan of Care Reviewed With: patient  Outcome Summary: Pt completed ADL this AM with good  tolerance sitting EOB. Pt still with c/o pain in L ankle. VSS throughout tx this date.        Outcome Measures     Row Name 06/04/18 0838 06/02/18 0833          How much help from another person do you currently need...    Turning from your back to your side while in flat bed without using bedrails?  -- 4  -CA     Moving from lying on back to sitting on the side of a flat bed without bedrails?  -- 4  -CA     Moving to and from a bed to a chair (including a wheelchair)?  -- 4  -CA     Standing up from a chair using your arms (e.g., wheelchair, bedside chair)?  -- 4  -CA     Climbing 3-5 steps with a railing?  -- 3  -CA     To walk in hospital room?  -- 3  -CA     AM-PAC 6 Clicks Score  -- 22  -CA        How much help from another is currently needed...    Putting on and taking off regular lower body clothing? 2  -KD  --     Bathing (including washing, rinsing, and drying) 2  -KD  --     Toileting (which includes using toilet bed pan or urinal) 2  -KD  --     Putting on and taking off regular upper body clothing 3  -KD  --     Taking care of personal grooming (such as brushing teeth) 3  -KD  --     Eating meals 4  -KD  --     Score 16  -KD  --        Functional Assessment    Outcome Measure Options  -- AM-PAC 6 Clicks Basic Mobility (PT)  -CA       User Key  (r) = Recorded By, (t) = Taken By, (c) = Cosigned By    Initials Name Provider Type    ZACHARY Jorgensen PTA Physical Therapy Assistant    ERIK Arriaga/L Occupational Therapy Assistant           Time Calculation:         Time Calculation- OT     Row Name 06/04/18 1023             Time Calculation- OT    OT Start Time 0838  -KD      OT Stop Time 0935  -KD      OT Time Calculation (min) 57 min  -KD      Total Timed Code Minutes- OT 57 minute(s)  -KD      OT Received On 06/04/18  -KD        User Key  (r) = Recorded By, (t) = Taken By, (c) = Cosigned By    Initials Name Provider Type    ERIK Arriaga/L Occupational Therapy Assistant            Therapy Charges for Today     Code Description Service Date Service Provider Modifiers Qty    77267175370 HC OT THER PROC EA 15 MIN 6/3/2018 CASSIE ZeeA/L GO 2    36244819434 HC OT SELF CARE/MGMT/TRAIN EA 15 MIN 6/3/2018 Luiza Ha VALENCIA/L GO 2    77387912842 HC OT SELF CARE/MGMT/TRAIN EA 15 MIN 6/4/2018 Luiza Ha VALENCIA/L GO 4          OT G-codes  OT Professional Judgement Used?: Yes  OT Functional Scales Options: AM-PAC 6 Clicks Daily Activity (OT)  Score: 16  Functional Limitation: Self care  Self Care Current Status (): At least 40 percent but less than 60 percent impaired, limited or restricted  Self Care Goal Status (): At least 20 percent but less than 40 percent impaired, limited or restricted    CASSIE ZeeA/NICOLE  6/4/2018

## 2018-06-04 NOTE — CONSULTS
CVTS CONSULTATION  CVTS/Dr Manzo request to see in consultation by Hospitalist JOHNATHAN Laura PA-C.    Patient Care Team:  Terrell Arzate MD as PCP - General  Terrell Arzate MD as PCP - Claims Attributed  Terrell Reyes DPM as Consulting Physician (Podiatry)  Bethany Loco, RN as Care Coordinator (Population Health)    Chief complaint   Weak  Both legs hurt.  Sore on RLE.     Subjective     History of Present Illness:  87 y.o. male with know PVD, arterial and venous, admitted to hospital with UTI ([place] in place).  He has a stasis ulcer of RLE followed by Dr Reyes, and Dr Hamman.  Has had episodes of blistering with edema requiring ben boots.    3/23/16 PTA/Stent placement to R SFA placed by Dr Hernández.  Did not tolerate anticoagulation with coumadin for at fib due hematuria.  Contributing factors include:  HTN, gout, CVA, CKD 3, HLP, and osteoarthritis with chronic back pain.   Risk Factors:  HLP  No longer smokes      Vascular HX:  3/23/16 PTA/stent placement RIGHT SFA   12/28/17 Arterial Duplex:  R SFA stent patent  Triphasic and biphasic waveforms.   12/21/18  ALEKSANDRA:  RIGHT FEM/POP Triphasic  DP/PT Biphasic  LEFT FEM/POP Triphasic  DP/PT Biphasic     The following portions of the patient's history were reviewed and updated as appropriate: allergies, current medications, past family history, past medical history, past social history, past surgical history and problem list.  Recent images independently reviewed.  Available laboratory values reviewed.    Review of Systems   Constitutional: Positive for activity change, fatigue and fever. Negative for appetite change.   HENT: Positive for hearing loss. Negative for nosebleeds, trouble swallowing and voice change.    Eyes: Negative for visual disturbance.   Respiratory: Negative for apnea, choking, chest tightness, shortness of breath and wheezing.    Cardiovascular: Positive for leg swelling. Negative for chest pain.        LE:  Y Open sores R shin   Y color  changes  N coldness.           Y numbness R toes  N paresthesias   Gastrointestinal: Positive for abdominal pain.   Genitourinary: Positive for dysuria and hematuria.        Dye in place    Skin: Positive for color change and wound. Negative for pallor and rash.        Bad circulation with sores in legs for awhile   Allergic/Immunologic: Negative for food allergies.   Neurological: Positive for weakness and light-headedness. Negative for seizures.   Hematological: Bruises/bleeds easily.   Psychiatric/Behavioral: Negative for agitation. The patient is not nervous/anxious.         Past Medical History:   Diagnosis Date   • Allergic rhinitis    • Benign prostatic hyperplasia    • Cerebrovascular accident    • Chronic anemia    • Chronic atrial fibrillation    • CKD (chronic kidney disease) stage 3, GFR 30-59 ml/min    • Elevated cholesterol    • GERD (gastroesophageal reflux disease)    • Gout    • Herpes zoster    • History of transfusion    • Hypercholesterolemia    • Hypertension    • Peripheral arterial disease    • Sensorineural hearing loss      Past Surgical History:   Procedure Laterality Date   • CARDIAC CATHETERIZATION     • COLONOSCOPY     • CYSTOSCOPY, URETEROSCOPY, RETROGRADE PYELOGRAM, STENT INSERTION Left 5/27/2018    Procedure: CYSTOSCOPY URETEROSCOPY RETROGRADE PYELOGRAM HOLMIUM LASER STENT INSERTION;  Surgeon: Stephen Serrano MD;  Location: [facility];  Service: Urology   • HERNIA REPAIR       Family History   Problem Relation Age of Onset   • Hypertension Father      Social History   Substance Use Topics   • Smoking status: Former Smoker   • Smokeless tobacco: Never Used   • Alcohol use Yes     Prescriptions Prior to Admission   Medication Sig Dispense Refill Last Dose   • 8-hydroxyquinoline sulfate (BAG BALM) ointment Apply 1 application topically Daily. 1 package 1 Unknown   • albuterol (PROVENTIL HFA;VENTOLIN HFA) 108 (90 BASE) MCG/ACT inhaler Inhale 2 puffs Every 6 (Six) Hours As Needed for  Wheezing. 2 Puffs 4 times per day as needed. 1 inhaler 11 Unknown   • Bacitracin Zinc (MAGIC BUTT OINTMENT) Apply 1 g topically 2 (Two) Times a Day. 120 g 1 Unknown   • bumetanide (BUMEX) 1 MG tablet Take 1 tablet by mouth Daily. 30 tablet 1 Unknown   • collagenase 250 UNIT/GM ointment Apply  topically Daily. 90 g 1 Unknown   • diltiaZEM CD (CARDIZEM CD) 120 MG 24 hr capsule Take 1 capsule by mouth Daily. 30 capsule 1 Unknown   • doxazosin (CARDURA) 2 MG tablet Take 1 tablet by mouth Every Night. 30 tablet 6 Unknown   • finasteride (PROSCAR) 5 MG tablet Take 5 mg by mouth Daily.   Unknown   • fluticasone (FLONASE) 50 MCG/ACT nasal spray 1 spray into each nostril.   Unknown   • folic acid (FOLVITE) 1 MG tablet Take 1 tablet by mouth Daily. 30 tablet 1 Unknown   • levothyroxine (SYNTHROID, LEVOTHROID) 50 MCG tablet Take 1 tablet by mouth Daily. 30 tablet 5 Unknown   • metoprolol succinate XL (TOPROL-XL) 25 MG 24 hr tablet Take 1 tablet by mouth Daily. 30 tablet 11 Unknown   • oxyCODONE-acetaminophen (PERCOCET) 5-325 MG per tablet Take 1 or 2 tablets daily as needed. 60 tablet 0 Unknown   • potassium chloride (K-DUR,KLOR-CON) 10 MEQ CR tablet Take 5 mEq by mouth Daily.   Unknown   • tamsulosin (FLOMAX) 0.4 MG capsule 24 hr capsule Take 2 capsules by mouth Every Night. 60 capsule 11 Unknown   • tiZANidine (ZANAFLEX) 4 MG tablet Take 0.5-1 tablets by mouth At Night As Needed for Muscle Spasms. 15 tablet 0 Unknown   • vitamin B-12 (VITAMIN B-12) 1000 MCG tablet Take 1 tablet by mouth Daily. 30 tablet 1 Unknown       8-hydroxyquinoline sulfate 1 application Apply externally Daily   8-hydroxyquinoline sulfate  Apply externally Daily   bisacodyl 10 mg Rectal Daily   ceftriaxone 1 g Intravenous Q24H   collagenase  Topical Daily   docusate sodium 100 mg Oral BID   finasteride 5 mg Oral Daily   levothyroxine 50 mcg Oral Q AM   magic butt ointment 1 g Topical BID   metoprolol succinate XL 25 mg Oral Daily   mupirocin  Topical  "Q12H   polyethylene glycol 17 g Oral Daily   potassium chloride 20 mEq Oral Daily   terazosin 2 mg Oral Nightly   cyanocobalamin 1,000 mcg Oral Daily     Allergies:  Lipitor [atorvastatin]; Neomycin; and Simvastatin    Objective      Vital Signs  Temp:  [97.6 °F (36.4 °C)-98.6 °F (37 °C)] 98.4 °F (36.9 °C)  Heart Rate:  [70-95] 70  Resp:  [18] 18  BP: ()/(56-98) 92/64    Flowsheet Rows      First Filed Value   Admission Height  182.9 cm (72\") Documented at 05/24/2018 2134   Admission Weight  86.2 kg (190 lb) Documented at 05/24/2018 2134        182.9 cm (72\")    Physical Exam   Constitutional: He is oriented to person, place, and time. He appears well-nourished. No distress.   Body mass index is 25.58 kg/m².     HENT:   Head: Normocephalic.   Mouth/Throat: Oropharynx is clear and moist.   Eyes: Conjunctivae are normal. Pupils are equal, round, and reactive to light.   Neck: Neck supple. No JVD present.   Cardiovascular: Normal rate.  An irregular rhythm present.   Murmur heard.   Systolic murmur is present with a grade of 2/6   Pulses:       Radial pulses are 2+ on the right side, and 2+ on the left side.        Femoral pulses are 2+ on the right side, and 2+ on the left side.       Popliteal pulses are 1+ on the right side, and 1+ on the left side.        Dorsalis pedis pulses are 1+ on the right side, and 1+ on the left side.        Posterior tibial pulses are 1+ on the right side, and 1+ on the left side.   RSB/LSB   LE:  W/d cap refil<3 sec no edema  Rubor post toes  Top of feet rubor with multiple varcositeis  No paleness  Intact mobility and sensation except R toes posterior/anterior.    Open lesion R shin  Scabbed lesions left shin   Erythema of L ankle area + tender and warm        Pulmonary/Chest: Effort normal and breath sounds normal. No stridor. He has no wheezes.   Abdominal: Soft. Bowel sounds are normal. He exhibits no distension.   Musculoskeletal: He exhibits tenderness (L ankle ) and " deformity (arthritic changes ). He exhibits no edema.       Neurological Sensory Findings -  Altered sharp/dull right ankle/foot discrimination. Unaltered sharp/dull left ankle/foot discrimination.  Vascular Status -  His right foot exhibits abnormal foot vasculature . His right foot exhibits no edema. His left foot exhibits abnormal foot vasculature . His left foot exhibits no edema.  Skin Integrity  -  His right foot skin is intact (Shin area not intact ).His left foot skin is intact..  Neurological: He is alert and oriented to person, place, and time.   Skin: Skin is warm and dry. Capillary refill takes 2 to 3 seconds. No rash noted. There is erythema. No pallor.   LE:  W/d cap refil<3 sec no edema  Rubor post toes  Top of feet rubor with multiple varcositeis  No paleness  Intact mobility and sensation except R toes posterior/anterior.    Open lesion R shin  Scabbed lesions left shin   Erythema of L ankle area + tender and warm    Nursing note and vitals reviewed.      Results Review:   Lab Results (last 24 hours)     Procedure Component Value Units Date/Time    Comprehensive Metabolic Panel [[id number]]  (Abnormal) Collected:  06/04/18 0633    Specimen:  Blood Updated:  06/04/18 0725     Glucose 90 mg/dL      BUN 26 (H) mg/dL      Creatinine 1.25 mg/dL      Sodium 142 mmol/L      Potassium 4.0 mmol/L      Chloride 106 mmol/L      CO2 25.0 mmol/L      Calcium 8.7 mg/dL      Total Protein 6.7 g/dL      Albumin 3.10 (L) g/dL      ALT (SGPT) 20 (L) U/L      AST (SGOT) 18 U/L      Alkaline Phosphatase 94 U/L      Total Bilirubin 0.7 mg/dL      eGFR Non African Amer 55 mL/min/1.73      Globulin 3.6 (H) gm/dL      A/G Ratio 0.9 (L) g/dL      BUN/Creatinine Ratio 20.8     Anion Gap 11.0 mmol/L     Narrative:       The MDRD GFR formula is only valid for adults with stable renal function between ages 18 and 70.    CBC & Differential [[id number]] Collected:  06/04/18 0633    Specimen:  Blood Updated:  06/04/18 0712     Narrative:       The following orders were created for panel order CBC & Differential.  Procedure                               Abnormality         Status                     ---------                               -----------         ------                     CBC Auto Differential[[id number]]        Abnormal            Final result                 Please view results for these tests on the individual orders.    CBC Auto Differential [[id number]]  (Abnormal) Collected:  06/04/18 0633    Specimen:  Blood Updated:  06/04/18 0712     WBC 5.00 10*3/mm3      RBC 3.73 (L) 10*6/mm3      Hemoglobin 9.2 (L) g/dL      Hematocrit 29.9 (L) %      MCV 80.2 fL      MCH 24.7 (L) pg      MCHC 30.8 (L) g/dL      RDW 26.2 (H) %      RDW-SD 75.3 (H) fl      MPV 9.7 fL      Platelets 209 10*3/mm3      Neutrophil % 57.4 %      Lymphocyte % 23.6 %      Monocyte % 17.2 (H) %      Eosinophil % 1.6 %      Basophil % 0.0 %      Immature Grans % 0.2 %      Neutrophils, Absolute 2.87 10*3/mm3      Lymphocytes, Absolute 1.18 10*3/mm3      Monocytes, Absolute 0.86 10*3/mm3      Eosinophils, Absolute 0.08 10*3/mm3      Basophils, Absolute 0.00 10*3/mm3      Immature Grans, Absolute 0.01 10*3/mm3         Imaging Results (last 72 hours)     Procedure Component Value Units Date/Time    US Arterial Doppler Lower Extremity Bilateral [[id number]] Collected:  06/04/18 1104     Updated:  06/04/18 1238    Narrative:       Procedure: Bilateral lower extremity arterial duplex analysis    Reason for exam: Decreased pulses, cool temp, pain, possible  arterial disease.    FINDINGS: Right common femoral artery appears patent with normal  triphasic waveforms with peak systolic velocity 109 cm/s. Right  deep profunda artery biphasic waveforms with peak systolic  velocity of 46 cm/s. Proximal right superficial femoral artery  demonstrates triphasic waveforms with peak systolic velocity 175  cm/s. Mid right superficial femoral artery triphasic waveforms  with peak  systolic velocity is 70 cm/s. Distal right superficial  femoral artery triphasic waveforms with peak systolic velocity is  65 cm/s. Right popliteal artery monophasic waveforms with peak  systolic velocity of 38 cm/s. Right anterior tibial artery  demonstrates monophasic waveforms with peak systolic velocity is  60 cm/s. The right peroneal and posterior tibial artery is not  identified and therefore cannot be evaluated.    The left common femoral artery demonstrates normal triphasic  waveforms with peak systolic velocity of 83 cm/s.. The left deep  profunda artery demonstrates triphasic waveforms with peak  systolic velocity of 57 cm/s. Proximal left superficial femoral  artery demonstrates normal triphasic waveforms with peak systolic  velocity of 89 cm/s. Mid left superficial femoral artery  demonstrates normal triphasic waveforms with peak systolic  velocity of 75 cm/s. Distal left superficial femoral artery  demonstrates biphasic waveforms with peak systolic velocity 144  cm/s. The left popliteal artery demonstrates monophasic waveforms  with peak systolic velocity of 55 cm/s. The left anterior tibial  artery demonstrates monophasic waveforms with peak systolic  velocity of 48 cm/s. The left peroneal artery demonstrates  monophasic waveforms with peak systolic velocity of 60 cm/s. The  left posterior tibial artery demonstrates dampened monophasic  waveforms with peak systolic velocity of 26 cm/s.    Extensive calcified atherosclerotic plaque involving bilateral  lower extremity arterial systems.      Impression:       1.  Right deep profunda artery biphasic waveforms suggest mild to  moderate peripheral vascular disease.  2.  Right popliteal artery and right anterior tibial artery  demonstrates monophasic waveforms suggest severe peripheral  vascular disease.  3.  Neither the right peroneal or posterior tibial artery is  identified and therefore they cannot be evaluated.  4.  Left popliteal artery, left anterior  tibial artery, left  peroneal artery, and left posterior tibial artery monophasic  waveforms suggest severe peripheral vascular disease.    Electronically signed by:  Mikael Lundberg MD  6/4/2018 12:36 PM CDT  Workstation: [id number]    US Venous Doppler Lower Extremity Bilateral (duplex) [[id number]] Collected:  06/03/18 1424     Updated:  06/03/18 1525    Narrative:       EXAM DESCRIPTION: US VENOUS DOPPLER LOWER EXTREMITY BILATERAL  (DUPLEX)    PROCEDURE: Bilateral Lower Extremity Venous Duplex    COMPARISON: None    HISTORY: Bilateral lower extremity pain and swelling    FINDINGS: Realtime grayscale and color-flow imaging were  performed of both lower extremities.    The right common femoral vein, profunda femoral vein, femoral  vein and popliteal vein have normal venous flow on color and  duplex Doppler. The veins augment well compress, and appear to be  patent with no filling defects visible on grayscale or color  Doppler imaging. The infrapopliteal veins also appear patent with  no evidence of thrombus. The greater saphenous vein is patent.     The left common femoral vein, profunda femoral vein, femoral vein  , and popliteal vein have normal venous flow on color and duplex  Doppler. The veins augment well, compress, and appear to be  patent with no filling defects visible on grayscale or color  Doppler imaging. The infrapopliteal veins also appear patent with  no evidence of thrombus. The greater saphenous vein is patent.      Impression:       Negative for DVT bilateral lower extremities.    Electronically signed by:  Andres Roland MD  6/3/2018 3:24 PM CDT  Workstation: [id number]    XR Calcaneus 2+ View Left [[id number]] Collected:  06/03/18 1101     Updated:  06/03/18 1155    Narrative:       PROCEDURE: Two-view calcaneus    COMPARISON: No comparison    HISTORY: Left heel pain    FINDINGS: Lateral and axial views of the calcaneus are obtained.  There is a plantar calcaneal spur. Small calcifications are noted  in the  soft tissues located plantar to the calcaneus, possibly  vascular or due to plantar fasciitis. Atherosclerotic  calcifications are noted in the ankle and along the dorsal and  posterior aspects of the foot. No radiographic evidence of acute  fracture is seen.      Impression:       CONCLUSION:    No radiographic evidence of acute fracture.    Electronically signed by:  Jamel Wiseman MD  6/3/2018 11:54 AM CDT  Workstation: [id number]            Assessment/Plan     Principal Problem:    Urinary tract infection associated with indwelling urethral catheter  Active Problems:    Urinary retention    Benign prostatic hyperplasia    Hydronephrosis    Nephrolithiasis      Assessment & Plan  Moderate to severe PVD with patent SFA Stent:    Recommend medical management with OP follow up in 4-6 wks from discharge with Dr Hamman with ALEKSANDRA.  Discussed with Valentín GUEVARA.  Will add EC ASA 81 mg daily with food.   Anticoagulation contraindicated for hematuria.  Statins not tolerated  Will obtain Vit D level and replace as appropriate.  Avoid going barefoot.    Possible early cellulitis, possible gout in L ankle;  Care options discussed with discussed with Valentín GUEVARA.    Thank you for the opportunity to participate in this patient's care.    Copy to primary care provider.        I discussed the patients findings and my recommendations with Dr Anais Weinberg, APRN  06/04/18  5:00 PM

## 2018-06-04 NOTE — PROGRESS NOTES
"   LOS: 10 days   Patient Care Team:  Terrell Arzate MD as PCP - General  Terrell Arzate MD as PCP - Claims Attributed  Terrell Reyes DPM as Consulting Physician (Podiatry)  Bethany Loco RN as Care Coordinator ([facility])    Subjective     Subjective:  Symptoms:  Stable.  (Urine pink in Dye's gravity bag. Urine clear in left nephrostomy tube. Eating breakfast, no nausea or vomiting.).    Diet:  No nausea or vomiting.    Pain:  He complains of pain that is mild.  He reports pain is unchanged.        History taken from: patient chart    Objective     Vital Signs  Temp:  [97.6 °F (36.4 °C)-98.6 °F (37 °C)] 97.6 °F (36.4 °C)  Heart Rate:  [77-95] 80  Resp:  [18] 18  BP: (127-141)/(56-98) 140/56    Objective:  General Appearance:  In no acute distress.    Vital signs: (most recent): Blood pressure 140/56, pulse 80, temperature 97.6 °F (36.4 °C), temperature source Oral, resp. rate 18, height 182.9 cm (72\"), weight 85.5 kg (188 lb 9.6 oz), SpO2 96 %.  Vital signs are normal.  No fever.    Output: Producing urine (Dye, Left nephrostomy).    HEENT: Normal HEENT exam.    Lungs:  Normal effort and normal respiratory rate.  Breath sounds clear to auscultation.  He is not in respiratory distress.    Heart: Normal rate.  Irregular rhythm.    Chest: Symmetric chest wall expansion.   Abdomen: Abdomen is soft.  Bowel sounds are normal.   (LEFT CVA tender).   (Phimosis).   Extremities: There is no deformity or dependent edema.    Pulses: Distal pulses are intact.    Neurological: Patient is alert and oriented to person, place and time.  GCS score is 15.    Pupils:  Pupils are equal, round, and reactive to light.    Skin:  Warm, dry and pale.  No rash, ecchymosis or cyanosis.             Results Review:    Lab Results (last 24 hours)     Procedure Component Value Units Date/Time    Comprehensive Metabolic Panel [[id number]]  (Abnormal) Collected:  06/04/18 0633    Specimen:  Blood Updated:  06/04/18 0725     " Glucose 90 mg/dL      BUN 26 (H) mg/dL      Creatinine 1.25 mg/dL      Sodium 142 mmol/L      Potassium 4.0 mmol/L      Chloride 106 mmol/L      CO2 25.0 mmol/L      Calcium 8.7 mg/dL      Total Protein 6.7 g/dL      Albumin 3.10 (L) g/dL      ALT (SGPT) 20 (L) U/L      AST (SGOT) 18 U/L      Alkaline Phosphatase 94 U/L      Total Bilirubin 0.7 mg/dL      eGFR Non African Amer 55 mL/min/1.73      Globulin 3.6 (H) gm/dL      A/G Ratio 0.9 (L) g/dL      BUN/Creatinine Ratio 20.8     Anion Gap 11.0 mmol/L     Narrative:       The MDRD GFR formula is only valid for adults with stable renal function between ages 18 and 70.    CBC & Differential [[id number]] Collected:  06/04/18 0633    Specimen:  Blood Updated:  06/04/18 0712    Narrative:       The following orders were created for panel order CBC & Differential.  Procedure                               Abnormality         Status                     ---------                               -----------         ------                     CBC Auto Differential[[id number]]        Abnormal            Final result                 Please view results for these tests on the individual orders.    CBC Auto Differential [[id number]]  (Abnormal) Collected:  06/04/18 0633    Specimen:  Blood Updated:  06/04/18 0712     WBC 5.00 10*3/mm3      RBC 3.73 (L) 10*6/mm3      Hemoglobin 9.2 (L) g/dL      Hematocrit 29.9 (L) %      MCV 80.2 fL      MCH 24.7 (L) pg      MCHC 30.8 (L) g/dL      RDW 26.2 (H) %      RDW-SD 75.3 (H) fl      MPV 9.7 fL      Platelets 209 10*3/mm3      Neutrophil % 57.4 %      Lymphocyte % 23.6 %      Monocyte % 17.2 (H) %      Eosinophil % 1.6 %      Basophil % 0.0 %      Immature Grans % 0.2 %      Neutrophils, Absolute 2.87 10*3/mm3      Lymphocytes, Absolute 1.18 10*3/mm3      Monocytes, Absolute 0.86 10*3/mm3      Eosinophils, Absolute 0.08 10*3/mm3      Basophils, Absolute 0.00 10*3/mm3      Immature Grans, Absolute 0.01 10*3/mm3              I reviewed the  "patient's new clinical results.  I reviewed the patient's new imaging results and agree with the interpretation.  I reviewed the patient's other test results and agree with the interpretation      Assessment/Plan     Principal Problem:    Urinary tract infection associated with indwelling urethral catheter  Active Problems:    Urinary retention    Benign prostatic hyperplasia    Hydronephrosis    Nephrolithiasis      Assessment & Plan  #1. LEFT HYDRONEPHROSIS related to   #2. 1 cm proximal LEFT ureteric stone  --CT abdomen/pelvis 5/24/18 reads,\" Mild-to-moderate left hydronephrosis and proximal hydroureter to the level of an irregular 1 cm stone in the proximal left ureter versus two adjacent stones. 1.5 cm non-obstructing right distal ureteral stone  -Estimated Creatinine Clearance: 50.4 mL/min (by C-G formula based on SCr of 1.25 mg/dL).   -POSTOP attempted CRULLS on 5/27/18, J-stents NOT placed. Findings: Inflamed trigone obscure ureters, fish hooking of bilateral ureters.   -Renal ultrasound 5/29/18 showed Bilateral kidney moderate hydronephrosis and hydroureter secondary to bilateral ureter calculi. Bilateral kidney nonobstructive renal calculi.  -LEFT nephrostomy tube placement on 5/31/18  --Creatinine 1.63-->1.47-->1.25     #3. 1.5 cm nonobstructing right distal ureteral stone on 5/24/18, chronic, present on previous imaging     #4. UTI   -No leukocytosis  -Rocephin day #2 for Morganella morganii UTI cystitis     5. 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 Phimosis, physiologic  -due for follow up with Dr. Serrano to discuss circumcision     Plan: LEFT ESWL outpatient, Dye and Left nephrostomy to remain. Continue Rocephin.     JUSTIN Selby  06/04/18  9:04 AM      "

## 2018-06-04 NOTE — PLAN OF CARE
Problem: Patient Care Overview  Goal: Plan of Care Review  Outcome: Ongoing (interventions implemented as appropriate)   06/04/18 0858   Coping/Psychosocial   Plan of Care Reviewed With patient   Plan of Care Review   Progress improving   OTHER   Outcome Summary Pt completed ADL this AM with good tolerance sitting EOB. Pt still with c/o pain in L ankle. VSS throughout tx this date.

## 2018-06-04 NOTE — PLAN OF CARE
Problem: Patient Care Overview  Goal: Plan of Care Review  Outcome: Ongoing (interventions implemented as appropriate)   06/04/18 1529   Coping/Psychosocial   Plan of Care Reviewed With patient   Plan of Care Review   Progress no change   OTHER   Outcome Summary Pt has been in pain; medicated. PAOLA Simmons made aware of pt's left ankle. Contact precautions maintained.     Goal: Individualization and Mutuality  Outcome: Ongoing (interventions implemented as appropriate)      Problem: Fall Risk (Adult)  Goal: Absence of Fall  Outcome: Ongoing (interventions implemented as appropriate)      Problem: Urinary Tract Infection (Adult)  Goal: Signs and Symptoms of Listed Potential Problems Will be Absent, Minimized or Managed (Urinary Tract Infection)  Outcome: Ongoing (interventions implemented as appropriate)      Problem: Skin Injury Risk (Adult)  Goal: Skin Health and Integrity  Outcome: Ongoing (interventions implemented as appropriate)      Problem: Pain, Acute (Adult)  Goal: Acceptable Pain Control/Comfort Level  Outcome: Ongoing (interventions implemented as appropriate)

## 2018-06-05 NOTE — SIGNIFICANT NOTE
06/05/18 1300   Rehab Treatment   Discipline occupational therapy assistant   Reason Treatment Not Performed patient/family declined treatment  (Nsg in room giving pt an enema @ this time. Checked back on pt  once nsg finished, pt unable to stay awake to perform therapy and requested OT check back ion AM.)

## 2018-06-05 NOTE — PLAN OF CARE
Problem: Patient Care Overview  Goal: Plan of Care Review  Outcome: Ongoing (interventions implemented as appropriate)   06/05/18 0342   Coping/Psychosocial   Plan of Care Reviewed With patient   Plan of Care Review   Progress no change   OTHER   Outcome Summary Patient rested well during the night. vital signs have maintained stable .will continue to monitor.     Goal: Individualization and Mutuality  Outcome: Ongoing (interventions implemented as appropriate)    Goal: Discharge Needs Assessment  Outcome: Ongoing (interventions implemented as appropriate)      Problem: Fall Risk (Adult)  Goal: Absence of Fall  Outcome: Ongoing (interventions implemented as appropriate)      Problem: Skin Injury Risk (Adult)  Goal: Skin Health and Integrity  Outcome: Ongoing (interventions implemented as appropriate)      Problem: Pain, Acute (Adult)  Goal: Acceptable Pain Control/Comfort Level  Outcome: Outcome(s) achieved Date Met: 06/05/18

## 2018-06-05 NOTE — PROGRESS NOTES
"  Pharmacokinetics by Pharmacy - Vancomycin - Day 2    Ravi Park is a 87 y.o. male initiated on vancomycin for cellulitis  Concurrently on ceftriaxone     [Ht: 182.9 cm (72\"); Wt: 85.7 kg (188 lb 14.4 oz)]    Estimated Creatinine Clearance: 54.9 mL/min (by C-G formula based on SCr of 1.15 mg/dL).   Lab Results   Component Value Date    CREATININE 1.15 06/05/2018    CREATININE 1.25 06/04/2018    CREATININE 1.47 (H) 06/03/2018      Lab Results   Component Value Date    WBC 7.60 06/05/2018    WBC 5.00 06/04/2018    WBC 4.82 06/03/2018      Temp Readings from Last 1 Encounters:   06/05/18 96.9 °F (36.1 °C) (Oral)          Baseline culture results:  Microbiology Results (last 10 days)       Procedure Component Value - Date/Time    Urine Culture - Urine, [[id number]]  (Abnormal)  (Susceptibility) Collected:  06/01/18 1506    Lab Status:  Final result Specimen:  Urine from Urine, Catheter Updated:  06/03/18 0713     Urine Culture >100,000 CFU/mL Morganella morganii (A)     Comment: Multi Drug Resistant Organism  contact precautions requested         Susceptibility        Morganella morganii     ANYA     Amoxicillin + Clavulanate <=8/4 ug/ml Susceptible     Ampicillin <=8 ug/ml Susceptible     Ampicillin + Sulbactam <=8/4 ug/ml Susceptible     Cefazolin 16 ug/ml Intermediate     Cefepime <=8 ug/ml Susceptible     Cefoxitin <=8 ug/ml Susceptible     Ceftazidime <=1 ug/ml Susceptible     Ceftriaxone <=8 ug/ml Susceptible     Cefuroxime sodium 8 ug/ml Susceptible     Levofloxacin >4 ug/ml Resistant     Nitrofurantoin >64 ug/ml Resistant     Piperacillin + Tazobactam <=16 ug/ml Susceptible     Tetracycline >8 ug/ml Resistant     Trimethoprim + Sulfamethoxazole >2/38 ug/ml Resistant                                 Indication for use: cellulitis    Assessment/Plan  Initiated Vancomycin 1250 mg IVPB Q24H. Today is day 2    Vancomycin trough 6/7 @ 1900   Goal 10-15    SCr and WBC WNL, afebrile    Pharmacy will monitor renal " function and adjust dose accordingly.    Ricardo Wilkes, [facility]  06/05/18 10:33 AM

## 2018-06-05 NOTE — PROGRESS NOTES
[facility]  INPATIENT PROGRESS NOTE    Length of Stay: 11  Date of Admission: 5/24/2018  Primary Care Physician: Terrell Arzate MD    Subjective   Patient currently feels weak no chest pain or shortness of breath was more sleepy this morning but then improved  Chief Complaint/HPI:  This 87-year-old  male reported to the emergency Department was subsequently admitted to Hospital services secondary to weakness and subjective fever.  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  Dye was to be changed in the office during the month previous, however patient was unable to make his appointment.    In addition to urinary tract infection, patient was found to have left hydronephrosis related to a 1 cm proximal left ureteral stone.  Dye catheter was changed.  CRULLS procedure attempted on 5/27/18, but was unsuccessful.  No J stents placed.  Patient demonstrated trigone obscure ureters with fish hooking with inflammation.  Patient was treated with Levaquin, however cultures now reveal Morganella species resistant to Levaquin.  Is sensitive to ceftriaxone and antibiotics changed appropriately.    At this time urology recommending outpatient ESWL while nephrostomy tube is in place.    Patient does complain of left heel pain and some ankle and leg pain.  Duplex ultrasound ordered and plain films.  Pain medication adjusted.    Review of Systems   Constitutional: Negative for chills and fever.   Respiratory: Negative for shortness of breath.    Cardiovascular: Negative for chest pain.   Gastrointestinal: Negative for abdominal pain, nausea and vomiting.   Genitourinary: Negative for dysuria.   Musculoskeletal: Negative for myalgias.   Skin: Negative for color change.   Psychiatric/Behavioral: Negative for confusion.      All pertinent negatives and positives  are as above. All other systems have been reviewed and are negative unless otherwise stated.     Objective    Temp:  [96.9 °F (36.1 °C)-99.5 °F (37.5 °C)] 97.8 °F (36.6 °C)  Heart Rate:  [] 82  Resp:  [18] 18  BP: ()/(54-95) 108/54    Physical Exam   Constitutional: He is oriented to person, place, and time. He appears well-developed and well-nourished. No distress.   HENT:   Head: Normocephalic and atraumatic.   Cardiovascular: Normal rate and regular rhythm.    Pulmonary/Chest: Effort normal. No respiratory distress. He has no wheezes.   Abdominal: Soft. Bowel sounds are normal. He exhibits no distension. There is no tenderness.   Neurological: He is alert and oriented to person, place, and time.   Skin: Skin is warm and dry. He is not diaphoretic.     Results Review:  I have reviewed the labs, radiology results, and diagnostic studies.    Laboratory Data:     Results from last 7 days  Lab Units 06/05/18  0659 06/04/18  0633 06/03/18  0607   SODIUM mmol/L 140 142 139   POTASSIUM mmol/L 4.0 4.0 4.0   CHLORIDE mmol/L 107 106 104   CO2 mmol/L 22.0 25.0 26.0   BUN mg/dL 25* 26* 30*   CREATININE mg/dL 1.15 1.25 1.47*   GLUCOSE mg/dL 121* 90 94   CALCIUM mg/dL 8.5 8.7 8.8   BILIRUBIN mg/dL 0.8 0.7 0.5   ALK PHOS U/L 99 94 105   ALT (SGPT) U/L 17* 20* 24   AST (SGOT) U/L 17 18 19   ANION GAP mmol/L 11.0 11.0 9.0     Estimated Creatinine Clearance: 54.9 mL/min (by C-G formula based on SCr of 1.15 mg/dL).        Results from last 7 days  Lab Units 06/04/18  0633   URIC ACID mg/dL 6.1       Results from last 7 days  Lab Units 06/05/18  0659 06/04/18  0633 06/03/18  0607 06/02/18  0556 06/01/18  0538   WBC 10*3/mm3 7.60 5.00 4.82 4.09 3.24   HEMOGLOBIN g/dL 9.2* 9.2* 10.0* 9.9* 10.0*   HEMATOCRIT % 29.2* 29.9* 31.5* 31.9* 31.2*   PLATELETS 10*3/mm3 186 209 215 223 214       Results from last 7 days  Lab Units 05/31/18  1142   INR  1.25*       Culture Data:   No results found for: BLOODCX  No results found for:  URINECX  No results found for: RESPCX  No results found for: WOUNDCX  No results found for: STOOLCX  No components found for: BODYFLD    Radiology Data:   Imaging Results (last 24 hours)     Procedure Component Value Units Date/Time    US Arterial Doppler Lower Extremity Bilateral [[id number]] Collected:  06/04/18 1104     Updated:  06/04/18 2056    Narrative:       Procedure: Bilateral lower extremity arterial duplex analysis    Reason for exam: Decreased pulses, cool temp, pain, possible  arterial disease.    FINDINGS: Right common femoral artery appears patent with normal  triphasic waveforms with peak systolic velocity 109 cm/s. Right  deep profunda artery biphasic waveforms with peak systolic  velocity of 46 cm/s. Proximal right superficial femoral artery  demonstrates triphasic waveforms with peak systolic velocity 175  cm/s. Mid right superficial femoral artery triphasic waveforms  with peak systolic velocity is 70 cm/s. Distal right superficial  femoral artery triphasic waveforms with peak systolic velocity is  65 cm/s. Right popliteal artery monophasic waveforms with peak  systolic velocity of 38 cm/s. Right anterior tibial artery  demonstrates monophasic waveforms with peak systolic velocity is  60 cm/s. The right peroneal and posterior tibial artery is not  identified and therefore cannot be evaluated.    The left common femoral artery demonstrates normal triphasic  waveforms with peak systolic velocity of 83 cm/s.. The left deep  profunda artery demonstrates triphasic waveforms with peak  systolic velocity of 57 cm/s. Proximal left superficial femoral  artery demonstrates normal triphasic waveforms with peak systolic  velocity of 89 cm/s. Mid left superficial femoral artery  demonstrates normal triphasic waveforms with peak systolic  velocity of 75 cm/s. Distal left superficial femoral artery  demonstrates biphasic waveforms with peak systolic velocity 144  cm/s. The left popliteal artery demonstrates  monophasic waveforms  with peak systolic velocity of 55 cm/s. The left anterior tibial  artery demonstrates monophasic waveforms with peak systolic  velocity of 48 cm/s. The left peroneal artery demonstrates  monophasic waveforms with peak systolic velocity of 60 cm/s. The  left posterior tibial artery demonstrates dampened monophasic  waveforms with peak systolic velocity of 26 cm/s.    Extensive calcified atherosclerotic plaque involving bilateral  lower extremity arterial systems.      Impression:       1.  Right deep profunda artery biphasic waveforms suggest mild to  moderate peripheral vascular disease.  2.  Right popliteal artery and right anterior tibial artery  demonstrates monophasic waveforms suggest severe peripheral  vascular disease.  3.  Neither the right peroneal or posterior tibial artery is  identified and therefore they cannot be evaluated.  4.  Left popliteal artery, left anterior tibial artery, left  peroneal artery, and left posterior tibial artery monophasic  waveforms suggest severe peripheral vascular disease.    Electronically signed by:  Mikael Lundberg MD  6/4/2018 12:36 PM CDT  Workstation: [id number]          I have reviewed the patient current medications.     Assessment/Plan     Hospital Problem List     * (Principal)Urinary tract infection associated with indwelling urethral catheter    Urinary retention (Chronic)    Benign prostatic hyperplasia (Chronic)    Hydronephrosis (Chronic)    Nephrolithiasis (Chronic)          Patient is likely septic due to cellulitis will continue IV antibiotics and will monitor currently does not appear to have like acute vascular event

## 2018-06-05 NOTE — PROGRESS NOTES
"   LOS: 11 days   Patient Care Team:  Terrell Arzate MD as PCP - General  Terrell Arzate MD as PCP - Claims Attributed  Terrell Reyes DPM as Consulting Physician (Podiatry)  Bethany Loco RN as Care Coordinator (Population Health)    Subjective     Subjective:  Symptoms:  Stable.  He reports malaise.  (Urine clear and yellow in Dye and nephrostomy tube bags. Complains of chills. ).    Diet:  No nausea or vomiting.    Pain:  He complains of pain that is mild.  He reports pain is unchanged.        History taken from: patient chart    Objective     Vital Signs  Temp:  [96.9 °F (36.1 °C)-99.5 °F (37.5 °C)] 97.8 °F (36.6 °C)  Heart Rate:  [] 82  Resp:  [18] 18  BP: ()/(54-95) 108/54    Objective:  General Appearance:  In no acute distress.    Vital signs: (most recent): Blood pressure 108/54, pulse 82, temperature 97.8 °F (36.6 °C), temperature source Oral, resp. rate 18, height 182.9 cm (72\"), weight 85.7 kg (188 lb 14.4 oz), SpO2 94 %.  Vital signs are normal.  No fever.    Output: Producing urine (Dye, Left nephrostomy).    HEENT: Normal HEENT exam.    Lungs:  Normal effort and normal respiratory rate.  Breath sounds clear to auscultation.  He is not in respiratory distress.    Heart: Normal rate.  Irregular rhythm.    Chest: Symmetric chest wall expansion.   Abdomen: Abdomen is soft.  Bowel sounds are normal.   There is no abdominal tenderness.   (Phimosis).   Extremities: There is dependent edema.  There is no deformity.    Pulses: Distal pulses are intact.    Neurological: Patient is alert and oriented to person, place and time.  GCS score is 15.    Pupils:  Pupils are equal, round, and reactive to light.    Skin:  Warm, dry and pale.  No rash, ecchymosis or cyanosis.             Results Review:    Lab Results (last 24 hours)     Procedure Component Value Units Date/Time    Vitamin D 25 Hydroxy [[id number]]  (Abnormal) Collected:  06/05/18 0659    Specimen:  Blood Updated:  06/05/18 0808     " 25 Hydroxy, Vitamin D 19.3 (L) ng/ml     Comprehensive Metabolic Panel [[id number]]  (Abnormal) Collected:  06/05/18 0659    Specimen:  Blood Updated:  06/05/18 0755     Glucose 121 (H) mg/dL      BUN 25 (H) mg/dL      Creatinine 1.15 mg/dL      Sodium 140 mmol/L      Potassium 4.0 mmol/L      Chloride 107 mmol/L      CO2 22.0 mmol/L      Calcium 8.5 mg/dL      Total Protein 6.8 g/dL      Albumin 3.10 (L) g/dL      ALT (SGPT) 17 (L) U/L      AST (SGOT) 17 U/L      Alkaline Phosphatase 99 U/L      Total Bilirubin 0.8 mg/dL      eGFR Non African Amer 60 mL/min/1.73      Globulin 3.7 (H) gm/dL      A/G Ratio 0.8 (L) g/dL      BUN/Creatinine Ratio 21.7     Anion Gap 11.0 mmol/L     Narrative:       The MDRD GFR formula is only valid for adults with stable renal function between ages 18 and 70.    CBC & Differential [[id number]] Collected:  06/05/18 0659    Specimen:  Blood Updated:  06/05/18 0740    Narrative:       The following orders were created for panel order CBC & Differential.  Procedure                               Abnormality         Status                     ---------                               -----------         ------                     CBC Auto Differential[[id number]]        Abnormal            Final result                 Please view results for these tests on the individual orders.    CBC Auto Differential [[id number]]  (Abnormal) Collected:  06/05/18 0659    Specimen:  Blood Updated:  06/05/18 0740     WBC 7.60 10*3/mm3      RBC 3.67 (L) 10*6/mm3      Hemoglobin 9.2 (L) g/dL      Hematocrit 29.2 (L) %      MCV 79.6 (L) fL      MCH 25.1 (L) pg      MCHC 31.5 g/dL      RDW 25.9 (H) %      RDW-SD 74.1 (H) fl      MPV 9.8 fL      Platelets 186 10*3/mm3      Neutrophil % 75.4 %      Lymphocyte % 11.1 %      Monocyte % 12.4 (H) %      Eosinophil % 0.5 %      Basophil % 0.1 %      Immature Grans % 0.5 %      Neutrophils, Absolute 5.73 10*3/mm3      Lymphocytes, Absolute 0.84 10*3/mm3      Monocytes,  "Absolute 0.94 (H) 10*3/mm3      Eosinophils, Absolute 0.04 10*3/mm3      Basophils, Absolute 0.01 10*3/mm3      Immature Grans, Absolute 0.04 (H) 10*3/mm3     Uric Acid [[id number]]  (Normal) Collected:  06/04/18 0633    Specimen:  Blood Updated:  06/04/18 1838     Uric Acid 6.1 mg/dL              I reviewed the patient's new clinical results.  I reviewed the patient's new imaging results and agree with the interpretation.  I reviewed the patient's other test results and agree with the interpretation      Assessment/Plan     Principal Problem:    Urinary tract infection associated with indwelling urethral catheter  Active Problems:    Urinary retention    Benign prostatic hyperplasia    Hydronephrosis    Nephrolithiasis      Assessment & Plan  #1. LEFT HYDRONEPHROSIS related to   #2. 1 cm proximal LEFT ureteric stone  --CT abdomen/pelvis 5/24/18 reads,\" Mild-to-moderate left hydronephrosis and proximal hydroureter to the level of an irregular 1 cm stone in the proximal left ureter versus two adjacent stones. 1.5 cm non-obstructing right distal ureteral stone  -Estimated Creatinine Clearance: 54.9 mL/min (by C-G formula based on SCr of 1.15 mg/dL).   -POSTOP attempted CRULLS on 5/27/18, J-stents NOT placed. Findings: Inflamed trigone obscure ureters, fish hooking of bilateral ureters.   -Renal ultrasound 5/29/18 showed Bilateral kidney moderate hydronephrosis and hydroureter secondary to bilateral ureter calculi. Bilateral kidney nonobstructive renal calculi.  -LEFT nephrostomy tube placement on 5/31/18  --Creatinine 1.63-->1.47-->1.25-->1.15     #3. 1.5 cm nonobstructing right distal ureteral stone on 5/24/18, chronic, present on previous imaging     #4. UTI   -No leukocytosis  -Rocephin day #3 for Morganella morganii UTI cystitis     5. 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 Phimosis, physiologic  -due for follow up with Dr. Serrano to discuss circumcision     Plan: LEFT ESWL " outpatient, Dye and Left nephrostomy to remain. Continue Rocephin. On vancomycin for gram + coverage for lower extremity stasis wound.     JUSTIN Selby  06/05/18  4:49 PM

## 2018-06-05 NOTE — PROGRESS NOTES
"[facility]  [address] [place] [address]. [address]  T - [phone]  F - [phone]     Progress Note          PATIENT  DEMOGRAPHICS   PATIENT NAME: Ravi Park                      PHYSICIAN: Buzz Sandra MD  : 1930  MRN: [id number]   LOS: 11 days    Patient Care Team:  Terrell Arzate MD as PCP - General  Terrell Arzate MD as PCP - Claims Attributed  Terrell Reyes DPM as Consulting Physician (Podiatry)  Bethany Loco RN as Care Coordinator (Population Health)  Subjective   SUBJECTIVE   Left foot pain no marked soa         Objective   OBJECTIVE   Vital Signs  Temp:  [96.9 °F (36.1 °C)-99.5 °F (37.5 °C)] 96.9 °F (36.1 °C)  Heart Rate:  [] 113  Resp:  [18] 18  BP: ()/(58-95) 156/95    Flowsheet Rows      First Filed Value   Admission Height  182.9 cm (72\") Documented at 2018   Admission Weight  86.2 kg (190 lb) Documented at 2018           I/O last 3 completed shifts:  In: 1440 [P.O.:1440]  Out: 2410 [Urine:2410]    PHYSICAL EXAM    Physical Exam   Constitutional: He is oriented to person, place, and time. He appears well-developed.   HENT:   Head: Normocephalic.   Eyes: Pupils are equal, round, and reactive to light.   Cardiovascular: Normal rate, regular rhythm and normal heart sounds.    Pulmonary/Chest: Effort normal and breath sounds normal.   Abdominal: Soft. Bowel sounds are normal.   Musculoskeletal: He exhibits no edema.   Neurological: He is alert and oriented to person, place, and time.       RESULTS   Results Review:      Results from last 7 days  Lab Units 18  0659 18  0633 18  0607   SODIUM mmol/L 140 142 139   POTASSIUM mmol/L 4.0 4.0 4.0   CHLORIDE mmol/L 107 106 104   CO2 mmol/L 22.0 25.0 26.0   BUN mg/dL 25* 26* 30*   CREATININE mg/dL 1.15 1.25 1.47*   CALCIUM mg/dL 8.5 8.7 8.8   BILIRUBIN mg/dL 0.8 0.7 0.5   ALK PHOS U/L 99 94 105   ALT (SGPT) U/L 17* 20* 24   AST (SGOT) U/L 17 18 19   GLUCOSE mg/dL " 121* 90 94       Estimated Creatinine Clearance: 54.9 mL/min (by C-G formula based on SCr of 1.15 mg/dL).            Results from last 7 days  Lab Units 06/04/18  0633   URIC ACID mg/dL 6.1         Results from last 7 days  Lab Units 06/05/18  0659 06/04/18  0633 06/03/18  0607 06/02/18  0556 06/01/18  0538   WBC 10*3/mm3 7.60 5.00 4.82 4.09 3.24   HEMOGLOBIN g/dL 9.2* 9.2* 10.0* 9.9* 10.0*   PLATELETS 10*3/mm3 186 209 215 223 214         Results from last 7 days  Lab Units 05/31/18  1142   INR  1.25*         Imaging Results (last 24 hours)     Procedure Component Value Units Date/Time    US Arterial Doppler Lower Extremity Bilateral [[id number]] Collected:  06/04/18 1104     Updated:  06/04/18 2056    Narrative:       Procedure: Bilateral lower extremity arterial duplex analysis    Reason for exam: Decreased pulses, cool temp, pain, possible  arterial disease.    FINDINGS: Right common femoral artery appears patent with normal  triphasic waveforms with peak systolic velocity 109 cm/s. Right  deep profunda artery biphasic waveforms with peak systolic  velocity of 46 cm/s. Proximal right superficial femoral artery  demonstrates triphasic waveforms with peak systolic velocity 175  cm/s. Mid right superficial femoral artery triphasic waveforms  with peak systolic velocity is 70 cm/s. Distal right superficial  femoral artery triphasic waveforms with peak systolic velocity is  65 cm/s. Right popliteal artery monophasic waveforms with peak  systolic velocity of 38 cm/s. Right anterior tibial artery  demonstrates monophasic waveforms with peak systolic velocity is  60 cm/s. The right peroneal and posterior tibial artery is not  identified and therefore cannot be evaluated.    The left common femoral artery demonstrates normal triphasic  waveforms with peak systolic velocity of 83 cm/s.. The left deep  profunda artery demonstrates triphasic waveforms with peak  systolic velocity of 57 cm/s. Proximal left superficial  femoral  artery demonstrates normal triphasic waveforms with peak systolic  velocity of 89 cm/s. Mid left superficial femoral artery  demonstrates normal triphasic waveforms with peak systolic  velocity of 75 cm/s. Distal left superficial femoral artery  demonstrates biphasic waveforms with peak systolic velocity 144  cm/s. The left popliteal artery demonstrates monophasic waveforms  with peak systolic velocity of 55 cm/s. The left anterior tibial  artery demonstrates monophasic waveforms with peak systolic  velocity of 48 cm/s. The left peroneal artery demonstrates  monophasic waveforms with peak systolic velocity of 60 cm/s. The  left posterior tibial artery demonstrates dampened monophasic  waveforms with peak systolic velocity of 26 cm/s.    Extensive calcified atherosclerotic plaque involving bilateral  lower extremity arterial systems.      Impression:       1.  Right deep profunda artery biphasic waveforms suggest mild to  moderate peripheral vascular disease.  2.  Right popliteal artery and right anterior tibial artery  demonstrates monophasic waveforms suggest severe peripheral  vascular disease.  3.  Neither the right peroneal or posterior tibial artery is  identified and therefore they cannot be evaluated.  4.  Left popliteal artery, left anterior tibial artery, left  peroneal artery, and left posterior tibial artery monophasic  waveforms suggest severe peripheral vascular disease.    Electronically signed by:  Mikael Lundberg MD  6/4/2018 12:36 PM CDT  Workstation: [id number]           MEDICATIONS      8-hydroxyquinoline sulfate 1 application Apply externally Daily   8-hydroxyquinoline sulfate  Apply externally Daily   aspirin 81 mg Oral Daily   bisacodyl 10 mg Rectal Daily   ceftriaxone 1 g Intravenous Q24H   collagenase  Topical Daily   docusate sodium 100 mg Oral BID   finasteride 5 mg Oral Daily   levothyroxine 50 mcg Oral Q AM   magic butt ointment 1 g Topical BID   metoprolol succinate XL 25 mg Oral Daily    mupirocin  Topical Q12H   polyethylene glycol 17 g Oral Daily   potassium chloride 20 mEq Oral Daily   terazosin 2 mg Oral Nightly   vancomycin 1,250 mg Intravenous Q24H   cyanocobalamin 1,000 mcg Oral Daily       Pharmacy to dose vancomycin     sodium chloride 75 mL/hr Last Rate: 75 mL/hr (06/05/18 0641)       Assessment/Plan   ASSESSMENT / PLAN    Principal Problem:    Urinary tract infection associated with indwelling urethral catheter  Active Problems:    Urinary retention    Benign prostatic hyperplasia    Hydronephrosis    Nephrolithiasis    1- acute kidney injury on ckd 3 baseline creatinine close to 1.2-1.3.  His acute kidney injury could be related to post-obstruction plus diuretic use. Dc ivf can start bumex     2.bilateral nephrolithiasis with ureteral stone with left hydronephrosis status post NILDA procedure on May 27 and also has a left nephrostomy tube placed.  He has a good urine output.  He has lori hematuria on the nephrostomy tube.     3.history of urinary retention currently on Terazosin and finasteride      4. Morganella UTI now on ceftriaxone     5.atrial fibrillation chronic    6. PAD severe pad on left LE CT surgery is consulted                This document has been electronically signed by Buzz Sandra MD on June 5, 2018 11:08 AM

## 2018-06-05 NOTE — PLAN OF CARE
Problem: Patient Care Overview  Goal: Plan of Care Review  Outcome: Ongoing (interventions implemented as appropriate)   06/05/18 8185   Coping/Psychosocial   Plan of Care Reviewed With patient   Plan of Care Review   Progress no change   OTHER   Outcome Summary Pt has been very drowsy today; Dr. VINI Bazan made aware; dressing changed to back. Contact precautions maintained; will continue to monitor.      Goal: Individualization and Mutuality  Outcome: Ongoing (interventions implemented as appropriate)      Problem: Fall Risk (Adult)  Goal: Absence of Fall  Outcome: Ongoing (interventions implemented as appropriate)      Problem: Urinary Tract Infection (Adult)  Goal: Signs and Symptoms of Listed Potential Problems Will be Absent, Minimized or Managed (Urinary Tract Infection)  Outcome: Ongoing (interventions implemented as appropriate)      Problem: Skin Injury Risk (Adult)  Goal: Skin Health and Integrity  Outcome: Ongoing (interventions implemented as appropriate)      Problem: Pain, Acute (Adult)  Goal: Acceptable Pain Control/Comfort Level  Outcome: Ongoing (interventions implemented as appropriate)

## 2018-06-05 NOTE — SIGNIFICANT NOTE
"   06/05/18 9857   Rehab Treatment   Discipline physical therapy assistant   Reason Treatment Not Performed patient/family declined treatment  (pt stated, \"I can't get up, Leave me alone lady.\")     "

## 2018-06-06 NOTE — PLAN OF CARE
Problem: Patient Care Overview  Goal: Plan of Care Review  Outcome: Ongoing (interventions implemented as appropriate)   06/06/18 1414   Coping/Psychosocial   Plan of Care Reviewed With patient   Plan of Care Review   Progress no change   OTHER   Outcome Summary Pt supine in bed. Pt agreed to work on UB Strengthening. Pt already bathed this morning. Pt could benefit from HHOT upon D/C

## 2018-06-06 NOTE — PROGRESS NOTES
"   LOS: 12 days   Patient Care Team:  Terrell Arzate MD as PCP - General  Terrell Arzate MD as PCP - Claims Attributed  Terrell Reyes DPM as Consulting Physician (Podiatry)  Bethany Loco RN as Care Coordinator (Population Health)    Subjective     Subjective:  Symptoms:  Stable.  He reports malaise.  (Urine clear and yellow in Dye and nephrostomy tube bags. Complains of BLE pain. ).    Diet:  No nausea or vomiting.    Pain:  He complains of pain that is mild.  He reports pain is unchanged.  (BLE).       History taken from: patient chart    Objective     Vital Signs  Temp:  [98.1 °F (36.7 °C)-100.1 °F (37.8 °C)] 98.8 °F (37.1 °C)  Heart Rate:  [] 96  Resp:  [18] 18  BP: (112-158)/(45-72) 112/45    Objective:  General Appearance:  In no acute distress.    Vital signs: (most recent): Blood pressure 112/45, pulse 96, temperature 98.8 °F (37.1 °C), temperature source Axillary, resp. rate 18, height 182.9 cm (72\"), weight 85.7 kg (188 lb 14.4 oz), SpO2 94 %.  Vital signs are normal.  No fever.    Output: Producing urine (Dye, Left nephrostomy).    HEENT: Normal HEENT exam.    Lungs:  Normal effort and normal respiratory rate.  Breath sounds clear to auscultation.  He is not in respiratory distress.    Heart: Normal rate.  Irregular rhythm.    Chest: Symmetric chest wall expansion.   Abdomen: Abdomen is soft.  Bowel sounds are normal.   There is no abdominal tenderness.   (Phimosis).   Extremities: There is dependent edema.  There is no deformity.    Pulses: Distal pulses are intact.    Neurological: Patient is alert and oriented to person, place and time.  GCS score is 15.    Pupils:  Pupils are equal, round, and reactive to light.    Skin:  Warm, dry and pale.  No rash, ecchymosis or cyanosis.             Results Review:    Lab Results (last 24 hours)     Procedure Component Value Units Date/Time    CBC & Differential [[id number]] Collected:  06/06/18 0552    Specimen:  Blood Updated:  06/06/18 0944    " Narrative:       The following orders were created for panel order CBC & Differential.  Procedure                               Abnormality         Status                     ---------                               -----------         ------                     Scan Slide[[id number]]                                       Final result               CBC Auto Differential[[id number]]        Abnormal            Final result                 Please view results for these tests on the individual orders.    Scan Slide [[id number]] Collected:  06/06/18 0552    Specimen:  Blood Updated:  06/06/18 0944     Anisocytosis Slight/1+     Comment: Few ovalocytes        Hypochromia Slight/1+     WBC Morphology Normal     Platelet Estimate Adequate    Comprehensive Metabolic Panel [[id number]]  (Abnormal) Collected:  06/06/18 0552    Specimen:  Blood Updated:  06/06/18 0639     Glucose 101 (H) mg/dL      BUN 23 (H) mg/dL      Creatinine 1.02 mg/dL      Sodium 139 mmol/L      Potassium 3.7 mmol/L      Chloride 106 mmol/L      CO2 25.0 mmol/L      Calcium 8.6 mg/dL      Total Protein 6.9 g/dL      Albumin 3.20 (L) g/dL      ALT (SGPT) 23 U/L      AST (SGOT) 25 U/L      Alkaline Phosphatase 104 U/L      Total Bilirubin 1.0 mg/dL      eGFR Non African Amer 69 mL/min/1.73      Globulin 3.7 (H) gm/dL      A/G Ratio 0.9 (L) g/dL      BUN/Creatinine Ratio 22.5     Anion Gap 8.0 mmol/L     Narrative:       The MDRD GFR formula is only valid for adults with stable renal function between ages 18 and 70.    CBC Auto Differential [[id number]]  (Abnormal) Collected:  06/06/18 0552    Specimen:  Blood Updated:  06/06/18 0625     WBC 6.78 10*3/mm3      RBC 3.53 (L) 10*6/mm3      Hemoglobin 8.9 (L) g/dL      Hematocrit 28.3 (L) %      MCV 80.2 fL      MCH 25.2 (L) pg      MCHC 31.4 (L) g/dL      RDW 26.3 (H) %      RDW-SD 75.5 (H) fl      MPV 9.0 fL      Platelets 184 10*3/mm3      Neutrophil % 67.0 %      Lymphocyte % 18.4 %      Monocyte % 13.4 (H) %      " Eosinophil % 1.0 %      Basophil % 0.1 %      Immature Grans % 0.1 %      Neutrophils, Absolute 4.53 10*3/mm3      Lymphocytes, Absolute 1.25 10*3/mm3      Monocytes, Absolute 0.91 (H) 10*3/mm3      Eosinophils, Absolute 0.07 10*3/mm3      Basophils, Absolute 0.01 10*3/mm3      Immature Grans, Absolute 0.01 10*3/mm3      nRBC 0.0 /100 WBC              I reviewed the patient's new clinical results.  I reviewed the patient's new imaging results and agree with the interpretation.  I reviewed the patient's other test results and agree with the interpretation      Assessment/Plan     Principal Problem:    Urinary tract infection associated with indwelling urethral catheter  Active Problems:    Urinary retention    Benign prostatic hyperplasia    Hydronephrosis    Nephrolithiasis      Assessment & Plan  #1. LEFT HYDRONEPHROSIS related to   #2. 1 cm proximal LEFT ureteric stone  --CT abdomen/pelvis 5/24/18 reads,\" Mild-to-moderate left hydronephrosis and proximal hydroureter to the level of an irregular 1 cm stone in the proximal left ureter versus two adjacent stones. 1.5 cm non-obstructing right distal ureteral stone  -Estimated Creatinine Clearance: 61.8 mL/min (by C-G formula based on SCr of 1.02 mg/dL).   -POSTOP attempted CRULLS on 5/27/18, J-stents NOT placed. Findings: Inflamed trigone obscure ureters, fish hooking of bilateral ureters.   -Renal ultrasound 5/29/18 showed Bilateral kidney moderate hydronephrosis and hydroureter secondary to bilateral ureter calculi. Bilateral kidney nonobstructive renal calculi.  -LEFT nephrostomy tube placement on 5/31/18  --Creatinine 1.63-->1.47-->1.25-->1.15-->1.02     #3. 1.5 cm nonobstructing right distal ureteral stone on 5/24/18, chronic, present on previous imaging     #4. UTI   -No leukocytosis  -Rocephin day #4 for Morganella morganii UTI cystitis     5. 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 Phimosis, physiologic  -due for follow up " with  [place] to discuss circumcision     Plan: LEFT ESWL outpatient, Dye and Left nephrostomy to remain. Continue Rocephin. On vancomycin for gram + coverage for lower extremity stasis wound.     JUSTIN Selby  06/06/18  4:08 PM

## 2018-06-06 NOTE — PROGRESS NOTES
[facility] Medicine Services  INPATIENT PROGRESS NOTE    Length of Stay: 12  Date of Admission: 5/24/2018  Primary Care Physician: Terrell Arzate MD    Subjective   Patient still weak and confused at times without complaint of chest pain or shortness of breath at this time complains of leg pain bilaterally no vomiting Chief Complaint/HPI:  This 87-year-old  male reported to the emergency Department was subsequently admitted to Hospital services secondary to weakness and subjective fever.  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  Dye was to be changed in the office during the month previous, however patient was unable to make his appointment.    In addition to urinary tract infection, patient was found to have left hydronephrosis related to a 1 cm proximal left ureteral stone.  Dye catheter was changed.  CRULLS procedure attempted on 5/27/18, but was unsuccessful.  No J stents placed.  Patient demonstrated trigone obscure ureters with fish hooking with inflammation.  Patient was treated with Levaquin, however cultures now reveal Morganella species resistant to Levaquin.  Is sensitive to ceftriaxone and antibiotics changed appropriately.    At this time urology recommending outpatient ESWL while nephrostomy tube is in place.    Patient does complain of left heel pain and some ankle and leg pain.  Duplex ultrasound ordered and plain films.  Pain medication adjusted.    Review of Systems   Constitutional: Negative for chills and fever.   Respiratory: Negative for shortness of breath.    Cardiovascular: Negative for chest pain.   Gastrointestinal: Negative for abdominal pain, nausea and vomiting.   Genitourinary: Negative for dysuria.   Musculoskeletal: Negative for myalgias.   Skin: Negative for color change.   Psychiatric/Behavioral: Negative for confusion.       All pertinent negatives and positives are as above. All other systems have been reviewed and are negative unless otherwise stated.     Objective    Temp:  [98.1 °F (36.7 °C)-100.1 °F (37.8 °C)] 99.6 °F (37.6 °C)  Heart Rate:  [] 103  Resp:  [18] 18  BP: (130-158)/(59-72) 158/72    Physical Exam   Constitutional: He is oriented to person, place, and time. He appears well-developed and well-nourished. No distress.   HENT:   Head: Normocephalic and atraumatic.   Cardiovascular: Normal rate and regular rhythm.    Pulmonary/Chest: Effort normal. No respiratory distress. He has no wheezes.   Abdominal: Soft. Bowel sounds are normal. He exhibits no distension. There is no tenderness.   Neurological: He is alert and oriented to person, place, and time.   Skin: Skin is warm and dry. He is not diaphoretic.     Results Review:  I have reviewed the labs, radiology results, and diagnostic studies.    Laboratory Data:     Results from last 7 days  Lab Units 06/06/18  0552 06/05/18  0659 06/04/18  0633   SODIUM mmol/L 139 140 142   POTASSIUM mmol/L 3.7 4.0 4.0   CHLORIDE mmol/L 106 107 106   CO2 mmol/L 25.0 22.0 25.0   BUN mg/dL 23* 25* 26*   CREATININE mg/dL 1.02 1.15 1.25   GLUCOSE mg/dL 101* 121* 90   CALCIUM mg/dL 8.6 8.5 8.7   BILIRUBIN mg/dL 1.0 0.8 0.7   ALK PHOS U/L 104 99 94   ALT (SGPT) U/L 23 17* 20*   AST (SGOT) U/L 25 17 18   ANION GAP mmol/L 8.0 11.0 11.0     Estimated Creatinine Clearance: 61.8 mL/min (by C-G formula based on SCr of 1.02 mg/dL).        Results from last 7 days  Lab Units 06/04/18  0633   URIC ACID mg/dL 6.1       Results from last 7 days  Lab Units 06/06/18  0552 06/05/18  0659 06/04/18  0633 06/03/18  0607 06/02/18  0556   WBC 10*3/mm3 6.78 7.60 5.00 4.82 4.09   HEMOGLOBIN g/dL 8.9* 9.2* 9.2* 10.0* 9.9*   HEMATOCRIT % 28.3* 29.2* 29.9* 31.5* 31.9*   PLATELETS 10*3/mm3 184 186 209 215 223       Results from last 7 days  Lab Units 05/31/18  1142   INR  1.25*       Culture Data:   No results  found for: BLOODCX  No results found for: URINECX  No results found for: RESPCX  No results found for: WOUNDCX  No results found for: STOOLCX  No components found for: BODYFLD    Radiology Data:   Imaging Results (last 24 hours)     ** No results found for the last 24 hours. **          I have reviewed the patient current medications.     Assessment/Plan     Hospital Problem List     * (Principal)Urinary tract infection associated with indwelling urethral catheter    Urinary retention (Chronic)    Benign prostatic hyperplasia (Chronic)    Hydronephrosis (Chronic)    Nephrolithiasis (Chronic)          Patient is septic due to cellulitis and UTI will continue IV antibiotics  May need IV antibiotics at least for another week or 2  Hydronephrosis management as per urology  Confusion likely due to the current multiple medical problems

## 2018-06-06 NOTE — THERAPY TREATMENT NOTE
Acute Care - Occupational Therapy Treatment Note  [facility]     Patient Name: Ravi Park  : 1930  MRN: [id number]  Today's Date: 2018  Onset of Illness/Injury or Date of Surgery: 18  Date of Referral to OT: 18  Referring Physician: JUSTIN Russo    Admit Date: 2018       ICD-10-CM ICD-9-CM   1. Urinary tract infection associated with indwelling urethral catheter, initial encounter T83.511A 996.64    N39.0 599.0   2. Hypoxia R09.02 799.02   3. Chronic atrial fibrillation I48.2 427.31   4. Impaired functional mobility and endurance Z74.09 V49.89   5. Nephrolithiasis N20.0 592.0   6. Impaired mobility and activities of daily living Z74.09 799.89     Patient Active Problem List   Diagnosis   • Peripheral arterial disease   • Chronic atrial fibrillation   • Backache   • Lumbar radiculopathy   • Edema   • Acute congestive heart failure   • Acute blood loss anemia   • Acute on chronic diastolic heart failure   • YESENIA (acute kidney injury)   • (HFpEF) heart failure with preserved ejection fraction   • Urinary retention   • Personal history of tobacco use, presenting hazards to health   • Vitreous floaters   • Upper respiratory infection   • Sprain of shoulder and upper arm   • Shoulder pain   • Serous otitis media   • Sensorineural hearing loss   • Pure hypercholesterolemia   • Pseudophakia   • Prostatitis   • Pain in wrist   • Pain in thumb joint with movement   • Pain in joint involving right lower leg   • Pain in eye   • Otorrhea   • Open wound of toe   • Open wound of lower leg with complication   • Open wound of lower leg   • Open wound of lesser toe of left foot without damage to nail   • Olecranon bursitis   • Need for prophylactic vaccination and inoculation against influenza   • Need for immunization against influenza   • Multiple joint pain   • Malaise and fatigue   • Keratoconjunctivitis sicca   • Insect bite   • Impacted cerumen   • Hypertensive disorder   • History of  colonoscopy   • History of colon polyps   • History of artificial eye lens   • Herpes zoster   • Gout   • Exposure to communicable disease   • Dog bite of hand   • Disorder of sacroiliac joint   • Decreased platelet count   • Cough   • Contusion of thumb   • Common cold   • Cervical arthritis   • Cerebrovascular accident   • Cellulitis of lower leg   • Cataract   • Benign prostatic hyperplasia   • Atrial fibrillation   • Astigmatism   • Allergic rhinitis due to pollen   • Allergic rhinitis   • Adverse reaction to drug   • Acute confusion   • Acute bronchitis   • Abnormal gait   • Urinary tract infection associated with indwelling urethral catheter   • Hydronephrosis   • Nephrolithiasis     Past Medical History:   Diagnosis Date   • Allergic rhinitis    • Benign prostatic hyperplasia    • Cerebrovascular accident    • Chronic anemia    • Chronic atrial fibrillation    • CKD (chronic kidney disease) stage 3, GFR 30-59 ml/min    • Elevated cholesterol    • GERD (gastroesophageal reflux disease)    • Gout    • Herpes zoster    • History of transfusion    • Hypercholesterolemia    • Hypertension    • Peripheral arterial disease    • Sensorineural hearing loss      Past Surgical History:   Procedure Laterality Date   • CARDIAC CATHETERIZATION     • COLONOSCOPY     • CYSTOSCOPY, URETEROSCOPY, RETROGRADE PYELOGRAM, STENT INSERTION Left 5/27/2018    Procedure: CYSTOSCOPY, URETEROSCOPY, RETROGRADE PYELOGRAM;  Surgeon: Stephen Serrano MD;  Location: [facility];  Service: Urology   • HERNIA REPAIR         Therapy Treatment          Rehabilitation Treatment Summary     Row Name 06/06/18 1010             Treatment Time/Intention    Discipline occupational therapy assistant  -LW      Document Type therapy note (daily note)  -LW      Subjective Information no complaints  -LW      Mode of Treatment occupational therapy  -LW      Patient/Family Observations Pt supine in bed  -LW      Care Plan Review care plan/treatment goals  reviewed  -LW      Total Minutes, Occupational Therapy Treatment 30  -LW      Therapy Frequency (OT Eval) other (see comments)   3/14 times per day.  -LW      Patient Effort good  -LW      Existing Precautions/Restrictions fall  -LW      Equipment Issued to Patient gait belt  -LW      Recorded by [LW] ERIK Evans/L 06/06/18 1412      Row Name 06/06/18 1010             Vital Signs    Pre Systolic BP Rehab 133  -LW      Pre Treatment Diastolic BP 57  -LW      Pretreatment Heart Rate (beats/min) 83  -LW      Pretreatment Resp Rate (breaths/min) 82  -LW      Pre SpO2 (%) 94  -LW      O2 Delivery Pre Treatment supplemental O2  -LW      Post SpO2 (%) 96  -LW      O2 Delivery Post Treatment supplemental O2  -LW      Pre Patient Position Supine  -LW      Post Patient Position Sitting  -LW      Recorded by [LW] ERIK Evans/L 06/06/18 1412      Row Name 06/06/18 1010             Cognitive Assessment/Intervention- PT/OT    Affect/Mental Status (Cognitive) WFL  -LW      Orientation Status (Cognition) oriented x 4  -LW      Follows Commands (Cognition) WNL  -LW      Cognitive Function (Cognitive) WNL  -LW      Safety Deficit (Cognitive) mild deficit  -LW      Personal Safety Interventions fall prevention program maintained;gait belt;nonskid shoes/slippers when out of bed  -LW      Recorded by [LW] VIANEY Evans 06/06/18 1412      Row Name 06/06/18 1010             Bed Mobility Assessment/Treatment    Bed Mobility Assessment/Treatment supine-sit;sit-supine  -LW      Supine-Sit [place] (Bed Mobility) contact guard  -LW      Sit-Supine [place] (Bed Mobility) supervision  -LW      Bed Mobility, Safety Issues decreased use of arms for pushing/pulling  -LW      Assistive Device (Bed Mobility) bed rails  -LW      Recorded by [LW] ERIK Evans/L 06/06/18 1412      Row Name 06/06/18 1010             Upper Extremity Seated Therapeutic Exercise    Performed, Seated Upper Extremity  (Therapeutic Exercise) shoulder flexion/extension;shoulder abduction/adduction;scapular protraction/retraction;elbow flexion/extension;forearm supination/pronation;wrist flexion/extension;digit flexion/extension  -LW      Device, Seated Upper Extremity (Therapeutic Exercise) elastic bands/tubing  -LW      Exercise Type, Seated Upper Extremity (Therapeutic Exercise) AROM (active range of motion)  -LW      Recorded by [LW] ERIK Evans/L 06/06/18 1412      Row Name 06/06/18 1010             Positioning and Restraints    Pre-Treatment Position in bed  -LW      Post Treatment Position bed  -LW      In Bed supine;call light within reach;encouraged to call for assist;exit alarm on  -LW      Recorded by [LW] ERIK Evans/L 06/06/18 1412      Row Name 06/06/18 1010             Pain Scale: Numbers Pre/Post-Treatment    Pain Scale: Numbers, Pretreatment 0/10 - no pain  -LW      Pain Scale: Numbers, Post-Treatment 0/10 - no pain  -LW      Recorded by [LW] ERIK Evans/L 06/06/18 1412      Row Name 06/06/18 1010             Sensory Assessment/Intervention    Sensory General Assessment no sensation deficits identified  -LW      Recorded by [LW] ERIK Evans/L 06/06/18 1412      Row Name 06/06/18 1010             Vision Assessment/Intervention    Visual Impairment/Limitations corrective lenses full time  -LW      Recorded by [LW] ERIK Evans/L 06/06/18 1412      Row Name                Wound 04/24/18 1440 Left lower leg    Wound - Properties Group Date first assessed: 04/24/18 [DL] Time first assessed: 1440 [DL2] Present On Admission : picture taken [DL] Side: Left [DL] Orientation: lower [DL] Location: leg [DL] Recorded by:  [DL] Soco Montes RN 04/24/18 1813 [DL2] Soco Montes RN 04/24/18 1814    Row Name                Wound 06/01/18 0800 Right lower leg ulceration, venous    Wound - Properties Group Date first assessed: 06/01/18 [CC] Time first assessed: 0800 [CC]  Present On Admission : yes [CC] Side: Right [CC] Orientation: lower [CC] Location: leg [CC] Type: ulceration, venous [CC] Recorded by:  [CC] Jeanne Coley RN 06/03/18 1829    Row Name 06/06/18 1010             Coping    Observed Emotional State cooperative;calm  -LW      Verbalized Emotional State acceptance  -LW      Recorded by [LW] Deisy Sullivan VALENCIA/L 06/06/18 1412      Row Name 06/06/18 1010             Plan of Care Review    Plan of Care Reviewed With patient  -LW      Recorded by [LW] Deisy Sullivan VALENCIA/L 06/06/18 1412      Row Name 06/06/18 1010             Outcome Summary/Treatment Plan (OT)    Daily Summary of Progress (OT) progress toward functional goals is good  -LW      Plan for Continued Treatment (OT) Continue POC  -LW      Anticipated Discharge Disposition (OT) home with home health  -LW      Recorded by [LW] Deisy Sullivan VALENCIA/L 06/06/18 1412        User Key  (r) = Recorded By, (t) = Taken By, (c) = Cosigned By    Initials Name Effective Dates Discipline    DL Soco Montes RN 10/17/16 -  Nurse    LW Deisy Sullivan, VALENCIA/L 03/07/18 -  OT    CC Jeanne Coley RN 10/17/16 -  Nurse        Wound 04/24/18 1440 Left lower leg (Active)   Dressing Appearance open to air 6/6/2018  8:00 AM   Base closed/resurfaced 6/6/2018  8:00 AM   Drainage Amount none 6/6/2018  8:00 AM   Care, Wound barrier applied 6/6/2018  8:00 AM   Dressing Care, Wound open to air 6/5/2018 10:59 PM       Wound 06/01/18 0800 Right lower leg ulceration, venous (Active)   Dressing Appearance open to air 6/6/2018  8:00 AM   Base closed/resurfaced 6/6/2018  8:00 AM   Drainage Amount none 6/6/2018  8:00 AM   Care, Wound barrier applied 6/6/2018  8:00 AM   Dressing Care, Wound open to air 6/5/2018 10:59 PM     Non-skid socks and gait belt in place. Toileting offered. Call light and needs within reach. Pt advised to not get up alone and call the nurse for assistance.  Bed alarm on.           OT Rehab Goals     Row Name  06/06/18 1010             Bathing Goal 1 (OT)    Activity/Assistive Device (Bathing Goal 1, OT) bathing skills, all   AE PRN  -LW      Circle Level/Cues Needed (Bathing Goal 1, OT) supervision required  -LW      Time Frame (Bathing Goal 1, OT) long term goal (LTG);by discharge  -LW      Progress/Outcomes (Bathing Goal 1, OT) goal not met;continuing progress toward goal  -LW         Dressing Goal 1 (OT)    Activity/Assistive Device (Dressing Goal 1, OT) dressing skills, all   AE PRN  -LW      Circle/Cues Needed (Dressing Goal 1, OT) supervision required  -LW      Time Frame (Dressing Goal 1, OT) long term goal (LTG);by discharge  -LW      Progress/Outcome (Dressing Goal 1, OT) goal not met;continuing progress toward goal  -LW         Patient Education Goal (OT)    Activity (Patient Education Goal, OT) AE management, home safety, BUE HEP, EC/WS  -LW      Circle/Cues/Accuracy (Memory Goal 2, OT) demonstrates adequately;independent;verbalizes understanding  -LW      Time Frame (Patient Education Goal, OT) long term goal (LTG);by discharge  -LW      Progress/Outcome (Patient Education Goal, OT) goal not met;continuing progress toward goal  -LW        User Key  (r) = Recorded By, (t) = Taken By, (c) = Cosigned By    Initials Name Provider Type Discipline    LW ERIK Evans/L Occupational Therapy Assistant OT        Occupational Therapy Education     Title: PT OT SLP Therapies (Active)     Topic: Occupational Therapy (Done)     Point: ADL training (Done)     Description: Instruct learner(s) on proper safety adaptation and remediation techniques during self care or transfers.   Instruct in proper use of assistive devices.   Learning Progress Summary     Learner Status Readiness Method Response Comment Documented by    Patient Done Acceptance ANGELES SCHOFIELD  LW 06/06/18 1414     Done Acceptance E,TB,H LEEANN Educated pt on HEP and Home safety LW 05/31/18 1321     Done Acceptance E LEEANN Educated on role of OT  and POC. Educated on benefit of activity and safety with t/f and functional mobility in room. Educated to call for help and not to get OOB on his own. MR 05/28/18 1142          Point: Home exercise program (Done)     Description: Instruct learner(s) on appropriate technique for monitoring, assisting and/or progressing therapeutic exercises/activities.   Learning Progress Summary     Learner Status Readiness Method Response Comment Documented by    Patient Done Acceptance E,TB VU  LW 06/06/18 1414     Done Acceptance E,TBH VU Educated pt on HEP and Home safety LW 05/31/18 1321          Point: Precautions (Done)     Description: Instruct learner(s) on prescribed precautions during self-care and functional transfers.   Learning Progress Summary     Learner Status Readiness Method Response Comment Documented by    Patient Done Acceptance E,TB VU  LW 06/06/18 1414     Done Acceptance E,ANGELESH VU Educated pt on HEP and Home safety LW 05/31/18 1321     Active Acceptance E NR   05/30/18 1435     Active Acceptance E NR   05/29/18 0927     Done Acceptance E VU Educated on role of OT and POC. Educated on benefit of activity and safety with t/f and functional mobility in room. Educated to call for help and not to get OOB on his own. MR 05/28/18 1142          Point: Body mechanics (Done)     Description: Instruct learner(s) on proper positioning and spine alignment during self-care, functional mobility activities and/or exercises.   Learning Progress Summary     Learner Status Readiness Method Response Comment Documented by    Patient Done Acceptance E,TB VU  LW 06/06/18 1414     Active Acceptance E NR   06/01/18 1130     Done Acceptance E,ANGELESH VU Educated pt on HEP and Home safety LW 05/31/18 1321     Active Acceptance E NR   05/30/18 1435     Active Acceptance E NR   05/29/18 0927     Done Acceptance E VU Educated on role of OT and POC. Educated on benefit of activity and safety with t/f and functional mobility in  room. Educated to call for help and not to get OOB on his own. MR 05/28/18 1142                      User Key     Initials Effective Dates Name Provider Type Discipline    BB 03/07/18 -  ERIK More/L Occupational Therapy Assistant OT    RC 03/07/18 -  ERIK Forbes/L Occupational Therapy Assistant OT    LW 03/07/18 -  ERIK Evans/L Occupational Therapy Assistant OT    MR 04/03/18 -  Katie Silvestre, OT Occupational Therapist OT                OT Recommendation and Plan  Outcome Summary/Treatment Plan (OT)  Daily Summary of Progress (OT): progress toward functional goals is good  Plan for Continued Treatment (OT): Continue POC  Anticipated Discharge Disposition (OT): home with home health  Therapy Frequency (OT Eval): other (see comments) (3/14 times per day.)  Daily Summary of Progress (OT): progress toward functional goals is good  Plan of Care Review  Plan of Care Reviewed With: patient  Plan of Care Reviewed With: patient  Outcome Summary: Pt supine in bed. Pt agreed to work on UB Strengthening. Pt already bathed this morning. Pt could benefit from HHOT upon D/C        Outcome Measures     Row Name 06/06/18 1010 06/04/18 0838          How much help from another is currently needed...    Putting on and taking off regular lower body clothing? 2  -LW 2  -KD     Bathing (including washing, rinsing, and drying) 2  -LW 2  -KD     Toileting (which includes using toilet bed pan or urinal) 2  -LW 2  -KD     Putting on and taking off regular upper body clothing 3  -LW 3  -KD     Taking care of personal grooming (such as brushing teeth) 3  -LW 3  -KD     Eating meals 4  -LW 4  -KD     Score 16  -LW 16  -KD       User Key  (r) = Recorded By, (t) = Taken By, (c) = Cosigned By    Initials Name Provider Type    KD ERIK Zee/L Occupational Therapy Assistant    LW ERIK Evans/L Occupational Therapy Assistant           Time Calculation:         Time Calculation- OT     Row  Name 06/06/18 1416             Time Calculation- OT    OT Start Time 1010  -LW      OT Stop Time 1040  -LW      OT Time Calculation (min) 30 min  -LW      Total Timed Code Minutes- OT 30 minute(s)  -LW      OT Received On 06/06/18  -LW        User Key  (r) = Recorded By, (t) = Taken By, (c) = Cosigned By    Initials Name Provider Type    LW VIANEY Evans Occupational Therapy Assistant           Therapy Charges for Today     Code Description Service Date Service Provider Modifiers Qty    [id number] HC OT THER PROC EA 15 MIN 6/6/2018 VIANEY Evans GO 2          OT G-codes  OT Professional Judgement Used?: Yes  OT Functional Scales Options: AM-PAC 6 Clicks Daily Activity (OT)  Score: 16  Functional Limitation: Self care  Self Care Current Status (): At least 40 percent but less than 60 percent impaired, limited or restricted  Self Care Goal Status (): At least 20 percent but less than 40 percent impaired, limited or restricted    VIANEY Evans  6/6/2018

## 2018-06-07 NOTE — THERAPY TREATMENT NOTE
Acute Care - Physical Therapy Treatment Note  [facility]     Patient Name: Ravi Park  : 1930  MRN: [id number]  Today's Date: 2018  Onset of Illness/Injury or Date of Surgery: 18  Date of Referral to PT: 18  Referring Physician: JUSTIN Russo    Admit Date: 2018    Visit Dx:    ICD-10-CM ICD-9-CM   1. Urinary tract infection associated with indwelling urethral catheter, initial encounter T83.511A 996.64    N39.0 599.0   2. Hypoxia R09.02 799.02   3. Chronic atrial fibrillation I48.2 427.31   4. Impaired functional mobility and endurance Z74.09 V49.89   5. Nephrolithiasis N20.0 592.0   6. Impaired mobility and activities of daily living Z74.09 799.89     Patient Active Problem List   Diagnosis   • Peripheral arterial disease   • Chronic atrial fibrillation   • Backache   • Lumbar radiculopathy   • Edema   • Acute congestive heart failure   • Acute blood loss anemia   • Acute on chronic diastolic heart failure   • YESENIA (acute kidney injury)   • (HFpEF) heart failure with preserved ejection fraction   • Urinary retention   • Personal history of tobacco use, presenting hazards to health   • Vitreous floaters   • Upper respiratory infection   • Sprain of shoulder and upper arm   • Shoulder pain   • Serous otitis media   • Sensorineural hearing loss   • Pure hypercholesterolemia   • Pseudophakia   • Prostatitis   • Pain in wrist   • Pain in thumb joint with movement   • Pain in joint involving right lower leg   • Pain in eye   • Otorrhea   • Open wound of toe   • Open wound of lower leg with complication   • Open wound of lower leg   • Open wound of lesser toe of left foot without damage to nail   • Olecranon bursitis   • Need for prophylactic vaccination and inoculation against influenza   • Need for immunization against influenza   • Multiple joint pain   • Malaise and fatigue   • Keratoconjunctivitis sicca   • Insect bite   • Impacted cerumen   • Hypertensive disorder   • History of  colonoscopy   • History of colon polyps   • History of artificial eye lens   • Herpes zoster   • Gout   • Exposure to communicable disease   • Dog bite of hand   • Disorder of sacroiliac joint   • Decreased platelet count   • Cough   • Contusion of thumb   • Common cold   • Cervical arthritis   • Cerebrovascular accident   • Cellulitis of lower leg   • Cataract   • Benign prostatic hyperplasia   • Atrial fibrillation   • Astigmatism   • Allergic rhinitis due to pollen   • Allergic rhinitis   • Adverse reaction to drug   • Acute confusion   • Acute bronchitis   • Abnormal gait   • Urinary tract infection associated with indwelling urethral catheter   • Hydronephrosis   • Nephrolithiasis       Therapy Treatment          Rehabilitation Treatment Summary     Row Name 06/07/18 1400 06/07/18 1335 06/07/18 0720       Treatment Time/Intention    Discipline physical therapy assistant  -TW occupational therapy assistant  -BB occupational therapy assistant  -BB    Document Type therapy note (daily note)  -TW therapy note (daily note)  -BB therapy note (daily note)  -BB    Subjective Information no complaints  -TW no complaints  -BB no complaints  -BB    Mode of Treatment physical therapy;individual therapy  -TW individual therapy;occupational therapy  -BB individual therapy;occupational therapy  -BB    Patient/Family Observations Pt was sitting EOB attempting to make a phone call.  -TW  -- sitting EOB  -BB    Total Minutes, Occupational Therapy Treatment  -- 25  -BB 70  -BB    Therapy Frequency (OT Eval)  -- other (see comments)   3-14 tx's/wk  -BB other (see comments)   3-14 txs/wk  -BB    Patient Effort good  -TW good  -BB good  -BB    Existing Precautions/Restrictions fall  -TW fall  -BB fall  -BB    Equipment Issued to Patient gait belt  -TW  --  --    Recorded by [TW] Hari Davis, JOSHUA 06/07/18 1531 [BB] VIANEY More 06/07/18 1456 [BB] ERIK More/L 06/07/18 1019    Row Name 06/07/18 1400  06/07/18 1335 06/07/18 0720       Vital Signs    Pre Systolic BP Rehab  --  -- 120  -BB    Pre Treatment Diastolic BP  --  -- 72  -BB    Pretreatment Heart Rate (beats/min) 88  -TW 86  -BB 83  -BB    Posttreatment Heart Rate (beats/min)  -- 80  -BB 86  -BB    Pre SpO2 (%) 95  -TW 94  -BB 96  -BB    O2 Delivery Pre Treatment room air  -TW room air  -BB supplemental O2  -BB    Post SpO2 (%)  -- 93  -BB 94  -BB    O2 Delivery Post Treatment  -- room air  -BB supplemental O2  -BB    Pre Patient Position Sitting   EOB  -TW Supine  -BB Sitting  -BB    Intra Patient Position Standing  -TW  --  --    Post Patient Position Sitting  -TW Sitting  -BB Sitting  -BB    Recorded by [TW] Hari Davis, PTA 06/07/18 1531 [BB] Carla Lutz VALENCIA/L 06/07/18 1456 [BB] Carla Lutz VALENCIA/L 06/07/18 1019    Row Name 06/07/18 1400 06/07/18 1335 06/07/18 0720       Cognitive Assessment/Intervention- PT/OT    Affect/Mental Status (Cognitive) WFL  -TW  -- WFL  -BB    Orientation Status (Cognition) oriented x 3  -TW oriented x 4  -BB oriented x 4  -BB    Follows Commands (Cognition) WFL  -TW WNL  -BB WNL  -BB    Cognitive Function (Cognitive) WFL  -TW WNL  -BB WNL  -BB    Safety Deficit (Cognitive) mild deficit  -TW mild deficit  -BB mild deficit  -BB    Personal Safety Interventions fall prevention program maintained;gait belt;nonskid shoes/slippers when out of bed  -TW fall prevention program maintained;gait belt;nonskid shoes/slippers when out of bed;supervised activity  -BB fall prevention program maintained;gait belt;nonskid shoes/slippers when out of bed;muscle strengthening facilitated;supervised activity  -BB    Recorded by [TW] Hari Davis, PTA 06/07/18 1531 [BB] Carla Lutz VALENCIA/L 06/07/18 1456 [BB] Carla Lutz VALENCIA/L 06/07/18 1019    Row Name 06/07/18 1400 06/07/18 1335          Bed Mobility Assessment/Treatment    Supine-Sit [place] (Bed Mobility) not tested  -TW contact guard  -BB      Bed Mobility, Safety Issues  -- decreased use of arms for pushing/pulling;decreased use of legs for bridging/pushing  -BB     Assistive Device (Bed Mobility)  -- bed rails;head of bed elevated  -BB     Recorded by [TW] Hari Davis, PTA 06/07/18 1531 [BB] Carla Lutz VALENCIA/L 06/07/18 1456     Row Name 06/07/18 1400 06/07/18 0720          Transfer Assessment/Treatment    Transfer Assessment/Treatment bed-chair transfer;chair-bed transfer  -TW  --     Bed-Chair [place] (Transfers) minimum assist (75% patient effort)  -TW  --     Assistive Device (Bed-Chair Transfers) walker, front-wheeled  -TW  --     Sit-Stand [place] (Transfers) minimum assist (75% patient effort)  -TW minimum assist (75% patient effort)  -BB     Stand-Sit [place] (Transfers) minimum assist (75% patient effort)  -TW contact guard  -BB     Recorded by [TW] Hari Davis, PTA 06/07/18 1531 [BB] Carla Lutz VALENCIA/L 06/07/18 1019     Row Name 06/07/18 1400 06/07/18 0720          Sit-Stand Transfer    Assistive Device (Sit-Stand Transfers) walker, front-wheeled  -TW walker, front-wheeled  -BB     Recorded by [TW] Hari Davis, PTA 06/07/18 1531 [BB] Carla Lutz VALENCIA/L 06/07/18 1019     Row Name 06/07/18 1400 06/07/18 0720          Stand-Sit Transfer    Assistive Device (Stand-Sit Transfers) walker, front-wheeled  -TW walker, front-wheeled  -BB     Recorded by [TW] Hari Davis, PTA 06/07/18 1531 [BB] Carla Lutz VALENCIA/L 06/07/18 1019     Row Name 06/07/18 1400             Gait/Stairs Assessment/Training    Gait/Stairs Assessment/Training gait/ambulation assistive device  -TW      Wheeler Level (Gait) minimum assist (75% patient effort)  -TW      Assistive Device (Gait) walker, front-wheeled  -TW      Distance in Feet (Gait) 6ft to bedside chair.  -TW      Pattern (Gait) swing-through  -TW      Deviations/Abnormal Patterns (Gait) jarrett decreased;stride length decreased  -TW       Recorded by [TW] Hari Davis, PTA 06/07/18 1531      Row Name 06/07/18 0720             ADL Assessment/Intervention    Additional Documentation --   pt states he is too cold for a bath at this time  -BB      Recorded by [BB] CASSIE MoreA/L 06/07/18 1019      Row Name 06/07/18 1335 06/07/18 0720          Upper Body Dressing Assessment/Training    Upper Body Dressing [place] Level doff;don;contact guard assist   hospital gown  -BB don;contact guard assist   gown as robe  -BB     Upper Body Dressing Position edge of bed sitting  -BB edge of bed sitting  -BB     Comment (Upper Body Dressing) pt c/o hospital gown being sticky  -BB  --     Recorded by [BB] Carla Lutz VALENCIA/L 06/07/18 1456 [BB] Carla Lutz VALENCIA/L 06/07/18 1019     Row Name 06/07/18 1335 06/07/18 0720          Lower Body Dressing Assessment/Training    Lower Body Dressing [place] Level doff;socks;minimum assist (75% patient effort)  -BB doff;don;socks;minimum assist (75% patient effort)  -BB     Lower Body Dressing Position long sitting  -BB edge of bed sitting  -BB     Recorded by [BB] CASSIE MoreA/NICOLE 06/07/18 1456 [BB] Carla Lutz VALENCIA/L 06/07/18 1019     Row Name 06/07/18 1335 06/07/18 0720          Grooming Assessment/Training    [place] Level (Grooming) wash face, hands;set up;conditional independence  -BB hair care, combing/brushing;oral care regimen;wash face, hands;set up;supervision  -BB     Grooming Position edge of bed sitting  -BB edge of bed sitting  -BB     Recorded by [BB] Carla Lutz VALENCIA/L 06/07/18 1456 [BB] Carla Lutz VALENCIA/L 06/07/18 1019     Row Name 06/07/18 0720             Toileting Assessment/Training    [place] Level (Toileting) toileting skills;minimum assist (75% patient effort)  -BB      Toileting Position supported sitting  -BB      Recorded by [BB] CASSIE MoreA/L 06/07/18 1019      Row Name 06/07/18 0720             Upper  Extremity Seated Therapeutic Exercise    Performed, Seated Upper Extremity (Therapeutic Exercise) --   across chest, diagonals, pull ups, punch outs  -BB      Device, Seated Upper Extremity (Therapeutic Exercise) elastic bands/tubing  -BB      Exercise Type, Seated Upper Extremity (Therapeutic Exercise) AROM (active range of motion)  -BB      Expected Outcomes, Seated Upper Extremity (Therapeutic Exercise) improve functional tolerance, self-care activity;improve functional tolerance, household activity  -BB      Sets/Reps Detail, Seated Upper Extremity (Therapeutic Exercise) 1 set x15 reps  -BB      Comment, Seated Upper Extremity (Therapeutic Exercise) RBs as needed  -BB      Recorded by [BB] CASSIE MoreA/L 06/07/18 1019      Row Name 06/07/18 1335 06/07/18 0720          Therapeutic Exercise    Upper Extremity Range of Motion (Therapeutic Exercise) shoulder flexion/extension, bilateral   shoulder shrugs, shoulder ROM AAROM 2' to L UE limited ROM  -BB shoulder internal/external rotation, bilateral;elbow flexion/extension, bilateral;forearm supination/pronation, bilateral;wrist flexion/extension, bilateral;shoulder flexion/extension, right  -BB     Hand (Therapeutic Exercise)  -- finger flexion/extension, bilateral  -BB     Exercise Type (Therapeutic Exercise)  -- AROM (active range of motion)  -BB     Position (Therapeutic Exercise) seated  -BB seated  -BB     Sets/Reps (Therapeutic Exercise) 1 x10 reps  -BB 2 setsx20  -BB     Expected Outcome (Therapeutic Exercise) improve functional tolerance, self-care activity;improve functional tolerance, household activity  -BB2  --     Recorded by [BB] ERIK More/L 06/07/18 1456  [BB2] ERIK More/NICOLE 06/07/18 1459 [BB] ERIK More/L 06/07/18 1019     Row Name 06/07/18 1335             Static Sitting Balance    Level of [place] (Unsupported Sitting, Static Balance) independent  -BB      Sitting Position (Unsupported  Sitting, Static Balance) sitting on edge of bed  -BB      Time Able to Maintain Position (Unsupported Sitting, Static Balance) more than 5 minutes  -BB      Recorded by [BB] ERIK More/L 06/07/18 1456      Row Name 06/07/18 1400 06/07/18 1335 06/07/18 0720       Positioning and Restraints    Pre-Treatment Position in bed  -TW in bed  -BB in bed  -BB    Post Treatment Position chair  -TW bed  -BB bed  -BB    In Bed  -- sitting EOB;call light within reach;encouraged to call for assist;exit alarm on  -BB sitting EOB;encouraged to call for assist;exit alarm on;call light within reach  -BB    In Chair reclined;call light within reach;encouraged to call for assist;exit alarm on;with nsg  -TW  --  --    Recorded by [TW] Hari Davis, PTA 06/07/18 1531 [BB] ERIK More/NICOLE 06/07/18 1456 [BB] ERIK More/L 06/07/18 1019    Row Name 06/07/18 1335             Pain Assessment    Additional Documentation Pain Scale: Numbers Pre/Post-Treatment (Group)  -BB      Recorded by [BB] ERIK More/L 06/07/18 1456      Row Name 06/07/18 1400 06/07/18 1335 06/07/18 0720       Pain Scale: Numbers Pre/Post-Treatment    Pain Scale: Numbers, Pretreatment 7/10  -TW 6/10  -BB 6/10  -BB    Pain Scale: Numbers, Post-Treatment 7/10  -TW 7/10  -BB 5/10  -BB    Pain Location - Side  --  -- Left  -BB    Pain Location - Orientation lower  -TW  --  --    Pain Location back  -TW back  -BB ankle  -BB    Pain Intervention(s)  --  -- --   nsg notified  -BB    Recorded by [TW] Hari Davis, PTA 06/07/18 1531 [BB] CASSIE MoreA/NICOLE 06/07/18 1456 [BB] CASSIE MoreA/L 06/07/18 1019    Row Name                Wound 04/24/18 1440 Left lower leg    Wound - Properties Group Date first assessed: 04/24/18 [DL] Time first assessed: 1440 [DL2] Present On Admission : picture taken [DL] Side: Left [DL] Orientation: lower [DL] Location: leg [DL] Recorded by:  [DL] Soco Montes RN  04/24/18 1813 [DL2] Soco Montes RN 04/24/18 1814    Row Name                Wound 06/01/18 0800 Right lower leg ulceration, venous    Wound - Properties Group Date first assessed: 06/01/18 [CC] Time first assessed: 0800 [CC] Present On Admission : yes [CC] Side: Right [CC] Orientation: lower [CC] Location: leg [CC] Type: ulceration, venous [CC] Recorded by:  [CC] Jeanne Coley RN 06/03/18 1829    Row Name 06/07/18 1335 06/07/18 0720          Plan of Care Review    Plan of Care Reviewed With patient  -BB patient  -BB     Recorded by [BB] ERIK More/NICOLE 06/07/18 1456 [BB] CASSIE MoreA/L 06/07/18 1019     Row Name 06/07/18 1335 06/07/18 0720          Outcome Summary/Treatment Plan (OT)    Daily Summary of Progress (OT) progress toward functional goals is good  -BB progress toward functional goals is good  -BB     Plan for Continued Treatment (OT) continue POC  -BB continue POC  -BB     Anticipated Discharge Disposition (OT) long term acute care facility  -BB home with assist;home with home health  -BB     Recorded by [BB] ERIK More/NICOLE 06/07/18 1456 [BB] CASSIE MoreA/L 06/07/18 1019     Row Name 06/07/18 1400             Outcome Summary/Treatment Plan (PT)    Daily Summary of Progress (PT) progress towards functional goals is fair  -TW      Barriers to Overall Progress (PT) Weakness and low endurance.  -TW      Plan for Continued Treatment (PT) Cont  -TW      Recorded by [TW] Hari Davis, PTA 06/07/18 1531        User Key  (r) = Recorded By, (t) = Taken By, (c) = Cosigned By    Initials Name Effective Dates Discipline    DL Soco Montes RN 10/17/16 -  Nurse    TW Hari Davis, PTA 03/07/18 -  PT    BB ERIK More/L 03/07/18 -  OT    CC Jeanne Coley RN 10/17/16 -  Nurse          Wound 04/24/18 1440 Left lower leg (Active)   Dressing Appearance open to air 6/7/2018  7:06 AM   Base closed/resurfaced 6/7/2018  7:06 AM   Drainage  Amount none 6/7/2018  7:06 AM   Care, Wound barrier applied 6/7/2018  7:06 AM   Dressing Care, Wound open to air 6/7/2018  7:06 AM       Wound 06/01/18 0800 Right lower leg ulceration, venous (Active)   Dressing Appearance open to air 6/7/2018  7:06 AM   Base closed/resurfaced 6/7/2018  7:06 AM   Drainage Amount none 6/7/2018  7:06 AM   Care, Wound barrier applied 6/7/2018  7:06 AM   Dressing Care, Wound open to air 6/7/2018  7:06 AM               PT Rehab Goals     Row Name 06/07/18 1400             Transfer Goal 1 (PT)    Activity/Assistive Device (Transfer Goal 1, PT) sit-to-stand/stand-to-sit;bed-to-chair/chair-to-bed  -TW      Harmony Level/Cues Needed (Transfer Goal 1, PT) supervision required  -TW      Time Frame (Transfer Goal 1, PT) 1 week  -TW      Progress/Outcome (Transfer Goal 1, PT) goal met  -TW         Gait Training Goal 1 (PT)    Harmony Level (Gait Training Goal 1, PT) supervision required  -TW      Distance (Gait Goal 1, PT) 50 ft with supplemental O2 with O2 sats > 90%  -TW      Time Frame (Gait Training Goal 1, PT) 1 week  -TW      Barriers (Gait Training Goal 1, PT) dyspnea, pain  -TW      Progress/Outcome (Gait Training Goal 1, PT) continuing progress toward goal  -TW        User Key  (r) = Recorded By, (t) = Taken By, (c) = Cosigned By    Initials Name Provider Type Discipline     Hari Davis PTA Physical Therapy Assistant PT          Physical Therapy Education     Title: PT OT SLP Therapies (Resolved)     Topic: Physical Therapy (Resolved)     Point: Mobility training (Resolved)    Learning Progress Summary     Learner Status Readiness Method Response Comment Documented by    Patient Active Acceptance E NR requires cues for positioning self with transfers and pacing self with endurance status.  05/26/18 1410                      User Key     Initials Effective Dates Name Provider Type Discipline     04/08/18 -  Mikael Reed, PT Physical Therapist PT                     PT Recommendation and Plan     Outcome Summary/Treatment Plan (PT)  Daily Summary of Progress (PT): progress towards functional goals is fair  Barriers to Overall Progress (PT): Weakness and low endurance.  Plan for Continued Treatment (PT): Cont  Plan of Care Reviewed With: patient  Progress: improving  Outcome Summary: Pt more alert and willing to work with PTA but focused on making a phone call so pt declined to amb any distance this date. Pt able to sit EOB and t/f to recliner with min A of 1 this pm.          Outcome Measures     Row Name 06/07/18 1400 06/07/18 0720 06/06/18 1010       How much help from another person do you currently need...    Turning from your back to your side while in flat bed without using bedrails? 4  -TW  --  --    Moving from lying on back to sitting on the side of a flat bed without bedrails? 4  -TW  --  --    Moving to and from a bed to a chair (including a wheelchair)? 4  -TW  --  --    Standing up from a chair using your arms (e.g., wheelchair, bedside chair)? 4  -TW  --  --    Climbing 3-5 steps with a railing? 3  -TW  --  --    To walk in hospital room? 3  -TW  --  --    AM-PAC 6 Clicks Score 22  -TW  --  --       How much help from another is currently needed...    Putting on and taking off regular lower body clothing?  -- 2  -BB 2  -LW    Bathing (including washing, rinsing, and drying)  -- 2  -BB 2  -LW    Toileting (which includes using toilet bed pan or urinal)  -- 2  -BB 2  -LW    Putting on and taking off regular upper body clothing  -- 3  -BB 3  -LW    Taking care of personal grooming (such as brushing teeth)  -- 3  -BB 3  -LW    Eating meals  -- 4  -BB 4  -LW    Score  -- 16  -BB 16  -LW       Functional Assessment    Outcome Measure Options AM-PAC 6 Clicks Basic Mobility (PT)  -TW  --  --      User Key  (r) = Recorded By, (t) = Taken By, (c) = Cosigned By    Initials Name Provider Type    TW Hari Davis PTA Physical Therapy Assistant    ROHAN MASTERSON  ERIK Lutz/L Occupational Therapy Assistant    ERIK Purvis/L Occupational Therapy Assistant           Time Calculation:         PT Charges     Row Name 06/07/18 1533             Time Calculation    Start Time 1400  -TW      Stop Time 1425  -TW      Time Calculation (min) 25 min  -TW      PT Received On 06/07/18  -TW         Time Calculation- PT    Total Timed Code Minutes- PT 25 minute(s)  -TW        User Key  (r) = Recorded By, (t) = Taken By, (c) = Cosigned By    Initials Name Provider Type     Hari Davis PTA Physical Therapy Assistant          Therapy Charges for Today     Code Description Service Date Service Provider Modifiers Qty    52168462019 HC PT THER SUPP EA 15 MIN 6/6/2018 Hari Davis PTA GP 1    34794205166 HC PT THER SUPP EA 15 MIN 6/7/2018 Hari Davis PTA GP 1    19564142880 HC PT THERAPEUTIC ACT EA 15 MIN 6/7/2018 Hari Davis PTA GP 2          PT G-Codes  PT Professional Judgement Used?: Yes  Outcome Measure Options: AM-PAC 6 Clicks Basic Mobility (PT)  Score: 16  Functional Limitation: Mobility: Walking and moving around  Mobility: Walking and Moving Around Current Status (): At least 40 percent but less than 60 percent impaired, limited or restricted  Mobility: Walking and Moving Around Goal Status (): At least 20 percent but less than 40 percent impaired, limited or restricted    Hari Davis PTA  6/7/2018

## 2018-06-07 NOTE — THERAPY TREATMENT NOTE
Acute Care - Occupational Therapy Treatment Note  [facility]     Patient Name: Ravi Park  : 1930  MRN: [id number]  Today's Date: 2018  Onset of Illness/Injury or Date of Surgery: 18  Date of Referral to OT: 18  Referring Physician: JUSTIN Russo    Admit Date: 2018       ICD-10-CM ICD-9-CM   1. Urinary tract infection associated with indwelling urethral catheter, initial encounter T83.511A 996.64    N39.0 599.0   2. Hypoxia R09.02 799.02   3. Chronic atrial fibrillation I48.2 427.31   4. Impaired functional mobility and endurance Z74.09 V49.89   5. Nephrolithiasis N20.0 592.0   6. Impaired mobility and activities of daily living Z74.09 799.89     Patient Active Problem List   Diagnosis   • Peripheral arterial disease   • Chronic atrial fibrillation   • Backache   • Lumbar radiculopathy   • Edema   • Acute congestive heart failure   • Acute blood loss anemia   • Acute on chronic diastolic heart failure   • YESENIA (acute kidney injury)   • (HFpEF) heart failure with preserved ejection fraction   • Urinary retention   • Personal history of tobacco use, presenting hazards to health   • Vitreous floaters   • Upper respiratory infection   • Sprain of shoulder and upper arm   • Shoulder pain   • Serous otitis media   • Sensorineural hearing loss   • Pure hypercholesterolemia   • Pseudophakia   • Prostatitis   • Pain in wrist   • Pain in thumb joint with movement   • Pain in joint involving right lower leg   • Pain in eye   • Otorrhea   • Open wound of toe   • Open wound of lower leg with complication   • Open wound of lower leg   • Open wound of lesser toe of left foot without damage to nail   • Olecranon bursitis   • Need for prophylactic vaccination and inoculation against influenza   • Need for immunization against influenza   • Multiple joint pain   • Malaise and fatigue   • Keratoconjunctivitis sicca   • Insect bite   • Impacted cerumen   • Hypertensive disorder   • History of  colonoscopy   • History of colon polyps   • History of artificial eye lens   • Herpes zoster   • Gout   • Exposure to communicable disease   • Dog bite of hand   • Disorder of sacroiliac joint   • Decreased platelet count   • Cough   • Contusion of thumb   • Common cold   • Cervical arthritis   • Cerebrovascular accident   • Cellulitis of lower leg   • Cataract   • Benign prostatic hyperplasia   • Atrial fibrillation   • Astigmatism   • Allergic rhinitis due to pollen   • Allergic rhinitis   • Adverse reaction to drug   • Acute confusion   • Acute bronchitis   • Abnormal gait   • Urinary tract infection associated with indwelling urethral catheter   • Hydronephrosis   • Nephrolithiasis     Past Medical History:   Diagnosis Date   • Allergic rhinitis    • Benign prostatic hyperplasia    • Cerebrovascular accident    • Chronic anemia    • Chronic atrial fibrillation    • CKD (chronic kidney disease) stage 3, GFR 30-59 ml/min    • Elevated cholesterol    • GERD (gastroesophageal reflux disease)    • Gout    • Herpes zoster    • History of transfusion    • Hypercholesterolemia    • Hypertension    • Peripheral arterial disease    • Sensorineural hearing loss      Past Surgical History:   Procedure Laterality Date   • CARDIAC CATHETERIZATION     • COLONOSCOPY     • CYSTOSCOPY, URETEROSCOPY, RETROGRADE PYELOGRAM, STENT INSERTION Left 5/27/2018    Procedure: CYSTOSCOPY, URETEROSCOPY, RETROGRADE PYELOGRAM;  Surgeon: Stephen Serrano MD;  Location: [facility];  Service: Urology   • HERNIA REPAIR         Therapy Treatment          Rehabilitation Treatment Summary     Row Name 06/07/18 0720             Treatment Time/Intention    Discipline occupational therapy assistant  -BB      Document Type therapy note (daily note)  -BB      Subjective Information no complaints  -BB      Mode of Treatment individual therapy;occupational therapy  -BB      Patient/Family Observations sitting EOB  -BB      Total Minutes, Occupational Therapy  Treatment 70  -BB      Therapy Frequency (OT Eval) other (see comments)   3-14 txs/wk  -BB      Patient Effort good  -BB      Existing Precautions/Restrictions fall  -BB      Recorded by [BB] CASSIE MoreA/L 06/07/18 1019      Row Name 06/07/18 0720             Vital Signs    Pre Systolic BP Rehab 120  -BB      Pre Treatment Diastolic BP 72  -BB      Pretreatment Heart Rate (beats/min) 83  -BB      Posttreatment Heart Rate (beats/min) 86  -BB      Pre SpO2 (%) 96  -BB      O2 Delivery Pre Treatment supplemental O2  -BB      Post SpO2 (%) 94  -BB      O2 Delivery Post Treatment supplemental O2  -BB      Pre Patient Position Sitting  -BB      Post Patient Position Sitting  -BB      Recorded by [BB] ERIK More/L 06/07/18 1019      Row Name 06/07/18 0720             Cognitive Assessment/Intervention- PT/OT    Affect/Mental Status (Cognitive) WFL  -BB      Orientation Status (Cognition) oriented x 4  -BB      Follows Commands (Cognition) WNL  -BB      Cognitive Function (Cognitive) WNL  -BB      Safety Deficit (Cognitive) mild deficit  -BB      Personal Safety Interventions fall prevention program maintained;gait belt;nonskid shoes/slippers when out of bed;muscle strengthening facilitated;supervised activity  -BB      Recorded by [BB] ERIK More/L 06/07/18 1019      Row Name 06/07/18 0720             Transfer Assessment/Treatment    Sit-Stand [place] (Transfers) minimum assist (75% patient effort)  -BB      Stand-Sit [place] (Transfers) contact guard  -BB      Recorded by [BB] ERIK More/L 06/07/18 1019      Row Name 06/07/18 0720             Sit-Stand Transfer    Assistive Device (Sit-Stand Transfers) walker, front-wheeled  -BB      Recorded by [BB] ERIK More/L 06/07/18 1019      Row Name 06/07/18 0720             Stand-Sit Transfer    Assistive Device (Stand-Sit Transfers) walker, front-wheeled  -BB      Recorded by [BB] Carla Lutz,  VALENCIA/L 06/07/18 1019      Row Name 06/07/18 0720             ADL Assessment/Intervention    Additional Documentation --   pt states he is too cold for a bath at this time  -BB      Recorded by [BB] Carla Lutz ERIK/L 06/07/18 1019      Row Name 06/07/18 0720             Upper Body Dressing Assessment/Training    Upper Body Dressing [place] Level don;contact guard assist   gown as robe  -BB      Upper Body Dressing Position edge of bed sitting  -BB      Recorded by [BB] Carla Lutz VALENCIA/L 06/07/18 1019      Row Name 06/07/18 0720             Lower Body Dressing Assessment/Training    Lower Body Dressing [place] Level doff;don;socks;minimum assist (75% patient effort)  -BB      Lower Body Dressing Position edge of bed sitting  -BB      Recorded by [BB] Carla Lutz VALENCIA/L 06/07/18 1019      Row Name 06/07/18 0720             Grooming Assessment/Training    [place] Level (Grooming) hair care, combing/brushing;oral care regimen;wash face, hands;set up;supervision  -BB      Grooming Position edge of bed sitting  -BB      Recorded by [BB] Carla Lutz VALENCIA/L 06/07/18 1019      Row Name 06/07/18 0720             Toileting Assessment/Training    [place] Level (Toileting) toileting skills;minimum assist (75% patient effort)  -BB      Toileting Position supported sitting  -BB      Recorded by [BB] Carla Lutz VALENCIA/L 06/07/18 1019      Row Name 06/07/18 0720             Upper Extremity Seated Therapeutic Exercise    Performed, Seated Upper Extremity (Therapeutic Exercise) --   across chest, diagonals, pull ups, punch outs  -BB      Device, Seated Upper Extremity (Therapeutic Exercise) elastic bands/tubing  -BB      Exercise Type, Seated Upper Extremity (Therapeutic Exercise) AROM (active range of motion)  -BB      Expected Outcomes, Seated Upper Extremity (Therapeutic Exercise) improve functional tolerance, self-care activity;improve functional tolerance, household  activity  -BB      Sets/Reps Detail, Seated Upper Extremity (Therapeutic Exercise) 1 set x15 reps  -BB      Comment, Seated Upper Extremity (Therapeutic Exercise) RBs as needed  -BB      Recorded by [BB] ERIK More/L 06/07/18 1019      Row Name 06/07/18 0720             Therapeutic Exercise    Upper Extremity Range of Motion (Therapeutic Exercise) shoulder internal/external rotation, bilateral;elbow flexion/extension, bilateral;forearm supination/pronation, bilateral;wrist flexion/extension, bilateral;shoulder flexion/extension, right  -BB      Hand (Therapeutic Exercise) finger flexion/extension, bilateral  -BB      Exercise Type (Therapeutic Exercise) AROM (active range of motion)  -BB      Position (Therapeutic Exercise) seated  -BB      Sets/Reps (Therapeutic Exercise) 2 setsx20  -BB      Recorded by [BB] VIANEY More 06/07/18 1019      Row Name 06/07/18 0720             Positioning and Restraints    Pre-Treatment Position in bed  -BB      Post Treatment Position bed  -BB      In Bed sitting EOB;encouraged to call for assist;exit alarm on;call light within reach  -BB      Recorded by [BB] VIANEY More 06/07/18 1019      Row Name 06/07/18 0720             Pain Scale: Numbers Pre/Post-Treatment    Pain Scale: Numbers, Pretreatment 6/10  -BB      Pain Scale: Numbers, Post-Treatment 5/10  -BB      Pain Location - Side Left  -BB      Pain Location ankle  -BB      Pain Intervention(s) --   nsg notified  -BB      Recorded by [BB] VIANEY More 06/07/18 1019      Row Name                Wound 04/24/18 1440 Left lower leg    Wound - Properties Group Date first assessed: 04/24/18 [DL] Time first assessed: 1440 [DL2] Present On Admission : picture taken [DL] Side: Left [DL] Orientation: lower [DL] Location: leg [DL] Recorded by:  [DL] Soco Montes RN 04/24/18 1813 [DL2] Soco Montes RN 04/24/18 1814    Row Name                Wound 06/01/18 0800 Right lower  leg ulceration, venous    Wound - Properties Group Date first assessed: 06/01/18 [CC] Time first assessed: 0800 [CC] Present On Admission : yes [CC] Side: Right [CC] Orientation: lower [CC] Location: leg [CC] Type: ulceration, venous [CC] Recorded by:  [CC] Jeanne Coley RN 06/03/18 1829    Row Name 06/07/18 0720             Plan of Care Review    Plan of Care Reviewed With patient  -BB      Recorded by [BB] Carla Lutz VALENCIA/L 06/07/18 1019      Row Name 06/07/18 0720             Outcome Summary/Treatment Plan (OT)    Daily Summary of Progress (OT) progress toward functional goals is good  -BB      Plan for Continued Treatment (OT) continue POC  -BB      Anticipated Discharge Disposition (OT) home with assist;home with home health  -BB      Recorded by [BB] CASSIE MoreA/L 06/07/18 1019        User Key  (r) = Recorded By, (t) = Taken By, (c) = Cosigned By    Initials Name Effective Dates Discipline    DL Soco Montes RN 10/17/16 -  Nurse    BB Carla Lutz VALENCIA/L 03/07/18 -  OT    CC Jeanne Coley RN 10/17/16 -  Nurse        Wound 04/24/18 1440 Left lower leg (Active)   Dressing Appearance open to air 6/7/2018  7:06 AM   Base closed/resurfaced 6/7/2018  7:06 AM   Drainage Amount none 6/7/2018  7:06 AM   Care, Wound barrier applied 6/7/2018  7:06 AM   Dressing Care, Wound open to air 6/7/2018  7:06 AM       Wound 06/01/18 0800 Right lower leg ulceration, venous (Active)   Dressing Appearance open to air 6/7/2018  7:06 AM   Base closed/resurfaced 6/7/2018  7:06 AM   Drainage Amount none 6/7/2018  7:06 AM   Care, Wound barrier applied 6/7/2018  7:06 AM   Dressing Care, Wound open to air 6/7/2018  7:06 AM             OT Rehab Goals     Row Name 06/07/18 0720             Bathing Goal 1 (OT)    Activity/Assistive Device (Bathing Goal 1, OT) bathing skills, all   AE PRN  -BB      [place] Level/Cues Needed (Bathing Goal 1, OT) supervision required  -BB      Time Frame (Bathing Goal  1, OT) long term goal (LTG);by discharge  -BB      Progress/Outcomes (Bathing Goal 1, OT) goal not met;continuing progress toward goal  -BB         Dressing Goal 1 (OT)    Activity/Assistive Device (Dressing Goal 1, OT) dressing skills, all   AE PRN  -BB      [place]/Cues Needed (Dressing Goal 1, OT) supervision required  -BB      Time Frame (Dressing Goal 1, OT) long term goal (LTG);by discharge  -BB      Progress/Outcome (Dressing Goal 1, OT) goal not met;continuing progress toward goal  -BB         Patient Education Goal (OT)    Activity (Patient Education Goal, OT) AE management, home safety, BUE HEP, EC/WS  -BB      [place]/Cues/Accuracy (Memory Goal 2, OT) demonstrates adequately;independent;verbalizes understanding  -BB      Time Frame (Patient Education Goal, OT) long term goal (LTG);by discharge  -BB      Progress/Outcome (Patient Education Goal, OT) goal not met;continuing progress toward goal  -BB        User Key  (r) = Recorded By, (t) = Taken By, (c) = Cosigned By    Initials Name Provider Type Discipline    ERIK Durham/L Occupational Therapy Assistant OT        Occupational Therapy Education     Title: PT OT SLP Therapies (Active)     Topic: Occupational Therapy (Active)     Point: ADL training (Active)     Description: Instruct learner(s) on proper safety adaptation and remediation techniques during self care or transfers.   Instruct in proper use of assistive devices.   Learning Progress Summary     Learner Status Readiness Method Response Comment Documented by    Patient Active Acceptance E NR  BB 06/07/18 1021     Done Acceptance E,TB VU  LW 06/06/18 1414     Done Acceptance E,TB,H VU Educated pt on HEP and Home safety LW 05/31/18 1321     Done Acceptance E VU Educated on role of OT and POC. Educated on benefit of activity and safety with t/f and functional mobility in room. Educated to call for help and not to get OOB on his own. MR 05/28/18 1142          Point: Home  exercise program (Active)     Description: Instruct learner(s) on appropriate technique for monitoring, assisting and/or progressing therapeutic exercises/activities.   Learning Progress Summary     Learner Status Readiness Method Response Comment Documented by    Patient Active Acceptance E NR  BB 06/07/18 1021     Done Acceptance E,TB VU   06/06/18 1414     Done Acceptance E,TB,H VU Educated pt on HEP and Home safety LW 05/31/18 1321          Point: Precautions (Done)     Description: Instruct learner(s) on prescribed precautions during self-care and functional transfers.   Learning Progress Summary     Learner Status Readiness Method Response Comment Documented by    Patient Done Acceptance E,TB VU  LW 06/06/18 1414     Done Acceptance E,TB,H VU Educated pt on HEP and Home safety LW 05/31/18 1321     Active Acceptance E NR   05/30/18 1435     Active Acceptance E NR   05/29/18 0927     Done Acceptance E VU Educated on role of OT and POC. Educated on benefit of activity and safety with t/f and functional mobility in room. Educated to call for help and not to get OOB on his own. MR 05/28/18 1142          Point: Body mechanics (Active)     Description: Instruct learner(s) on proper positioning and spine alignment during self-care, functional mobility activities and/or exercises.   Learning Progress Summary     Learner Status Readiness Method Response Comment Documented by    Patient Active Acceptance E NR  BB 06/07/18 1021     Done Acceptance ETB VU   06/06/18 1414     Active Acceptance E NR  BB 06/01/18 1130     Done Acceptance E,TB,H VU Educated pt on HEP and Home safety  05/31/18 1321     Active Acceptance E NR   05/30/18 1435     Active Acceptance E NR   05/29/18 0927     Done Acceptance E VU Educated on role of OT and POC. Educated on benefit of activity and safety with t/f and functional mobility in room. Educated to call for help and not to get OOB on his own. MR 05/28/18 1142                       User Key     Initials Effective Dates Name Provider Type Discipline    BB 03/07/18 -  ERIK More/L Occupational Therapy Assistant OT    RC 03/07/18 -  ERIK Forbes/L Occupational Therapy Assistant OT    LW 03/07/18 -  ERIK Evans/L Occupational Therapy Assistant OT    MR 04/03/18 -  Katie Silvestre, OT Occupational Therapist OT              Non-skid socks and gait belt in place. Toileting offered. Call light and needs within reach. Pt advised to not get up alone and call the nurse for assistance.  Bed alarm on.     OT Recommendation and Plan  Outcome Summary/Treatment Plan (OT)  Daily Summary of Progress (OT): progress toward functional goals is good  Plan for Continued Treatment (OT): continue POC  Anticipated Discharge Disposition (OT): home with assist, home with home health  Therapy Frequency (OT Eval): other (see comments) (3-14 txs/wk)  Daily Summary of Progress (OT): progress toward functional goals is good  Plan of Care Review  Plan of Care Reviewed With: patient  Plan of Care Reviewed With: patient  Outcome Summary: Pt sitting EOB during B UE exercises. Pt's O2 remained >90% during activity. Pt t/f bed to toilet with min A. No goals met this am.        Outcome Measures     Row Name 06/07/18 0720 06/06/18 1010          How much help from another is currently needed...    Putting on and taking off regular lower body clothing? 2  -BB 2  -LW     Bathing (including washing, rinsing, and drying) 2  -BB 2  -LW     Toileting (which includes using toilet bed pan or urinal) 2  -BB 2  -LW     Putting on and taking off regular upper body clothing 3  -BB 3  -LW     Taking care of personal grooming (such as brushing teeth) 3  -BB 3  -LW     Eating meals 4  -BB 4  -LW     Score 16  -BB 16  -LW       User Key  (r) = Recorded By, (t) = Taken By, (c) = Cosigned By    Initials Name Provider Type    BB ERIK More/L Occupational Therapy Assistant    LW ERIK Evans/NICOLE  Occupational Therapy Assistant           Time Calculation:         Time Calculation- OT     Row Name 06/07/18 1023             Time Calculation- OT    OT Start Time 0720  -BB      OT Stop Time 0830  -      OT Time Calculation (min) 70 min  -      Total Timed Code Minutes- OT 70 minute(s)  -      OT Received On 06/07/18  -        User Key  (r) = Recorded By, (t) = Taken By, (c) = Cosigned By    Initials Name Provider Type     ERIK More/L Occupational Therapy Assistant           Therapy Charges for Today     Code Description Service Date Service Provider Modifiers Qty    08856574378 HC OT SELF CARE/MGMT/TRAIN EA 15 MIN 6/7/2018 ERIK More/L GO 3    57324971352 HC OT THERAPEUTIC ACT EA 15 MIN 6/7/2018 ERIK More/L GO 2          OT G-codes  OT Professional Judgement Used?: Yes  OT Functional Scales Options: AM-PAC 6 Clicks Daily Activity (OT)  Score: 16  Functional Limitation: Self care  Self Care Current Status (): At least 40 percent but less than 60 percent impaired, limited or restricted  Self Care Goal Status (): At least 20 percent but less than 40 percent impaired, limited or restricted    ERIK More/NICOLE  6/7/2018

## 2018-06-07 NOTE — PROGRESS NOTES
"   LOS: 13 days   Patient Care Team:  Terrell Arzate MD as PCP - General  Terrell Arzate MD as PCP - Claims Attributed  Terrell Reyes DPM as Consulting Physician (Podiatry)  Bethany Loco RN as Care Coordinator (Population Health)    Subjective     Subjective:  Symptoms:  Stable.  He reports malaise.  (Urine yellow with some sediment in Dye and nephrostomy tube bags. Reports he feels better today, less fatigue, compared to the last few days, less pain in lower extremities. ).    Diet:  No nausea or vomiting.    Pain:  He complains of pain that is mild.  He reports pain is improving.  (BLE).       History taken from: patient chart    Objective     Vital Signs  Temp:  [97.4 °F (36.3 °C)-100.6 °F (38.1 °C)] 97.4 °F (36.3 °C)  Heart Rate:  [64-97] 97  Resp:  [18] 18  BP: (112-154)/(45-72) 120/72    Objective:  General Appearance:  In no acute distress.    Vital signs: (most recent): Blood pressure 120/72, pulse 97, temperature 97.4 °F (36.3 °C), temperature source Oral, resp. rate 18, height 182.9 cm (72\"), weight 85.1 kg (187 lb 9.6 oz), SpO2 95 %.  Vital signs are normal.  No fever.    Output: Producing urine (Dye, Left nephrostomy).    HEENT: Normal HEENT exam.    Lungs:  Normal effort and normal respiratory rate.  Breath sounds clear to auscultation.  He is not in respiratory distress.    Heart: Normal rate.  Irregular rhythm.    Chest: Symmetric chest wall expansion.   Abdomen: Abdomen is soft.  Bowel sounds are normal.   There is no abdominal tenderness.   (Phimosis).   Extremities: There is venous stasis and dependent edema.  There is no deformity.    Pulses: Distal pulses are intact.    Neurological: Patient is alert and oriented to person, place and time.  GCS score is 15.    Pupils:  Pupils are equal, round, and reactive to light.    Skin:  Warm, dry and pale.  There is ulceration (RLE wound, dressing).  No rash, ecchymosis or cyanosis.             Results Review:    Lab Results (last 24 hours)     " Procedure Component Value Units Date/Time    CBC & Differential [[id number]] Collected:  06/07/18 0538    Specimen:  Blood Updated:  06/07/18 0624    Narrative:       The following orders were created for panel order CBC & Differential.  Procedure                               Abnormality         Status                     ---------                               -----------         ------                     Scan Slide[[id number]]                                                                  CBC Auto Differential[[id number]]        Abnormal            Final result                 Please view results for these tests on the individual orders.    CBC Auto Differential [[id number]]  (Abnormal) Collected:  06/07/18 0538    Specimen:  Blood Updated:  06/07/18 0624     WBC 3.56 10*3/mm3      RBC 3.32 (L) 10*6/mm3      Hemoglobin 8.3 (L) g/dL      Hematocrit 26.7 (L) %      MCV 80.4 fL      MCH 25.0 (L) pg      MCHC 31.1 (L) g/dL      RDW 25.9 (H) %      RDW-SD 74.4 (H) fl      MPV 9.6 fL      Platelets 184 10*3/mm3      Neutrophil % 54.5 %      Lymphocyte % 20.5 %      Monocyte % 20.5 (H) %      Eosinophil % 3.9 %      Basophil % 0.3 %      Immature Grans % 0.3 %      Neutrophils, Absolute 1.94 (L) 10*3/mm3      Lymphocytes, Absolute 0.73 10*3/mm3      Monocytes, Absolute 0.73 10*3/mm3      Eosinophils, Absolute 0.14 10*3/mm3      Basophils, Absolute 0.01 10*3/mm3      Immature Grans, Absolute 0.01 10*3/mm3     Comprehensive Metabolic Panel [[id number]]  (Abnormal) Collected:  06/07/18 0538    Specimen:  Blood Updated:  06/07/18 0614     Glucose 88 mg/dL      BUN 26 (H) mg/dL      Creatinine 1.09 mg/dL      Sodium 140 mmol/L      Potassium 3.7 mmol/L      Chloride 108 mmol/L      CO2 24.0 mmol/L      Calcium 8.5 mg/dL      Total Protein 6.3 g/dL      Albumin 2.80 (L) g/dL      ALT (SGPT) 27 U/L      AST (SGOT) 35 U/L      Alkaline Phosphatase 112 U/L      Total Bilirubin 0.4 mg/dL      eGFR Non African Amer 64 mL/min/1.73   "    Globulin 3.5 gm/dL      A/G Ratio 0.8 (L) g/dL      BUN/Creatinine Ratio 23.9     Anion Gap 8.0 mmol/L     Narrative:       The MDRD GFR formula is only valid for adults with stable renal function between ages 18 and 70.    CBC & Differential [[id number]] Collected:  06/06/18 0552    Specimen:  Blood Updated:  06/06/18 0944    Narrative:       The following orders were created for panel order CBC & Differential.  Procedure                               Abnormality         Status                     ---------                               -----------         ------                     Scan Slide[[id number]]                                       Final result               CBC Auto Differential[[id number]]        Abnormal            Final result                 Please view results for these tests on the individual orders.    Scan Slide [[id number]] Collected:  06/06/18 0552    Specimen:  Blood Updated:  06/06/18 0944     Anisocytosis Slight/1+     Comment: Few ovalocytes        Hypochromia Slight/1+     WBC Morphology Normal     Platelet Estimate Adequate             I reviewed the patient's new clinical results.  I reviewed the patient's new imaging results and agree with the interpretation.  I reviewed the patient's other test results and agree with the interpretation      Assessment/Plan     Principal Problem:    Urinary tract infection associated with indwelling urethral catheter  Active Problems:    Urinary retention    Benign prostatic hyperplasia    Hydronephrosis    Nephrolithiasis      Assessment & Plan  #1. LEFT HYDRONEPHROSIS related to   #2. 1 cm proximal LEFT ureteric stone  --CT abdomen/pelvis 5/24/18 reads,\" Mild-to-moderate left hydronephrosis and proximal hydroureter to the level of an irregular 1 cm stone in the proximal left ureter versus two adjacent stones. 1.5 cm non-obstructing right distal ureteral stone  -Estimated Creatinine Clearance: 57.5 mL/min (by C-G formula based on SCr of 1.09 mg/dL). "   -POSTOP attempted CRULLS on 5/27/18, J-stents NOT placed. Findings: Inflamed trigone obscure ureters, fish hooking of bilateral ureters.   -Renal ultrasound 5/29/18 showed bilateral kidney moderate hydronephrosis and hydroureter secondary to bilateral ureter calculi. Bilateral kidney nonobstructive renal calculi.  -LEFT nephrostomy tube placement on 5/31/18, yellow urine with sediment  --Creatinine 1.63-->1.47-->1.25-->1.15-->1.02-->1.09     #3. 1.5 cm nonobstructing right distal ureteral stone on 5/24/18, chronic, present on previous imaging     #4. 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 Phimosis, physiologic  -due for follow up with Dr. Serrano to discuss circumcision     Plan: LEFT ESWL outpatient, Dye and left nephrostomy to remain. Continue Rocephin. On vancomycin for gram + coverage for lower extremity stasis wound.     JUSTIN Selby  06/07/18  8:53 AM

## 2018-06-07 NOTE — PLAN OF CARE
Problem: Patient Care Overview  Goal: Plan of Care Review  Outcome: Ongoing (interventions implemented as appropriate)   06/07/18 1021   Coping/Psychosocial   Plan of Care Reviewed With patient   Plan of Care Review   Progress no change   OTHER   Outcome Summary Pt sitting EOB during B UE exercises. Pt's O2 remained >90% during activity. Pt t/f bed to toilet with min A. No goals met this am.

## 2018-06-07 NOTE — PLAN OF CARE
Problem: Patient Care Overview  Goal: Plan of Care Review  Outcome: Ongoing (interventions implemented as appropriate)   06/07/18 0257   Coping/Psychosocial   Plan of Care Reviewed With patient   Plan of Care Review   Progress no change   OTHER   Outcome Summary Patient rested well. less complaints of pain during the night. vital signs maintained stable. will continue to monitor.,     Goal: Individualization and Mutuality  Outcome: Ongoing (interventions implemented as appropriate)    Goal: Discharge Needs Assessment  Outcome: Ongoing (interventions implemented as appropriate)      Problem: Fall Risk (Adult)  Goal: Absence of Fall  Outcome: Ongoing (interventions implemented as appropriate)      Problem: Pain, Acute (Adult)  Goal: Acceptable Pain Control/Comfort Level  Outcome: Ongoing (interventions implemented as appropriate)

## 2018-06-07 NOTE — DISCHARGE SUMMARY
[facility] Medicine Services  DISCHARGE SUMMARY       Date of Admission: 5/24/2018  Date of Discharge:  6/7/2018  Primary Care Physician: Terrell Arzate MD    Presenting Problem/History of Present Illness:  Hypoxia [R09.02]  Chronic atrial fibrillation [I48.2]  Urinary tract infection associated with indwelling urethral catheter, initial encounter [T83.511A, N39.0]  Urinary tract infection associated with indwelling urethral catheter, initial encounter [T83.511A, N39.0]  Urinary tract infection associated with indwelling urethral catheter, initial encounter [T83.511A, N39.0]     Final Discharge Diagnoses:  Hospital Problem List     * (Principal)Urinary tract infection associated with indwelling urethral catheter    Urinary retention (Chronic)    Benign prostatic hyperplasia (Chronic)    Hydronephrosis (Chronic)    Nephrolithiasis (Chronic)          Consults:   Consults     Date and Time Order Name Status Description    6/4/2018 1038 Inpatient Cardiothoracic Surgery Consult Completed     6/2/2018 0913 Inpatient Nephrology Consult Completed     5/25/2018 1430 Inpatient Urology Consult Completed     5/25/2018 0037 Hospitalist (on-call MD unless specified)      4/26/2018 1209 Inpatient Urology Consult Completed     4/23/2018 1358 Inpatient Nephrology Consult Completed     4/22/2018 1754 Inpatient Cardiology Consult Completed           Procedures Performed: Procedure(s):  CYSTOSCOPY, URETEROSCOPY, RETROGRADE PYELOGRAM                Pertinent Test Results:    Laboratory Data:     Results from last 7 days  Lab Units 06/07/18  0538 06/06/18  0552 06/05/18  0659   SODIUM mmol/L 140 139 140   POTASSIUM mmol/L 3.7 3.7 4.0   CHLORIDE mmol/L 108 106 107   CO2 mmol/L 24.0 25.0 22.0   BUN mg/dL 26* 23* 25*   CREATININE mg/dL 1.09 1.02 1.15   GLUCOSE mg/dL 88 101* 121*   CALCIUM mg/dL 8.5 8.6 8.5   BILIRUBIN mg/dL 0.4 1.0 0.8   ALK PHOS U/L 112 104 99   ALT (SGPT) U/L 27 23 17*   AST  (SGOT) U/L 35 25 17   ANION GAP mmol/L 8.0 8.0 11.0     Estimated Creatinine Clearance: 57.5 mL/min (by C-G formula based on SCr of 1.09 mg/dL).        Results from last 7 days  Lab Units 06/04/18  0633   URIC ACID mg/dL 6.1       Results from last 7 days  Lab Units 06/07/18  0538 06/06/18  0552 06/05/18  0659 06/04/18  0633 06/03/18  0607   WBC 10*3/mm3 3.56 6.78 7.60 5.00 4.82   HEMOGLOBIN g/dL 8.3* 8.9* 9.2* 9.2* 10.0*   HEMATOCRIT % 26.7* 28.3* 29.2* 29.9* 31.5*   PLATELETS 10*3/mm3 184 184 186 209 215           Culture Data:   No results found for: BLOODCX  No results found for: URINECX  No results found for: RESPCX  No results found for: WOUNDCX  No results found for: STOOLCX  No components found for: BODYFLD    Micobiology                    Urine Culture   Date Value Ref Range Status   06/01/2018 >100,000 CFU/mL Morganella morganii (A)  Final     Comment:     Multi Drug Resistant Organism  contact precautions requested            Radiology Data:   Imaging Results (all)     Procedure Component Value Units Date/Time    US Arterial Doppler Lower Extremity Bilateral [[id number]] Collected:  06/04/18 1104     Updated:  06/04/18 2056    Narrative:       Procedure: Bilateral lower extremity arterial duplex analysis    Reason for exam: Decreased pulses, cool temp, pain, possible  arterial disease.    FINDINGS: Right common femoral artery appears patent with normal  triphasic waveforms with peak systolic velocity 109 cm/s. Right  deep profunda artery biphasic waveforms with peak systolic  velocity of 46 cm/s. Proximal right superficial femoral artery  demonstrates triphasic waveforms with peak systolic velocity 175  cm/s. Mid right superficial femoral artery triphasic waveforms  with peak systolic velocity is 70 cm/s. Distal right superficial  femoral artery triphasic waveforms with peak systolic velocity is  65 cm/s. Right popliteal artery monophasic waveforms with peak  systolic velocity of 38 cm/s. Right anterior  tibial artery  demonstrates monophasic waveforms with peak systolic velocity is  60 cm/s. The right peroneal and posterior tibial artery is not  identified and therefore cannot be evaluated.    The left common femoral artery demonstrates normal triphasic  waveforms with peak systolic velocity of 83 cm/s.. The left deep  profunda artery demonstrates triphasic waveforms with peak  systolic velocity of 57 cm/s. Proximal left superficial femoral  artery demonstrates normal triphasic waveforms with peak systolic  velocity of 89 cm/s. Mid left superficial femoral artery  demonstrates normal triphasic waveforms with peak systolic  velocity of 75 cm/s. Distal left superficial femoral artery  demonstrates biphasic waveforms with peak systolic velocity 144  cm/s. The left popliteal artery demonstrates monophasic waveforms  with peak systolic velocity of 55 cm/s. The left anterior tibial  artery demonstrates monophasic waveforms with peak systolic  velocity of 48 cm/s. The left peroneal artery demonstrates  monophasic waveforms with peak systolic velocity of 60 cm/s. The  left posterior tibial artery demonstrates dampened monophasic  waveforms with peak systolic velocity of 26 cm/s.    Extensive calcified atherosclerotic plaque involving bilateral  lower extremity arterial systems.      Impression:       1.  Right deep profunda artery biphasic waveforms suggest mild to  moderate peripheral vascular disease.  2.  Right popliteal artery and right anterior tibial artery  demonstrates monophasic waveforms suggest severe peripheral  vascular disease.  3.  Neither the right peroneal or posterior tibial artery is  identified and therefore they cannot be evaluated.  4.  Left popliteal artery, left anterior tibial artery, left  peroneal artery, and left posterior tibial artery monophasic  waveforms suggest severe peripheral vascular disease.    Electronically signed by:  Mikael Lundberg MD  6/4/2018 12:36 PM CDT  Workstation: [id number]      Venous Doppler Lower Extremity Bilateral (duplex) [[id number]] Collected:  06/03/18 1424     Updated:  06/03/18 1525    Narrative:       EXAM DESCRIPTION: US VENOUS DOPPLER LOWER EXTREMITY BILATERAL  (DUPLEX)    PROCEDURE: Bilateral Lower Extremity Venous Duplex    COMPARISON: None    HISTORY: Bilateral lower extremity pain and swelling    FINDINGS: Realtime grayscale and color-flow imaging were  performed of both lower extremities.    The right common femoral vein, profunda femoral vein, femoral  vein and popliteal vein have normal venous flow on color and  duplex Doppler. The veins augment well compress, and appear to be  patent with no filling defects visible on grayscale or color  Doppler imaging. The infrapopliteal veins also appear patent with  no evidence of thrombus. The greater saphenous vein is patent.     The left common femoral vein, profunda femoral vein, femoral vein  , and popliteal vein have normal venous flow on color and duplex  Doppler. The veins augment well, compress, and appear to be  patent with no filling defects visible on grayscale or color  Doppler imaging. The infrapopliteal veins also appear patent with  no evidence of thrombus. The greater saphenous vein is patent.      Impression:       Negative for DVT bilateral lower extremities.    Electronically signed by:  Andres Roland MD  6/3/2018 3:24 PM CDT  Workstation: [id number]    XR Calcaneus 2+ View Left [[id number]] Collected:  06/03/18 1101     Updated:  06/03/18 1155    Narrative:       PROCEDURE: Two-view calcaneus    COMPARISON: No comparison    HISTORY: Left heel pain    FINDINGS: Lateral and axial views of the calcaneus are obtained.  There is a plantar calcaneal spur. Small calcifications are noted  in the soft tissues located plantar to the calcaneus, possibly  vascular or due to plantar fasciitis. Atherosclerotic  calcifications are noted in the ankle and along the dorsal and  posterior aspects of the foot. No radiographic evidence  of acute  fracture is seen.      Impression:       CONCLUSION:    No radiographic evidence of acute fracture.    Electronically signed by:  Jamel Wiseman MD  6/3/2018 11:54 AM CDT  Workstation: [id number]    CT Guided Nephrostomy Tube Placement [[id number]] Collected:  05/31/18 1510     Updated:  05/31/18 1631    Narrative:       CT-guided left nephrostomy, tube placement.    HISTORY: 87-year-old male with hydronephrotic changes left kidney  due to obstructing ureteral calculi. The patient is referred for  CT-guided left kidney nephrostomy.    Multiple noncontrast axial CT sections were obtained. This exam  was performed using radiation doses that are As Low As Reasonably  Achievable (ALARA).This exam was performed according to our  departmental dose optimization program, which includes automated  exposure control, adjustment of the mA and/or KV according to  patient size and/or use of iterative reconstruction technique.    After informed consent and using sterile technique a 21-gauge  needle was advanced under CT guidance into the left renal pelvis.  A thin 0.018 guidewire was then positioned  into the proximal  ureter. A transition sheath was then advanced over the guidewire  followed by a larger 0.038 guidewire. An 8 Occitan dilator was  then placed over the guidewire followed by positioning of an 8  Occitan nephrostomy catheter. The catheter was then positioned  into the proximal right ureter near the junction of the renal  pelvis and locked in position with an internal locking suture  loop. Adequate urine drainage was obtained. CT images  demonstrates good positioning of the catheter.    The patient tolerated the procedure well no evidence of any  immediate complications. The patient left the CT suite in stable  condition with normal vital signs.      Impression:       CONCLUSION: Technically successful and uneventful CT-guided left  nephrostomy placement.    Electronically signed by:  Dragan Aleman MD  5/31/2018 4:30  PM CDT  Workstation: [id number]    US Renal Bilateral [[id number]] Collected:  05/29/18 1417     Updated:  05/29/18 1547    Narrative:       EXAMINATION:  ultrasound, renal     CLINICAL INDICATION / HISTORY:  ureteric stone, T83.511A  Infection and inflammatory reaction due to indwelling urethral  catheter, initial encounter N39.0 Urinary tract infection, site  not specified R09.02 Hypoxemia I48.2 Chronic atrial fibrillation  Z74.09 Other reduced mobility N20.0 Calculus of kidney Z74.09  Other reduced mobility    COMPARISON:  none    TECHNIQUE:  ultrasound, renal    FULL RESULTS / FINDINGS:    Kidney, right:      size:  normal, measuring 10.9 x 6.7 x 7.4 cm    echotexture:  normal    Moderate hydronephrosis and hydroureter secondary to distal  right ureter 1.3 cm calculi. Right kidney lower pole 1.4 cm  echogenic lesion with acoustic shadowing consistent with renal  calculi.    Kidney, left:      size:  normal, measuring 11.9 x 7.4 x 6.9 cm    echotexture:  normal  Moderate left kidney hydronephrosis and hydroureter secondary to  left kidney mid ureter 1.5 cm calculi causing obstruction at this  level. Left kidney lower pole 1.8 cm renal calculi.    Urinary bladder:  Bladder is decompressed with a Dye and  therefore cannot be evaluated well.            Impression:       CONCLUSION:    1.  Bilateral kidney moderate hydronephrosis and hydroureter  secondary to bilateral ureter calculi as described above.  2.  Bilateral kidney nonobstructive renal calculi.    Electronically signed by:  Mikael Lundberg MD  5/29/2018 3:46 PM CDT  Workstation: [id number]    FL Retrograde Pyelogram In OR [[id number]] Resulted:  05/29/18 0922     Updated:  05/29/18 0922    CT Abdomen Pelvis Without Contrast [[id number]] Collected:  05/25/18 0107     Updated:  05/25/18 0138    Narrative:         CT abdomen and pelvis without contrast on 5/25/2018     CLINICAL INDICATION: Low back pain, UTI    TECHNIQUE: Multiple axial images are obtained throughout  the  abdomen and pelvis without the administration of contrast. This  exam was performed according to our departmental  dose-optimization program, which includes automated exposure  control, adjustment of the mA and/or kV according to patient size  and/or use of iterative reconstruction technique.   Total DLP is 780.1 mGy*cm.    COMPARISON: 5/22/2017    FINDINGS:  Abdomen: Coronary artery calcifications and other vascular  calcifications are noted. There are trace bilateral pleural  effusions with minimal bibasilar atelectasis and/or scarring.  There is a moderate-sized nonobstructing stone in the lower pole  of the right kidney again noted. Small right renal cyst is noted.  There is also a nonobstructing stone in the lower pole of the  left kidney measuring 9 mm. There is new mild to moderate left  hydronephrosis and proximal hydroureter to the level of an  irregular 1 cm left proximal ureteral stone versus two adjacent  stones. This is at the L4 vertebral body level. There is stable  positioning of a large nonobstructing 1.5 cm right distal  ureteral stone just above the right UVJ. There are no other  ureteral stones. The unenhanced solid abdominal organs are  otherwise unremarkable. There is no abdominal adenopathy. There  is no free fluid or free air within the abdomen. The abdominal  portion of the GI tract is unremarkable.    Pelvis: The prostate is enlarged, please correlate with physical  exam and PSA levels. Dye catheter is noted in the bladder.  There is no pelvic adenopathy. Pelvic portion of the GI tract  including the appendix is unremarkable. Degenerative changes are  noted in the spine. There is grade 1 spondylolisthesis at L3-4  and L4-5 secondary to degenerative facet disease.      Impression:       1. Mild-to-moderate left hydronephrosis and proximal hydroureter  to the level of an irregular 1 cm stone in the proximal left  ureter versus two adjacent stones.  2. 1.5 cm nonobstructing right distal  ureteral stone.  3. Bilateral nephrolithiasis.  4. Prostate enlargement.    Electronically signed by:  Mil Grajeda  5/25/2018 1:37 AM  CDT Workstation: [id number]    XR Chest 1 View [[id number]] Collected:  05/24/18 2218     Updated:  05/24/18 2243    Narrative:         Chest single view on  5/24/2018     CLINICAL INDICATION: Hypoxia    COMPARISON: 4/26/2018    FINDINGS: Heart is upper limits normal for size. Mild chronic  interstitial changes are noted. Lungs are otherwise clear. Hilar  and mediastinal contours are within normal limits. Degenerative  changes are noted in the shoulders.      Impression:       No acute disease.    Electronically signed by:  iMl Grajeda  5/24/2018 10:42 PM  CDT Workstation: [id number]          Chief Complaint on Day of Discharge: none    HPI:  Hospital Course:Admitted to the hospital with sepsis confusion and generalized weakness the patient started on IV antibiotics improved but again patient worse didn't have cellulitis in his lower extremities has been evaluated by vascular surgery no need for any intervention pain no obvious occlusion of the arteries patient continued to be confused and continued to be weak and continued to have cellulitis and multiple medical problems so patient was transferred to one of back to continue IV antibiotics and continue current treatment patient also denied for hydronephrosis has been managed by urology no further intervention is needed at this time    Patient may need to have antibiotics for at least 2 weeks  Condition on Discharge:  Improved and Stable    Physical Exam on Discharge:  Temp:  [97.4 °F (36.3 °C)-100.6 °F (38.1 °C)] 97.4 °F (36.3 °C)  Heart Rate:  [64-97] 84  Resp:  [16-18] 16  BP: (112-154)/(45-72) 120/72    Constitutional: Patient is AAO x 3. Patient appears well-developed and well-nourished.   Cardiovascular: Normal rate, regular rhythm, normal heart sounds and intact distal pulses.  Exam reveals no gallop and no  friction rub.  No murmur heard.  Pulmonary/Chest: Effort normal and breath sounds normal. No respiratory distress. No wheezes, rales or ronchi.  Abdominal: Soft. Bowel sounds are normal. No distension, no tenderness. There is no rebound and no guarding. No hernia.   Musculoskeletal: No Cyanosis clubbing or edema.    Discharge Disposition:  Short Term Hospital (DC - External)    Discharge Medications:   Ravi Park   Home Medication Instructions SIMEON:[id number]    Printed on:06/07/18 6663   Medication Information                      8-hydroxyquinoline sulfate (BAG BALM) ointment  Apply 1 application topically Daily.             albuterol (PROVENTIL HFA;VENTOLIN HFA) 108 (90 BASE) MCG/ACT inhaler  Inhale 2 puffs Every 6 (Six) Hours As Needed for Wheezing. 2 Puffs 4 times per day as needed.             Bacitracin Zinc (MAGIC BUTT OINTMENT)  Apply 1 g topically 2 (Two) Times a Day.             bumetanide (BUMEX) 1 MG tablet  Take 1 tablet by mouth Daily.             collagenase 250 UNIT/GM ointment  Apply  topically Daily.             diltiaZEM CD (CARDIZEM CD) 120 MG 24 hr capsule  Take 1 capsule by mouth Daily.             doxazosin (CARDURA) 2 MG tablet  Take 1 tablet by mouth Every Night.             finasteride (PROSCAR) 5 MG tablet  Take 5 mg by mouth Daily.             fluticasone (FLONASE) 50 MCG/ACT nasal spray  1 spray into each nostril.             folic acid (FOLVITE) 1 MG tablet  Take 1 tablet by mouth Daily.             levothyroxine (SYNTHROID, LEVOTHROID) 50 MCG tablet  Take 1 tablet by mouth Daily.             metoprolol succinate XL (TOPROL-XL) 25 MG 24 hr tablet  Take 1 tablet by mouth Daily.             oxyCODONE-acetaminophen (PERCOCET) 5-325 MG per tablet  Take 1 or 2 tablets daily as needed.             potassium chloride (K-DUR,KLOR-CON) 10 MEQ CR tablet  Take 5 mEq by mouth Daily.             tamsulosin (FLOMAX) 0.4 MG capsule 24 hr capsule  Take 2 capsules by mouth Every Night.              tiZANidine (ZANAFLEX) 4 MG tablet  Take 0.5-1 tablets by mouth At Night As Needed for Muscle Spasms.             vitamin B-12 (VITAMIN B-12) 1000 MCG tablet  Take 1 tablet by mouth Daily.                 Discharge Diet:     Activity at Discharge:     All the abnormal findings were discussed with the patient in details and the patient verbalized understanding of needing to follow up with PCP and and other specialities of  outpatient follow up for further evaluation and management.    Discharge Care Plan/Instructions: follow up with PCP in 2-3 days    Follow-up Appointments:   Future Appointments  Date Time Provider Department Center   7/30/2018 2:30 PM Viet Brooks MD [facility] None       Test Results Pending at Discharge:      This document has been electronically signed by Tatiana Bazan MD on June 7, 2018 3:09 PM

## 2018-06-07 NOTE — PLAN OF CARE
Problem: Patient Care Overview  Goal: Plan of Care Review  Outcome: Ongoing (interventions implemented as appropriate)   06/07/18 1400   Coping/Psychosocial   Plan of Care Reviewed With patient   Plan of Care Review   Progress improving   OTHER   Outcome Summary Pt more alert and willing to work with PTA but focused on making a phone call so pt declined to amb any distance this date. Pt able to sit EOB and t/f to recliner with min A of 1 this pm.

## 2018-06-07 NOTE — THERAPY DISCHARGE NOTE
Acute Care - Occupational Therapy Discharge Summary  [facility]     Patient Name: Ravi Park  : 1930  MRN: [id number]    Today's Date: 2018  Onset of Illness/Injury or Date of Surgery: 18    Date of Referral to OT: 18  Referring Physician: JUSTIN Russo      Admit Date: 2018        OT Recommendation and Plan    Visit Dx:    ICD-10-CM ICD-9-CM   1. Urinary tract infection associated with indwelling urethral catheter, initial encounter T83.511A 996.64    N39.0 599.0   2. Hypoxia R09.02 799.02   3. Chronic atrial fibrillation I48.2 427.31   4. Impaired functional mobility and endurance Z74.09 V49.89   5. Nephrolithiasis N20.0 592.0   6. Impaired mobility and activities of daily living Z74.09 799.89               Time Calculation- OT     Row Name 18 1458 18 1023          Time Calculation- OT    OT Start Time 1335  -BB 0720  -BB     OT Stop Time 1400  -BB 0830  -BB     OT Time Calculation (min) 25 min  -BB 70 min  -BB     Total Timed Code Minutes- OT 25 minute(s)  -BB 70 minute(s)  -BB     OT Received On 18  -BB 18  -BB       User Key  (r) = Recorded By, (t) = Taken By, (c) = Cosigned By    Initials Name Provider Type    ERIK Durham/L Occupational Therapy Assistant                  OT Rehab Goals     Row Name 18 1335 18 0720          Bathing Goal 1 (OT)    Activity/Assistive Device (Bathing Goal 1, OT) bathing skills, all   AE PRN  -BB bathing skills, all   AE PRN  -BB     [place] Level/Cues Needed (Bathing Goal 1, OT) supervision required  -BB supervision required  -BB     Time Frame (Bathing Goal 1, OT) long term goal (LTG);by discharge  -BB long term goal (LTG);by discharge  -BB     Progress/Outcomes (Bathing Goal 1, OT) goal not met;continuing progress toward goal  -BB goal not met;continuing progress toward goal  -BB        Dressing Goal 1 (OT)    Activity/Assistive Device (Dressing Goal 1, OT) dressing skills, all   AE PRN   -BB dressing skills, all   AE PRN  -BB     [place]/Cues Needed (Dressing Goal 1, OT) supervision required  -BB supervision required  -BB     Time Frame (Dressing Goal 1, OT) long term goal (LTG);by discharge  -BB long term goal (LTG);by discharge  -BB     Progress/Outcome (Dressing Goal 1, OT) goal not met;continuing progress toward goal  -BB goal not met;continuing progress toward goal  -BB        Patient Education Goal (OT)    Activity (Patient Education Goal, OT) AE management, home safety, BUE HEP, EC/WS  -BB AE management, home safety, BUE HEP, EC/WS  -BB     [place]/Cues/Accuracy (Memory Goal 2, OT) demonstrates adequately;independent;verbalizes understanding  -BB demonstrates adequately;independent;verbalizes understanding  -BB     Time Frame (Patient Education Goal, OT) long term goal (LTG);by discharge  -BB long term goal (LTG);by discharge  -BB     Progress/Outcome (Patient Education Goal, OT) goal not met;continuing progress toward goal  -BB goal not met;continuing progress toward goal  -BB       User Key  (r) = Recorded By, (t) = Taken By, (c) = Cosigned By    Initials Name Provider Type Discipline    BB ERIK More/L Occupational Therapy Assistant OT                Outcome Measures     Row Name 06/07/18 1400 06/07/18 0720 06/06/18 1010       How much help from another person do you currently need...    Turning from your back to your side while in flat bed without using bedrails? 4  -TW  --  --    Moving from lying on back to sitting on the side of a flat bed without bedrails? 4  -TW  --  --    Moving to and from a bed to a chair (including a wheelchair)? 4  -TW  --  --    Standing up from a chair using your arms (e.g., wheelchair, bedside chair)? 4  -TW  --  --    Climbing 3-5 steps with a railing? 3  -TW  --  --    To walk in hospital room? 3  -TW  --  --    AM-PAC 6 Clicks Score 22  -TW  --  --       How much help from another is currently needed...    Putting on and taking off  regular lower body clothing?  -- 2  -BB 2  -LW    Bathing (including washing, rinsing, and drying)  -- 2  -BB 2  -LW    Toileting (which includes using toilet bed pan or urinal)  -- 2  -BB 2  -LW    Putting on and taking off regular upper body clothing  -- 3  -BB 3  -LW    Taking care of personal grooming (such as brushing teeth)  -- 3  -BB 3  -LW    Eating meals  -- 4  -BB 4  -LW    Score  -- 16  -BB 16  -LW       Functional Assessment    Outcome Measure Options AM-PAC 6 Clicks Basic Mobility (PT)  -TW  --  --      User Key  (r) = Recorded By, (t) = Taken By, (c) = Cosigned By    Initials Name Provider Type    TW Hari Davis PTA Physical Therapy Assistant    BB CASSIE MoreA/L Occupational Therapy Assistant    LW ERIK Evans/L Occupational Therapy Assistant              OT Discharge Summary  Anticipated Discharge Disposition (OT): long term acute care facility  Reason for Discharge: Discharge from facility, Per MD order  Outcomes Achieved: Refer to plan of care for updates on goals achieved (Pt d/c prior to all goals being met)  Discharge Destination: other (comment) (short term hospital (external facility))      Debra Corado, OT  6/7/2018

## 2018-06-07 NOTE — PLAN OF CARE
Problem: Patient Care Overview  Goal: Plan of Care Review  Outcome: Ongoing (interventions implemented as appropriate)   06/07/18 2473   Coping/Psychosocial   Plan of Care Reviewed With patient   Plan of Care Review   Progress no change   OTHER   Outcome Summary Pt is alert this AM. Pt has been in pain; medicated. Pt was able to sit up in chair with help from PT. Dr. MATI Bazan has been made aware pt's last BM was 6/1. Contact precautions maintained; will continue to monitor.      Goal: Individualization and Mutuality  Outcome: Ongoing (interventions implemented as appropriate)      Problem: Fall Risk (Adult)  Goal: Absence of Fall  Outcome: Ongoing (interventions implemented as appropriate)      Problem: Urinary Tract Infection (Adult)  Goal: Signs and Symptoms of Listed Potential Problems Will be Absent, Minimized or Managed (Urinary Tract Infection)  Outcome: Ongoing (interventions implemented as appropriate)      Problem: Skin Injury Risk (Adult)  Goal: Skin Health and Integrity  Outcome: Ongoing (interventions implemented as appropriate)      Problem: Pain, Acute (Adult)  Goal: Acceptable Pain Control/Comfort Level  Outcome: Ongoing (interventions implemented as appropriate)

## 2018-06-08 NOTE — THERAPY DISCHARGE NOTE
Acute Care - Physical Therapy Discharge Summary  [facility]       Patient Name: Ravi Park  : 1930  MRN: [id number]    Today's Date: 2018  Onset of Illness/Injury or Date of Surgery: 18    Date of Referral to PT: 18  Referring Physician: JUSTIN Russo      Admit Date: 2018      PT Recommendation and Plan    Visit Dx:    ICD-10-CM ICD-9-CM   1. Urinary tract infection associated with indwelling urethral catheter, initial encounter T83.511A 996.64    N39.0 599.0   2. Hypoxia R09.02 799.02   3. Chronic atrial fibrillation I48.2 427.31   4. Impaired functional mobility and endurance Z74.09 V49.89   5. Nephrolithiasis N20.0 592.0   6. Impaired mobility and activities of daily living Z74.09 799.89             Outcome Measures     Row Name 18 1400 18 0720 18 1010       How much help from another person do you currently need...    Turning from your back to your side while in flat bed without using bedrails? 4  -TW  --  --    Moving from lying on back to sitting on the side of a flat bed without bedrails? 4  -TW  --  --    Moving to and from a bed to a chair (including a wheelchair)? 4  -TW  --  --    Standing up from a chair using your arms (e.g., wheelchair, bedside chair)? 4  -TW  --  --    Climbing 3-5 steps with a railing? 3  -TW  --  --    To walk in hospital room? 3  -TW  --  --    AM-PAC 6 Clicks Score 22  -TW  --  --       How much help from another is currently needed...    Putting on and taking off regular lower body clothing?  -- 2  -BB 2  -LW    Bathing (including washing, rinsing, and drying)  -- 2  -BB 2  -LW    Toileting (which includes using toilet bed pan or urinal)  -- 2  -BB 2  -LW    Putting on and taking off regular upper body clothing  -- 3  -BB 3  -LW    Taking care of personal grooming (such as brushing teeth)  -- 3  -BB 3  -LW    Eating meals  -- 4  -BB 4  -LW    Score  -- 16  -BB 16  -LW       Functional Assessment    Outcome Measure Options  AM-PAC 6 Clicks Basic Mobility (PT)  -TW  --  --      User Key  (r) = Recorded By, (t) = Taken By, (c) = Cosigned By    Initials Name Provider Type    TW Hari Davis, PTA Physical Therapy Assistant    BB Carla Lutz, VALENCIA/L Occupational Therapy Assistant    LW Deisy Sullivan VALENCIA/L Occupational Therapy Assistant                PT Charges     Row Name 06/08/18 1154             Time Calculation    Start Time 1020  -AS      Stop Time 1115  -AS      Time Calculation (min) 55 min  -AS        User Key  (r) = Recorded By, (t) = Taken By, (c) = Cosigned By    Initials Name Provider Type    AS Muriel Jean, PT Physical Therapist                    PT Discharge Summary  Anticipated Discharge Disposition (PT): home with home health  Reason for Discharge: Discharge from facility, Per MD order  Outcomes Achieved: Patient able to partially acheive established goals  Discharge Destination: [facility]      Amarilys Flor, PT   6/8/2018

## 2018-06-21 NOTE — PRE-PROCEDURE NOTE
Patient's history and physical exam were reviewed, and no changes were noted.  The risks and benefits of the procedure and of conscious sedation were discussed with the patient, and the patient had ample opportunity to ask questions.  Informed consent was obtained.

## 2018-06-21 NOTE — SIGNIFICANT NOTE
06/21/18 0921   Rehab Treatment   Discipline occupational therapy assistant   Reason Treatment Not Performed other (see comments)  (Pt sitting EOB eating b'fast, pt reported that he was having issues with swallowing this AM.  Nsg notified, Will check back)

## 2018-06-27 NOTE — OUTREACH NOTE
Skilled Nursing Facility Discharge Flowsheet:     Skilled Nursing Facility Discharge Assessment 6/27/2018   Acute Facility Discharged From [place]   Acute Discharge Date 6/26/2018   Name of the Skilled Nursing Facility? [facility]   Tier Level of the Skilled Nursing Facility 4   Purpose of SNF Admission PT;OT   Estimated length of stay for the patient? Unclear at present   Who is the insurance provider or payor of patient stay? Medicare   Progression of Patient? Therapy evaluations in progress

## 2018-07-05 NOTE — OUTREACH NOTE
Skilled Nursing Facility Discharge Flowsheet:     Skilled Nursing Facility Discharge Assessment 7/5/2018   Acute Facility Discharged From [place]   Acute Discharge Date 6/26/2018   Name of the Skilled Nursing Facility? [facility]   Tier Level of the Skilled Nursing Facility 4   Purpose of SNF Admission PT;OT   Estimated length of stay for the patient? Unclear at this time, plan is home at discharge   Who is the insurance provider or payor of patient stay? Medicare   Progression of Patient? -

## 2018-07-10 PROBLEM — N20.0 CALCULUS OF KIDNEY: Status: ACTIVE | Noted: 2018-01-01

## 2018-07-11 NOTE — OUTREACH NOTE
Skilled Nursing Facility Discharge Flowsheet:     Skilled Nursing Facility Discharge Assessment 7/11/2018   Acute Facility Discharged From [place]   Acute Discharge Date 6/26/2018   Name of the Skilled Nursing Facility? [facility]   Tier Level of the Skilled Nursing Facility 4   Purpose of SNF Admission PT;OT   Estimated length of stay for the patient? not clear at present   Who is the insurance provider or payor of patient stay? Medicare   Progression of Patient? Therapy in progress

## 2018-07-16 PROBLEM — I27.20 PULMONARY HYPERTENSION (HCC): Chronic | Status: ACTIVE | Noted: 2018-01-01

## 2018-07-16 PROBLEM — A41.9 SEPSIS (HCC): Status: ACTIVE | Noted: 2018-01-01

## 2018-07-16 PROBLEM — I07.1 TRICUSPID REGURGITATION: Chronic | Status: ACTIVE | Noted: 2018-01-01

## 2018-07-16 PROBLEM — N18.30 CKD (CHRONIC KIDNEY DISEASE) STAGE 3, GFR 30-59 ML/MIN (HCC): Chronic | Status: ACTIVE | Noted: 2018-01-01

## 2018-07-17 PROBLEM — A49.9 ESBL (EXTENDED SPECTRUM BETA-LACTAMASE) PRODUCING BACTERIA INFECTION: Status: ACTIVE | Noted: 2018-01-01

## 2018-07-17 PROBLEM — Z16.12 ESBL (EXTENDED SPECTRUM BETA-LACTAMASE) PRODUCING BACTERIA INFECTION: Status: ACTIVE | Noted: 2018-01-01

## 2018-07-17 NOTE — H&P
UROLOGY [facility] History and Physical  LALITHA RANGEL  87-year-old; :1930;;male  [address];[place] [address]  Ins: 1) MEDICARE/RAILROAD 2) [facility]  Employer: RETIRED  MRN:[id number]  MONISHA YOUNG MD  [facility]  Jul. 10, 2018 02:21 PM  Allergies  Lipitor Unknown  neomycin Unknown  simvastatin Unknown  Current Medications  aspirin 325 mg oral delayed release tablet  Proventil HFA 90 mcg/inh inhalation aerosol  levothyroxine 50 mcg (0.05 mg) oral tablet  Cardizem  mg/24 hours oral capsule,  extended release  Zanaflex 4 mg oral capsule  Flomax 0.4 mg oral capsule  folic acid 1 mg oral tablet  Proscar 5 mg oral tablet  MAGIC BUTT  Cardura 2 mg oral tablet  bumetanide  Toprol-XL 25 mg oral tablet, extended release  Percocet 5/325 oral tablet  Klor-Con 10 mEq oral tablet, extended release  Flonase 50 mcg/inh nasal spray  * Medications Reconciled  Medical History  Completed stroke  Heart disease  Kidney disease  Patient reports having had a colonoscopy within  the past 9 years.  Surgical History  BLADDER STONE LITHOTRIPSY  Neck surgery  BACK SURGERY  prostate surgery  NEPHROSTOMY  Family History  Family history of cancer  Mother  Father  Sister  Alive Sister Alive  Social History  Former smoker. Non drinker. Retired. .  Imm/Inj/Office Meds History Comments  Patient has had influenza vaccination for this  season.  Patient has had pneumococcal vaccination.  Chief Complaint  LEFT RENAL STONE  History of Present Illness  LALITHA RANGEL is a 87-year-old male here for evaluation. He has a history of LEFT  RENAL STONE WITH NEPHROTUBE. I WAS UNABLE TO PLACE STENT ON  2018. HE ALSO HAS SCHOFIELD AND SP CATHETER FOR BPH. HE IS HERE  TODAY TO BE SCHEDULED FOR FURTHER TREATMENT.  Location: LEFT KIDNEY  Severity: MILD  Onset / Duration: GREATER THAN 6 WEEKS.  Context: NO FEVER  Modifying factors: LEFT NEPHROSTOMY TUBE./SCHOFIELD  Associated  signs and symptoms: NO FEVER.  Review of Systems  Eyes: ; Denies: blurry vision, pain in the eyes and double vision  ENMT: ; Denies: ear pain, sore throat and sinus problems  Cardiovascular: ; Denies: chest pain, varicose veins, angina and syncope  Respiratory: ; Denies: shortness of breath, wheezing and frequent cough  Gastrointestinal: ; Denies: abdominal pain, nausea, vomiting, indigestion and  heartburn  Genitourinary: Reports: other symptoms (see HPI)  Musculoskeletal: Reports: joint pain, neck pain and back pain  Integumentary: Reports: skin rash; Denies: boils and persistent itch  Neurological: ; Denies: tremors, dizzy spells and numbness / tingling  Psychiatric: ; Denies: generally satisfied with life, depression, suicidal ideation and  anxiety  Endocrine: ; Denies: Excessive thirst, cold intolerance, heat intolerance and  tired/sluggish  Hematologic/Lymphatic: ; Denies: swollen glands and blood clotting problem  Allergic/Immunologic: ; Denies: hayfever  Constitutional: ; Denies: fever, chills and weight loss  Vital Signs  7/10/2018 2:21:00 PM  BP: 112/65 (mmHg) Heart Rate: 86 (/min)  Resp Rate: 18 (/min) Height: 72 (in)  Former smoker  Exam  General appearance: The patient appears well developed and well nourished, in no  apparent acute distress.  Examination of pupils and irises: No redness or drainage noted.  Assessment of hearing: Responds to verbal command.  Examination of neck: Neck appears supple. No masses noted.  Assessment of respiratory effort: Respiratory effort appears normal. No apparent  distress.  Obtain stool sample: Stool specimen for occult blood is not indicated.  Genitourinary: LEFT NEPROSTOMY TUBE. SCHOFIELD AND SP CATHETER.  Examination of gait and station: IN WHEELCHAIR AT PRESENT.  Head and neck (Assessment of range of motion): Adequate ROM noted in all  Extremities.  Head and neck (Assessment of stability): Wheelchair bound.  Head and neck (Assessment of muscle strength and tone):  Muscle strength and tone  normal for age.  Inspection of skin and subcutaneous tissue: Exam of the skin is within normal limits.  The color and skin turgor are normal.  No lesions, bruises, rashes, or jaundice.  Orientation to time, place and person: Oriented to person, place, and time.  Mood and affect: Normal mood and affect.  Data Review  Medications and chart reviewed. History and physical form/ROS reviewed and new  form completed today.  Assessment - Kidney stone  DX:  Kidney stone  SNO: [id number], ICD-9: 592.0, ICD-10: N20.0  Benign prostatic hyperplasia  SNO: [id number], ICD-9: 600.01, ICD-10: N40.0  Plan  Plan Notes:  LEFT ESWL; possibly cystoscopy, stent placement.  Education:  surginsts - English  Discussion:  Discussed my findings and plan of action and reasoning behind decision making. All  questions were answered. FINDINGS WERE DISCUSSED WITH PATIENT. RISKS  AND BENEFITS EXPLAINED. PATIENT WISHES TO PROCEED. DAUGHTER  PRESENT FOR VISIT.  Instructions:  Patient is instructed to call if the condition worsens. Patient is instructed to call with  any changes in condition. Patient is instructed to call if he has any intractable pain,  fever, chills, nausea, or vomiting.  INCREASE FLUIDS. IF PAIN WORSENS/ UNCONTROLLED OR TEMPERATURE  >101.0 GO IMMEDIATELY TO ER.

## 2018-07-20 NOTE — ANESTHESIA PROCEDURE NOTES
Airway  Urgency: elective    Difficult airway    General Information and Staff    Patient location during procedure: OR    Indications and Patient Condition  Indications for airway management: airway protection    Preoxygenated: yes  MILS maintained throughout  Mask difficulty assessment: 0 - not attempted    Final Airway Details  Final airway type: supraglottic airway      Successful airway: I-gel  Size 4    Number of attempts at approach: 1

## 2018-07-20 NOTE — ANESTHESIA POSTPROCEDURE EVALUATION
Patient: Ravi Kirkpatrickgley    Procedure Summary     Date:  07/20/18 Room / Location:  [facility] OR 09 / [facility] OR    Anesthesia Start:  1622 Anesthesia Stop:  1709    Procedure:  EXTRACORPOREAL SHOCKWAVE LITHOTRIPSY        (NURSING HOME  [place] SIDE) (Left ) Diagnosis:       Calculus of kidney      (Calculus of kidney [N20.0])    Surgeon:  Stephen Serrano MD Provider:  Mikey England MD    Anesthesia Type:  general ASA Status:  4          Anesthesia Type: general  Last vitals  BP   138/75 (07/20/18 1337)   Temp   98.5 °F (36.9 °C) (07/20/18 1337)   Pulse   99 (07/20/18 1337)   Resp   18 (07/20/18 1337)     SpO2   96 % (07/20/18 1337)     Post Anesthesia Care and Evaluation    Patient location during evaluation: bedside  Patient participation: complete - patient participated  Level of consciousness: awake and alert  Pain score: 1  Pain management: adequate  Airway patency: patent  Anesthetic complications: No anesthetic complications  PONV Status: none  Cardiovascular status: acceptable  Respiratory status: acceptable  Hydration status: acceptable

## 2018-07-20 NOTE — OP NOTE
EXTRACORPOREAL SHOCKWAVE LITHOTRIPSY WITH STENT INSERTION/REMOVAL  Procedure Note    Ravi Park  7/20/2018    Pre-op Diagnosis:   Calculus of kidney [N20.0]    Post-op Diagnosis:     Post-Op Diagnosis Codes:     * Calculus of kidney [N20.0]      Procedure(s):  EXTRACORPOREAL SHOCKWAVE LITHOTRIPSY        ([facility])    Surgeon(s):  Stephen Serrano MD    Anesthesia: General    Staff:   Circulator: Saima Pascual RN  Scrub Person: Phong Orozco  Assistant: Julienne Orozco CSA    Estimated Blood Loss: none    Specimens:                None      Drains:   Nephrostomy Left 8 Fr. (Active)   Site Assessment Intact 7/18/2018  8:23 AM   Dressing Status Removed 7/18/2018  8:23 AM   Dressing Type Split gauze 7/18/2018  8:23 AM   Securement Method Tape 7/18/2018  8:23 AM   Output (mL) 0 mL 7/18/2018  3:22 PM       Urethral Catheter Non-latex 16 Fr. (Active)   Daily Indications Chronic history of urinary catheter 7/18/2018  8:23 AM   Site Assessment Red 7/17/2018  8:50 PM   Collection Container Standard drainage bag 7/18/2018  8:23 AM   Securement Method Securing device 7/18/2018  8:23 AM   Catheter care complete Yes 7/18/2018  9:00 AM   Output (mL) 250 mL 7/18/2018  3:22 PM       Findings: Left renal stone    Complications: None    Indications: Left flank pain kidney obstruction    Description of Procedure: The patient was brought to the operating suite, placed in the supine position on lithotripsy table.  AP and oblique planes, stone was localized.  With a power setting ranging between 4 and 5 we used 2500 shocks to fragment the stone.  On the left  Once this was accomplished, patient was taken back off the table and taken to recovery having tolerated the procedure well.    Stephen Serrano MD     Date: 7/20/2018  Time: 5:03 PM

## 2018-07-20 NOTE — ANESTHESIA PREPROCEDURE EVALUATION
" Anesthesia Evaluation     Patient summary reviewed and Nursing notes reviewed   no history of anesthetic complications:  NPO Solid Status: > 8 hours  NPO Liquid Status: > 8 hours           Airway   Mallampati: II  TM distance: >3 FB  Neck ROM: full  No difficulty expected  Dental    (+) lower dentures    Comment: Few remaining upper and lower hopeless dentition;\"Snags\".    Pulmonary - normal exam    breath sounds clear to auscultation  (+) a smoker Former, COPD mild, recent URI resolved, decreased breath sounds,   Cardiovascular - normal exam  Exercise tolerance: poor (<4 METS)    NYHA Classification: IV  ECG reviewed  PT is on anticoagulation therapy  Rhythm: irregular  Rate: abnormal    (+) hypertension 2 medications or greater, dysrhythmias Atrial Fib, CHF, murmur (Grade II-III/VI systolic), PVD, hyperlipidemia,   (-) carotid bruits      Neuro/Psych  (+) CVA residual symptoms, numbness (Lumbar radiculopathy.),     GI/Hepatic/Renal/Endo    (+)   hypothyroidism,     Musculoskeletal     (+) back pain, neck pain,       ROS comment: Previous posterior cervical and lumbar discectomy.  Abdominal    Substance History - negative use     OB/GYN negative ob/gyn ROS         Other   (+) arthritis (DDD cervical and lumbar.)                       Anesthesia Plan    ASA 4     general     intravenous induction   Anesthetic plan and risks discussed with patient.      "

## 2018-08-14 NOTE — OUTREACH NOTE
Skilled Nursing Facility Discharge Flowsheet:     Skilled Nursing Facility Discharge Assessment 8/14/2018   Acute Facility Discharged From [place]   Acute Discharge Date 6/26/2018   Name of the Skilled Nursing Facility? [facility]   Tier Level of the Skilled Nursing Facility 4   Purpose of SNF Admission PT;OT   Estimated length of stay for the patient? TBD, plan is home with home care in future   Who is the insurance provider or payor of patient stay? Medicare   Progression of Patient? -

## 2018-08-17 PROBLEM — T83.511A URINARY TRACT INFECTION ASSOCIATED WITH CATHETERIZATION OF URINARY TRACT (HCC): Status: ACTIVE | Noted: 2018-01-01

## 2018-08-17 PROBLEM — N39.0 URINARY TRACT INFECTION ASSOCIATED WITH CATHETERIZATION OF URINARY TRACT (HCC): Status: ACTIVE | Noted: 2018-01-01

## 2018-08-22 NOTE — OUTREACH NOTE
Skilled Nursing Facility Discharge Flowsheet:     Skilled Nursing Facility Discharge Assessment 8/22/2018   Acute Facility Discharged From [place]   Acute Discharge Date 6/26/2018   Name of the Skilled Nursing Facility? [facility]   Tier Level of the Skilled Nursing Facility 4   Purpose of SNF Admission PT;OT   Estimated length of stay for the patient? TBD   Who is the insurance provider or payor of patient stay? Medicare   Progression of Patient? THerapy continues

## 2018-08-22 NOTE — ANESTHESIA PREPROCEDURE EVALUATION
" Anesthesia Evaluation     Patient summary reviewed and Nursing notes reviewed   no history of anesthetic complications:  NPO Solid Status: > 8 hours  NPO Liquid Status: > 8 hours           Airway   Mallampati: II  TM distance: >3 FB  Neck ROM: full  No difficulty expected  Dental    (+) upper dentures, partials and poor dentition    Comment: Few remaining upper and lower hopeless dentition;\"Snags\".    Pulmonary     breath sounds clear to auscultation  (+) a smoker Former, COPD mild, recent URI resolved, decreased breath sounds,     ROS comment: Home O2  Cardiovascular - normal exam  Exercise tolerance: poor (<4 METS)    NYHA Classification: IV  ECG reviewed  PT is on anticoagulation therapy  Patient on routine beta blocker  Rhythm: irregular  Rate: abnormal    (+) hypertension 2 medications or greater, dysrhythmias Atrial Fib, murmur (Grade II-III/VI systolic), PVD, hyperlipidemia,   (-) past MI, angina, carotid bruits    ROS comment: Atrial fibrillation  Abnormal ECG  When compared with ECG of 16-JUL-2018 14:43,  Nonspecific T wave abnormality, improved in Inferior leads  Confirmed by VETTIANKAL    EF 55-60%    Neuro/Psych  (+) 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  Abdominal    Substance History - negative use     OB/GYN negative ob/gyn ROS         Other   (+) arthritis (DDD cervical and lumbar.)                       Anesthesia Plan    ASA 4     general   (Finished breakfast at 9 am)  intravenous induction   Anesthetic plan and risks discussed with patient.      "

## 2018-08-23 NOTE — ANESTHESIA POSTPROCEDURE EVALUATION
Patient: Ravi Park    Procedure Summary     Date:  08/22/18 Room / Location:  [facility] OR 02 / [facility] OR    Anesthesia Start:  1840 Anesthesia Stop:  1938    Procedure:  CYSTOSCOPY URETEROSCOPY RETROGRADE PYELOGRAM HOLMIUM LASER STENT INSERTION (Left ) Diagnosis:       YESENIA (acute kidney injury) (CMS/HCC)      (YESENIA (acute kidney injury) (CMS/[facility]) [N17.9])    Surgeon:  Stephen Serrano MD Provider:  Rodney Ruby MD    Anesthesia Type:  general ASA Status:  4          Anesthesia Type: general  Last vitals  BP   140/62 (08/22/18 1955)   Temp   97 °F (36.1 °C) (08/22/18 1955)   Pulse   80 (08/22/18 1955)   Resp   18 (08/22/18 1955)     SpO2   99 % (08/22/18 1955)     Post Anesthesia Care and Evaluation    Patient location during evaluation: PACU  Patient participation: complete - patient participated  Level of consciousness: awake and alert  Pain score: 0  Pain management: adequate  Airway patency: patent  Anesthetic complications: No anesthetic complications  PONV Status: none  Cardiovascular status: acceptable  Respiratory status: acceptable  Hydration status: acceptable

## 2018-08-29 NOTE — TELEPHONE ENCOUNTER
PATIENT'S DAUGHTER CALLED STATING THAT PATIENT IS RUNNING OUT OF NURSING HOME DAYS WITH HIS MEDICARE.  HE IS CURRENTLY IN THE HOSPITAL.  HE HAS 8 MORE DAYS THAT WILL BE COVERED.  SHE WAS TOLD THAT MEDICARE WOULD COVER UP TO 8 HOURS PER DAY FOR HOME HEALTH IF A DOCTOR PRESCRIBED IT.  SHE IS STILL CHECKING OUT HER OPTIONS.  I TOLD HER TO CHECK WITH HOME HEALTH AGENCIES TO SEE IF THEY PROVIDE THAT KIND OF SERVICE.   SHE IS GOING TO CHECK WITH [employer].

## 2018-09-08 PROBLEM — I48.20 CHRONIC ATRIAL FIBRILLATION (HCC): Status: ACTIVE | Noted: 2018-01-01

## 2018-09-08 PROBLEM — I21.4 NSTEMI (NON-ST ELEVATED MYOCARDIAL INFARCTION) (HCC): Status: ACTIVE | Noted: 2018-01-01

## 2018-09-08 PROBLEM — L89.620 DECUBITUS ULCER OF LEFT HEEL, UNSTAGEABLE (HCC): Status: ACTIVE | Noted: 2018-01-01

## 2018-09-08 PROBLEM — N30.01 ACUTE CYSTITIS WITH HEMATURIA: Status: ACTIVE | Noted: 2018-01-01

## 2018-09-08 PROBLEM — N17.0 ACUTE RENAL FAILURE WITH TUBULAR NECROSIS (HCC): Status: ACTIVE | Noted: 2018-01-01

## 2018-09-08 PROBLEM — A41.9 SEPSIS (HCC): Status: ACTIVE | Noted: 2018-01-01

## 2018-09-08 PROBLEM — G92.9 TOXIC ENCEPHALOPATHY: Status: ACTIVE | Noted: 2018-01-01

## 2018-09-08 PROBLEM — N17.9 ACUTE RENAL FAILURE (HCC): Status: ACTIVE | Noted: 2018-01-01

## 2018-09-08 NOTE — H&P
[facility] Medicine Admission      Date of Admission: 9/8/2018      Primary Care Physician: Terrell Arzate MD      Chief Complaint: Altered mental status    HPI: 87-year-old  male getting rehabilitation nursing home was brought in with complaints of altered mental status by family since yesterday.  Questionable fever.  Patient denies any shortness of breath, abdominal pain, palpitations, chest pain or any other symptoms.  Patient was found to be in A. fib with RVR and rate was controlled with a esmolol drip in the ER.  Family also complaining of left heel ulcer that has been there for weeks.  Patient complains of pain in the neck and wants pain medicine at this time.  Patient was admitted for further workup.    Concurrent Medical History:  has a past medical history of Benign prostatic hyperplasia; Cerebrovascular accident (CMS/HCC); Chronic anemia; Chronic atrial fibrillation (CMS/HCC); CKD (chronic kidney disease) stage 3, GFR 30-59 ml/min; GERD (gastroesophageal reflux disease); Gout; Hydronephrosis; Hypercholesterolemia; Hypertension; Nephrolithiasis; Peripheral arterial disease (CMS/HCC); Pulmonary hypertension; Tricuspid regurgitation; and Urinary retention.    Past Surgical History:  has a past surgical history that includes Cardiac catheterization; cystoscopy, ureteroscopy, retrograde pyelogram, stent insertion (Left, 5/27/2018); Nephrostomy; extracorporeal shockwave lithotripsy (eswl), stent insertion/removal (Left, 7/20/2018); and cystoscopy, ureteroscopy, retrograde pyelogram, stent insertion (Left, 8/22/2018).    Family History: family history includes Hypertension in his father.     Social History:  reports that he has quit smoking. He has never used smokeless tobacco. He reports that he drinks alcohol. He reports that he does not use drugs.    Allergies:   Allergies   Allergen Reactions   • Lipitor [Atorvastatin] Itching     Pruritus   • Neomycin       Rash   • Simvastatin Itching     Itching  Zocor  -  Rash       Medications:   (Not in a hospital admission)   No current facility-administered medications on file prior to encounter.      Current Outpatient Prescriptions on File Prior to Encounter   Medication Sig Dispense Refill   • albuterol (PROVENTIL HFA;VENTOLIN HFA) 108 (90 BASE) MCG/ACT inhaler Inhale 2 puffs Every 6 (Six) Hours As Needed for Wheezing. 2 Puffs 4 times per day as needed. 1 inhaler 11   • bacitracin 500 UNIT/GM ointment Apply  topically to the appropriate area as directed Daily.     • bumetanide (BUMEX) 1 MG tablet Take 1 tablet by mouth Daily. 30 tablet 1   • diltiaZEM CD (CARDIZEM CD) 120 MG 24 hr capsule Take 1 capsule by mouth Daily. 30 capsule 1   • docusate sodium 100 MG capsule Take 100 mg by mouth 2 (Two) Times a Day.     • doxazosin (CARDURA) 2 MG tablet Take 1 tablet by mouth Every Night. 30 tablet 6   • famotidine (PEPCID) 40 MG tablet Take 1 tablet by mouth Daily.     • finasteride (PROSCAR) 5 MG tablet Take 5 mg by mouth Daily.     • fluticasone (FLONASE) 50 MCG/ACT nasal spray 1 spray into each nostril.     • folic acid (FOLVITE) 1 MG tablet Take 1 tablet by mouth Daily. 30 tablet 1   • levothyroxine (SYNTHROID, LEVOTHROID) 50 MCG tablet Take 1 tablet by mouth Daily. 30 tablet 5   • loratadine (CLARITIN) 10 MG tablet Take 10 mg by mouth Daily.     • metoprolol succinate XL (TOPROL-XL) 25 MG 24 hr tablet Take 1 tablet by mouth Daily. 30 tablet 11   • oxyCODONE-acetaminophen (PERCOCET) 5-325 MG per tablet Take 1 tablet by mouth Every 4 (Four) Hours As Needed for Moderate Pain  or Severe Pain . 18 tablet 0   • polyethylene glycol (MIRALAX) pack packet Take 17 g by mouth Daily.     • potassium chloride (K-DUR,KLOR-CON) 10 MEQ CR tablet Take 5 mEq by mouth Daily.     • tamsulosin (FLOMAX) 0.4 MG capsule 24 hr capsule Take 2 capsules by mouth Every Night. 30 capsule    • TiZANidine (ZANAFLEX) 2 MG capsule Take 2 mg by mouth Daily As  Needed for Muscle Spasms.     • vitamin B-12 (CYANOCOBALAMIN) 1000 MCG tablet Take 1,000 mcg by mouth Daily.           Review of Systems:  Review of Systems   Constitutional: Negative for fever.   HENT: Negative for ear pain.    Eyes: Negative for pain.   Respiratory: Negative for shortness of breath and wheezing.    Cardiovascular: Negative for chest pain.   Gastrointestinal: Negative for abdominal pain.   Genitourinary: Negative for dysuria.   Musculoskeletal: Negative for neck pain.   Skin: Negative for rash.   Neurological: Negative for headaches.   Psychiatric/Behavioral: Negative for agitation.      Otherwise complete ROS is negative except as mentioned above.    Physical Exam:   Temp:  [98.4 °F (36.9 °C)] 98.4 °F (36.9 °C)  Heart Rate:  [] 98  Resp:  [20-24] 20  BP: ()/(50-59) 117/52  Physical Exam   Constitutional: He appears well-developed.   HENT:   Head: Normocephalic and atraumatic.   Mouth/Throat: Mucous membranes are dry.   Eyes: Pupils are equal, round, and reactive to light.   Neck: Normal range of motion.   Cardiovascular: Normal rate.    Pulmonary/Chest: He has decreased breath sounds. He has no wheezes. He has rales.   Abdominal: Soft. There is no tenderness.   Musculoskeletal: He exhibits no edema.   Left heel unstageable ulcer   Neurological: He is alert.   Skin: Skin is warm and dry.   Psychiatric: He has a normal mood and affect.         Results Reviewed:  I have personally reviewed current lab, radiology, and data and agree with results.  Lab Results (last 24 hours)     Procedure Component Value Units Date/Time    Urinalysis, Microscopic Only - Urine, Clean Catch [[id number]]  (Abnormal) Collected:  09/08/18 1222    Specimen:  Urine from Urine, Catheter Updated:  09/08/18 1407     RBC, UA 6-12 (A) /HPF      WBC, UA Too Numerous to Count (A) /HPF      Bacteria, UA 2+ (A) /HPF      Squamous Epithelial Cells, UA None Seen /HPF      Hyaline Casts, UA None Seen /LPF      Methodology  Manual Light Microscopy    Urinalysis With Microscopic If Indicated (No Culture) - Urine, Clean Catch [[id number]]  (Abnormal) Collected:  09/08/18 1222    Specimen:  Urine from Urine, Catheter Updated:  09/08/18 1311     Color, UA Yellow     Appearance, UA Turbid (A)     pH, UA 6.0     Specific Gravity, UA 1.020     Glucose, UA Negative     Ketones, UA Negative     Bilirubin, UA Negative     Blood, UA Moderate (2+) (A)     Protein,  mg/dL (2+) (A)     Leuk Esterase, UA Large (3+) (A)     Nitrite, UA Negative     Urobilinogen, UA 0.2 E.U./dL    Influenza Antigen, Rapid - Swab, Nasopharynx [[id number]]  (Normal) Collected:  09/08/18 1107    Specimen:  Swab from Nasopharynx Updated:  09/08/18 1249     Influenza A Ag, EIA Negative     Influenza B Ag, EIA Negative    Extra Tubes [[id number]] Collected:  09/08/18 1134    Specimen:  Blood from Blood, Venous Line Updated:  09/08/18 1245    Narrative:       The following orders were created for panel order Extra Tubes.  Procedure                               Abnormality         Status                     ---------                               -----------         ------                     Light Blue Top[[id number]]                                   Final result               Lavender Top[[id number]]                                     Final result               Gold Top - SST[[id number]]                                   Final result               Green Top (Gel)[[id number]]                                  Final result                 Please view results for these tests on the individual orders.    Light Blue Top [[id number]] Collected:  09/08/18 1134    Specimen:  Blood from Arm, Left Updated:  09/08/18 1245     Extra Tube hold for add-on     Comment: Auto resulted       Lavender Top [[id number]] Collected:  09/08/18 1134    Specimen:  Blood from Arm, Left Updated:  09/08/18 1245     Extra Tube hold for add-on     Comment: Auto resulted       Gold Top - SST [[id number]]  Collected:  09/08/18 1134    Specimen:  Blood from Arm, Left Updated:  09/08/18 1245     Extra Tube Hold for add-ons.     Comment: Auto resulted.       Green Top (Gel) [[id number]] Collected:  09/08/18 1134    Specimen:  Blood from Arm, Left Updated:  09/08/18 1245     Extra Tube Hold for add-ons.     Comment: Auto resulted.       [place] Draw [[id number]] Collected:  09/08/18 1106    Specimen:  Blood Updated:  09/08/18 1215    Narrative:       The following orders were created for panel order [place] Draw.  Procedure                               Abnormality         Status                     ---------                               -----------         ------                     Light Blue Top[[id number]]                                   Final result               Green Top (Gel)[[id number]]                                  Final result               Lavender Top[[id number]]                                     Final result               Gold Top - SST[[id number]]                                   Final result                 Please view results for these tests on the individual orders.    Light Blue Top [[id number]] Collected:  09/08/18 1106    Specimen:  Blood from Arm, Right Updated:  09/08/18 1215     Extra Tube hold for add-on     Comment: Auto resulted       Green Top (Gel) [[id number]] Collected:  09/08/18 1106    Specimen:  Blood from Arm, Right Updated:  09/08/18 1215     Extra Tube Hold for add-ons.     Comment: Auto resulted.       Lavender Top [[id number]] Collected:  09/08/18 1106    Specimen:  Blood from Arm, Right Updated:  09/08/18 1215     Extra Tube hold for add-on     Comment: Auto resulted       Gold Top - SST [[id number]] Collected:  09/08/18 1106    Specimen:  Blood from Arm, Right Updated:  09/08/18 1215     Extra Tube Hold for add-ons.     Comment: Auto resulted.       Troponin [[id number]]  (Abnormal) Collected:  09/08/18 1106    Specimen:  Blood from Arm, Right Updated:  09/08/18 1152     Troponin I  0.209 (C) ng/mL     CK-MB [[id number]]  (Abnormal) Collected:  09/08/18 1106    Specimen:  Blood from Arm, Right Updated:  09/08/18 1143     CKMB 5.82 (H) ng/mL     Blood Culture - Blood, [[id number]] Collected:  09/08/18 1134    Specimen:  Blood from Arm, Left Updated:  09/08/18 1138    Lactic Acid, Plasma [[id number]]  (Abnormal) Collected:  09/08/18 1106    Specimen:  Blood from Arm, Right Updated:  09/08/18 1137     Lactate 6.5 (C) mmol/L     Lactic Acid, Reflex Timer (This will reflex a repeat order 3-3:15 hours after ordered.) [[id number]] Collected:  09/08/18 1106    Specimen:  Blood from Arm, Right Updated:  09/08/18 1137    CBC & Differential [[id number]] Collected:  09/08/18 1106    Specimen:  Blood Updated:  09/08/18 1136    Narrative:       The following orders were created for panel order CBC & Differential.  Procedure                               Abnormality         Status                     ---------                               -----------         ------                     CBC Auto Differential[[id number]]        Abnormal            Final result                 Please view results for these tests on the individual orders.    CBC Auto Differential [[id number]]  (Abnormal) Collected:  09/08/18 1106    Specimen:  Blood from Arm, Right Updated:  09/08/18 1136     WBC 9.16 10*3/mm3      RBC 3.49 (L) 10*6/mm3      Hemoglobin 10.1 (L) g/dL      Hematocrit 30.8 (L) %      MCV 88.3 fL      MCH 28.9 pg      MCHC 32.8 g/dL      RDW 19.7 (H) %      RDW-SD 63.6 (H) fl      MPV 9.3 fL      Platelets 186 10*3/mm3      Neutrophil % 87.1 (H) %      Lymphocyte % 5.7 (L) %      Monocyte % 5.6 %      Eosinophil % 1.1 %      Basophil % 0.0 %      Immature Grans % 0.5 %      Neutrophils, Absolute 7.98 10*3/mm3      Lymphocytes, Absolute 0.52 (L) 10*3/mm3      Monocytes, Absolute 0.51 10*3/mm3      Eosinophils, Absolute 0.10 10*3/mm3      Basophils, Absolute 0.00 10*3/mm3      Immature Grans, Absolute 0.05 (H) 10*3/mm3      Comprehensive Metabolic Panel [[id number]]  (Abnormal) Collected:  09/08/18 1106    Specimen:  Blood from Arm, Right Updated:  09/08/18 1134     Glucose 95 mg/dL      BUN 52 (H) mg/dL      Creatinine 2.04 (H) mg/dL      Sodium 139 mmol/L      Potassium 5.0 mmol/L      Chloride 101 mmol/L      CO2 20.0 (L) mmol/L      Calcium 9.4 mg/dL      Total Protein 8.7 (H) g/dL      Albumin 3.60 g/dL      ALT (SGPT) 24 U/L      AST (SGOT) 61 (H) U/L      Alkaline Phosphatase 177 (H) U/L      Total Bilirubin 0.6 mg/dL      eGFR Non African Amer 31 (L) mL/min/1.73      Globulin 5.1 (H) gm/dL      A/G Ratio 0.7 (L) g/dL      BUN/Creatinine Ratio 25.5 (H)     Anion Gap 18.0 (H) mmol/L     Narrative:       The MDRD GFR formula is only valid for adults with stable renal function between ages 18 and 70.    Magnesium [[id number]]  (Normal) Collected:  09/08/18 1106    Specimen:  Blood from Arm, Right Updated:  09/08/18 1134     Magnesium 2.1 mg/dL     CK [[id number]]  (Abnormal) Collected:  09/08/18 1106    Specimen:  Blood from Arm, Right Updated:  09/08/18 1134     Creatine Kinase 691 (H) U/L     Blood Culture - Blood, [[id number]] Collected:  09/08/18 1106    Specimen:  Blood from Arm, Right Updated:  09/08/18 1118        Imaging Results (last 24 hours)     Procedure Component Value Units Date/Time    XR Abdomen 2 View With Chest 1 View [[id number]] Collected:  09/08/18 1142     Updated:  09/08/18 1208    Narrative:       Acute Abdominal Series Withe Upright Chest.    History: Dominant pain    Upright frontal film of the chest and supine and upright films of  the abdomen were obtained.    Comparison: KUB July 20, 2018. Correlation CT August 16, 2018.    EKG leads.  Bilateral linear atelectasis or scar.  Cardiomegaly.  The pulmonary vasculature is not increased.  No pleural effusion.  No pneumothorax.  No acute osseous abnormality.    Bilateral renal calculi.  No nephrostomy tube identified.  Left ureteral stent in place with proximal  pigtail projected over  the mid left kidney and distal pigtail projected over the right  side of the urinary bladder.  Unchanged 1.1 cm calculus right ureterovesicular junction.  No free air.  No mechanical bowel obstruction.  No organomegaly.  No acute osseous abnormality.  Degenerative changes and degenerative disc disease lumbar spine.      Impression:       Conclusion:  Bilateral linear atelectasis or scar.  Cardiomegaly.  Bilateral renal calculi.  No nephrostomy tube identified.  Left ureteral stent in place with proximal pigtail projected over  the mid left kidney and distal pigtail projected over the right  side of the urinary bladder.  Unchanged 1.1 cm calculus right ureterovesicular junction.    [id number]    Electronically signed by:  Garfield Cabrera MD  9/8/2018 12:07 PM CDT  Workstation: [email or web address]            Assessment:    Hospital Problem List     * (Principal)Toxic encephalopathy    NSTEMI (non-ST elevated myocardial infarction) (CMS/HCC)    Acute renal failure with tubular necrosis (CMS/HCC)    Chronic atrial fibrillation (CMS/HCC)    Acute cystitis with hematuria    Sepsis (CMS/HCC)    Decubitus ulcer of left heel, unstageable (CMS/HCC)                Plan:  1. Encephalopathy: new.  likely toxic, given low BP.  Get CXR.  Blood cx, urine cx  2. Nstemi: had low risk stress in 4/18.  Trend trops. Likely type 2.  Cards consult  3. Acute renal failure: new. Likely pre-renal, low BP.  IVF.  Recheck in am  4. Afib: ?chronic  Rate controlled on esmolol drip.  Family thought he was on coumadin, but no mention of coumadin in the recent past.  Cards consult  5. UTI: new.  Has [place] followed by urology for stones.  Given h/o ESBL will start on meropenem.  UCx pending  6. Sepsis: ?uti.  Elevated lactic acid.  Start meropenem and vanco empirically. IVF  7. Left heel ulcer: unstagable.  Wound care consult.  Xray of the left foot.    I discussed the patient's findings and my recommendations with: pt and  family    Signed     Dr Ancelmo Farnsworth, DO   9/8/2018  2:47 PM    EMR Dragon/Transcription disclaimer:   Much of this encounter note is an electronic transcription/translation of spoken language to printed text. The electronic translation of spoken language may permit erroneous, or at times, nonsensical words or phrases to be inadvertently transcribed; Although I have reviewed the note for such errors, some may still exist.

## 2018-09-08 NOTE — ED PROVIDER NOTES
Subjective     Altered Mental Status   Presenting symptoms: confusion and lethargy    Severity:  Mild  Most recent episode:  Today  Episode history:  Single  Duration:  1 day  Timing:  Constant  Progression:  Waxing and waning  Chronicity:  Recurrent  Context: nursing home resident    Associated symptoms: abdominal pain ( mild) and weakness        Review of Systems   Unable to perform ROS: Acuity of condition   Constitutional: Positive for activity change.   Gastrointestinal: Positive for abdominal pain ( mild).   Neurological: Positive for weakness.   Psychiatric/Behavioral: Positive for confusion.       Past Medical History:   Diagnosis Date   • Benign prostatic hyperplasia    • Cerebrovascular accident (CMS/HCC)    • Chronic anemia    • Chronic atrial fibrillation (CMS/HCC)    • CKD (chronic kidney disease) stage 3, GFR 30-59 ml/min    • GERD (gastroesophageal reflux disease)    • Gout    • Hydronephrosis    • Hypercholesterolemia    • Hypertension    • Nephrolithiasis    • Peripheral arterial disease (CMS/HCC)    • Pulmonary hypertension    • Tricuspid regurgitation    • Urinary retention        Allergies   Allergen Reactions   • Lipitor [Atorvastatin] Itching     Pruritus   • Neomycin      Rash   • Simvastatin Itching     Itching  Zocor  -  Rash       Past Surgical History:   Procedure Laterality Date   • CARDIAC CATHETERIZATION     • CYSTOSCOPY, URETEROSCOPY, RETROGRADE PYELOGRAM, STENT INSERTION Left 5/27/2018   • CYSTOSCOPY, URETEROSCOPY, RETROGRADE PYELOGRAM, STENT INSERTION Left 8/22/2018    Procedure: CYSTOSCOPY URETEROSCOPY RETROGRADE PYELOGRAM HOLMIUM LASER STENT INSERTION;  Surgeon: Stephen Serrano MD;  Location: [facility];  Service: Urology   • EXTRACORPOREAL SHOCKWAVE LITHOTRIPSY (ESWL), STENT INSERTION/REMOVAL Left 7/20/2018    Procedure: EXTRACORPOREAL SHOCKWAVE LITHOTRIPSY        ([facility]);  Surgeon: Stephen Serrano MD;  Location: [facility];  Service: Urology   • NEPHROSTOMY          Family History   Problem Relation Age of Onset   • Hypertension Father        Social History     Social History   • Marital status:      Social History Main Topics   • Smoking status: Former Smoker   • Smokeless tobacco: Never Used   • Alcohol use Yes   • Drug use: No   • Sexual activity: Not Currently     Other Topics Concern   • Not on file           Objective   Physical Exam   Constitutional: He is oriented to person, place, and time. He appears well-developed and well-nourished.   HENT:   Head: Normocephalic and atraumatic.   Nose: Nose normal.   Mouth/Throat: Oropharynx is clear and moist.   Eyes: Pupils are equal, round, and reactive to light. Conjunctivae and EOM are normal. Right eye exhibits no discharge. Left eye exhibits no discharge. No scleral icterus.   Neck: Normal range of motion. Neck supple. No tracheal deviation present.   Cardiovascular: Normal heart sounds.  An irregularly irregular rhythm present. Tachycardia present.    No murmur heard.  Pulmonary/Chest: Effort normal and breath sounds normal. No stridor. No respiratory distress. He has no wheezes. He has no rales.   Abdominal: Soft. Bowel sounds are normal. He exhibits no distension and no mass. There is no tenderness. There is no rebound and no guarding.   Musculoskeletal: He exhibits no edema.   Neurological: He is alert and oriented to person, place, and time. Coordination normal.   Skin: Skin is warm and dry. No rash noted. No erythema.   Nursing note and vitals reviewed.      ECG 12 Lead    Date/Time: 9/8/2018 1:57 PM  Performed by: SAM LANE  Authorized by: SAM LANE   Interpreted by physician  Rhythm: atrial fibrillation  Rate: tachycardic  BPM: 124  ST Segments: ST segments normal                   ED Course          Labs Reviewed   COMPREHENSIVE METABOLIC PANEL - Abnormal; Notable for the following:        Result Value    BUN 52 (*)     Creatinine 2.04 (*)     CO2 20.0 (*)     Total Protein 8.7 (*)      AST (SGOT) 61 (*)     Alkaline Phosphatase 177 (*)     eGFR Non African Amer 31 (*)     Globulin 5.1 (*)     A/G Ratio 0.7 (*)     BUN/Creatinine Ratio 25.5 (*)     Anion Gap 18.0 (*)     All other components within normal limits    Narrative:     The MDRD GFR formula is only valid for adults with stable renal function between ages 18 and 70.   LACTIC ACID, PLASMA - Abnormal; Notable for the following:     Lactate 6.5 (*)     All other components within normal limits   URINALYSIS W/ MICROSCOPIC IF INDICATED (NO CULTURE) - Abnormal; Notable for the following:     Appearance, UA Turbid (*)     Blood, UA Moderate (2+) (*)     Protein,  mg/dL (2+) (*)     Leuk Esterase, UA Large (3+) (*)     All other components within normal limits   CK MB - Abnormal; Notable for the following:     CKMB 5.82 (*)     All other components within normal limits   CK - Abnormal; Notable for the following:     Creatine Kinase 691 (*)     All other components within normal limits   TROPONIN (IN-HOUSE) - Abnormal; Notable for the following:     Troponin I 0.209 (*)     All other components within normal limits   CBC WITH AUTO DIFFERENTIAL - Abnormal; Notable for the following:     RBC 3.49 (*)     Hemoglobin 10.1 (*)     Hematocrit 30.8 (*)     RDW 19.7 (*)     RDW-SD 63.6 (*)     Neutrophil % 87.1 (*)     Lymphocyte % 5.7 (*)     Lymphocytes, Absolute 0.52 (*)     Immature Grans, Absolute 0.05 (*)     All other components within normal limits   URINALYSIS, MICROSCOPIC ONLY - Abnormal; Notable for the following:     RBC, UA 6-12 (*)     WBC, UA Too Numerous to Count (*)     Bacteria, UA 2+ (*)     All other components within normal limits   INFLUENZA ANTIGEN, RAPID - Normal   MAGNESIUM - Normal   BLOOD CULTURE   BLOOD CULTURE   RAINBOW DRAW    Narrative:     The following orders were created for panel order [place] Draw.  Procedure                               Abnormality         Status                     ---------                                -----------         ------                     Light Blue Top[[id number]]                                   Final result               Green Top (Gel)[[id number]]                                  Final result               Lavender Top[[id number]]                                     Final result               Gold Top - SST[[id number]]                                   Final result                 Please view results for these tests on the individual orders.   LACTIC ACID REFLEX TIMER   LACTIC ACID, REFLEX   CBC AND DIFFERENTIAL    Narrative:     The following orders were created for panel order CBC & Differential.  Procedure                               Abnormality         Status                     ---------                               -----------         ------                     CBC Auto Differential[[id number]]        Abnormal            Final result                 Please view results for these tests on the individual orders.   LIGHT BLUE TOP   GREEN TOP   LAVENDER TOP   GOLD TOP - SST   EXTRA TUBES    Narrative:     The following orders were created for panel order Extra Tubes.  Procedure                               Abnormality         Status                     ---------                               -----------         ------                     Light Blue Top[[id number]]                                   Final result               Lavender Top[[id number]]                                     Final result               Gold Top - SST[[id number]]                                   Final result               Green Top (Gel)[[id number]]                                  Final result                 Please view results for these tests on the individual orders.   LIGHT BLUE TOP   LAVENDER TOP   GOLD TOP - SST   GREEN TOP       CT Head Without Contrast   Final Result   CONCLUSION:   No acute process.   Cerebral atrophy.   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   Minimal to moderate  small vessel disease.      [id number]      Electronically signed by:  Garfield Cabrera MD  9/8/2018 2:45 PM CDT   Workstation: [id number]      XR Abdomen 2 View With Chest 1 View   Final Result   Conclusion:   Bilateral linear atelectasis or scar.   Cardiomegaly.   Bilateral renal calculi.   No nephrostomy tube identified.   Left ureteral stent in place with proximal pigtail projected over   the mid left kidney and distal pigtail projected over the right   side of the urinary bladder.   Unchanged 1.1 cm calculus right ureterovesicular junction.      [id number]      Electronically signed by:  Garfield Cabrera MD  9/8/2018 12:07 PM CDT   Workstation: [id number]      XR Chest 1 View    (Results Pending)   XR Foot 3+ View Left    (Results Pending)                 MDM      Final diagnoses:   Acute renal failure, unspecified acute renal failure type (CMS/HCC)   Lethargic   Paroxysmal atrial fibrillation (CMS/HCC)   NSTEMI (non-ST elevated myocardial infarction) (CMS/HCC)            Dariel Hidalgo MD  09/08/18 2338       Dariel Hidalgo MD  09/08/18 1731

## 2018-09-08 NOTE — PLAN OF CARE
Problem: Patient Care Overview  Goal: Plan of Care Review  Outcome: Ongoing (interventions implemented as appropriate)    Goal: Interprofessional Rounds/Family Conf  Outcome: Ongoing (interventions implemented as appropriate)      Problem: Cardiac: ACS (Acute Coronary Syndrome) (Adult)  Goal: Signs and Symptoms of Listed Potential Problems Will be Absent, Minimized or Managed (Cardiac: ACS)  Outcome: Ongoing (interventions implemented as appropriate)      Problem: Urinary Tract Infection (Adult)  Goal: Signs and Symptoms of Listed Potential Problems Will be Absent, Minimized or Managed (Urinary Tract Infection)  Outcome: Ongoing (interventions implemented as appropriate)      Problem: Wound (Includes Pressure Injury) (Adult)  Goal: Signs and Symptoms of Listed Potential Problems Will be Absent, Minimized or Managed (Wound)  Outcome: Ongoing (interventions implemented as appropriate)      Problem: Skin Injury Risk (Adult)  Goal: Identify Related Risk Factors and Signs and Symptoms  Outcome: Ongoing (interventions implemented as appropriate)    Goal: Skin Health and Integrity  Outcome: Ongoing (interventions implemented as appropriate)

## 2018-09-08 NOTE — PROGRESS NOTES
"Pharmacokinetics by Pharmacy - Vancomycin Initial Consult    Ravi Park is a 87 y.o. male being initiated on vancomycin for sepsis. Patient is also receiving Merrem.      Objective:     [Ht: 182.9 cm (72\"); Wt: 83.5 kg (184 lb)]     Lab Results   Component Value Date    WBC 9.16 09/08/2018    WBC 4.54 08/30/2018    WBC 6.23 08/28/2018      Lab Results   Component Value Date    LACTATE 6.5 (C) 09/08/2018    LACTATE 1.0 07/21/2018    LACTATE 2.9 (C) 07/16/2018      Temp Readings from Last 1 Encounters:   09/08/18 98.4 °F (36.9 °C) (Oral)     Estimated Creatinine Clearance: 30.1 mL/min (A) (by C-G formula based on SCr of 2.04 mg/dL (H)).   Lab Results   Component Value Date    CREATININE 2.04 (H) 09/08/2018    CREATININE 1.09 08/30/2018    CREATININE 0.97 08/28/2018       Baseline culture results:  Microbiology Results (last 10 days)       Procedure Component Value - Date/Time    Influenza Antigen, Rapid - Swab, Nasopharynx [[id number]]  (Normal) Collected:  09/08/18 1107    Lab Status:  Final result Specimen:  Swab from Nasopharynx Updated:  09/08/18 1249     Influenza A Ag, EIA Negative     Influenza B Ag, EIA Negative    Urine Culture [[id number]]  (Abnormal) Collected:  09/07/18 1054    Lab Status:  Preliminary result Specimen:  Urine from Urine, Clean Catch Updated:  09/08/18 0819     Urine Culture 10,000-20,000 CFU/mL Gram Negative Bacilli (A)    Urine Culture [[id number]]  (Abnormal)  (Susceptibility) Collected:  09/05/18 1810    Lab Status:  Final result Specimen:  Urine from Urine, Clean Catch Updated:  09/07/18 0632     Urine Culture >100,000 CFU/mL Escherichia coli ESBL (C)     Comment:   Consider infectious disease consult.  Susceptibility results may not correlate to clinical outcomes.  Organisms producing extended spectrum beta lactamase are clinically resistant to therapy with penicillins, cephalosporins, or aztreonam despite apparent in vitro susceptibility.  Therapeutic failure results from treatment " with these agents.  contact precautions requested  Multi Drug Resistant Organism         Susceptibility        Escherichia coli ESBL     ANYA     Gentamicin >8 ug/ml Resistant     Levofloxacin >4 ug/ml Resistant     Meropenem <=4 ug/ml Susceptible     Nitrofurantoin <=32 ug/ml Susceptible     Piperacillin + Tazobactam <=16 ug/ml Susceptible     Tobramycin >8 ug/ml Resistant     Trimethoprim + Sulfamethoxazole >2/38 ug/ml Resistant                                 Assessment  WBC 9.16  SCr 2.04 - was 1.09 about a week ago  Lactate 6.5    Was just discharged 8/31/18 due to ESBL E. Coli pyelonephritis     Blood cultures in progress  Urine culture from 9/5/18 shows ESBL E. Coli  Urine culture from 9/7/18 show GNR    Patient received 1750 mg x1 in ER 9/8 at approximately 1400    Below regimen is estimated to provide:   (goal 400-600)  Peak 33 (goal 30-40)  Trough 16.9 (goal 10-20)      Plan  1. Initiate 1000 mg Q24 hours to begin 9/9 at 1400  2. Peak ordered for 9/9 at 1600. Trough ordered for 9/10 at 1330. This is between the second and third dose.  3. Pharmacy will monitor renal function and adjust dose accordingly.    Valery Mace RPH  09/08/18 3:10 PM

## 2018-09-08 NOTE — CONSULTS
CARDIOLOGY CONSULTATION NOTE    Referring Provider: Dariel Hidalgo MD    Reason for Consultation: Rapid atrial fibrillation.    Ravi Park  9/9/1930  87 y.o. male      HPI    87-year-old  male getting rehabilitation nursing home was brought in with complaints of altered mental status by family since yesterday.  Questionable fever.  Patient denies any shortness of breath, abdominal pain, palpitations, chest pain or any other symptoms.  Patient was found to be in A. fib with RVR.  The patient has a history of chronic atrial fibrillation.  He was previously on oral anticoagulation but appears this has been stopped due to recurrent hematuria.  The patient was just discharged from the hospital one week ago with a diagnosis of urinary tract infection with indwelling catheter.  Discharge medication summary list Cardizem  mg twice a day as well as Toprol-XL 25 mg daily..  It is unclear whether he has been receiving his medication but I assume he has.  Today, in the ER the patient was given IV has been called to help with rate control.  Heart rate is now improved.  Patient did drop his blood pressure after bolus and drip was subsequently discontinued.  Patient has been started back on antibiotics and receiving IV fluid supplementation.      SUBJECTIVE    Past Medical History:   Diagnosis Date   • Benign prostatic hyperplasia    • Cerebrovascular accident (CMS/HCC)    • Chronic anemia    • Chronic atrial fibrillation (CMS/HCC)    • CKD (chronic kidney disease) stage 3, GFR 30-59 ml/min    • GERD (gastroesophageal reflux disease)    • Gout    • Hydronephrosis    • Hypercholesterolemia    • Hypertension    • Nephrolithiasis    • Peripheral arterial disease (CMS/HCC)    • Pulmonary hypertension    • Tricuspid regurgitation    • Urinary retention          Past Surgical History:   Procedure Laterality Date   • CARDIAC CATHETERIZATION     • CYSTOSCOPY, URETEROSCOPY, RETROGRADE PYELOGRAM, STENT INSERTION Left  5/27/2018   • CYSTOSCOPY, URETEROSCOPY, RETROGRADE PYELOGRAM, STENT INSERTION Left 8/22/2018    Procedure: CYSTOSCOPY URETEROSCOPY RETROGRADE PYELOGRAM HOLMIUM LASER STENT INSERTION;  Surgeon: Stephen Serrano MD;  Location: [facility];  Service: Urology   • EXTRACORPOREAL SHOCKWAVE LITHOTRIPSY (ESWL), STENT INSERTION/REMOVAL Left 7/20/2018    Procedure: EXTRACORPOREAL SHOCKWAVE LITHOTRIPSY        ([facility]);  Surgeon: Stephen Serrano MD;  Location: [facility];  Service: Urology   • NEPHROSTOMY           Family History   Problem Relation Age of Onset   • Hypertension Father          Social History     Social History   • Marital status:      Spouse name: N/A   • Number of children: N/A   • Years of education: N/A     Occupational History   • Not on file.     Social History Main Topics   • Smoking status: Former Smoker   • Smokeless tobacco: Never Used   • Alcohol use Yes   • Drug use: No   • Sexual activity: Not Currently     Other Topics Concern   • Not on file     Social History Narrative   • No narrative on file         Prior to Admission medications    Medication Sig Start Date End Date Taking? Authorizing Provider   vitamin D (ERGOCALCIFEROL) 35014 units capsule capsule Take 50,000 Units by mouth Every 7 (Seven) Days. Take 1 capsule by mouth every 7 days on Thursday   Yes Khurram Castellanos MD   acetaminophen (TYLENOL) 325 MG tablet Take 650 mg by mouth Every 6 (Six) Hours As Needed for Mild Pain , Headache or Fever.    Provider, MD Khurram   albuterol (PROVENTIL HFA;VENTOLIN HFA) 108 (90 BASE) MCG/ACT inhaler Inhale 2 puffs Every 6 (Six) Hours As Needed for Wheezing. 2 Puffs 4 times per day as needed. 8/10/17   Terrell Arzate MD   bumetanide (BUMEX) 1 MG tablet Take 1 tablet by mouth Daily. 4/29/18   Dawson Clark MD   cefuroxime (CEFTIN) 500 MG tablet Take 500 mg by mouth 2 (Two) Times a Day. 9/6/18 9/13/18  Khurram Castellanos MD   diltiaZEM CD (CARDIZEM CD) 120 MG 24 hr  capsule Take 1 capsule by mouth Daily. 4/29/18   Dawson Clark MD   docusate sodium 100 MG capsule Take 100 mg by mouth 2 (Two) Times a Day. 8/31/18   Sridevi Truong APRN   doxazosin (CARDURA) 2 MG tablet Take 1 tablet by mouth Every Night. 9/6/17   Mikey Hernández MD   doxycycline (DORYX) 100 MG enteric coated tablet Take 1 tablet by mouth 2 (Two) Times a Day for 7 days. 9/8/18 9/15/18  Dariel Hidalgo MD   famotidine (PEPCID) 40 MG tablet Take 1 tablet by mouth Daily. 8/31/18   Sridevi Truong APRN   finasteride (PROSCAR) 5 MG tablet Take 5 mg by mouth Daily.    Khurram Castellanos MD   fluconazole (DIFLUCAN) 100 MG tablet Take 100 mg by mouth Daily. 9/7/18 9/14/18  Khurram Castellanos MD   fluticasone (FLONASE) 50 MCG/ACT nasal spray 1 spray by Each Nare route Daily.    Khurram Castellanos MD   folic acid (FOLVITE) 1 MG tablet Take 1 tablet by mouth Daily. 4/29/18   Dawson Clark MD   levothyroxine (SYNTHROID, LEVOTHROID) 50 MCG tablet Take 1 tablet by mouth Daily. 3/28/17   Mikey Hernández MD   loratadine (CLARITIN) 10 MG tablet Take 10 mg by mouth Daily.    Khurram Castellanos MD   metoprolol succinate XL (TOPROL-XL) 25 MG 24 hr tablet Take 1 tablet by mouth Daily. 9/6/17   Mikey Hernández MD   nitrofurantoin, macrocrystal-monohydrate, (MACROBID) 100 MG capsule Take 100 mg by mouth 2 (Two) Times a Day. 9/6/18 9/16/18  Khurram Castellanos MD   oxyCODONE-acetaminophen (PERCOCET) 5-325 MG per tablet Take 1 tablet by mouth Every 4 (Four) Hours As Needed for Moderate Pain  or Severe Pain . 8/31/18   Sridevi Truong APRN   polyethylene glycol (MIRALAX) pack packet Take 17 g by mouth Daily. 8/31/18   Sridevi Truong APRN   potassium chloride (K-DUR,KLOR-CON) 10 MEQ CR tablet Take 5 mEq by mouth Daily.    Provider, MD Khurram   tamsulosin (FLOMAX) 0.4 MG capsule 24 hr capsule Take 2 capsules by mouth Every Night. 8/31/18   Sridevi Truong APRN   TiZANidine (ZANAFLEX) 2 MG  "capsule Take 2 mg by mouth Daily As Needed for Muscle Spasms.    Provider, MD Khurram   vitamin B-12 (CYANOCOBALAMIN) 1000 MCG tablet Take 1,000 mcg by mouth Daily.    Provider, MD Khurram   bacitracin 500 UNIT/GM ointment Apply  topically to the appropriate area as directed Daily. 8/31/18 9/8/18  Sridevi Truong APRJOHNY         OBJECTIVE    /52   Pulse 93   Temp 98.4 °F (36.9 °C) (Oral)   Resp 18   Ht 182.9 cm (72\")   Wt 83.5 kg (184 lb)   SpO2 96%   BMI 24.95 kg/m²       Review of Systems     Constitutional:  Denies recent weight loss, weight gain, fever or chills, no change in exercise tolerance     HENT:  Denies any hearing loss, epistaxis, hoarseness, or difficulty speaking.     Eyes: Wears eyeglasses or contact lenses     Respiratory:  Denies dyspnea with exertion,no cough, wheezing, or hemoptysis.     Cardiovascular: Negative for palpitations, chest pain, orthopnea, PND, peripheral edema, syncope, or claudication.     Gastrointestinal:  Denies change in bowel habits, dyspepsia, ulcer disease, hematochezia, or melena.     Endocrine: Negative for cold intolerance, heat intolerance, polydipsia, polyphagia and polyuria. Denies any history of weight change, heat/cold intolerance, polydipsia, polyuria     Genitourinary: Negative.      Musculoskeletal: Denies any history of arthritic symptoms or back problems     Skin:  Denies any change in hair or nails, rashes, or skin lesions.     Allergic/Immunologic: Negative.  Negative for environmental allergies, food allergies and immunocompromised state.     Neurological:  Denies any history of recurrent headaches, strokes, TIA, or seizure disorder.     Hematological: Denies any food allergies, seasonal allergies, bleeding disorders, or lymphadenopathy.     Psychiatric/Behavioral: Denies any history of depression, substance abuse, or change in cognitive function.       Physical Exam     Constitutional: Cooperative, alert and oriented, well-developed, " well-nourished, in no acute distress.     HENT:   Head: Normocephalic, normal hair patterns, no masses or tenderness.  Ears, Nose, and Throat: No gross abnormalities. No pallor or cyanosis. Dentition good.   Eyes: EOMS intact, PERRL, conjunctivae and lids unremarkable. Fundoscopic exam and visual fields not performed.   Neck: No palpable masses or adenopathy, no thyromegaly, no JVD, carotid pulses are full and equal bilaterally and without  Bruits.     Cardiovascular: Regular rhythm, S1 and S2 normal, no S3 or S4. Apical impulse not displaced. No murmurs, gallops, or rubs detected.     Pulmonary/Chest: Chest: normal symmetry, no tenderness to palpation, normal respiratory excursion, no intercostal retraction, no use of accessory muscles.            Pulmonary: Normal breath sounds. No rales or rhonchi.    Abdominal: Abdomen soft, bowel sounds normoactive, no masses, no hepatosplenomegaly, non-tender, no bruits.     Musculoskeletal: No deformities, clubbing, cyanosis, erythema, or edema observed. There are no spinal abnormalities noted. Normal muscle strength and tone. Pulses full and equal in all extremities, no bruits auscultated.     Neurological: No gross motor or sensory deficits noted, Cranial nerves 2-12 normal. affect appropriate, oriented to time, person, place.     Skin: Warm and dry to the touch, no apparent skin lesions or masses noted.     Psychiatric: Normal mood and affect. Behavior is normal. Judgment and thought content normal.     RESULTS  Lab Results (last 24 hours)     Procedure Component Value Units Date/Time    Lactic Acid, Reflex Timer (This will reflex a repeat order 3-3:15 hours after ordered.) [[id number]] Collected:  09/08/18 1106    Specimen:  Blood from Arm, Right Updated:  09/08/18 1445     Extra Tube Hold for add-ons.     Comment: Auto resulted.       Urinalysis, Microscopic Only - Urine, Clean Catch [[id number]]  (Abnormal) Collected:  09/08/18 1222    Specimen:  Urine from Urine,  Catheter Updated:  09/08/18 1407     RBC, UA 6-12 (A) /HPF      WBC, UA Too Numerous to Count (A) /HPF      Bacteria, UA 2+ (A) /HPF      Squamous Epithelial Cells, UA None Seen /HPF      Hyaline Casts, UA None Seen /LPF      Methodology Manual Light Microscopy    Urinalysis With Microscopic If Indicated (No Culture) - Urine, Clean Catch [[id number]]  (Abnormal) Collected:  09/08/18 1222    Specimen:  Urine from Urine, Catheter Updated:  09/08/18 1311     Color, UA Yellow     Appearance, UA Turbid (A)     pH, UA 6.0     Specific Gravity, UA 1.020     Glucose, UA Negative     Ketones, UA Negative     Bilirubin, UA Negative     Blood, UA Moderate (2+) (A)     Protein,  mg/dL (2+) (A)     Leuk Esterase, UA Large (3+) (A)     Nitrite, UA Negative     Urobilinogen, UA 0.2 E.U./dL    Influenza Antigen, Rapid - Swab, Nasopharynx [[id number]]  (Normal) Collected:  09/08/18 1107    Specimen:  Swab from Nasopharynx Updated:  09/08/18 1249     Influenza A Ag, EIA Negative     Influenza B Ag, EIA Negative    Extra Tubes [[id number]] Collected:  09/08/18 1134    Specimen:  Blood from Blood, Venous Line Updated:  09/08/18 1245    Narrative:       The following orders were created for panel order Extra Tubes.  Procedure                               Abnormality         Status                     ---------                               -----------         ------                     Light Blue Top[[id number]]                                   Final result               Lavender Top[[id number]]                                     Final result               Gold Top - SST[[id number]]                                   Final result               Green Top (Gel)[[id number]]                                  Final result                 Please view results for these tests on the individual orders.    Light Blue Top [[id number]] Collected:  09/08/18 1134    Specimen:  Blood from Arm, Left Updated:  09/08/18 1245     Extra Tube hold for add-on      Comment: Auto resulted       Lavender Top [[id number]] Collected:  09/08/18 1134    Specimen:  Blood from Arm, Left Updated:  09/08/18 1245     Extra Tube hold for add-on     Comment: Auto resulted       Gold Top - SST [[id number]] Collected:  09/08/18 1134    Specimen:  Blood from Arm, Left Updated:  09/08/18 1245     Extra Tube Hold for add-ons.     Comment: Auto resulted.       Green Top (Gel) [[id number]] Collected:  09/08/18 1134    Specimen:  Blood from Arm, Left Updated:  09/08/18 1245     Extra Tube Hold for add-ons.     Comment: Auto resulted.       [place] Draw [[id number]] Collected:  09/08/18 1106    Specimen:  Blood Updated:  09/08/18 1215    Narrative:       The following orders were created for panel order [place] Draw.  Procedure                               Abnormality         Status                     ---------                               -----------         ------                     Light Blue Top[[id number]]                                   Final result               Green Top (Gel)[[id number]]                                  Final result               Lavender Top[[id number]]                                     Final result               Gold Top - SST[[id number]]                                   Final result                 Please view results for these tests on the individual orders.    Light Blue Top [[id number]] Collected:  09/08/18 1106    Specimen:  Blood from Arm, Right Updated:  09/08/18 1215     Extra Tube hold for add-on     Comment: Auto resulted       Green Top (Gel) [[id number]] Collected:  09/08/18 1106    Specimen:  Blood from Arm, Right Updated:  09/08/18 1215     Extra Tube Hold for add-ons.     Comment: Auto resulted.       Lavender Top [[id number]] Collected:  09/08/18 1106    Specimen:  Blood from Arm, Right Updated:  09/08/18 1215     Extra Tube hold for add-on     Comment: Auto resulted       Gold Top - SST [[id number]] Collected:  09/08/18 1106    Specimen:  Blood from  Arm, Right Updated:  09/08/18 1215     Extra Tube Hold for add-ons.     Comment: Auto resulted.       Troponin [[id number]]  (Abnormal) Collected:  09/08/18 1106    Specimen:  Blood from Arm, Right Updated:  09/08/18 1152     Troponin I 0.209 (C) ng/mL     CK-MB [[id number]]  (Abnormal) Collected:  09/08/18 1106    Specimen:  Blood from Arm, Right Updated:  09/08/18 1143     CKMB 5.82 (H) ng/mL     Blood Culture - Blood, [[id number]] Collected:  09/08/18 1134    Specimen:  Blood from Arm, Left Updated:  09/08/18 1138    Lactic Acid, Plasma [[id number]]  (Abnormal) Collected:  09/08/18 1106    Specimen:  Blood from Arm, Right Updated:  09/08/18 1137     Lactate 6.5 (C) mmol/L     CBC & Differential [[id number]] Collected:  09/08/18 1106    Specimen:  Blood Updated:  09/08/18 1136    Narrative:       The following orders were created for panel order CBC & Differential.  Procedure                               Abnormality         Status                     ---------                               -----------         ------                     CBC Auto Differential[[id number]]        Abnormal            Final result                 Please view results for these tests on the individual orders.    CBC Auto Differential [[id number]]  (Abnormal) Collected:  09/08/18 1106    Specimen:  Blood from Arm, Right Updated:  09/08/18 1136     WBC 9.16 10*3/mm3      RBC 3.49 (L) 10*6/mm3      Hemoglobin 10.1 (L) g/dL      Hematocrit 30.8 (L) %      MCV 88.3 fL      MCH 28.9 pg      MCHC 32.8 g/dL      RDW 19.7 (H) %      RDW-SD 63.6 (H) fl      MPV 9.3 fL      Platelets 186 10*3/mm3      Neutrophil % 87.1 (H) %      Lymphocyte % 5.7 (L) %      Monocyte % 5.6 %      Eosinophil % 1.1 %      Basophil % 0.0 %      Immature Grans % 0.5 %      Neutrophils, Absolute 7.98 10*3/mm3      Lymphocytes, Absolute 0.52 (L) 10*3/mm3      Monocytes, Absolute 0.51 10*3/mm3      Eosinophils, Absolute 0.10 10*3/mm3      Basophils, Absolute 0.00 10*3/mm3       Immature Grans, Absolute 0.05 (H) 10*3/mm3     Comprehensive Metabolic Panel [[id number]]  (Abnormal) Collected:  09/08/18 1106    Specimen:  Blood from Arm, Right Updated:  09/08/18 1134     Glucose 95 mg/dL      BUN 52 (H) mg/dL      Creatinine 2.04 (H) mg/dL      Sodium 139 mmol/L      Potassium 5.0 mmol/L      Chloride 101 mmol/L      CO2 20.0 (L) mmol/L      Calcium 9.4 mg/dL      Total Protein 8.7 (H) g/dL      Albumin 3.60 g/dL      ALT (SGPT) 24 U/L      AST (SGOT) 61 (H) U/L      Alkaline Phosphatase 177 (H) U/L      Total Bilirubin 0.6 mg/dL      eGFR Non African Amer 31 (L) mL/min/1.73      Globulin 5.1 (H) gm/dL      A/G Ratio 0.7 (L) g/dL      BUN/Creatinine Ratio 25.5 (H)     Anion Gap 18.0 (H) mmol/L     Narrative:       The MDRD GFR formula is only valid for adults with stable renal function between ages 18 and 70.    Magnesium [[id number]]  (Normal) Collected:  09/08/18 1106    Specimen:  Blood from Arm, Right Updated:  09/08/18 1134     Magnesium 2.1 mg/dL     CK [[id number]]  (Abnormal) Collected:  09/08/18 1106    Specimen:  Blood from Arm, Right Updated:  09/08/18 1134     Creatine Kinase 691 (H) U/L     Blood Culture - Blood, [[id number]] Collected:  09/08/18 1106    Specimen:  Blood from Arm, Right Updated:  09/08/18 1118        Imaging Results (last 24 hours)     Procedure Component Value Units Date/Time    CT Head Without Contrast [[id number]] Collected:  09/08/18 1417     Updated:  09/08/18 1446    Narrative:         CT Head Without Contrast    History: Decreased level of alertness.    Axial scans of the brain were obtained without intravenous  contrast.  Coronal and sagital reconstructions were preformed.    This exam was performed according to our departmental  dose-optimization program, which includes automated exposure  control, adjustment of the mA and/or kV according to patient size  and/or use of iterative reconstruction technique.    DLP: 1034.0    Comparison: April 26, 2018    Bone  windows are unremarkable.  The visualized paranasal sinuses are unremarkable.    No acute process.  Cerebral atrophy.  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  Minimal to moderate small vessel disease.  No hemorrhage.  No mass.  No abnormal areas of increased attenuation.  No midline shift.  No abnormal extra-axial fluid collections.      Impression:       CONCLUSION:  No acute process.  Cerebral atrophy.  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  Minimal to moderate small vessel disease.    [id number]    Electronically signed by:  Garfield Cabrera MD  9/8/2018 2:45 PM CDT  Workstation: [employer] Abdomen 2 View With Chest 1 View [[id number]] Collected:  09/08/18 1142     Updated:  09/08/18 1208    Narrative:       Acute Abdominal Series Withe Upright Chest.    History: Dominant pain    Upright frontal film of the chest and supine and upright films of  the abdomen were obtained.    Comparison: [facility] July 20, 2018. Correlation CT August 16, 2018.    EKG leads.  Bilateral linear atelectasis or scar.  Cardiomegaly.  The pulmonary vasculature is not increased.  No pleural effusion.  No pneumothorax.  No acute osseous abnormality.    Bilateral renal calculi.  No nephrostomy tube identified.  Left ureteral stent in place with proximal pigtail projected over  the mid left kidney and distal pigtail projected over the right  side of the urinary bladder.  Unchanged 1.1 cm calculus right ureterovesicular junction.  No free air.  No mechanical bowel obstruction.  No organomegaly.  No acute osseous abnormality.  Degenerative changes and degenerative disc disease lumbar spine.      Impression:       Conclusion:  Bilateral linear atelectasis or scar.  Cardiomegaly.  Bilateral renal calculi.  No nephrostomy tube identified.  Left ureteral stent in place with proximal pigtail projected over  the mid left kidney and distal pigtail projected over the right  side of the urinary  bladder.  Unchanged 1.1 cm calculus right ureterovesicular junction.    [id number]    Electronically signed by:  Garfield Cabrera MD  9/8/2018 12:07 PM CDT  Workstation: [employer]            ASSESSMENT AND PLAN    1.  Chronic atrial fibrillation - patient was not well rate controlled on presentation.  I suspect this is being driven by underlying metabolic process.  He is not a candidate for anticoagulation due to prior recurrent hematuria.  We will focus on rate control.  We will reinitiate oral therapies in the form of both beta blockers and calcium channel blockers.  Will reserve IV therapy for further recurrence of rapid heart rate.  We will continue with IV fluid supplementation and tailored antibiotic therapy.  No further cardiac workup is warranted at this time.    Mike Brito MD  9/8/2018  3:11 PM

## 2018-09-09 PROBLEM — I24.8 DEMAND ISCHEMIA (HCC): Status: ACTIVE | Noted: 2018-01-01

## 2018-09-09 NOTE — PLAN OF CARE
Problem: Patient Care Overview  Goal: Individualization and Mutuality  Outcome: Ongoing (interventions implemented as appropriate)    Goal: Discharge Needs Assessment  Outcome: Ongoing (interventions implemented as appropriate)   09/08/18 1600   Discharge Needs Assessment   Patient/Family Anticipates Transition to long term care facility   Patient/Family Anticipated Services at Transition skilled nursing   Transportation Concerns car, none   Transportation Anticipated family or friend will provide       Problem: Cardiac: ACS (Acute Coronary Syndrome) (Adult)  Goal: Signs and Symptoms of Listed Potential Problems Will be Absent, Minimized or Managed (Cardiac: ACS)  Outcome: Ongoing (interventions implemented as appropriate)      Problem: Urinary Tract Infection (Adult)  Goal: Signs and Symptoms of Listed Potential Problems Will be Absent, Minimized or Managed (Urinary Tract Infection)  Outcome: Ongoing (interventions implemented as appropriate)      Problem: Wound (Includes Pressure Injury) (Adult)  Goal: Signs and Symptoms of Listed Potential Problems Will be Absent, Minimized or Managed (Wound)  Outcome: Ongoing (interventions implemented as appropriate)      Problem: Skin Injury Risk (Adult)  Goal: Skin Health and Integrity  Outcome: Ongoing (interventions implemented as appropriate)      Problem: Fall Risk (Adult)  Goal: Absence of Fall  Outcome: Ongoing (interventions implemented as appropriate)

## 2018-09-09 NOTE — PROGRESS NOTES
CARDIOLOGY PROGRESS NOTE      LOS: 1 day   Patient Care Team:  Terrell Arzate MD as PCP - General  Terrell [facility]ate [facility] PCP [facility] ([facility]    Problems/Diagnoses: Chronic atrial fibrillation    Subjective     Interval History: Patient is awake alert and oriented.  Patient denies chest pain, shortness of breath or palpitations.  Rhythm is atrial fibrillation.  Rate is controlled.    Vital Signs  Temp:  [97 °F (36.1 °C)-98 °F (36.7 °C)] 97 °F (36.1 °C)  Heart Rate:  [] 65  Resp:  [18-24] 18  BP: ()/(47-64) 100/52    Physical Exam:    Constitutional: Cooperative, alert and oriented, well-developed, well-nourished, in no acute distress.     HENT:   Head: Normocephalic, normal hair patterns, no masses or tenderness.  Ears, Nose, and Throat: No gross abnormalities. No pallor or cyanosis. Dentition good.   Eyes: EOMS intact, PERRL, conjunctivae and lids unremarkable. Fundoscopic exam and visual fields not performed.   Neck: No palpable masses or adenopathy, no thyromegaly, no JVD, carotid pulses are full and equal bilaterally and without  Bruits.     Cardiovascular: Iregular rhythm, S1 and S2 normal, no S3 or S4. Apical impulse not displaced. No gallops, or rubs detected.     Pulmonary/Chest: Chest: normal symmetry, no tenderness to palpation, normal respiratory excursion, no intercostal retraction, no use of accessory muscles.            Pulmonary: Normal breath sounds. No rales or rhonchi.    Abdominal: Abdomen soft, bowel sounds normoactive, no masses, no hepatosplenomegaly, non-tender, no bruits.     Musculoskeletal: No deformities, clubbing, cyanosis, erythema, or edema observed. There are no spinal abnormalities noted. Normal muscle strength and tone. Pulses full and equal in all extremities, no bruits auscultated.     Skin: Warm and dry to the touch, no apparent skin lesions or masses noted.     Psychiatric: He has a normal mood and  affect. His behavior is normal. Judgment and thought content normal.     Results Review:    Lab Results (last 24 hours)     Procedure Component Value Units Date/Time    Blood Culture - Blood, [[id number]]  (Abnormal) Collected:  09/08/18 1106    Specimen:  Blood from Arm, Right Updated:  09/09/18 1026     Blood Culture Abnormal Stain (A)     Gram Stain Result Aerobic Bottle Gram negative bacilli     Comment: 2 bottles positive of 4 drawn       Blood Culture - Blood, [[id number]]  (Abnormal) Collected:  09/08/18 1134    Specimen:  Blood from Arm, Left Updated:  09/09/18 1024     Blood Culture Abnormal Stain (A)     Gram Stain Result Aerobic Bottle Gram negative bacilli     Comment: 1 bottle positive of 4 drawn       Blood Culture ID, PCR - Blood, [[id number]]  (Abnormal) Collected:  09/08/18 1134    Specimen:  Blood from Arm, Left Updated:  09/09/18 1024     BCID, PCR Pseudomonas aeruginosa. Identification by BCID PCR. (C)    Narrative:         Sensitivity to Follow    Troponin [[id number]]  (Abnormal) Collected:  09/09/18 0749    Specimen:  Blood Updated:  09/09/18 0843     Troponin I 0.808 (C) ng/mL     Troponin [[id number]]  (Abnormal) Collected:  09/09/18 0443    Specimen:  Blood Updated:  09/09/18 0553     Troponin I 0.893 (C) ng/mL     Comprehensive Metabolic Panel [[id number]]  (Abnormal) Collected:  09/09/18 0443    Specimen:  Blood Updated:  09/09/18 0545     Glucose 84 mg/dL      BUN 40 (H) mg/dL      Creatinine 1.39 (H) mg/dL      Sodium 140 mmol/L      Potassium 4.6 mmol/L      Chloride 111 (H) mmol/L      CO2 21.0 (L) mmol/L      Calcium 8.5 mg/dL      Total Protein 6.7 g/dL      Albumin 2.70 (L) g/dL      ALT (SGPT) 30 U/L      AST (SGOT) 41 U/L      Alkaline Phosphatase 122 U/L      Total Bilirubin 0.3 mg/dL      eGFR Non African Amer 48 mL/min/1.73      Globulin 4.0 (H) gm/dL      A/G Ratio 0.7 (L) g/dL      BUN/Creatinine Ratio 28.8 (H)     Anion Gap 8.0 mmol/L     Narrative:       The MDRD GFR formula is  only valid for adults with stable renal function between ages 18 and 70.    CBC & Differential [[id number]] Collected:  09/09/18 0443    Specimen:  Blood Updated:  09/09/18 0525    Narrative:       The following orders were created for panel order CBC & Differential.  Procedure                               Abnormality         Status                     ---------                               -----------         ------                     CBC Auto Differential[[id number]]        Abnormal            Final result                 Please view results for these tests on the individual orders.    CBC Auto Differential [[id number]]  (Abnormal) Collected:  09/09/18 0443    Specimen:  Blood Updated:  09/09/18 0525     WBC 5.26 10*3/mm3      RBC 2.90 (L) 10*6/mm3      Hemoglobin 8.4 (L) g/dL      Hematocrit 25.3 (L) %      MCV 87.2 fL      MCH 29.0 pg      MCHC 33.2 g/dL      RDW 19.8 (H) %      RDW-SD 63.5 (H) fl      MPV 9.3 fL      Platelets 147 (L) 10*3/mm3      Neutrophil % 72.9 %      Lymphocyte % 16.0 %      Monocyte % 7.8 %      Eosinophil % 2.9 %      Basophil % 0.2 %      Immature Grans % 0.2 %      Neutrophils, Absolute 3.84 10*3/mm3      Lymphocytes, Absolute 0.84 10*3/mm3      Monocytes, Absolute 0.41 10*3/mm3      Eosinophils, Absolute 0.15 10*3/mm3      Basophils, Absolute 0.01 10*3/mm3      Immature Grans, Absolute 0.01 10*3/mm3     Troponin [[id number]]  (Abnormal) Collected:  09/09/18 0112    Specimen:  Blood Updated:  09/09/18 0221     Troponin I 1.050 (C) ng/mL     Troponin [[id number]]  (Abnormal) Collected:  09/08/18 2147    Specimen:  Blood Updated:  09/08/18 2233     Troponin I 1.440 (C) ng/mL     Troponin [[id number]]  (Abnormal) Collected:  09/08/18 1935    Specimen:  Blood Updated:  09/08/18 2042     Troponin I 1.530 (C) ng/mL     CRE Screen by PCR - Swab, Large Intestine, Rectum [[id number]] Collected:  09/08/18 1707    Specimen:  Swab from Large Intestine, Rectum Updated:  09/08/18 1811     CRE SCREEN  Not Detected     Comment: Test performed by real-time polymerase chain reaction (qPCR).        OXA 48 Strain Not Detected     IMP STRAIN Not Detected     VIM STRAIN Not Detected     NDM Strain Not Detected     KPC Strain Not Detected    Troponin [[id number]]  (Abnormal) Collected:  09/08/18 1550    Specimen:  Blood Updated:  09/08/18 1715     Troponin I 0.967 (C) ng/mL     Lactic Acid, Reflex [[id number]]  (Abnormal) Collected:  09/08/18 1516    Specimen:  Blood Updated:  09/08/18 1537     Lactate 3.9 (C) mmol/L     Lactic Acid, Reflex Timer (This will reflex a repeat order 3-3:15 hours after ordered.) [[id number]] Collected:  09/08/18 1106    Specimen:  Blood from Arm, Right Updated:  09/08/18 1445     Extra Tube Hold for add-ons.     Comment: Auto resulted.       Urinalysis, Microscopic Only - Urine, Clean Catch [[id number]]  (Abnormal) Collected:  09/08/18 1222    Specimen:  Urine from Urine, Catheter Updated:  09/08/18 1407     RBC, UA 6-12 (A) /HPF      WBC, UA Too Numerous to Count (A) /HPF      Bacteria, UA 2+ (A) /HPF      Squamous Epithelial Cells, UA None Seen /HPF      Hyaline Casts, UA None Seen /LPF      Methodology Manual Light Microscopy    Urinalysis With Microscopic If Indicated (No Culture) - Urine, Clean Catch [[id number]]  (Abnormal) Collected:  09/08/18 1222    Specimen:  Urine from Urine, Catheter Updated:  09/08/18 1311     Color, UA Yellow     Appearance, UA Turbid (A)     pH, UA 6.0     Specific Gravity, UA 1.020     Glucose, UA Negative     Ketones, UA Negative     Bilirubin, UA Negative     Blood, UA Moderate (2+) (A)     Protein,  mg/dL (2+) (A)     Leuk Esterase, UA Large (3+) (A)     Nitrite, UA Negative     Urobilinogen, UA 0.2 E.U./dL    Influenza Antigen, Rapid - Swab, Nasopharynx [[id number]]  (Normal) Collected:  09/08/18 1107    Specimen:  Swab from Nasopharynx Updated:  09/08/18 1249     Influenza A Ag, EIA Negative     Influenza B Ag, EIA Negative    Extra Tubes [[id number]]  Collected:  09/08/18 1134    Specimen:  Blood from Blood, Venous Line Updated:  09/08/18 1245    Narrative:       The following orders were created for panel order Extra Tubes.  Procedure                               Abnormality         Status                     ---------                               -----------         ------                     Light Blue Top[[id number]]                                   Final result               Lavender Top[[id number]]                                     Final result               Gold Top - SST[[id number]]                                   Final result               Green Top (Gel)[[id number]]                                  Final result                 Please view results for these tests on the individual orders.    Light Blue Top [[id number]] Collected:  09/08/18 1134    Specimen:  Blood from Arm, Left Updated:  09/08/18 1245     Extra Tube hold for add-on     Comment: Auto resulted       Lavender Top [[id number]] Collected:  09/08/18 1134    Specimen:  Blood from Arm, Left Updated:  09/08/18 1245     Extra Tube hold for add-on     Comment: Auto resulted       Gold Top - SST [[id number]] Collected:  09/08/18 1134    Specimen:  Blood from Arm, Left Updated:  09/08/18 1245     Extra Tube Hold for add-ons.     Comment: Auto resulted.       Green Top (Gel) [[id number]] Collected:  09/08/18 1134    Specimen:  Blood from Arm, Left Updated:  09/08/18 1245     Extra Tube Hold for add-ons.     Comment: Auto resulted.       [place] Draw [[id number]] Collected:  09/08/18 1106    Specimen:  Blood Updated:  09/08/18 1215    Narrative:       The following orders were created for panel order [place] Draw.  Procedure                               Abnormality         Status                     ---------                               -----------         ------                     Light Blue Top[[id number]]                                   Final result               Green Top (Gel)[[id number]]                                   Final result               Lavender Top[[id number]]                                     Final result               Gold Top - SST[[id number]]                                   Final result                 Please view results for these tests on the individual orders.    Light Blue Top [[id number]] Collected:  09/08/18 1106    Specimen:  Blood from Arm, Right Updated:  09/08/18 1215     Extra Tube hold for add-on     Comment: Auto resulted       Green Top (Gel) [[id number]] Collected:  09/08/18 1106    Specimen:  Blood from Arm, Right Updated:  09/08/18 1215     Extra Tube Hold for add-ons.     Comment: Auto resulted.       Lavender Top [[id number]] Collected:  09/08/18 1106    Specimen:  Blood from Arm, Right Updated:  09/08/18 1215     Extra Tube hold for add-on     Comment: Auto resulted       Gold Top - SST [[id number]] Collected:  09/08/18 1106    Specimen:  Blood from Arm, Right Updated:  09/08/18 1215     Extra Tube Hold for add-ons.     Comment: Auto resulted.       Troponin [[id number]]  (Abnormal) Collected:  09/08/18 1106    Specimen:  Blood from Arm, Right Updated:  09/08/18 1152     Troponin I 0.209 (C) ng/mL     CK-MB [[id number]]  (Abnormal) Collected:  09/08/18 1106    Specimen:  Blood from Arm, Right Updated:  09/08/18 1143     CKMB 5.82 (H) ng/mL     Lactic Acid, Plasma [[id number]]  (Abnormal) Collected:  09/08/18 1106    Specimen:  Blood from Arm, Right Updated:  09/08/18 1137     Lactate 6.5 (C) mmol/L     CBC & Differential [[id number]] Collected:  09/08/18 1106    Specimen:  Blood Updated:  09/08/18 1136    Narrative:       The following orders were created for panel order CBC & Differential.  Procedure                               Abnormality         Status                     ---------                               -----------         ------                     CBC Auto Differential[[id number]]        Abnormal            Final result                 Please view results for  these tests on the individual orders.    CBC Auto Differential [[id number]]  (Abnormal) Collected:  09/08/18 1106    Specimen:  Blood from Arm, Right Updated:  09/08/18 1136     WBC 9.16 10*3/mm3      RBC 3.49 (L) 10*6/mm3      Hemoglobin 10.1 (L) g/dL      Hematocrit 30.8 (L) %      MCV 88.3 fL      MCH 28.9 pg      MCHC 32.8 g/dL      RDW 19.7 (H) %      RDW-SD 63.6 (H) fl      MPV 9.3 fL      Platelets 186 10*3/mm3      Neutrophil % 87.1 (H) %      Lymphocyte % 5.7 (L) %      Monocyte % 5.6 %      Eosinophil % 1.1 %      Basophil % 0.0 %      Immature Grans % 0.5 %      Neutrophils, Absolute 7.98 10*3/mm3      Lymphocytes, Absolute 0.52 (L) 10*3/mm3      Monocytes, Absolute 0.51 10*3/mm3      Eosinophils, Absolute 0.10 10*3/mm3      Basophils, Absolute 0.00 10*3/mm3      Immature Grans, Absolute 0.05 (H) 10*3/mm3     Comprehensive Metabolic Panel [[id number]]  (Abnormal) Collected:  09/08/18 1106    Specimen:  Blood from Arm, Right Updated:  09/08/18 1134     Glucose 95 mg/dL      BUN 52 (H) mg/dL      Creatinine 2.04 (H) mg/dL      Sodium 139 mmol/L      Potassium 5.0 mmol/L      Chloride 101 mmol/L      CO2 20.0 (L) mmol/L      Calcium 9.4 mg/dL      Total Protein 8.7 (H) g/dL      Albumin 3.60 g/dL      ALT (SGPT) 24 U/L      AST (SGOT) 61 (H) U/L      Alkaline Phosphatase 177 (H) U/L      Total Bilirubin 0.6 mg/dL      eGFR Non African Amer 31 (L) mL/min/1.73      Globulin 5.1 (H) gm/dL      A/G Ratio 0.7 (L) g/dL      BUN/Creatinine Ratio 25.5 (H)     Anion Gap 18.0 (H) mmol/L     Narrative:       The MDRD GFR formula is only valid for adults with stable renal function between ages 18 and 70.    Magnesium [[id number]]  (Normal) Collected:  09/08/18 1106    Specimen:  Blood from Arm, Right Updated:  09/08/18 1134     Magnesium 2.1 mg/dL     CK [[id number]]  (Abnormal) Collected:  09/08/18 1106    Specimen:  Blood from Arm, Right Updated:  09/08/18 1134     Creatine Kinase 691 (H) U/L         Imaging Results (last  24 hours)     Procedure Component Value Units Date/Time    CT Head Without Contrast [[id number]] Collected:  09/08/18 1417     Updated:  09/08/18 1537    Narrative:         CT Head Without Contrast    History: Decreased level of alertness.    Axial scans of the brain were obtained without intravenous  contrast.  Coronal and sagital reconstructions were preformed.    This exam was performed according to our departmental  dose-optimization program, which includes automated exposure  control, adjustment of the mA and/or kV according to patient size  and/or use of iterative reconstruction technique.    DLP: 1034.0    Comparison: April 26, 2018    Bone windows are unremarkable.  The visualized paranasal sinuses are unremarkable.    No acute process.  Cerebral atrophy.  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  Minimal to moderate small vessel disease.  No hemorrhage.  No mass.  No abnormal areas of increased attenuation.  No midline shift.  No abnormal extra-axial fluid collections.      Impression:       CONCLUSION:  No acute process.  Cerebral atrophy.  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  Minimal to moderate small vessel disease.    [id number]    Electronically signed by:  Garfield Cabrera MD  9/8/2018 2:45 PM CDT  Workstation: [email or web address] Foot 3+ View Left [[id number]] Collected:  09/08/18 1506     Updated:  09/08/18 1523    Narrative:         Three view left foot    HISTORY: Left heel ulcer    AP, lateral and oblique views obtained.    COMPARISON: None    Soft tissue ulcer heel.  No underlying bone destruction or periosteal reaction to suggest  osteoarthritis.  Small calcaneal spurs.  Atherosclerotic ankle and foot calcifications.  No fracture or dislocation.  No other osseous or articular abnormality.      Impression:       CONCLUSION:  Soft tissue ulcer heel.  No underlying bone destruction or periosteal reaction to suggest  osteoarthritis.  Small calcaneal  spurs.  Atherosclerotic ankle and foot calcifications.    [id number]    Electronically signed by:  Garfield Cabrera MD  9/8/2018 3:22 PM CDT  Workstation: [id number]    XR Abdomen 2 View With Chest 1 View [[id number]] Collected:  09/08/18 1142     Updated:  09/08/18 1208    Narrative:       Acute Abdominal Series Withe Upright Chest.    History: Dominant pain    Upright frontal film of the chest and supine and upright films of  the abdomen were obtained.    Comparison: [facility] July 20, 2018. Correlation CT August 16, 2018.    EKG leads.  Bilateral linear atelectasis or scar.  Cardiomegaly.  The pulmonary vasculature is not increased.  No pleural effusion.  No pneumothorax.  No acute osseous abnormality.    Bilateral renal calculi.  No nephrostomy tube identified.  Left ureteral stent in place with proximal pigtail projected over  the mid left kidney and distal pigtail projected over the right  side of the urinary bladder.  Unchanged 1.1 cm calculus right ureterovesicular junction.  No free air.  No mechanical bowel obstruction.  No organomegaly.  No acute osseous abnormality.  Degenerative changes and degenerative disc disease lumbar spine.      Impression:       Conclusion:  Bilateral linear atelectasis or scar.  Cardiomegaly.  Bilateral renal calculi.  No nephrostomy tube identified.  Left ureteral stent in place with proximal pigtail projected over  the mid left kidney and distal pigtail projected over the right  side of the urinary bladder.  Unchanged 1.1 cm calculus right ureterovesicular junction.    [id number]    Electronically signed by:  Garfield Cabrera MD  9/8/2018 12:07 PM CDT  Workstation: [id number]          Medication Review:   Current Facility-Administered Medications   Medication Dose Route Frequency Provider Last Rate Last Dose   • acetaminophen (TYLENOL) tablet 650 mg  650 mg Oral Q6H PRN Ancelmo Farnsworth,        • albuterol (PROVENTIL) nebulizer solution 0.083% 2.5 mg/3mL  2.5 mg Nebulization Q6H PRN  Ancelmo Farnsworth, DO       • diltiaZEM (CARDIZEM) tablet 60 mg  60 mg Oral Q6H Mike Brito MD   60 mg at 09/09/18 0556   • docusate sodium (COLACE) capsule 100 mg  100 mg Oral BID Ancelmo Farnsworth, DO   100 mg at 09/09/18 1020   • finasteride (PROSCAR) tablet 5 mg  5 mg Oral Daily Ancelmo Farnsworth, DO   5 mg at 09/09/18 1020   • fluconazole (DIFLUCAN) tablet 100 mg  100 mg Oral Daily Ancelmo Farnsworth, DO   100 mg at 09/09/18 1021   • folic acid (FOLVITE) tablet 1 mg  1 mg Oral Daily Ancelmo Farnsworth, DO   1 mg at 09/09/18 1020   • heparin (porcine) 5000 UNIT/ML injection 5,000 Units  5,000 Units Subcutaneous Q8H Ancelmo Farnsworth, DO   5,000 Units at 09/09/18 0556   • levothyroxine (SYNTHROID, LEVOTHROID) tablet 50 mcg  50 mcg Oral Daily Ancelmo Farnsworth, DO   50 mcg at 09/09/18 1021   • meropenem (MERREM) 1 g/100 mL 0.9% NS VTB (mbp)  1 g Intravenous Q8H Ancelmo Farnsworth, DO   1 g at 09/09/18 0618   • metoprolol succinate XL (TOPROL-XL) 24 hr tablet 25 mg  25 mg Oral Daily Ancelmo Farnsworth, DO   25 mg at 09/09/18 1020   • morphine injection 1 mg  1 mg Intravenous Q4H PRN Ancelmo Farnsworth DO        And   • naloxone (NARCAN) injection 0.4 mg  0.4 mg Intravenous Q5 Min PRN Ancelmo Farnsworth, DO       • ondansetron (ZOFRAN) injection 4 mg  4 mg Intravenous Q6H PRN Ancelmo Farnsworth, DO       • oxyCODONE-acetaminophen (PERCOCET) 5-325 MG per tablet 1 tablet  1 tablet Oral Q4H PRN Ancelmo Farnsworth, DO       • Pharmacy to Dose meropenem (MERREM)   Does not apply Continuous PRN Ancelmo Farnsworth, DO       • Pharmacy to dose vancomycin   Does not apply Continuous PRN Ancelmo Farnsworth, DO       • polyethylene glycol 3350 powder (packet)  17 g Oral Daily Ancelmo Farnsworth DO   17 g at 09/09/18 1020   • sodium chloride 0.9 % flush 1-10 mL  1-10 mL Intravenous PRN Ancelmo Farnsworth DO       • sodium chloride 0.9 % flush 10 mL  10 mL Intravenous PRN Dariel Hidalgo MD       • tamsulosin  (FLOMAX) 24 hr capsule 0.8 mg  0.8 mg Oral Nightly Javad, Sony T, DO   0.8 mg at 09/08/18 2042   • terazosin (HYTRIN) capsule 2 mg  2 mg Oral Nightly JavadAncelmo T, DO   2 mg at 09/08/18 2042   • tiZANidine (ZANAFLEX) tablet 2 mg  2 mg Oral Q8H PRN Ancelmo Farnsworth T, DO       • vancomycin 1 g/250 mL 0.9% NS (vial-mate)  1,000 mg Intravenous Q24H JavadAncelmo T, DO       • vitamin B-12 (CYANOCOBALAMIN) tablet 1,000 mcg  1,000 mcg Oral Daily Ancelmo Farnsworth, DO   1,000 mcg at 09/09/18 1020         Assessment/Plan     Principal Problem:    Toxic encephalopathy  Active Problems:    Demand ischemia (CMS/HCC)    Acute renal failure with tubular necrosis (CMS/HCC)    Chronic atrial fibrillation (CMS/HCC)    Acute cystitis with hematuria    Sepsis (CMS/HCC)    Decubitus ulcer of left heel, unstageable (CMS/HCC)    Acute renal failure (CMS/HCC)    1.  Chronic atrial fibrillation - patient was not well rate controlled on presentation.  I suspect this was being driven by underlying metabolic process.  His heart rate is now much improved.  He is not a candidate for anticoagulation due to prior recurrent hematuria.  We will continue to focus on rate control.  Continue beta blocker/calcium channel blocker therapy.    2.  Elevated troponin - this is likely reflective of demand ischemia related to elevated heart rate, acute metabolic abnormalities.  Would consider low-dose antiplatelet therapy unless otherwise contraindicated.  Continue beta blockers.  Recommend conservative medical management.      Mike Brito MD  09/09/18  10:51 AM

## 2018-09-09 NOTE — PROGRESS NOTES
[facility] Medicine Services  INPATIENT PROGRESS NOTE    Length of Stay: 1  Date of Admission: 9/8/2018  Primary Care Physician: Terrell Arzate MD    Subjective     Chief Complaint   Patient presents with   • Altered Mental Status     HPI:  87-year-old  male getting rehabilitation and nursing home was brought in with a complaint of altered mental status, questionable fever.  Patient was found to be in A. fib with RVR and rate was controlled with esmolol drip in the ER.    9/9/18: Patient had further lab workup and blood culture which showed Pseudomonas in his blood.  Patient has been started on IV antibiotic since then his symptoms have improved.  Blood pressure has improved.  Heart rate is well-controlled.    Review of Systems   Constitutional: Positive for activity change, appetite change and fatigue.   Respiratory: Negative for chest tightness and shortness of breath.    Cardiovascular: Negative for chest pain and palpitations.        All pertinent negatives and positives are as above. All other systems have been reviewed and are negative unless otherwise stated.     Objective      Vital Sign Min/Max for last 24 hours  Temp  Min: 97 °F (36.1 °C)  Max: 98 °F (36.7 °C)   BP  Min: 84/47  Max: 124/58   Pulse  Min: 65  Max: 110   Resp  Min: 18  Max: 20   SpO2  Min: 93 %  Max: 98 %   No Data Recorded   Weight  Min: 71.9 kg (158 lb 8 oz)  Max: 71.9 kg (158 lb 8 oz)         Physical Exam   Constitutional: He appears well-developed and well-nourished. He has a sickly appearance. He appears ill.   HENT:   Head: Normocephalic.   Eyes: Pupils are equal, round, and reactive to light.   Cardiovascular: Normal rate, regular rhythm and normal heart sounds.    Pulmonary/Chest: Effort normal and breath sounds normal.   Abdominal: Soft. Bowel sounds are normal.   Musculoskeletal: Normal range of motion.   Neurological: He is alert.   Psychiatric: He has a normal mood and affect.    Vitals reviewed.      Current Facility-Administered Medications   Medication Dose Route Frequency Provider Last Rate Last Dose   • acetaminophen (TYLENOL) tablet 650 mg  650 mg Oral Q6H PRN Ancelmo Farnsworth, DO       • albuterol (PROVENTIL) nebulizer solution 0.083% 2.5 mg/3mL  2.5 mg Nebulization Q6H PRN Ancelmo Farnsworth, DO       • diltiaZEM (CARDIZEM) tablet 60 mg  60 mg Oral Q6H Mike Brito MD   60 mg at 09/09/18 1243   • docusate sodium (COLACE) capsule 100 mg  100 mg Oral BID Ancelmo Farnsworth, DO   100 mg at 09/09/18 1020   • finasteride (PROSCAR) tablet 5 mg  5 mg Oral Daily Ancelmo Farnsworth, DO   5 mg at 09/09/18 1020   • fluconazole (DIFLUCAN) tablet 100 mg  100 mg Oral Daily Ancelmo Farnsworth, DO   100 mg at 09/09/18 1021   • folic acid (FOLVITE) tablet 1 mg  1 mg Oral Daily Ancelmo Farnsworth, DO   1 mg at 09/09/18 1020   • heparin (porcine) 5000 UNIT/ML injection 5,000 Units  5,000 Units Subcutaneous Q8H Ancelmo Farnsworth, DO   5,000 Units at 09/09/18 1549   • levothyroxine (SYNTHROID, LEVOTHROID) tablet 50 mcg  50 mcg Oral Daily Ancelmo Farnsworth, DO   50 mcg at 09/09/18 1021   • meropenem (MERREM) 1 g/100 mL 0.9% NS VTB (mbp)  1 g Intravenous Q8H Ancelmo aFrnsworth, DO   1 g at 09/09/18 1245   • metoprolol succinate XL (TOPROL-XL) 24 hr tablet 25 mg  25 mg Oral Daily Ancelmo Farnsworth, DO   25 mg at 09/09/18 1020   • morphine injection 1 mg  1 mg Intravenous Q4H PRN Ancelmo Farnsworth, DO        And   • naloxone (NARCAN) injection 0.4 mg  0.4 mg Intravenous Q5 Min PRN Ancelmo Farnsworth, DO       • ondansetron (ZOFRAN) injection 4 mg  4 mg Intravenous Q6H PRN Ancelmo Farnsworth, DO       • oxyCODONE-acetaminophen (PERCOCET) 5-325 MG per tablet 1 tablet  1 tablet Oral Q4H PRN Ancelmo Farnsworth, DO       • Pharmacy to Dose meropenem (MERREM)   Does not apply Continuous PRN Ancelmo Farnsworth, DO       • Pharmacy to dose vancomycin   Does not apply Continuous PRN Ancelmo Farnsworth, DO        • polyethylene glycol 3350 powder (packet)  17 g Oral Daily Javad, Ancelmo T, DO   17 g at 09/09/18 1020   • sodium chloride 0.9 % flush 1-10 mL  1-10 mL Intravenous PRN Javad, Ancelmo T, DO       • sodium chloride 0.9 % flush 10 mL  10 mL Intravenous PRN Dariel Hidalgo MD       • tamsulosin (FLOMAX) 24 hr capsule 0.8 mg  0.8 mg Oral Nightly Javad, Ancelmo T, DO   0.8 mg at 09/08/18 2042   • terazosin (HYTRIN) capsule 2 mg  2 mg Oral Nightly Javad, Ancelmo T, DO   2 mg at 09/08/18 2042   • tiZANidine (ZANAFLEX) tablet 2 mg  2 mg Oral Q8H PRN Javad, Ancelmo T, DO       • vancomycin 1 g/250 mL 0.9% NS (vial-mate)  1,000 mg Intravenous Q24H Javad, Ancelmo T, DO   1,000 mg at 09/09/18 1544   • vitamin B-12 (CYANOCOBALAMIN) tablet 1,000 mcg  1,000 mcg Oral Daily Javad, Ancelmo T, DO   1,000 mcg at 09/09/18 1020           Results Review:  I have reviewed the labs, radiology results, and diagnostic studies.    Laboratory Data:     Results from last 7 days  Lab Units 09/09/18  0443 09/08/18  1106   SODIUM mmol/L 140 139   POTASSIUM mmol/L 4.6 5.0   CHLORIDE mmol/L 111* 101   CO2 mmol/L 21.0* 20.0*   BUN mg/dL 40* 52*   CREATININE mg/dL 1.39* 2.04*   GLUCOSE mg/dL 84 95   CALCIUM mg/dL 8.5 9.4   BILIRUBIN mg/dL 0.3 0.6   ALK PHOS U/L 122 177*   ALT (SGPT) U/L 30 24   AST (SGOT) U/L 41 61*   ANION GAP mmol/L 8.0 18.0*     Estimated Creatinine Clearance: 37.4 mL/min (A) (by C-G formula based on SCr of 1.39 mg/dL (H)).    Results from last 7 days  Lab Units 09/08/18  1106   MAGNESIUM mg/dL 2.1           Results from last 7 days  Lab Units 09/09/18  0443 09/08/18  1106   WBC 10*3/mm3 5.26 9.16   HEMOGLOBIN g/dL 8.4* 10.1*   HEMATOCRIT % 25.3* 30.8*   PLATELETS 10*3/mm3 147* 186               Culture Data:   Blood Culture   Date Value Ref Range Status   09/08/2018 Abnormal Stain (A)  Preliminary   09/08/2018 Abnormal Stain (A)  Preliminary     Urine Culture   Date Value Ref Range Status   09/07/2018 10,000-20,000  CFU/mL Escherichia coli ESBL (C)  Final     Comment:       Consider infectious disease consult.  Susceptibility results may not correlate to clinical outcomes.  Organisms producing extended spectrum beta lactamase are clinically resistant to therapy with penicillins, cephalosporins, or aztreonam despite apparent in vitro susceptibility.  Therapeutic failure results from treatment with these agents.  contact precautions requested  Multi Drug Resistant Organism       No results found for: RESPCX  No results found for: WOUNDCX  No results found for: STOOLCX  No components found for: BODYFLD      Radiology Data:   Imaging Results (last 24 hours)     ** No results found for the last 24 hours. **          I have reviewed the patient current medications.     Assessment/Plan     Active Hospital Problems    Diagnosis Date Noted   • **Toxic encephalopathy [G92] 09/08/2018   • Demand ischemia (CMS/[facility]) [I24.8] 09/08/2018   • Acute renal failure with tubular necrosis (CMS/[facility]) [N17.0] 09/08/2018   • Chronic atrial fibrillation (CMS/[facility]) [I48.2] 09/08/2018   • Acute cystitis with hematuria [N30.01] 09/08/2018   • Sepsis (CMS/[facility]) [A41.9] 09/08/2018   • Decubitus ulcer of left heel, unstageable (CMS/[facility]) [L89.620] 09/08/2018   • Acute renal failure (CMS/[facility]) [N17.9] 09/08/2018     1.  Sepsis secondary to Pseudomonas: Currently he is on IV antibiotic.  We'll continue with follow-up culture and sensitivity.  His blood pressure has improved.  2.  A. fib with RVR: Currently rate control he has been tapered off of esmolol drip.  Patient is not a candidate for anti-coagulation due to his GI bleed.  3.  Acute renal failure: Likely secondary to the sepsis causing hypotension, repeat labs shows improvement.  4.  Left heel ulcer unstageable, wound care consult.  5.  Anemia: Acute drop in hemoglobin from 10.1- 8.4, continue monitor H&H and obtain Hemoccult.      Discharge Planning: I expect patient to be [facility] in [facility]  days.      DVT Prophylaxis: SCD              This document has been electronically signed by Augusto Romano MD on September 9, 2018 4:37 PM

## 2018-09-09 NOTE — PLAN OF CARE
Problem: Cardiac: ACS (Acute Coronary Syndrome) (Adult)  Goal: Signs and Symptoms of Listed Potential Problems Will be Absent, Minimized or Managed (Cardiac: ACS)  Outcome: Ongoing (interventions implemented as appropriate)      Problem: Urinary Tract Infection (Adult)  Goal: Signs and Symptoms of Listed Potential Problems Will be Absent, Minimized or Managed (Urinary Tract Infection)  Outcome: Ongoing (interventions implemented as appropriate)      Problem: Wound (Includes Pressure Injury) (Adult)  Goal: Signs and Symptoms of Listed Potential Problems Will be Absent, Minimized or Managed (Wound)  Outcome: Ongoing (interventions implemented as appropriate)      Problem: Patient Care Overview  Goal: Plan of Care Review  Outcome: Ongoing (interventions implemented as appropriate)   09/09/18 0335   Coping/Psychosocial   Plan of Care Reviewed With patient   Plan of Care Review   Progress no change   OTHER   Outcome Summary pt unable to urinate on own, staright cath TID       Problem: Skin Injury Risk (Adult)  Goal: Identify Related Risk Factors and Signs and Symptoms  Outcome: Outcome(s) achieved Date Met: 09/09/18    Goal: Skin Health and Integrity  Outcome: Ongoing (interventions implemented as appropriate)      Problem: Fall Risk (Adult)  Goal: Identify Related Risk Factors and Signs and Symptoms  Outcome: Outcome(s) achieved Date Met: 09/09/18    Goal: Absence of Fall  Outcome: Ongoing (interventions implemented as appropriate)

## 2018-09-10 NOTE — PLAN OF CARE
Problem: Cardiac: ACS (Acute Coronary Syndrome) (Adult)  Goal: Signs and Symptoms of Listed Potential Problems Will be Absent, Minimized or Managed (Cardiac: ACS)  Outcome: Ongoing (interventions implemented as appropriate)      Problem: Urinary Tract Infection (Adult)  Goal: Signs and Symptoms of Listed Potential Problems Will be Absent, Minimized or Managed (Urinary Tract Infection)  Outcome: Ongoing (interventions implemented as appropriate)      Problem: Wound (Includes Pressure Injury) (Adult)  Goal: Signs and Symptoms of Listed Potential Problems Will be Absent, Minimized or Managed (Wound)  Outcome: Ongoing (interventions implemented as appropriate)      Problem: Patient Care Overview  Goal: Plan of Care Review  Outcome: Ongoing (interventions implemented as appropriate)      Problem: Skin Injury Risk (Adult)  Goal: Skin Health and Integrity  Outcome: Ongoing (interventions implemented as appropriate)      Problem: Fall Risk (Adult)  Goal: Absence of Fall  Outcome: Ongoing (interventions implemented as appropriate)

## 2018-09-10 NOTE — CONSULTS
Adult Nutrition  Assessment    Patient Name:  Ravi Park  YOB: 1930  MRN: [id number]  Admit Date:  9/8/2018    Assessment Date:  9/10/2018    Comments:  Pt is an 88 year old male recently d/c from hospital, admitted from SNF with AMS and found to have a fib with pseudomonas in blood.  Pt also has unstageable pressure ulcer on his foot.  RD familiar with pt from recent visits.  Pt did not eat lunch and reports he does not really like the foods at the SNF where he is doing rehab.  Good acceptance of milk, will send on trays to promote nutrition for wound healing as long as renal labs acceptable.  RD provided icecream and will send chopped meats due to limited dentition.  Wt down 8lb over past 10 days.  RD will monitor.          Reason for Assessment     Row Name 09/10/18 1543          Reason for Assessment    Reason For Assessment identified at risk by screening criteria     Identified At Risk by Screening Criteria MST SCORE 2+;large or nonhealing wound, burn or pressure injury               Nutrition/Diet History     Row Name 09/10/18 1544          Nutrition/Diet History    Typical Food/Fluid Intake Pt reports fair appetite, willing to receive chopped meats.  Does not always like foods at nursing home.     Supplemental Drinks/Foods/Additives Likes milk w/meals.             Anthropometrics     Row Name 09/10/18 1547          Usual Body Weight (UBW)    Weight Loss unintentional     Weight Loss Time Frame 8lb in 10 days             Labs/Tests/Procedures/Meds     Row Name 09/10/18 1548          Labs/Procedures/Meds    Lab Results Reviewed reviewed, pertinent        Medications    Pertinent Medications Reviewed reviewed, pertinent             Physical Findings     Row Name 09/10/18 1547          Physical Findings    Oral/Mouth Cavity poor dentition     Skin pressure injury;non-healing wound(s)             Estimated/Assessed Needs     Row Name 09/10/18 6710          Calculation Measurements    Weight Used  For Calculations 72.8 kg (160 lb 7.9 oz)        Estimated/Assessed Needs    Additional Documentation KCAL/KG (Group);Protein Requirements (Group);Fluid Requirements (Group)        KCAL/KG    14 Kcal/Kg (kcal) 1019.2     15 Kcal/Kg (kcal) 1092     18 Kcal/Kg (kcal) 1310.4     20 Kcal/Kg (kcal) 1456     25 Kcal/Kg (kcal) 1820     30 Kcal/Kg (kcal) 2184     35 Kcal/Kg (kcal) 2548     40 Kcal/Kg (kcal) 2912     45 Kcal/Kg (kcal) 3276     50 Kcal/Kg (kcal) 3640     kcal/kg (Specify) 25        Torrance-St. Jeor Equation    RMR (Torrance-St. Jeor Equation) 1436        Protein Requirements    Est Protein Requirement Amount (gms/kg) 1.0 gm protein     Estimated Protein Requirements (gms/day) 72.8        Fluid Requirements    Estimated Fluid Requirements (mL/day) 1820     Estimated Fluid Requirement Method RDA Method     RDA Method (mL) 1820     Teodora-Gina Method (over 20 kg) 2956             Nutrition Prescription Ordered     Row Name 09/10/18 1550          Nutrition Prescription PO    Current PO Diet Regular     Common Modifiers Renal             Evaluation of Received Nutrient/Fluid Intake     Row Name 09/10/18 1551 09/10/18 1550       Calculation Measurements    Weight Used For Calculations  -- 72.8 kg (160 lb 7.9 oz)       PO Evaluation    Number of Meals 1  --    % PO Intake 75  --            Evaluation of Prescribed Nutrient/Fluid Intake     Row Name 09/10/18 1550          Calculation Measurements    Weight Used For Calculations 72.8 kg (160 lb 7.9 oz)           Electronically signed by:  Pau Edwards RD  09/10/18 3:54 PM

## 2018-09-10 NOTE — PLAN OF CARE
Problem: Cardiac: ACS (Acute Coronary Syndrome) (Adult)  Goal: Signs and Symptoms of Listed Potential Problems Will be Absent, Minimized or Managed (Cardiac: ACS)  Outcome: Ongoing (interventions implemented as appropriate)      Problem: Urinary Tract Infection (Adult)  Goal: Signs and Symptoms of Listed Potential Problems Will be Absent, Minimized or Managed (Urinary Tract Infection)  Outcome: Ongoing (interventions implemented as appropriate)      Problem: Wound (Includes Pressure Injury) (Adult)  Goal: Signs and Symptoms of Listed Potential Problems Will be Absent, Minimized or Managed (Wound)  Outcome: Ongoing (interventions implemented as appropriate)      Problem: Patient Care Overview  Goal: Plan of Care Review  Outcome: Ongoing (interventions implemented as appropriate)   09/10/18 9730   Coping/Psychosocial   Plan of Care Reviewed With patient   Plan of Care Review   Progress no change   OTHER   Outcome Summary no concerns or complications at this time     Goal: Individualization and Mutuality  Outcome: Ongoing (interventions implemented as appropriate)    Goal: Interprofessional Rounds/Family Conf  Outcome: Ongoing (interventions implemented as appropriate)      Problem: Skin Injury Risk (Adult)  Goal: Skin Health and Integrity  Outcome: Ongoing (interventions implemented as appropriate)      Problem: Fall Risk (Adult)  Goal: Absence of Fall  Outcome: Ongoing (interventions implemented as appropriate)      Problem: Pain, Acute (Adult)  Goal: Identify Related Risk Factors and Signs and Symptoms  Outcome: Outcome(s) achieved Date Met: 09/10/18    Goal: Acceptable Pain Control/Comfort Level  Outcome: Ongoing (interventions implemented as appropriate)

## 2018-09-10 NOTE — PROGRESS NOTES
[facility] Medicine Services  INPATIENT PROGRESS NOTE    Length of Stay: 2  Date of Admission: 9/8/2018  Primary Care Physician: Terrell Arzate MD    Subjective     Chief Complaint   Patient presents with   • Altered Mental Status     HPI:  87-year-old  male getting rehabilitation and nursing home was brought in with a complaint of altered mental status, questionable fever.  Patient was found to be in A. fib with RVR and rate was controlled with esmolol drip in the ER.    9/9/18: Patient had further lab workup and blood culture which showed Pseudomonas in his blood.  Patient has been started on IV antibiotic since then his symptoms have improved.  Blood pressure has improved.  Heart rate is well-controlled.    9/10/18: feeling better today.     Review of Systems   Constitutional: Positive for activity change, appetite change and fatigue.   Respiratory: Negative for chest tightness and shortness of breath.    Cardiovascular: Negative for chest pain and palpitations.        All pertinent negatives and positives are as above. All other systems have been reviewed and are negative unless otherwise stated.     Objective      Vital Sign Min/Max for last 24 hours  Temp  Min: 96.8 °F (36 °C)  Max: 97.2 °F (36.2 °C)   BP  Min: 118/56  Max: 141/62   Pulse  Min: 54  Max: 69   Resp  Min: 18  Max: 22   SpO2  Min: 91 %  Max: 96 %   No Data Recorded   Weight  Min: 72.8 kg (160 lb 6.4 oz)  Max: 72.8 kg (160 lb 6.4 oz)         Physical Exam   Constitutional: He appears well-developed and well-nourished. He has a sickly appearance. He appears ill.   HENT:   Head: Normocephalic.   Eyes: Pupils are equal, round, and reactive to light.   Cardiovascular: Normal rate, regular rhythm and normal heart sounds.    Pulmonary/Chest: Effort normal and breath sounds normal.   Abdominal: Soft. Bowel sounds are normal.   Musculoskeletal: Normal range of motion.   Neurological: He is alert.    Psychiatric: He has a normal mood and affect.   Vitals reviewed.      Current Facility-Administered Medications   Medication Dose Route Frequency Provider Last Rate Last Dose   • acetaminophen (TYLENOL) tablet 650 mg  650 mg Oral Q6H PRN Ancelmo Farnsworth, DO       • albuterol (PROVENTIL) nebulizer solution 0.083% 2.5 mg/3mL  2.5 mg Nebulization Q6H PRN Ancelmo Farnsworth, DO       • diltiaZEM (CARDIZEM) tablet 60 mg  60 mg Oral Q6H Mike Brito MD   60 mg at 09/10/18 1244   • docusate sodium (COLACE) capsule 100 mg  100 mg Oral BID Ancelmo Farnsworth, DO   100 mg at 09/10/18 0947   • finasteride (PROSCAR) tablet 5 mg  5 mg Oral Daily Ancelmo Farnsworth, DO   5 mg at 09/10/18 0947   • fluconazole (DIFLUCAN) tablet 100 mg  100 mg Oral Daily Ancelmo Farnsworth, DO   100 mg at 09/10/18 0947   • folic acid (FOLVITE) tablet 1 mg  1 mg Oral Daily Ancelmo Farnsworth, DO   1 mg at 09/10/18 0948   • heparin (porcine) 5000 UNIT/ML injection 5,000 Units  5,000 Units Subcutaneous Q8H Ancelmo Farnsworth, DO   5,000 Units at 09/10/18 0548   • levothyroxine (SYNTHROID, LEVOTHROID) tablet 50 mcg  50 mcg Oral Daily Ancelmo Farnsworth, DO   50 mcg at 09/10/18 0947   • meropenem (MERREM) 1 g/100 mL 0.9% NS VTB (mbp)  1 g Intravenous Q8H Ancelmo Farnsworth, DO   1 g at 09/10/18 1244   • metoprolol succinate XL (TOPROL-XL) 24 hr tablet 25 mg  25 mg Oral Daily Ancelmo Farnsworth, DO   25 mg at 09/10/18 0948   • morphine injection 1 mg  1 mg Intravenous Q4H PRN Ancelmo Farnsworth, DO        And   • naloxone (NARCAN) injection 0.4 mg  0.4 mg Intravenous Q5 Min PRN Ancelmo Farnsworth, DO       • ondansetron (ZOFRAN) injection 4 mg  4 mg Intravenous Q6H PRN Ancelmo Farnsworth, DO       • oxyCODONE-acetaminophen (PERCOCET) 5-325 MG per tablet 1 tablet  1 tablet Oral Q4H PRN Ancelmo Farnsworth, DO   1 tablet at 09/10/18 0549   • Pharmacy to Dose meropenem (MERREM)   Does not apply Continuous PRN Ancelmo Farnsworth, DO       • polyethylene  glycol 3350 powder (packet)  17 g Oral Daily JavadTomiy T, DO   17 g at 09/10/18 0948   • povidone-iodine (BETADINE) ointment   Topical Daily Augusto Romano MD       • sodium chloride 0.9 % flush 1-10 mL  1-10 mL Intravenous PRN Javad, Sony T, DO       • sodium chloride 0.9 % flush 10 mL  10 mL Intravenous PRN Dariel Hidalgo MD       • tamsulosin (FLOMAX) 24 hr capsule 0.8 mg  0.8 mg Oral Nightly JavadTomiy T, DO   0.8 mg at 09/09/18 2047   • terazosin (HYTRIN) capsule 2 mg  2 mg Oral Nightly JavadTomiy T, DO   2 mg at 09/09/18 2047   • tiZANidine (ZANAFLEX) tablet 2 mg  2 mg Oral Q8H PRN Javad, Sony T, DO       • vitamin B-12 (CYANOCOBALAMIN) tablet 1,000 mcg  1,000 mcg Oral Daily Javad, Sony T, DO   1,000 mcg at 09/10/18 0947           Results Review:  I have reviewed the labs, radiology results, and diagnostic studies.    Laboratory Data:     Results from last 7 days  Lab Units 09/10/18  0626 09/09/18  0443 09/08/18  1106   SODIUM mmol/L 142 140 139   POTASSIUM mmol/L 4.6 4.6 5.0   CHLORIDE mmol/L 110 111* 101   CO2 mmol/L 24.0 21.0* 20.0*   BUN mg/dL 33* 40* 52*   CREATININE mg/dL 1.20 1.39* 2.04*   GLUCOSE mg/dL 94 84 95   CALCIUM mg/dL 9.0 8.5 9.4   BILIRUBIN mg/dL 0.3 0.3 0.6   ALK PHOS U/L 135* 122 177*   ALT (SGPT) U/L 27 30 24   AST (SGOT) U/L 37 41 61*   ANION GAP mmol/L 8.0 8.0 18.0*     Estimated Creatinine Clearance: 43.8 mL/min (by C-G formula based on SCr of 1.2 mg/dL).    Results from last 7 days  Lab Units 09/08/18  1106   MAGNESIUM mg/dL 2.1           Results from last 7 days  Lab Units 09/10/18  0626 09/09/18  0443 09/08/18  1106   WBC 10*3/mm3 5.30 5.26 9.16   HEMOGLOBIN g/dL 9.3* 8.4* 10.1*   HEMATOCRIT % 29.3* 25.3* 30.8*   PLATELETS 10*3/mm3 177 147* 186               Culture Data:   Blood Culture   Date Value Ref Range Status   09/08/2018 Gram Negative Bacilli (A)  Preliminary   09/08/2018 Gram Negative Bacilli (A)  Preliminary     No results found for:  URINECX  No results found for: RESPCX  No results found for: WOUNDCX  No results found for: STOOLCX  No components found for: BODYFLD      Radiology Data:   Imaging Results (last 24 hours)     ** No results found for the last 24 hours. **          I have reviewed the patient current medications.     Assessment/Plan     Active Hospital Problems    Diagnosis Date Noted   • **Toxic encephalopathy [G92] 09/08/2018   • Demand ischemia (CMS/HCC) [I24.8] 09/08/2018   • Acute renal failure with tubular necrosis (CMS/HCC) [N17.0] 09/08/2018   • Chronic atrial fibrillation (CMS/[facility]) [I48.2] 09/08/2018   • Acute cystitis with hematuria [N30.01] 09/08/2018   • Sepsis (CMS/[facility]) [A41.9] 09/08/2018   • Decubitus ulcer of left heel, unstageable (CMS/[facility]) [L89.620] 09/08/2018   • Acute renal failure (CMS/[facility]) [N17.9] 09/08/2018     1.  Sepsis secondary to Pseudomonas: Currently he is on IV antibiotic.  We'll continue with follow-up culture and sensitivity.  His blood pressure has improved. Repeat blood culture.  2.  A. fib with RVR: Currently rate control he has been tapered off of esmolol drip.  Patient is not a candidate for anti-coagulation due to his GI bleed.  3.  Acute renal failure: Likely secondary to the sepsis causing hypotension, repeat labs shows improvement.  4.  Left heel ulcer unstageable, wound care consult.  5.  Anemia: Acute drop in hemoglobin from 10.1- 8.4, continue monitor H&H and obtain Hemoccult.      Discharge Planning: I expect patient to be discharged to [facility] in 3-4 days.      DVT Prophylaxis: SCD              This document has been electronically signed by Augusto Romano MD on September 10, 2018 4:33 PM

## 2018-09-10 NOTE — PLAN OF CARE
Problem: Patient Care Overview  Goal: Plan of Care Review  Outcome: Ongoing (interventions implemented as appropriate)   09/10/18 1600   Coping/Psychosocial   Plan of Care Reviewed With patient   Plan of Care Review   Progress no change   OTHER   Outcome Summary Pt has decreased appetite, did not eat lunch. Good acceptance of milk, will send on all trays. RD provided icecream snack and will send chopped meats.

## 2018-09-10 NOTE — NURSING NOTE
Patient has an unstageable pressure injury on his left heel. Wound bed 100% eschar. It measures 2.7 x 3.5 cm. Dry. Pulses sound mono to biphasic doppler. Would keep wound dry and protected. Family would like a podiatry consult.

## 2018-09-11 NOTE — CONSULTS
Inpatient Urology Consult  Consult performed by: ROSARIO VALENTINE  Consult ordered by: ARTIE TABARES          Patient Care Team:  Terrell Arzate MD as PCP - General  Terrell Arzate MD as PCP - Claims Attributed  Bethany Loco RN as Care Coordinator ([facility])    Chief complaint: urinary retention    Subjective     Mr. 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 He has a history of E.Coli ESBL on 8/16/18 urine culture for which he finished 14 days of Merrem, candida UTI on 8/26/18 urine culture and he finished Diflucan course. He has kidney and ureteral stones, 8/22/18 had LEFT CRULLS with left nephrostomy tube removal. He had a chronic Dye for his chronic retention of urine up until last week when he was seen in office by Urology, myself, and Dye was ordered for removal and intermittent catheterization to be performed a minimum of 3 times daily. He was placed on Macrobid and Diflucan and had plans for J-stent removal in the next 1-2 weeks. He is admitted now for confusion related to Pseudomonas aeruginosa bacteremia. He is currently on Diflucan and Merrem therapy. He is requiring straight cath every 4-6 hours at present. He feels less confused, complains of weakness, decreased appetite, fatigue. Afebrile.      Urinary Tract Infection    This is a chronic problem. The current episode started more than 1 month ago. The problem has been gradually improving. The quality of the pain is described as burning. There has been no fever. There is a history of pyelonephritis. Pertinent negatives include no chills, discharge, flank pain, frequency, hematuria, hesitancy, nausea, possible pregnancy, sweats, urgency or vomiting. He has tried antibiotics for the symptoms. The treatment provided mild relief. His past medical history is significant for catheterization, kidney stones,  recurrent UTIs, urinary stasis and a urological procedure.   Urinary Retention   This is a chronic problem. The current episode started more than 1 month ago. The problem occurs constantly. The problem has been unchanged. Associated symptoms include anorexia, fatigue, urinary symptoms and weakness. Pertinent negatives include no abdominal pain, arthralgias, change in bowel habit, chest pain, chills, congestion, coughing, diaphoresis, fever, headaches, joint swelling, myalgias, nausea, neck pain, numbness, rash, sore throat, swollen glands, vertigo, visual change or vomiting. Treatments tried: Flomax, Hytrin, Proscar. The treatment provided no relief.       Review of Systems   Constitutional: Positive for appetite change and fatigue. Negative for activity change, chills, diaphoresis, fever and unexpected weight change.   HENT: Negative for congestion, dental problem, drooling, ear discharge and sore throat.    Eyes: Negative.  Negative for photophobia, pain, discharge, redness, itching and visual disturbance.   Respiratory: Negative for apnea, cough, choking, chest tightness, shortness of breath, wheezing and stridor.    Cardiovascular: Positive for leg swelling. Negative for chest pain and palpitations.   Gastrointestinal: Positive for anorexia. Negative for abdominal distention, abdominal pain, anal bleeding, blood in stool, change in bowel habit, constipation, diarrhea, nausea, rectal pain and vomiting.   Endocrine: Negative.    Genitourinary: Positive for difficulty urinating and dysuria. Negative for decreased urine volume, discharge, flank pain, frequency, hematuria, hesitancy, penile pain, penile swelling, scrotal swelling, testicular pain and urgency.   Musculoskeletal: Negative for arthralgias, joint swelling, myalgias and neck pain.   Skin: Positive for wound. Negative for color change, pallor and rash.   Allergic/Immunologic: Negative.    Neurological: Positive for weakness. Negative for dizziness,  vertigo, tremors, seizures, syncope, facial asymmetry, speech difficulty, numbness and headaches.   Hematological: Negative.  Negative for adenopathy. Does not bruise/bleed easily.   Psychiatric/Behavioral: Positive for confusion. Negative for agitation, behavioral problems, decreased concentration and suicidal ideas.        Past Medical History:   Diagnosis Date   • Benign prostatic hyperplasia    • Cerebrovascular accident (CMS/[facility])    • Chronic anemia    • Chronic atrial fibrillation (CMS/[facility])    • CKD (chronic kidney disease) stage 3, GFR 30-59 ml/min    • GERD (gastroesophageal reflux disease)    • Gout    • Hydronephrosis    • Hypercholesterolemia    • Hypertension    • Nephrolithiasis    • Peripheral arterial disease (CMS/[facility])    • Pulmonary hypertension    • Tricuspid regurgitation    • Urinary retention    ,   Past Surgical History:   Procedure Laterality Date   • CARDIAC CATHETERIZATION     • CYSTOSCOPY, URETEROSCOPY, RETROGRADE PYELOGRAM, STENT INSERTION Left 5/27/2018   • CYSTOSCOPY, URETEROSCOPY, RETROGRADE PYELOGRAM, STENT INSERTION Left 8/22/2018    Procedure: CYSTOSCOPY URETEROSCOPY RETROGRADE PYELOGRAM HOLMIUM LASER STENT INSERTION;  Surgeon: Stephen Serrano MD;  Location: [facility];  Service: Urology   • EXTRACORPOREAL SHOCKWAVE LITHOTRIPSY (ESWL), STENT INSERTION/REMOVAL Left 7/20/2018    Procedure: EXTRACORPOREAL SHOCKWAVE LITHOTRIPSY        ([facility]);  Surgeon: Stephen Serrano MD;  Location: [facility];  Service: Urology   • NEPHROSTOMY     ,   Family History   Problem Relation Age of Onset   • Hypertension Father    ,   Social History   Substance Use Topics   • Smoking status: Former Smoker   • Smokeless tobacco: Never Used   • Alcohol use Yes   ,   Prescriptions Prior to Admission   Medication Sig Dispense Refill Last Dose   • bumetanide (BUMEX) 1 MG tablet Take 1 tablet by mouth Daily. 30 tablet 1 9/7/2018 at Unknown time   • cefuroxime (CEFTIN) 500 MG tablet Take 500 mg by  mouth 2 (Two) Times a Day.   9/7/2018 at Unknown time   • diltiaZEM CD (CARDIZEM CD) 120 MG 24 hr capsule Take 1 capsule by mouth Daily. 30 capsule 1 9/7/2018 at Unknown time   • docusate sodium 100 MG capsule Take 100 mg by mouth 2 (Two) Times a Day.   9/7/2018 at Unknown time   • doxazosin (CARDURA) 2 MG tablet Take 1 tablet by mouth Every Night. 30 tablet 6 9/7/2018 at Unknown time   • famotidine (PEPCID) 40 MG tablet Take 1 tablet by mouth Daily.   9/7/2018 at Unknown time   • finasteride (PROSCAR) 5 MG tablet Take 5 mg by mouth Daily.   9/7/2018 at Unknown time   • fluconazole (DIFLUCAN) 100 MG tablet Take 100 mg by mouth Daily.   9/7/2018 at Unknown time   • fluticasone (FLONASE) 50 MCG/ACT nasal spray 1 spray by Each Nare route Daily.   9/7/2018 at Unknown time   • folic acid (FOLVITE) 1 MG tablet Take 1 tablet by mouth Daily. 30 tablet 1 9/7/2018 at Unknown time   • levothyroxine (SYNTHROID, LEVOTHROID) 50 MCG tablet Take 1 tablet by mouth Daily. 30 tablet 5 9/7/2018 at Unknown time   • loratadine (CLARITIN) 10 MG tablet Take 10 mg by mouth Daily.   9/7/2018 at Unknown time   • metoprolol succinate XL (TOPROL-XL) 25 MG 24 hr tablet Take 1 tablet by mouth Daily. 30 tablet 11 9/7/2018 at Unknown time   • nitrofurantoin, macrocrystal-monohydrate, (MACROBID) 100 MG capsule Take 100 mg by mouth 2 (Two) Times a Day.   9/7/2018 at Unknown time   • oxyCODONE-acetaminophen (PERCOCET) 5-325 MG per tablet Take 1 tablet by mouth Every 4 (Four) Hours As Needed for Moderate Pain  or Severe Pain . 18 tablet 0 9/7/2018 at Unknown time   • polyethylene glycol (MIRALAX) pack packet Take 17 g by mouth Daily.   9/7/2018 at Unknown time   • potassium chloride (K-DUR,KLOR-CON) 10 MEQ CR tablet Take 5 mEq by mouth Daily.   9/7/2018 at Unknown time   • tamsulosin (FLOMAX) 0.4 MG capsule 24 hr capsule Take 2 capsules by mouth Every Night. 30 capsule  9/7/2018 at Unknown time   • TiZANidine (ZANAFLEX) 2 MG capsule Take 2 mg by mouth  Daily As Needed for Muscle Spasms.   9/7/2018 at Unknown time   • vitamin B-12 (CYANOCOBALAMIN) 1000 MCG tablet Take 1,000 mcg by mouth Daily.   9/7/2018 at Unknown time   • vitamin D (ERGOCALCIFEROL) 19980 units capsule capsule Take 50,000 Units by mouth Every 7 (Seven) Days. Take 1 capsule by mouth every 7 days on Thursday 9/7/2018 at Unknown time   • acetaminophen (TYLENOL) 325 MG tablet Take 650 mg by mouth Every 6 (Six) Hours As Needed for Mild Pain , Headache or Fever.   Unknown at Unknown time   • albuterol (PROVENTIL HFA;VENTOLIN HFA) 108 (90 BASE) MCG/ACT inhaler Inhale 2 puffs Every 6 (Six) Hours As Needed for Wheezing. 2 Puffs 4 times per day as needed. 1 inhaler 11 Unknown at Unknown time        Allergies:  Lipitor [atorvastatin]; Neomycin; and Simvastatin    Objective      Vital Signs  Temp:  [97.1 °F (36.2 °C)-98.6 °F (37 °C)] 97.2 °F (36.2 °C)  Heart Rate:  [52-85] 54  Resp:  [18-20] 20  BP: ()/(42-74) 96/42    Physical Exam   Constitutional: He is oriented to person, place, and time. He appears well-developed and well-nourished. No distress.   HENT:   Head: Normocephalic and atraumatic.   Right Ear: External ear normal.   Left Ear: External ear normal.   Mouth/Throat: Oropharynx is clear and moist.   Eyes: Pupils are equal, round, and reactive to light. EOM are normal. Right eye exhibits no discharge. Left eye exhibits no discharge. No scleral icterus.   Neck: Normal range of motion. Neck supple. No JVD present. No tracheal deviation present.   Cardiovascular: Normal rate and normal heart sounds.  Exam reveals no gallop and no friction rub.    No murmur heard.  Pulmonary/Chest: Effort normal and breath sounds normal. No stridor. No respiratory distress. He has no wheezes. He has no rales. He exhibits no tenderness.   Abdominal: Soft. Bowel sounds are normal. He exhibits no distension and no mass. There is no tenderness. There is no guarding and no CVA tenderness.   Genitourinary:  Uncircumcised. No phimosis. No discharge found.   Musculoskeletal: He exhibits no edema, tenderness or deformity.   Lymphadenopathy:     He has no cervical adenopathy.   Neurological: He is alert and oriented to person, place, and time.   Skin: Skin is warm and dry. Capillary refill takes 2 to 3 seconds. No rash noted. He is not diaphoretic. No erythema. There is pallor.   Psychiatric: He has a normal mood and affect. His behavior is normal. Judgment and thought content normal.       Results Review:   Lab Results (last 24 hours)     Procedure Component Value Units Date/Time    Blood Culture - Blood, [[id number]]  (Normal) Collected:  09/10/18 1650    Specimen:  Blood from Hand, Left Updated:  09/11/18 1700     Blood Culture No growth at 24 hours    Blood Culture - Blood, [[id number]]  (Normal) Collected:  09/10/18 1655    Specimen:  Blood from Arm, Left Updated:  09/11/18 1700     Blood Culture No growth at 24 hours    Comprehensive Metabolic Panel [[id number]]  (Abnormal) Collected:  09/11/18 0732    Specimen:  Blood Updated:  09/11/18 0808     Glucose 90 mg/dL      BUN 27 (H) mg/dL      Creatinine 1.09 mg/dL      Sodium 139 mmol/L      Potassium 4.2 mmol/L      Chloride 108 mmol/L      CO2 25.0 mmol/L      Calcium 9.0 mg/dL      Total Protein 7.0 g/dL      Albumin 2.90 (L) g/dL      ALT (SGPT) 26 U/L      AST (SGOT) 29 U/L      Alkaline Phosphatase 133 (H) U/L      Total Bilirubin 0.4 mg/dL      eGFR Non African Amer 64 mL/min/1.73      Globulin 4.1 (H) gm/dL      A/G Ratio 0.7 (L) g/dL      BUN/Creatinine Ratio 24.8     Anion Gap 6.0 mmol/L     Narrative:       The MDRD GFR formula is only valid for adults with stable renal function between ages 18 and 70.    CBC & Differential [[id number]] Collected:  09/11/18 0732    Specimen:  Blood Updated:  09/11/18 0757    Narrative:       The following orders were created for panel order CBC & Differential.  Procedure                               Abnormality         Status                      ---------                               -----------         ------                     CBC Auto Differential[[id number]]        Abnormal            Final result                 Please view results for these tests on the individual orders.    CBC Auto Differential [[id number]]  (Abnormal) Collected:  09/11/18 0732    Specimen:  Blood Updated:  09/11/18 0757     WBC 6.30 10*3/mm3      RBC 3.26 (L) 10*6/mm3      Hemoglobin 9.4 (L) g/dL      Hematocrit 28.7 (L) %      MCV 88.0 fL      MCH 28.8 pg      MCHC 32.8 g/dL      RDW 19.4 (H) %      RDW-SD 62.2 (H) fl      MPV 9.2 fL      Platelets 160 10*3/mm3      Neutrophil % 62.2 %      Lymphocyte % 25.4 %      Monocyte % 7.9 %      Eosinophil % 3.8 %      Basophil % 0.2 %      Immature Grans % 0.5 %      Neutrophils, Absolute 3.92 10*3/mm3      Lymphocytes, Absolute 1.60 10*3/mm3      Monocytes, Absolute 0.50 10*3/mm3      Eosinophils, Absolute 0.24 10*3/mm3      Basophils, Absolute 0.01 10*3/mm3      Immature Grans, Absolute 0.03 (H) 10*3/mm3     Blood Culture - Blood, [[id number]]  (Abnormal) Collected:  09/08/18 1106    Specimen:  Blood from Arm, Right Updated:  09/11/18 0545     Blood Culture Pseudomonas aeruginosa (A)     Gram Stain Result Aerobic Bottle Gram negative bacilli     Comment: 2 bottles positive of 4 drawn       Narrative:       for sensitivity see previous report    Blood Culture - Blood, [[id number]]  (Abnormal)  (Susceptibility) Collected:  09/08/18 1134    Specimen:  Blood from Arm, Left Updated:  09/11/18 0544     Blood Culture Pseudomonas aeruginosa (A)     Comment: Pseudomonas aeruginosa may develop resistance during prolonged therapy with all antibiotics. Isolates that are initially susceptible may become resistant within 3 or 4 days. Repeat susceptibility testing may be indicated.          Isolated from Aerobic Bottle     Gram Stain Result Aerobic Bottle Gram negative bacilli     Comment: 1 bottle positive of 4 drawn        Susceptibility      Pseudomonas aeruginosa     ANYA     Amikacin <=16 ug/ml Susceptible     Aztreonam <=8 ug/ml Susceptible     Cefepime <=8 ug/ml Susceptible     Ceftazidime <=1 ug/ml Susceptible     Gentamicin <=4 ug/ml Susceptible     Levofloxacin <=2 ug/ml Susceptible     Meropenem <=4 ug/ml Susceptible     Piperacillin + Tazobactam <=16 ug/ml Susceptible     Tobramycin <=4 ug/ml Susceptible                         Imaging Results (last 24 hours)     ** No results found for the last 24 hours. **           I reviewed the patient's new clinical results.  I reviewed the patient's new imaging results and agree with the interpretation.  I reviewed the patient's other test results and agree with the interpretation        Assessment/Plan     Principal Problem:    Toxic encephalopathy  Active Problems:    Demand ischemia (CMS/HCC)    Acute renal failure with tubular necrosis (CMS/HCC)    Chronic atrial fibrillation (CMS/HCC)    Acute cystitis with hematuria    Sepsis (CMS/HCC)    Decubitus ulcer of left heel, unstageable (CMS/HCC)    Acute renal failure (CMS/HCC)      Assessment & Plan    1. Urinary tract infection, complicated, catheter associated. Dye removed last week in Urology office and has been straight catheterized since, unable to void.   -History of E.Coli ESBL and candida UTI, currently has Pseudomonas aeruginosa bacteremia  -Merrem day #3, Diflucan  -WBC 6.30    2. Retention of urine, chronic, related to obstructive BPH, likely neurogenic bladder  -Dye removed last week, required straight cath  -Flomax, Hytrin, Proscar  -Estimated Creatinine Clearance: 48.1 mL/min (by C-G formula based on SCr of 1.09 mg/dL).     3. Kidney and ureteral stones, status post LEFT CRULLS and nephrostomy tube removal on 8/22/2018. J-stent in place at this time.   -KUB on 9/8/18 showed bilateral kidney stones, left ureteral stent in place, unchanged 1.1 cm stone right UVJ.     Plan:   Will anchor Dye now until OR with   [place], plan for Friday PM, cystoscopy, left retrograde and J-stent removal. After his sepsis and cystitis is treated, J-stent is removed, likely he will need SP tube placement for long term treatment of his chronic retention of urine. Urine culture.     PRE DIAGNOSIS:  NGB Retention of urine     POST DIAGNOSIS:  NGB Retention of urine     PROCEDURE DETAILS:  I have reviewed the diagnosis and treatment options with patient. Risks and benefits explained and patient wishes to proceed with Dye placement. Glans prepped in sterile technique. 16 Filipino catheter inserted with 10 mL sterile water in balloon. Catheter connected to gravity bag, closed system.      COMPLICATIONS:  No Complications.     FINDINGS: Urine cloudy yellow.      INSTRUCTIONS: Appropriate post procedure instructions given. Verbalized understanding    I discussed the patient's findings and my recommendations with patient, nursing staff, consulting provider and DrJUSTIN Miranda  09/11/18  5:58 PM

## 2018-09-11 NOTE — PROGRESS NOTES
[facility] Medicine Services  INPATIENT PROGRESS NOTE    Length of Stay: 3  Date of Admission: 9/8/2018  Primary Care Physician: Terrell Arzate MD    Subjective     Chief Complaint   Patient presents with   • Altered Mental Status     HPI:  87-year-old  male getting rehabilitation and nursing home was brought in with a complaint of altered mental status, questionable fever.  Patient was found to be in A. fib with RVR and rate was controlled with esmolol drip in the ER.    9/9/18: Patient had further lab workup and blood culture which showed Pseudomonas in his blood.  Patient has been started on IV antibiotic since then his symptoms have improved.  Blood pressure has improved.  Heart rate is well-controlled.    9/10/18: feeling better today.     9/11/18: pt is feeling better, repeat blood cultures are pending.     Review of Systems   Constitutional: Positive for activity change, appetite change and fatigue.   Respiratory: Negative for chest tightness and shortness of breath.    Cardiovascular: Negative for chest pain and palpitations.        All pertinent negatives and positives are as above. All other systems have been reviewed and are negative unless otherwise stated.     Objective      Vital Sign Min/Max for last 24 hours  Temp  Min: 97.1 °F (36.2 °C)  Max: 98.6 °F (37 °C)   BP  Min: 124/68  Max: 159/71   Pulse  Min: 52  Max: 85   Resp  Min: 18  Max: 20   SpO2  Min: 93 %  Max: 97 %   No Data Recorded   Weight  Min: 72.6 kg (160 lb)  Max: 72.6 kg (160 lb)         Physical Exam   Constitutional: He appears well-developed and well-nourished. He has a sickly appearance. He appears ill.   HENT:   Head: Normocephalic.   Eyes: Pupils are equal, round, and reactive to light.   Cardiovascular: Normal rate, regular rhythm and normal heart sounds.    Pulmonary/Chest: Effort normal and breath sounds normal.   Abdominal: Soft. Bowel sounds are normal.   Musculoskeletal:  Normal range of motion.   Neurological: He is alert.   Psychiatric: He has a normal mood and affect.   Vitals reviewed.      Current Facility-Administered Medications   Medication Dose Route Frequency Provider Last Rate Last Dose   • acetaminophen (TYLENOL) tablet 650 mg  650 mg Oral Q6H PRN Ancelmo Farnsworth, DO       • albuterol (PROVENTIL) nebulizer solution 0.083% 2.5 mg/3mL  2.5 mg Nebulization Q6H PRN Ancelmo Farnsworth, DO       • diltiaZEM (CARDIZEM) tablet 60 mg  60 mg Oral Q6H Mike Brito MD   60 mg at 09/11/18 1117   • docusate sodium (COLACE) capsule 100 mg  100 mg Oral BID Ancelmo Farnsworth, DO   100 mg at 09/11/18 0946   • finasteride (PROSCAR) tablet 5 mg  5 mg Oral Daily Ancelmo Farnsworth, DO   5 mg at 09/11/18 0946   • fluconazole (DIFLUCAN) tablet 100 mg  100 mg Oral Daily Ancelmo Farnsworth, DO   100 mg at 09/11/18 0946   • folic acid (FOLVITE) tablet 1 mg  1 mg Oral Daily Ancelmo Farnsworth, DO   1 mg at 09/11/18 0947   • heparin (porcine) 5000 UNIT/ML injection 5,000 Units  5,000 Units Subcutaneous Q8H Ancelmo Farnsworth, DO   5,000 Units at 09/11/18 1346   • levothyroxine (SYNTHROID, LEVOTHROID) tablet 50 mcg  50 mcg Oral Daily Ancelmo Farnsworth, DO   50 mcg at 09/11/18 0946   • meropenem (MERREM) 1 g/100 mL 0.9% NS VTB (mbp)  1 g Intravenous Q8H Ancelmo Farnsworth, DO   1 g at 09/11/18 1346   • metoprolol succinate XL (TOPROL-XL) 24 hr tablet 25 mg  25 mg Oral Daily Ancelmo Farnsworth, DO   25 mg at 09/11/18 0946   • morphine injection 1 mg  1 mg Intravenous Q4H PRN Ancelmo Farnsworth DO        And   • naloxone (NARCAN) injection 0.4 mg  0.4 mg Intravenous Q5 Min PRN Javad, Ancelmo T, DO       • ondansetron (ZOFRAN) injection 4 mg  4 mg Intravenous Q6H PRN Ancelmo Farnsworth, DO       • oxyCODONE-acetaminophen (PERCOCET) 5-325 MG per tablet 1 tablet  1 tablet Oral Q4H PRN Ancelmo Farnsworth, DO   1 tablet at 09/11/18 0650   • Pharmacy to Dose meropenem (MERREM)   Does not apply  Continuous PRN JavadAncelmo T, DO       • polyethylene glycol 3350 powder (packet)  17 g Oral Daily Javad, Ancelmo T, DO   17 g at 09/11/18 0947   • povidone-iodine (BETADINE) ointment   Topical Daily Augusto Romano MD       • sodium chloride 0.9 % flush 1-10 mL  1-10 mL Intravenous PRN JavadAncelmo segura T, DO       • sodium chloride 0.9 % flush 10 mL  10 mL Intravenous PRN Dariel Hidaglo MD       • tamsulosin (FLOMAX) 24 hr capsule 0.8 mg  0.8 mg Oral Nightly Javad, Ancelmo T, DO   0.8 mg at 09/10/18 2105   • terazosin (HYTRIN) capsule 2 mg  2 mg Oral Nightly Javad, Ancelmo T, DO   2 mg at 09/10/18 2106   • tiZANidine (ZANAFLEX) tablet 2 mg  2 mg Oral Q8H PRN Ancelmo Farnsworth T, DO       • vitamin B-12 (CYANOCOBALAMIN) tablet 1,000 mcg  1,000 mcg Oral Daily Javad Ancelmo T, DO   1,000 mcg at 09/11/18 0947           Results Review:  I have reviewed the labs, radiology results, and diagnostic studies.    Laboratory Data:     Results from last 7 days  Lab Units 09/11/18  0732 09/10/18  0626 09/09/18  0443   SODIUM mmol/L 139 142 140   POTASSIUM mmol/L 4.2 4.6 4.6   CHLORIDE mmol/L 108 110 111*   CO2 mmol/L 25.0 24.0 21.0*   BUN mg/dL 27* 33* 40*   CREATININE mg/dL 1.09 1.20 1.39*   GLUCOSE mg/dL 90 94 84   CALCIUM mg/dL 9.0 9.0 8.5   BILIRUBIN mg/dL 0.4 0.3 0.3   ALK PHOS U/L 133* 135* 122   ALT (SGPT) U/L 26 27 30   AST (SGOT) U/L 29 37 41   ANION GAP mmol/L 6.0 8.0 8.0     Estimated Creatinine Clearance: 48.1 mL/min (by C-G formula based on SCr of 1.09 mg/dL).    Results from last 7 days  Lab Units 09/08/18  1106   MAGNESIUM mg/dL 2.1           Results from last 7 days  Lab Units 09/11/18  0732 09/10/18  0626 09/09/18  0443 09/08/18  1106   WBC 10*3/mm3 6.30 5.30 5.26 9.16   HEMOGLOBIN g/dL 9.4* 9.3* 8.4* 10.1*   HEMATOCRIT % 28.7* 29.3* 25.3* 30.8*   PLATELETS 10*3/mm3 160 177 147* 186               Culture Data:   Blood Culture   Date Value Ref Range Status   09/10/2018 No growth at less than 24 hours   Preliminary   09/10/2018 No growth at less than 24 hours  Preliminary     No results found for: URINECX  No results found for: RESPCX  No results found for: WOUNDCX  No results found for: STOOLCX  No components found for: BODYFLD      Radiology Data:   Imaging Results (last 24 hours)     ** No results found for the last 24 hours. **          I have reviewed the patient current medications.     Assessment/Plan     Active Hospital Problems    Diagnosis Date Noted   • **Toxic encephalopathy [G92] 09/08/2018   • Demand ischemia (CMS/HCC) [I24.8] 09/08/2018   • Acute renal failure with tubular necrosis (CMS/HCC) [N17.0] 09/08/2018   • Chronic atrial fibrillation (CMS/[facility]) [I48.2] 09/08/2018   • Acute cystitis with hematuria [N30.01] 09/08/2018   • Sepsis (CMS/[facility]) [A41.9] 09/08/2018   • Decubitus ulcer of left heel, unstageable (CMS/[facility]) [L89.620] 09/08/2018   • Acute renal failure (CMS/[facility]) [N17.9] 09/08/2018     1.  Sepsis secondary to Pseudomonas: Currently he is on IV antibiotic.  We'll continue with follow-up culture and sensitivity.  His blood pressure has improved. Repeat blood culture.  2.  A. fib with RVR: Currently rate control he has been tapered off of esmolol drip.  Patient is not a candidate for anti-coagulation due to his GI bleed.  3.  Acute renal failure: Likely secondary to the sepsis causing hypotension, repeat labs shows improvement.  4.  Left heel ulcer unstageable, wound care consult.  5.  Anemia: Acute drop in hemoglobin from 10.1- 8.4, continue monitor H&H and obtain Hemoccult. Improve to 9.4  6. ESBL UTI: meropenem.     Discharge Planning: I expect patient to be discharged to [facility] in 3-4 days.      DVT Prophylaxis: SCD              This document has been electronically signed by Auugsto Romano MD on September 11, 2018 1:51 PM

## 2018-09-11 NOTE — PLAN OF CARE
Problem: Cardiac: ACS (Acute Coronary Syndrome) (Adult)  Goal: Signs and Symptoms of Listed Potential Problems Will be Absent, Minimized or Managed (Cardiac: ACS)  Outcome: Ongoing (interventions implemented as appropriate)      Problem: Urinary Tract Infection (Adult)  Goal: Signs and Symptoms of Listed Potential Problems Will be Absent, Minimized or Managed (Urinary Tract Infection)  Outcome: Ongoing (interventions implemented as appropriate)      Problem: Wound (Includes Pressure Injury) (Adult)  Goal: Signs and Symptoms of Listed Potential Problems Will be Absent, Minimized or Managed (Wound)  Outcome: Ongoing (interventions implemented as appropriate)      Problem: Patient Care Overview  Goal: Plan of Care Review  Outcome: Ongoing (interventions implemented as appropriate)   09/11/18 0433   Coping/Psychosocial   Plan of Care Reviewed With patient   Plan of Care Review   Progress no change   OTHER   Outcome Summary VSS, no C/O pain/discomfort. Will continue to monitor.        Problem: Skin Injury Risk (Adult)  Goal: Skin Health and Integrity  Outcome: Ongoing (interventions implemented as appropriate)      Problem: Fall Risk (Adult)  Goal: Absence of Fall  Outcome: Ongoing (interventions implemented as appropriate)      Problem: Pain, Acute (Adult)  Goal: Acceptable Pain Control/Comfort Level  Outcome: Ongoing (interventions implemented as appropriate)

## 2018-09-11 NOTE — PLAN OF CARE
Problem: Patient Care Overview  Goal: Plan of Care Review  Outcome: Ongoing (interventions implemented as appropriate)   09/11/18 1413   Coping/Psychosocial   Plan of Care Reviewed With patient   Plan of Care Review   Progress no change       Problem: Skin Injury Risk (Adult)  Goal: Skin Health and Integrity  Outcome: Ongoing (interventions implemented as appropriate)      Problem: Fall Risk (Adult)  Goal: Absence of Fall  Outcome: Ongoing (interventions implemented as appropriate)      Problem: Pain, Acute (Adult)  Goal: Acceptable Pain Control/Comfort Level  Outcome: Ongoing (interventions implemented as appropriate)

## 2018-09-12 NOTE — PROGRESS NOTES
"   LOS: 4 days   Patient Care Team:  Terrell Arzate MD as PCP - General  Terrell Arzate MD as PCP - Claims Attributed  Bethany Loco, RN as Care Coordinator (Population Health)    Subjective     Subjective:  Symptoms:  Stable.  No shortness of breath, cough, chest pain, headache, chest pressure or anxiety.  (No sediment, urine cloudy yellow in Dye's gravity bag.   ).    Diet:  Adequate intake.  No nausea or vomiting.    Activity level: Returning to normal.    Pain:  He complains of pain that is mild.  He reports pain is improving.  Pain is requiring pain medication.        History taken from: patient chart family RN    Objective     Vital Signs  Temp:  [96 °F (35.6 °C)-97.7 °F (36.5 °C)] 96 °F (35.6 °C)  Heart Rate:  [55-84] 62  Resp:  [18] 18  BP: (100-153)/(58-71) 108/58    Objective:  General Appearance:  In no acute distress.    Vital signs: (most recent): Blood pressure 108/58, pulse 62, temperature 96 °F (35.6 °C), temperature source Temporal Artery , resp. rate 18, height 182.9 cm (72\"), weight 73.8 kg (162 lb 12.8 oz), SpO2 96 %.  Vital signs are normal.  No fever.    Output: Producing urine (Dye ).    HEENT: Normal HEENT exam.    Lungs:  Normal effort and normal respiratory rate.  Breath sounds clear to auscultation.  He is not in respiratory distress.  No decreased breath sounds.    Heart: Normal rate.  Regular rhythm.  S1 normal and S2 normal.  No murmur, gallop or friction rub.   Chest: Symmetric chest wall expansion.   Abdomen: Abdomen is soft and non-distended.  Bowel sounds are normal.   There is no abdominal tenderness.     Extremities: Normal range of motion.  There is dependent edema.  There is no deformity.    Pulses: Distal pulses are intact.    Neurological: Patient is alert and oriented to person, place and time.  GCS score is 15.    Pupils:  Pupils are equal, round, and reactive to light.    Skin:  Warm, dry and pale.  There is ulceration (LLE).  No rash, ecchymosis or cyanosis. "             Results Review:    Lab Results (last 48 hours)     Procedure Component Value Units Date/Time    Basic Metabolic Panel [[id number]]  (Abnormal) Collected:  08/28/18 0622    Specimen:  Blood Updated:  08/28/18 0709     Glucose 112 (H) mg/dL      BUN 30 (H) mg/dL      Creatinine 0.97 mg/dL      Sodium 138 mmol/L      Potassium 4.2 mmol/L      Chloride 104 mmol/L      CO2 28.0 mmol/L      Calcium 8.7 mg/dL      eGFR Non African Amer 73 mL/min/1.73      BUN/Creatinine Ratio 30.9 (H)     Anion Gap 6.0 mmol/L     Narrative:       The MDRD GFR formula is only valid for adults with stable renal function between ages 18 and 70.    Urine Culture - Urine, [[id number]]  (Abnormal) Collected:  08/26/18 1549    Specimen:  Urine from Urine, Catheter Updated:  08/28/18 0645     Urine Culture >100,000 CFU/mL Candida albicans (A)    CBC & Differential [[id number]] Collected:  08/28/18 0530    Specimen:  Blood Updated:  08/28/18 0611    Narrative:       The following orders were created for panel order CBC & Differential.  Procedure                               Abnormality         Status                     ---------                               -----------         ------                     CBC Auto Differential[[id number]]        Abnormal            Final result                 Please view results for these tests on the individual orders.    CBC Auto Differential [[id number]]  (Abnormal) Collected:  08/28/18 0530    Specimen:  Blood Updated:  08/28/18 0611     WBC 6.23 10*3/mm3      RBC 3.58 (L) 10*6/mm3      Hemoglobin 10.1 (L) g/dL      Hematocrit 31.9 (L) %      MCV 89.1 fL      MCH 28.2 pg      MCHC 31.7 g/dL      RDW 18.5 (H) %      RDW-SD 59.5 (H) fl      MPV 8.7 fL      Platelets 260 10*3/mm3      Neutrophil % 53.8 %      Lymphocyte % 30.5 %      Monocyte % 10.9 %      Eosinophil % 4.0 %      Basophil % 0.3 %      Immature Grans % 0.5 %      Neutrophils, Absolute 3.35 10*3/mm3      Lymphocytes, Absolute 1.90 10*3/mm3       Monocytes, Absolute 0.68 10*3/mm3      Eosinophils, Absolute 0.25 10*3/mm3      Basophils, Absolute 0.02 10*3/mm3      Immature Grans, Absolute 0.03 (H) 10*3/mm3      nRBC 0.0 /100 WBC           I reviewed the patient's new clinical results.  I reviewed the patient's new imaging results and agree with the interpretation.  I reviewed the patient's other test results and agree with the interpretation      Assessment/Plan     Principal Problem:    Toxic encephalopathy  Active Problems:    Demand ischemia (CMS/[facility])    Acute renal failure with tubular necrosis (CMS/[facility])    Chronic atrial fibrillation (CMS/[facility])    Acute cystitis with hematuria    Sepsis (CMS/[facility])    Decubitus ulcer of left heel, unstageable (CMS/[facility])    Acute renal failure (CMS/[facility])      Assessment & Plan    1. Urinary tract infection, complicated, catheter associated. Dye removed last week in Urology office and has been straight catheterized since, unable to void, Dye anchored by Urology 9/11/18  -History of E.Coli ESBL and candida UTI, currently has Pseudomonas aeruginosa bacteremia  -Merrem day #4, Diflucan  -WBC 6.30-->6.05  -Urine culture 9/11/18 in progress.      2. 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     3. Kidney and ureteral stones, status post LEFT CRULLS and nephrostomy tube removal on 8/22/2018. J-stent in place at this time.   -KUB on 9/8/18 showed bilateral kidney stones, left ureteral stent in place, unchanged 1.1 cm stone right UVJ.      Plan:   Continue Dye for now, OR tomorrow PM with Dr. Serrano, cystoscopy, left retrograde and J-stent removal.   After his cystitis is treated, J-stent is removed, likely he will need SP tube placement for long term treatment of his chronic retention of urine.       Peter Lai, APRN  09/12/18  5:32 PM

## 2018-09-12 NOTE — PLAN OF CARE
Problem: Patient Care Overview  Goal: Plan of Care Review  Outcome: Ongoing (interventions implemented as appropriate)   09/12/18 9839   Coping/Psychosocial   Plan of Care Reviewed With patient   Plan of Care Review   Progress no change   OTHER   Outcome Summary Pt has varying PO intakes but has good appetite when he likes foods being served. Good acceptance of milk and icecream. Will continue sending chopped meats.

## 2018-09-12 NOTE — PROGRESS NOTES
TWO PATIENT IDENTIFIERS WERE USED. CONSENT WAS SIGNED PER PATIENT EDUCATION MATERIAL WAS GIVEN TO PATIENT AND / OR FAMILY. THE PATIENT WAS DRAPED WITH FULL BODY DRAPE AND PATIENT'S RIGHT ARM WAS PREPPED WITH CHLORAPREP.  ULTRASOUND WAS USED TO LOCALIZE THERIGHT BASILIC  VEIN. SUBCUTANEOUS TISSUE AT THE CATHETER SITE WAS INFILTRATED WITH 2% LIDOCAINE. UNDER ULTRASOUND GUIDANCE, THE VEIN WAS ACCESSED WITH A 21GAUGE  NEEDLE. AN 0.018 WIRE WAS THEN THREADED THROUGH THE NEEDLE INTO THE CENTRAL VENOUS SYSTEM. THE 21GAUGE  NEEDLE WAS REMOVED AND A 5 Turkish PEEL AWAY SHEATH WAS PLACED OVER THE WIRE. THE PICC LINE CATHETER WAS CUT AT 35 CM. THE PICC LINE CATHETER WAS THEN PLACED OVER THE WIRE INTO THE VEIN, THE SHEATH WAS PEELED AWAY,WIRE WAS REMOVED. CATHETER WAS FLUSHED WITH NORMAL SALINE AND TIPS APPLIED. BIOPATCH PLACED. CATHETER SECURED WITH STATLOCK AND TEGADERM. PATIENT TOLERATED PROCEDURE WELL. THIS WAS DONE IN THE   ANGIOSUITE      IMPRESSION: SUCCESSFUL PLACEMENT OF TRIPLE LUMEN SOLO PICC        Darryl Pierre  9/12/2018  2:57 PM

## 2018-09-12 NOTE — PLAN OF CARE
Problem: Cardiac: ACS (Acute Coronary Syndrome) (Adult)  Goal: Signs and Symptoms of Listed Potential Problems Will be Absent, Minimized or Managed (Cardiac: ACS)  Outcome: Ongoing (interventions implemented as appropriate)   09/12/18 1421   Goal/Outcome Evaluation   Problems Assessed (Acute Coronary Syndrome) all   Problems Present (Acute Coronary Syn) none       Problem: Urinary Tract Infection (Adult)  Goal: Signs and Symptoms of Listed Potential Problems Will be Absent, Minimized or Managed (Urinary Tract Infection)  Outcome: Ongoing (interventions implemented as appropriate)      Problem: Wound (Includes Pressure Injury) (Adult)  Goal: Signs and Symptoms of Listed Potential Problems Will be Absent, Minimized or Managed (Wound)  Outcome: Ongoing (interventions implemented as appropriate)      Problem: Patient Care Overview  Goal: Individualization and Mutuality  Outcome: Ongoing (interventions implemented as appropriate)    Goal: Interprofessional Rounds/Family Conf  Outcome: Ongoing (interventions implemented as appropriate)      Problem: Skin Injury Risk (Adult)  Goal: Skin Health and Integrity  Outcome: Ongoing (interventions implemented as appropriate)      Problem: Fall Risk (Adult)  Goal: Absence of Fall  Outcome: Ongoing (interventions implemented as appropriate)      Problem: Pain, Acute (Adult)  Goal: Acceptable Pain Control/Comfort Level  Outcome: Ongoing (interventions implemented as appropriate)

## 2018-09-12 NOTE — PROGRESS NOTES
[facility]  INPATIENT PROGRESS NOTE    Length of Stay: 4  Date of Admission: 9/8/2018  Primary Care Physician: Terrell Arzate MD    Subjective     Chief Complaint   Patient presents with   • Altered Mental Status     HPI:  87-year-old  male getting rehabilitation and nursing home was brought in with a complaint of altered mental status, questionable fever.  Patient was found to be in A. fib with RVR and rate was controlled with esmolol drip in the ER.    9/9/18: Patient had further lab workup and blood culture which showed Pseudomonas in his blood.  Patient has been started on IV antibiotic since then his symptoms have improved.  Blood pressure has improved.  Heart rate is well-controlled.    9/10/18: feeling better today.     9/11/18: pt is feeling better, repeat blood cultures are pending.     9/12/18: Pt is feeling better, he is going to get his PICC line placed today. Plan for discharge back to NH tomorrow.     Review of Systems   Constitutional: Positive for activity change, appetite change and fatigue.   Respiratory: Negative for chest tightness and shortness of breath.    Cardiovascular: Negative for chest pain and palpitations.        All pertinent negatives and positives are as above. All other systems have been reviewed and are negative unless otherwise stated.     Objective      Vital Sign Min/Max for last 24 hours  Temp  Min: 96 °F (35.6 °C)  Max: 97.7 °F (36.5 °C)   BP  Min: 100/58  Max: 153/71   Pulse  Min: 55  Max: 84   Resp  Min: 18  Max: 18   SpO2  Min: 94 %  Max: 96 %   No Data Recorded   Weight  Min: 73.8 kg (162 lb 12.8 oz)  Max: 73.8 kg (162 lb 12.8 oz)         Physical Exam   Constitutional: He appears well-developed and well-nourished. He has a sickly appearance. He appears ill.   HENT:   Head: Normocephalic.   Eyes: Pupils are equal, round, and reactive to light.   Cardiovascular: Normal rate, regular rhythm and normal  heart sounds.    Pulmonary/Chest: Effort normal and breath sounds normal.   Abdominal: Soft. Bowel sounds are normal.   Musculoskeletal: Normal range of motion.   Neurological: He is alert.   Psychiatric: He has a normal mood and affect.   Vitals reviewed.      Current Facility-Administered Medications   Medication Dose Route Frequency Provider Last Rate Last Dose   • acetaminophen (TYLENOL) tablet 650 mg  650 mg Oral Q6H PRN Ancelmo Farnsworth, DO       • albuterol (PROVENTIL) nebulizer solution 0.083% 2.5 mg/3mL  2.5 mg Nebulization Q6H PRN Ancelmo Farnsworth, DO       • diltiaZEM (CARDIZEM) tablet 60 mg  60 mg Oral Q6H Mike Brito MD   60 mg at 09/12/18 1309   • docusate sodium (COLACE) capsule 100 mg  100 mg Oral BID Ancelmo Farnsworth, DO   100 mg at 09/12/18 0912   • finasteride (PROSCAR) tablet 5 mg  5 mg Oral Daily Ancelmo Farnsworth, DO   5 mg at 09/12/18 0913   • fluconazole (DIFLUCAN) tablet 100 mg  100 mg Oral Daily Ancelmo Farnsworth, DO   100 mg at 09/12/18 0913   • folic acid (FOLVITE) tablet 1 mg  1 mg Oral Daily Ancelmo Farnsworth, DO   1 mg at 09/12/18 0913   • heparin (porcine) 5000 UNIT/ML injection 5,000 Units  5,000 Units Subcutaneous Q8H Ancelmo Farnsworth, DO   5,000 Units at 09/12/18 1309   • levothyroxine (SYNTHROID, LEVOTHROID) tablet 50 mcg  50 mcg Oral Daily Ancelmo Farnsworth, DO   50 mcg at 09/12/18 0913   • meropenem (MERREM) 1 g/100 mL 0.9% NS VTB (mbp)  1 g Intravenous Q8H Ancelmo Farnsworth, DO   1 g at 09/12/18 1309   • metoprolol succinate XL (TOPROL-XL) 24 hr tablet 25 mg  25 mg Oral Daily Ancelmo Farnsworth, DO   25 mg at 09/12/18 0913   • morphine injection 1 mg  1 mg Intravenous Q4H PRN Acnelmo Farnsworth DO        And   • naloxone (NARCAN) injection 0.4 mg  0.4 mg Intravenous Q5 Min PRN Ancelmo Farnsworth T, DO       • ondansetron (ZOFRAN) injection 4 mg  4 mg Intravenous Q6H PRN Ancelmo Farnsworth, DO       • oxyCODONE-acetaminophen (PERCOCET) 5-325 MG per tablet 1 tablet   1 tablet Oral Q4H PRN Ancelmo Farnsworth DO   1 tablet at 09/12/18 0912   • Pharmacy to Dose meropenem (MERREM)   Does not apply Continuous PRN Ancelmo Farnsworth DO       • polyethylene glycol 3350 powder (packet)  17 g Oral Daily Ancelmo Farnsworth, DO   17 g at 09/12/18 0912   • povidone-iodine (BETADINE) ointment   Topical Daily Augusto Romano MD       • sodium chloride 0.9 % flush 1-10 mL  1-10 mL Intravenous PRN Ancelmo Farnsworth DO       • sodium chloride 0.9 % flush 10 mL  10 mL Intravenous PRN Dariel Hidalgo MD       • tamsulosin (FLOMAX) 24 hr capsule 0.8 mg  0.8 mg Oral Nightly Ancelmo Farnsworth, DO   0.8 mg at 09/11/18 2059   • terazosin (HYTRIN) capsule 2 mg  2 mg Oral Nightly Ancelmo Farnsworth, DO   2 mg at 09/11/18 2059   • tiZANidine (ZANAFLEX) tablet 2 mg  2 mg Oral Q8H PRN Ancelmo Farnsworth DO       • vitamin B-12 (CYANOCOBALAMIN) tablet 1,000 mcg  1,000 mcg Oral Daily Ancelmo Farnsworth DO   1,000 mcg at 09/12/18 0913           Results Review:  I have reviewed the labs, radiology results, and diagnostic studies.    Laboratory Data:     Results from last 7 days  Lab Units 09/12/18  0608 09/11/18  0732 09/10/18  0626   SODIUM mmol/L 138 139 142   POTASSIUM mmol/L 4.3 4.2 4.6   CHLORIDE mmol/L 109 108 110   CO2 mmol/L 24.0 25.0 24.0   BUN mg/dL 24* 27* 33*   CREATININE mg/dL 0.97 1.09 1.20   GLUCOSE mg/dL 84 90 94   CALCIUM mg/dL 8.9 9.0 9.0   BILIRUBIN mg/dL 0.3 0.4 0.3   ALK PHOS U/L 121 133* 135*   ALT (SGPT) U/L 29 26 27   AST (SGOT) U/L 25 29 37   ANION GAP mmol/L 5.0 6.0 8.0     Estimated Creatinine Clearance: 54.9 mL/min (by C-G formula based on SCr of 0.97 mg/dL).    Results from last 7 days  Lab Units 09/08/18  1106   MAGNESIUM mg/dL 2.1           Results from last 7 days  Lab Units 09/12/18  0608 09/11/18  0732 09/10/18  0626 09/09/18  0443 09/08/18  1106   WBC 10*3/mm3 6.05 6.30 5.30 5.26 9.16   HEMOGLOBIN g/dL 9.2* 9.4* 9.3* 8.4* 10.1*   HEMATOCRIT % 28.1* 28.7* 29.3* 25.3* 30.8*    PLATELETS 10*3/mm3 158 160 177 147* 186               Culture Data:   Blood Culture   Date Value Ref Range Status   09/10/2018 No growth at 24 hours  Preliminary   09/10/2018 No growth at 24 hours  Preliminary     Urine Culture   Date Value Ref Range Status   09/11/2018 Culture in progress  Preliminary     No results found for: RESPCX  No results found for: WOUNDCX  No results found for: STOOLCX  No components found for: BODYFLD      Radiology Data:   Imaging Results (last 24 hours)     Procedure Component Value Units Date/Time    XR Chest Post CVA Port [[id number]] Collected:  09/12/18 1441     Updated:  09/12/18 1529    Narrative:       [facility] [place]    Patient Name: MR. LALITHA RANGEL    ORDERING: ARTIE TABARES     ATTENDING: URI PYLE     REFERRING: ARTIE TABARES    -----------------------    PROCEDURE: Portable chest x-ray    TECHNIQUE: Single AP view of the chest    COMPARISON: 9/8/2018    HISTORY: picc line placement, N17.9 Acute kidney failure,  unspecified R53.83 Other fatigue I48.0 Paroxysmal atrial  fibrillation I21.4 Non-ST elevation (NSTEMI) myocardial  infarction    FINDINGS:     Life-support devices: Right upper extremity PICC terminates in  the SVC    Lungs/pleura: Small right pleural effusion. The lungs otherwise  appear clear. No pneumothorax.    Heart, hilar and mediastinal structures: Cardiac silhouette is  slightly enlarged. No significant change.      Impression:       CONCLUSION:  Small right pleural effusion.    Electronically signed by:  Jamel Wiseman MD  9/12/2018 3:27 PM CDT  Workstation: [id number]    IR PICC wo fluoro guidance [[id number]] Resulted:  09/12/18 1503     Updated:  09/12/18 1503    Narrative:       This procedure was auto-finalized with no dictation required.    US Guided Vascular Access [[id number]] Resulted:  09/12/18 1446     Updated:  09/12/18 1446    Narrative:       This procedure was auto-finalized with no dictation required.          I have  reviewed the patient current medications.     Assessment/Plan     Active Hospital Problems    Diagnosis Date Noted   • **Toxic encephalopathy [G92] 09/08/2018   • Demand ischemia (CMS/[facility]) [I24.8] 09/08/2018   • Acute renal failure with tubular necrosis (CMS/[facility]) [N17.0] 09/08/2018   • Chronic atrial fibrillation (CMS/[facility]) [I48.2] 09/08/2018   • Acute cystitis with hematuria [N30.01] 09/08/2018   • Sepsis (CMS/[facility]) [A41.9] 09/08/2018   • Decubitus ulcer of left heel, unstageable (CMS/[facility]) [L89.620] 09/08/2018   • Acute renal failure (CMS/[facility]) [N17.9] 09/08/2018     1.  Sepsis secondary to Pseudomonas: Currently he is on IV antibiotic.  We'll continue with follow-up culture and sensitivity.  His blood pressure has improved. Repeat blood culture.  2.  A. fib with RVR: Currently rate control he has been tapered off of esmolol drip.  Patient is not a candidate for anti-coagulation due to his GI bleed.  3.  Acute renal failure: Likely secondary to the sepsis causing hypotension, repeat labs shows improvement.  4.  Left heel ulcer unstageable, wound care consult.  5.  Anemia: improve to 9.4 today, hemoccult is negative,   6. ESBL UTI: meropenem.     Discharge Planning: I expect patient to be discharged to [facility] in 1-2 days.      DVT Prophylaxis: SCD              This document has been electronically signed by Augusto Romano MD on September 12, 2018 4:48 PM

## 2018-09-12 NOTE — CONSULTS
Adult Nutrition  Assessment    Patient Name:  Ravi Park  YOB: 1930  MRN: [id number]  Admit Date:  9/8/2018    Assessment Date:  9/12/2018    Comments:  Pt has fair to good appetite but does not always like the foods he receives.  RN reports family brought food last night and pt ate well.  Pt has good acceptance of milk and icecream, will add icecream on trays.  Pt willing to continue receiving chopped meats.  RD noted food preferences for supper.  Will monitor.          Reason for Assessment     Row Name 09/12/18 1632          Reason for Assessment    Reason For Assessment follow-up protocol               Nutrition/Diet History     Row Name 09/12/18 1633          Nutrition/Diet History    Typical Food/Fluid Intake Pt did not like meat he received at lunch.  RD collected food preferences for supper.  Accepted icecream for snack.               Labs/Tests/Procedures/Meds     Row Name 09/12/18 1633          Labs/Procedures/Meds    Lab Results Reviewed reviewed, pertinent        Medications    Pertinent Medications Reviewed reviewed, pertinent             Physical Findings     Row Name 09/12/18 1633          Physical Findings    Oral/Mouth Cavity poor dentition     Skin pressure injury;non-healing wound(s)               Nutrition Prescription Ordered     Row Name 09/12/18 1633          Nutrition Prescription PO    Current PO Diet Soft Texture     Texture Chopped     Common Modifiers Renal             Evaluation of Received Nutrient/Fluid Intake     Row Name 09/12/18 1634          PO Evaluation    Number of Days PO Intake Evaluated 2 days     % PO Intake 75, 100, 0x2, 25 x 1             Electronically signed by:  Pau Edwards RD  09/12/18 4:34 PM

## 2018-09-12 NOTE — PLAN OF CARE
Problem: Cardiac: ACS (Acute Coronary Syndrome) (Adult)  Goal: Signs and Symptoms of Listed Potential Problems Will be Absent, Minimized or Managed (Cardiac: ACS)  Outcome: Ongoing (interventions implemented as appropriate)      Problem: Urinary Tract Infection (Adult)  Goal: Signs and Symptoms of Listed Potential Problems Will be Absent, Minimized or Managed (Urinary Tract Infection)  Outcome: Ongoing (interventions implemented as appropriate)      Problem: Patient Care Overview  Goal: Plan of Care Review  Outcome: Ongoing (interventions implemented as appropriate)   09/12/18 0520   Coping/Psychosocial   Plan of Care Reviewed With patient   Plan of Care Review   Progress no change     Goal: Individualization and Mutuality  Outcome: Ongoing (interventions implemented as appropriate)    Goal: Discharge Needs Assessment  Outcome: Ongoing (interventions implemented as appropriate)      Problem: Skin Injury Risk (Adult)  Goal: Skin Health and Integrity  Outcome: Ongoing (interventions implemented as appropriate)      Problem: Fall Risk (Adult)  Goal: Absence of Fall  Outcome: Ongoing (interventions implemented as appropriate)      Problem: Pain, Acute (Adult)  Goal: Acceptable Pain Control/Comfort Level  Outcome: Ongoing (interventions implemented as appropriate)

## 2018-09-13 NOTE — PLAN OF CARE
Problem: Cardiac: ACS (Acute Coronary Syndrome) (Adult)  Goal: Signs and Symptoms of Listed Potential Problems Will be Absent, Minimized or Managed (Cardiac: ACS)  Outcome: Ongoing (interventions implemented as appropriate)      Problem: Urinary Tract Infection (Adult)  Goal: Signs and Symptoms of Listed Potential Problems Will be Absent, Minimized or Managed (Urinary Tract Infection)  Outcome: Ongoing (interventions implemented as appropriate)      Problem: Wound (Includes Pressure Injury) (Adult)  Goal: Signs and Symptoms of Listed Potential Problems Will be Absent, Minimized or Managed (Wound)  Outcome: Ongoing (interventions implemented as appropriate)      Problem: Patient Care Overview  Goal: Plan of Care Review  Outcome: Ongoing (interventions implemented as appropriate)   09/13/18 0551   Coping/Psychosocial   Plan of Care Reviewed With patient   Plan of Care Review   Progress improving     Goal: Individualization and Mutuality  Outcome: Ongoing (interventions implemented as appropriate)    Goal: Discharge Needs Assessment  Outcome: Ongoing (interventions implemented as appropriate)      Problem: Skin Injury Risk (Adult)  Goal: Skin Health and Integrity  Outcome: Ongoing (interventions implemented as appropriate)      Problem: Fall Risk (Adult)  Goal: Absence of Fall  Outcome: Ongoing (interventions implemented as appropriate)      Problem: Pain, Acute (Adult)  Goal: Acceptable Pain Control/Comfort Level  Outcome: Ongoing (interventions implemented as appropriate)

## 2018-09-13 NOTE — ANESTHESIA PREPROCEDURE EVALUATION
" Anesthesia Evaluation     Patient summary reviewed and Nursing notes reviewed   no history of anesthetic complications:  NPO Solid Status: > 8 hours  NPO Liquid Status: > 4 hours           Airway   Mallampati: II  TM distance: >3 FB  Neck ROM: full  No difficulty expected  Dental    (+) upper dentures, partials and poor dentition    Comment: Few remaining upper and lower hopeless dentition;\"Snags\".    Pulmonary     breath sounds clear to auscultation  (+) a smoker Former, COPD mild, recent URI resolved, decreased breath sounds,     ROS comment: Home O2  Cardiovascular - normal exam  Exercise tolerance: poor (<4 METS)    NYHA Classification: IV  ECG reviewed  PT is on anticoagulation therapy  Patient on routine beta blocker  Rhythm: irregular  Rate: abnormal    (+) hypertension 2 medications or greater, dysrhythmias Atrial Fib, murmur (Grade II-III/VI systolic), PVD, hyperlipidemia,   (-) past MI, angina, carotid bruits    ROS comment: Atrial fibrillation  Abnormal ECG  When compared with ECG of 16-JUL-2018 14:43,  Nonspecific T wave abnormality, improved in Inferior leads  Confirmed by VETTIANKAL    EF 55-60%    Neuro/Psych  (+) 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  Abdominal    Substance History - negative use     OB/GYN negative ob/gyn ROS         Other   (+) arthritis (DDD cervical and lumbar.)                       Anesthesia Plan    ASA 4     general   (Daughter gave consent by phone.)  intravenous induction   Anesthetic plan, all risks, benefits, and alternatives have been provided, discussed and informed consent has been obtained with: patient, healthcare power of , child and legal guardian.      "

## 2018-09-13 NOTE — OUTREACH NOTE
Skilled Nursing Facility Discharge Flowsheet:     Skilled Nursing Facility Discharge Assessment 9/13/2018   Acute Facility Discharged From [place]   Acute Discharge Date 6/26/2018   Name of the Skilled Nursing Facility? [facility]   Tier Level of the Skilled Nursing Facility 4   Purpose of SNF Admission -   Estimated length of stay for the patient? 9/8   Who is the insurance provider or payor of patient stay? Medicare   Progression of Patient? Back to Acute Care per records   Skilled Nursing Discharge Date? 9/8/2018   Where was the patient discharged to? Other (Comment)

## 2018-09-13 NOTE — PROGRESS NOTES
[facility] Medicine Services  INPATIENT PROGRESS NOTE    Length of Stay: 5  Date of Admission: 9/8/2018  Primary Care Physician: Terrell Arzate MD    Subjective     Chief Complaint   Patient presents with   • Altered Mental Status     HPI:  87-year-old  male getting rehabilitation and nursing home was brought in with a complaint of altered mental status, questionable fever.  Patient was found to be in A. fib with RVR and rate was controlled with esmolol drip in the ER.    9/9/18: Patient had further lab workup and blood culture which showed Pseudomonas in his blood.  Patient has been started on IV antibiotic since then his symptoms have improved.  Blood pressure has improved.  Heart rate is well-controlled.    9/10/18: feeling better today.     9/11/18: pt is feeling better, repeat blood cultures are pending.     9/12/18: Pt is feeling better, he is going to get his PICC line placed today. Plan for discharge back to NH tomorrow.     9/13/18: doing well, plan for cysto today for stent removal.     Review of Systems   Constitutional: Positive for activity change, appetite change and fatigue.   Respiratory: Negative for chest tightness and shortness of breath.    Cardiovascular: Negative for chest pain and palpitations.        All pertinent negatives and positives are as above. All other systems have been reviewed and are negative unless otherwise stated.     Objective      Vital Sign Min/Max for last 24 hours  Temp  Min: 96 °F (35.6 °C)  Max: 96.6 °F (35.9 °C)   BP  Min: 101/58  Max: 149/63   Pulse  Min: 53  Max: 97   Resp  Min: 18  Max: 18   SpO2  Min: 92 %  Max: 96 %   No Data Recorded   Weight  Min: 73.9 kg (162 lb 14.4 oz)  Max: 73.9 kg (162 lb 14.4 oz)         Physical Exam   Constitutional: He appears well-developed and well-nourished. He has a sickly appearance. He appears ill.   HENT:   Head: Normocephalic.   Eyes: Pupils are equal, round, and reactive to  light.   Cardiovascular: Normal rate, regular rhythm and normal heart sounds.    Pulmonary/Chest: Effort normal and breath sounds normal.   Abdominal: Soft. Bowel sounds are normal.   Musculoskeletal: Normal range of motion.   Neurological: He is alert.   Psychiatric: He has a normal mood and affect.   Vitals reviewed.      Current Facility-Administered Medications   Medication Dose Route Frequency Provider Last Rate Last Dose   • acetaminophen (TYLENOL) tablet 650 mg  650 mg Oral Q6H PRN Ancelmo Farnsworth, DO       • albuterol (PROVENTIL) nebulizer solution 0.083% 2.5 mg/3mL  2.5 mg Nebulization Q6H PRN Ancelmo Farnsworth T, DO       • diltiaZEM (CARDIZEM) tablet 60 mg  60 mg Oral Q6H Mike Brito MD   60 mg at 09/13/18 1206   • docusate sodium (COLACE) capsule 100 mg  100 mg Oral BID Ancelmo Farnsworth, DO   100 mg at 09/13/18 0907   • finasteride (PROSCAR) tablet 5 mg  5 mg Oral Daily Ancelmo Farnsworth T, DO   5 mg at 09/13/18 0907   • fluconazole (DIFLUCAN) tablet 100 mg  100 mg Oral Daily Javad, Sony T, DO   100 mg at 09/13/18 0907   • folic acid (FOLVITE) tablet 1 mg  1 mg Oral Daily Ancelmo Farnsworth T, DO   1 mg at 09/13/18 0907   • heparin (porcine) 5000 UNIT/ML injection 5,000 Units  5,000 Units Subcutaneous Q8H Ancelmo Farnsworth, DO   5,000 Units at 09/13/18 0604   • levothyroxine (SYNTHROID, LEVOTHROID) tablet 50 mcg  50 mcg Oral Daily Ancelmo Farnsworth T, DO   50 mcg at 09/13/18 0907   • meropenem (MERREM) 1 g/100 mL 0.9% NS VTB (mbp)  1 g Intravenous Q8H Augusto Romano MD       • metoprolol succinate XL (TOPROL-XL) 24 hr tablet 25 mg  25 mg Oral Daily Ancelmo Farnsworth T, DO   25 mg at 09/13/18 0907   • morphine injection 1 mg  1 mg Intravenous Q4H PRN Ancelmo Farnsworth, DO        And   • naloxone (NARCAN) injection 0.4 mg  0.4 mg Intravenous Q5 Min PRN Ancelmo Farnsworth T, DO       • ondansetron (ZOFRAN) injection 4 mg  4 mg Intravenous Q6H PRN Ancelmo Farnsworth, DO       • oxyCODONE-acetaminophen  (PERCOCET) 5-325 MG per tablet 1 tablet  1 tablet Oral Q4H PRN Ancelmo Farnsworth T, DO   1 tablet at 09/13/18 0907   • Pharmacy to Dose meropenem (MERREM)   Does not apply Continuous PRN Ancelmo Farnsworth T, DO       • polyethylene glycol 3350 powder (packet)  17 g Oral Daily Javad, Sony T, DO   17 g at 09/13/18 0907   • povidone-iodine (BETADINE) ointment   Topical Daily Augusto Romano MD       • sodium chloride 0.9 % flush 1-10 mL  1-10 mL Intravenous PRN Ancelmo Farnsworth T, DO       • sodium chloride 0.9 % flush 10 mL  10 mL Intravenous PRN Dariel Hidalgo MD       • tamsulosin (FLOMAX) 24 hr capsule 0.8 mg  0.8 mg Oral Nightly Ancelmo Farnsworth T, DO   0.8 mg at 09/12/18 2031   • terazosin (HYTRIN) capsule 2 mg  2 mg Oral Nightly Ancelmo Farnsworth T, DO   2 mg at 09/12/18 2031   • tiZANidine (ZANAFLEX) tablet 2 mg  2 mg Oral Q8H PRN Ancelmo Farnsworth, DO       • vitamin B-12 (CYANOCOBALAMIN) tablet 1,000 mcg  1,000 mcg Oral Daily Ancelmo Farnsworth T, DO   1,000 mcg at 09/13/18 0907           Results Review:  I have reviewed the labs, radiology results, and diagnostic studies.    Laboratory Data:     Results from last 7 days  Lab Units 09/13/18  0626 09/12/18  0608 09/11/18  0732   SODIUM mmol/L 137 138 139   POTASSIUM mmol/L 4.3 4.3 4.2   CHLORIDE mmol/L 106 109 108   CO2 mmol/L 25.0 24.0 25.0   BUN mg/dL 25* 24* 27*   CREATININE mg/dL 0.94 0.97 1.09   GLUCOSE mg/dL 92 84 90   CALCIUM mg/dL 9.0 8.9 9.0   BILIRUBIN mg/dL 0.3 0.3 0.4   ALK PHOS U/L 124 121 133*   ALT (SGPT) U/L 22 29 26   AST (SGOT) U/L 25 25 29   ANION GAP mmol/L 6.0 5.0 6.0     Estimated Creatinine Clearance: 56.8 mL/min (by C-G formula based on SCr of 0.94 mg/dL).    Results from last 7 days  Lab Units 09/08/18  1106   MAGNESIUM mg/dL 2.1           Results from last 7 days  Lab Units 09/13/18  0626 09/12/18  0608 09/11/18  0732 09/10/18  0626 09/09/18  0443   WBC 10*3/mm3 6.52 6.05 6.30 5.30 5.26   HEMOGLOBIN g/dL 9.6* 9.2* 9.4* 9.3* 8.4*    HEMATOCRIT % 29.5* 28.1* 28.7* 29.3* 25.3*   PLATELETS 10*3/mm3 167 158 160 177 147*               Culture Data:   Blood Culture   Date Value Ref Range Status   09/10/2018 No growth at 2 days  Preliminary   09/10/2018 No growth at 2 days  Preliminary     Urine Culture   Date Value Ref Range Status   09/11/2018 No growth at 24 hours  Final     No results found for: RESPCX  No results found for: WOUNDCX  No results found for: STOOLCX  No components found for: BODYFLD      Radiology Data:   Imaging Results (last 24 hours)     Procedure Component Value Units Date/Time    XR Chest Post CVA Port [[id number]] Collected:  09/12/18 1441     Updated:  09/12/18 1529    Narrative:       [facility] [place]    Patient Name: MR. LALITHA RANGEL    ORDERING: ARTIE TABARES     ATTENDING: URI PYLE     REFERRING: ARTIE TABARES    -----------------------    PROCEDURE: Portable chest x-ray    TECHNIQUE: Single AP view of the chest    COMPARISON: 9/8/2018    HISTORY: picc line placement, N17.9 Acute kidney failure,  unspecified R53.83 Other fatigue I48.0 Paroxysmal atrial  fibrillation I21.4 Non-ST elevation (NSTEMI) myocardial  infarction    FINDINGS:     Life-support devices: Right upper extremity PICC terminates in  the SVC    Lungs/pleura: Small right pleural effusion. The lungs otherwise  appear clear. No pneumothorax.    Heart, hilar and mediastinal structures: Cardiac silhouette is  slightly enlarged. No significant change.      Impression:       CONCLUSION:  Small right pleural effusion.    Electronically signed by:  Jamel Wiseman MD  9/12/2018 3:27 PM CDT  Workstation: [id number]    IR PICC wo fluoro guidance [[id number]] Resulted:  09/12/18 1503     Updated:  09/12/18 1503    Narrative:       This procedure was auto-finalized with no dictation required.    US Guided Vascular Access [[id number]] Resulted:  09/12/18 1446     Updated:  09/12/18 1446    Narrative:       This procedure was auto-finalized with no  dictation required.          I have reviewed the patient current medications.     Assessment/Plan     Active Hospital Problems    Diagnosis Date Noted   • **Toxic encephalopathy [G92] 09/08/2018   • Demand ischemia (CMS/[facility]) [I24.8] 09/08/2018   • Acute renal failure with tubular necrosis (CMS/[facility]) [N17.0] 09/08/2018   • Chronic atrial fibrillation (CMS/[facility]) [I48.2] 09/08/2018   • Acute cystitis with hematuria [N30.01] 09/08/2018   • Sepsis (CMS/[facility]) [A41.9] 09/08/2018   • Decubitus ulcer of left heel, unstageable (CMS/[facility]) [L89.620] 09/08/2018   • Acute renal failure (CMS/[facility]) [N17.9] 09/08/2018     1.  Sepsis secondary to Pseudomonas: Currently he is on IV antibiotic.  We'll continue with follow-up culture and sensitivity.  His blood pressure has improved. Repeat blood culture negative.  2.  A. fib with RVR: Currently rate control he has been tapered off of esmolol drip.  Patient is not a candidate for anti-coagulation due to his GI bleed.  3.  Acute renal failure: Likely secondary to the sepsis causing hypotension, repeat labs shows improvement.  4.  Left heel ulcer unstageable, wound care consult.  5.  Anemia: improve to 9.4 today, hemoccult is negative,   6. ESBL UTI: meropenem.     Discharge Planning: I expect patient to be discharged to [facility] in 1-2 days.      DVT Prophylaxis: SCD              This document has been electronically signed by Augusto Romano MD on September 13, 2018 1:34 PM

## 2018-09-13 NOTE — PROGRESS NOTES
"   LOS: 5 days   Patient Care Team:  Terrell Arzate MD as PCP - General  Terrell Arzate MD as PCP - Claims Attributed  Bethany Loco, RN as Care Coordinator (Population Health)    Subjective     Subjective:  Symptoms:  Stable.  No shortness of breath, cough, chest pain, headache, chest pressure or anxiety.  (No sediment, urine cloudy yellow in Dye's gravity bag.  Afebrile.  ).    Diet:  NPO.  No nausea or vomiting.    Activity level: Returning to normal.    Pain:  He complains of pain that is mild.  He reports pain is improving.  Pain is requiring pain medication.        History taken from: patient chart family RN    Objective     Vital Signs  Temp:  [96.4 °F (35.8 °C)-96.6 °F (35.9 °C)] 96.4 °F (35.8 °C)  Heart Rate:  [53-97] 76  Resp:  [18] 18  BP: (101-149)/(55-67) 128/60    Objective:  General Appearance:  In no acute distress.    Vital signs: (most recent): Blood pressure 128/60, pulse 76, temperature 96.4 °F (35.8 °C), temperature source Temporal Artery , resp. rate 18, height 182.9 cm (72\"), weight 73.9 kg (162 lb 14.4 oz), SpO2 94 %.  Vital signs are normal.  No fever.    Output: Producing urine (Dye ).    HEENT: Normal HEENT exam.    Lungs:  Normal effort and normal respiratory rate.  Breath sounds clear to auscultation.  He is not in respiratory distress.  No decreased breath sounds.    Heart: Normal rate.  Regular rhythm.  S1 normal and S2 normal.  No murmur, gallop or friction rub.   Chest: Symmetric chest wall expansion.   Abdomen: Abdomen is soft and non-distended.  Bowel sounds are normal.   There is no abdominal tenderness.     Extremities: Normal range of motion.  There is dependent edema.  There is no deformity.    Pulses: Distal pulses are intact.    Neurological: Patient is alert and oriented to person, place and time.  GCS score is 15.    Pupils:  Pupils are equal, round, and reactive to light.    Skin:  Warm, dry and pale.  There is ulceration (LLE).  No rash, ecchymosis or cyanosis. "             Results Review:    Lab Results (last 48 hours)     Procedure Component Value Units Date/Time    Basic Metabolic Panel [[id number]]  (Abnormal) Collected:  08/28/18 0622    Specimen:  Blood Updated:  08/28/18 0709     Glucose 112 (H) mg/dL      BUN 30 (H) mg/dL      Creatinine 0.97 mg/dL      Sodium 138 mmol/L      Potassium 4.2 mmol/L      Chloride 104 mmol/L      CO2 28.0 mmol/L      Calcium 8.7 mg/dL      eGFR Non African Amer 73 mL/min/1.73      BUN/Creatinine Ratio 30.9 (H)     Anion Gap 6.0 mmol/L     Narrative:       The MDRD GFR formula is only valid for adults with stable renal function between ages 18 and 70.    Urine Culture - Urine, [[id number]]  (Abnormal) Collected:  08/26/18 1549    Specimen:  Urine from Urine, Catheter Updated:  08/28/18 0645     Urine Culture >100,000 CFU/mL Candida albicans (A)    CBC & Differential [[id number]] Collected:  08/28/18 0530    Specimen:  Blood Updated:  08/28/18 0611    Narrative:       The following orders were created for panel order CBC & Differential.  Procedure                               Abnormality         Status                     ---------                               -----------         ------                     CBC Auto Differential[[id number]]        Abnormal            Final result                 Please view results for these tests on the individual orders.    CBC Auto Differential [[id number]]  (Abnormal) Collected:  08/28/18 0530    Specimen:  Blood Updated:  08/28/18 0611     WBC 6.23 10*3/mm3      RBC 3.58 (L) 10*6/mm3      Hemoglobin 10.1 (L) g/dL      Hematocrit 31.9 (L) %      MCV 89.1 fL      MCH 28.2 pg      MCHC 31.7 g/dL      RDW 18.5 (H) %      RDW-SD 59.5 (H) fl      MPV 8.7 fL      Platelets 260 10*3/mm3      Neutrophil % 53.8 %      Lymphocyte % 30.5 %      Monocyte % 10.9 %      Eosinophil % 4.0 %      Basophil % 0.3 %      Immature Grans % 0.5 %      Neutrophils, Absolute 3.35 10*3/mm3      Lymphocytes, Absolute 1.90 10*3/mm3       Monocytes, Absolute 0.68 10*3/mm3      Eosinophils, Absolute 0.25 10*3/mm3      Basophils, Absolute 0.02 10*3/mm3      Immature Grans, Absolute 0.03 (H) 10*3/mm3      nRBC 0.0 /100 WBC           I reviewed the patient's new clinical results.  I reviewed the patient's new imaging results and agree with the interpretation.  I reviewed the patient's other test results and agree with the interpretation      Assessment/Plan     Principal Problem:    Toxic encephalopathy  Active Problems:    Demand ischemia (CMS/[facility])    Acute renal failure with tubular necrosis (CMS/[facility])    Chronic atrial fibrillation (CMS/[facility])    Acute cystitis with hematuria    Sepsis (CMS/[facility])    Decubitus ulcer of left heel, unstageable (CMS/[facility])    Acute renal failure (CMS/[facility])      Assessment & Plan    1. Urinary tract infection, complicated, catheter associated. Dye removed last week in Urology office and has been straight catheterized since, unable to void, Dye anchored by Urology 9/11/18  -History of E.Coli ESBL and candida UTI, currently has Pseudomonas aeruginosa bacteremia  -Merrem day #5, Diflucan  -WBC 6.30-->6.05-->6.52  -Urine culture 9/11/18 NEGATIVE  -Blood cultures 9/10/18 NEGATIVE at 3 days     2. 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     3. Kidney and ureteral stones, status post LEFT CRULLS and nephrostomy tube removal on 8/22/2018. J-stent in place at this time.   -KUB on 9/8/18 showed bilateral kidney stones, left ureteral stent in place, unchanged 1.1 cm stone right UVJ.      Plan:   Continue Dye for now, OR this PM with Dr. Serrano:   RISKS AND BENEFITS DISCUSSED WITH PATIENT BY DR. SERRANO AND PATIENT WISHES TO PROCEED WITH cystoscopy, left J-stent removal, suprapubic catheter placement.      Peter Lai, APRN  09/13/18  5:55 PM

## 2018-09-13 NOTE — PLAN OF CARE
Problem: Cardiac: ACS (Acute Coronary Syndrome) (Adult)  Goal: Signs and Symptoms of Listed Potential Problems Will be Absent, Minimized or Managed (Cardiac: ACS)  Outcome: Ongoing (interventions implemented as appropriate)   09/13/18 9795   Goal/Outcome Evaluation   Problems Assessed (Acute Coronary Syndrome) all   Problems Present (Acute Coronary Syn) none       Problem: Urinary Tract Infection (Adult)  Goal: Signs and Symptoms of Listed Potential Problems Will be Absent, Minimized or Managed (Urinary Tract Infection)  Outcome: Ongoing (interventions implemented as appropriate)      Problem: Wound (Includes Pressure Injury) (Adult)  Goal: Signs and Symptoms of Listed Potential Problems Will be Absent, Minimized or Managed (Wound)  Outcome: Ongoing (interventions implemented as appropriate)      Problem: Patient Care Overview  Goal: Plan of Care Review  Outcome: Ongoing (interventions implemented as appropriate)    Goal: Individualization and Mutuality  Outcome: Ongoing (interventions implemented as appropriate)    Goal: Discharge Needs Assessment  Outcome: Ongoing (interventions implemented as appropriate)    Goal: Interprofessional Rounds/Family Conf  Outcome: Ongoing (interventions implemented as appropriate)      Problem: Skin Injury Risk (Adult)  Goal: Skin Health and Integrity  Outcome: Ongoing (interventions implemented as appropriate)      Problem: Fall Risk (Adult)  Goal: Absence of Fall  Outcome: Ongoing (interventions implemented as appropriate)      Problem: Pain, Acute (Adult)  Goal: Acceptable Pain Control/Comfort Level  Outcome: Ongoing (interventions implemented as appropriate)

## 2018-09-14 NOTE — OP NOTE
CYSTOSCOPY  Procedure Note    Ravi Park  9/8/2018 - 9/13/2018    Pre-op Diagnosis:   Acute cystitis with hematuria [N30.01]    Post-op Diagnosis:     Post-Op Diagnosis Codes:     * Acute cystitis with hematuria [N30.01]      Procedure(s):  CYSTOSCOPY. LEFT J-STENT REMOVAL, PLACEMENT SUPRAPUBIC CATHETER    Surgeon(s):  Zach, Stephen SWEENEY MD    Anesthesia: Choice    Staff:   * No surgical staff found *    Estimated Blood Loss: minimal    Specimens:                None      Drains:   Urethral Catheter (Active)   Daily Indications Acute retention 9/13/2018  9:07 AM   Site Assessment Clean;Skin intact 9/13/2018  9:07 AM   Collection Container Standard drainage bag 9/13/2018  9:07 AM   Securement Method Securing device 9/13/2018  9:07 AM   Catheter care complete Yes 9/13/2018  9:07 AM   Output (mL) 550 mL 9/13/2018  1:55 PM       [REMOVED] Nephrostomy Left 8 Fr. (Removed)       [REMOVED] Nephrostomy Left (Removed)   Site Assessment Clean;Intact 8/22/2018 10:21 AM   Dressing Status Clean;Dry;Intact 8/22/2018 10:21 AM   Dressing Type Dry dressing 8/22/2018 10:21 AM   Dressing Change Due 08/20/18 8/20/2018  6:00 PM   Collection Container Standard drainage bag 8/22/2018 10:21 AM   Securement Method Tape;Securing device 8/22/2018 10:21 AM   Output (mL) 150 mL 8/21/2018  7:00 PM       [REMOVED] Urethral Catheter Coude;Non-latex 16 Fr. (Removed)   Catheter care complete Yes 8/24/2018  7:42 AM   Output (mL) 1400 mL 8/22/2018  5:00 PM       [REMOVED] Urethral Catheter (Removed)   Daily Indications Chronic history of urinary catheter 8/24/2018 10:05 AM   Site Assessment Clean;Skin intact 8/23/2018  8:01 PM   Collection Container Standard drainage bag 8/24/2018 10:05 AM   Securement Method Securing device 8/24/2018 10:05 AM   Catheter care complete Yes 8/24/2018  7:42 AM   Output (mL) 125 mL 8/23/2018  3:52 AM       [REMOVED] Urethral Catheter (Removed)   Daily Indications Urologist on the case and cannot remove without order  8/31/2018  9:35 AM   Site Assessment Clean;Skin intact 8/30/2018  7:50 PM   Collection Container Standard drainage bag 8/31/2018  9:35 AM   Securement Method Securing device 8/31/2018  9:35 AM   Catheter care complete Yes 8/31/2018  9:05 AM   Output (mL) 700 mL 8/31/2018  5:45 AM       Findings: Retained double-J stent obstructing prostate's obvious UTI    Complications: None    Indications: Same    Description of Procedure: Patient brought to cystoscopy place in the dorsolithotomy position.  Passed a #22 Romanian cystoscope open into the bladder.  Left double-J stent was pulled down with the graspers and out then placed a cystoscope into the bladder fill the bladder to capacity and made incisions in the suprapubic site with the trocar #16 Romanian Pl. that into the dome of the bladder threaded the 16 Romanian Dye into the dome of the bladder 10 cc placed in balloon removed the working sheath drain the bladder placed a 16 Romanian Dye back in the penis 10 cc placed in balloon started irrigation through the suprapubic coming out through the Dye the patient taken recovery and her procedure well    Stephen Serrano MD     Date: 9/13/2018  Time: 7:58 PM

## 2018-09-14 NOTE — PROGRESS NOTES
"   LOS: 6 days   Patient Care Team:  Terrell Arzate MD as PCP - General  Terrell Arzate MD as PCP - Claims Attributed  Bethany Loco, RN as Care Coordinator (Population Health)    Subjective     Subjective:  Symptoms:  Stable.  No shortness of breath, cough, chest pain, headache, chest pressure or anxiety.  (Urine clear and yellow in Dye's gravity bag, no drainage at SP tube site, CBI not running. ).    Diet:  Adequate intake.  No nausea or vomiting.    Activity level: Returning to normal.    Pain:  He complains of pain that is mild.  He reports pain is improving.  Pain is requiring pain medication.        History taken from: patient chart family RN    Objective     Vital Signs  Temp:  [97.3 °F (36.3 °C)-97.6 °F (36.4 °C)] 97.4 °F (36.3 °C)  Heart Rate:  [50-77] 70  Resp:  [18] 18  BP: (109-155)/(55-72) 124/62    Objective:  General Appearance:  In no acute distress.    Vital signs: (most recent): Blood pressure 124/62, pulse 70, temperature 97.4 °F (36.3 °C), resp. rate 18, height 182.9 cm (72\"), weight 72.4 kg (159 lb 11.2 oz), SpO2 93 %.  Vital signs are normal.  No fever.    Output: Producing urine (Dye ).    HEENT: Normal HEENT exam.    Lungs:  Normal effort and normal respiratory rate.  Breath sounds clear to auscultation.  He is not in respiratory distress.  No decreased breath sounds.    Heart: Normal rate.  Regular rhythm.  S1 normal and S2 normal.  No murmur, gallop or friction rub.   Chest: Symmetric chest wall expansion.   Abdomen: Abdomen is soft and non-distended.  Bowel sounds are normal.   There is no abdominal tenderness.     Extremities: Normal range of motion.  There is dependent edema.  There is no deformity.    Pulses: Distal pulses are intact.    Neurological: Patient is alert and oriented to person, place and time.  GCS score is 15.    Pupils:  Pupils are equal, round, and reactive to light.    Skin:  Warm, dry and pale.  There is ulceration (LLE).  No rash, ecchymosis or cyanosis. "             Results Review:    Lab Results (last 24 hours)     Procedure Component Value Units Date/Time    Tissue Pathology Exam [[id number]] Collected:  09/13/18 2005    Specimen:  Hardware / Foreign Body from Ureter, Left Updated:  09/14/18 0810    Comprehensive Metabolic Panel [[id number]]  (Abnormal) Collected:  09/14/18 0557    Specimen:  Blood Updated:  09/14/18 0633     Glucose 103 (H) mg/dL      BUN 25 (H) mg/dL      Creatinine 1.04 mg/dL      Sodium 137 mmol/L      Potassium 4.3 mmol/L      Chloride 106 mmol/L      CO2 24.0 mmol/L      Calcium 8.8 mg/dL      Total Protein 6.8 g/dL      Albumin 2.80 (L) g/dL      ALT (SGPT) 25 U/L      AST (SGOT) 22 U/L      Alkaline Phosphatase 124 U/L      Total Bilirubin 0.3 mg/dL      eGFR Non African Amer 67 mL/min/1.73      Globulin 4.0 (H) gm/dL      A/G Ratio 0.7 (L) g/dL      BUN/Creatinine Ratio 24.0     Anion Gap 7.0 mmol/L     Narrative:       The MDRD GFR formula is only valid for adults with stable renal function between ages 18 and 70.    CBC & Differential [[id number]] Collected:  09/14/18 0557    Specimen:  Blood Updated:  09/14/18 0617    Narrative:       The following orders were created for panel order CBC & Differential.  Procedure                               Abnormality         Status                     ---------                               -----------         ------                     CBC Auto Differential[[id number]]        Abnormal            Final result                 Please view results for these tests on the individual orders.    CBC Auto Differential [[id number]]  (Abnormal) Collected:  09/14/18 0557    Specimen:  Blood Updated:  09/14/18 0617     WBC 6.20 10*3/mm3      RBC 3.44 (L) 10*6/mm3      Hemoglobin 9.8 (L) g/dL      Hematocrit 30.4 (L) %      MCV 88.4 fL      MCH 28.5 pg      MCHC 32.2 g/dL      RDW 19.7 (H) %      RDW-SD 63.3 (H) fl      MPV 9.0 fL      Platelets 189 10*3/mm3      Neutrophil % 57.2 %      Lymphocyte % 26.9 %       Monocyte % 9.0 %      Eosinophil % 4.7 %      Basophil % 0.3 %      Immature Grans % 1.9 (H) %      Neutrophils, Absolute 3.54 10*3/mm3      Lymphocytes, Absolute 1.67 10*3/mm3      Monocytes, Absolute 0.56 10*3/mm3      Eosinophils, Absolute 0.29 10*3/mm3      Basophils, Absolute 0.02 10*3/mm3      Immature Grans, Absolute 0.12 (H) 10*3/mm3      nRBC 0.0 /100 WBC     Blood Culture - Blood, [[id number]]  (Normal) Collected:  09/10/18 1655    Specimen:  Blood from Arm, Left Updated:  09/13/18 1700     Blood Culture No growth at 3 days    Blood Culture - Blood, [[id number]]  (Normal) Collected:  09/10/18 1650    Specimen:  Blood from Hand, Left Updated:  09/13/18 1700     Blood Culture No growth at 3 days          I reviewed the patient's new clinical results.  I reviewed the patient's new imaging results and agree with the interpretation.  I reviewed the patient's other test results and agree with the interpretation      Assessment/Plan     Principal Problem:    Toxic encephalopathy  Active Problems:    Demand ischemia (CMS/HCC)    Acute renal failure with tubular necrosis (CMS/HCC)    Chronic atrial fibrillation (CMS/HCC)    Acute cystitis with hematuria    Sepsis (CMS/HCC)    Decubitus ulcer of left heel, unstageable (CMS/HCC)    Acute renal failure (CMS/HCC)      Assessment & Plan    1. Urinary tract infection, complicated, catheter associated.   -POD #1 cystoscopy LEFT J-stent removal SP tube placement and Dye replacement  -History of E.Coli ESBL and candida UTI, currently has Pseudomonas aeruginosa bacteremia  -Merrem day #6, Diflucan  -WBC 6.30-->6.05-->6.52-->6.20  -Urine culture 9/11/18 NEGATIVE  -Blood cultures 9/10/18 NEGATIVE at 3 days     2. 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     3. Kidney and ureteral stones, status post  LEFT CRULLS and nephrostomy tube removal on 8/22/2018. J-stent REMOVED 9/13/18.   -KUB on 9/8/18 showed bilateral kidney stones, left ureteral stent in place, unchanged 1.1 cm stone right UVJ.      Plan:   Follow up with Dr. Serrano next week. SP tube and Dye to leg bags.     JUSTIN Selby  09/14/18  12:21 PM

## 2018-09-14 NOTE — PLAN OF CARE
Problem: Cardiac: ACS (Acute Coronary Syndrome) (Adult)  Goal: Signs and Symptoms of Listed Potential Problems Will be Absent, Minimized or Managed (Cardiac: ACS)  Outcome: Ongoing (interventions implemented as appropriate)      Problem: Urinary Tract Infection (Adult)  Goal: Signs and Symptoms of Listed Potential Problems Will be Absent, Minimized or Managed (Urinary Tract Infection)  Outcome: Ongoing (interventions implemented as appropriate)      Problem: Wound (Includes Pressure Injury) (Adult)  Goal: Signs and Symptoms of Listed Potential Problems Will be Absent, Minimized or Managed (Wound)  Outcome: Ongoing (interventions implemented as appropriate)      Problem: Patient Care Overview  Goal: Plan of Care Review  Outcome: Ongoing (interventions implemented as appropriate)   09/14/18 0520   Coping/Psychosocial   Plan of Care Reviewed With patient   Plan of Care Review   Progress no change     Goal: Individualization and Mutuality  Outcome: Ongoing (interventions implemented as appropriate)    Goal: Discharge Needs Assessment  Outcome: Ongoing (interventions implemented as appropriate)      Problem: Skin Injury Risk (Adult)  Goal: Skin Health and Integrity  Outcome: Ongoing (interventions implemented as appropriate)      Problem: Fall Risk (Adult)  Goal: Absence of Fall  Outcome: Ongoing (interventions implemented as appropriate)      Problem: Pain, Acute (Adult)  Goal: Acceptable Pain Control/Comfort Level  Outcome: Ongoing (interventions implemented as appropriate)

## 2018-09-14 NOTE — DISCHARGE SUMMARY
[address] [address]  T - [phone]     DISCHARGE SUMMARY         PATIENT  DEMOGRAPHICS   PATIENT NAME: Ravi Park                      PHYSICIAN: Augusto Romano MD  : 1930  MRN: [id number]    ADMISSION/DISCHARGE INFO   ADMISSION DATE: 2018   DISCHARGE DATE: 18    ADMISSION DIAGNOSES: Paroxysmal atrial fibrillation (CMS/[facility]) [I48.0]  Lethargic [R53.83]  NSTEMI (non-ST elevated myocardial infarction) (CMS/[facility]) [I21.4]  Acute renal failure, unspecified acute renal failure type (CMS/[facility]) [N17.9]  DISCHARGE DIAGNOSES:     Principal Problem:    Toxic encephalopathy  Active Problems:    Demand ischemia (CMS/[facility])    Acute renal failure with tubular necrosis (CMS/[facility])    Chronic atrial fibrillation (CMS/[facility])    Acute cystitis with hematuria    Sepsis (CMS/[facility])    Decubitus ulcer of left heel, unstageable (CMS/[facility])    Acute renal failure (CMS/[facility])      SERVICE:  Medicine       CONSULTS   Consult Orders (all)     Start     Ordered    18 1758  Inpatient Urology Consult  Once     Specialty:  Urology  Provider:  Stephen Serrano MD    18 1758    18 1456  Hospitalist (on-call MD unless specified)  Once     Specialty:  Hospitalist  Provider:  Connor Bolton MD    18 1457    18 1431  Inpatient Cardiology Consult  Once     Specialty:  Cardiology  Provider:  Mike Brito MD    18 1431          PROCEDURES     Imaging Results (last 24 hours)     ** No results found for the last 24 hours. **          Ct Abdomen Pelvis Without Contrast    Result Date: 2018  Narrative: CT abdomen, pelvis without contrast. CLINICAL INDICATION: Kidney stone, follow-up.    COMPARISON: 2018   TECHNIQUE: Noncontrast helical scanning with axial and coronal reformations. Soft tissue, lung, and bone windows reviewed. This exam was performed according to our departmental dose-optimization program, which includes automated  exposure control, adjustment of the mA and/or kV according to patient size and/or use of iterative reconstruction technique. ABDOMEN CT FINDINGS: Bilateral nonobstructing renal calculi. Small calcified lesion lower pole lateral aspect right kidney unchanged since multiple prior exams. Hydronephrotic changes left kidney with dilatation of calyces renal pelvis and ureter. Obstruction is due to a 1.14 cm stone in the distal ureter just inferior to the left sacral ala. Position of the ureteral stone slightly more inferior in comparison to prior CT examination July 21, 2018. (Previously this stone was at L4-L5 level.). Hydronephrotic changes are similar to prior CT exam. Dye catheter within the bladder. Degenerative changes lumbar spine. Left-sided lumbar scoliotic curvature.     Impression: CONCLUSION: Bilateral nonobstructing renal calculi. Stable appearing partially calcified lesion lateral aspect lower pole right kidney. Persistent hydronephrotic changes left kidney. Obstruction is due to a 1 cm stone in the distal ureter, just inferior to the left sacral ala. Position of the ureteral stone study more inferior in comparison to prior CT examination. (Previously stone was at the L4-L5 level.) Hydronephrotic changes similar to prior exam. Electronically signed by:  Dragan Aleman MD  8/17/2018 8:54 AM CDT Workstation: [id number]    Xr Foot 3+ View Left    Result Date: 9/8/2018  Narrative: Three view left foot HISTORY: Left heel ulcer AP, lateral and oblique views obtained. COMPARISON: None Soft tissue ulcer heel. No underlying bone destruction or periosteal reaction to suggest osteoarthritis. Small calcaneal spurs. Atherosclerotic ankle and foot calcifications. No fracture or dislocation. No other osseous or articular abnormality.     Impression: CONCLUSION: Soft tissue ulcer heel. No underlying bone destruction or periosteal reaction to suggest osteoarthritis. Small calcaneal spurs. Atherosclerotic ankle and foot  calcifications. [id number] Electronically signed by:  Garfield Cabrera MD  9/8/2018 3:22 PM CDT Workstation: [email or web address]    Xr Abdomen 2 View With Chest 1 View    Result Date: 9/8/2018  Narrative: Acute Abdominal Series Withe Upright Chest. History: Dominant pain Upright frontal film of the chest and supine and upright films of the abdomen were obtained. Comparison: KUB July 20, 2018. Correlation CT August 16, 2018. EKG leads. Bilateral linear atelectasis or scar. Cardiomegaly. The pulmonary vasculature is not increased. No pleural effusion. No pneumothorax. No acute osseous abnormality. Bilateral renal calculi. No nephrostomy tube identified. Left ureteral stent in place with proximal pigtail projected over the mid left kidney and distal pigtail projected over the right side of the urinary bladder. Unchanged 1.1 cm calculus right ureterovesicular junction. No free air. No mechanical bowel obstruction. No organomegaly. No acute osseous abnormality. Degenerative changes and degenerative disc disease lumbar spine.     Impression: Conclusion: Bilateral linear atelectasis or scar. Cardiomegaly. Bilateral renal calculi. No nephrostomy tube identified. Left ureteral stent in place with proximal pigtail projected over the mid left kidney and distal pigtail projected over the right side of the urinary bladder. Unchanged 1.1 cm calculus right ureterovesicular junction. [id number] Electronically signed by:  Garfield Cabrera MD  9/8/2018 12:07 PM CDT Workstation: [email or web address]    Ct Head Without Contrast    Result Date: 9/8/2018  Narrative: CT Head Without Contrast History: Decreased level of alertness. Axial scans of the brain were obtained without intravenous contrast.  Coronal and sagital reconstructions were preformed. This exam was performed according to our departmental dose-optimization program, which includes automated exposure control, adjustment of the mA and/or kV according to patient size and/or use of iterative reconstruction  technique. DLP: 1034.0 Comparison: April 26, 2018 Bone windows are unremarkable. The visualized paranasal sinuses are unremarkable. No acute process. Cerebral atrophy. 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 Minimal to moderate small vessel disease. No hemorrhage. No mass. No abnormal areas of increased attenuation. No midline shift. No abnormal extra-axial fluid collections.     Impression: CONCLUSION: No acute process. Cerebral atrophy. 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 Minimal to moderate small vessel disease. [id number] Electronically signed by:  Garfield Cabrera MD  9/8/2018 2:45 PM CDT Workstation: [id number]    Us Guided Vascular Access    Result Date: 9/12/2018  Narrative: This procedure was auto-finalized with no dictation required.    Us Guided Vascular Access    Result Date: 8/21/2018  Narrative: This procedure was auto-finalized with no dictation required.    Xr Chest 1 View    Result Date: 8/19/2018  Narrative: EXAM:         Radiograph(s), Chest VIEWS:   Portable ; 1     DATE/TIME:  8/19/2018 2:16 PM CDT            INDICATION:   recheck, Z74.09 Other reduced mobility Z74.09 Other reduced mobility  COMPARISON:  CXR: 7/17/18         FINDINGS:         - lines/tubes:    none   - cardiac:         size within normal limits       - mediastinum: contour within normal limits       - lungs:         no focal air space process, pulmonary interstitial edema, nodule(s)/mass           - pleura:         Minimal right pleural fluid collection               - osseous:         unremarkable for age                - misc.:        Impression: CONCLUSION:    1. No evidence of an active cardiopulmonary process.                                  Electronically signed by:  RUMA Vaca MD  8/19/2018 2:17 PM CDT Workstation: [id number]    Ir Insert Midline Without Port Pump 5 Plus    Result Date: 8/21/2018  Narrative: This procedure was auto-finalized with no dictation  required.    Ct Guided Nephrostomy Tube Placement    Result Date: 8/18/2018  Narrative: PROCEDURE: CT-guided nephrostomy tube placement. COMPARISON: CT abdomen 8/16/2018 HISTORY: Hydronephrosis CONSENT: The risks, including bleeding, infection and injury to adjacent organs, as well as the risks, benefits and possibility of an unsuccessful attempt were thoroughly discussed with the patient. The patient had ample opportunity to ask questions and agreed to proceed. Written, informed consent was obtained. This exam was performed according to our departmental dose optimization program, which includes automated exposure control, adjustment of the mA and/or KV according to patient size and/or use of iterative reconstruction technique. TECHNIQUE: The patient was placed in the prone position on the CT table. An appropriate site was marked over the right flank. The location was prepped and draped in sterile fashion. Approximately 10 mL of lidocaine solution was used for local anesthesia. Local lidocaine was infiltrated superficially and down to the capsule of the left kidney.  Under CT guidance, an 18 gauge AccuStick needle was introduced into the mid to lower pole of the left kidney and into the dilated collecting system.  A thin wire was then placed through the needle over which was placed a dilator. The tract was dilated and an 8 Fr. nephrostomy catheter was coiled in the renal pelvis and left in place, secured to the patient's back and connected to gravity drainage.   The procedure was tolerated well without complications. Approximately 20 mL of tan purulent fluid was obtained from the dilated left renal collecting system, which was sent for Gram stain, culture, and sensitivity. FINDINGS: Successful placement of an 8 [place] nephrostomy catheter into the left renal pelvis. The patient tolerated the procedure well. No immediate post procedure complications. Additional findings: There is mild right hydroureter  ureteronephrosis due to a large calculus in the right ureterovesicular junction region measuring 1.6 cm. The urinary bladder is markedly distended with urine. There are additional nonobstructing bilateral renal calculi.     Impression: CONCLUSION:  Successful placement of a left nephrostomy tube, as described above. The urinary bladder is markedly distended with urine. Electronically signed by:  Jamel Wiseman MD  8/18/2018 12:50 PM CDT Workstation: [id number]    Xr Chest Post Cva Port    Result Date: 9/12/2018  Narrative: [facility] Patient Name: MR. LALITHA RANGEL ORDERING: ARTIE TABARES ATTENDING: URI PYLE REFERRING: ARTIE TABARES ----------------------- PROCEDURE: Portable chest x-ray TECHNIQUE: Single AP view of the chest COMPARISON: 9/8/2018 HISTORY: picc line placement, N17.9 Acute kidney failure, unspecified R53.83 Other fatigue I48.0 Paroxysmal atrial fibrillation I21.4 Non-ST elevation (NSTEMI) myocardial infarction FINDINGS:  Life-support devices: Right upper extremity PICC terminates in the SVC Lungs/pleura: Small right pleural effusion. The lungs otherwise appear clear. No pneumothorax. Heart, hilar and mediastinal structures: Cardiac silhouette is slightly enlarged. No significant change.     Impression: CONCLUSION: Small right pleural effusion. Electronically signed by:  Jamel Wiseman MD  9/12/2018 3:27 PM CDT Workstation: [id number]    Ir Picc Wo Fluoro Guidance    Result Date: 9/12/2018  Narrative: This procedure was auto-finalized with no dictation required.      HISTORY OF PRESENT ILLNESS   Copy from H and P.     87-year-old  male getting rehabilitation nursing home was brought in with complaints of altered mental status by family since yesterday.  Questionable fever.  Patient denies any shortness of breath, abdominal pain, palpitations, chest pain or any other symptoms.  Patient was found to be in A. fib with RVR and rate was controlled with a esmolol drip in the  ER.  Family also complaining of left heel ulcer that has been there for weeks.  Patient complains of pain in the neck and wants pain medicine at this time.  Patient was admitted for further workup.    DIAGNOSTIC DATA   Labs   Images     HOSPITAL COURSE   Patient was admitted to the hospital, was started on IV antibiotic.  Patient was initially found to have elevated troponin cardiology was consulted who recommended this is likely secondary to sepsis.  Continue with empiric therapy for the sepsis and treatment.  His blood culture came back positive for Pseudomonas.  Patient was being treated at the nursing home for ESBL.  His culture showed that his ESBL and Pseudomonas is sensitive to meropenem.  Therefore patient will be going back nursing home on meropenem.  He will need to complete a course of 14 days of antibiotic.  Patient had a line placement for antibiotic and restriction at the nursing home.  Patient is doing very well he worked with therapy.  Patient had a ureteral stent that require removal, he urology was consulted who performed cystoscopy and remove the stent.  Patient tolerated procedure very well.  He will be going home on IV antibiotic and follow-up with his primary care physician.  Patient was also follow-up with his cardiology as well as Dr. Serrano.    Physical Exam   Constitutional: He is oriented to person, place, and time. He appears well-developed and well-nourished.   HENT:   Head: Normocephalic and atraumatic.   Eyes: Pupils are equal, round, and reactive to light. EOM are normal.   Neck: Normal range of motion.   Cardiovascular: Normal rate, regular rhythm and normal heart sounds.    Pulmonary/Chest: Effort normal and breath sounds normal.   Abdominal: Soft. Bowel sounds are normal.   Musculoskeletal: Normal range of motion.   Neurological: He is alert and oriented to person, place, and time.   Skin: Skin is warm.   Psychiatric: He has a normal mood and affect. His behavior is normal.    Vitals reviewed.         DISCHARGE CONDITION   Stable    DISPOSITION   To Nursing home      DISCHARGE MEDICATIONS        Discharge Medications      New Medications      Instructions Start Date   diltiaZEM 60 MG tablet  Commonly known as:  CARDIZEM   60 mg, Oral, 4 Times Daily      doxycycline 100 MG enteric coated tablet  Commonly known as:  DORYX   100 mg, Oral, 2 Times Daily      meropenem 1 g injection  Commonly known as:  MERREM   1 g, Intravenous, Every 8 Hours Scheduled         Continue These Medications      Instructions Start Date   acetaminophen 325 MG tablet  Commonly known as:  TYLENOL   650 mg, Oral, Every 6 Hours PRN      albuterol 108 (90 Base) MCG/ACT inhaler  Commonly known as:  PROVENTIL HFA;VENTOLIN HFA   2 puffs, Inhalation, Every 6 Hours PRN, 2 Puffs 4 times per day as needed.       bumetanide 1 MG tablet  Commonly known as:  BUMEX   1 mg, Oral, Daily      CLARITIN 10 MG tablet  Generic drug:  loratadine   10 mg, Oral, Daily      docusate sodium 100 MG capsule   100 mg, Oral, 2 Times Daily      doxazosin 2 MG tablet  Commonly known as:  CARDURA   2 mg, Oral, Nightly      famotidine 40 MG tablet  Commonly known as:  PEPCID   40 mg, Oral, Daily      finasteride 5 MG tablet  Commonly known as:  PROSCAR   5 mg, Oral, Daily      fluconazole 100 MG tablet  Commonly known as:  DIFLUCAN   100 mg, Oral, Daily      fluticasone 50 MCG/ACT nasal spray  Commonly known as:  FLONASE   1 spray, Each Nare, Daily      folic acid 1 MG tablet  Commonly known as:  FOLVITE   1 mg, Oral, Daily      levothyroxine 50 MCG tablet  Commonly known as:  SYNTHROID, LEVOTHROID   50 mcg, Oral, Daily      metoprolol succinate XL 25 MG 24 hr tablet  Commonly known as:  TOPROL-XL   25 mg, Oral, Daily      nitrofurantoin (macrocrystal-monohydrate) 100 MG capsule  Commonly known as:  MACROBID   100 mg, Oral, 2 Times Daily      oxyCODONE-acetaminophen 5-325 MG per tablet  Commonly known as:  PERCOCET   1 tablet, Oral, Every 4 Hours  PRN      polyethylene glycol pack packet  Commonly known as:  MIRALAX   17 g, Oral, Daily      potassium chloride 10 MEQ CR tablet  Commonly known as:  K-DUR,KLOR-CON   5 mEq, Oral, Daily      tamsulosin 0.4 MG capsule 24 hr capsule  Commonly known as:  FLOMAX   0.8 mg, Oral, Nightly      TiZANidine 2 MG capsule  Commonly known as:  ZANAFLEX   2 mg, Oral, Daily PRN      vitamin B-12 1000 MCG tablet  Commonly known as:  CYANOCOBALAMIN   1,000 mcg, Oral, Daily      vitamin D 90503 units capsule capsule  Commonly known as:  ERGOCALCIFEROL   50,000 Units, Oral, Every 7 Days, Take 1 capsule by mouth every 7 days on Thursday          Stop These Medications    cefuroxime 500 MG tablet  Commonly known as:  CEFTIN     diltiaZEM  MG 24 hr capsule  Commonly known as:  CARDIZEM CD              INSTRUCTIONS   Activity:   Activity Instructions     Discharge Activity       As tolerated   PT/OT          Diet:   Diet Instructions     Diet: Consistent Carbohydrate, Cardiac       Discharge Diet:   Consistent Carbohydrate  Cardiac             Special Instructions: Patient instructed to call M.D. or return to ED with worsening shortness of breath, chest pain, fever greater than 100.4°F or any other medical concerns.    FOLLOW UP    Contact information for follow-up providers     Terrell Arzate MD. Schedule an appointment as soon as possible for a visit in 3 day(s).    Specialties:  Family Medicine, Emergency Medicine  Contact information:  [address] [id number] [phone]             Mikey Hernández MD Follow up in 2 week(s).    Specialty:  Cardiology  Contact information:  [address] [place] [address] [address]  [address]  [facility] [address] [phone]             Stephen Serrano MD Follow up in 1 week(s).    Specialty:  Urology  Contact information:  [address]    [place] [id number] [phone]                   Contact information for after-discharge care     Destination      [facility] Follow up.    Specialty:  Skilled Nursing Facility  Contact information:  [address]  [facility] [address] [phone]                            PENDING TEST RESULTS AT DISCHARGE    Order Current Status    Tissue Pathology Exam In process    Blood Culture - Blood, Preliminary result    Blood Culture - Blood, Preliminary result         TIME   Time: 45 minutes were spent planning this discharge.                      This document has been electronically signed by Augusto Romano MD on September 14, 2018 12:50 PM

## 2018-09-16 PROBLEM — J18.9 PNEUMONIA DUE TO INFECTIOUS ORGANISM: Status: ACTIVE | Noted: 2018-01-01

## 2018-09-17 NOTE — TELEPHONE ENCOUNTER
PATIENT IS CURRENTLY IN HOSPITAL.      ----- Message from Sheila Byrne RN sent at 9/14/2018  1:20 PM CDT -----  Regarding: follow up appointment  Contact: [phone]  Call patient with follow up post hospitalization appointment in 3 days.

## 2018-10-04 NOTE — OUTREACH NOTE
Skilled Nursing Facility Discharge Flowsheet:     Skilled Nursing Facility Discharge Assessment 10/4/2018   Acute Facility Discharged From [facility]   Acute Discharge Date 9/29/2018   Name of the Skilled Nursing Facility? [facility]   Tier Level of the Skilled Nursing Facility 4   Purpose of SNF Admission -   Estimated length of stay for the patient? TBD   Who is the insurance provider or payor of patient stay? Medicare   Progression of Patient? Back to SNF from Acute   Skilled Nursing Discharge Date? -   Where was the patient discharged to? -

## 2018-10-15 NOTE — PROGRESS NOTES
Max Park is a 88 y.o. male.     History of Present Illness     Left heel wound.  Left nursing home several days ago.  On percocet 7.5/325 qid.  [facility] 9/8/18 no bony involvement but arteriosclerosis noted.  I had given percocet 5 upon leaving nh, he has finished these and instructed me that he was on the 7.5 qid.  Suprapubic cathetor daughter concern regarding redness around the tube. They have started bacitracin.   Daughter says his spirits are better since now home.       Review of Systems   Constitutional: Negative for chills, fatigue and fever.   HENT: Negative for congestion, ear discharge, ear pain, facial swelling, hearing loss, postnasal drip, rhinorrhea, sinus pressure, sore throat, trouble swallowing and voice change.    Eyes: Negative for discharge, redness and visual disturbance.   Respiratory: Negative for cough, chest tightness, shortness of breath and wheezing.    Cardiovascular: Negative for chest pain and palpitations.   Gastrointestinal: Negative for abdominal pain, blood in stool, constipation, diarrhea, nausea and vomiting.   Endocrine: Negative for polydipsia and polyuria.   Genitourinary: Negative for dysuria, flank pain, hematuria and urgency.   Musculoskeletal: Negative for arthralgias, back pain, joint swelling and myalgias.   Skin: Negative for rash.   Neurological: Negative for dizziness, weakness, numbness and headaches.   Hematological: Negative for adenopathy.   Psychiatric/Behavioral: Negative for confusion and sleep disturbance. The patient is not nervous/anxious.            /66 (BP Location: Right arm, Patient Position: Sitting, Cuff Size: Adult)   Pulse 59   Temp 98.3 °F (36.8 °C) (Temporal Artery )   SpO2 97%       Objective     Physical Exam   Constitutional: He is oriented to person, place, and time. He appears well-developed and well-nourished.   HENT:   Head: Normocephalic and atraumatic.   Right Ear: External ear normal.   Left Ear: External  ear normal.   Nose: Nose normal.   Eyes: Pupils are equal, round, and reactive to light. Conjunctivae and EOM are normal.   Neck: Normal range of motion.   Cardiovascular: Normal rate and regular rhythm.    Murmur heard.  Pulmonary/Chest: Effort normal.   Musculoskeletal: Normal range of motion.   Neurological: He is alert and oriented to person, place, and time.   Skin:   Left heel wound black eschar  I don't fee pt pulse.    Psychiatric: He has a normal mood and affect. His behavior is normal. Judgment and thought content normal.   Nursing note and vitals reviewed.          PAST MEDICAL HISTORY     Past Medical History:   Diagnosis Date   • Benign prostatic hyperplasia    • Cerebrovascular accident (CMS/HCC)    • Chronic anemia    • Chronic atrial fibrillation (CMS/HCC)    • CKD (chronic kidney disease) stage 3, GFR 30-59 ml/min (CMS/HCC)    • GERD (gastroesophageal reflux disease)    • Gout    • Hydronephrosis    • Hypercholesterolemia    • Hypertension    • Nephrolithiasis    • Peripheral arterial disease (CMS/HCC)    • Pulmonary hypertension (CMS/HCC)    • Tricuspid regurgitation    • Urinary retention       PAST SURGICAL HISTORY     Past Surgical History:   Procedure Laterality Date   • CARDIAC CATHETERIZATION     • CYSTOSCOPY Left 9/13/2018    Procedure: CYSTOSCOPY. LEFT J-STENT REMOVAL, PLACEMENT SUPRAPUBIC CATHETER;  Surgeon: Stephen Serrano MD;  Location: [facility];  Service: Urology   • CYSTOSCOPY, URETEROSCOPY, RETROGRADE PYELOGRAM, STENT INSERTION Left 5/27/2018   • CYSTOSCOPY, URETEROSCOPY, RETROGRADE PYELOGRAM, STENT INSERTION Left 8/22/2018    Procedure: CYSTOSCOPY URETEROSCOPY RETROGRADE PYELOGRAM HOLMIUM LASER STENT INSERTION;  Surgeon: Stephen Serrano MD;  Location: [facility];  Service: Urology   • EXTRACORPOREAL SHOCKWAVE LITHOTRIPSY (ESWL), STENT INSERTION/REMOVAL Left 7/20/2018    Procedure: EXTRACORPOREAL SHOCKWAVE LITHOTRIPSY        ([facility]);  Surgeon: Stephen Serrano  MD;  Location: [facility];  Service: Urology   • NEPHROSTOMY        SOCIAL HISTORY     Social History     Social History   • Marital status:      Social History Main Topics   • Smoking status: Former Smoker   • Smokeless tobacco: Never Used   • Alcohol use Yes   • Drug use: No   • Sexual activity: Not Currently     Other Topics Concern   • Not on file      ALLERGIES   Lipitor [atorvastatin]; Neomycin; and Simvastatin   MEDICATIONS     Current Outpatient Prescriptions   Medication Sig Dispense Refill   • acetaminophen (TYLENOL) 325 MG tablet Take 650 mg by mouth Every 6 (Six) Hours As Needed for Mild Pain , Headache or Fever.     • albuterol (PROVENTIL HFA;VENTOLIN HFA) 108 (90 BASE) MCG/ACT inhaler Inhale 2 puffs Every 6 (Six) Hours As Needed for Wheezing. 2 Puffs 4 times per day as needed. 1 inhaler 11   • aspirin 81 MG chewable tablet Chew 1 tablet Daily.     • bumetanide (BUMEX) 1 MG tablet Take 1 tablet by mouth Daily. 30 tablet 1   • diltiaZEM (CARDIZEM) 60 MG tablet Take 1 tablet by mouth 4 (Four) Times a Day. 60 tablet 0   • docusate sodium 100 MG capsule Take 100 mg by mouth 2 (Two) Times a Day.     • doxazosin (CARDURA) 2 MG tablet Take 1 tablet by mouth Every Night. 30 tablet 6   • famotidine (PEPCID) 40 MG tablet Take 1 tablet by mouth Daily.     • finasteride (PROSCAR) 5 MG tablet Take 5 mg by mouth Daily.     • fluticasone (FLONASE) 50 MCG/ACT nasal spray 1 spray by Each Nare route Daily.     • folic acid (FOLVITE) 1 MG tablet Take 1 tablet by mouth Daily. 30 tablet 1   • levothyroxine (SYNTHROID, LEVOTHROID) 50 MCG tablet Take 1 tablet by mouth Daily. 30 tablet 5   • loratadine (CLARITIN) 10 MG tablet Take 10 mg by mouth Daily.     • metoprolol succinate XL (TOPROL-XL) 25 MG 24 hr tablet Take 1 tablet by mouth Daily. 30 tablet 11   • oxyCODONE-acetaminophen (PERCOCET) 7.5-325 MG per tablet Take 1 tablet by mouth Every 6 (Six) Hours As Needed for Moderate Pain . 18 tablet 0   • polyethylene glycol  (MIRALAX) pack packet Take 17 g by mouth Daily.     • potassium chloride (K-DUR,KLOR-CON) 10 MEQ CR tablet Take 5 mEq by mouth Daily.     • tamsulosin (FLOMAX) 0.4 MG capsule 24 hr capsule Take 2 capsules by mouth Every Night. 30 capsule    • TiZANidine (ZANAFLEX) 2 MG capsule Take 2 mg by mouth Daily As Needed for Muscle Spasms.     • vitamin B-12 (CYANOCOBALAMIN) 1000 MCG tablet Take 1,000 mcg by mouth Daily.     • vitamin D (ERGOCALCIFEROL) 52572 units capsule capsule Take 50,000 Units by mouth Every 7 (Seven) Days. Take 1 capsule by mouth every 7 days on Thursday       No current facility-administered medications for this visit.         The following portions of the patient's history were reviewed and updated as appropriate: allergies, current medications, past family history, past medical history, past social history, past surgical history and problem list.        Assessment/Plan   Ravi was seen today for follow-up.    Diagnoses and all orders for this visit:    Open wound of left heel, sequela  -     Ambulatory Referral to Wound Clinic    Heel pain, chronic, left        Call regarding refill of percocet.  Will need chronically.              No Follow-up on file.                  This document has been electronically signed by Terrell Arzate MD on October 15, 2018 1:59 PM

## 2018-10-16 NOTE — PROGRESS NOTES
[facility]  OFFICE NOTE    Ravi Park  88 y.o. male    10/16/2018  1. Chronic atrial fibrillation (CMS/HCC)    2. Peripheral arterial disease (CMS/HCC)    3. Pure hypercholesterolemia    4. Open wound of left lower leg, sequela        Chief complaint -nonhealing ulcer on the left  Heal of the leg      History of present Illness- 88-year-old gentleman with history of chronic atrial fibrillation was on chronic Coumadin therapy and had his Coumadin stopped due to bleeding from bladder after seen by Dr. D'Amico.  She is off of Coumadin and only on aspirin for the chronic atrial fibrillation as he required blood transfusions .  Now he has a nonhealing ulcer on the back of the left heel   I will refer him to the wound clinic to see Dr. Hamman.  He does have history of PVD and had prior stent placement to right SFA in the past, he does not have any claudication pain when he walks.  He has a lot of back pain in the lower back possibly due to sciatica.          Allergies   Allergen Reactions   • Lipitor [Atorvastatin] Itching     Pruritus   • Neomycin      Rash   • Simvastatin Itching     Itching  Zocor  -  Rash         Past Medical History:   Diagnosis Date   • Benign prostatic hyperplasia    • Cerebrovascular accident (CMS/HCC)    • Chronic anemia    • Chronic atrial fibrillation (CMS/HCC)    • CKD (chronic kidney disease) stage 3, GFR 30-59 ml/min (CMS/HCC)    • GERD (gastroesophageal reflux disease)    • Gout    • Hydronephrosis    • Hypercholesterolemia    • Hypertension    • Nephrolithiasis    • Peripheral arterial disease (CMS/HCC)    • Pulmonary hypertension (CMS/HCC)    • Tricuspid regurgitation    • Urinary retention          Past Surgical History:   Procedure Laterality Date   • CARDIAC CATHETERIZATION     • CYSTOSCOPY Left 9/13/2018    Procedure: CYSTOSCOPY. LEFT J-STENT REMOVAL, PLACEMENT SUPRAPUBIC CATHETER;  Surgeon: [place], Stephen SWEENEY MD;  Location: [facility];  Service: Urology    • CYSTOSCOPY, URETEROSCOPY, RETROGRADE PYELOGRAM, STENT INSERTION Left 5/27/2018   • CYSTOSCOPY, URETEROSCOPY, RETROGRADE PYELOGRAM, STENT INSERTION Left 8/22/2018    Procedure: CYSTOSCOPY URETEROSCOPY RETROGRADE PYELOGRAM HOLMIUM LASER STENT INSERTION;  Surgeon: Stephen Serrano MD;  Location: [facility];  Service: Urology   • EXTRACORPOREAL SHOCKWAVE LITHOTRIPSY (ESWL), STENT INSERTION/REMOVAL Left 7/20/2018    Procedure: EXTRACORPOREAL SHOCKWAVE LITHOTRIPSY        ([facility]);  Surgeon: Stephen Serrano MD;  Location: [facility];  Service: Urology   • NEPHROSTOMY           Family History   Problem Relation Age of Onset   • Hypertension Father          Social History     Social History   • Marital status:      Spouse name: N/A   • Number of children: N/A   • Years of education: N/A     Occupational History   • Not on file.     Social History Main Topics   • Smoking status: Former Smoker   • Smokeless tobacco: Never Used   • Alcohol use Yes   • Drug use: No   • Sexual activity: Not Currently     Other Topics Concern   • Not on file     Social History Narrative   • No narrative on file         Current Outpatient Prescriptions   Medication Sig Dispense Refill   • acetaminophen (TYLENOL) 325 MG tablet Take 650 mg by mouth Every 6 (Six) Hours As Needed for Mild Pain , Headache or Fever.     • albuterol (PROVENTIL HFA;VENTOLIN HFA) 108 (90 BASE) MCG/ACT inhaler Inhale 2 puffs Every 6 (Six) Hours As Needed for Wheezing. 2 Puffs 4 times per day as needed. 1 inhaler 11   • aspirin 81 MG chewable tablet Chew 1 tablet Daily.     • bumetanide (BUMEX) 1 MG tablet Take 1 tablet by mouth Daily. 30 tablet 1   • diltiaZEM (CARDIZEM) 60 MG tablet Take 1 tablet by mouth 4 (Four) Times a Day. 60 tablet 0   • docusate sodium 100 MG capsule Take 100 mg by mouth 2 (Two) Times a Day.     • doxazosin (CARDURA) 2 MG tablet Take 1 tablet by mouth Every Night. 30 tablet 6   • famotidine (PEPCID) 40 MG tablet Take 1  tablet by mouth Daily.     • finasteride (PROSCAR) 5 MG tablet Take 5 mg by mouth Daily.     • fluticasone (FLONASE) 50 MCG/ACT nasal spray 1 spray by Each Nare route Daily.     • folic acid (FOLVITE) 1 MG tablet Take 1 tablet by mouth Daily. 30 tablet 1   • levothyroxine (SYNTHROID, LEVOTHROID) 50 MCG tablet Take 1 tablet by mouth Daily. 30 tablet 5   • loratadine (CLARITIN) 10 MG tablet Take 10 mg by mouth Daily.     • metoprolol succinate XL (TOPROL-XL) 25 MG 24 hr tablet Take 1 tablet by mouth Daily. 30 tablet 11   • oxyCODONE-acetaminophen (PERCOCET) 7.5-325 MG per tablet Take 1 tablet by mouth Every 6 (Six) Hours As Needed for Moderate Pain . 18 tablet 0   • polyethylene glycol (MIRALAX) pack packet Take 17 g by mouth Daily.     • potassium chloride (K-DUR,KLOR-CON) 10 MEQ CR tablet Take 5 mEq by mouth Daily.     • tamsulosin (FLOMAX) 0.4 MG capsule 24 hr capsule Take 2 capsules by mouth Every Night. 30 capsule    • TiZANidine (ZANAFLEX) 2 MG capsule Take 2 mg by mouth Daily As Needed for Muscle Spasms.     • vitamin B-12 (CYANOCOBALAMIN) 1000 MCG tablet Take 1,000 mcg by mouth Daily.     • vitamin D (ERGOCALCIFEROL) 88279 units capsule capsule Take 50,000 Units by mouth Every 7 (Seven) Days. Take 1 capsule by mouth every 7 days on Thursday       No current facility-administered medications for this visit.          Review of Systems     Constitution: Denies any fatigue, fever or chills    HENT: Denies any headache, Mild to moderate hearing impairment,     Eyes: Denies any blurring of vision, or photophobia     Cardivascular - As per history of present illness     Respiratory system-denies any COPD, shortness of breath,        Endocrine:   history of hyperlipidemia                      Hypothyrodism       Musculoskeletal:  history of arthritis with musculoskeletal problems Of the low back    Gastrointestinal: No nausea, vomiting, or melena    Genitourinary: History of BPH And had recurrent  "hematuria    Neurological:   No history of seizure disorder, stroke, he has some early memory problems    Psychiatric/Behavioral:        No history of depression,    Hematological- no history of easy bruising or any bleeding diathesis            OBJECTIVE    /62   Pulse 64   Ht 180.3 cm (70.98\")   SpO2 93%       Physical Exam     Constitutional: is oriented to person, place, and time.     Skin-warm and dry    Well developed and nourished in no acute distress      Head: Normocephalic and atraumatic.     Eyes: Pupils are equal,    Neck: Neck supple. No bruit in the carotids,    Cardiovascular: Rayle in the fifth intercostal space   irregular rate, and  Rhythm,    S1 greater than S2, no S3 or S4, no gallop     Pulmonary/Chest:   Air  Entry is equal on both sides  No wheezing or crackles,      Abdominal: Soft.  No hepatosplenomegaly,    Musculoskeletal: No kyphoscoliosis,    Neurological: is alert and oriented to person, place, and time.    cranial nerve are intact .   No motor or sensory deficit    Extremities- there is varicosities of the veins on both legs with chronic venous stasis changes, with a nonhealing ulcer on the left heel              Procedures      Lab Results   Component Value Date    WBC 3.97 09/25/2018    HGB 8.4 (L) 09/25/2018    HCT 24.9 (L) 09/25/2018    MCV 88.9 09/25/2018     09/25/2018     Lab Results   Component Value Date    GLUCOSE 89 09/25/2018    BUN 30 (H) 09/25/2018    CREATININE 1.16 09/25/2018    EGFRIFNONA 59 09/25/2018    BCR 25.9 (H) 09/25/2018    CO2 25.0 09/25/2018    CALCIUM 8.7 09/25/2018    ALBUMIN 2.80 (L) 09/22/2018    AST 25 09/22/2018    ALT 30 09/22/2018     Lab Results   Component Value Date    CHOL 93 04/23/2018     Lab Results   Component Value Date    TRIG 65 04/23/2018    TRIG 64 12/04/2015     Lab Results   Component Value Date    HDL 24 (L) 04/23/2018     No components found for: LDLCALC  Lab Results   Component Value Date    LDL 43 04/23/2018    LDL 93 " 12/04/2015    LDL 81 12/04/2015     No results found for: HDLLDLRATIO  No components found for: CHOLHDL  Lab Results   Component Value Date    HGBA1C 5.9 (H) 04/22/2018     Lab Results   Component Value Date    TSH 2.560 04/22/2018                  A/P    Peripheral vascular disease with history of rotational atherectomy and stent placement to right SFA, doing well no claudication, With a nonhealing ulcer on the left heel, will consult Dr. Hamman    Varicose veins on both legs- him to wear compression stockings, takes Synthroid for hypothyroidism.    Chronic atrial fibrillation-off Coumadin due to hematuria requiring blood transfusion    BPH on Flomax and has a chronic Dye catheter    Follow-up in 6 months            This document has been electronically signed by Mikey Hernández MD on October 16, 2018 3:28 PM       EMR Dragon/Transcription disclaimer:   Some of this note may be an electronic transcription/translation of spoken language to printed text. The electronic translation of spoken language may permit erroneous, or at times, nonsensical words or phrases to be inadvertently transcribed; Although I have reviewed the note for such errors, some may still exist.

## 2018-10-17 PROBLEM — S91.302A NON-HEALING WOUND OF LEFT HEEL: Status: ACTIVE | Noted: 2018-01-01

## 2018-10-17 NOTE — PROGRESS NOTES
"Ravi Park  9/9/1930            88 y.o.      10/16/2018    No chief complaint on file.  PCP CHELSEA RYAN MD  CARDIOLOGY DR. RIOJAS(REFERRING)  88 year old man with pressure ulcer left heel present for several weeks and has been dressed by Home Health (caretenders) He has undergone repeat hospitalizations in recent past and during one hospital stay there developed pressure ulcer postero-lateral aspect left heel. He returned to office visit Dr. Riojas today and I was asked to see Mr. Park. Chronic atrial fibrillation for which he takes only aspirin.  3-24-16 Dr. Riojas  PTA with CSI rotational atherectomy right SFA with stent placement The right lower extremity seems to be totally intact at the present time without open wound and only slight edema. There is 6cm pressure  ulceration left posterolateral heel unstageable at the present time, likely stage 3.     PAST HISTORY :  Leg Swelling        right leg unnaboot     Patient returns for now long-term care of edema both lower extremities which is multifactorial with a but related to chronic chronic congestive heart failure.  We had ordered Circ-aid devices for both lower extremities for edema control./  Patient brought in a parcel which he had received at home. It proved to be a knee brace which after lengthy questioning it now seems that the patient ordered the brace himself after being \"merchandized by a telephone call\"  We will once again attempt to order CIRC-aid devices for both lower extremities.        The patient is followed for having  developed edema both lower extremities multifactorial but due to the large part to  congestive heart failure with blistering and resultant superficial ulceration of the right leg and tiny ulceration of the left leg. Edema persists right leg with ulceration which is now healing with residual crusts  Measures about 2x2 cm.and tiny ulceration anterior left leg 0.5x0.5cm Circ-Aid devices have been ordered for each " lower extremity          Upon return 1-18-18 wounds of the right leg are now completely healed.  Circ-Aid devices have been ordered for long term copmpression therapy but have not yet arrived.  Belle solorio reapplied today right lower extremityh. for edema control until his Circ-aid devices are available/.All wounds are healed    On return to the office today to seen Dr. Hernández (10-17-18) patient noted to have bandage left ankle which covered left heel ulcer. And I was asked to see patient         ROS       Current Outpatient Prescriptions:   •  acetaminophen (TYLENOL) 325 MG tablet, Take 650 mg by mouth Every 6 (Six) Hours As Needed for Mild Pain , Headache or Fever., Disp: , Rfl:   •  albuterol (PROVENTIL HFA;VENTOLIN HFA) 108 (90 BASE) MCG/ACT inhaler, Inhale 2 puffs Every 6 (Six) Hours As Needed for Wheezing. 2 Puffs 4 times per day as needed., Disp: 1 inhaler, Rfl: 11  •  aspirin 81 MG chewable tablet, Chew 1 tablet Daily., Disp: , Rfl:   •  bumetanide (BUMEX) 1 MG tablet, Take 1 tablet by mouth Daily., Disp: 30 tablet, Rfl: 1  •  diltiaZEM (CARDIZEM) 60 MG tablet, Take 1 tablet by mouth 4 (Four) Times a Day., Disp: 60 tablet, Rfl: 0  •  docusate sodium 100 MG capsule, Take 100 mg by mouth 2 (Two) Times a Day., Disp: , Rfl:   •  doxazosin (CARDURA) 2 MG tablet, Take 1 tablet by mouth Every Night., Disp: 30 tablet, Rfl: 6  •  famotidine (PEPCID) 40 MG tablet, Take 1 tablet by mouth Daily., Disp: , Rfl:   •  finasteride (PROSCAR) 5 MG tablet, Take 5 mg by mouth Daily., Disp: , Rfl:   •  fluticasone (FLONASE) 50 MCG/ACT nasal spray, 1 spray by Each Nare route Daily., Disp: , Rfl:   •  folic acid (FOLVITE) 1 MG tablet, Take 1 tablet by mouth Daily., Disp: 30 tablet, Rfl: 1  •  levothyroxine (SYNTHROID, LEVOTHROID) 50 MCG tablet, Take 1 tablet by mouth Daily., Disp: 30 tablet, Rfl: 5  •  loratadine (CLARITIN) 10 MG tablet, Take 10 mg by mouth Daily., Disp: , Rfl:   •  metoprolol succinate XL (TOPROL-XL) 25 MG 24 hr  tablet, Take 1 tablet by mouth Daily., Disp: 30 tablet, Rfl: 11  •  oxyCODONE-acetaminophen (PERCOCET) 7.5-325 MG per tablet, Take 1 tablet by mouth Every 6 (Six) Hours As Needed for Moderate Pain ., Disp: 120 tablet, Rfl: 0  •  polyethylene glycol (MIRALAX) pack packet, Take 17 g by mouth Daily., Disp: , Rfl:   •  potassium chloride (K-DUR,KLOR-CON) 10 MEQ CR tablet, Take 5 mEq by mouth Daily., Disp: , Rfl:   •  tamsulosin (FLOMAX) 0.4 MG capsule 24 hr capsule, Take 2 capsules by mouth Every Night., Disp: 30 capsule, Rfl:   •  TiZANidine (ZANAFLEX) 2 MG capsule, Take 2 mg by mouth Daily As Needed for Muscle Spasms., Disp: , Rfl:   •  vitamin B-12 (CYANOCOBALAMIN) 1000 MCG tablet, Take 1,000 mcg by mouth Daily., Disp: , Rfl:   •  vitamin D (ERGOCALCIFEROL) 03176 units capsule capsule, Take 50,000 Units by mouth Every 7 (Seven) Days. Take 1 capsule by mouth every 7 days on Thursday, Disp: , Rfl:     The following portions of the patient's history were reviewed and updated as appropriate: allergies, current medications, past family history, past medical history, past social history, past surgical history and problem list.        Past Surgical History:   Procedure Laterality Date   • CARDIAC CATHETERIZATION     • CYSTOSCOPY Left 9/13/2018    Procedure: CYSTOSCOPY. LEFT J-STENT REMOVAL, PLACEMENT SUPRAPUBIC CATHETER;  Surgeon: Stephen Serrano MD;  Location: [facility];  Service: Urology   • CYSTOSCOPY, URETEROSCOPY, RETROGRADE PYELOGRAM, STENT INSERTION Left 5/27/2018   • CYSTOSCOPY, URETEROSCOPY, RETROGRADE PYELOGRAM, STENT INSERTION Left 8/22/2018    Procedure: CYSTOSCOPY URETEROSCOPY RETROGRADE PYELOGRAM HOLMIUM LASER STENT INSERTION;  Surgeon: Stephen Serrano MD;  Location: [facility];  Service: Urology   • EXTRACORPOREAL SHOCKWAVE LITHOTRIPSY (ESWL), STENT INSERTION/REMOVAL Left 7/20/2018    Procedure: EXTRACORPOREAL SHOCKWAVE LITHOTRIPSY        ([facility]);  Surgeon: Stephen Serrano MD;  Location:  [facility] OR;  Service: Urology   • NEPHROSTOMY             Physical Exam  There were no vitals taken for this visit.    HEENT:    CHEST:    HEART:    ABDOMEN:    EXTREMITIES: No pulses palpable at the ankle bilaterally.  Posterior lateral aspect of the left heel the patient has approximately 5-6 cm unstageable pressure ulcer, likely  stage III    VASCULAR LAB STUDIES:ALEKSANDRA (10-16-18)   RIGHT (calcified)   LEFT(calcified)          WAVE FORMS                      CF            Triphasic                 Biphasic                                                      POP            Biphasic                  Biphasic                                                        PT             Biphasic                  Biphasic                                                        DP             Biphasic                  Biphasic  Diminished arterial perfusion both lower extremities, RIGHT likely at the SFA level. LEFT perhaps at the iliac as well as SFA level    LABS  (9-25-18)    eGFR 59    Basic Metabolic Panel   Order: [id number]   Status:  Final result   Visible to patient:  No (Not Released)    Ref Range & Units 3wk ago   Glucose 60 - 100 mg/dL 89    BUN 7 - 21 mg/dL 30     Creatinine 0.70 - 1.30 mg/dL 1.16    Sodium 137 - 145 mmol/L 135     Potassium 3.5 - 5.1 mmol/L 4.3    Chloride 95 - 110 mmol/L 104    CO2 22.0 - 31.0 mmol/L 25.0    Calcium 8.4 - 10.2 mg/dL 8.7    eGFR Non African Amer 42 - 98 mL/min/1.73 59    BUN/Creatinine Ratio 7.0 - 25.0 25.9     Anion Gap 5.0 - 15.0 mmol/L 6.0    Resulting Agency  [facility]   Narrative     The MDRD GFR formula is only valid for adults with stable renal function between ages 18 and 70.      Specimen Collected: 09/25/18 05:56 Last Resulted: 09/25/18 07:10                  RADIOLOGY:      Non-healing wound of left heel [S91.302A]    IMPRESSION:  1. Pressure ulcer posterolateral aspect left heel unstageable, likely stage 3  2. Diminished arterial perfusion both lower extremities  3.  Chronic atrial fibrillation for which he is anticoagulated.                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  Likely will require revascularization for left heel ulcer to progress.  Wound redressed      Return to vascular office PRN      No Follow-up on file.            Jack L. Hamman, MD  10/17/2018

## 2018-10-23 PROBLEM — I73.9 PVD (PERIPHERAL VASCULAR DISEASE) WITH CLAUDICATION (HCC): Status: ACTIVE | Noted: 2018-01-01

## 2018-10-23 NOTE — PROGRESS NOTES
[facility]  OFFICE NOTE    Ravi Park  88 y.o. male    10/23/2018  1. Chronic atrial fibrillation (CMS/HCC)    2. Peripheral arterial disease (CMS/HCC)    3. Acute on chronic diastolic heart failure (CMS/HCC)    4. CKD (chronic kidney disease) stage 3, GFR 30-59 ml/min (CMS/HCC)        Chief complaint -nonhealing ulcer on the left  Heal of the leg      History of present Illness- 88-year-old gentleman with history of chronic atrial fibrillation was on chronic Coumadin therapy and had his Coumadin stopped due to bleeding from bladder after seen by Dr. D'Amico.  She is off of Coumadin and only on aspirin for the chronic atrial fibrillation as he required blood transfusions .  Now he has a nonhealing ulcer on the back of the left heel  He does have history of PVD and had prior stent placement to right SFA in the past, He has a lot of back pain in the lower back possibly due to sciatica.  After Dr. Hamman did the non-invasive study he has possible left iliac disease and planned to go endovascular therapy for the left iliac disease.          Allergies   Allergen Reactions   • Lipitor [Atorvastatin] Itching     Pruritus   • Neomycin      Rash   • Simvastatin Itching     Itching  Zocor  -  Rash         Past Medical History:   Diagnosis Date   • Benign prostatic hyperplasia    • Cerebrovascular accident (CMS/HCC)    • Chronic anemia    • Chronic atrial fibrillation (CMS/HCC)    • CKD (chronic kidney disease) stage 3, GFR 30-59 ml/min (CMS/HCC)    • GERD (gastroesophageal reflux disease)    • Gout    • Hydronephrosis    • Hypercholesterolemia    • Hypertension    • Nephrolithiasis    • Peripheral arterial disease (CMS/HCC)    • Pulmonary hypertension (CMS/HCC)    • Tricuspid regurgitation    • Urinary retention          Past Surgical History:   Procedure Laterality Date   • CARDIAC CATHETERIZATION     • CYSTOSCOPY Left 9/13/2018    Procedure: CYSTOSCOPY. LEFT J-STENT REMOVAL, PLACEMENT  SUPRAPUBIC CATHETER;  Surgeon: Stephen Serrano MD;  Location: [facility];  Service: Urology   • CYSTOSCOPY, URETEROSCOPY, RETROGRADE PYELOGRAM, STENT INSERTION Left 5/27/2018   • CYSTOSCOPY, URETEROSCOPY, RETROGRADE PYELOGRAM, STENT INSERTION Left 8/22/2018    Procedure: CYSTOSCOPY URETEROSCOPY RETROGRADE PYELOGRAM HOLMIUM LASER STENT INSERTION;  Surgeon: Stephen Serrano MD;  Location: [facility];  Service: Urology   • EXTRACORPOREAL SHOCKWAVE LITHOTRIPSY (ESWL), STENT INSERTION/REMOVAL Left 7/20/2018    Procedure: EXTRACORPOREAL SHOCKWAVE LITHOTRIPSY        ([facility]);  Surgeon: Stephen Serrano MD;  Location: [facility];  Service: Urology   • NEPHROSTOMY           Family History   Problem Relation Age of Onset   • Hypertension Father          Social History     Social History   • Marital status:      Spouse name: N/A   • Number of children: N/A   • Years of education: N/A     Occupational History   • Not on file.     Social History Main Topics   • Smoking status: Former Smoker   • Smokeless tobacco: Never Used   • Alcohol use Yes   • Drug use: No   • Sexual activity: Not Currently     Other Topics Concern   • Not on file     Social History Narrative   • No narrative on file         Current Outpatient Prescriptions   Medication Sig Dispense Refill   • acetaminophen (TYLENOL) 325 MG tablet Take 650 mg by mouth Every 6 (Six) Hours As Needed for Mild Pain , Headache or Fever.     • albuterol (PROVENTIL HFA;VENTOLIN HFA) 108 (90 BASE) MCG/ACT inhaler Inhale 2 puffs Every 6 (Six) Hours As Needed for Wheezing. 2 Puffs 4 times per day as needed. 1 inhaler 11   • aspirin 81 MG chewable tablet Chew 1 tablet Daily.     • bumetanide (BUMEX) 1 MG tablet Take 1 tablet by mouth Daily. 30 tablet 1   • docusate sodium 100 MG capsule Take 100 mg by mouth 2 (Two) Times a Day.     • doxazosin (CARDURA) 2 MG tablet Take 1 tablet by mouth Every Night. 30 tablet 6   • famotidine (PEPCID) 40 MG tablet Take 1 tablet  by mouth Daily.     • finasteride (PROSCAR) 5 MG tablet Take 5 mg by mouth Daily.     • fluticasone (FLONASE) 50 MCG/ACT nasal spray 1 spray by Each Nare route Daily.     • folic acid (FOLVITE) 1 MG tablet Take 1 tablet by mouth Daily. 30 tablet 1   • levothyroxine (SYNTHROID, LEVOTHROID) 50 MCG tablet Take 1 tablet by mouth Daily. 30 tablet 5   • loratadine (CLARITIN) 10 MG tablet Take 10 mg by mouth Daily.     • metoprolol succinate XL (TOPROL-XL) 25 MG 24 hr tablet Take 1 tablet by mouth Daily. 30 tablet 11   • oxyCODONE-acetaminophen (PERCOCET) 7.5-325 MG per tablet Take 1 tablet by mouth Every 6 (Six) Hours As Needed for Moderate Pain . 120 tablet 0   • polyethylene glycol (MIRALAX) pack packet Take 17 g by mouth Daily.     • potassium chloride (K-DUR,KLOR-CON) 10 MEQ CR tablet Take 5 mEq by mouth Daily.     • tamsulosin (FLOMAX) 0.4 MG capsule 24 hr capsule Take 2 capsules by mouth Every Night. 30 capsule    • TiZANidine (ZANAFLEX) 2 MG capsule Take 2 mg by mouth Daily As Needed for Muscle Spasms.     • vitamin B-12 (CYANOCOBALAMIN) 1000 MCG tablet Take 1,000 mcg by mouth Daily.     • vitamin D (ERGOCALCIFEROL) 30147 units capsule capsule Take 50,000 Units by mouth Every 7 (Seven) Days. Take 1 capsule by mouth every 7 days on Thursday       No current facility-administered medications for this visit.          Review of Systems     Constitution: Denies any fatigue, fever or chills    HENT: Denies any headache, Mild to moderate hearing impairment,     Eyes: Denies any blurring of vision, or photophobia     Cardivascular - As per history of present illness     Respiratory system-denies any COPD, shortness of breath,        Endocrine:   history of hyperlipidemia                      Hypothyrodism       Musculoskeletal:  history of arthritis with musculoskeletal problems Of the low back    Gastrointestinal: No nausea, vomiting, or melena    Genitourinary: History of BPH And had recurrent hematuria    Neurological:  "  No history of seizure disorder, stroke, he has some early memory problems    Psychiatric/Behavioral:        No history of depression,    Hematological- no history of easy bruising or any bleeding diathesis            OBJECTIVE    /55   Pulse 79   Ht 180.3 cm (70.98\")       Physical Exam     Constitutional: is oriented to person, place, and time.     Skin-warm and dry    Well developed and nourished in no acute distress      Head: Normocephalic and atraumatic.     Eyes: Pupils are equal,    Neck: Neck supple. No bruit in the carotids,    Cardiovascular: [place] in the fifth intercostal space   irregular rate, and  Rhythm,    S1 greater than S2, no S3 or S4, no gallop     Pulmonary/Chest:   Air  Entry is equal on both sides  No wheezing or crackles,      Abdominal: Soft.  No hepatosplenomegaly,    Musculoskeletal: No kyphoscoliosis,    Neurological: is alert and oriented to person, place, and time.    cranial nerve are intact .   No motor or sensory deficit    Extremities- there is varicosities of the veins on both legs with chronic venous stasis changes, with a nonhealing ulcer on the left heel              Procedures      Lab Results   Component Value Date    WBC 3.97 09/25/2018    HGB 8.4 (L) 09/25/2018    HCT 24.9 (L) 09/25/2018    MCV 88.9 09/25/2018     09/25/2018     Lab Results   Component Value Date    GLUCOSE 89 09/25/2018    BUN 30 (H) 09/25/2018    CREATININE 1.16 09/25/2018    EGFRIFNONA 59 09/25/2018    BCR 25.9 (H) 09/25/2018    CO2 25.0 09/25/2018    CALCIUM 8.7 09/25/2018    ALBUMIN 2.80 (L) 09/22/2018    AST 25 09/22/2018    ALT 30 09/22/2018     Lab Results   Component Value Date    CHOL 93 04/23/2018     Lab Results   Component Value Date    TRIG 65 04/23/2018    TRIG 64 12/04/2015     Lab Results   Component Value Date    HDL 24 (L) 04/23/2018     No components found for: LDLCALC  Lab Results   Component Value Date    LDL 43 04/23/2018    LDL 93 12/04/2015    LDL 81 12/04/2015     No " results found for: HDLLDLRATIO  No components found for: CHOLHDL  Lab Results   Component Value Date    HGBA1C 5.9 (H) 04/22/2018     Lab Results   Component Value Date    TSH 2.560 04/22/2018                  A/P    Peripheral vascular disease with history of rotational atherectomy and stent placement to right SFA, doing well no claudication, With a nonhealing ulcer on the left heel, he has possible high-grade stenosis of the left iliac artery and scheduled for aortogram with runoff and possible PTA to the left iliac artery explained all the risks and benefits and patient's daughter consented.  Patient is ASA class II, Mallampatti level II.  Patient is scheduled for next Tuesday 1/30 at [facility]    Varicose veins on both legs- him to wear compression stockings, takes Synthroid for hypothyroidism.    Chronic atrial fibrillation-off Coumadin due to hematuria requiring blood transfusion.  Even though he had previous stroke 6 years ago    BPH on Flomax and has a chronic Dye catheter    All the risks and benefits explained and patient will, on Tuesday as outpatient for PTA to the left iliac artery            This document has been electronically signed by Mikey Hernández MD on October 23, 2018 1:46 PM       EMR Dragon/Transcription disclaimer:   Some of this note may be an electronic transcription/translation of spoken language to printed text. The electronic translation of spoken language may permit erroneous, or at times, nonsensical words or phrases to be inadvertently transcribed; Although I have reviewed the note for such errors, some may still exist.

## 2018-10-29 NOTE — TELEPHONE ENCOUNTER
PATIENT'S DAUGHTER CALLED CONCERNED ABOUT SOME MEDICATIONS.    SHE SAID SOME FAMILY MEMBERS THOUGHT THAT BUMEX 1 MG 1 QD WAS MAKING HIM LOOPY AND CONFUSED.  THEY QUIT GIVING IT TO HIM.  SHE DOESN'T KNOW WHAT IT IS FOR, BUT WONDERED IF HE NEEDED SOMETHING TO REPLACE IT.    HIS GRANDSON ALSO GOOGLED METOPROLOL 25 MG 1 QD AND DILTIAZEM 60 MG 2 QD AND SAW THAT THEY HAVE AN INTERACTION, SO SHE WAS WONDERING IF HE SHOULD BE ON BOTH.  I DON'T SEE DILTIAZEM ON HIS MED LIST.

## 2018-10-29 NOTE — TELEPHONE ENCOUNTER
PATIENT'S DAUGHTER DID DECIDE TO CHECK WITH CARDIOLOGIST AFTER WE GOT OFF THE PHONE.  SHE SAID HE IS SCHEDULED TO HAVE AND ILIAC STINT AND SOMETHING WITH VEINS IN HIS LEG TOMORROW WITH DR. GALLAGHER.  I TOLD HER TO BE SURE TO HAVE ALL HIS MEDS WITH HIM SO THEY CAN BE SURE WHAT HE IS TAKING.

## 2018-10-29 NOTE — TELEPHONE ENCOUNTER
PATIENT'S DAUGHTER CALLED AND SAID SOMEONE STOLE HIS PERCOCET, ONLY TWO ARE LEFT IN THE BOTTLE.  SHE SAID SHE KNOWS YOU CAN'T GIVE HIM ANYMORE, YET, BUT WONDERED IF THERE IS ANYTHING ELSE YOU CAN SEND FOR HIM UNTIL HE CAN GET THE PERCOCET NEXT MONTH.  SHE ALSO ASKED IF SHE SHOULD CALL THE POLICE.  LATIA

## 2018-10-31 NOTE — PROGRESS NOTES
New Patient Sleep Medicine Consultation    Encounter Date: 10/31/2018         Patient's PCP: Terrell Arzate MD  Referring provider: No ref. provider found  Reason for consultation chief complaint: snoring, excessive daytime sleepiness and unrefreshing sleep    Ravi Park is a 88 y.o. male who admits to snoring, High blod pressure, excessive daytime sleepiness, stop breathing during sleep, Disturbed or restless sleep, memory loss and Up to the bathroom at night. He denies cataplexy, sleep paralysis, or hypnagogic hallucinations. His bedtime is ~ 8717-7389. He  falls asleep after 5 minutes, and is up 2-3 times per night. He wakes up ~ 0700. He endorses 8 hours of sleep. He drinks 1 cups of coffee, 0 teas, and 0 sodas per day. He drinks 0 alcoholic beverages per week. He is not a current smoker. He does not take sedatives or hypnotics. Sleepiness with driving- N/A. He naps continuously.    [place] - 22    Past Medical History:   Diagnosis Date   • Benign prostatic hyperplasia    • Cerebrovascular accident (CMS/HCC)    • Chronic anemia    • Chronic atrial fibrillation (CMS/HCC)    • CKD (chronic kidney disease) stage 3, GFR 30-59 ml/min (CMS/HCC)    • GERD (gastroesophageal reflux disease)    • Gout    • Hydronephrosis    • Hypercholesterolemia    • Hypertension    • Nephrolithiasis    • Peripheral arterial disease (CMS/HCC)    • Pulmonary hypertension (CMS/HCC)    • Tricuspid regurgitation    • Urinary retention      Social History     Social History   • Marital status:      Spouse name: N/A   • Number of children: N/A   • Years of education: N/A     Occupational History   • Not on file.     Social History Main Topics   • Smoking status: Former Smoker   • Smokeless tobacco: Never Used   • Alcohol use Yes   • Drug use: No   • Sexual activity: Not Currently     Other Topics Concern   • Not on file     Social History Narrative   • No narrative on file     Family History   Problem Relation Age of Onset   •  "Hypertension Father      4 children  Retired telephone comm for [employer]  0 brothers and 2 sisters  Other family history + for: cancer  Smoking history: smoked 1 ppds from age 9 until 1995  FH of sleep disorders: daughter and son    Review of Systems:  Constitutional: negative  Eyes: negative  Ears, nose, mouth, throat, and face: negative  Respiratory: negative  Cardiovascular: negative  Gastrointestinal: negative  Genitourinary:negative  Integument/breast: negative  Hematologic/lymphatic: negative  Musculoskeletal:negative  Neurological: negative  Behavioral/Psych: negative  Endocrine: negative  Allergic/Immunologic: negative Patient advised to discuss any positive ROS with PCP.      Vitals:    10/31/18 1419   BP: 112/58   Pulse: 96   SpO2: 93%     Body mass index is 24 kg/m². Patient's Body mass index is 24 kg/m². BMI is within normal parameters. No follow-up required.  Neck circumference: 16.5\"          General: Alert. Cooperative. Well developed. No acute distress. Appear stated age (has left heel wrapped, [place] catheter.             Head:  Normocephalic. Symmetrical. Atraumatic.              Eyes: Sclera clear. No icterus. PERRLA. Normal EOM.             Ears: No deformities. Normal hearing.             Nose: No septal deviation. No drainage.          Throat: No oral lesions. No thrush. Moist mucous membranes. Trachea midline    Tongue is normal    Dentition is upper and lower dentures       Pharynx: Posterior pharyngeal pillars are narrow    Mallampati score of III (soft and hard palate and base of uvula visible) elongated uvula with low lying palatopharyngeal arches    Pharynx is normal with unrermarkable tonsils   Chest Wall:  Normal shape. Symmetric expansion with respiration. No tenderness.          Lungs:  Clear to auscultation bilaterally. No wheezes. No rhonchi. No rales. Respirations regular, even and unlabored.            Heart:  Regular rhythm and normal rate. Normal S1 and S2. No murmur.     " Abdomen:  Soft, non-tender and non-distended. Normal bowel sounds. No masses.  Extremities:  No edema. Chronic venous stasis changes              Skin: Dry. Intact. LLE wrapped           Neuro: Moves all 4 extremities and cranial nerves grossly intact. - in wheelchair  Psychiatric: Normal mood and affect.      Current Outpatient Prescriptions:   •  acetaminophen (TYLENOL) 325 MG tablet, Take 650 mg by mouth Every 6 (Six) Hours As Needed for Mild Pain , Headache or Fever., Disp: , Rfl:   •  albuterol (PROVENTIL HFA;VENTOLIN HFA) 108 (90 BASE) MCG/ACT inhaler, Inhale 2 puffs Every 6 (Six) Hours As Needed for Wheezing. 2 Puffs 4 times per day as needed., Disp: 1 inhaler, Rfl: 11  •  aspirin 81 MG chewable tablet, Chew 1 tablet Daily., Disp: , Rfl:   •  bumetanide (BUMEX) 1 MG tablet, Take 1 tablet by mouth Daily., Disp: 30 tablet, Rfl: 1  •  clopidogrel (PLAVIX) 75 MG tablet, Take 1 tablet by mouth Daily., Disp: 30 tablet, Rfl: 3  •  docusate sodium 100 MG capsule, Take 100 mg by mouth 2 (Two) Times a Day., Disp: , Rfl:   •  famotidine (PEPCID) 40 MG tablet, Take 1 tablet by mouth Daily., Disp: , Rfl:   •  finasteride (PROSCAR) 5 MG tablet, Take 5 mg by mouth Daily., Disp: , Rfl:   •  fluticasone (FLONASE) 50 MCG/ACT nasal spray, 1 spray by Each Nare route Daily., Disp: , Rfl:   •  folic acid (FOLVITE) 1 MG tablet, Take 1 tablet by mouth Daily., Disp: 30 tablet, Rfl: 1  •  levothyroxine (SYNTHROID, LEVOTHROID) 50 MCG tablet, Take 1 tablet by mouth Daily., Disp: 30 tablet, Rfl: 5  •  loratadine (CLARITIN) 10 MG tablet, Take 10 mg by mouth Daily., Disp: , Rfl:   •  metoprolol succinate XL (TOPROL-XL) 25 MG 24 hr tablet, Take 1 tablet by mouth Daily., Disp: 30 tablet, Rfl: 11  •  oxyCODONE-acetaminophen (PERCOCET) 7.5-325 MG per tablet, Take 1 tablet by mouth Every 6 (Six) Hours As Needed for Moderate Pain ., Disp: 120 tablet, Rfl: 0  •  polyethylene glycol (MIRALAX) pack packet, Take 17 g by mouth Daily., Disp: , Rfl:    •  potassium chloride (K-DUR,KLOR-CON) 10 MEQ CR tablet, Take 5 mEq by mouth Daily., Disp: , Rfl:   •  tamsulosin (FLOMAX) 0.4 MG capsule 24 hr capsule, Take 2 capsules by mouth Every Night., Disp: 30 capsule, Rfl:   •  TiZANidine (ZANAFLEX) 2 MG capsule, Take 2 mg by mouth Daily As Needed for Muscle Spasms., Disp: , Rfl:   •  vitamin B-12 (CYANOCOBALAMIN) 1000 MCG tablet, Take 1,000 mcg by mouth Daily., Disp: , Rfl:   •  vitamin D (ERGOCALCIFEROL) 39098 units capsule capsule, Take 50,000 Units by mouth Every 7 (Seven) Days. Take 1 capsule by mouth every 7 days on Thursday, Disp: , Rfl:   No current facility-administered medications for this visit.     Facility-Administered Medications Ordered in Other Visits:   •  acetaminophen (TYLENOL) tablet 650 mg, 650 mg, Oral, Q6H PRN, Mikey Hernández MD  •  aspirin chewable tablet 81 mg, 81 mg, Oral, Daily, Mikey Hernández MD, 81 mg at 10/31/18 0936  •  clopidogrel (PLAVIX) tablet 75 mg, 75 mg, Oral, Daily, Mikey Hernández MD, 75 mg at 10/31/18 0936  •  famotidine (PEPCID) tablet 40 mg, 40 mg, Oral, Daily, Mikey Hernández MD, 40 mg at 10/31/18 0936  •  finasteride (PROSCAR) tablet 5 mg, 5 mg, Oral, Daily, Mikey Hernández MD, 5 mg at 10/31/18 0936  •  levothyroxine (SYNTHROID, LEVOTHROID) tablet 50 mcg, 50 mcg, Oral, Daily, Mikey Hernández MD, 50 mcg at 10/31/18 0937  •  metoprolol succinate XL (TOPROL-XL) 24 hr tablet 25 mg, 25 mg, Oral, Daily, Mikey Hernández MD, 25 mg at 10/31/18 0937  •  oxyCODONE-acetaminophen (PERCOCET) 7.5-325 MG per tablet 1 tablet, 1 tablet, Oral, Q6H PRN, Mikey Hernández MD, 1 tablet at 10/31/18 0937  •  tamsulosin (FLOMAX) 24 hr capsule 0.8 mg, 0.8 mg, Oral, Nightly, Mikey Hernández MD, 0.8 mg at 10/30/18 3056    WBC   Date Value Ref Range Status   10/31/2018 6.49 3.20 - 9.80 10*3/mm3 Final     RBC   Date Value Ref Range Status   10/31/2018 2.82 (L) 4.37 - 5.74 10*6/mm3 Final     Hemoglobin    Date Value Ref Range Status   10/31/2018 7.9 (L) 13.7 - 17.3 g/dL Final     Comment:     SPECIMEN REANALYZED TO CONFIRM HGB RESULTS     Hematocrit   Date Value Ref Range Status   10/31/2018 25.8 (L) 39.0 - 49.0 % Final     MCV   Date Value Ref Range Status   10/31/2018 91.5 80.0 - 98.0 fL Final     MCH   Date Value Ref Range Status   10/31/2018 28.0 26.5 - 34.0 pg Final     MCHC   Date Value Ref Range Status   10/31/2018 30.6 (L) 31.5 - 36.3 g/dL Final     RDW   Date Value Ref Range Status   10/31/2018 16.2 (H) 11.5 - 14.5 % Final     RDW-SD   Date Value Ref Range Status   10/31/2018 54.5 (H) 35.1 - 43.9 fl Final     MPV   Date Value Ref Range Status   10/31/2018 8.3 8.0 - 12.0 fL Final     Platelets   Date Value Ref Range Status   10/31/2018 222 150 - 450 10*3/mm3 Final     Neutrophil %   Date Value Ref Range Status   09/25/2018 45.3 37.0 - 80.0 % Final     Lymphocyte %   Date Value Ref Range Status   09/25/2018 34.5 10.0 - 50.0 % Final     Monocyte %   Date Value Ref Range Status   09/25/2018 14.4 (H) 0.0 - 12.0 % Final     Eosinophil %   Date Value Ref Range Status   09/25/2018 5.0 0.0 - 7.0 % Final     Basophil %   Date Value Ref Range Status   09/25/2018 0.5 0.0 - 2.0 % Final     Immature Grans %   Date Value Ref Range Status   09/25/2018 0.3 0.0 - 0.5 % Final     Neutrophils, Absolute   Date Value Ref Range Status   09/25/2018 1.80 (L) 2.00 - 8.60 10*3/mm3 Final     Lymphocytes, Absolute   Date Value Ref Range Status   09/25/2018 1.37 0.60 - 4.20 10*3/mm3 Final     Monocytes, Absolute   Date Value Ref Range Status   09/25/2018 0.57 0.00 - 0.90 10*3/mm3 Final     Eosinophils, Absolute   Date Value Ref Range Status   09/25/2018 0.20 0.00 - 0.70 10*3/mm3 Final     Basophils, Absolute   Date Value Ref Range Status   09/25/2018 0.02 0.00 - 0.20 10*3/mm3 Final     Immature Grans, Absolute   Date Value Ref Range Status   09/25/2018 0.01 0.00 - 0.02 10*3/mm3 Final     nRBC   Date Value Ref Range Status   09/18/2018  0.0 0.0 - 0.0 /100 WBC Final     Contraindications to home sleep test: Suspicion of central sleep apnea, Supraventricular tachycardia / bradycardic arrythmia and Pulmonary Hypertension    ASSESSMENT:  1. Obstructive sleep apnea   1. Check split night polysomnography   2. CHF  3. Afib  4. CVA  5. PVD - just left hospital earlier today (cath for LLE ischemia)  6. CKD3      RTC 2 weeks after testing         This document has been electronically signed by Viet Brooks MD on October 31, 2018         CC: Terrell Arzate MD          No ref. provider found
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
## 2018-11-06 NOTE — H&P
[address] [address]  T - [phone]     H&P         SUBJECTIVE:   Patient Care Team:  Terrell Arzate MD as PCP - General  Terrell Arzate MD as PCP - [facility]  Bethany Loco, RN as Care Coordinator (Population Health)    Chief Complaint:     Chief Complaint   Patient presents with   • Shortness of Breath       Patient is 88 y.o. male presents with  past medical history of tricuspid regurgitation, peripheral vascular disease, hyperlipidemia, pulmonary hypertension, hypertensive disorder, CK D stage III, heart failure with preserved ejection fraction, presented to the ED with complain of altered mental status.  Patient appeared to be confused.  Upon further evaluation patient is found to have significant urinary tract infection.  Patient has a chronic suprapubic Dye catheter in place.  Unable to state whether he has any urinary symptoms or not.    HPI     ROS/HISTORY/ CURRENT MEDICATIONS/OBJECTIVE/VS/PE:   Review of Systems:   Review of Systems   Constitutional: Positive for activity change, appetite change and fatigue.   HENT: Negative for congestion and rhinorrhea.    Respiratory: Negative for chest tightness and shortness of breath.    Cardiovascular: Negative for chest pain, palpitations and leg swelling.   Gastrointestinal: Negative for abdominal pain, constipation, diarrhea, nausea and vomiting.   Genitourinary:        Suprapubic Dye placed   Neurological: Negative for dizziness.   Psychiatric/Behavioral: Negative for agitation and behavioral problems.       History:     Past Medical History:   Diagnosis Date   • Benign prostatic hyperplasia    • Cerebrovascular accident (CMS/HCC)    • Chronic anemia    • Chronic atrial fibrillation (CMS/HCC)    • CKD (chronic kidney disease) stage 3, GFR 30-59 ml/min (CMS/HCC)    • GERD (gastroesophageal reflux disease)    • Gout    • Hydronephrosis    • Hypercholesterolemia    • Hypertension    •  Nephrolithiasis    • Peripheral arterial disease (CMS/HCC)    • Pulmonary hypertension (CMS/HCC)    • Tricuspid regurgitation    • Urinary retention      Past Surgical History:   Procedure Laterality Date   • CARDIAC CATHETERIZATION     • CYSTOSCOPY Left 9/13/2018    Procedure: CYSTOSCOPY. LEFT J-STENT REMOVAL, PLACEMENT SUPRAPUBIC CATHETER;  Surgeon: Stephen Serrano MD;  Location: [facility];  Service: Urology   • CYSTOSCOPY, URETEROSCOPY, RETROGRADE PYELOGRAM, STENT INSERTION Left 5/27/2018   • CYSTOSCOPY, URETEROSCOPY, RETROGRADE PYELOGRAM, STENT INSERTION Left 8/22/2018    Procedure: CYSTOSCOPY URETEROSCOPY RETROGRADE PYELOGRAM HOLMIUM LASER STENT INSERTION;  Surgeon: Stephen Serrano MD;  Location: [facility] OR;  Service: Urology   • EXTRACORPOREAL SHOCKWAVE LITHOTRIPSY (ESWL), STENT INSERTION/REMOVAL Left 7/20/2018    Procedure: EXTRACORPOREAL SHOCKWAVE LITHOTRIPSY        ([facility]);  Surgeon: Stephen Serrano MD;  Location: [facility];  Service: Urology   • INTERVENTIONAL RADIOLOGY PROCEDURE N/A 10/30/2018    Procedure: Abdominal Aortogram;  Surgeon: Mikey Hernández MD;  Location: [facility] CATH INVASIVE LOCATION;  Service: Cardiology   • NEPHROSTOMY       Family History   Problem Relation Age of Onset   • Hypertension Father      Social History   Substance Use Topics   • Smoking status: Former Smoker   • Smokeless tobacco: Never Used   • Alcohol use Yes       (Not in a hospital admission)  Allergies:  Lipitor [atorvastatin]; Neomycin; and Simvastatin    Current Medications:     Current Facility-Administered Medications   Medication Dose Route Frequency Provider Last Rate Last Dose   • piperacillin-tazobactam (ZOSYN) 3.375 g/100 mL 0.9% NS IVPB (mbp)  3.375 g Intravenous Once Cece Dominguez MD       • sodium chloride 0.9 % bolus 500 mL  500 mL Intravenous Once Cece Dominguez MD       • sodium chloride 0.9 % flush 10 mL  10 mL Intravenous PRN Cece Dominguez MD          Current Outpatient Prescriptions   Medication Sig Dispense Refill   • acetaminophen (TYLENOL) 325 MG tablet Take 650 mg by mouth Every 6 (Six) Hours As Needed for Mild Pain , Headache or Fever.     • albuterol (PROVENTIL HFA;VENTOLIN HFA) 108 (90 BASE) MCG/ACT inhaler Inhale 2 puffs Every 6 (Six) Hours As Needed for Wheezing. 2 Puffs 4 times per day as needed. 1 inhaler 11   • aspirin 81 MG chewable tablet Chew 1 tablet Daily.     • bumetanide (BUMEX) 1 MG tablet Take 1 tablet by mouth Daily. 30 tablet 1   • clopidogrel (PLAVIX) 75 MG tablet Take 1 tablet by mouth Daily. 30 tablet 3   • docusate sodium 100 MG capsule Take 100 mg by mouth 2 (Two) Times a Day.     • famotidine (PEPCID) 40 MG tablet Take 1 tablet by mouth Daily.     • finasteride (PROSCAR) 5 MG tablet Take 5 mg by mouth Daily.     • fluticasone (FLONASE) 50 MCG/ACT nasal spray 1 spray by Each Nare route Daily.     • folic acid (FOLVITE) 1 MG tablet Take 1 tablet by mouth Daily. 30 tablet 1   • levothyroxine (SYNTHROID, LEVOTHROID) 50 MCG tablet Take 1 tablet by mouth Daily. 30 tablet 5   • loratadine (CLARITIN) 10 MG tablet Take 10 mg by mouth Daily.     • metoprolol succinate XL (TOPROL-XL) 25 MG 24 hr tablet Take 1 tablet by mouth Daily. 30 tablet 11   • oxyCODONE-acetaminophen (PERCOCET) 7.5-325 MG per tablet Take 1 tablet by mouth Every 6 (Six) Hours As Needed for Moderate Pain . 120 tablet 0   • polyethylene glycol (MIRALAX) pack packet Take 17 g by mouth Daily.     • potassium chloride (K-DUR,KLOR-CON) 10 MEQ CR tablet Take 5 mEq by mouth Daily.     • tamsulosin (FLOMAX) 0.4 MG capsule 24 hr capsule Take 2 capsules by mouth Every Night. 30 capsule    • TiZANidine (ZANAFLEX) 2 MG capsule Take 2 mg by mouth Daily As Needed for Muscle Spasms.     • vitamin B-12 (CYANOCOBALAMIN) 1000 MCG tablet Take 1,000 mcg by mouth Daily.     • vitamin D (ERGOCALCIFEROL) 29305 units capsule capsule Take 50,000 Units by mouth Every 7 (Seven) Days. Take 1  capsule by mouth every 7 days on Thursday         Physical Exam:     Vital Sign Min/Max for last 24 hours  Temp  Min: 98 °F (36.7 °C)  Max: 98 °F (36.7 °C)   BP  Min: 155/71  Max: 194/86   Pulse  Min: 85  Max: 107   Resp  Min: 28  Max: 28   SpO2  Min: 100 %  Max: 100 %   Flow (L/min)  Min: 3  Max: 3   Weight  Min: 89 kg (196 lb 4.8 oz)  Max: 89 kg (196 lb 4.8 oz)       Physical Exam:    Physical Exam   Constitutional: He appears well-developed and well-nourished.   HENT:   Head: Normocephalic and atraumatic.   Eyes: Pupils are equal, round, and reactive to light. EOM are normal.   Neck: Normal range of motion.   Cardiovascular: Normal rate, regular rhythm and normal heart sounds.    Pulmonary/Chest: Effort normal and breath sounds normal.   Abdominal: Soft. Bowel sounds are normal.   Genitourinary:   Genitourinary Comments: Suprapubic Dye in place   Musculoskeletal: Normal range of motion.   Neurological: He is alert.   Skin: Skin is warm.   Psychiatric: He has a normal mood and affect. His behavior is normal.   Vitals reviewed.       Results Review:   Lab Results (last 24 hours)     Procedure Component Value Units Date/Time    Lactic Acid, Plasma [[id number]]  (Normal) Collected:  11/05/18 1849    Specimen:  Blood from Arm, Left Updated:  11/05/18 1909     Lactate 1.7 mmol/L     Influenza Antigen, Rapid - Swab, Nasopharynx [[id number]]  (Normal) Collected:  11/05/18 1822    Specimen:  Swab from Nasopharynx Updated:  11/05/18 1855     Influenza A Ag, EIA Negative     Influenza B Ag, EIA Negative    S[facility]cer Draw [[id number]] Collected:  11/05/18 1733    Specimen:  Blood Updated:  11/05/18 1845    Narrative:       The following orders were created for panel order Spencer Draw.  Procedure                               Abnormality         Status                     ---------                               -----------         ------                     Light Blue Top[[id number]]                                   Final  result               Green Top (Gel)[[id number]]                                  Final result               Lavender Top[[id number]]                                     Final result               Gold Top - SST[[id number]]                                   Final result                 Please view results for these tests on the individual orders.    Lavender Top [[id number]] Collected:  11/05/18 1733    Specimen:  Blood Updated:  11/05/18 1845     Extra Tube hold for add-on     Comment: Auto resulted       Gold Top - SST [[id number]] Collected:  11/05/18 1733    Specimen:  Blood Updated:  11/05/18 1845     Extra Tube Hold for add-ons.     Comment: Auto resulted.       Light Blue Top [[id number]] Collected:  11/05/18 1733    Specimen:  Blood Updated:  11/05/18 1845     Extra Tube hold for add-on     Comment: Auto resulted       Green Top (Gel) [[id number]] Collected:  11/05/18 1733    Specimen:  Blood Updated:  11/05/18 1845     Extra Tube Hold for add-ons.     Comment: Auto resulted.       Urine Drug Screen - Urine, Clean Catch [[id number]]  (Abnormal) Collected:  11/05/18 1643    Specimen:  Urine from Urine, Catheter Updated:  11/05/18 1840     Amphetamine Screen, Urine Negative     Barbiturates Screen, Urine Negative     Benzodiazepine Screen, Urine Negative     Cocaine Screen, Urine Negative     Methadone Screen, Urine Negative     Opiate Screen Negative     Oxycodone Screen, Urine Positive (A)     THC, Screen, Urine Negative    Narrative:       Negative Thresholds For Drugs Screened in Urine:     Amphetamines          500 ng/ml  Barbiturates          200 ng/ml  Benzodiazepines       200 ng/ml  Cocaine               150 ng/ml  Methadone             300 ng/mL  Opiates               300 ng/mL  Oxycodone             100 ng/mL  THC                   20 ng/mL    The normal value for all drugs tested is negative. This report includes final unconfirmed screening results.  A positive result by this assay can be, at your request,  sent to the Reference Lab for confirmation by gas chromatography. Unconfirmed results must not be used for non-medical purposes, such as employment or legal testing. Clinical consideration should be applied to any drug of abuse test result, particularly when unconfirmed results are used.    BNP [[id number]]  (Abnormal) Collected:  11/05/18 1733    Specimen:  Blood Updated:  11/05/18 1821     proBNP 4,470.0 (H) pg/mL     Troponin [[id number]]  (Normal) Collected:  11/05/18 1733    Specimen:  Blood Updated:  11/05/18 1821     Troponin I <0.012 ng/mL     Comprehensive Metabolic Panel [[id number]]  (Abnormal) Collected:  11/05/18 1733    Specimen:  Blood Updated:  11/05/18 1815     Glucose 93 mg/dL      BUN 19 mg/dL      Creatinine 1.12 mg/dL      Sodium 136 (L) mmol/L      Potassium 3.8 mmol/L      Chloride 101 mmol/L      CO2 25.0 mmol/L      Calcium 9.4 mg/dL      Total Protein 8.0 g/dL      Albumin 3.60 g/dL      ALT (SGPT) 11 (L) U/L      AST (SGOT) 27 U/L      Alkaline Phosphatase 101 U/L      Total Bilirubin 0.6 mg/dL      eGFR Non African Amer 62 mL/min/1.73      Globulin 4.4 (H) gm/dL      A/G Ratio 0.8 (L) g/dL      BUN/Creatinine Ratio 17.0     Anion Gap 10.0 mmol/L     Narrative:       The MDRD GFR formula is only valid for adults with stable renal function between ages 18 and 70.    Magnesium [[id number]]  (Normal) Collected:  11/05/18 1733    Specimen:  Blood Updated:  11/05/18 1759     Magnesium 2.0 mg/dL     Urinalysis, Microscopic Only - Urine, Clean Catch [[id number]]  (Abnormal) Collected:  11/05/18 1643    Specimen:  Urine from Urine, Catheter Updated:  11/05/18 1749     RBC, UA Too Numerous to Count (A) /HPF      WBC, UA Too Numerous to Count (A) /HPF      Bacteria, UA       Unable to determine due to loaded field (A)     /HPF     Squamous Epithelial Cells, UA       Unable to determine due to loaded field (A)     /HPF     Hyaline Casts, UA       Unable to determine due to loaded field     /LPF      Methodology Manual Light Microscopy    Urine Culture - Urine, [[id number]] Collected:  11/05/18 1643    Specimen:  Urine from Urine, Catheter Updated:  11/05/18 1749    CBC & Differential [[id number]] Collected:  11/05/18 1733    Specimen:  Blood Updated:  11/05/18 1744    Narrative:       The following orders were created for panel order CBC & Differential.  Procedure                               Abnormality         Status                     ---------                               -----------         ------                     CBC Auto Differential[[id number]]        Abnormal            Final result                 Please view results for these tests on the individual orders.    CBC Auto Differential [[id number]]  (Abnormal) Collected:  11/05/18 1733    Specimen:  Blood Updated:  11/05/18 1744     WBC 7.73 10*3/mm3      RBC 3.12 (L) 10*6/mm3      Hemoglobin 8.9 (L) g/dL      Hematocrit 28.4 (L) %      MCV 91.0 fL      MCH 28.5 pg      MCHC 31.3 (L) g/dL      RDW 16.6 (H) %      RDW-SD 54.4 (H) fl      MPV 9.1 fL      Platelets 229 10*3/mm3      Neutrophil % 75.6 %      Lymphocyte % 10.2 %      Monocyte % 13.3 (H) %      Eosinophil % 0.5 %      Basophil % 0.1 %      Immature Grans % 0.3 %      Neutrophils, Absolute 5.84 10*3/mm3      Lymphocytes, Absolute 0.79 10*3/mm3      Monocytes, Absolute 1.03 (H) 10*3/mm3      Eosinophils, Absolute 0.04 10*3/mm3      Basophils, Absolute 0.01 10*3/mm3      Immature Grans, Absolute 0.02 10*3/mm3     Urinalysis With Culture If Indicated - Urine, Catheter [[id number]]  (Abnormal) Collected:  11/05/18 1643    Specimen:  Urine from Urine, Catheter Updated:  11/05/18 1714     Color, UA Yellow     Appearance, UA Turbid (A)     pH, UA 6.0     Specific Gravity, UA 1.015     Glucose, UA Negative     Ketones, UA 15 mg/dL (1+) (A)     Bilirubin, UA Negative     Blood, UA Moderate (2+) (A)     Protein,  mg/dL (2+) (A)     Leuk Esterase, UA Large (3+) (A)     Nitrite, UA Positive (A)      Urobilinogen, UA 0.2 E.U./dL              Imaging Results (last 24 hours)     Procedure Component Value Units Date/Time    CT Head Without Contrast [[id number]] Collected:  11/05/18 1828     Updated:  11/05/18 1846    Narrative:       .      EXAMINATION:  Computed Tomography      REGION:  Head             INDICATION:  altered mental status    HISTORY:  CORRELATIVE IMAGING:    CT head 9/8/18    TECHNIQUE:  iv contrast:  no    This exam was performed according to the departmental  dose-optimization program which includes automated exposure  control, adjustment of the mA and/or kV according to patient size  and/or use of iterative reconstruction technique.              COMMENTS:                - atrophy:              wnl for age    - cortex: age related degenerated changes      - deep white mat: age related degenerated changes       - hemorrhage:       none       - fluid collection: no intra/extra axial fluid collection     - mass / lesion:     no focal parenchymal lesion(s)       - gray/white jxn:   borders preserved         - brain stem:         wnl       - cerebellum:        wnl       - globes / retro:     wnl       - ventricles:          normal size / configuration       - midline shift:      no       - sinuses:              wnl       - mastoids:           wnl        - osseous:             wnl       - misc.:  .       Impression:       CONCLUSION:    1.  Negative examination for acute intracranial pathology.           If signs or symptoms persist beyond reasonable expectations, a  MRI examination is suggested as is deemed clinically appropriate.           Electronically signed by:  RUMA Vaca MD  11/5/2018 6:45  PM CST Workstation: [id number]    XR Chest 2 View [[id number]] Collected:  11/05/18 1617     Updated:  11/05/18 1632    Narrative:           PROCEDURE: Chest PA and lateral    REASON FOR EXAM: CHF/COPD Protocol    FINDINGS: Comparison study dated September 15, 2018. . Cardiac  and pulmonary vasculature  are normal . Lungs are clear. Pleural  spaces are normal . No acute osseous abnormality.      Impression:       No acute cardiopulmonary abnormality.    Electronically signed by:  Mikael Lundberg MD  11/5/2018 4:31 PM CST  Workstation: [id number]           I reviewed the patient's new clinical results.  I reviewed the patient's new imaging results and agree with the interpretation.     ASSESSMENT/PLAN:   Assessment/Plan   Active Hospital Problems    Diagnosis Date Noted   • PVD (peripheral vascular disease) with claudication (CMS/[facility]) [I73.9] 10/23/2018     Added automatically from request for surgery [id number]     • Acute cystitis with hematuria [N30.01] 09/08/2018   • Tricuspid regurgitation [I07.1] 07/16/2018   • Pulmonary hypertension (CMS/[facility]) [I27.20] 07/16/2018   • CKD (chronic kidney disease) stage 3, GFR 30-59 ml/min (CMS/[facility]) [N18.3] 07/16/2018   • (HFpEF) heart failure with preserved ejection fraction (CMS/[facility]) [I50.30] 05/21/2018   • Pure hypercholesterolemia [E78.00]    • Hypertensive disorder [I10]      1.  Acute metabolic encephalopathy: Likely secondary to urinary tract infection.  Has been started on antibiotic continue monitor.  2.  Acute cystitis: Consult urology for possible Dye replacement.  Urine appeared to be very cloudy.  Obtain urine culture, blood culture, start on broad-spectrum antibiotic.  3.  CK D stage III: Appear to be doing well continue to monitor.  4.  Hypertensive disorder: Currently baby to be normotensive on home medication will continue with that.  5.  Hyperlipidemia: Statin.  Heart failure with preserved ejection fraction: Patient does not appear to be in heart failure at this point we'll continue monitor.    DVT ppx: SCD    I discussed the patient's findings and my recommendations with patient and nursing staff.              This document has been electronically signed by Augusto Romano MD on November 5, 2018 7:26 PM

## 2018-11-06 NOTE — ED PROVIDER NOTES
Subjective   History of Present Illness     88 year old man presents to ED with AMS for 2-3 days. He is more lethargic than normal. He was recently discharged from the nursing home three weeks ago because family could no longer afford it. He has a history of MDR UTI in the past. He has a suprapubic catheter that has not been changed for 2 months. Family reports someone has stolen his pain medicine. A family member gave him Suboxone to try and help, and he has been lethargic since then. He has fallen four times recently.     Review of Systems   Unable to perform ROS: Acuity of condition   Constitutional: Positive for activity change and fatigue.   Neurological: Positive for weakness.   Psychiatric/Behavioral: Positive for confusion.       Past Medical History:   Diagnosis Date   • Benign prostatic hyperplasia    • Cerebrovascular accident (CMS/HCC)    • Chronic anemia    • Chronic atrial fibrillation (CMS/HCC)    • CKD (chronic kidney disease) stage 3, GFR 30-59 ml/min (CMS/HCC)    • GERD (gastroesophageal reflux disease)    • Gout    • Hydronephrosis    • Hypercholesterolemia    • Hypertension    • Nephrolithiasis    • Peripheral arterial disease (CMS/HCC)    • Pulmonary hypertension (CMS/HCC)    • Tricuspid regurgitation    • Urinary retention        Allergies   Allergen Reactions   • Lipitor [Atorvastatin] Itching     Pruritus   • Neomycin      Rash   • Simvastatin Itching     Itching  Zocor  -  Rash       Past Surgical History:   Procedure Laterality Date   • CARDIAC CATHETERIZATION     • CYSTOSCOPY Left 9/13/2018    Procedure: CYSTOSCOPY. LEFT J-STENT REMOVAL, PLACEMENT SUPRAPUBIC CATHETER;  Surgeon: Zach, Stephen SWEENEY MD;  Location: [facility];  Service: Urology   • CYSTOSCOPY, URETEROSCOPY, RETROGRADE PYELOGRAM, STENT INSERTION Left 5/27/2018   • CYSTOSCOPY, URETEROSCOPY, RETROGRADE PYELOGRAM, STENT INSERTION Left 8/22/2018    Procedure: CYSTOSCOPY URETEROSCOPY RETROGRADE PYELOGRAM HOLMIUM LASER STENT INSERTION;   Surgeon: Stephen Serrano MD;  Location: [facility] OR;  Service: Urology   • EXTRACORPOREAL SHOCKWAVE LITHOTRIPSY (ESWL), STENT INSERTION/REMOVAL Left 7/20/2018    Procedure: EXTRACORPOREAL SHOCKWAVE LITHOTRIPSY        (NURSING HOME  [facility]);  Surgeon: Stephen Serrano MD;  Location: [facility] OR;  Service: Urology   • INTERVENTIONAL RADIOLOGY PROCEDURE N/A 10/30/2018    Procedure: Abdominal Aortogram;  Surgeon: Mikey Hernández MD;  Location: [facility] CATH INVASIVE LOCATION;  Service: Cardiology   • NEPHROSTOMY         Family History   Problem Relation Age of Onset   • Hypertension Father        Social History     Social History   • Marital status:      Social History Main Topics   • Smoking status: Former Smoker   • Smokeless tobacco: Never Used   • Alcohol use Yes   • Drug use: No   • Sexual activity: Not Currently     Other Topics Concern   • Not on file           Objective   Physical Exam   Constitutional: He is oriented to person, place, and time. He appears well-developed and well-nourished. No distress.   HENT:   Head: Normocephalic and atraumatic.   Nose: Nose normal.   Eyes: Conjunctivae and EOM are normal.   Neck: Normal range of motion. Neck supple.   Cardiovascular: Normal rate, regular rhythm and normal heart sounds.    No murmur heard.  Pulmonary/Chest: Effort normal and breath sounds normal. No respiratory distress. He has no wheezes. He has no rales.   Abdominal: Soft. Bowel sounds are normal. He exhibits no distension. There is no tenderness. There is no guarding.   Genitourinary:   Genitourinary Comments: Suprapubic catheter    Musculoskeletal: Normal range of motion.   Neurological: He is alert and oriented to person, place, and time.   Skin: Skin is warm and dry.   Psychiatric: He has a normal mood and affect. His behavior is normal.   Vitals reviewed.      Procedures           ED Course        UA showed UTI with bacteria and WBC unable to count due to loaded field. He has a history of  UTI that has needed a two week course of IV antibiotics. He has continued to have altered mental status. He was started on Zosyn based on previous urine culture sensitivity. CT head without contrast was negative. He will be admitted for observation.           MDM      Final diagnoses:   Acute cystitis with hematuria   Altered mental status, unspecified altered mental status type            Cece Dominguez MD  Resident  11/05/18 5396

## 2018-11-06 NOTE — PLAN OF CARE
Problem: Patient Care Overview  Goal: Plan of Care Review  Outcome: Ongoing (interventions implemented as appropriate)   11/06/18 5140   Coping/Psychosocial   Plan of Care Reviewed With patient   Plan of Care Review   Progress no change   OTHER   Outcome Summary Pt sleeping soundly, ate most of his lunch. Will send chopped meats due to poor dentition and whole milk on all trays to increase protein/maciej intake.

## 2018-11-06 NOTE — PLAN OF CARE
Problem: Fall Risk (Adult)  Goal: Identify Related Risk Factors and Signs and Symptoms  Outcome: Outcome(s) achieved Date Met: 11/06/18    Goal: Absence of Fall  Outcome: Ongoing (interventions implemented as appropriate)      Problem: Patient Care Overview  Goal: Plan of Care Review  Outcome: Ongoing (interventions implemented as appropriate)   11/06/18 0042   Coping/Psychosocial   Plan of Care Reviewed With patient   Plan of Care Review   Progress no change   OTHER   Outcome Summary No new complaints at this time; will continue to monitor.        Problem: Infection, Risk/Actual (Adult)  Goal: Identify Related Risk Factors and Signs and Symptoms  Outcome: Outcome(s) achieved Date Met: 11/06/18    Goal: Infection Prevention/Resolution  Outcome: Ongoing (interventions implemented as appropriate)

## 2018-11-06 NOTE — SIGNIFICANT NOTE
Risks and benefits delivered and patient is agreeable to blood transfusion.        This document has been electronically signed by TRACIE De Jesus on November 6, 2018 3:22 PM

## 2018-11-06 NOTE — NURSING NOTE
Patient has been seen in the wound center. He has an unstageable pressure injury on his left heel. It measures 3 x 4.5 cm Wound bed 100% eschar with callus around edges. ABIs done 10-16-18 Posterior tibial and dorsalis pedis pulses on right  foot biphasic.  He also has a deep tissue injury left buttock. It measures 1.3 x 1 cm Needs offloading, protection and wound care. POA yes

## 2018-11-06 NOTE — CONSULTS
Adult Nutrition  Assessment    Patient Name:  Ravi Park  YOB: 1930  MRN: [id number]  Admit Date:  11/5/2018    Assessment Date:  11/6/2018    Comments:  Pt is an 88 year old male with hx of CHF, PVD, and CKD 3 admitted with AMS and found to have UTI.  Pt has chronic suprapubic cath in place and also noted to have pressure injury to heel.  Pt sleeping soundly at all attempted RD visits, but RD familiar with pt from multiple admissions and LTAC stay.  Pt visualized to have eaten most of his lunch.  Will send chopped meats due to limited dentition with no dentures noted.  Also will add whole milk on all trays to optimize protein intake for wound healing.  Wt down ~5lb over past week but pt has hx of CHF and on Bumex.  RD will monitor.          Reason for Assessment     Row Name 11/06/18 1438          Reason for Assessment    Reason For Assessment identified at risk by screening criteria     Identified At Risk by Screening Criteria large or nonhealing wound, burn or pressure injury               Nutrition/Diet History     Row Name 11/06/18 1438          Nutrition/Diet History    Typical Food/Fluid Intake Pt sleeping soundly at both attempted RD visits.  Visualized to have eaten most of his lunch.               Labs/Tests/Procedures/Meds     Row Name 11/06/18 1440 11/06/18 1439       Labs/Procedures/Meds    Lab Results Reviewed  -- reviewed, pertinent       Medications    Pertinent Medications Reviewed  -- reviewed, pertinent    Pertinent Medications Comments bumex  --            Physical Findings     Row Name 11/06/18 1439          Physical Findings    Oral/Mouth Cavity poor dentition     Skin pressure injury             Estimated/Assessed Needs     Row Name 11/06/18 1440          Calculation Measurements    Weight Used For Calculations 76.9 kg (169 lb 8.5 oz)        Estimated/Assessed Needs    Additional Documentation KCAL/KG (Group);Fluid Requirements (Group);Protein Requirements (Group)         KCAL/KG    14 Kcal/Kg (kcal) 1076.6     15 Kcal/Kg (kcal) 1153.5     18 Kcal/Kg (kcal) 1384.2     20 Kcal/Kg (kcal) 1538     25 Kcal/Kg (kcal) 1922.5     30 Kcal/Kg (kcal) 2307     35 Kcal/Kg (kcal) 2691.5     40 Kcal/Kg (kcal) 3076     45 Kcal/Kg (kcal) 3460.5     50 Kcal/Kg (kcal) 3845     kcal/kg (Specify) 25        Belmont-St. Jeor Equation    RMR (Belmont-St. Jeor Equation) 1477        Protein Requirements    Est Protein Requirement Amount (gms/kg) 1.0 gm protein     Estimated Protein Requirements (gms/day) 76.9        Fluid Requirements    Estimated Fluid Requirements (mL/day) 1922     Estimated Fluid Requirement Method RDA Method     RDA Method (mL) 1922     Teodora-Gina Method (over 20 kg) 3038             Nutrition Prescription Ordered     Row Name 11/06/18 1441          Nutrition Prescription PO    Current PO Diet Regular     Common Modifiers Cardiac             Evaluation of Received Nutrient/Fluid Intake     Row Name 11/06/18 1442 11/06/18 1440       Calculation Measurements    Weight Used For Calculations  -- 76.9 kg (169 lb 8.5 oz)       PO Evaluation    Number of Days PO Intake Evaluated Insufficient Data  --    Number of Meals 1  --    % PO Intake 75  --            Evaluation of Prescribed Nutrient/Fluid Intake     Row Name 11/06/18 1440          Calculation Measurements    Weight Used For Calculations 76.9 kg (169 lb 8.5 oz)           Electronically signed by:  Pau Edwards RD  11/06/18 2:42 PM

## 2018-11-07 NOTE — CONSULTS
Consults    Patient Care Team:  Terrell Arzate MD as PCP - General  Terrell Arzate MD as PCP - Claims Attributed  Bethany Loco, RN as Care Coordinator ([facility])    Chief complaint: UTI SP tube in place    Subjective     Mr. Park is an 88-year-old male consulted to Urology for UTI. On 9/13/18 he had cystoscopy LEFT J-stent removal SP tube placement and Dye placement, due for SP tube change. History of NGB, kidney stones, CKD, history of ESBL UTI. He is confused at the moment, urine is cloudy, cystostomy site is clear. Afebrile. Urine culture is positive, sensitivity is pending.         Review of Systems   Unable to perform ROS: Mental status change        Past Medical History:   Diagnosis Date   • Benign prostatic hyperplasia    • Cerebrovascular accident (CMS/HCC)    • Chronic anemia    • Chronic atrial fibrillation (CMS/HCC)    • CKD (chronic kidney disease) stage 3, GFR 30-59 ml/min (CMS/HCC)    • GERD (gastroesophageal reflux disease)    • Gout    • Hydronephrosis    • Hypercholesterolemia    • Hypertension    • Nephrolithiasis    • Peripheral arterial disease (CMS/HCC)    • Pulmonary hypertension (CMS/HCC)    • Tricuspid regurgitation    • Urinary retention    ,   Past Surgical History:   Procedure Laterality Date   • CARDIAC CATHETERIZATION     • CYSTOSCOPY Left 9/13/2018    Procedure: CYSTOSCOPY. LEFT J-STENT REMOVAL, PLACEMENT SUPRAPUBIC CATHETER;  Surgeon: Stephen Serrano MD;  Location: [facility];  Service: Urology   • CYSTOSCOPY, URETEROSCOPY, RETROGRADE PYELOGRAM, STENT INSERTION Left 5/27/2018   • CYSTOSCOPY, URETEROSCOPY, RETROGRADE PYELOGRAM, STENT INSERTION Left 8/22/2018    Procedure: CYSTOSCOPY URETEROSCOPY RETROGRADE PYELOGRAM HOLMIUM LASER STENT INSERTION;  Surgeon: Stephen Serrano MD;  Location: [facility];  Service: Urology   • EXTRACORPOREAL SHOCKWAVE LITHOTRIPSY (ESWL), STENT INSERTION/REMOVAL Left 7/20/2018    Procedure: EXTRACORPOREAL SHOCKWAVE LITHOTRIPSY         (NURSING HOME  [facility]);  Surgeon: Stephen Serrano MD;  Location: [facility] OR;  Service: Urology   • INTERVENTIONAL RADIOLOGY PROCEDURE N/A 10/30/2018    Procedure: Abdominal Aortogram;  Surgeon: Mikey Hernández MD;  Location: [facility] CATH INVASIVE LOCATION;  Service: Cardiology   • NEPHROSTOMY     ,   Family History   Problem Relation Age of Onset   • Hypertension Father    ,   Social History   Substance Use Topics   • Smoking status: Former Smoker   • Smokeless tobacco: Never Used   • Alcohol use No   ,   Prescriptions Prior to Admission   Medication Sig Dispense Refill Last Dose   • acetaminophen (TYLENOL) 325 MG tablet Take 650 mg by mouth Every 6 (Six) Hours As Needed for Mild Pain , Headache or Fever.   Taking   • albuterol (PROVENTIL HFA;VENTOLIN HFA) 108 (90 BASE) MCG/ACT inhaler Inhale 2 puffs Every 6 (Six) Hours As Needed for Wheezing. 2 Puffs 4 times per day as needed. 1 inhaler 11 Taking   • clopidogrel (PLAVIX) 75 MG tablet Take 1 tablet by mouth Daily. 30 tablet 3 Taking   • docusate sodium 100 MG capsule Take 100 mg by mouth 2 (Two) Times a Day.   Taking   • famotidine (PEPCID) 40 MG tablet Take 1 tablet by mouth Daily.   Taking   • finasteride (PROSCAR) 5 MG tablet Take 5 mg by mouth Daily.   Taking   • fluticasone (FLONASE) 50 MCG/ACT nasal spray 1 spray by Each Nare route Daily.   Taking   • folic acid (FOLVITE) 1 MG tablet Take 1 tablet by mouth Daily. 30 tablet 1 Taking   • levothyroxine (SYNTHROID, LEVOTHROID) 50 MCG tablet Take 1 tablet by mouth Daily. 30 tablet 5 Taking   • metoprolol succinate XL (TOPROL-XL) 25 MG 24 hr tablet Take 1 tablet by mouth Daily. 30 tablet 11 Taking   • oxyCODONE-acetaminophen (PERCOCET) 7.5-325 MG per tablet Take 1 tablet by mouth Every 6 (Six) Hours As Needed for Moderate Pain . 120 tablet 0 Taking   • polyethylene glycol (MIRALAX) pack packet Take 17 g by mouth Daily.   Patient Taking Differently at Unknown time   • potassium chloride (K-DUR,KLOR-CON) 10  MEQ CR tablet Take 5 mEq by mouth Daily.   Taking   • tamsulosin (FLOMAX) 0.4 MG capsule 24 hr capsule Take 2 capsules by mouth Every Night. 30 capsule  Taking   • TiZANidine (ZANAFLEX) 2 MG capsule Take 2 mg by mouth Daily As Needed for Muscle Spasms.   Taking   • aspirin 81 MG chewable tablet Chew 1 tablet Daily.   Taking   • bumetanide (BUMEX) 1 MG tablet Take 1 tablet by mouth Daily. 30 tablet 1 Taking   • loratadine (CLARITIN) 10 MG tablet Take 10 mg by mouth Daily.   Taking   • vitamin B-12 (CYANOCOBALAMIN) 1000 MCG tablet Take 1,000 mcg by mouth Daily.   Taking   • vitamin D (ERGOCALCIFEROL) 80698 units capsule capsule Take 50,000 Units by mouth Every 7 (Seven) Days. Take 1 capsule by mouth every 7 days on Thursday   Taking        Allergies:  Lipitor [atorvastatin]; Neomycin; and Simvastatin    Objective      Vital Signs  Temp:  [96.8 °F (36 °C)-99.1 °F (37.3 °C)] 99.1 °F (37.3 °C)  Heart Rate:  [78-92] 92  Resp:  [16-20] 16  BP: (111-155)/(55-71) 111/55    Physical Exam   Constitutional: Vital signs are normal. He appears well-developed and well-nourished. No distress.   HENT:   Head: Normocephalic and atraumatic.   Right Ear: External ear normal.   Left Ear: External ear normal.   Nose: Nose normal.   Mouth/Throat: Oropharynx is clear and moist.   Eyes: Pupils are equal, round, and reactive to light. Right eye exhibits no discharge. Left eye exhibits no discharge. No scleral icterus.   Neck: Normal range of motion. No JVD present. No tracheal deviation present. No thyromegaly present.   Cardiovascular: Normal rate, regular rhythm, normal heart sounds and intact distal pulses.    No murmur heard.  Pulmonary/Chest: Effort normal and breath sounds normal. No stridor. No respiratory distress. He has no wheezes. He has no rales. He exhibits no tenderness.   Abdominal: Soft. Normal appearance and bowel sounds are normal. He exhibits no distension. There is no tenderness.   Musculoskeletal: Normal range of motion.  He exhibits edema.   Neurological: He is alert. He is disoriented.   Skin: Skin is warm and dry. Capillary refill takes 2 to 3 seconds. He is not diaphoretic. There is pallor.   Psychiatric: He has a normal mood and affect. Cognition and memory are impaired.       Results Review:   Lab Results (last 24 hours)     Procedure Component Value Units Date/Time    Urinalysis, Microscopic Only - Urine, Clean Catch [[id number]]  (Abnormal) Collected:  11/06/18 1230    Specimen:  Urine from Urine, Catheter Updated:  11/06/18 1348     RBC, UA 6-12 (A) /HPF      WBC, UA Too Numerous to Count (A) /HPF      Bacteria, UA 2+ (A) /HPF      Squamous Epithelial Cells, UA 0-2 /HPF      Hyaline Casts, UA Unable to Determine /LPF      Methodology Manual Light Microscopy    Urinalysis With Microscopic If Indicated (No Culture) - Urine, Catheter [[id number]]  (Abnormal) Collected:  11/06/18 1230    Specimen:  Urine from Urine, Catheter Updated:  11/06/18 1333     Color, UA Yellow     Appearance, UA Turbid (A)     pH, UA <=5.0     Specific Gravity, UA 1.011     Glucose, UA Negative     Ketones, UA Negative     Bilirubin, UA Negative     Blood, UA Small (1+) (A)     Protein, UA Trace (A)     Leuk Esterase, UA Large (3+) (A)     Nitrite, UA Negative     Urobilinogen, UA 0.2 E.U./dL    Blood Culture - Blood, [[id number]]  (Normal) Collected:  11/05/18 1849    Specimen:  Blood from Arm, Left Updated:  11/06/18 0745     Blood Culture No growth at less than 24 hours    Blood Culture - Blood, [[id number]]  (Normal) Collected:  11/05/18 1936    Specimen:  Blood from Hand, Left Updated:  11/06/18 0745     Blood Culture No growth at less than 24 hours    Scan Slide [[id number]] Collected:  11/06/18 0534    Specimen:  Blood Updated:  11/06/18 0703     Anisocytosis Slight/1+     WBC Morphology Normal     Platelet Morphology Normal    Urine Culture - Urine, [[id number]]  (Abnormal) Collected:  11/05/18 1643    Specimen:  Urine from Urine, Catheter Updated:   11/06/18 0645     Urine Culture >100,000 CFU/mL Gram Negative Bacilli (A)    CBC Auto Differential [[id number]]  (Abnormal) Collected:  11/06/18 0534    Specimen:  Blood Updated:  11/06/18 0643     WBC 6.87 10*3/mm3      RBC 2.56 (L) 10*6/mm3      Hemoglobin 7.2 (L) g/dL      Comment: Verified results repeat testing        Hematocrit 23.3 (L) %      MCV 91.0 fL      MCH 28.1 pg      MCHC 30.9 (L) g/dL      RDW 16.4 (H) %      RDW-SD 52.9 (H) fl      MPV 9.1 fL      Platelets 220 10*3/mm3      Neutrophil % 65.2 %      Lymphocyte % 16.3 %      Monocyte % 16.7 (H) %      Eosinophil % 1.6 %      Basophil % 0.1 %      Immature Grans % 0.1 %      Neutrophils, Absolute 4.47 10*3/mm3      Lymphocytes, Absolute 1.12 10*3/mm3      Monocytes, Absolute 1.15 (H) 10*3/mm3      Eosinophils, Absolute 0.11 10*3/mm3      Basophils, Absolute 0.01 10*3/mm3      Immature Grans, Absolute 0.01 10*3/mm3      nRBC 0.0 /100 WBC     Comprehensive Metabolic Panel [[id number]]  (Abnormal) Collected:  11/06/18 0534    Specimen:  Blood Updated:  11/06/18 0626     Glucose 86 mg/dL      BUN 18 mg/dL      Creatinine 1.04 mg/dL      Sodium 140 mmol/L      Potassium 4.0 mmol/L      Chloride 105 mmol/L      CO2 27.0 mmol/L      Calcium 8.6 mg/dL      Total Protein 6.7 g/dL      Albumin 2.80 (L) g/dL      ALT (SGPT) 21 U/L      AST (SGOT) 18 U/L      Alkaline Phosphatase 77 U/L      Total Bilirubin 0.6 mg/dL      eGFR Non African Amer 67 mL/min/1.73      Globulin 3.9 (H) gm/dL      A/G Ratio 0.7 (L) g/dL      BUN/Creatinine Ratio 17.3     Anion Gap 8.0 mmol/L     Narrative:       The MDRD GFR formula is only valid for adults with stable renal function between ages 18 and 70.    Lactic Acid, Plasma [[id number]]  (Normal) Collected:  11/05/18 1849    Specimen:  Blood from Arm, Left Updated:  11/05/18 1909     Lactate 1.7 mmol/L     Influenza Antigen, Rapid - Swab, Nasopharynx [[id number]]  (Normal) Collected:  11/05/18 1822    Specimen:  Swab from Nasopharynx  Updated:  11/05/18 1855     Influenza A Ag, EIA Negative     Influenza B Ag, EIA Negative    [place] Draw [[id number]] Collected:  11/05/18 1733    Specimen:  Blood Updated:  11/05/18 1845    Narrative:       The following orders were created for panel order [place] Draw.  Procedure                               Abnormality         Status                     ---------                               -----------         ------                     Light Blue Top[[id number]]                                   Final result               Green Top (Gel)[[id number]]                                  Final result               Lavender Top[[id number]]                                     Final result               Gold Top - SST[[id number]]                                   Final result                 Please view results for these tests on the individual orders.    Lavender Top [[id number]] Collected:  11/05/18 1733    Specimen:  Blood Updated:  11/05/18 1845     Extra Tube hold for add-on     Comment: Auto resulted       Gold Top - SST [[id number]] Collected:  11/05/18 1733    Specimen:  Blood Updated:  11/05/18 1845     Extra Tube Hold for add-ons.     Comment: Auto resulted.       Light Blue Top [[id number]] Collected:  11/05/18 1733    Specimen:  Blood Updated:  11/05/18 1845     Extra Tube hold for add-on     Comment: Auto resulted       Green Top (Gel) [[id number]] Collected:  11/05/18 1733    Specimen:  Blood Updated:  11/05/18 1845     Extra Tube Hold for add-ons.     Comment: Auto resulted.       Urine Drug Screen - Urine, Clean Catch [[id number]]  (Abnormal) Collected:  11/05/18 1643    Specimen:  Urine from Urine, Catheter Updated:  11/05/18 1840     Amphetamine Screen, Urine Negative     Barbiturates Screen, Urine Negative     Benzodiazepine Screen, Urine Negative     Cocaine Screen, Urine Negative     Methadone Screen, Urine Negative     Opiate Screen Negative     Oxycodone Screen, Urine Positive (A)     THC, Screen, Urine  Negative    Narrative:       Negative Thresholds For Drugs Screened in Urine:     Amphetamines          500 ng/ml  Barbiturates          200 ng/ml  Benzodiazepines       200 ng/ml  Cocaine               150 ng/ml  Methadone             300 ng/mL  Opiates               300 ng/mL  Oxycodone             100 ng/mL  THC                   20 ng/mL    The normal value for all drugs tested is negative. This report includes final unconfirmed screening results.  A positive result by this assay can be, at your request, sent to the Reference Lab for confirmation by gas chromatography. Unconfirmed results must not be used for non-medical purposes, such as employment or legal testing. Clinical consideration should be applied to any drug of abuse test result, particularly when unconfirmed results are used.    BNP [[id number]]  (Abnormal) Collected:  11/05/18 1733    Specimen:  Blood Updated:  11/05/18 1821     proBNP 4,470.0 (H) pg/mL     Troponin [[id number]]  (Normal) Collected:  11/05/18 1733    Specimen:  Blood Updated:  11/05/18 1821     Troponin I <0.012 ng/mL     Comprehensive Metabolic Panel [[id number]]  (Abnormal) Collected:  11/05/18 1733    Specimen:  Blood Updated:  11/05/18 1815     Glucose 93 mg/dL      BUN 19 mg/dL      Creatinine 1.12 mg/dL      Sodium 136 (L) mmol/L      Potassium 3.8 mmol/L      Chloride 101 mmol/L      CO2 25.0 mmol/L      Calcium 9.4 mg/dL      Total Protein 8.0 g/dL      Albumin 3.60 g/dL      ALT (SGPT) 11 (L) U/L      AST (SGOT) 27 U/L      Alkaline Phosphatase 101 U/L      Total Bilirubin 0.6 mg/dL      eGFR Non African Amer 62 mL/min/1.73      Globulin 4.4 (H) gm/dL      A/G Ratio 0.8 (L) g/dL      BUN/Creatinine Ratio 17.0     Anion Gap 10.0 mmol/L     Narrative:       The MDRD GFR formula is only valid for adults with stable renal function between ages 18 and 70.         Imaging Results (last 24 hours)     Procedure Component Value Units Date/Time    CT Head Without Contrast [[id number]]  Collected:  11/05/18 1828     Updated:  11/05/18 1846    Narrative:       .      EXAMINATION:  Computed Tomography      REGION:  Head             INDICATION:  altered mental status    HISTORY:  CORRELATIVE IMAGING:    CT head 9/8/18    TECHNIQUE:  iv contrast:  no    This exam was performed according to the departmental  dose-optimization program which includes automated exposure  control, adjustment of the mA and/or kV according to patient size  and/or use of iterative reconstruction technique.              COMMENTS:                - atrophy:              wnl for age    - cortex: age related degenerated changes      - deep white mat: age related degenerated changes       - hemorrhage:       none       - fluid collection: no intra/extra axial fluid collection     - mass / lesion:     no focal parenchymal lesion(s)       - gray/white jxn:   borders preserved         - brain stem:         wnl       - cerebellum:        wnl       - globes / retro:     wnl       - ventricles:          normal size / configuration       - midline shift:      no       - sinuses:              wnl       - mastoids:           wnl        - osseous:             wnl       - misc.:  .       Impression:       CONCLUSION:    1.  Negative examination for acute intracranial pathology.           If signs or symptoms persist beyond reasonable expectations, a  MRI examination is suggested as is deemed clinically appropriate.           Electronically signed by:  RUMA Vaca MD  11/5/2018 6:45  PM CST Workstation: [id number]           I reviewed the patient's new clinical results.  I reviewed the patient's new imaging results and agree with the interpretation.  I reviewed the patient's other test results and agree with the interpretation        Assessment/Plan       (HFpEF) heart failure with preserved ejection fraction (CMS/HCC)    Pure hypercholesterolemia    Hypertensive disorder    Pulmonary hypertension (CMS/HCC)    Tricuspid regurgitation    CKD  (chronic kidney disease) stage 3, GFR 30-59 ml/min (CMS/[facility])    Acute cystitis with hematuria    PVD (peripheral vascular disease) with claudication (CMS/[facility])      Assessment & Plan    1. Obstructive BPH causing neurogenic bladder, cystoscopy with SP tube placement in Sept due for SP tube change  -Proscar, Flomax  -Chronic SP tube    2. UTI cystitis, complicated, history of ESBL UTI  -Urine culture 11/5/18 gram negative bacilli  -Merrem day #2    Plan: SP tube changed, follow culture result, continue Merrem.    PRE DIAGNOSIS:  NGB     POST DIAGNOSIS:  NGB     PROCEDURE DETAILS:  Supra pubic site prepped in sterile technique. Existing catheter removed with 16 catheter inserted with 10 mL  sterile water in balloon. Supra pubic site clear with minimal amount of redness and no drainage noted. Placement was checked. Catheter connected to gravity bag, closed system.      COMPLICATIONS:  No Complications.     FINDINGS: Urine cloudy yellow.     INSTRUCTIONS: Appropriate post procedure instructions given to nursing. Verbalized understanding.    I discussed the patient's findings and my recommendations with patient, nursing staff, primary care team and JUSTIN England  11/06/18  6:01 PM

## 2018-11-07 NOTE — PLAN OF CARE
Problem: Fall Risk (Adult)  Goal: Absence of Fall  Outcome: Ongoing (interventions implemented as appropriate)      Problem: Patient Care Overview  Goal: Plan of Care Review  Outcome: Ongoing (interventions implemented as appropriate)   11/07/18 0049   Coping/Psychosocial   Plan of Care Reviewed With patient   Plan of Care Review   Progress no change   OTHER   Outcome Summary Pt has rested well; no new complaints at this time; will continue to monitor.        Problem: Infection, Risk/Actual (Adult)  Goal: Infection Prevention/Resolution  Outcome: Ongoing (interventions implemented as appropriate)

## 2018-11-07 NOTE — NURSING NOTE
Pt is unable to sign consent for blood transfusion at this time. Called and spoke with daughter who stated she was not present during informed consent and would like to speak with the Dr about risks and benefits. Spoke with Troy Laura about pt's daughters request; she stated at this time to hold blood transfusion until hemoglobin was rechecked tomorrow (11/7/18). Orders put in for AM labs and nursing communication to hold blood transfusion at this time.

## 2018-11-07 NOTE — PLAN OF CARE
Problem: Patient Care Overview  Goal: Plan of Care Review  Outcome: Ongoing (interventions implemented as appropriate)   11/07/18 9936   Coping/Psychosocial   Plan of Care Reviewed With patient   Plan of Care Review   Progress no change   OTHER   Outcome Summary Dressing changed to left heel; pt has been in pain; medicated; will continue to monitor.      Goal: Individualization and Mutuality  Outcome: Ongoing (interventions implemented as appropriate)      Problem: Infection, Risk/Actual (Adult)  Goal: Infection Prevention/Resolution  Outcome: Ongoing (interventions implemented as appropriate)      Problem: Pain, Acute (Adult)  Goal: Identify Related Risk Factors and Signs and Symptoms  Outcome: Outcome(s) achieved Date Met: 11/07/18    Goal: Acceptable Pain Control/Comfort Level  Outcome: Ongoing (interventions implemented as appropriate)      Problem: Wound (Includes Pressure Injury) (Adult)  Goal: Signs and Symptoms of Listed Potential Problems Will be Absent, Minimized or Managed (Wound)  Outcome: Ongoing (interventions implemented as appropriate)
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
## 2018-11-07 NOTE — SIGNIFICANT NOTE
"   11/07/18 1448   Rehab Time/Intention   Evaluation Not Performed patient/family declined evaluation  (Attempted to see pt for evaluation, however, pt reporting that his \"leg hurts and he hurts all over\" and does not want to participate in PT evaluation at this time. Will check back tomorrow. )   Rehab Treatment   Discipline physical therapist   Recommendation   PT - Next Appointment 11/08/18     "
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
## 2018-11-08 NOTE — PLAN OF CARE
Problem: Fall Risk (Adult)  Goal: Absence of Fall  Outcome: Outcome(s) achieved Date Met: 11/08/18      Problem: Patient Care Overview  Goal: Plan of Care Review  Outcome: Ongoing (interventions implemented as appropriate)   11/08/18 0109   Coping/Psychosocial   Plan of Care Reviewed With patient   Plan of Care Review   Progress no change   OTHER   Outcome Summary Patient having difficulty swallowing pills and water. Patient currently resting. VSS. Will continue to monitor.      Goal: Individualization and Mutuality  Outcome: Ongoing (interventions implemented as appropriate)    Goal: Discharge Needs Assessment  Outcome: Ongoing (interventions implemented as appropriate)    Goal: Interprofessional Rounds/Family Conf  Outcome: Ongoing (interventions implemented as appropriate)      Problem: Infection, Risk/Actual (Adult)  Goal: Infection Prevention/Resolution  Outcome: Ongoing (interventions implemented as appropriate)      Problem: Pain, Acute (Adult)  Goal: Acceptable Pain Control/Comfort Level  Outcome: Ongoing (interventions implemented as appropriate)      Problem: Wound (Includes Pressure Injury) (Adult)  Goal: Signs and Symptoms of Listed Potential Problems Will be Absent, Minimized or Managed (Wound)  Outcome: Ongoing (interventions implemented as appropriate)

## 2018-11-08 NOTE — THERAPY EVALUATION
Acute Care - Occupational Therapy Initial Evaluation  [facility]     Patient Name: Ravi Park  : 1930  MRN: [id number]  Today's Date: 2018  Onset of Illness/Injury or Date of Surgery: 18  Date of Referral to OT: 18  Referring Physician: Troy Laura    Admit Date: 2018       ICD-10-CM ICD-9-CM   1. Acute cystitis with hematuria N30.01 595.0   2. Altered mental status, unspecified altered mental status type R41.82 780.97   3. Impaired functional mobility and endurance Z74.09 V49.89   4. Impaired mobility and activities of daily living Z74.09 799.89     Patient Active Problem List   Diagnosis   • Peripheral arterial disease (CMS/HCC)   • Chronic atrial fibrillation (CMS/HCC)   • Backache   • Lumbar radiculopathy   • Edema   • Acute congestive heart failure (CMS/HCC)   • Acute blood loss anemia   • Acute on chronic diastolic heart failure (CMS/HCC)   • YESENIA (acute kidney injury) (CMS/HCC)   • (HFpEF) heart failure with preserved ejection fraction (CMS/HCC)   • Urinary retention   • Personal history of tobacco use, presenting hazards to health   • Vitreous floaters   • Upper respiratory infection   • Sprain of shoulder and upper arm   • Shoulder pain   • Serous otitis media   • Sensorineural hearing loss   • Pure hypercholesterolemia   • Pseudophakia   • Prostatitis   • Pain in wrist   • Pain in thumb joint with movement   • Pain in joint involving right lower leg   • Pain in eye   • Otorrhea   • Open wound of toe   • Open wound of lower leg with complication   • Open wound of lower leg   • Open wound of lesser toe of left foot without damage to nail   • Olecranon bursitis   • Need for prophylactic vaccination and inoculation against influenza   • Need for immunization against influenza   • Multiple joint pain   • Malaise and fatigue   • Keratoconjunctivitis sicca (CMS/HCC)   • Insect bite   • Impacted cerumen   • Hypertensive disorder   • History of colonoscopy   • History of  colon polyps   • History of artificial eye lens   • Herpes zoster   • Gout   • Exposure to communicable disease   • Dog bite of hand   • Disorder of sacroiliac joint   • Decreased platelet count (CMS/HCC)   • Cough   • Contusion of thumb   • Common cold   • Cervical arthritis   • Cerebrovascular accident (CMS/HCC)   • Cellulitis of lower leg   • Cataract   • Benign prostatic hyperplasia   • Atrial fibrillation (CMS/HCC)   • Astigmatism   • Allergic rhinitis due to pollen   • Allergic rhinitis   • Adverse reaction to drug   • Acute confusion   • Acute bronchitis   • Abnormal gait   • Urinary tract infection associated with indwelling urethral catheter (CMS/HCC)   • Hydronephrosis   • Nephrolithiasis   • Calculus of kidney   • Sepsis (CMS/HCC)   • Pulmonary hypertension (CMS/HCC)   • Tricuspid regurgitation   • CKD (chronic kidney disease) stage 3, GFR 30-59 ml/min (CMS/HCC)   • ESBL (extended spectrum beta-lactamase) producing bacteria infection   • Urinary tract infection associated with catheterization of urinary tract (CMS/HCC)   • Toxic encephalopathy   • Demand ischemia (CMS/HCC)   • Acute renal failure with tubular necrosis (CMS/HCC)   • Chronic atrial fibrillation (CMS/HCC)   • Acute cystitis with hematuria   • Sepsis (CMS/HCC)   • Decubitus ulcer of left heel, unstageable (CMS/HCC)   • Acute renal failure (CMS/HCC)   • Pneumonia due to infectious organism   • Non-healing wound of left heel   • PVD (peripheral vascular disease) with claudication (CMS/HCC)     Past Medical History:   Diagnosis Date   • Benign prostatic hyperplasia    • Cerebrovascular accident (CMS/HCC)    • Chronic anemia    • Chronic atrial fibrillation (CMS/HCC)    • CKD (chronic kidney disease) stage 3, GFR 30-59 ml/min (CMS/HCC)    • GERD (gastroesophageal reflux disease)    • Gout    • Hydronephrosis    • Hypercholesterolemia    • Hypertension    • Nephrolithiasis    • Peripheral arterial disease (CMS/HCC)    • Pulmonary hypertension  (CMS/HCC)    • Tricuspid regurgitation    • Urinary retention      Past Surgical History:   Procedure Laterality Date   • CARDIAC CATHETERIZATION     • CYSTOSCOPY Left 9/13/2018    Procedure: CYSTOSCOPY. LEFT J-STENT REMOVAL, PLACEMENT SUPRAPUBIC CATHETER;  Surgeon: Stephen Serrano MD;  Location: [facility];  Service: Urology   • CYSTOSCOPY, URETEROSCOPY, RETROGRADE PYELOGRAM, STENT INSERTION Left 5/27/2018   • CYSTOSCOPY, URETEROSCOPY, RETROGRADE PYELOGRAM, STENT INSERTION Left 8/22/2018    Procedure: CYSTOSCOPY URETEROSCOPY RETROGRADE PYELOGRAM HOLMIUM LASER STENT INSERTION;  Surgeon: Stephen Serrano MD;  Location: [facility];  Service: Urology   • EXTRACORPOREAL SHOCKWAVE LITHOTRIPSY (ESWL), STENT INSERTION/REMOVAL Left 7/20/2018    Procedure: EXTRACORPOREAL SHOCKWAVE LITHOTRIPSY        ([facility]);  Surgeon: Stephen Serrano MD;  Location: [facility];  Service: Urology   • INTERVENTIONAL RADIOLOGY PROCEDURE N/A 10/30/2018    Procedure: Abdominal Aortogram;  Surgeon: Mikey Hernández MD;  Location: [facility] CATH INVASIVE LOCATION;  Service: Cardiology   • NEPHROSTOMY            OT ASSESSMENT FLOWSHEET (last 72 hours)      Occupational Therapy Evaluation     Row Name 11/08/18 1502                   OT Evaluation Time/Intention    Subjective Information complains of;fatigue   sleepiness  -AS        Document Type evaluation  -AS        Mode of Treatment co-treatment;occupational therapy;physical therapy  -AS        Total Evaluation Minutes, Occupational Therapy 21  -AS        Patient Effort fair  -AS        Comment Pt largely limited by sleepiness with difficulty sustaining arousal  -AS           General Information    Patient Profile Reviewed? yes  -AS        Onset of Illness/Injury or Date of Surgery 11/05/18  -AS        Referring Physician Troy Laura PA  -AS        Patient Observations cooperative;agree to therapy;lethargic  -AS        Patient/Family Observations no family present  -AS         General Observations of Patient supine in bed, supplemental O2, suprapubic [place] catheter, IV  -AS        Prior Level of Function min assist:;mod assist:;ADL's;w/c or scooter   difficult to assess 2/2sleepiness;obtained fm prior admits  -AS        Equipment Currently Used at Home wheelchair  -AS        Pertinent History of Current Functional Problem Pt is an 87 yo M admitted for AMS and found to have UTI and acute cystitis. Pt has chronic suprapubic [place] catheter  -AS        Existing Precautions/Restrictions fall;oxygen therapy device and L/min  -AS        Limitations/Impairments hearing;safety/cognitive  -AS        Risks Reviewed patient:;LOB;nausea/vomiting;dizziness;increased discomfort;change in vital signs;increased drainage;lines disloged  -AS        Benefits Reviewed patient:;improve function;increase independence;increase balance;increase strength;decrease pain;decrease risk of DVT;improve skin integrity;increase knowledge  -AS        Barriers to Rehab previous functional deficit  -AS           Relationship/Environment    Lives With grandchild(danii)   reports grandson  -AS        Concerns About Impact on Relationships Per chart review, pt lives with grandson and has around the clock caregivers for when family isn't present  -AS           Resource/Environmental Concerns    Current Living Arrangements home/apartment/condo  -AS           Cognitive Assessment/Intervention- PT/OT    Orientation Status (Cognition) oriented to;person  -AS        Follows Commands (Cognition) follows one step commands;0-24% accuracy;increased processing time needed;initiation impaired;physical/tactile prompts required;repetition of directions required;verbal cues/prompting required   limited by lethargy and sleepiness  -AS        Safety Deficit (Cognitive) ability to follow commands  -AS           Safety Issues, Functional Mobility    Safety Issues Affecting Function (Mobility) ability to follow commands  -AS        Impairments  "Affecting Function (Mobility) balance;cognition;endurance/activity tolerance;strength  -AS           Bed Mobility Assessment/Treatment    Bed Mobility Assessment/Treatment supine-sit;sit-supine  -AS        Supine-Sit [place] (Bed Mobility) maximum assist (25% patient effort);2 person assist  -AS        Sit-Supine [place] (Bed Mobility) maximum assist (25% patient effort);2 person assist  -AS        Bed Mobility, Safety Issues cognitive deficits limit understanding;decreased use of arms for pushing/pulling;impaired trunk control for bed mobility;decreased use of legs for bridging/pushing  -AS        Assistive Device (Bed Mobility) draw sheet;head of bed elevated  -AS           Functional Mobility    Functional Mobility- Comment unable to assess due to poor arousal  -AS           Transfer Assessment/Treatment    Comment (Transfers) Pt declined attempts stating \"I am just too sleepy\"  -AS           ADL Assessment/Intervention    BADL Assessment/Intervention --   unable to assess 2/2 fatigue; difficulty sustaining arousal   -AS           BADL Safety/Performance    Impairments, BADL Safety/Performance cognition;balance;endurance/activity tolerance;strength  -AS           General ROM    GENERAL ROM COMMENTS difficult to assess 2/2 lethargy; pt demo ability to lift BUE against gravity  -AS           MMT (Manual Muscle Testing)    General MMT Comments difficult to assess due to lethargy and inability to consistently follow commands; pt demo 4-/5 elbow flex/ext  -AS           Balance    Balance static sitting balance  -AS           Static Sitting Balance    Level of [place] (Supported Sitting, Static Balance) maximal assist, 25 to 49% patient effort;moderate assist, 50 to 74% patient effort;minimal assist, 75% patient effort  -AS        Sitting Position (Supported Sitting, Static Balance) sitting on edge of bed  -AS        Time Able to Maintain Position (Supported Sitting, Static Balance) 4 to 5 minutes  -AS   "      Comment (Supported Sitting, Static Balance) Pt able to sustain periods of min/CGA for sitting balance but fluctuated due to attempts to lie back down or fall to right.  -AS           Sensory Assessment/Intervention    Sensory General Assessment other (see comments)   unable to assess 2/2 lethargy  -AS           Positioning and Restraints    Pre-Treatment Position in bed  -AS        Post Treatment Position bed  -AS        In Bed exit alarm on;encouraged to call for assist;call light within reach;supine;heels elevated;pillow between legs  -AS           Pain Scale: Numbers Pre/Post-Treatment    Pain Scale: Numbers, Pretreatment 0/10 - no pain  -AS        Pain Scale: Numbers, Post-Treatment 0/10 - no pain  -AS           Wound 10/30/18 1715 Left heel pressure injury    Wound - Properties Group Date first assessed: 10/30/18  -ASA Time first assessed: 1715  -ASA Present On Admission : yes;picture taken  -ASA Side: Left  -ASA Location: heel  -ASA Type: pressure injury  -ASA Stage, Pressure Injury: unstageable  -ASA       Wound 11/06/18 0300 Left gluteal pressure injury    Wound - Properties Group Date first assessed: 11/06/18  -AB Time first assessed: 0300  -AB Present On Admission : yes;picture taken  -SR, picture taken by nightshift RN  Side: Left  -SR Location: gluteal  -SR Type: pressure injury  -SR Stage, Pressure Injury: deep tissue injury  -SR       Plan of Care Review    Plan of Care Reviewed With patient  -AS           Clinical Impression (OT)    Date of Referral to OT 11/07/18  -AS        OT Diagnosis impaired mobility and ADLs  -AS        Criteria for Skilled Therapeutic Interventions Met (OT Eval) yes;treatment indicated  -AS        Rehab Potential (OT Eval) fair, will monitor progress closely  -AS        Therapy Frequency (OT Eval) --   3-5x/wk  -AS        Predicted Duration of Therapy Intervention (Therapy Eval) until goals met or d/c from facility  -AS        Care Plan Review (OT) evaluation/treatment  results reviewed;care plan/treatment goals reviewed;risks/benefits reviewed;current/potential barriers reviewed;patient/other agree to care plan  -AS        Anticipated Discharge Disposition (OT) home with 24/7 care;home with home health;skilled nursing facility  -AS           Vital Signs    Pre Systolic BP Rehab 119  -AS        Pre Treatment Diastolic BP 56  -AS        Pretreatment Heart Rate (beats/min) 94  -AS        Pre SpO2 (%) 96  -AS        O2 Delivery Pre Treatment supplemental O2  -AS        Pre Patient Position Supine  -AS           Planned OT Interventions    Planned Therapy Interventions (OT Eval) activity tolerance training;adaptive equipment training;BADL retraining;functional balance retraining;occupation/activity based interventions;patient/caregiver education/training;ROM/therapeutic exercise;strengthening exercise;transfer/mobility retraining  -AS           OT Goals    Transfer Goal Selection (OT) transfer, OT goal 1  -AS        Bathing Goal Selection (OT) bathing, OT goal 1  -AS        Dressing Goal Selection (OT) dressing, OT goal 1  -AS        Toileting Goal Selection (OT) toileting, OT goal 1  -AS        Strength Goal Selection (OT) strength, OT goal 1  -AS        Additional Documentation Strength Goal Selection (OT) (Row)  -AS           Transfer Goal 1 (OT)    Activity/Assistive Device (Transfer Goal 1, OT) sit-to-stand/stand-to-sit;bed-to-chair/chair-to-bed;toilet  -AS        [place] Level/Cues Needed (Transfer Goal 1, OT) contact guard assist  -AS        Time Frame (Transfer Goal 1, OT) long term goal (LTG);by discharge  -AS        Progress/Outcome (Transfer Goal 1, OT) goal not met  -AS           Bathing Goal 1 (OT)    Activity/Assistive Device (Bathing Goal 1, OT) bathing skills, all   using AE PRN  -AS        [place] Level/Cues Needed (Bathing Goal 1, OT) minimum assist (75% or more patient effort)  -AS        Time Frame (Bathing Goal 1, OT) long term goal (LTG);by discharge   -AS        Progress/Outcomes (Bathing Goal 1, OT) goal not met  -AS           Dressing Goal 1 (OT)    Activity/Assistive Device (Dressing Goal 1, OT) dressing skills, all   using AE PRN  -AS        [place]/Cues Needed (Dressing Goal 1, OT) minimum assist (75% or more patient effort)  -AS        Time Frame (Dressing Goal 1, OT) long term goal (LTG);by discharge  -AS        Progress/Outcome (Dressing Goal 1, OT) goal not met  -AS           Toileting Goal 1 (OT)    Activity/Device (Toileting Goal 1, OT) toileting skills, all  -AS        [place] Level/Cues Needed (Toileting Goal 1, OT) minimum assist (75% or more patient effort)  -AS        Time Frame (Toileting Goal 1, OT) long term goal (LTG);by discharge  -AS        Progress/Outcome (Toileting Goal 1, OT) goal not met  -AS           Strength Goal 1 (OT)    Strength Goal 1 (OT) Pt will increase BUE strenth at least 1/2 grade level to benefit ADLs and functional transfers.   -AS        Time Frame (Strength Goal 1, OT) long term goal (LTG);by discharge  -AS        Progress/Outcome (Strength Goal 1, OT) goal not met  -AS           Living Environment    Home Accessibility --   unknown  -AS          User Key  (r) = Recorded By, (t) = Taken By, (c) = Cosigned By    Initials Name Effective Dates    AB Jacquie Chu, RN 10/17/16 -     Lexi Mobley RN 10/17/16 -     Giselle Marie RN 12/13/16 -     AS Debra Corado OT 05/01/18 -            Occupational Therapy Education     Title: PT OT SLP Therapies (Active)     Topic: Occupational Therapy (Active)     Point: Body mechanics (Active)     Description: Instruct learner(s) on proper positioning and spine alignment during self-care, functional mobility activities and/or exercises.   Learning Progress Summary     Learner Status Readiness Method Response Comment Documented by    Patient Active Acceptance E NR,NL role of OT, OT POC, bed mobility AS 11/08/18 5457                      User Key      Initials Effective Dates Name Provider Type Discipline    AS 05/01/18 -  Debra Corado, OT Occupational Therapist OT                  OT Recommendation and Plan  Outcome Summary/Treatment Plan (OT)  Anticipated Discharge Disposition (OT): home with 24/7 care, home with home health, skilled nursing facility  Planned Therapy Interventions (OT Eval): activity tolerance training, adaptive equipment training, BADL retraining, functional balance retraining, occupation/activity based interventions, patient/caregiver education/training, ROM/therapeutic exercise, strengthening exercise, transfer/mobility retraining  Therapy Frequency (OT Eval):  (3-5x/wk)  Plan of Care Review  Plan of Care Reviewed With: patient  Plan of Care Reviewed With: patient  Outcome Summary: OT eval completed; co-eval with PT. Pt pleasant and agreeable, however limited by lethargy and difficulty sustaining arousal. Pt performed supine<>sit with max 2 person assist. Pt able to tolerate approx 5 min EOB with static sitting balance ranging min-max A. Deferred sit<>stand and further transfers 2/2 sleepiness.  Pt would benefit from contined skilled  acute OT services to promote highest level of functioning and assist with safe d/c planning. Anticipate d/c home with 24/7 assist and  OT vs SNF. Will continue to assess with progress.          Outcome Measures     Row Name 11/08/18 1502 11/08/18 1501          How much help from another person do you currently need...    Turning from your back to your side while in flat bed without using bedrails?  -- 2  -CW     Moving from lying on back to sitting on the side of a flat bed without bedrails?  -- 2  -CW     Moving to and from a bed to a chair (including a wheelchair)?  -- 2  -CW     Standing up from a chair using your arms (e.g., wheelchair, bedside chair)?  -- 2  -CW     Climbing 3-5 steps with a railing?  -- 1  -CW     To walk in hospital room?  -- 1  -CW     AM-PAC 6 Clicks Score  -- 10  -CW        How  much help from another is currently needed...    Putting on and taking off regular lower body clothing? 2  -AS  --     Bathing (including washing, rinsing, and drying) 2  -AS  --     Toileting (which includes using toilet bed pan or urinal) 2  -AS  --     Putting on and taking off regular upper body clothing 2  -AS  --     Taking care of personal grooming (such as brushing teeth) 3  -AS  --     Eating meals 3  -AS  --     Score 14  -AS  --        Functional Assessment    Outcome Measure Options AM-PAC 6 Clicks Daily Activity (OT)  -AS AM-PAC 6 Clicks Basic Mobility (PT)  -CW       User Key  (r) = Recorded By, (t) = Taken By, (c) = Cosigned By    Initials Name Provider Type    AS Debra Corado OT Occupational Therapist    CW Kristie Alvarez, PT Physical Therapist          Time Calculation:         Time Calculation- OT     Row Name 11/08/18 1639             Time Calculation- OT    OT Start Time 1502  -AS      OT Stop Time 1523  -AS      OT Time Calculation (min) 21 min  -AS      OT Received On 11/08/18  -AS      OT Goal Re-Cert Due Date 11/21/18  -AS        User Key  (r) = Recorded By, (t) = Taken By, (c) = Cosigned By    Initials Name Provider Type    AS Debra Corado OT Occupational Therapist        Therapy Suggested Charges     Code   Minutes Charges    None           Therapy Charges for Today     Code Description Service Date Service Provider Modifiers Qty    [id number]  OT SELFCARE CURRENT 11/8/2018 Debra Corado OT GO CK 1    [id number]  OT SELFCARE PROJECTED 11/8/2018 Debra Corado OT GO CJ 1    [id number]  OT EVAL MOD COMPLEXITY 1 11/8/2018 Debra Corado OT GO 1          OT G-codes  OT Professional Judgement Used?: Yes  OT Functional Scales Options: AM-PAC 6 Clicks Daily Activity (OT)  Score: 14  Functional Limitation: Self care  Self Care Current Status (): At least 40 percent but less than 60 percent impaired, limited or restricted  Self Care Goal Status (): At  least 20 percent but less than 40 percent impaired, limited or restricted    Debra Corado, OT  11/8/2018

## 2018-11-08 NOTE — PROGRESS NOTES
[facility] Medicine Services  INPATIENT PROGRESS NOTE    Length of Stay: 0  Date of Admission: 11/5/2018  Primary Care Physician: Terrell Arzate MD    Subjective   Chief Complaint: No complaints    HPI:     11/8/2018:  Due to worsening fatigue and drop in hemoglobin (7.2), two units of PRBC given today.  I spoke with the patient's daughter and received consent for blood.       88 year old  male with PMH of tricuspid regurgitation, peripheral vascular disease, HLD, pulmonary hypertension, HTN, CKD stage III and HFpEF that presented to [facility] on 11/5/2018 secondary to altered mental status.  The patient was found to have a Klebsiella UTI and started on Rocephin.  He has a chronic suprapubic catheter that was changed on 11/6/2018 by urology.       Review of Systems   Constitutional: Positive for fatigue.      All pertinent negatives and positives are as above. All other systems have been reviewed and are negative unless otherwise stated.     Objective    Temp:  [96.3 °F (35.7 °C)-99.7 °F (37.6 °C)] 96.9 °F (36.1 °C)  Heart Rate:  [] 70  Resp:  [16-18] 18  BP: (105-153)/(44-76) 105/44    Physical Exam   Constitutional: He is oriented to person, place, and time. He appears well-developed and well-nourished.   HENT:   Head: Normocephalic and atraumatic.   Eyes: Pupils are equal, round, and reactive to light. EOM are normal.   Neck: Normal range of motion. Neck supple.   Cardiovascular: Normal rate and regular rhythm.    Pulmonary/Chest: Effort normal and breath sounds normal.   Abdominal: Soft. Bowel sounds are normal.   Musculoskeletal: Normal range of motion.   Neurological: He is alert and oriented to person, place, and time.   Skin: Skin is warm and dry.   Psychiatric: He has a normal mood and affect.     Results Review:  I have reviewed the labs, radiology results, and diagnostic studies.    Laboratory Data:     Results from last 7 days  Lab Units 11/08/18  0518  11/07/18  0544 11/06/18  0534   SODIUM mmol/L 141 140 140   POTASSIUM mmol/L 3.6 3.7 4.0   CHLORIDE mmol/L 104 105 105   CO2 mmol/L 30.0 27.0 27.0   BUN mg/dL 18 17 18   CREATININE mg/dL 1.14 1.17 1.04   GLUCOSE mg/dL 96 103* 86   CALCIUM mg/dL 8.7 8.9 8.6   BILIRUBIN mg/dL 0.4 0.4 0.6   ALK PHOS U/L 70 79 77   ALT (SGPT) U/L 18* 20* 21   AST (SGOT) U/L 20 20 18   ANION GAP mmol/L 7.0 8.0 8.0     Estimated Creatinine Clearance: 48.5 mL/min (by C-G formula based on SCr of 1.14 mg/dL).    Results from last 7 days  Lab Units 11/05/18  1733   MAGNESIUM mg/dL 2.0           Results from last 7 days  Lab Units 11/08/18  0518 11/07/18  0544 11/06/18  0534 11/05/18  1733   WBC 10*3/mm3 5.36 6.83 6.87 7.73   HEMOGLOBIN g/dL 7.2* 7.8* 7.2* 8.9*   HEMATOCRIT % 22.9* 25.6* 23.3* 28.4*   PLATELETS 10*3/mm3 228 250 220 229           Culture Data:   Blood Culture   Date Value Ref Range Status   11/05/2018 No growth at 2 days  Preliminary   11/05/2018 No growth at 2 days  Preliminary     Urine Culture   Date Value Ref Range Status   11/05/2018 >100,000 CFU/mL Klebsiella oxytoca (A)  Final     No results found for: RESPCX  No results found for: WOUNDCX  No results found for: STOOLCX  No components found for: BODYFLD    Radiology Data:   Imaging Results (last 24 hours)     ** No results found for the last 24 hours. **          I have reviewed the patient's current medications.     Assessment/Plan     Active Hospital Problems    Diagnosis Date Noted   • PVD (peripheral vascular disease) with claudication (CMS/[facility]) [I73.9] 10/23/2018     Added automatically from request for surgery [id number]     • Acute cystitis with hematuria [N30.01] 09/08/2018   • Tricuspid regurgitation [I07.1] 07/16/2018   • Pulmonary hypertension (CMS/[facility]) [I27.20] 07/16/2018   • CKD (chronic kidney disease) stage 3, GFR 30-59 ml/min (CMS/[facility]) [N18.3] 07/16/2018   • (HFpEF) heart failure with preserved ejection fraction (CMS/[facility]) [I50.30] 05/21/2018   • Pure  hypercholesterolemia [E78.00]    • Hypertensive disorder [I10]        Plan:    1.  Acute cystitis: Klebsiella oxytoca.  Continue Rocephin.  2.  Anemia:  Oral iron started.  2 units of PRBC given.   3.  CKD stage III: Appear to be doing well continue to monitor.  4.  Hypertension,stable: Continue current medications.  5.  Hyperlipidemia: Statin.  6.  Heart failure with preserved ejection fraction: Patient does not appear to be in heart failure at this point we'll continue monitor.  7.  Deconditioning PT/OT.    Case management working with APS and family about appropriate discharge.     Discharge Planning: I expect patient to be discharged to home in 1-2 days.      This document has been electronically signed by JUSTIN David on November 8, 2018 1:05 PM

## 2018-11-08 NOTE — THERAPY EVALUATION
Acute Care - Physical Therapy Initial Evaluation  [facility]     Patient Name: Ravi Park  : 1930  MRN: [id number]  Today's Date: 2018   Onset of Illness/Injury or Date of Surgery: 18  Date of Referral to PT: 18  Referring Physician: Troy Laura      Admit Date: 2018    Visit Dx:     ICD-10-CM ICD-9-CM   1. Acute cystitis with hematuria N30.01 595.0   2. Altered mental status, unspecified altered mental status type R41.82 780.97   3. Impaired functional mobility and endurance Z74.09 V49.89     Patient Active Problem List   Diagnosis   • Peripheral arterial disease (CMS/HCC)   • Chronic atrial fibrillation (CMS/HCC)   • Backache   • Lumbar radiculopathy   • Edema   • Acute congestive heart failure (CMS/HCC)   • Acute blood loss anemia   • Acute on chronic diastolic heart failure (CMS/HCC)   • YESENIA (acute kidney injury) (CMS/HCC)   • (HFpEF) heart failure with preserved ejection fraction (CMS/HCC)   • Urinary retention   • Personal history of tobacco use, presenting hazards to health   • Vitreous floaters   • Upper respiratory infection   • Sprain of shoulder and upper arm   • Shoulder pain   • Serous otitis media   • Sensorineural hearing loss   • Pure hypercholesterolemia   • Pseudophakia   • Prostatitis   • Pain in wrist   • Pain in thumb joint with movement   • Pain in joint involving right lower leg   • Pain in eye   • Otorrhea   • Open wound of toe   • Open wound of lower leg with complication   • Open wound of lower leg   • Open wound of lesser toe of left foot without damage to nail   • Olecranon bursitis   • Need for prophylactic vaccination and inoculation against influenza   • Need for immunization against influenza   • Multiple joint pain   • Malaise and fatigue   • Keratoconjunctivitis sicca (CMS/HCC)   • Insect bite   • Impacted cerumen   • Hypertensive disorder   • History of colonoscopy   • History of colon polyps   • History of artificial eye lens   • Herpes  zoster   • Gout   • Exposure to communicable disease   • Dog bite of hand   • Disorder of sacroiliac joint   • Decreased platelet count (CMS/HCC)   • Cough   • Contusion of thumb   • Common cold   • Cervical arthritis   • Cerebrovascular accident (CMS/HCC)   • Cellulitis of lower leg   • Cataract   • Benign prostatic hyperplasia   • Atrial fibrillation (CMS/HCC)   • Astigmatism   • Allergic rhinitis due to pollen   • Allergic rhinitis   • Adverse reaction to drug   • Acute confusion   • Acute bronchitis   • Abnormal gait   • Urinary tract infection associated with indwelling urethral catheter (CMS/HCC)   • Hydronephrosis   • Nephrolithiasis   • Calculus of kidney   • Sepsis (CMS/HCC)   • Pulmonary hypertension (CMS/HCC)   • Tricuspid regurgitation   • CKD (chronic kidney disease) stage 3, GFR 30-59 ml/min (CMS/HCC)   • ESBL (extended spectrum beta-lactamase) producing bacteria infection   • Urinary tract infection associated with catheterization of urinary tract (CMS/HCC)   • Toxic encephalopathy   • Demand ischemia (CMS/HCC)   • Acute renal failure with tubular necrosis (CMS/HCC)   • Chronic atrial fibrillation (CMS/HCC)   • Acute cystitis with hematuria   • Sepsis (CMS/HCC)   • Decubitus ulcer of left heel, unstageable (CMS/HCC)   • Acute renal failure (CMS/HCC)   • Pneumonia due to infectious organism   • Non-healing wound of left heel   • PVD (peripheral vascular disease) with claudication (CMS/HCC)     Past Medical History:   Diagnosis Date   • Benign prostatic hyperplasia    • Cerebrovascular accident (CMS/HCC)    • Chronic anemia    • Chronic atrial fibrillation (CMS/HCC)    • CKD (chronic kidney disease) stage 3, GFR 30-59 ml/min (CMS/HCC)    • GERD (gastroesophageal reflux disease)    • Gout    • Hydronephrosis    • Hypercholesterolemia    • Hypertension    • Nephrolithiasis    • Peripheral arterial disease (CMS/HCC)    • Pulmonary hypertension (CMS/HCC)    • Tricuspid regurgitation    • Urinary retention   "    Past Surgical History:   Procedure Laterality Date   • CARDIAC CATHETERIZATION     • CYSTOSCOPY Left 9/13/2018    Procedure: CYSTOSCOPY. LEFT J-STENT REMOVAL, PLACEMENT SUPRAPUBIC CATHETER;  Surgeon: Stephen Serrano MD;  Location: [facility];  Service: Urology   • CYSTOSCOPY, URETEROSCOPY, RETROGRADE PYELOGRAM, STENT INSERTION Left 5/27/2018   • CYSTOSCOPY, URETEROSCOPY, RETROGRADE PYELOGRAM, STENT INSERTION Left 8/22/2018    Procedure: CYSTOSCOPY URETEROSCOPY RETROGRADE PYELOGRAM HOLMIUM LASER STENT INSERTION;  Surgeon: Stephen Serrano MD;  Location: [facility];  Service: Urology   • EXTRACORPOREAL SHOCKWAVE LITHOTRIPSY (ESWL), STENT INSERTION/REMOVAL Left 7/20/2018    Procedure: EXTRACORPOREAL SHOCKWAVE LITHOTRIPSY        ([facility]);  Surgeon: Stephen Serrano MD;  Location: [facility];  Service: Urology   • INTERVENTIONAL RADIOLOGY PROCEDURE N/A 10/30/2018    Procedure: Abdominal Aortogram;  Surgeon: Mikey Hernández MD;  Location: [facility] CATH INVASIVE LOCATION;  Service: Cardiology   • NEPHROSTOMY          PT ASSESSMENT (last 12 hours)      Physical Therapy Evaluation     Row Name 11/08/18 1501          PT Evaluation Time/Intention    Subjective Information complains of;fatigue   Pt repeatedly states he is \"sleepy\"  -CW     Document Type evaluation  -CW     Mode of Treatment co-treatment;physical therapy;occupational therapy  -CW     Patient Effort poor  -CW     Symptoms Noted During/After Treatment fatigue  -CW     Comment Pt very lethargic and difficult to keep awake  -CW     Row Name 11/08/18 1501          General Information    Patient Profile Reviewed? yes  -CW     Onset of Illness/Injury or Date of Surgery 11/05/18  -CW     Referring Physician Dr. Romano  -CW     Patient Observations lethargic  -CW     Patient/Family Observations no family present  -CW     General Observations of Patient Pt supine with tele, catheter, IV, NC  -CW     Prior Level of Function --   difficult to assess " per pt's lethargy  -CW     Equipment Currently Used at Home --   difficult to assess per pt's lethargy  -CW     Pertinent History of Current Functional Problem Pt is an 89 y/o male who presented to ED with altered mental status and diagnosed with UTI. Pt has chronic suprapubic catheter  -CW     Existing Precautions/Restrictions fall  -CW     Limitations/Impairments hearing;safety/cognitive  -CW     Risks Reviewed patient:;LOB;nausea/vomiting;dizziness;increased discomfort;change in vital signs;lines disloged;increased drainage  -CW     Benefits Reviewed patient:;improve function;increase strength;increase balance;increase independence;decrease pain;improve skin integrity;decrease risk of DVT;increase knowledge  -CW     Barriers to Rehab previous functional deficit  -CW     Row Name 11/08/18 1501          Relationship/Environment    Primary Source of Support/Comfort --   difficult to assess per pt's lethargy  -CW     Row Name 11/08/18 1501          Cognitive Assessment/Interventions    Additional Documentation Cognitive Assessment/Intervention (Group)  -CW     Row Name 11/08/18 1501          Cognitive Assessment/Intervention- PT/OT    Affect/Mental Status (Cognitive) unable/difficult to assess  -CW     Behavioral Issues (Cognitive) unable/difficult to assess  -CW     Orientation Status (Cognition) oriented to;person;place;verbal cues/prompts needed for orientation  -CW     Follows Commands (Cognition) follows one step commands;25-49% accuracy  -CW     Cognitive Function (Cognitive) attention deficit;safety deficit  -CW     Attention Deficit (Cognitive) arousal/alertness  -CW     Safety Deficit (Cognitive) ability to follow commands;at risk behavior observed;awareness of need for assistance;impulsivity;insight into deficits/self awareness;judgment;problem solving;safety precautions follow-through/compliance;safety precautions awareness  -CW     Personal Safety Interventions supervised activity;nonskid shoes/slippers  "when out of bed;fall prevention program maintained  -     Row Name 11/08/18 1501          Safety Issues, Functional Mobility    Safety Issues Affecting Function (Mobility) ability to follow commands;at risk behavior observed;impulsivity;insight into deficits/self awareness;judgment;problem solving;safety precaution awareness;safety precautions follow-through/compliance;sequencing abilities  -     Impairments Affecting Function (Mobility) balance;cognition;coordination;postural/trunk control;range of motion (ROM);strength;shortness of breath  -[facility] Name 11/08/18 1501          Bed Mobility Assessment/Treatment    Bed Mobility Assessment/Treatment supine-sit;sit-supine  -     Supine-Sit [place] (Bed Mobility) maximum assist (25% patient effort);2 person assist  -     Sit-Supine [place] (Bed Mobility) maximum assist (25% patient effort);2 person assist  -     Bed Mobility, Safety Issues cognitive deficits limit understanding;decreased use of arms for pushing/pulling;impaired trunk control for bed mobility;decreased use of legs for bridging/pushing  -     Assistive Device (Bed Mobility) draw sheet;head of bed elevated  -[facility] Name 11/08/18 1501          Transfer Assessment/Treatment    Comment (Transfers) Pt declined stating he was \"too sleepy\"  -[facility] Name 11/08/18 1501          General ROM    GENERAL ROM COMMENTS demonstrated WFL BLE knees, hip flexion difficult to assess  -[facility] Name 11/08/18 1501          MMT (Manual Muscle Testing)    General MMT Comments Difficult to assess per pt's poor command following, demonstrated 3-/5 knee extension, 3+/5 knee flexion  -[facility] Name 11/08/18 1501          Motor Assessment/Intervention    Additional Documentation Balance (Group)  -     Row Name 11/08/18 1501          Balance    Balance static sitting balance  -[facility] Name 11/08/18 1501          Static Sitting Balance    Level of [place] (Supported Sitting, Static " "Balance) moderate assist, 50 to 74% patient effort;maximal assist, 25 to 49% patient effort;minimal assist, 75% patient effort  -CW     Sitting Position (Supported Sitting, Static Balance) sitting on edge of bed  -CW     Time Able to Maintain Position (Supported Sitting, Static Balance) 4 to 5 minutes  -CW     Comment (Supported Sitting, Static Balance) Pt kept trying to lie back down, fall to R side, stating he was \"too sleepy\", fluctuating between min to max A  -CW     Row Name             Wound 10/30/18 1715 Left heel pressure injury    Wound - Properties Group Date first assessed: 10/30/18  -AS Time first assessed: 1715  -AS Present On Admission : yes;picture taken  -AS Side: Left  -AS Location: heel  -AS Type: pressure injury  -AS Stage, Pressure Injury: unstageable  -AS    Row Name             Wound 11/06/18 0300 Left gluteal pressure injury    Wound - Properties Group Date first assessed: 11/06/18  -AB Time first assessed: 0300  -AB Present On Admission : yes;picture taken  -SR, picture taken by nightshift RN  Side: Left  -SR Location: gluteal  -SR Type: pressure injury  -SR Stage, Pressure Injury: deep tissue injury  -SR    Row Name 11/08/18 1501          Physical Therapy Clinical Impression    Date of Referral to PT 11/07/18  -CW     PT Diagnosis (PT Clinical Impression) impaired functional mobility and endurance  -CW     Functional Level at Time of Evaluation (PT Clinical Impression) max A x2 for bed mobility  -CW     Criteria for Skilled Interventions Met (PT Clinical Impression) yes  -CW     Pathology/Pathophysiology Noted (Describe Specifically for Each System) musculoskeletal;endocrine/metabolic;cardiovascular;pulmonary  -CW     Impairments Found (describe specific impairments) aerobic capacity/endurance;arousal, attention, and cognition;gait, locomotion, and balance;integumentary integrity;muscle performance  -CW     Rehab Potential (PT Clinical Summary) fair, will monitor progress closely  -CW     " Predicted Duration of Therapy (PT) until d/c from acute care or all goals met  -CW     Care Plan Review (PT) patient/other agree to care plan  -CW     Row Name 11/08/18 1501          Vital Signs    Pre Systolic BP Rehab 119  -CW     Pre Treatment Diastolic BP 56  -CW     Pretreatment Heart Rate (beats/min) 94  -CW     Pre SpO2 (%) 96  -CW     O2 Delivery Pre Treatment supplemental O2  -CW     Row Name 11/08/18 1501          Physical Therapy Goals    Bed Mobility Goal Selection (PT) bed mobility, PT goal 1  -CW     Transfer Goal Selection (PT) transfer, PT goal 1  -CW     Gait Training Goal Selection (PT) gait training, PT goal 1  -CW     Row Name 11/08/18 1501          Bed Mobility Goal 1 (PT)    Activity/Assistive Device (Bed Mobility Goal 1, PT) bed mobility activities, all  -CW     [place] Level/Cues Needed (Bed Mobility Goal 1, PT) conditional independence  -CW     Time Frame (Bed Mobility Goal 1, PT) short term goal (STG)  -CW     Progress/Outcomes (Bed Mobility Goal 1, PT) goal not met  -CW     Row Name 11/08/18 1501          Transfer Goal 1 (PT)    Activity/Assistive Device (Transfer Goal 1, PT) sit-to-stand/stand-to-sit  -CW     [place] Level/Cues Needed (Transfer Goal 1, PT) supervision required  -CW     Time Frame (Transfer Goal 1, PT) 2 - 3 days  -CW     Progress/Outcome (Transfer Goal 1, PT) goal not met  -CW     Row Name 11/08/18 1501          Gait Training Goal 1 (PT)    Activity/Assistive Device (Gait Training Goal 1, PT) walker, rolling;gait (walking locomotion);assistive device use;backward stepping;decrease asymmetrical patterns;decrease fall risk;diminish gait deviation;forward stepping;improve balance and speed;increase endurance/gait distance;increase energy conservation;maintain weight bearing status;normalize weight shifts;sidestepping;turning, left;turning, right  -CW     [place] Level (Gait Training Goal 1, PT) supervision required  -CW     Distance (Gait Goal 1, PT) 1x25 ft  "or more  -CW     Row Name 11/08/18 1501          Positioning and Restraints    Pre-Treatment Position in bed  -CW     Post Treatment Position bed  -CW     In Bed exit alarm on;encouraged to call for assist;call light within reach;supine;heels elevated;pillow between legs  -CW     Row Name 11/08/18 1501          Living Environment    Home Accessibility --   difficult to assess per pt's lethargy  -CW       User Key  (r) = Recorded By, (t) = Taken By, (c) = Cosigned By    Initials Name Provider Type    Jacquie Dukes, RN Registered Nurse    Lexi Mobley RN Registered Nurse    AS Giselle Alicea RN Registered Nurse    CW Kristie Alvarez, PT Physical Therapist              PT Recommendation and Plan  Anticipated Discharge Disposition (PT): skilled nursing facility, anticipate therapy at next level of care  Planned Therapy Interventions (PT Eval): balance training, bed mobility training, gait training, home exercise program, strengthening, stretching, transfer training, patient/family education  Therapy Frequency (PT Clinical Impression): other (see comments) (5-14x/week)  Outcome Summary/Treatment Plan (PT)  Anticipated Equipment Needs at Discharge (PT): other (see comments) (difficult to assess per pt's lethargy)  Anticipated Discharge Disposition (PT): skilled nursing facility, anticipate therapy at next level of care  Plan of Care Reviewed With: patient  Progress: no change  Outcome Summary: PT evaluation completed this date with OT co-eval. Pt is 87 y/o male who presented with altered mental status and diagnosed with UTI. Pt very lethargic and intermittent with communication/responsiveness but agreeable to phyiscal therapy evaluation. Pt was max assist x2 for supine to sit EOB, and sat EOB for ~5 minutes ranging from min -max assist for sitting balance. Pt declined attempting to stand, stating he was \"too sleepy.\" Pt was max A x2 for sit to supine. Pt encouraged to call for assist when needed and " positioned with heels in breeze and pillow between knees. Pt would benefit from skilled PT to improve functional mobility, endurance, balance, and reduce risk of falls. Discharge recommendation includes skilled nursing facility when appropriate.          Outcome Measures     Row Name 11/08/18 1501             How much help from another person do you currently need...    Turning from your back to your side while in flat bed without using bedrails? 2  -CW      Moving from lying on back to sitting on the side of a flat bed without bedrails? 2  -CW      Moving to and from a bed to a chair (including a wheelchair)? 2  -CW      Standing up from a chair using your arms (e.g., wheelchair, bedside chair)? 2  -CW      Climbing 3-5 steps with a railing? 1  -CW      To walk in hospital room? 1  -CW      AM-PAC 6 Clicks Score 10  -CW         Functional Assessment    Outcome Measure Options AM-PAC 6 Clicks Basic Mobility (PT)  -CW        User Key  (r) = Recorded By, (t) = Taken By, (c) = Cosigned By    Initials Name Provider Type    CW Kristie Alvarez PT Physical Therapist           Time Calculation:         PT Charges     Row Name 11/08/18 1549             Time Calculation    Start Time 1501  -CW      Stop Time 1522  -CW      Time Calculation (min) 21 min  -CW      PT Received On 11/08/18  -CW      PT Goal Re-Cert Due Date 11/21/18  -CW        User Key  (r) = Recorded By, (t) = Taken By, (c) = Cosigned By    Initials Name Provider Type    CW Kristie Alvarez PT Physical Therapist        Therapy Suggested Charges     Code   Minutes Charges    None           Therapy Charges for Today     Code Description Service Date Service Provider Modifiers Qty    85819669924 HC PT MOBILITY CURRENT 11/8/2018 Kristie Alvarez, PT GP, CL 1    51225538982 HC PT MOBILITY PROJECTED 11/8/2018 Kristie Alvarez, PT GP, CK 1    95384058670 HC PT EVAL MOD COMPLEXITY 1 11/8/2018 Kristie Alvarez, PT GP 1          PT G-Codes  PT Professional  Judgement Used?: Yes  Outcome Measure Options: AM-PAC 6 Clicks Basic Mobility (PT)  AM-PAC 6 Clicks Score: 10  Functional Limitation: Mobility: Walking and moving around  Mobility: Walking and Moving Around Current Status (): At least 60 percent but less than 80 percent impaired, limited or restricted  Mobility: Walking and Moving Around Goal Status (): At least 40 percent but less than 60 percent impaired, limited or restricted      Kristie Alvarez, PT  11/8/2018

## 2018-11-08 NOTE — PLAN OF CARE
"Problem: Patient Care Overview  Goal: Plan of Care Review  Outcome: Ongoing (interventions implemented as appropriate)   11/08/18 1501   Coping/Psychosocial   Plan of Care Reviewed With patient   Plan of Care Review   Progress no change   OTHER   Outcome Summary PT evaluation completed this date with OT co-dionisio. Pt is 87 y/o male who presented with altered mental status and diagnosed with UTI. Pt very lethargic and intermittent with communication/responsiveness but agreeable to phyiscal therapy evaluation. Pt was max assist x2 for supine to sit EOB, and sat EOB for ~5 minutes ranging from min -max assist for sitting balance. Pt declined attempting to stand, stating he was \"too sleepy.\" Pt was max A x2 for sit to supine. Pt encouraged to call for assist when needed and positioned with heels in breeze and pillow between knees. Pt would benefit from skilled PT to improve functional mobility, endurance, balance, and reduce risk of falls. Discharge recommendation includes skilled nursing facility when appropriate.         "

## 2018-11-08 NOTE — PLAN OF CARE
Problem: Patient Care Overview  Goal: Plan of Care Review  Outcome: Ongoing (interventions implemented as appropriate)   11/08/18 2110   Coping/Psychosocial   Plan of Care Reviewed With patient   Plan of Care Review   Progress improving   OTHER   Outcome Summary Upon swallow evaluation, pt displays difficulty with oral phase due to lack of dentition and ability to manage regular solids. Pt required change of medication administration from whole with water to placing whole in applesauce. Medication would fall to lateral sulci with pt with water alone. Recommend meds whole in pudding or applesauce.

## 2018-11-08 NOTE — PLAN OF CARE
Problem: Patient Care Overview  Goal: Plan of Care Review  Outcome: Ongoing (interventions implemented as appropriate)   11/08/18 6291   Coping/Psychosocial   Plan of Care Reviewed With patient   OTHER   Outcome Summary OT eval completed; co-eval with PT. Pt pleasant and agreeable, however limited by lethargy and difficulty sustaining arousal. Pt performed supine<>sit with max 2 person assist. Pt able to tolerate approx 5 min EOB with static sitting balance ranging min-max A. Deferred sit<>stand and further transfers 2/2 sleepiness. Pt would benefit from contined skilled acute OT services to promote highest level of functioning and assist with safe d/c planning. Anticipate d/c home with 24/7 assist and  OT vs SNF. Will continue to assess with progress.

## 2018-11-08 NOTE — THERAPY EVALUATION
Acute Care - Speech Language Pathology   Swallow Initial Evaluation [facility]     Patient Name: Ravi Park  : 1930  MRN: [id number]  Today's Date: 2018               Admit Date: 2018    Visit Dx:     ICD-10-CM ICD-9-CM   1. Acute cystitis with hematuria N30.01 595.0   2. Altered mental status, unspecified altered mental status type R41.82 780.97     Patient Active Problem List   Diagnosis   • Peripheral arterial disease (CMS/HCC)   • Chronic atrial fibrillation (CMS/HCC)   • Backache   • Lumbar radiculopathy   • Edema   • Acute congestive heart failure (CMS/HCC)   • Acute blood loss anemia   • Acute on chronic diastolic heart failure (CMS/HCC)   • YESENIA (acute kidney injury) (CMS/HCC)   • (HFpEF) heart failure with preserved ejection fraction (CMS/HCC)   • Urinary retention   • Personal history of tobacco use, presenting hazards to health   • Vitreous floaters   • Upper respiratory infection   • Sprain of shoulder and upper arm   • Shoulder pain   • Serous otitis media   • Sensorineural hearing loss   • Pure hypercholesterolemia   • Pseudophakia   • Prostatitis   • Pain in wrist   • Pain in thumb joint with movement   • Pain in joint involving right lower leg   • Pain in eye   • Otorrhea   • Open wound of toe   • Open wound of lower leg with complication   • Open wound of lower leg   • Open wound of lesser toe of left foot without damage to nail   • Olecranon bursitis   • Need for prophylactic vaccination and inoculation against influenza   • Need for immunization against influenza   • Multiple joint pain   • Malaise and fatigue   • Keratoconjunctivitis sicca (CMS/HCC)   • Insect bite   • Impacted cerumen   • Hypertensive disorder   • History of colonoscopy   • History of colon polyps   • History of artificial eye lens   • Herpes zoster   • Gout   • Exposure to communicable disease   • Dog bite of hand   • Disorder of sacroiliac joint   • Decreased platelet count (CMS/HCC)   • Cough   •  Contusion of thumb   • Common cold   • Cervical arthritis   • Cerebrovascular accident (CMS/HCC)   • Cellulitis of lower leg   • Cataract   • Benign prostatic hyperplasia   • Atrial fibrillation (CMS/HCC)   • Astigmatism   • Allergic rhinitis due to pollen   • Allergic rhinitis   • Adverse reaction to drug   • Acute confusion   • Acute bronchitis   • Abnormal gait   • Urinary tract infection associated with indwelling urethral catheter (CMS/HCC)   • Hydronephrosis   • Nephrolithiasis   • Calculus of kidney   • Sepsis (CMS/HCC)   • Pulmonary hypertension (CMS/HCC)   • Tricuspid regurgitation   • CKD (chronic kidney disease) stage 3, GFR 30-59 ml/min (CMS/HCC)   • ESBL (extended spectrum beta-lactamase) producing bacteria infection   • Urinary tract infection associated with catheterization of urinary tract (CMS/HCC)   • Toxic encephalopathy   • Demand ischemia (CMS/HCC)   • Acute renal failure with tubular necrosis (CMS/HCC)   • Chronic atrial fibrillation (CMS/HCC)   • Acute cystitis with hematuria   • Sepsis (CMS/HCC)   • Decubitus ulcer of left heel, unstageable (CMS/HCC)   • Acute renal failure (CMS/HCC)   • Pneumonia due to infectious organism   • Non-healing wound of left heel   • PVD (peripheral vascular disease) with claudication (CMS/HCC)     Past Medical History:   Diagnosis Date   • Benign prostatic hyperplasia    • Cerebrovascular accident (CMS/HCC)    • Chronic anemia    • Chronic atrial fibrillation (CMS/HCC)    • CKD (chronic kidney disease) stage 3, GFR 30-59 ml/min (CMS/HCC)    • GERD (gastroesophageal reflux disease)    • Gout    • Hydronephrosis    • Hypercholesterolemia    • Hypertension    • Nephrolithiasis    • Peripheral arterial disease (CMS/HCC)    • Pulmonary hypertension (CMS/HCC)    • Tricuspid regurgitation    • Urinary retention      Past Surgical History:   Procedure Laterality Date   • CARDIAC CATHETERIZATION     • CYSTOSCOPY Left 9/13/2018    Procedure: CYSTOSCOPY. LEFT J-STENT  REMOVAL, PLACEMENT SUPRAPUBIC CATHETER;  Surgeon: Stephen Serrano MD;  Location: [facility] OR;  Service: Urology   • CYSTOSCOPY, URETEROSCOPY, RETROGRADE PYELOGRAM, STENT INSERTION Left 5/27/2018   • CYSTOSCOPY, URETEROSCOPY, RETROGRADE PYELOGRAM, STENT INSERTION Left 8/22/2018    Procedure: CYSTOSCOPY URETEROSCOPY RETROGRADE PYELOGRAM HOLMIUM LASER STENT INSERTION;  Surgeon: Stephen Serrano MD;  Location: [facility] OR;  Service: Urology   • EXTRACORPOREAL SHOCKWAVE LITHOTRIPSY (ESWL), STENT INSERTION/REMOVAL Left 7/20/2018    Procedure: EXTRACORPOREAL SHOCKWAVE LITHOTRIPSY        ([facility]);  Surgeon: Stephen Serrano MD;  Location: [facility] OR;  Service: Urology   • INTERVENTIONAL RADIOLOGY PROCEDURE N/A 10/30/2018    Procedure: Abdominal Aortogram;  Surgeon: Mikey Hernández MD;  Location: [facility] CATH INVASIVE LOCATION;  Service: Cardiology   • NEPHROSTOMY            SWALLOW EVALUATION (last 72 hours)      [facility] Adult Swallow Evaluation     Row Name 11/08/18 0959                   Rehab Evaluation    Document Type evaluation  -EK        Subjective Information no complaints  -EK        Patient Observations cooperative;lethargic  -EK        Patient Effort good  -EK           General Information    Patient Profile Reviewed yes  -EK        Current Method of Nutrition soft textures;ground;thin liquids   pt does not have upper dentures  -EK        Precautions/Limitations, Vision WFL  -EK        Precautions/Limitations, Hearing hearing impairment, bilaterally  -EK        Prior Level of Function-Communication WFL  -EK        Prior Level of Function-Swallowing no diet consistency restrictions  -EK        Plans/Goals Discussed with patient  -EK           Pain Assessment    Additional Documentation Pain Scale: Numbers Pre/Post-Treatment (Group)  -EK           Pain Scale: Numbers Pre/Post-Treatment    Pain Scale: Numbers, Pretreatment 0/10 - no pain  -EK        Pain Scale: Numbers, Post-Treatment 0/10 - no pain   -EK           Oral Motor and Function    Dentition Assessment natural, present and adequate   natural bottom, upper dentures but not present  -EK        Secretion Management WNL/WFL  -EK        Mucosal Quality moist, healthy  -EK        Volitional Swallow WFL  -EK           General Eating/Swallowing Observations    Respiratory Support Currently in Use nasal cannula  -EK        Eating/Swallowing Skills self-fed  -EK        Positioning During Eating upright 90 degree  -EK        Utensils Used spoon;cup;straw  -EK        Consistencies Trialed regular textures;pureed;thin liquids  -EK           Clinical Swallow Eval    Oral Prep Phase impaired  -EK        Oral Transit WFL  -EK        Oral Residue WFL  -EK        Pharyngeal Phase WFL  -EK        Clinical Swallow Evaluation Summary Upon swallow evaluation, pt displays difficulty with oral phase due to lack of dentition and ability to manage regular solids. Pt required change of medication administration from whole with water to placing whole in applesauce. Medication would fall to lateral sulci with pt with water alone. Recommend meds whole in pudding or applesauce.   -EK           Oral Prep Concerns    Oral Prep Concerns prolonged mastication;inefficient mastication  -EK        Prolonged Mastication regular consistencies  -EK        Inefficient Mastication regular consistencies  -EK           Clinical Impression    SLP Swallowing Diagnosis mild;oral dysfunction  -EK        Functional Impact risk of aspiration/pneumonia  -EK        Rehab Potential/Prognosis, Swallowing good, to achieve stated therapy goals  -EK        Swallow Criteria for Skilled Therapeutic Interventions Met demonstrates skilled criteria  -EK           Recommendations    Therapy Frequency (Swallow) other (see comments)   3-5 times per week  -EK        Predicted Duration Therapy Intervention (Days) until discharge  -EK        SLP Diet Recommendation soft textures;ground;thin liquids  -EK         Recommended Precautions and Strategies upright posture during/after eating;small bites of food and sips of liquid;no straw;alternate between small bites of food and sips of liquid  -EK        SLP Rec. for Method of Medication Administration meds whole;with pudding or applesauce  -EK        Monitor for Signs of Aspiration yes  -EK           Swallow Goals (SLP)    Oral Nutrition/Hydration Goal Selection (SLP) oral nutrition/hydration, SLP goal 1  -EK           Oral Nutrition/Hydration Goal 1 (SLP)    Oral Nutrition/Hydration Goal 1, SLP Pt to tolerate least restrictive diet with no s/s of aspiration for adequate nutrition and hydration.   -EK        Time Frame (Oral Nutrition/Hydration Goal 1, SLP) by discharge  -EK          User Key  (r) = Recorded By, (t) = Taken By, (c) = Cosigned By    Initials Name Effective Dates    Cece Cleaning CCC-SLP 06/08/18 -         EDUCATION  The patient has been educated in the following areas:   Dysphagia (Swallowing Impairment).    SLP Recommendation and Plan  SLP Swallowing Diagnosis: mild, oral dysfunction  SLP Diet Recommendation: soft textures, ground, thin liquids  Recommended Precautions and Strategies: upright posture during/after eating, small bites of food and sips of liquid, no straw, alternate between small bites of food and sips of liquid     Monitor for Signs of Aspiration: yes     Swallow Criteria for Skilled Therapeutic Interventions Met: demonstrates skilled criteria     Rehab Potential/Prognosis, Swallowing: good, to achieve stated therapy goals  Therapy Frequency (Swallow): other (see comments) (3-5 times per week)  Predicted Duration Therapy Intervention (Days): until discharge       Plan of Care Reviewed With: patient  Plan of Care Review  Plan of Care Reviewed With: patient  Progress: improving  Outcome Summary:  Upon swallow evaluation, pt displays difficulty with oral phase due to lack of dentition and ability to manage regular solids. Pt  required change of medication administration from whole with water to placing whole in applesauce. Medication would fall to lateral sulci with pt with water alone. Recommend meds whole in pudding or applesauce.           SLP GOALS     Row Name 11/08/18 0959             Oral Nutrition/Hydration Goal 1 (SLP)    Oral Nutrition/Hydration Goal 1, SLP Pt to tolerate least restrictive diet with no s/s of aspiration for adequate nutrition and hydration.   -EK      Time Frame (Oral Nutrition/Hydration Goal 1, SLP) by discharge  -EK        User Key  (r) = Recorded By, (t) = Taken By, (c) = Cosigned By    Initials Name Provider Type    Cece Cleaning CCC-SLP Speech and Language Pathologist               Time Calculation:         Time Calculation- SLP     Row Name 11/08/18 1347             Time Calculation- SLP    SLP Start Time 0959  -EK      SLP Stop Time 1015  -EK      SLP Time Calculation (min) 16 min  -EK      SLP Received On 11/08/18  -EK      SLP Goal Re-Cert Due Date 11/21/18  -EK        User Key  (r) = Recorded By, (t) = Taken By, (c) = Cosigned By    Initials Name Provider Type    Cece Cleaning CCC-SLP Speech and Language Pathologist          Therapy Charges for Today     Code Description Service Date Service Provider Modifiers Qty    90578225283 HC ST SWALLOWING CURRENT STATUS 11/8/2018 Cece Arnold CCC-SLP GN, CI 1    59296646810 HC ST SWALLOWING PROJECTED 11/8/2018 Cece Arnold CCC-SLP GN, CH 1          SLP G-Codes  Functional Limitations: Swallowing  Swallow Current Status (): At least 1 percent but less than 20 percent impaired, limited or restricted  Swallow Goal Status (): 0 percent impaired, limited or restricted    ALISSON Otero  11/8/2018

## 2018-11-08 NOTE — SIGNIFICANT NOTE
11/08/18 1011   Rehab Time/Intention   Evaluation Not Performed other (see comments)  (PT/OT eval attempted. Per RN, pt is scheduled blood transfusion as hemoglobin is 7.2. Will follow up in PM. )   Rehab Treatment   Discipline physical therapist

## 2018-11-08 NOTE — PROGRESS NOTES
"   LOS: 0 days   Patient Care Team:  Terrell Arzate MD as PCP - General  Terrell Arzate MD as PCP - Claims Attributed  Bethany Loco RN as Care Coordinator (Population Health)    Subjective     Subjective:  Symptoms:  Stable.  (Some generalized body aches today. Urine yellow with some sediment. No problems at cystostomy site. ).    Diet:  No nausea or vomiting.    Activity level: Impaired due to weakness.    Pain:  Pain is requiring pain medication.        History taken from: patient chart    Objective     Vital Signs  Temp:  [96.7 °F (35.9 °C)-98.3 °F (36.8 °C)] 98.3 °F (36.8 °C)  Heart Rate:  [68-99] 82  Resp:  [16-18] 16  BP: (106-130)/(50-68) 108/50    Objective:  General Appearance:  In no acute distress.    Vital signs: (most recent): Blood pressure 108/50, pulse 85, temperature 98.3 °F (36.8 °C), temperature source Oral, resp. rate 16, height 182.9 cm (72\"), weight 76.4 kg (168 lb 8 oz), SpO2 93 %.  Vital signs are normal.  No fever.    Output: Producing urine.    HEENT: Normal HEENT exam.    Lungs:  Normal effort and normal respiratory rate.  Breath sounds clear to auscultation.  He is not in respiratory distress.    Heart: Normal rate.  Regular rhythm.  S1 normal and S2 normal.  No murmur.   Chest: Symmetric chest wall expansion.   Abdomen: Abdomen is soft and non-distended.  Bowel sounds are normal.   There is no abdominal tenderness.     Extremities: Normal range of motion.    Pulses: Distal pulses are intact.    Neurological: Patient is alert.  GCS score is 15.    Pupils:  Pupils are equal, round, and reactive to light.    Skin:  Warm, dry and pale.  No rash, ecchymosis or cyanosis.             Results Review:    Lab Results (last 24 hours)     Procedure Component Value Units Date/Time    Iron Profile [[id number]]  (Abnormal) Collected:  11/07/18 1400    Specimen:  Blood Updated:  11/07/18 1507     Iron <10 (L) mcg/dL      TIBC 256 (L) mcg/dL      Iron Saturation -- %      Comment: Unable to " calculate.       Vitamin B12 [[id number]]  (Abnormal) Collected:  11/05/18 1733    Specimen:  Blood Updated:  11/07/18 1347     Vitamin B-12 933 (H) pg/mL     Folate [[id number]]  (Normal) Collected:  11/05/18 1733    Specimen:  Blood Updated:  11/07/18 1347     Folate >20.00 ng/mL     Ferritin [[id number]]  (Normal) Collected:  11/05/18 1733    Specimen:  Blood Updated:  11/07/18 1317     Ferritin 208.00 ng/mL     Urine Culture - Urine, [[id number]]  (Abnormal)  (Susceptibility) Collected:  11/05/18 1643    Specimen:  Urine from Urine, Catheter Updated:  11/07/18 0647     Urine Culture >100,000 CFU/mL Klebsiella oxytoca (A)    Susceptibility      Klebsiella oxytoca     ANYA     Amoxicillin + Clavulanate <=8/4 ug/ml Susceptible     Ampicillin >16 ug/ml Resistant     Ampicillin + Sulbactam 16/8 ug/ml Intermediate     Cefazolin 16 ug/ml Intermediate     Cefepime <=8 ug/ml Susceptible     Cefoxitin <=8 ug/ml Susceptible     Ceftazidime <=1 ug/ml Susceptible     Ceftriaxone <=8 ug/ml Susceptible     Cefuroxime sodium <=4 ug/ml Susceptible     Levofloxacin <=2 ug/ml Susceptible     Nitrofurantoin <=32 ug/ml Susceptible     Piperacillin + Tazobactam <=16 ug/ml Susceptible     Tetracycline <=4 ug/ml Susceptible     Trimethoprim + Sulfamethoxazole <=2/38 ug/ml Susceptible                    Comprehensive Metabolic Panel [[id number]]  (Abnormal) Collected:  11/07/18 0544    Specimen:  Blood Updated:  11/07/18 0632     Glucose 103 (H) mg/dL      BUN 17 mg/dL      Creatinine 1.17 mg/dL      Sodium 140 mmol/L      Potassium 3.7 mmol/L      Chloride 105 mmol/L      CO2 27.0 mmol/L      Calcium 8.9 mg/dL      Total Protein 6.8 g/dL      Albumin 2.90 (L) g/dL      ALT (SGPT) 20 (L) U/L      AST (SGOT) 20 U/L      Alkaline Phosphatase 79 U/L      Total Bilirubin 0.4 mg/dL      eGFR Non African Amer 59 mL/min/1.73      Globulin 3.9 (H) gm/dL      A/G Ratio 0.7 (L) g/dL      BUN/Creatinine Ratio 14.5     Anion Gap 8.0 mmol/L     Narrative:        The MDRD GFR formula is only valid for adults with stable renal function between ages 18 and 70.    CBC & Differential [[id number]] Collected:  11/07/18 0544    Specimen:  Blood Updated:  11/07/18 0615    Narrative:       The following orders were created for panel order CBC & Differential.  Procedure                               Abnormality         Status                     ---------                               -----------         ------                     CBC Auto Differential[[id number]]        Abnormal            Final result                 Please view results for these tests on the individual orders.    CBC Auto Differential [[id number]]  (Abnormal) Collected:  11/07/18 0544    Specimen:  Blood Updated:  11/07/18 0615     WBC 6.83 10*3/mm3      RBC 2.78 (L) 10*6/mm3      Hemoglobin 7.8 (L) g/dL      Hematocrit 25.6 (L) %      MCV 92.1 fL      MCH 28.1 pg      MCHC 30.5 (L) g/dL      RDW 16.9 (H) %      RDW-SD 56.4 (H) fl      MPV 8.8 fL      Platelets 250 10*3/mm3      Neutrophil % 67.0 %      Lymphocyte % 15.8 %      Monocyte % 13.3 (H) %      Eosinophil % 3.7 %      Basophil % 0.1 %      Immature Grans % 0.1 %      Neutrophils, Absolute 4.57 10*3/mm3      Lymphocytes, Absolute 1.08 10*3/mm3      Monocytes, Absolute 0.91 (H) 10*3/mm3      Eosinophils, Absolute 0.25 10*3/mm3      Basophils, Absolute 0.01 10*3/mm3      Immature Grans, Absolute 0.01 10*3/mm3     Blood Culture - Blood, [[id number]]  (Normal) Collected:  11/05/18 1849    Specimen:  Blood from Arm, Left Updated:  11/06/18 1945     Blood Culture No growth at 24 hours    Blood Culture - Blood, [[id number]]  (Normal) Collected:  11/05/18 1936    Specimen:  Blood from Hand, Left Updated:  11/06/18 1945     Blood Culture No growth at 24 hours         Imaging Results (last 24 hours)     ** No results found for the last 24 hours. **           I reviewed the patient's new clinical results.  I reviewed the patient's new imaging results and agree with  the interpretation.  I reviewed the patient's other test results and agree with the interpretation      Assessment/Plan       (HFpEF) heart failure with preserved ejection fraction (CMS/HCC)    Pure hypercholesterolemia    Hypertensive disorder    Pulmonary hypertension (CMS/HCC)    Tricuspid regurgitation    CKD (chronic kidney disease) stage 3, GFR 30-59 ml/min (CMS/HCC)    Acute cystitis with hematuria    PVD (peripheral vascular disease) with claudication (CMS/HCC)      Assessment:    Condition: In stable condition.       1. Obstructive BPH causing neurogenic bladder, cystoscopy with SP tube placement in Sept due for SP tube change  -Proscar, Flomax  -Chronic SP tube, changed 11/6/18     2. UTI cystitis, complicated, history of ESBL UTI  -Urine culture 11/5/18 Klebsiella oxytoca  -Antibiotic day #3, Rocephin    Plan:   Continue Rocephin.   Monitor for signs of shorty UTI.     JUSTIN Selby  11/07/18  6:09 PM

## 2018-11-08 NOTE — SIGNIFICANT NOTE
11/08/18 1009   Rehab Time/Intention   Evaluation Not Performed other (see comments)  (OT eval attempted; Pt hmg 7.2 and RN reported plans for blood transfusion this a.m. OT will f/u in p.m. or as time permits)   Rehab Treatment   Discipline occupational therapist

## 2018-11-09 NOTE — PLAN OF CARE
Problem: Patient Care Overview  Goal: Plan of Care Review  Outcome: Ongoing (interventions implemented as appropriate)   11/09/18 0119   Coping/Psychosocial   Plan of Care Reviewed With patient   Plan of Care Review   Progress no change   OTHER   Outcome Summary At the beginning of shift, patient was too confused and drowsy to take PO meds.Patient complaining of leg pain. PRN percocet given and effective. Patient currently resting with no complaints. VSS. Will continue to monitor.      Goal: Individualization and Mutuality  Outcome: Ongoing (interventions implemented as appropriate)    Goal: Discharge Needs Assessment  Outcome: Ongoing (interventions implemented as appropriate)    Goal: Interprofessional Rounds/Family Conf  Outcome: Ongoing (interventions implemented as appropriate)      Problem: Infection, Risk/Actual (Adult)  Goal: Infection Prevention/Resolution  Outcome: Ongoing (interventions implemented as appropriate)      Problem: Pain, Acute (Adult)  Goal: Acceptable Pain Control/Comfort Level  Outcome: Ongoing (interventions implemented as appropriate)      Problem: Wound (Includes Pressure Injury) (Adult)  Goal: Signs and Symptoms of Listed Potential Problems Will be Absent, Minimized or Managed (Wound)  Outcome: Ongoing (interventions implemented as appropriate)      Problem: Urinary Tract Infection (Adult)  Goal: Signs and Symptoms of Listed Potential Problems Will be Absent, Minimized or Managed (Urinary Tract Infection)  Outcome: Ongoing (interventions implemented as appropriate)      Problem: Cardiac Output Decreased (Adult)  Goal: Identify Related Risk Factors and Signs and Symptoms  Outcome: Ongoing (interventions implemented as appropriate)    Goal: Effective Tissue Perfusion  Outcome: Ongoing (interventions implemented as appropriate)

## 2018-11-09 NOTE — CONSULTS
Adult Nutrition  Assessment    Patient Name:  Ravi Park  YOB: 1930  MRN: [id number]  Admit Date:  11/5/2018    Assessment Date:  11/9/2018    Comments:  Pt very drowsy today, family concerned that he has not been eating.  Nursing reports pt ate about half of his breakfast.  PO intakes usually 50-75% unless pt too drowsy to eat.  SLP evaluated pt and mechanical soft diet initiated.  Good intake of milk, will continue to send on all trays.  Wt stable, pt on Bumex daily.  RD will monitor.          Reason for Assessment     Row Name 11/09/18 1305          Reason for Assessment    Reason For Assessment follow-up protocol               Nutrition/Diet History     Row Name 11/09/18 1305          Nutrition/Diet History    Typical Food/Fluid Intake Nursing reports pt ate about half of breakfast.  Pt very drowsy at lunch and did not eat much.  Family concerned.     Supplemental Drinks/Foods/Additives Good intake of whole milkl               Labs/Tests/Procedures/Meds     Row Name 11/09/18 1307          Medications    Pertinent Medications Reviewed reviewed, pertinent     Pertinent Medications Comments bumex             Physical Findings     Row Name 11/09/18 1309          Physical Findings    Oral/Mouth Cavity poor dentition     Skin pressure injury               Nutrition Prescription Ordered     Row Name 11/09/18 1309          Nutrition Prescription PO    Current PO Diet Soft Texture     Texture Ground     Fluid Consistency Thin     Common Modifiers Cardiac             Evaluation of Received Nutrient/Fluid Intake     Row Name 11/09/18 1310          PO Evaluation    Number of Days PO Intake Evaluated 3 days     % PO Intake 50-75; 0x1; 25 x 1             Electronically signed by:  Pau Edwards RD  11/09/18 1:10 PM

## 2018-11-09 NOTE — PROGRESS NOTES
[facility] Medicine Services  INPATIENT PROGRESS NOTE    Length of Stay: 0  Date of Admission: 11/5/2018  Primary Care Physician: Terrell Arzate MD    Subjective   Chief Complaint: No complaints    HPI:     11/9/2018:  The patient continues to decline and requires feeding by nursing staff currently.  Speech and swallow evaluation recommends all medication be presented in [place] or applesauce.    11/8/2018:  Due to worsening fatigue and drop in hemoglobin (7.2), two units of PRBC given today.  I spoke with the patient's daughter and received consent for blood.       88 year old  male with PMH of tricuspid regurgitation, peripheral vascular disease, HLD, pulmonary hypertension, HTN, CKD stage III and HFpEF that presented to [facility] on 11/5/2018 secondary to altered mental status.  The patient was found to have a Klebsiella UTI and started on Rocephin.  He has a chronic suprapubic catheter that was changed on 11/6/2018 by urology.       Review of Systems   Constitutional: Positive for fatigue.      All pertinent negatives and positives are as above. All other systems have been reviewed and are negative unless otherwise stated.     Objective    Temp:  [96.4 °F (35.8 °C)-100.3 °F (37.9 °C)] 98.3 °F (36.8 °C)  Heart Rate:  [] 89  Resp:  [16-18] 18  BP: (113-160)/(45-77) 147/77    Physical Exam   Constitutional: He is oriented to person, place, and time. He appears well-developed and well-nourished.   HENT:   Head: Normocephalic and atraumatic.   Eyes: Pupils are equal, round, and reactive to light. EOM are normal.   Neck: Normal range of motion. Neck supple.   Cardiovascular: Normal rate and regular rhythm.    Pulmonary/Chest: Effort normal and breath sounds normal.   Abdominal: Soft. Bowel sounds are normal.   Musculoskeletal: Normal range of motion.   Neurological: He is alert and oriented to person, place, and time.   Skin: Skin is warm and dry.    Psychiatric: He has a normal mood and affect.     Results Review:  I have reviewed the labs, radiology results, and diagnostic studies.    Laboratory Data:     Results from last 7 days  Lab Units 11/09/18  0550 11/08/18  0518 11/07/18  0544   SODIUM mmol/L 142 141 140   POTASSIUM mmol/L 3.7 3.6 3.7   CHLORIDE mmol/L 105 104 105   CO2 mmol/L 29.0 30.0 27.0   BUN mg/dL 23* 18 17   CREATININE mg/dL 1.15 1.14 1.17   GLUCOSE mg/dL 97 96 103*   CALCIUM mg/dL 8.9 8.7 8.9   BILIRUBIN mg/dL 0.9 0.4 0.4   ALK PHOS U/L 73 70 79   ALT (SGPT) U/L 11* 18* 20*   AST (SGOT) U/L 19 20 20   ANION GAP mmol/L 8.0 7.0 8.0     Estimated Creatinine Clearance: 48.1 mL/min (by C-G formula based on SCr of 1.15 mg/dL).    Results from last 7 days  Lab Units 11/05/18  1733   MAGNESIUM mg/dL 2.0           Results from last 7 days  Lab Units 11/09/18  0550 11/08/18  1936 11/08/18  0518 11/07/18  0544 11/06/18  0534 11/05/18  1733   WBC 10*3/mm3 6.74  --  5.36 6.83 6.87 7.73   HEMOGLOBIN g/dL 9.1* 9.8* 7.2* 7.8* 7.2* 8.9*   HEMATOCRIT % 27.9* 30.4* 22.9* 25.6* 23.3* 28.4*   PLATELETS 10*3/mm3 228  --  228 250 220 229           Culture Data:   No results found for: BLOODCX  No results found for: URINECX  No results found for: RESPCX  No results found for: WOUNDCX  No results found for: STOOLCX  No components found for: BODYFLD    Radiology Data:   Imaging Results (last 24 hours)     ** No results found for the last 24 hours. **          I have reviewed the patient's current medications.     Assessment/Plan     Active Hospital Problems    Diagnosis Date Noted   • PVD (peripheral vascular disease) with claudication (CMS/[facility]) [I73.9] 10/23/2018     Added automatically from request for surgery [id number]     • Acute cystitis with hematuria [N30.01] 09/08/2018   • Tricuspid regurgitation [I07.1] 07/16/2018   • Pulmonary hypertension (CMS/[facility]) [I27.20] 07/16/2018   • CKD (chronic kidney disease) stage 3, GFR 30-59 ml/min (CMS/[facility]) [N18.3] 07/16/2018   •  (HFpEF) heart failure with preserved ejection fraction (CMS/[facility]) [I50.30] 05/21/2018   • Pure hypercholesterolemia [E78.00]    • Hypertensive disorder [I10]        Plan:    1.  Acute cystitis: Klebsiella oxytoca.  Continue Rocephin.  2.  Anemia:  Oral iron started.  2 units of PRBC given.   3.  CKD stage III: Appear to be doing well continue to monitor.  4.  Hypertension,stable: Continue current medications.  5.  Hyperlipidemia: Statin.  6.  Heart failure with preserved ejection fraction: Patient does not appear to be in heart failure at this point we'll continue monitor.  7.  Deconditioning PT/OT.    Case management working with [facility] and family about appropriate discharge.     Discharge Planning: I expect patient to be discharged to home in 1-2 days.      This document has been electronically signed by JUSTIN David on November 9, 2018 1:45 PM

## 2018-11-09 NOTE — PROGRESS NOTES
"   LOS: 0 days   Patient Care Team:  Terrell Arzate MD as PCP - General  Terrell Arzate MD as PCP - Claims Attributed  [facility] as Care Coordinator (Population Health)    Subjective     Subjective:  Symptoms:  Stable.  (Urine clear in Dye's gravity bag. Cystostomy site clear. ).    Diet:  No nausea or vomiting.    Activity level: Impaired due to weakness.    Pain:  Pain is requiring pain medication.        History taken from: patient chart    Objective     Vital Signs  Temp:  [96.4 °F (35.8 °C)-100.3 °F (37.9 °C)] 97.9 °F (36.6 °C)  Heart Rate:  [] 100  Resp:  [16-18] 18  BP: (115-147)/(56-77) 115/56    Objective:  General Appearance:  In no acute distress.    Vital signs: (most recent): Blood pressure 115/56, pulse 100, temperature 97.9 °F (36.6 °C), temperature source Oral, resp. rate 18, height 182.9 cm (72\"), weight 76.6 kg (168 lb 14.4 oz), SpO2 100 %.  Vital signs are normal.  No fever.    Output: Producing urine (Dye urine clear minimal sediment).    HEENT: Normal HEENT exam.    Lungs:  Normal effort and normal respiratory rate.  Breath sounds clear to auscultation.  He is not in respiratory distress.    Heart: Normal rate.  Regular rhythm.  S1 normal and S2 normal.  No murmur.   Chest: Symmetric chest wall expansion.   Abdomen: Abdomen is soft and non-distended.  (No redness at SP tube site).  Bowel sounds are normal.   There is no abdominal tenderness.     Extremities: Normal range of motion.    Pulses: Distal pulses are intact.    Neurological: Patient is alert.  GCS score is 15.    Pupils:  Pupils are equal, round, and reactive to light.    Skin:  Warm, dry and pale.  No rash, ecchymosis or cyanosis.             Results Review:    Lab Results (last 24 hours)     Procedure Component Value Units Date/Time    Comprehensive Metabolic Panel [[id number]]  (Abnormal) Collected:  11/09/18 0550    Specimen:  Blood Updated:  11/09/18 0620     Glucose 97 mg/dL      BUN 23 (H) mg/dL      " Creatinine 1.15 mg/dL      Sodium 142 mmol/L      Potassium 3.7 mmol/L      Chloride 105 mmol/L      CO2 29.0 mmol/L      Calcium 8.9 mg/dL      Total Protein 7.3 g/dL      Albumin 3.20 (L) g/dL      ALT (SGPT) 11 (L) U/L      AST (SGOT) 19 U/L      Alkaline Phosphatase 73 U/L      Total Bilirubin 0.9 mg/dL      eGFR Non African Amer 60 mL/min/1.73      Globulin 4.1 (H) gm/dL      A/G Ratio 0.8 (L) g/dL      BUN/Creatinine Ratio 20.0     Anion Gap 8.0 mmol/L     Narrative:       The MDRD GFR formula is only valid for adults with stable renal function between ages 18 and 70.    CBC & Differential [[id number]] Collected:  11/09/18 0550    Specimen:  Blood Updated:  11/09/18 0606    Narrative:       The following orders were created for panel order CBC & Differential.  Procedure                               Abnormality         Status                     ---------                               -----------         ------                     CBC Auto Differential[[id number]]        Abnormal            Final result                 Please view results for these tests on the individual orders.    CBC Auto Differential [[id number]]  (Abnormal) Collected:  11/09/18 0550    Specimen:  Blood Updated:  11/09/18 0606     WBC 6.74 10*3/mm3      RBC 3.17 (L) 10*6/mm3      Hemoglobin 9.1 (L) g/dL      Hematocrit 27.9 (L) %      MCV 88.0 fL      MCH 28.7 pg      MCHC 32.6 g/dL      RDW 16.6 (H) %      RDW-SD 53.5 (H) fl      MPV 9.4 fL      Platelets 228 10*3/mm3      Neutrophil % 63.7 %      Lymphocyte % 22.0 %      Monocyte % 13.2 (H) %      Eosinophil % 0.7 %      Basophil % 0.1 %      Immature Grans % 0.3 %      Neutrophils, Absolute 4.29 10*3/mm3      Lymphocytes, Absolute 1.48 10*3/mm3      Monocytes, Absolute 0.89 10*3/mm3      Eosinophils, Absolute 0.05 10*3/mm3      Basophils, Absolute 0.01 10*3/mm3      Immature Grans, Absolute 0.02 10*3/mm3     Hemoglobin & Hematocrit, Blood [[id number]]  (Abnormal) Collected:  11/08/18 1936     Specimen:  Blood Updated:  11/08/18 1950     Hemoglobin 9.8 (L) g/dL      Hematocrit 30.4 (L) %     Blood Culture - Blood, [[id number]]  (Normal) Collected:  11/05/18 1849    Specimen:  Blood from Arm, Left Updated:  11/08/18 1945     Blood Culture No growth at 3 days    Blood Culture - Blood, [[id number]]  (Normal) Collected:  11/05/18 1936    Specimen:  Blood from Hand, Left Updated:  11/08/18 1945     Blood Culture No growth at 3 days         Imaging Results (last 24 hours)     ** No results found for the last 24 hours. **           I reviewed the patient's new clinical results.  I reviewed the patient's new imaging results and agree with the interpretation.  I reviewed the patient's other test results and agree with the interpretation      Assessment/Plan       (HFpEF) heart failure with preserved ejection fraction (CMS/HCC)    Pure hypercholesterolemia    Hypertensive disorder    Pulmonary hypertension (CMS/HCC)    Tricuspid regurgitation    CKD (chronic kidney disease) stage 3, GFR 30-59 ml/min (CMS/HCC)    Acute cystitis with hematuria    PVD (peripheral vascular disease) with claudication (CMS/HCC)      Assessment:    Condition: In stable condition.       1. Obstructive BPH causing neurogenic bladder, cystoscopy with SP tube placement in Sept due for SP tube change  -Proscar, Flomax  -Chronic SP tube, changed 11/6/18     2. UTI cystitis, complicated, history of ESBL UTI  -Urine culture 11/5/18 Klebsiella oxytoca  -Antibiotic day #5, Rocephin    Plan:   Continue Rocephin.   Monitor for signs of candida UTI, culture again as needed.   SP Tube change due 12/4/18.    JUSTIN Selby  11/09/18  5:45 PM

## 2018-11-09 NOTE — THERAPY TREATMENT NOTE
Acute Care - Occupational Therapy Treatment Note  [facility]     Patient Name: Ravi Park  : 1930  MRN: [id number]  Today's Date: 2018  Onset of Illness/Injury or Date of Surgery: 18  Date of Referral to OT: 18  Referring Physician: Troy Laura    Admit Date: 2018       ICD-10-CM ICD-9-CM   1. Acute cystitis with hematuria N30.01 595.0   2. Altered mental status, unspecified altered mental status type R41.82 780.97   3. Impaired functional mobility and endurance Z74.09 V49.89   4. Impaired mobility and activities of daily living Z74.09 799.89     Patient Active Problem List   Diagnosis   • Peripheral arterial disease (CMS/HCC)   • Chronic atrial fibrillation (CMS/HCC)   • Backache   • Lumbar radiculopathy   • Edema   • Acute congestive heart failure (CMS/HCC)   • Acute blood loss anemia   • Acute on chronic diastolic heart failure (CMS/HCC)   • YESENIA (acute kidney injury) (CMS/HCC)   • (HFpEF) heart failure with preserved ejection fraction (CMS/HCC)   • Urinary retention   • Personal history of tobacco use, presenting hazards to health   • Vitreous floaters   • Upper respiratory infection   • Sprain of shoulder and upper arm   • Shoulder pain   • Serous otitis media   • Sensorineural hearing loss   • Pure hypercholesterolemia   • Pseudophakia   • Prostatitis   • Pain in wrist   • Pain in thumb joint with movement   • Pain in joint involving right lower leg   • Pain in eye   • Otorrhea   • Open wound of toe   • Open wound of lower leg with complication   • Open wound of lower leg   • Open wound of lesser toe of left foot without damage to nail   • Olecranon bursitis   • Need for prophylactic vaccination and inoculation against influenza   • Need for immunization against influenza   • Multiple joint pain   • Malaise and fatigue   • Keratoconjunctivitis sicca (CMS/HCC)   • Insect bite   • Impacted cerumen   • Hypertensive disorder   • History of colonoscopy   • History of colon  polyps   • History of artificial eye lens   • Herpes zoster   • Gout   • Exposure to communicable disease   • Dog bite of hand   • Disorder of sacroiliac joint   • Decreased platelet count (CMS/HCC)   • Cough   • Contusion of thumb   • Common cold   • Cervical arthritis   • Cerebrovascular accident (CMS/HCC)   • Cellulitis of lower leg   • Cataract   • Benign prostatic hyperplasia   • Atrial fibrillation (CMS/HCC)   • Astigmatism   • Allergic rhinitis due to pollen   • Allergic rhinitis   • Adverse reaction to drug   • Acute confusion   • Acute bronchitis   • Abnormal gait   • Urinary tract infection associated with indwelling urethral catheter (CMS/HCC)   • Hydronephrosis   • Nephrolithiasis   • Calculus of kidney   • Sepsis (CMS/HCC)   • Pulmonary hypertension (CMS/HCC)   • Tricuspid regurgitation   • CKD (chronic kidney disease) stage 3, GFR 30-59 ml/min (CMS/HCC)   • ESBL (extended spectrum beta-lactamase) producing bacteria infection   • Urinary tract infection associated with catheterization of urinary tract (CMS/HCC)   • Toxic encephalopathy   • Demand ischemia (CMS/HCC)   • Acute renal failure with tubular necrosis (CMS/HCC)   • Chronic atrial fibrillation (CMS/HCC)   • Acute cystitis with hematuria   • Sepsis (CMS/HCC)   • Decubitus ulcer of left heel, unstageable (CMS/HCC)   • Acute renal failure (CMS/HCC)   • Pneumonia due to infectious organism   • Non-healing wound of left heel   • PVD (peripheral vascular disease) with claudication (CMS/HCC)     Past Medical History:   Diagnosis Date   • Benign prostatic hyperplasia    • Cerebrovascular accident (CMS/HCC)    • Chronic anemia    • Chronic atrial fibrillation (CMS/HCC)    • CKD (chronic kidney disease) stage 3, GFR 30-59 ml/min (CMS/HCC)    • GERD (gastroesophageal reflux disease)    • Gout    • Hydronephrosis    • Hypercholesterolemia    • Hypertension    • Nephrolithiasis    • Peripheral arterial disease (CMS/HCC)    • Pulmonary hypertension (CMS/HCC)     • Tricuspid regurgitation    • Urinary retention      Past Surgical History:   Procedure Laterality Date   • CARDIAC CATHETERIZATION     • CYSTOSCOPY Left 9/13/2018    Procedure: CYSTOSCOPY. LEFT J-STENT REMOVAL, PLACEMENT SUPRAPUBIC CATHETER;  Surgeon: Stephen Serrano MD;  Location: [facility];  Service: Urology   • CYSTOSCOPY, URETEROSCOPY, RETROGRADE PYELOGRAM, STENT INSERTION Left 5/27/2018   • CYSTOSCOPY, URETEROSCOPY, RETROGRADE PYELOGRAM, STENT INSERTION Left 8/22/2018    Procedure: CYSTOSCOPY URETEROSCOPY RETROGRADE PYELOGRAM HOLMIUM LASER STENT INSERTION;  Surgeon: Stephen Serrano MD;  Location: [facility];  Service: Urology   • EXTRACORPOREAL SHOCKWAVE LITHOTRIPSY (ESWL), STENT INSERTION/REMOVAL Left 7/20/2018    Procedure: EXTRACORPOREAL SHOCKWAVE LITHOTRIPSY        ([facility]);  Surgeon: Stephen Serrano MD;  Location: [facility];  Service: Urology   • INTERVENTIONAL RADIOLOGY PROCEDURE N/A 10/30/2018    Procedure: Abdominal Aortogram;  Surgeon: Mikey Hernández MD;  Location: [facility] INVASIVE LOCATION;  Service: Cardiology   • NEPHROSTOMY         Therapy Treatment          Rehabilitation Treatment Summary     Row Name 11/09/18 0855             Treatment Time/Intention    Discipline occupational therapy assistant  -BB      Document Type therapy note (daily note)  -BB      Subjective Information complains of;fatigue  -BB      Mode of Treatment individual therapy;occupational therapy  -BB      Patient/Family Observations no family present  -BB      Total Minutes, Occupational Therapy Treatment 25  -BB      Therapy Frequency (OT Eval) other (see comments)   3-5x/wk  -BB      Patient Effort fair  -BB      Existing Precautions/Restrictions fall;oxygen therapy device and L/min  -BB      Recorded by [BB] Carla Lutz COTA/L 11/09/18 1251      Row Name 11/09/18 0855             Vital Signs    Pre Systolic BP Rehab 158  -BB      Pre Treatment Diastolic BP 63  -BB       Pretreatment Heart Rate (beats/min) 91  -BB      Pre SpO2 (%) 96  -BB      O2 Delivery Pre Treatment supplemental O2  -BB      Pre Patient Position --   long sitting  -BB      Recorded by [BB] Carla Lutz COTA/L 11/09/18 1300      Row Name 11/09/18 0855             Cognitive Assessment/Intervention- PT/OT    Behavioral Issues (Cognitive) unable/difficult to assess  -BB      Orientation Status (Cognition) oriented to;person  -BB      Personal Safety Interventions supervised activity;nonskid shoes/slippers when out of bed;fall prevention program maintained  -BB      Recorded by [BB] Carla Lutz COTA/L 11/09/18 1300      Row Name 11/09/18 0855             Bed Mobility Assessment/Treatment    Bed Mobility, Safety Issues cognitive deficits limit understanding;decreased use of arms for pushing/pulling;decreased use of legs for bridging/pushing;impaired trunk control for bed mobility  -BB      Assistive Device (Bed Mobility) draw sheet;head of bed elevated  -BB      Comment (Bed Mobility) Pt requested to be adjusted in bed. (assist x2)  -BB      Recorded by [BB] Carla Lutz COTA/L 11/09/18 1300      Row Name 11/09/18 0855             Grooming Assessment/Training    [place] Level (Grooming) hair care, combing/brushing;wash face, hands;minimum assist (75% patient effort);verbal cues  -BB      Grooming Position long sitting  -BB      Recorded by [BB] Carla Lutz COTA/L 11/09/18 1300      Row Name 11/09/18 0855             Therapeutic Exercise    Upper Extremity Range of Motion (Therapeutic Exercise) elbow flexion/extension, bilateral;wrist flexion/extension, bilateral;shoulder horizontal abduction/adduction, bilateral  -BB      Exercise Type (Therapeutic Exercise) AAROM (active assistive range of motion)  -BB      Position (Therapeutic Exercise) --   long sitting  -BB      Sets/Reps (Therapeutic Exercise) 1x10 reps  -BB      Expected Outcome (Therapeutic Exercise) improve functional  tolerance, household activity;improve performance, transfer skills;improve functional stability  -BB      Comment (Therapeutic Exercise) RBs as needed  -BB      Recorded by [BB] Carla Lutz COTA/L 11/09/18 1300      Row Name 11/09/18 0855             Positioning and Restraints    Pre-Treatment Position in bed  -BB      Post Treatment Position bed  -BB      In Bed fowlers;call light within reach;encouraged to call for assist;exit alarm on  -BB      Recorded by [BB] Carla Lutz COTA/L 11/09/18 1300      Row Name 11/09/18 0855             Pain Scale: Numbers Pre/Post-Treatment    Pain Scale: Numbers, Pretreatment 6/10  -BB      Pain Scale: Numbers, Post-Treatment 6/10  -BB      Pain Location neck  -BB      Pain Intervention(s) Repositioned  -BB      Recorded by [BB] Carla Lutz COTA/L 11/09/18 1300      Row Name                Wound 10/30/18 1715 Left heel pressure injury    Wound - Properties Group Date first assessed: 10/30/18 [AS] Time first assessed: 1715 [AS] Present On Admission : yes;picture taken [AS] Side: Left [AS] Location: heel [AS] Type: pressure injury [AS] Stage, Pressure Injury: unstageable [AS] Recorded by:  [AS] Giselle Alicea RN 10/30/18 1715    Row Name                Wound 11/06/18 0300 Left gluteal pressure injury    Wound - Properties Group Date first assessed: 11/06/18 [AB] Time first assessed: 0300 [AB] Present On Admission : yes;picture taken [SR], picture taken by nightshift RN  Side: Left [SR] Location: gluteal [SR] Type: pressure injury [SR] Stage, Pressure Injury: deep tissue injury [SR] Recorded by:  [AB] Jacquie Chu RN 11/08/18 1511 [SR] Lexi Chaparro RN 11/06/18 2051    Row Name 11/09/18 0119             Outcome Summary/Treatment Plan (OT)    Daily Summary of Progress (OT) progress toward functional goals is gradual  -BB      Plan for Continued Treatment (OT) continue POC  -BB      Anticipated Discharge Disposition (OT) skilled nursing  facility;anticipate therapy at next level of care  -BB      Recorded by [BB] LutzCarla granger COTA/L 11/09/18 1300        User Key  (r) = Recorded By, (t) = Taken By, (c) = Cosigned By    Initials Name Effective Dates Discipline    BB Carla Lutz ALEJA, VALENCIA/L 03/07/18 -  OT    AB Jacquie Chu RN 10/17/16 -  Nurse    Lexi Mobley RN 10/17/16 -  Nurse    Giselle Vasquez RN 12/13/16 -  Nurse        Wound 10/30/18 1715 Left heel pressure injury (Active)   Dressing Appearance dry;intact 11/9/2018  9:10 AM   Base dressing in place, unable to visualize 11/9/2018  9:10 AM   Periwound redness 11/8/2018  4:00 PM   Drainage Amount none 11/8/2018  4:00 PM   Care, Wound cleansed with;soap and water 11/8/2018  4:00 PM   Dressing Care, Wound dressing applied 11/8/2018  4:00 PM       Wound 11/06/18 0300 Left gluteal pressure injury (Active)   Dressing Appearance open to air 11/9/2018  9:10 AM   Base maroon/purple 11/9/2018  9:10 AM   Periwound blanchable;redness 11/9/2018  9:10 AM   Care, Wound barrier applied 11/9/2018  9:10 AM   Periwound Care, Wound barrier ointment applied 11/9/2018  9:10 AM             OT Rehab Goals     Row Name 11/09/18 0855             Transfer Goal 1 (OT)    Activity/Assistive Device (Transfer Goal 1, OT) sit-to-stand/stand-to-sit;bed-to-chair/chair-to-bed;toilet  -BB      [place] Level/Cues Needed (Transfer Goal 1, OT) contact guard assist  -BB      Time Frame (Transfer Goal 1, OT) long term goal (LTG);by discharge  -BB      Progress/Outcome (Transfer Goal 1, OT) goal not met  -BB         Bathing Goal 1 (OT)    Activity/Assistive Device (Bathing Goal 1, OT) bathing skills, all   using AE PRN  -BB      [place] Level/Cues Needed (Bathing Goal 1, OT) minimum assist (75% or more patient effort)  -BB      Time Frame (Bathing Goal 1, OT) long term goal (LTG);by discharge  -BB      Progress/Outcomes (Bathing Goal 1, OT) goal not met  -BB         Dressing Goal 1 (OT)     Activity/Assistive Device (Dressing Goal 1, OT) dressing skills, all   using AE PRN  -BB      [place]/Cues Needed (Dressing Goal 1, OT) minimum assist (75% or more patient effort)  -BB      Time Frame (Dressing Goal 1, OT) long term goal (LTG);by discharge  -BB      Progress/Outcome (Dressing Goal 1, OT) goal not met  -BB         Toileting Goal 1 (OT)    Activity/Device (Toileting Goal 1, OT) toileting skills, all  -BB      [place] Level/Cues Needed (Toileting Goal 1, OT) minimum assist (75% or more patient effort)  -BB      Time Frame (Toileting Goal 1, OT) long term goal (LTG);by discharge  -BB      Progress/Outcome (Toileting Goal 1, OT) goal not met  -BB         Strength Goal 1 (OT)    Strength Goal 1 (OT) Pt will increase BUE strenth at least 1/2 grade level to benefit ADLs and functional transfers.   -BB      Time Frame (Strength Goal 1, OT) long term goal (LTG);by discharge  -BB      Progress/Outcome (Strength Goal 1, OT) goal not met  -BB        User Key  (r) = Recorded By, (t) = Taken By, (c) = Cosigned By    Initials Name Provider Type Discipline    BB Carla Lutz COTA/L Occupational Therapy Assistant OT        Occupational Therapy Education     Title: PT OT SLP Therapies (Active)     Topic: Occupational Therapy (Active)     Point: Body mechanics (Active)     Description: Instruct learner(s) on proper positioning and spine alignment during self-care, functional mobility activities and/or exercises.   Learning Progress Summary     Learner Status Readiness Method Response Comment Documented by    Patient Active Acceptance E NR,NL role of OT, OT POC, bed mobility AS 11/08/18 1628                      User Key     Initials Effective Dates Name Provider Type Discipline    AS 05/01/18 -  Debra Corado OT Occupational Therapist OT            Non-skid socks and gait belt in place. Toileting offered. Call light and needs within reach. Pt advised to not get up alone and call the nurse for  assistance.  Bed alarm on.       OT Recommendation and Plan  Outcome Summary/Treatment Plan (OT)  Daily Summary of Progress (OT): progress toward functional goals is gradual  Plan for Continued Treatment (OT): continue POC  Anticipated Discharge Disposition (OT): skilled nursing facility, anticipate therapy at next level of care  Therapy Frequency (OT Eval): other (see comments) (3-5x/wk)  Daily Summary of Progress (OT): progress toward functional goals is gradual        Outcome Measures     Row Name 11/09/18 0855 11/08/18 1502 11/08/18 1501       How much help from another person do you currently need...    Turning from your back to your side while in flat bed without using bedrails?  --  -- 2  -CW    Moving from lying on back to sitting on the side of a flat bed without bedrails?  --  -- 2  -CW    Moving to and from a bed to a chair (including a wheelchair)?  --  -- 2  -CW    Standing up from a chair using your arms (e.g., wheelchair, bedside chair)?  --  -- 2  -CW    Climbing 3-5 steps with a railing?  --  -- 1  -CW    To walk in hospital room?  --  -- 1  -CW    AM-PAC 6 Clicks Score  --  -- 10  -CW       How much help from another is currently needed...    Putting on and taking off regular lower body clothing? 1  -BB 2  -AS  --    Bathing (including washing, rinsing, and drying) 2  -BB 2  -AS  --    Toileting (which includes using toilet bed pan or urinal) 1  -BB 2  -AS  --    Putting on and taking off regular upper body clothing 2  -BB 2  -AS  --    Taking care of personal grooming (such as brushing teeth) 2  -BB 3  -AS  --    Eating meals 2  -BB 3  -AS  --    Score 10  -BB 14  -AS  --       Functional Assessment    Outcome Measure Options  -- AM-PAC 6 Clicks Daily Activity (OT)  -AS AM-PAC 6 Clicks Basic Mobility (PT)  -CW      User Key  (r) = Recorded By, (t) = Taken By, (c) = Cosigned By    Initials Name Provider Type    BB Carla Lutz, VALENCIA/L Occupational Therapy Assistant    AS Debra Corado, OT  Occupational Therapist    Kristie Siddiqui, PT Physical Therapist           Time Calculation:         Time Calculation- OT     Row Name 11/09/18 1304             Time Calculation- OT    OT Start Time 0855  -BB      OT Stop Time 0920  -BB      OT Time Calculation (min) 25 min  -      Total Timed Code Minutes- OT 25 minute(s)  -BB      OT Received On 11/09/18  -        User Key  (r) = Recorded By, (t) = Taken By, (c) = Cosigned By    Initials Name Provider Type    Carla Ohara COTA/L Occupational Therapy Assistant           Therapy Suggested Charges     Code   Minutes Charges    None           Therapy Charges for Today     Code Description Service Date Service Provider Modifiers Qty    15950814879 HC OT SELF CARE/MGMT/TRAIN EA 15 MIN 11/9/2018 Carla Lutz COTA/L GO 1    02519038091 HC OT THER PROC EA 15 MIN 11/9/2018 Carla Lutz COTA/L GO 1          OT G-codes  OT Professional Judgement Used?: Yes  OT Functional Scales Options: AM-PAC 6 Clicks Daily Activity (OT)  Score: 14  Functional Limitation: Self care  Self Care Current Status (): At least 40 percent but less than 60 percent impaired, limited or restricted  Self Care Goal Status (): At least 20 percent but less than 40 percent impaired, limited or restricted    VIANEY More  11/9/2018

## 2018-11-09 NOTE — PLAN OF CARE
Problem: Patient Care Overview  Goal: Plan of Care Review  Outcome: Ongoing (interventions implemented as appropriate)   11/09/18 1301   Coping/Psychosocial   Plan of Care Reviewed With patient   Plan of Care Review   Progress no change   OTHER   Outcome Summary Pt is drowsy and oriented to person and place this shift, v/s stable, Hgb has improved at 9.1, will continue to monitor      Goal: Individualization and Mutuality  Outcome: Ongoing (interventions implemented as appropriate)    Goal: Discharge Needs Assessment  Outcome: Ongoing (interventions implemented as appropriate)    Goal: Interprofessional Rounds/Family Conf  Outcome: Ongoing (interventions implemented as appropriate)      Problem: Infection, Risk/Actual (Adult)  Goal: Infection Prevention/Resolution  Outcome: Ongoing (interventions implemented as appropriate)      Problem: Pain, Acute (Adult)  Goal: Acceptable Pain Control/Comfort Level  Outcome: Ongoing (interventions implemented as appropriate)      Problem: Wound (Includes Pressure Injury) (Adult)  Goal: Signs and Symptoms of Listed Potential Problems Will be Absent, Minimized or Managed (Wound)  Outcome: Ongoing (interventions implemented as appropriate)      Problem: Urinary Tract Infection (Adult)  Goal: Signs and Symptoms of Listed Potential Problems Will be Absent, Minimized or Managed (Urinary Tract Infection)  Outcome: Ongoing (interventions implemented as appropriate)      Problem: Cardiac Output Decreased (Adult)  Goal: Effective Tissue Perfusion  Outcome: Ongoing (interventions implemented as appropriate)

## 2018-11-09 NOTE — PROGRESS NOTES
"   LOS: 0 days   Patient Care Team:  Terrell Arzate MD as PCP - General  Terrell Arzate MD as PCP - Claims Attributed  Bethany Loco RN as Care Coordinator (Population Health)    Subjective     Subjective:  Symptoms:  Stable.  (Urine clearing in Dye's gravity bag. ).    Diet:  No nausea or vomiting.    Activity level: Impaired due to weakness.    Pain:  Pain is requiring pain medication.        History taken from: patient chart    Objective     Vital Signs  Temp:  [96.3 °F (35.7 °C)-99.7 °F (37.6 °C)] 96.8 °F (36 °C)  Heart Rate:  [] 93  Resp:  [16-18] 16  BP: (105-160)/(44-76) 133/63    Objective:  General Appearance:  In no acute distress.    Vital signs: (most recent): Blood pressure 133/63, pulse 93, temperature 96.8 °F (36 °C), temperature source Oral, resp. rate 16, height 182.9 cm (72\"), weight 76.5 kg (168 lb 9.6 oz), SpO2 95 %.  Vital signs are normal.  No fever.    Output: Producing urine (Dye urine clear minimal sediment).    HEENT: Normal HEENT exam.    Lungs:  Normal effort and normal respiratory rate.  Breath sounds clear to auscultation.  He is not in respiratory distress.    Heart: Normal rate.  Regular rhythm.  S1 normal and S2 normal.  No murmur.   Chest: Symmetric chest wall expansion.   Abdomen: Abdomen is soft and non-distended.  Bowel sounds are normal.   There is no abdominal tenderness.     Extremities: Normal range of motion.    Pulses: Distal pulses are intact.    Neurological: Patient is alert.  GCS score is 15.    Pupils:  Pupils are equal, round, and reactive to light.    Skin:  Warm, dry and pale.  No rash, ecchymosis or cyanosis.             Results Review:    Lab Results (last 24 hours)     Procedure Component Value Units Date/Time    Comprehensive Metabolic Panel [[id number]]  (Abnormal) Collected:  11/08/18 0518    Specimen:  Blood Updated:  11/08/18 0547     Glucose 96 mg/dL      BUN 18 mg/dL      Creatinine 1.14 mg/dL      Sodium 141 mmol/L      Potassium 3.6 mmol/L "      Chloride 104 mmol/L      CO2 30.0 mmol/L      Calcium 8.7 mg/dL      Total Protein 6.7 g/dL      Albumin 2.90 (L) g/dL      ALT (SGPT) 18 (L) U/L      AST (SGOT) 20 U/L      Alkaline Phosphatase 70 U/L      Total Bilirubin 0.4 mg/dL      eGFR Non African Amer 61 mL/min/1.73      Globulin 3.8 (H) gm/dL      A/G Ratio 0.8 (L) g/dL      BUN/Creatinine Ratio 15.8     Anion Gap 7.0 mmol/L     Narrative:       The MDRD GFR formula is only valid for adults with stable renal function between ages 18 and 70.    CBC & Differential [[id number]] Collected:  11/08/18 0518    Specimen:  Blood Updated:  11/08/18 0538    Narrative:       The following orders were created for panel order CBC & Differential.  Procedure                               Abnormality         Status                     ---------                               -----------         ------                     CBC Auto Differential[[id number]]        Abnormal            Final result                 Please view results for these tests on the individual orders.    CBC Auto Differential [[id number]]  (Abnormal) Collected:  11/08/18 0518    Specimen:  Blood Updated:  11/08/18 0538     WBC 5.36 10*3/mm3      RBC 2.56 (L) 10*6/mm3      Hemoglobin 7.2 (L) g/dL      Hematocrit 22.9 (L) %      MCV 89.5 fL      MCH 28.1 pg      MCHC 31.4 (L) g/dL      RDW 16.3 (H) %      RDW-SD 53.0 (H) fl      MPV 9.1 fL      Platelets 228 10*3/mm3      Neutrophil % 58.7 %      Lymphocyte % 24.1 %      Monocyte % 14.2 (H) %      Eosinophil % 2.8 %      Basophil % 0.0 %      Immature Grans % 0.2 %      Neutrophils, Absolute 3.15 10*3/mm3      Lymphocytes, Absolute 1.29 10*3/mm3      Monocytes, Absolute 0.76 10*3/mm3      Eosinophils, Absolute 0.15 10*3/mm3      Basophils, Absolute 0.00 10*3/mm3      Immature Grans, Absolute 0.01 10*3/mm3     Blood Culture - Blood, [[id number]]  (Normal) Collected:  11/05/18 8699    Specimen:  Blood from Arm, Left Updated:  11/07/18 1945     Blood Culture  No growth at 2 days    Blood Culture - Blood, [[id number]]  (Normal) Collected:  11/05/18 1936    Specimen:  Blood from Hand, Left Updated:  11/07/18 1945     Blood Culture No growth at 2 days         Imaging Results (last 24 hours)     ** No results found for the last 24 hours. **           I reviewed the patient's new clinical results.  I reviewed the patient's new imaging results and agree with the interpretation.  I reviewed the patient's other test results and agree with the interpretation      Assessment/Plan       (HFpEF) heart failure with preserved ejection fraction (CMS/HCC)    Pure hypercholesterolemia    Hypertensive disorder    Pulmonary hypertension (CMS/HCC)    Tricuspid regurgitation    CKD (chronic kidney disease) stage 3, GFR 30-59 ml/min (CMS/HCC)    Acute cystitis with hematuria    PVD (peripheral vascular disease) with claudication (CMS/HCC)      Assessment:    Condition: In stable condition.       1. Obstructive BPH causing neurogenic bladder, cystoscopy with SP tube placement in Sept due for SP tube change  -Proscar, Flomax  -Chronic SP tube, changed 11/6/18     2. UTI cystitis, complicated, history of ESBL UTI  -Urine culture 11/5/18 Klebsiella oxytoca  -Antibiotic day #4, Rocephin    Plan:   Continue Rocephin.   Monitor for signs of candida UTI, culture again as needed.     JUSTIN Selby  11/08/18  6:55 PM

## 2018-11-09 NOTE — PLAN OF CARE
Problem: Patient Care Overview  Goal: Plan of Care Review  Outcome: Ongoing (interventions implemented as appropriate)   11/09/18 1314 11/09/18 1350   Coping/Psychosocial   Plan of Care Reviewed With patient --    Plan of Care Review   Progress no change --    OTHER   Outcome Summary --  pt with limited participation due to pt lethargic, performed 10 reps of AAROM B LE exercises in supine. pt will require 24/7 care @ D/C

## 2018-11-09 NOTE — SIGNIFICANT NOTE
11/09/18 1027   Rehab Treatment   Discipline speech language pathologist   Reason Treatment Not Performed other (see comments)  (Pt tired and not alert for po trials this a.m. )

## 2018-11-09 NOTE — PLAN OF CARE
Problem: Patient Care Overview  Goal: Plan of Care Review  Outcome: Ongoing (interventions implemented as appropriate)   11/09/18 1314   Coping/Psychosocial   Plan of Care Reviewed With patient   Plan of Care Review   Progress no change   OTHER   Outcome Summary Mechanical soft diet ordered. Good intake of whole milk. Pt has PO intakes 50-75% unless too drowsy to eat. Pt not to be fed unless alert.

## 2018-11-09 NOTE — THERAPY TREATMENT NOTE
Acute Care - Physical Therapy Treatment Note  [facility]     Patient Name: Ravi Park  : 1930  MRN: [id number]  Today's Date: 2018  Onset of Illness/Injury or Date of Surgery: 18  Date of Referral to PT: 18  Referring Physician: Troy Laura    Admit Date: 2018    Visit Dx:    ICD-10-CM ICD-9-CM   1. Acute cystitis with hematuria N30.01 595.0   2. Altered mental status, unspecified altered mental status type R41.82 780.97   3. Impaired functional mobility and endurance Z74.09 V49.89   4. Impaired mobility and activities of daily living Z74.09 799.89     Patient Active Problem List   Diagnosis   • Peripheral arterial disease (CMS/[facility])   • Chronic atrial fibrillation (CMS/[facility])   • Backache   • Lumbar radiculopathy   • Edema   • Acute congestive heart failure (CMS/[facility])   • Acute blood loss anemia   • Acute on chronic diastolic heart failure (CMS/[facility])   • YESENIA (acute kidney injury) (CMS/[facility])   • (HFpEF) heart failure with preserved ejection fraction (CMS/[facility])   • Urinary retention   • Personal history of tobacco use, presenting hazards to health   • Vitreous floaters   • Upper respiratory infection   • Sprain of shoulder and upper arm   • Shoulder pain   • Serous otitis media   • Sensorineural hearing loss   • Pure hypercholesterolemia   • Pseudophakia   • Prostatitis   • Pain in wrist   • Pain in thumb joint with movement   • Pain in joint involving right lower leg   • Pain in eye   • Otorrhea   • Open wound of toe   • Open wound of lower leg with complication   • Open wound of lower leg   • Open wound of lesser toe of left foot without damage to nail   • Olecranon bursitis   • Need for prophylactic vaccination and inoculation against influenza   • Need for immunization against influenza   • Multiple joint pain   • Malaise and fatigue   • Keratoconjunctivitis sicca (CMS/HCC)   • Insect bite   • Impacted cerumen   • Hypertensive disorder   • History of colonoscopy   • History of  colon polyps   • History of artificial eye lens   • Herpes zoster   • Gout   • Exposure to communicable disease   • Dog bite of hand   • Disorder of sacroiliac joint   • Decreased platelet count (CMS/HCC)   • Cough   • Contusion of thumb   • Common cold   • Cervical arthritis   • Cerebrovascular accident (CMS/HCC)   • Cellulitis of lower leg   • Cataract   • Benign prostatic hyperplasia   • Atrial fibrillation (CMS/HCC)   • Astigmatism   • Allergic rhinitis due to pollen   • Allergic rhinitis   • Adverse reaction to drug   • Acute confusion   • Acute bronchitis   • Abnormal gait   • Urinary tract infection associated with indwelling urethral catheter (CMS/HCC)   • Hydronephrosis   • Nephrolithiasis   • Calculus of kidney   • Sepsis (CMS/HCC)   • Pulmonary hypertension (CMS/HCC)   • Tricuspid regurgitation   • CKD (chronic kidney disease) stage 3, GFR 30-59 ml/min (CMS/HCC)   • ESBL (extended spectrum beta-lactamase) producing bacteria infection   • Urinary tract infection associated with catheterization of urinary tract (CMS/HCC)   • Toxic encephalopathy   • Demand ischemia (CMS/HCC)   • Acute renal failure with tubular necrosis (CMS/HCC)   • Chronic atrial fibrillation (CMS/HCC)   • Acute cystitis with hematuria   • Sepsis (CMS/HCC)   • Decubitus ulcer of left heel, unstageable (CMS/HCC)   • Acute renal failure (CMS/HCC)   • Pneumonia due to infectious organism   • Non-healing wound of left heel   • PVD (peripheral vascular disease) with claudication (CMS/HCC)       Therapy Treatment          Rehabilitation Treatment Summary     Row Name 11/09/18 1350 11/09/18 0855          Treatment Time/Intention    Discipline physical therapy assistant  -TA occupational therapy assistant  -BB     Document Type therapy note (daily note)  -TA therapy note (daily note)  -BB     Subjective Information complains of;pain  -TA complains of;fatigue  -BB     Mode of Treatment physical therapy  -TA individual therapy;occupational therapy   -BB     Patient/Family Observations  -- no family present  -BB     Therapy Frequency (PT Clinical Impression) other (see comments)   5-14x/week  -TA  --     Total Minutes, Occupational Therapy Treatment  -- 25  -BB     Therapy Frequency (OT Eval)  -- other (see comments)   3-5x/wk  -BB     Patient Effort poor  -TA fair  -BB     Existing Precautions/Restrictions fall  -TA fall;oxygen therapy device and L/min  -BB     Patient Response to Treatment pt lethargic, pt agreed to Tx. pt with limited participation  -TA  --     Recorded by [TA] Slime Martin, PTA 11/09/18 1422 [BB] Carla Lutz COTA/L 11/09/18 1251     Row Name 11/09/18 1350 11/09/18 0855          Vital Signs    Pre Systolic BP Rehab 141  -  -BB     Pre Treatment Diastolic BP 70  -TA 63  -BB     Post Systolic BP Rehab 141  -TA  --     Post Treatment Diastolic BP 70  -TA  --     Pretreatment Heart Rate (beats/min) 73  -TA 91  -BB     Posttreatment Heart Rate (beats/min) 87  -TA  --     Pre SpO2 (%) 98  -TA 96  -BB     O2 Delivery Pre Treatment supplemental O2  -TA supplemental O2  -BB     Post SpO2 (%) 99  -TA  --     O2 Delivery Post Treatment supplemental O2  -TA  --     Pre Patient Position Supine  -TA --   long sitting  -BB     Intra Patient Position Supine  -TA  --     Post Patient Position Supine  -TA  --     Recorded by [TA] Slime Martin, PTA 11/09/18 1422 [BB] Carla Lutz COTA/L 11/09/18 1300     Row Name 11/09/18 1350 11/09/18 0855          Cognitive Assessment/Intervention- PT/OT    Affect/Mental Status (Cognitive) unable/difficult to assess  -TA  --     Behavioral Issues (Cognitive) unable/difficult to assess  -TA unable/difficult to assess  -BB     Orientation Status (Cognition) oriented to;person;place;verbal cues/prompts needed for orientation  -TA oriented to;person  -BB     Follows Commands (Cognition) follows one step commands;25-49% accuracy  -TA  --     Cognitive Function (Cognitive) attention deficit;safety  deficit  -TA  --     Attention Deficit (Cognitive) arousal/alertness  -TA  --     Personal Safety Interventions supervised activity;fall prevention program maintained  -TA supervised activity;nonskid shoes/slippers when out of bed;fall prevention program maintained  -BB     Recorded by [TA] Slime Martin, PTA 11/09/18 1422 [BB] Carla Lutz COTA/L 11/09/18 1300     Row Name 11/09/18 1350             Safety Issues, Functional Mobility    Impairments Affecting Function (Mobility) balance;cognition;coordination;postural/trunk control;range of motion (ROM);strength;shortness of breath  -TA      Recorded by [TA] Slime Martin, PTA 11/09/18 1422      Row Name 11/09/18 1350 11/09/18 0855          Bed Mobility Assessment/Treatment    Bed Mobility Assessment/Treatment supine-sit;sit-supine  -TA  --     Supine-Sit [place] (Bed Mobility) not tested  -TA  --     Sit-Supine [place] (Bed Mobility) not tested  -TA  --     Bed Mobility, Safety Issues cognitive deficits limit understanding;decreased use of arms for pushing/pulling;impaired trunk control for bed mobility;decreased use of legs for bridging/pushing  -TA cognitive deficits limit understanding;decreased use of arms for pushing/pulling;decreased use of legs for bridging/pushing;impaired trunk control for bed mobility  -BB     Assistive Device (Bed Mobility)  -- draw sheet;head of bed elevated  -BB     Comment (Bed Mobility)  -- Pt requested to be adjusted in bed. (assist x2)  -BB     Recorded by [TA] Slime Martin, PTA 11/09/18 1422 [BB] Carla Lutz COTA/L 11/09/18 1300     Row Name 11/09/18 0855             Grooming Assessment/Training    [place] Level (Grooming) hair care, combing/brushing;wash face, hands;minimum assist (75% patient effort);verbal cues  -BB      Grooming Position long sitting  -BB      Recorded by [BB] Carla Lutz COTA/L 11/09/18 1300      Row Name 11/09/18 1350 11/09/18 0855          Therapeutic Exercise     Upper Extremity Range of Motion (Therapeutic Exercise)  -- elbow flexion/extension, bilateral;wrist flexion/extension, bilateral;shoulder horizontal abduction/adduction, bilateral  -BB     Lower Extremity (Therapeutic Exercise) quad sets, bilateral;heel slides, bilateral  -TA  --     Lower Extremity Range of Motion (Therapeutic Exercise) hip abduction/adduction, bilateral;ankle dorsiflexion/plantar flexion, bilateral  -TA  --     Exercise Type (Therapeutic Exercise) AAROM (active assistive range of motion)  -TA AAROM (active assistive range of motion)  -BB     Position (Therapeutic Exercise)  -- --   long sitting  -BB     Sets/Reps (Therapeutic Exercise) 10  -TA 1x10 reps  -BB     Expected Outcome (Therapeutic Exercise) improve functional stability;improve performance, transfer skills  -TA improve functional tolerance, household activity;improve performance, transfer skills;improve functional stability  -BB     Comment (Therapeutic Exercise) limited ROM  -TA RBs as needed  -BB     Recorded by [TA] Slime Martin, PTA 11/09/18 1422 [BB] Carla Lutz COTA/L 11/09/18 1300     Row Name 11/09/18 1350 11/09/18 0855          Positioning and Restraints    Pre-Treatment Position in bed  -TA in bed  -BB     Post Treatment Position bed  -TA bed  -BB     In Bed supine;call light within reach;exit alarm on;R heel elevated;L heel elevated  -TA fowlers;call light within reach;encouraged to call for assist;exit alarm on  -BB     Recorded by [TA] Slime Martin, PTA 11/09/18 1422 [BB] Carla Lutz COTA/L 11/09/18 1300     Row Name 11/09/18 1350 11/09/18 0855          Pain Scale: Numbers Pre/Post-Treatment    Pain Scale: Numbers, Pretreatment --   pt c/o pain, unable to assess  -TA 6/10  -BB     Pain Scale: Numbers, Post-Treatment  -- 6/10  -BB     Pain Location  -- neck  -BB     Pain Intervention(s)  -- Repositioned  -BB     Recorded by [TA] Slime Martin, PTA 11/09/18 1422 [BB] Carla Lutz COTA/L  11/09/18 1300     Row Name                Wound 10/30/18 1715 Left heel pressure injury    Wound - Properties Group Date first assessed: 10/30/18 [AS] Time first assessed: 1715 [AS] Present On Admission : yes;picture taken [AS] Side: Left [AS] Location: heel [AS] Type: pressure injury [AS] Stage, Pressure Injury: unstageable [AS] Recorded by:  [AS] Giselle Alicea, RN 10/30/18 1715    Row Name                Wound 11/06/18 0300 Left gluteal pressure injury    Wound - Properties Group Date first assessed: 11/06/18 [AB] Time first assessed: 0300 [AB] Present On Admission : yes;picture taken [SR], picture taken by nightshift RN  Side: Left [SR] Location: gluteal [SR] Type: pressure injury [SR] Stage, Pressure Injury: deep tissue injury [SR] Recorded by:  [AB] Jacquie Chu RN 11/08/18 1511 [SR] Lexi Chaparro RN 11/06/18 2051    Row Name 11/09/18 1350             Outcome Summary/Treatment Plan (PT)    Daily Summary of Progress (PT) unable to show any progress toward functional goals  -TA      Plan for Continued Treatment (PT) continue per pt tolerence  -TA      Anticipated Equipment Needs at Discharge (PT) other (see comments)   difficult to assess per pt's lethargy  -TA      Anticipated Discharge Disposition (PT) skilled nursing facility;anticipate therapy at next level of care  -TA      Recorded by [TA] Slime Martin, PTA 11/09/18 1422        User Key  (r) = Recorded By, (t) = Taken By, (c) = Cosigned By    Initials Name Effective Dates Discipline    TA Slime Martin, PTA 03/07/18 -  PT    BB Carla Lutz COTA/L 03/07/18 -  OT    AB Jacquie Chu, RN 10/17/16 -  Nurse    SR Lexi Chaparro RN 10/17/16 -  Nurse    AS Giselle Alicea, RN 12/13/16 -  Nurse          Wound 10/30/18 1715 Left heel pressure injury (Active)   Dressing Appearance dry;intact 11/9/2018  9:10 AM   Base dressing in place, unable to visualize 11/9/2018  9:10 AM   Periwound redness 11/8/2018  4:00 PM   Drainage  Amount none 11/8/2018  4:00 PM   Care, Wound cleansed with;soap and water 11/8/2018  4:00 PM   Dressing Care, Wound dressing applied 11/8/2018  4:00 PM       Wound 11/06/18 0300 Left gluteal pressure injury (Active)   Dressing Appearance open to air 11/9/2018  9:10 AM   Base maroon/purple 11/9/2018  9:10 AM   Periwound blanchable;redness 11/9/2018  9:10 AM   Care, Wound barrier applied 11/9/2018  9:10 AM   Periwound Care, Wound barrier ointment applied 11/9/2018  9:10 AM               PT Rehab Goals     Row Name 11/09/18 1350             Bed Mobility Goal 1 (PT)    Activity/Assistive Device (Bed Mobility Goal 1, PT) bed mobility activities, all  -TA      [place] Level/Cues Needed (Bed Mobility Goal 1, PT) conditional independence  -TA      Time Frame (Bed Mobility Goal 1, PT) short term goal (STG)  -TA      Progress/Outcomes (Bed Mobility Goal 1, PT) goal not met  -TA         Transfer Goal 1 (PT)    Activity/Assistive Device (Transfer Goal 1, PT) sit-to-stand/stand-to-sit  -TA      [place] Level/Cues Needed (Transfer Goal 1, PT) supervision required  -TA      Time Frame (Transfer Goal 1, PT) 2 - 3 days  -TA      Progress/Outcome (Transfer Goal 1, PT) goal not met  -TA         Gait Training Goal 1 (PT)    Activity/Assistive Device (Gait Training Goal 1, PT) walker, rolling;gait (walking locomotion);assistive device use;backward stepping;decrease asymmetrical patterns;decrease fall risk;diminish gait deviation;forward stepping;improve balance and speed;increase endurance/gait distance;increase energy conservation;maintain weight bearing status;normalize weight shifts;sidestepping;turning, left;turning, right  -TA      [place] Level (Gait Training Goal 1, PT) supervision required  -TA      Distance (Gait Goal 1, PT) 1x25 ft or more  -TA        User Key  (r) = Recorded By, (t) = Taken By, (c) = Cosigned By    Initials Name Provider Type Discipline    Slime Damian, PTA Physical Therapy Assistant PT                   PT Recommendation and Plan  Anticipated Discharge Disposition (PT): skilled nursing facility, anticipate therapy at next level of care  Therapy Frequency (PT Clinical Impression): other (see comments) (5-14x/week)  Outcome Summary/Treatment Plan (PT)  Daily Summary of Progress (PT): unable to show any progress toward functional goals  Plan for Continued Treatment (PT): continue per pt tolerence  Anticipated Equipment Needs at Discharge (PT): other (see comments) (difficult to assess per pt's lethargy)  Anticipated Discharge Disposition (PT): skilled nursing facility, anticipate therapy at next level of care  Outcome Summary: pt with limited participation due to pt lethargic, performed 10 reps of AAROM B LE exercises in supine. pt will require 24/7 care @ D/C          Outcome Measures     Row Name 11/09/18 1400 11/09/18 0855 11/08/18 1502       How much help from another person do you currently need...    Turning from your back to your side while in flat bed without using bedrails? 2  -TA  --  --    Moving from lying on back to sitting on the side of a flat bed without bedrails? 2  -TA  --  --    Moving to and from a bed to a chair (including a wheelchair)? 2  -TA  --  --    Standing up from a chair using your arms (e.g., wheelchair, bedside chair)? 2  -TA  --  --    Climbing 3-5 steps with a railing? 1  -TA  --  --    To walk in hospital room? 1  -TA  --  --    AM-PAC 6 Clicks Score 10  -TA  --  --       How much help from another is currently needed...    Putting on and taking off regular lower body clothing?  -- 1  -BB 2  -AS    Bathing (including washing, rinsing, and drying)  -- 2  -BB 2  -AS    Toileting (which includes using toilet bed pan or urinal)  -- 1  -BB 2  -AS    Putting on and taking off regular upper body clothing  -- 2  -BB 2  -AS    Taking care of personal grooming (such as brushing teeth)  -- 2  -BB 3  -AS    Eating meals  -- 2  -BB 3  -AS    Score  -- 10  -BB 14  -AS        Functional Assessment    Outcome Measure Options AM-[facility] 6 Clicks Basic Mobility (PT)  -TA  -- AM-PAC 6 Clicks Daily Activity (OT)  -AS    Row Name 11/08/18 1501             How much help from another person do you currently need...    Turning from your back to your side while in flat bed without using bedrails? 2  -CW      Moving from lying on back to sitting on the side of a flat bed without bedrails? 2  -CW      Moving to and from a bed to a chair (including a wheelchair)? 2  -CW      Standing up from a chair using your arms (e.g., wheelchair, bedside chair)? 2  -CW      Climbing 3-5 steps with a railing? 1  -CW      To walk in hospital room? 1  -CW      AM-PAC 6 Clicks Score 10  -CW         Functional Assessment    Outcome Measure Options AM-[facility] 6 Clicks Basic Mobility (PT)  -CW        User Key  (r) = Recorded By, (t) = Taken By, (c) = Cosigned By    Initials Name Provider Type    Slime Damian PTA Physical Therapy Assistant    BB Carla Lutz, ERIK/L Occupational Therapy Assistant    AS Debra Corado, OT Occupational Therapist    CW Kristie Alvarez, PT Physical Therapist           Time Calculation:         PT Charges     Row Name 11/09/18 1426             Time Calculation    Start Time 1350  -TA      Stop Time 1410  -TA      Time Calculation (min) 20 min  -TA         Time Calculation- PT    Total Timed Code Minutes- PT 20 minute(s)  -TA        User Key  (r) = Recorded By, (t) = Taken By, (c) = Cosigned By    Initials Name Provider Type    Slime Damian PTA Physical Therapy Assistant        Therapy Suggested Charges     Code   Minutes Charges    None           Therapy Charges for Today     Code Description Service Date Service Provider Modifiers Qty    96416829820 HC PT THER PROC EA 15 MIN 11/9/2018 Slime Martin PTA GP 1          PT G-Codes  PT Professional Judgement Used?: Yes  Outcome Measure Options: AM-PAC 6 Clicks Basic Mobility (PT)  AM-[facility] 6 Clicks Score: 10  Score:  10  Functional Limitation: Mobility: Walking and moving around  Mobility: Walking and Moving Around Current Status (): At least 60 percent but less than 80 percent impaired, limited or restricted  Mobility: Walking and Moving Around Goal Status (): At least 40 percent but less than 60 percent impaired, limited or restricted    Slime Martin, PTA  11/9/2018

## 2018-11-10 NOTE — THERAPY TREATMENT NOTE
Acute Care - Speech Language Pathology   Swallow Treatment Note [facility]     Patient Name: Ravi Park  : 1930  MRN: [id number]  Today's Date: 11/10/2018  Onset of Illness/Injury or Date of Surgery: 18     Referring Physician: Troy Laura      Admit Date: 2018     Goal:  Patient will safely tolerate least restricted diet w/no overt s/s of aspiration for adequate nutrition and hydration:  Pt seen for dysphagia therapy this date at breakfast meal. Pt was able to self feed w/some assist. Pt initially consumed thin liquids w/no overt s/s of aspiration; however, as meal progressed pt began demonstrating cough w/thin liquids via straw and cup. As meal progressed, coughing became more frequent. Pt presented w/wet vocal quality after meal. Pt's cough was productive and he was able to cough on command. SLP will continue to f/u w/pt and monitor liquids closely. Pt did not have denture adhesive which made dentures move around oral cavity freely which may have cause difficulty w/liquids.    Visit Dx:      ICD-10-CM ICD-9-CM   1. Acute cystitis with hematuria N30.01 595.0   2. Altered mental status, unspecified altered mental status type R41.82 780.97   3. Impaired functional mobility and endurance Z74.09 V49.89   4. Impaired mobility and activities of daily living Z74.09 799.89   5. Dysphagia, unspecified type R13.10 787.20     Patient Active Problem List   Diagnosis   • Peripheral arterial disease (CMS/HCC)   • Chronic atrial fibrillation (CMS/HCC)   • Backache   • Lumbar radiculopathy   • Edema   • Acute congestive heart failure (CMS/HCC)   • Acute blood loss anemia   • Acute on chronic diastolic heart failure (CMS/HCC)   • YESENIA (acute kidney injury) (CMS/HCC)   • (HFpEF) heart failure with preserved ejection fraction (CMS/HCC)   • Urinary retention   • Personal history of tobacco use, presenting hazards to health   • Vitreous floaters   • Upper respiratory infection   • Sprain of shoulder and  upper arm   • Shoulder pain   • Serous otitis media   • Sensorineural hearing loss   • Pure hypercholesterolemia   • Pseudophakia   • Prostatitis   • Pain in wrist   • Pain in thumb joint with movement   • Pain in joint involving right lower leg   • Pain in eye   • Otorrhea   • Open wound of toe   • Open wound of lower leg with complication   • Open wound of lower leg   • Open wound of lesser toe of left foot without damage to nail   • Olecranon bursitis   • Need for prophylactic vaccination and inoculation against influenza   • Need for immunization against influenza   • Multiple joint pain   • Malaise and fatigue   • Keratoconjunctivitis sicca (CMS/HCC)   • Insect bite   • Impacted cerumen   • Hypertensive disorder   • History of colonoscopy   • History of colon polyps   • History of artificial eye lens   • Herpes zoster   • Gout   • Exposure to communicable disease   • Dog bite of hand   • Disorder of sacroiliac joint   • Decreased platelet count (CMS/HCC)   • Cough   • Contusion of thumb   • Common cold   • Cervical arthritis   • Cerebrovascular accident (CMS/HCC)   • Cellulitis of lower leg   • Cataract   • Benign prostatic hyperplasia   • Atrial fibrillation (CMS/HCC)   • Astigmatism   • Allergic rhinitis due to pollen   • Allergic rhinitis   • Adverse reaction to drug   • Acute confusion   • Acute bronchitis   • Abnormal gait   • Urinary tract infection associated with indwelling urethral catheter (CMS/HCC)   • Hydronephrosis   • Nephrolithiasis   • Calculus of kidney   • Sepsis (CMS/HCC)   • Pulmonary hypertension (CMS/HCC)   • Tricuspid regurgitation   • CKD (chronic kidney disease) stage 3, GFR 30-59 ml/min (CMS/HCC)   • ESBL (extended spectrum beta-lactamase) producing bacteria infection   • Urinary tract infection associated with catheterization of urinary tract (CMS/HCC)   • Toxic encephalopathy   • Demand ischemia (CMS/HCC)   • Acute renal failure with tubular necrosis (CMS/HCC)   • Chronic atrial  fibrillation (CMS/HCC)   • Acute cystitis with hematuria   • Sepsis (CMS/HCC)   • Decubitus ulcer of left heel, unstageable (CMS/HCC)   • Acute renal failure (CMS/HCC)   • Pneumonia due to infectious organism   • Non-healing wound of left heel   • PVD (peripheral vascular disease) with claudication (CMS/[facility])       Therapy Treatment  Rehabilitation Treatment Summary     Row Name 11/10/18 0826             Treatment Time/Intention    Discipline  speech language pathologist  -CK      Document Type  therapy note (daily note)  -CK      Subjective Information  no complaints  -CK      Mode of Treatment  individual therapy;speech-language pathology  -CK      Patient/Family Observations  Pt sitting up in bed eating breakfast; no family present  -CK      Total Evaluation Minutes, SLP  48  -CK2      Patient Effort  good  -CK      Recorded by [CK] Chica Arnold MS CCC-SLP 11/10/18 0908  [CK2] Chica Arnold MS CCC-SLP 11/10/18 0916      Row Name 11/10/18 0826             Positioning and Restraints    Pre-Treatment Position  in bed  -CK      Post Treatment Position  bed  -CK      In Bed  supine;sitting;call light within reach;encouraged to call for assist;exit alarm on;side rails up x2  -CK      Recorded by [CK] Chica Arnold MS CCC-SLP 11/10/18 0908      Row Name 11/10/18 0826             Pain Scale: Numbers Pre/Post-Treatment    Pain Scale: Numbers, Pretreatment  0/10 - no pain  -CK      Pain Scale: Numbers, Post-Treatment  0/10 - no pain  -CK      Recorded by [CK] Chica Arnold MS CCC-SLP 11/10/18 0908      Row Name                Wound 10/30/18 1715 Left heel pressure injury    Wound - Properties Group Date first assessed: 10/30/18 [AS] Time first assessed: 1715 [AS] Present On Admission : yes;picture taken [AS] Side: Left [AS] Location: heel [AS] Type: pressure injury [AS] Stage, Pressure Injury: unstageable [AS] Recorded by:  [AS] Gsielle Alicea, RN 10/30/18 1715    Row Name                Wound  11/06/18 0300 Left gluteal pressure injury    Wound - Properties Group Date first assessed: 11/06/18 [AB] Time first assessed: 0300 [AB] Present On Admission : yes;picture taken [SR], picture taken by nightshift RN  Side: Left [SR] Location: gluteal [SR] Type: pressure injury [SR] Stage, Pressure Injury: deep tissue injury [SR] Recorded by:  [AB] Jacquie Chu RN 11/08/18 1511 [SR] Lexi Chaparro RN 11/06/18 2051    Row Name 11/10/18 0826             Outcome Summary/Treatment Plan (SLP)    Daily Summary of Progress (SLP)  progress toward functional goals is good  -CK      Barriers to Overall Progress (SLP)  ill fitting dentures w/o adhesive  -CK      Plan for Continued Treatment (SLP)  Continue POC  -CK      Anticipated Dischage Disposition  home with 24/7 care  -CK2      Recorded by [CK] Chica Arnold MS CCC-SLP 11/10/18 0908  [CK2] Chica Arnold MS CCC-SLP 11/10/18 0909        User Key  (r) = Recorded By, (t) = Taken By, (c) = Cosigned By    Initials Name Effective Dates Discipline    CK Chica Arnold MS CCC-SLP 04/03/18 -  SLP    AB Jacquie Chu RN 10/17/16 -  Nurse    SR Lexi Chaparro RN 10/17/16 -  Nurse    AS Giselle Alicea RN 12/13/16 -  Nurse          Outcome Summary  Outcome Summary/Treatment Plan (SLP)  Daily Summary of Progress (SLP): progress toward functional goals is good (11/10/18 0826 : Chica Arnold, MS CCC-SLP)  Barriers to Overall Progress (SLP): ill fitting dentures w/o adhesive (11/10/18 0826 : Chica Arnold, MS CCC-SLP)  Plan for Continued Treatment (SLP): Continue POC (11/10/18 0826 : Chica Arnold, MS CCC-SLP)  Anticipated Dischage Disposition: home with 24/7 care (11/10/18 0826 : Chica Arnold, MS CCC-[facility])      SLP GOALS     Row Name 11/10/18 0826 11/08/18 0959          Oral Nutrition/Hydration Goal 1 (SLP)    Oral Nutrition/Hydration Goal 1, SLP  Pt to tolerate least restrictive diet with no s/s of aspiration for adequate  nutrition and hydration.   -CK  Pt to tolerate least restrictive diet with no s/s of aspiration for adequate nutrition and hydration.   -EK     Time Frame (Oral Nutrition/Hydration Goal 1, SLP)  by discharge  -CK  by discharge  -EK     Barriers (Oral Nutrition/Hydration Goal 1, SLP)  ill fitting dentures; coughing w/thin liquids  -CK  --     Progress/Outcomes (Oral Nutrition/Hydration Goal 1, SLP)  goal ongoing  -CK  --       User Key  (r) = Recorded By, (t) = Taken By, (c) = Cosigned By    Initials Name Provider Type    Cece Cleaning, CCC-SLP Speech and Language Pathologist    Chica Orozco MS CCC-SLP Speech and Language Pathologist          EDUCATION  The patient has been educated in the following areas:   Dysphagia (Swallowing Impairment) Modified Diet Instruction.    SLP Recommendation and Plan                       Anticipated Dischage Disposition: home with 24/7 care                Plan of Care Reviewed With: patient  Plan of Care Review  Plan of Care Reviewed With: patient  Daily Summary of Progress (SLP): progress toward functional goals is good  Plan for Continued Treatment (SLP): Continue POC  Progress: declining  Outcome Summary: Pt seen for dysphagia therapy this date at breakfast meal.  Pt was able to self feed w/some assist.  Pt initially consumed thin liquids w/no overt s/s of aspiration; however, as meal progressed pt began demonstrating cough w/thin liquids via straw and cup.  As meal progressed, coughing became more frequent.  Pt presented w/wet vocal quality after meal.  Pt's cough was productive and he was able to cough on command.  SLP will continue to f/u w/pt and monitor liquids closely.  Pt did not have denture adhesive which made dentures move around oral cavity freely which may have cause difficulty w/liquids.           Time Calculation:   Time Calculation- SLP     Row Name 11/10/18 0914             Time Calculation- SLP    SLP Start Time  0826  -CK      SLP Stop  Time  0914  -      SLP Time Calculation (min)  48 min  -CK      Total Timed Code Minutes- SLP  48 minute(s)  -CK      SLP Received On  11/10/18  -CK      SLP Goal Re-Cert Due Date  11/21/18  -        User Key  (r) = Recorded By, (t) = Taken By, (c) = Cosigned By    Initials Name Provider Type    Chica Orozco MS CCC-SLP Speech and Language Pathologist          Therapy Charges for Today     Code Description Service Date Service Provider Modifiers Qty    [id number]  ST TREATMENT SWALLOW 3 11/10/2018 Chica Arnold MS CCC-SLP GN 1          SLP G-Codes  Functional Limitations: Swallowing  Swallow Current Status (): At least 1 percent but less than 20 percent impaired, limited or restricted  Swallow Goal Status (): 0 percent impaired, limited or restricted      Chica Arnold MS CCC-SLP  11/10/2018

## 2018-11-10 NOTE — PLAN OF CARE
Problem: Patient Care Overview  Goal: Plan of Care Review  Outcome: Ongoing (interventions implemented as appropriate)   11/10/18 4675   Coping/Psychosocial   Plan of Care Reviewed With patient   Plan of Care Review   Progress improving   OTHER   Outcome Summary pt sup<>sit with min assist, pt sat @ EOB ~30 minutes with SBA & performed AROM B LE exercises, pt will require 24/7 care & continued PT services @ D/C

## 2018-11-10 NOTE — PROGRESS NOTES
[facility] Medicine Services  INPATIENT PROGRESS NOTE    Length of Stay: 0  Date of Admission: 11/5/2018  Primary Care Physician: Terrell Arzate MD    Subjective   Chief Complaint: No complaints    HPI:     11/10/2018:  The patient was able to assist with feeding today.  The patient home situation is being evaluated by APS and they must clear him for discharge.  Awaiting recommendation.     11/9/2018:  The patient continues to decline and requires feeding by nursing staff currently.  Speech and swallow evaluation recommends all medication be presented in [place] or [employer].    11/8/2018:  Due to worsening fatigue and drop in hemoglobin (7.2), two units of PRBC given today.  I spoke with the patient's daughter and received consent for blood.       88 year old  male with PMH of tricuspid regurgitation, peripheral vascular disease, HLD, pulmonary hypertension, HTN, CKD stage III and HFpEF that presented to [facility] on 11/5/2018 secondary to altered mental status.  The patient was found to have a Klebsiella UTI and started on Rocephin.  He has a chronic suprapubic catheter that was changed on 11/6/2018 by urology.       Review of Systems   Constitutional: Positive for fatigue.      All pertinent negatives and positives are as above. All other systems have been reviewed and are negative unless otherwise stated.     Objective    Temp:  [95.3 °F (35.2 °C)-97.9 °F (36.6 °C)] 95.3 °F (35.2 °C)  Heart Rate:  [] 89  Resp:  [16-18] 18  BP: (115-131)/(56-71) 131/69    Physical Exam   Constitutional: He is oriented to person, place, and time. He appears well-developed and well-nourished.   HENT:   Head: Normocephalic and atraumatic.   Eyes: EOM are normal. Pupils are equal, round, and reactive to light.   Neck: Normal range of motion. Neck supple.   Cardiovascular: Normal rate and regular rhythm.   Pulmonary/Chest: Effort normal and breath sounds normal.    Abdominal: Soft. Bowel sounds are normal.   Musculoskeletal: Normal range of motion.   Neurological: He is alert and oriented to person, place, and time.   Skin: Skin is warm and dry.   Psychiatric: He has a normal mood and affect.     Results Review:  I have reviewed the labs, radiology results, and diagnostic studies.    Laboratory Data:   Results from last 7 days   Lab Units  11/10/18   0601  11/09/18   0550  11/08/18   0518   SODIUM mmol/L  143  142  141   POTASSIUM mmol/L  3.5  3.7  3.6   CHLORIDE mmol/L  104  105  104   CO2 mmol/L  31.0  29.0  30.0   BUN mg/dL  28*  23*  18   CREATININE mg/dL  1.21  1.15  1.14   GLUCOSE mg/dL  95  97  96   CALCIUM mg/dL  9.2  8.9  8.7   BILIRUBIN mg/dL  0.5  0.9  0.4   ALK PHOS U/L  84  73  70   ALT (SGPT) U/L  8*  11*  18*   AST (SGOT) U/L  25  19  20   ANION GAP mmol/L  8.0  8.0  7.0     Estimated Creatinine Clearance: 45.8 mL/min (by C-G formula based on SCr of 1.21 mg/dL).  Results from last 7 days   Lab Units  11/05/18   1733   MAGNESIUM mg/dL  2.0         Results from last 7 days   Lab Units  11/10/18   0601  11/09/18   0550  11/08/18   1936  11/08/18   0518  11/07/18   0544  11/06/18   0534   WBC 10*3/mm3  7.01  6.74   --   5.36  6.83  6.87   HEMOGLOBIN g/dL  10.0*  9.1*  9.8*  7.2*  7.8*  7.2*   HEMATOCRIT %  31.6*  27.9*  30.4*  22.9*  25.6*  23.3*   PLATELETS 10*3/mm3  243  228   --   228  250  220           Culture Data:   No results found for: BLOODCX  No results found for: URINECX  No results found for: RESPCX  No results found for: WOUNDCX  No results found for: STOOLCX  No components found for: BODYFLD    Radiology Data:   Imaging Results (last 24 hours)     ** No results found for the last 24 hours. **          I have reviewed the patient's current medications.     Assessment/Plan     Active Hospital Problems    Diagnosis Date Noted   • PVD (peripheral vascular disease) with claudication (CMS/[facility]) [I73.9] 10/23/2018     Added automatically from request for  surgery [id number]     • Acute cystitis with hematuria [N30.01] 09/08/2018   • Tricuspid regurgitation [I07.1] 07/16/2018   • Pulmonary hypertension (CMS/HCC) [I27.20] 07/16/2018   • CKD (chronic kidney disease) stage 3, GFR 30-59 ml/min (CMS/[facility]) [N18.3] 07/16/2018   • (HFpEF) heart failure with preserved ejection fraction (CMS/[facility]) [I50.30] 05/21/2018   • Pure hypercholesterolemia [E78.00]    • Hypertensive disorder [I10]        Plan:    1.  Acute cystitis: Klebsiella oxytoca.  Continue Rocephin.  2.  Anemia:  Oral iron started.  2 units of PRBC given 11/8/2018. Hemoglobin has remained stable since then.    3.  CKD stage III: Appear to be doing well continue to monitor.  4.  Hypertension,stable: Continue current medications.  5.  Hyperlipidemia: Statin.  6.  Heart failure with preserved ejection fraction: Patient does not appear to be in heart failure at this point we'll continue monitor.  7.  Deconditioning PT/OT.    Case management working with APS and family about appropriate discharge.     Discharge Planning: I expect patient to be discharged to home in 1-2 days.      This document has been electronically signed by JUSTIN David on November 10, 2018 1:02 PM

## 2018-11-10 NOTE — PROGRESS NOTES
"   LOS: 0 days   Patient Care Team:  Terrell Arzate MD as PCP - General  Terrell Arzate MD as PCP - Claims Attributed  [facility] CampusBethany RN as Care Coordinator (Population Health)    Subjective     Subjective:  Symptoms:  (Increasing white sediment in Dye's gravity bag. Cystostomy site clear. ).    Diet:  No nausea or vomiting.    Activity level: Impaired due to weakness.    Pain:  Pain is requiring pain medication.        History taken from: patient chart    Objective     Vital Signs  Temp:  [95.3 °F (35.2 °C)-97.9 °F (36.6 °C)] 95.3 °F (35.2 °C)  Heart Rate:  [] 89  Resp:  [16-18] 18  BP: (115-131)/(56-71) 131/69    Objective:  General Appearance:  In no acute distress.    Vital signs: (most recent): Blood pressure 131/69, pulse 89, temperature 95.3 °F (35.2 °C), temperature source Axillary, resp. rate 18, height 182.9 cm (72\"), weight 76.7 kg (169 lb), SpO2 98 %.  Vital signs are normal.  No fever.    Output: Producing urine (Dye urine yellow with white sediment).    HEENT: Normal HEENT exam.    Lungs:  Normal effort and normal respiratory rate.  Breath sounds clear to auscultation.  He is not in respiratory distress.    Heart: Normal rate.  Regular rhythm.  S1 normal and S2 normal.  No murmur.   Chest: Symmetric chest wall expansion.   Abdomen: Abdomen is soft and non-distended.  (No redness at SP tube site).  Bowel sounds are normal.   There is no abdominal tenderness.     Extremities: Normal range of motion.    Pulses: Distal pulses are intact.    Neurological: Patient is alert.  GCS score is 15.    Pupils:  Pupils are equal, round, and reactive to light.    Skin:  Warm, dry and pale.  No rash, ecchymosis or cyanosis.             Results Review:    Lab Results (last 24 hours)     Procedure Component Value Units Date/Time    Comprehensive Metabolic Panel [[id number]]  (Abnormal) Collected:  11/10/18 0601    Specimen:  Blood Updated:  11/10/18 0644     Glucose 95 mg/dL      BUN 28 mg/dL      " Creatinine 1.21 mg/dL      Sodium 143 mmol/L      Potassium 3.5 mmol/L      Chloride 104 mmol/L      CO2 31.0 mmol/L      Calcium 9.2 mg/dL      Total Protein 7.7 g/dL      Albumin 3.80 g/dL      ALT (SGPT) 8 U/L      AST (SGOT) 25 U/L      Alkaline Phosphatase 84 U/L      Total Bilirubin 0.5 mg/dL      eGFR Non African Amer 57 mL/min/1.73      Globulin 3.9 gm/dL      A/G Ratio 1.0 g/dL      BUN/Creatinine Ratio 23.1     Anion Gap 8.0 mmol/L     Narrative:       The MDRD GFR formula is only valid for adults with stable renal function between ages 18 and 70.    CBC & Differential [[id number]] Collected:  11/10/18 0601    Specimen:  Blood Updated:  11/10/18 0613    Narrative:       The following orders were created for panel order CBC & Differential.  Procedure                               Abnormality         Status                     ---------                               -----------         ------                     CBC Auto Differential[[id number]]        Abnormal            Final result                 Please view results for these tests on the individual orders.    CBC Auto Differential [[id number]]  (Abnormal) Collected:  11/10/18 0601    Specimen:  Blood Updated:  11/10/18 0613     WBC 7.01 10*3/mm3      RBC 3.55 10*6/mm3      Hemoglobin 10.0 g/dL      Hematocrit 31.6 %      MCV 89.0 fL      MCH 28.2 pg      MCHC 31.6 g/dL      RDW 16.0 %      RDW-SD 52.4 fl      MPV 9.7 fL      Platelets 243 10*3/mm3      Neutrophil % 64.2 %      Lymphocyte % 19.0 %      Monocyte % 14.3 %      Eosinophil % 2.1 %      Basophil % 0.1 %      Immature Grans % 0.3 %      Neutrophils, Absolute 4.50 10*3/mm3      Lymphocytes, Absolute 1.33 10*3/mm3      Monocytes, Absolute 1.00 10*3/mm3      Eosinophils, Absolute 0.15 10*3/mm3      Basophils, Absolute 0.01 10*3/mm3      Immature Grans, Absolute 0.02 10*3/mm3     Blood Culture - Blood, [[id number]]  (Normal) Collected:  11/05/18 1309    Specimen:  Blood from Arm, Left Updated:   11/09/18 1945     Blood Culture No growth at 4 days    Blood Culture - Blood, [[id number]]  (Normal) Collected:  11/05/18 1936    Specimen:  Blood from Hand, Left Updated:  11/09/18 1945     Blood Culture No growth at 4 days         Imaging Results (last 24 hours)     ** No results found for the last 24 hours. **           I reviewed the patient's new clinical results.  I reviewed the patient's new imaging results and agree with the interpretation.  I reviewed the patient's other test results and agree with the interpretation      Assessment/Plan       (HFpEF) heart failure with preserved ejection fraction (CMS/HCC)    Pure hypercholesterolemia    Hypertensive disorder    Pulmonary hypertension (CMS/HCC)    Tricuspid regurgitation    CKD (chronic kidney disease) stage 3, GFR 30-59 ml/min (CMS/HCC)    Acute cystitis with hematuria    PVD (peripheral vascular disease) with claudication (CMS/HCC)      Assessment:    Condition: In stable condition.       1. Obstructive BPH causing neurogenic bladder, cystoscopy with SP tube placement in Sept due for SP tube change  -Proscar, Flomax  -Chronic SP tube, changed 11/6/18     2. UTI cystitis, complicated, history of ESBL UTI  -Urine culture 11/5/18 Klebsiella oxytoca  -Antibiotic day #6, Rocephin    Plan:   Continue Rocephin.   Urine culture, add Diflucan.   SP Tube change due 12/4/18.    JUSTIN Selby  11/10/18  1:16 PM

## 2018-11-10 NOTE — PLAN OF CARE
Problem: Patient Care Overview  Goal: Plan of Care Review  Outcome: Ongoing (interventions implemented as appropriate)   11/10/18 0944   Coping/Psychosocial   Plan of Care Reviewed With patient   Plan of Care Review   Progress declining   OTHER   Outcome Summary Pt seen for dysphagia therapy this date at breakfast meal. Pt was able to self feed w/some assist. Pt initially consumed thin liquids w/no overt s/s of aspiration; however, as meal progressed pt began demonstrating cough w/thin liquids via straw and cup. As meal progressed, coughing became more frequent. Pt presented w/wet vocal quality after meal. Pt's cough was productive and he was able to cough on command. SLP will continue to f/u w/pt and monitor liquids closely. Pt did not have denture adhesive which made dentures move around oral cavity freely which may have cause difficulty w/liquids.

## 2018-11-10 NOTE — PLAN OF CARE
Problem: Patient Care Overview  Goal: Plan of Care Review  Outcome: Ongoing (interventions implemented as appropriate)   11/10/18 6218   Coping/Psychosocial   Plan of Care Reviewed With patient   Plan of Care Review   Progress improving   OTHER   Outcome Summary improving toward goals addressed sitting balance act groomking with min a

## 2018-11-10 NOTE — THERAPY TREATMENT NOTE
Acute Care - Occupational Therapy Treatment Note  [facility]     Patient Name: Ravi Park  : 1930  MRN: [id number]  Today's Date: 11/10/2018  Onset of Illness/Injury or Date of Surgery: 18  Date of Referral to OT: 18  Referring Physician: Troy Laura    Admit Date: 2018       ICD-10-CM ICD-9-CM   1. Acute cystitis with hematuria N30.01 595.0   2. Altered mental status, unspecified altered mental status type R41.82 780.97   3. Impaired functional mobility and endurance Z74.09 V49.89   4. Impaired mobility and activities of daily living Z74.09 799.89   5. Dysphagia, unspecified type R13.10 787.20     Patient Active Problem List   Diagnosis   • Peripheral arterial disease (CMS/HCC)   • Chronic atrial fibrillation (CMS/[facility])   • Backache   • Lumbar radiculopathy   • Edema   • Acute congestive heart failure (CMS/[facility])   • Acute blood loss anemia   • Acute on chronic diastolic heart failure (CMS/HCC)   • YESENIA (acute kidney injury) (CMS/[facility])   • (HFpEF) heart failure with preserved ejection fraction (CMS/[facility])   • Urinary retention   • Personal history of tobacco use, presenting hazards to health   • Vitreous floaters   • Upper respiratory infection   • Sprain of shoulder and upper arm   • Shoulder pain   • Serous otitis media   • Sensorineural hearing loss   • Pure hypercholesterolemia   • Pseudophakia   • Prostatitis   • Pain in wrist   • Pain in thumb joint with movement   • Pain in joint involving right lower leg   • Pain in eye   • Otorrhea   • Open wound of toe   • Open wound of lower leg with complication   • Open wound of lower leg   • Open wound of lesser toe of left foot without damage to nail   • Olecranon bursitis   • Need for prophylactic vaccination and inoculation against influenza   • Need for immunization against influenza   • Multiple joint pain   • Malaise and fatigue   • Keratoconjunctivitis sicca (CMS/HCC)   • Insect bite   • Impacted cerumen   • Hypertensive disorder    • History of colonoscopy   • History of colon polyps   • History of artificial eye lens   • Herpes zoster   • Gout   • Exposure to communicable disease   • Dog bite of hand   • Disorder of sacroiliac joint   • Decreased platelet count (CMS/HCC)   • Cough   • Contusion of thumb   • Common cold   • Cervical arthritis   • Cerebrovascular accident (CMS/HCC)   • Cellulitis of lower leg   • Cataract   • Benign prostatic hyperplasia   • Atrial fibrillation (CMS/HCC)   • Astigmatism   • Allergic rhinitis due to pollen   • Allergic rhinitis   • Adverse reaction to drug   • Acute confusion   • Acute bronchitis   • Abnormal gait   • Urinary tract infection associated with indwelling urethral catheter (CMS/HCC)   • Hydronephrosis   • Nephrolithiasis   • Calculus of kidney   • Sepsis (CMS/HCC)   • Pulmonary hypertension (CMS/HCC)   • Tricuspid regurgitation   • CKD (chronic kidney disease) stage 3, GFR 30-59 ml/min (CMS/HCC)   • ESBL (extended spectrum beta-lactamase) producing bacteria infection   • Urinary tract infection associated with catheterization of urinary tract (CMS/HCC)   • Toxic encephalopathy   • Demand ischemia (CMS/HCC)   • Acute renal failure with tubular necrosis (CMS/HCC)   • Chronic atrial fibrillation (CMS/HCC)   • Acute cystitis with hematuria   • Sepsis (CMS/HCC)   • Decubitus ulcer of left heel, unstageable (CMS/HCC)   • Acute renal failure (CMS/HCC)   • Pneumonia due to infectious organism   • Non-healing wound of left heel   • PVD (peripheral vascular disease) with claudication (CMS/HCC)     Past Medical History:   Diagnosis Date   • Benign prostatic hyperplasia    • Cerebrovascular accident (CMS/HCC)    • Chronic anemia    • Chronic atrial fibrillation (CMS/HCC)    • CKD (chronic kidney disease) stage 3, GFR 30-59 ml/min (CMS/HCC)    • GERD (gastroesophageal reflux disease)    • Gout    • Hydronephrosis    • Hypercholesterolemia    • Hypertension    • Nephrolithiasis    • Peripheral arterial disease  (CMS/HCC)    • Pulmonary hypertension (CMS/HCC)    • Tricuspid regurgitation    • Urinary retention      Past Surgical History:   Procedure Laterality Date   • CARDIAC CATHETERIZATION     • NEPHROSTOMY         Therapy Treatment    Rehabilitation Treatment Summary     Row Name 11/10/18 1333 11/10/18 1050 11/10/18 0826       Treatment Time/Intention    Discipline  physical therapy assistant  -TA  occupational therapy assistant  -LM  speech language pathologist  -CK    Document Type  therapy note (daily note)  -TA  therapy note (daily note)  -LM  therapy note (daily note)  -CK    Subjective Information  complains of;pain  -TA  no complaints  -LM  no complaints  -CK    Mode of Treatment  physical therapy  -TA  individual therapy;occupational therapy  -LM  individual therapy;speech-language pathology  -CK    Patient/Family Observations  --  --  Pt sitting up in bed eating breakfast; no family present  -CK    Therapy Frequency (PT Clinical Impression)  other (see comments) 5-14x/week  -TA  --  --    Total Evaluation Minutes, SLP  --  --  48  -CK2    Patient Effort  adequate  -TA  --  good  -CK    Existing Precautions/Restrictions  fall  -TA  --  --    Recorded by [TA] Slime Martin, JOSHUA 11/10/18 1604 [LM] Marilin Miramontes, VALENCIA/L 11/10/18 1656 [CK] Chica Arnold, MS CCC-SLP 11/10/18 0908  [CK2] Chica Arnold, MS CCC-SLP 11/10/18 0916    Row Name 11/10/18 1333 11/10/18 1050          Vital Signs    Pre Systolic BP Rehab  117  -TA  --     Pre Treatment Diastolic BP  58  -TA  --     Post Systolic BP Rehab  154  -TA  --     Post Treatment Diastolic BP  65  -TA  --     Pretreatment Heart Rate (beats/min)  66  -TA  69  -LM     Posttreatment Heart Rate (beats/min)  60  -TA  63  -LM     Pre SpO2 (%)  95  -TA  96  -LM     O2 Delivery Pre Treatment  supplemental O2  -TA  --     Post SpO2 (%)  92  -TA  94  -LM     O2 Delivery Post Treatment  supplemental O2  -TA  --     Pre Patient Position  Supine  -TA  --     Intra  Patient Position  Sitting  -TA  --     Post Patient Position  Supine  -TA  --     Recorded by [TA] Slime Martin, PTA 11/10/18 1604 [LM] Marilin Miramontes VALENCIA/L 11/10/18 1656     Row Name 11/10/18 1333 11/10/18 1050          Cognitive Assessment/Intervention- PT/OT    Affect/Mental Status (Cognitive)  unable/difficult to assess  -TA  unable/difficult to assess  -LM     Behavioral Issues (Cognitive)  unable/difficult to assess  -TA  unable/difficult to assess  -LM     Orientation Status (Cognition)  oriented to;person;place;verbal cues/prompts needed for orientation  -TA  oriented x 3  -LM     Follows Commands (Cognition)  follows one step commands;50-74% accuracy  -TA  --     Cognitive Function (Cognitive)  safety deficit;memory deficit  -TA  --     Attention Deficit (Cognitive)  moderate deficit  -TA  --     Safety Deficit (Cognitive)  ability to follow commands  -TA  --     Personal Safety Interventions  nonskid shoes/slippers when out of bed;supervised activity;fall prevention program maintained  -TA  --     Recorded by [TA] Slime Martin, PTA 11/10/18 1604 [LM] Marilin Miramontes COTA/L 11/10/18 1656     Row Name 11/10/18 1333             Safety Issues, Functional Mobility    Impairments Affecting Function (Mobility)  balance;cognition;coordination;postural/trunk control;range of motion (ROM);strength;shortness of breath  -TA      Recorded by [TA] Slime Martin, PTA 11/10/18 1604      Row Name 11/10/18 1333 11/10/18 1050          Bed Mobility Assessment/Treatment    Bed Mobility Assessment/Treatment  supine-sit;sit-supine  -TA  bed mobility (all) activities;scooting/bridging;rolling left;sit-supine;supine-sit-supine;supine-sit  -LM     Rolling Left [place] (Bed Mobility)  --  minimum assist (75% patient effort)  -LM     Scooting/Bridging [place] (Bed Mobility)  --  moderate assist (50% patient effort)  -LM     Supine-Sit [place] (Bed Mobility)  minimum assist (75% patient effort)  -TA   minimum assist (75% patient effort)  -LM     Sit-Supine [place] (Bed Mobility)  moderate assist (50% patient effort)  -TA  moderate assist (50% patient effort)  -LM     Bed Mobility, Safety Issues  decreased use of arms for pushing/pulling;impaired trunk control for bed mobility;decreased use of legs for bridging/pushing  -TA  --     Assistive Device (Bed Mobility)  bed rails;head of bed elevated  -TA  --     Comment (Bed Mobility)  pt sat @ EOB ~30 minutes with SBA  -TA  --     Recorded by [TA] Slime Martin, PTA 11/10/18 1604 [LM] Marilin Miramontes VALENCIA/L 11/10/18 1656     Row Name 11/10/18 1333             Functional Mobility    Functional Mobility- Ind. Level  not tested  -TA      Recorded by [TA] Slime Martin, PTA 11/10/18 1604      Row Name 11/10/18 1050             Transfer Assessment/Treatment    Transfer Assessment/Treatment  --  -LM      Recorded by [LM] Marilin Miramontes VALENCIA/L 11/10/18 1656      Row Name 11/10/18 1333             Gait/Stairs Assessment/Training    [place] Level (Gait)  not tested  -TA      Recorded by [TA] Slime Martin, PTA 11/10/18 1604      Row Name 11/10/18 1050             Grooming Assessment/Training    [place] Level (Grooming)  grooming skills;hair care, combing/brushing;minimum assist (75% patient effort)  -LM      Recorded by [LM] Marilin Miramontes VALENCIA/L 11/10/18 1656      Row Name 11/10/18 1050             Motor Skills Assessment/Interventions    Additional Documentation  -- sitting balance eob all directions lean r l for stretch yuriy  -LM      Recorded by [LM] Marilin Miramontes VALENCIA/L 11/10/18 1656      Row Name 11/10/18 1333 11/10/18 1050          Therapeutic Exercise    Upper Extremity (Therapeutic Exercise)  --  -- [place] reaching all directions as jose   -LM     Lower Extremity (Therapeutic Exercise)  LAQ (long arc quad), bilateral;marching while seated  -TA  --     Lower Extremity Range of Motion (Therapeutic Exercise)  ankle  dorsiflexion/plantar flexion, bilateral;hip abduction/adduction, bilateral  -TA  --     Exercise Type (Therapeutic Exercise)  AROM (active range of motion)  -TA  --     Sets/Reps (Therapeutic Exercise)  15  -TA  --     Expected Outcome (Therapeutic Exercise)  improve functional stability;improve functional tolerance, household activity;improve performance, gait skills;improve performance, transfer skills  -TA  --     Recorded by [TA] Slime Martin, PTA 11/10/18 1604 [LM] Marilin Miramontes COTA/L 11/10/18 1656     Row Name 11/10/18 1333             Static Sitting Balance    Level of [place] (Supported Sitting, Static Balance)  standby assist  -TA      Sitting Position (Supported Sitting, Static Balance)  sitting on edge of bed  -TA      Time Able to Maintain Position (Supported Sitting, Static Balance)  more than 5 minutes  -TA      Recorded by [TA] Slime Martin PTA 11/10/18 1604      Row Name 11/10/18 1333 11/10/18 1050 11/10/18 0826       Positioning and Restraints    Pre-Treatment Position  in bed  -TA  in bed  -LM  in bed  -CK    Post Treatment Position  bed  -TA  bed  -LM  bed  -CK    In Bed  supine;call light within reach;exit alarm on;R heel elevated;L heel elevated  -TA  --  supine;sitting;call light within reach;encouraged to call for assist;exit alarm on;side rails up x2  -CK    Recorded by [TA] Slime Martin, PTA 11/10/18 1604 [LM] Marilin Miramontes COTA/L 11/10/18 1656 [CK] Chica Arnold MS CCC-SLP 11/10/18 0908    Row Name 11/10/18 1333 11/10/18 1050 11/10/18 0826       Pain Scale: Numbers Pre/Post-Treatment    Pain Scale: Numbers, Pretreatment  7/10  -TA  0/10 - no pain  -LM  0/10 - no pain  -CK    Pain Scale: Numbers, Post-Treatment  7/10  -TA  0/10 - no pain  -LM  0/10 - no pain  -CK    Pain Location  neck  -TA  --  --    Pain Intervention(s)  Repositioned  -TA  --  --    Recorded by [TA] Slime Martin, PTA 11/10/18 1604 [LM] Marilin Miramontes, VALENCIA/L 11/10/18 1656 [CK]  Chica Arnold MS CCC-SLP 11/10/18 0908    Row Name                Wound 10/30/18 1715 Left heel pressure injury    Wound - Properties Group Date first assessed: 10/30/18 [AS] Time first assessed: 1715 [AS] Present On Admission : yes;picture taken [AS] Side: Left [AS] Location: heel [AS] Type: pressure injury [AS] Stage, Pressure Injury: unstageable [AS] Recorded by:  [AS] Giselle Alicea RN 10/30/18 1715    Row Name                Wound 11/06/18 0300 Left gluteal pressure injury    Wound - Properties Group Date first assessed: 11/06/18 [AB] Time first assessed: 0300 [AB] Present On Admission : yes;picture taken [SR], picture taken by nightshift RN  Side: Left [SR] Location: gluteal [SR] Type: pressure injury [SR] Stage, Pressure Injury: deep tissue injury [SR] Recorded by:  [AB] Jacquie Chu RN 11/08/18 1511 [SR] Lexi Chaparro RN 11/06/18 2051    Row Name 11/10/18 1050             Outcome Summary/Treatment Plan (OT)    Daily Summary of Progress (OT)  progress toward functional goals is good  -LM      Recorded by [LM] Marilin Miramontes COTA/L 11/10/18 1656      Row Name 11/10/18 1333             Outcome Summary/Treatment Plan (PT)    Daily Summary of Progress (PT)  progress towards functional goals is fair  -TA      Plan for Continued Treatment (PT)  continue per pt tolerance  -TA      Anticipated Equipment Needs at Discharge (PT)  --  -TA      Anticipated Discharge Disposition (PT)  skilled nursing facility;anticipate therapy at next level of care;home with 24/7 care  -TA      Recorded by [TA] Slime Martin PTA 11/10/18 1604      Row Name 11/10/18 0826             Outcome Summary/Treatment Plan (SLP)    Daily Summary of Progress (SLP)  progress toward functional goals is good  -CK      Barriers to Overall Progress (SLP)  ill fitting dentures w/o adhesive  -CK      Plan for Continued Treatment (SLP)  Continue POC  -CK      Anticipated Dischage Disposition  home with 24/7 care  -CK2       Recorded by [CK] Chica Arnold, MS CCC-SLP 11/10/18 0908  [CK2] Chica Arnold, MS CCC-SLP 11/10/18 0909        User Key  (r) = Recorded By, (t) = Taken By, (c) = Cosigned By    Initials Name Effective Dates Discipline    CK Chica Arnold, MS CCC-SLP 04/03/18 -  SLP    TA Slime Martin, PTA 03/07/18 -  PT    LM Marilin Miramontes, VALENCIA/L 03/07/18 -  OT    AB Jacquie Chu RN 10/17/16 -  Nurse    SR Lexi Chaparro RN 10/17/16 -  Nurse    AS Giselle Alicea RN 12/13/16 -  Nurse        Wound 10/30/18 1715 Left heel pressure injury (Active)   Dressing Appearance no drainage 11/9/2018 10:00 PM   Base black eschar 11/9/2018 10:00 PM   Periwound Skin Turgor firm 11/9/2018 10:00 PM   Edges callused 11/9/2018 10:00 PM   Care, Wound cleansed with;soap and water 11/9/2018 10:00 PM   Dressing Care, Wound dressing changed 11/9/2018 10:00 PM       Wound 11/06/18 0300 Left gluteal pressure injury (Active)   Dressing Appearance open to air 11/9/2018 10:00 PM   Base maroon/purple 11/9/2018 10:00 PM   Periwound blanchable;redness 11/9/2018 10:00 PM   Care, Wound barrier applied 11/9/2018 10:00 PM   Periwound Care, Wound barrier ointment applied 11/9/2018 10:00 PM     OT Rehab Goals     Row Name 11/10/18 1657             Transfer Goal 1 (OT)    Activity/Assistive Device (Transfer Goal 1, OT)  sit-to-stand/stand-to-sit;bed-to-chair/chair-to-bed;toilet  -LM      [place] Level/Cues Needed (Transfer Goal 1, OT)  contact guard assist  -LM      Time Frame (Transfer Goal 1, OT)  long term goal (LTG);by discharge  -LM      Progress/Outcome (Transfer Goal 1, OT)  goal not met  -LM         Bathing Goal 1 (OT)    Activity/Assistive Device (Bathing Goal 1, OT)  bathing skills, all using AE PRN  -LM      [place] Level/Cues Needed (Bathing Goal 1, OT)  minimum assist (75% or more patient effort)  -LM      Time Frame (Bathing Goal 1, OT)  long term goal (LTG);by discharge  -LM      Progress/Outcomes (Bathing  Goal 1, OT)  goal not met  -LM         Dressing Goal 1 (OT)    Activity/Assistive Device (Dressing Goal 1, OT)  dressing skills, all using AE PRN  -LM      [place]/Cues Needed (Dressing Goal 1, OT)  minimum assist (75% or more patient effort)  -LM      Time Frame (Dressing Goal 1, OT)  long term goal (LTG);by discharge  -LM      Progress/Outcome (Dressing Goal 1, OT)  goal not met  -LM         Toileting Goal 1 (OT)    Activity/Device (Toileting Goal 1, OT)  toileting skills, all  -LM      [place] Level/Cues Needed (Toileting Goal 1, OT)  minimum assist (75% or more patient effort)  -LM      Time Frame (Toileting Goal 1, OT)  long term goal (LTG);by discharge  -LM      Progress/Outcome (Toileting Goal 1, OT)  goal not met  -LM         Strength Goal 1 (OT)    Strength Goal 1 (OT)  Pt will increase BUE strenth at least 1/2 grade level to benefit ADLs and functional transfers.   -LM      Time Frame (Strength Goal 1, OT)  long term goal (LTG);by discharge  -LM      Progress/Outcome (Strength Goal 1, OT)  goal not met  -LM        User Key  (r) = Recorded By, (t) = Taken By, (c) = Cosigned By    Initials Name Provider Type Discipline    LM Marilin Miramontes COTA/L Occupational Therapy Assistant OT        Occupational Therapy Education     Title: PT OT SLP Therapies (Active)     Topic: Occupational Therapy (Active)     Point: Body mechanics (Active)     Description: Instruct learner(s) on proper positioning and spine alignment during self-care, functional mobility activities and/or exercises.    Learning Progress Summary           Patient Acceptance, E, NR,NL by AS at 11/8/2018  4:28 PM    Comment:  role of OT, OT POC, bed mobility                               User Key     Initials Effective Dates Name Provider Type Discipline    AS 05/01/18 -  Debra Corado, OT Occupational Therapist OT                OT Recommendation and Plan  Outcome Summary/Treatment Plan (OT)  Daily Summary of Progress (OT): progress  toward functional goals is good  Daily Summary of Progress (OT): progress toward functional goals is good  Plan of Care Review  Plan of Care Reviewed With: patient  Plan of Care Reviewed With: patient  Outcome Summary: improving toward goals  addressed sitting balance act groomking with min a  Outcome Measures     Row Name 11/10/18 1600 11/09/18 1400 11/09/18 0855       How much help from another person do you currently need...    Turning from your back to your side while in flat bed without using bedrails?  3  -TA  2  -TA  --    Moving from lying on back to sitting on the side of a flat bed without bedrails?  3  -TA  2  -TA  --    Moving to and from a bed to a chair (including a wheelchair)?  2  -TA  2  -TA  --    Standing up from a chair using your arms (e.g., wheelchair, bedside chair)?  2  -TA  2  -TA  --    Climbing 3-5 steps with a railing?  1  -TA  1  -TA  --    To walk in hospital room?  1  -TA  1  -TA  --    AM-PAC 6 Clicks Score  12  -TA  10  -TA  --       How much help from another is currently needed...    Putting on and taking off regular lower body clothing?  --  --  1  -BB    Bathing (including washing, rinsing, and drying)  --  --  2  -BB    Toileting (which includes using toilet bed pan or urinal)  --  --  1  -BB    Putting on and taking off regular upper body clothing  --  --  2  -BB    Taking care of personal grooming (such as brushing teeth)  --  --  2  -BB    Eating meals  --  --  2  -BB    Score  --  --  10  -BB       Functional Assessment    Outcome Measure Options  AM-PAC 6 Clicks Basic Mobility (PT)  -TA  AM-PAC 6 Clicks Basic Mobility (PT)  -TA  --    Row Name 11/08/18 1502 11/08/18 1501          How much help from another person do you currently need...    Turning from your back to your side while in flat bed without using bedrails?  --  2  -CW     Moving from lying on back to sitting on the side of a flat bed without bedrails?  --  2  -CW     Moving to and from a bed to a chair (including  a wheelchair)?  --  2  -CW     Standing up from a chair using your arms (e.g., wheelchair, bedside chair)?  --  2  -CW     Climbing 3-5 steps with a railing?  --  1  -CW     To walk in hospital room?  --  1  -CW     AM-PAC 6 Clicks Score  --  10  -CW        How much help from another is currently needed...    Putting on and taking off regular lower body clothing?  2  -AS  --     Bathing (including washing, rinsing, and drying)  2  -AS  --     Toileting (which includes using toilet bed pan or urinal)  2  -AS  --     Putting on and taking off regular upper body clothing  2  -AS  --     Taking care of personal grooming (such as brushing teeth)  3  -AS  --     Eating meals  3  -AS  --     Score  14  -AS  --        Functional Assessment    Outcome Measure Options  AM-PAC 6 Clicks Daily Activity (OT)  -AS  AM-PAC 6 Clicks Basic Mobility (PT)  -CW       User Key  (r) = Recorded By, (t) = Taken By, (c) = Cosigned By    Initials Name Provider Type    TA Slime Martin, PTA Physical Therapy Assistant    BB Carla Lutz COTA/L Occupational Therapy Assistant    AS Debra Corado, OT Occupational Therapist    CW Kristie Alvarez, PT Physical Therapist           Time Calculation:   Time Calculation- OT     Row Name 11/10/18 1643             Time Calculation- OT    OT Start Time  1050  -LM      OT Stop Time  1143  -LM      OT Time Calculation (min)  53 min  -LM      Total Timed Code Minutes- OT  53 minute(s)  -LM        User Key  (r) = Recorded By, (t) = Taken By, (c) = Cosigned By    Initials Name Provider Type     Marilin Miramontes COTA/L Occupational Therapy Assistant           Therapy Suggested Charges     Code   Minutes Charges    None           Therapy Charges for Today     Code Description Service Date Service Provider Modifiers Qty    19019822643 HC OT THERAPEUTIC ACT EA 15 MIN 11/10/2018 Marilin Miramontes COTA/L GO 4    99819300535 HC OT THERAPEUTIC ACT EA 15 MIN 11/10/2018 Marilin Miramontes COTA/L GO  3    [id number]  OT SELF CARE/MGMT/TRAIN EA 15 MIN 11/10/2018 Marilin Miramontes, ERIK/L GO 1          OT G-codes  OT Professional Judgement Used?: Yes  OT Functional Scales Options: AM-PAC 6 Clicks Daily Activity (OT)  Score: 14  Functional Limitation: Self care  Self Care Current Status (): At least 40 percent but less than 60 percent impaired, limited or restricted  Self Care Goal Status (): At least 20 percent but less than 40 percent impaired, limited or restricted    ERIK Maacrio/NICOLE  11/10/2018

## 2018-11-10 NOTE — THERAPY TREATMENT NOTE
Acute Care - Physical Therapy Treatment Note  [facility]     Patient Name: Ravi Park  : 1930  MRN: [id number]  Today's Date: 11/10/2018  Onset of Illness/Injury or Date of Surgery: 18  Date of Referral to PT: 18  Referring Physician: Troy Laura    Admit Date: 2018    Visit Dx:    ICD-10-CM ICD-9-CM   1. Acute cystitis with hematuria N30.01 595.0   2. Altered mental status, unspecified altered mental status type R41.82 780.97   3. Impaired functional mobility and endurance Z74.09 V49.89   4. Impaired mobility and activities of daily living Z74.09 799.89   5. Dysphagia, unspecified type R13.10 787.20     Patient Active Problem List   Diagnosis   • Peripheral arterial disease (CMS/[facility])   • Chronic atrial fibrillation (CMS/[facility])   • Backache   • Lumbar radiculopathy   • Edema   • Acute congestive heart failure (CMS/[facility])   • Acute blood loss anemia   • Acute on chronic diastolic heart failure (CMS/[facility])   • YESENIA (acute kidney injury) (CMS/[facility])   • (HFpEF) heart failure with preserved ejection fraction (CMS/[facility])   • Urinary retention   • Personal history of tobacco use, presenting hazards to health   • Vitreous floaters   • Upper respiratory infection   • Sprain of shoulder and upper arm   • Shoulder pain   • Serous otitis media   • Sensorineural hearing loss   • Pure hypercholesterolemia   • Pseudophakia   • Prostatitis   • Pain in wrist   • Pain in thumb joint with movement   • Pain in joint involving right lower leg   • Pain in eye   • Otorrhea   • Open wound of toe   • Open wound of lower leg with complication   • Open wound of lower leg   • Open wound of lesser toe of left foot without damage to nail   • Olecranon bursitis   • Need for prophylactic vaccination and inoculation against influenza   • Need for immunization against influenza   • Multiple joint pain   • Malaise and fatigue   • Keratoconjunctivitis sicca (CMS/[facility])   • Insect bite   • Impacted cerumen   • Hypertensive  disorder   • History of colonoscopy   • History of colon polyps   • History of artificial eye lens   • Herpes zoster   • Gout   • Exposure to communicable disease   • Dog bite of hand   • Disorder of sacroiliac joint   • Decreased platelet count (CMS/HCC)   • Cough   • Contusion of thumb   • Common cold   • Cervical arthritis   • Cerebrovascular accident (CMS/HCC)   • Cellulitis of lower leg   • Cataract   • Benign prostatic hyperplasia   • Atrial fibrillation (CMS/HCC)   • Astigmatism   • Allergic rhinitis due to pollen   • Allergic rhinitis   • Adverse reaction to drug   • Acute confusion   • Acute bronchitis   • Abnormal gait   • Urinary tract infection associated with indwelling urethral catheter (CMS/HCC)   • Hydronephrosis   • Nephrolithiasis   • Calculus of kidney   • Sepsis (CMS/HCC)   • Pulmonary hypertension (CMS/HCC)   • Tricuspid regurgitation   • CKD (chronic kidney disease) stage 3, GFR 30-59 ml/min (CMS/HCC)   • ESBL (extended spectrum beta-lactamase) producing bacteria infection   • Urinary tract infection associated with catheterization of urinary tract (CMS/HCC)   • Toxic encephalopathy   • Demand ischemia (CMS/HCC)   • Acute renal failure with tubular necrosis (CMS/HCC)   • Chronic atrial fibrillation (CMS/HCC)   • Acute cystitis with hematuria   • Sepsis (CMS/HCC)   • Decubitus ulcer of left heel, unstageable (CMS/HCC)   • Acute renal failure (CMS/HCC)   • Pneumonia due to infectious organism   • Non-healing wound of left heel   • PVD (peripheral vascular disease) with claudication (CMS/HCC)       Therapy Treatment    Rehabilitation Treatment Summary     Row Name 11/10/18 1333 11/10/18 0826          Treatment Time/Intention    Discipline  physical therapy assistant  -TA  speech language pathologist  -CK     Document Type  therapy note (daily note)  -TA  therapy note (daily note)  -CK     Subjective Information  complains of;pain  -TA  no complaints  -CK     Mode of Treatment  physical therapy   -TA  individual therapy;speech-language pathology  -CK     Patient/Family Observations  --  Pt sitting up in bed eating breakfast; no family present  -CK     Therapy Frequency (PT Clinical Impression)  other (see comments) 5-14x/week  -TA  --     Total Evaluation Minutes, SLP  --  48  -CK2     Patient Effort  adequate  -TA  good  -CK     Existing Precautions/Restrictions  fall  -TA  --     Recorded by [TA] Slime Martin, PTA 11/10/18 1604 [CK] Chica Arnold, MS CCC-SLP 11/10/18 0908  [CK2] Chica Arnold, MS CCC-SLP 11/10/18 0916     Row Name 11/10/18 1333             Vital Signs    Pre Systolic BP Rehab  117  -TA      Pre Treatment Diastolic BP  58  -TA      Post Systolic BP Rehab  154  -TA      Post Treatment Diastolic BP  65  -TA      Pretreatment Heart Rate (beats/min)  66  -TA      Posttreatment Heart Rate (beats/min)  60  -TA      Pre SpO2 (%)  95  -TA      O2 Delivery Pre Treatment  supplemental O2  -TA      Post SpO2 (%)  92  -TA      O2 Delivery Post Treatment  supplemental O2  -TA      Pre Patient Position  Supine  -TA      Intra Patient Position  Sitting  -TA      Post Patient Position  Supine  -TA      Recorded by [TA] Slime Martin, PTA 11/10/18 1604      Row Name 11/10/18 1333             Cognitive Assessment/Intervention- PT/OT    Affect/Mental Status (Cognitive)  unable/difficult to assess  -TA      Behavioral Issues (Cognitive)  unable/difficult to assess  -TA      Orientation Status (Cognition)  oriented to;person;place;verbal cues/prompts needed for orientation  -TA      Follows Commands (Cognition)  follows one step commands;50-74% accuracy  -TA      Cognitive Function (Cognitive)  safety deficit;memory deficit  -TA      Attention Deficit (Cognitive)  moderate deficit  -TA      Safety Deficit (Cognitive)  ability to follow commands  -TA      Personal Safety Interventions  nonskid shoes/slippers when out of bed;supervised activity;fall prevention program maintained  -TA      Recorded  by [TA] Slime Martin, PTA 11/10/18 1604      Row Name 11/10/18 1333             Safety Issues, Functional Mobility    Impairments Affecting Function (Mobility)  balance;cognition;coordination;postural/trunk control;range of motion (ROM);strength;shortness of breath  -TA      Recorded by [TA] Slime Martin, PTA 11/10/18 1604      Row Name 11/10/18 1333             Bed Mobility Assessment/Treatment    Bed Mobility Assessment/Treatment  supine-sit;sit-supine  -TA      Supine-Sit [place] (Bed Mobility)  minimum assist (75% patient effort)  -TA      Sit-Supine [place] (Bed Mobility)  moderate assist (50% patient effort)  -TA      Bed Mobility, Safety Issues  decreased use of arms for pushing/pulling;impaired trunk control for bed mobility;decreased use of legs for bridging/pushing  -TA      Assistive Device (Bed Mobility)  bed rails;head of bed elevated  -TA      Comment (Bed Mobility)  pt sat @ EOB ~30 minutes with SBA  -TA      Recorded by [TA] Slime Martin, PTA 11/10/18 1604      Row Name 11/10/18 1334             Functional Mobility    Functional Mobility- Ind. Level  not tested  -TA      Recorded by [TA] Slime Martin, PTA 11/10/18 1604      Row Name 11/10/18 1330             Gait/Stairs Assessment/Training    [place] Level (Gait)  not tested  -TA      Recorded by [TA] Slime Martin, PTA 11/10/18 1604      Row Name 11/10/18 1338             Therapeutic Exercise    Lower Extremity (Therapeutic Exercise)  LAQ (long arc quad), bilateral;marching while seated  -TA      Lower Extremity Range of Motion (Therapeutic Exercise)  ankle dorsiflexion/plantar flexion, bilateral;hip abduction/adduction, bilateral  -TA      Exercise Type (Therapeutic Exercise)  AROM (active range of motion)  -TA      Sets/Reps (Therapeutic Exercise)  15  -TA      Expected Outcome (Therapeutic Exercise)  improve functional stability;improve functional tolerance, household activity;improve performance, gait skills;improve  performance, transfer skills  -TA      Recorded by [TA] Slime Martin, PTA 11/10/18 1604      Row Name 11/10/18 1333             Static Sitting Balance    Level of [place] (Supported Sitting, Static Balance)  standby assist  -TA      Sitting Position (Supported Sitting, Static Balance)  sitting on edge of bed  -TA      Time Able to Maintain Position (Supported Sitting, Static Balance)  more than 5 minutes  -TA      Recorded by [TA] Slime Martin, PTA 11/10/18 1604      Row Name 11/10/18 1333 11/10/18 0826          Positioning and Restraints    Pre-Treatment Position  in bed  -TA  in bed  -CK     Post Treatment Position  bed  -TA  bed  -CK     In Bed  supine;call light within reach;exit alarm on;R heel elevated;L heel elevated  -TA  supine;sitting;call light within reach;encouraged to call for assist;exit alarm on;side rails up x2  -CK     Recorded by [TA] Slime Martin, PTA 11/10/18 1604 [CK] Chica Arnold MS CCC-SLP 11/10/18 0908     Row Name 11/10/18 1333 11/10/18 0826          Pain Scale: Numbers Pre/Post-Treatment    Pain Scale: Numbers, Pretreatment  7/10  -TA  0/10 - no pain  -CK     Pain Scale: Numbers, Post-Treatment  7/10  -TA  0/10 - no pain  -CK     Pain Location  neck  -TA  --     Pain Intervention(s)  Repositioned  -TA  --     Recorded by [TA] Slime Martin, PTA 11/10/18 1604 [CK] Chica Arnold MS CCC-SLP 11/10/18 0908     Row Name                Wound 10/30/18 1715 Left heel pressure injury    Wound - Properties Group Date first assessed: 10/30/18 [AS] Time first assessed: 1715 [AS] Present On Admission : yes;picture taken [AS] Side: Left [AS] Location: heel [AS] Type: pressure injury [AS] Stage, Pressure Injury: unstageable [AS] Recorded by:  [AS] Giselle Alicea RN 10/30/18 1715    Row Name                Wound 11/06/18 0300 Left gluteal pressure injury    Wound - Properties Group Date first assessed: 11/06/18 [AB] Time first assessed: 0300 [AB] Present On Admission :  yes;picture taken [SR], picture taken by nightshift RN  Side: Left [SR] Location: gluteal [SR] Type: pressure injury [SR] Stage, Pressure Injury: deep tissue injury [SR] Recorded by:  [AB] Jacquie Chu RN 11/08/18 1511 [SR] Lexi Chaparro RN 11/06/18 2051    Row Name 11/10/18 1333             Outcome Summary/Treatment Plan (PT)    Daily Summary of Progress (PT)  progress towards functional goals is fair  -TA      Plan for Continued Treatment (PT)  continue per pt tolerance  -TA      Anticipated Equipment Needs at Discharge (PT)  --  -TA      Anticipated Discharge Disposition (PT)  skilled nursing facility;anticipate therapy at next level of care;home with 24/7 care  -TA      Recorded by [TA] Slime Martin PTA 11/10/18 1604      Row Name 11/10/18 0891             Outcome Summary/Treatment Plan (SLP)    Daily Summary of Progress (SLP)  progress toward functional goals is good  -CK      Barriers to Overall Progress (SLP)  ill fitting dentures w/o adhesive  -CK      Plan for Continued Treatment (SLP)  Continue POC  -CK      Anticipated Dischage Disposition  home with 24/7 care  -CK2      Recorded by [CK] Chica Arnold, MS CCC-SLP 11/10/18 0908  [CK2] Chica Arnold, MS CCC-SLP 11/10/18 0909        User Key  (r) = Recorded By, (t) = Taken By, (c) = Cosigned By    Initials Name Effective Dates Discipline    CK Chica Arnold, MS CCC-SLP 04/03/18 -  SLP    TA Slime Martin PTA 03/07/18 -  PT    AB Jacquie Chu RN 10/17/16 -  Nurse    SR Lexi Chaparro RN 10/17/16 -  Nurse    AS Giselle Alicea RN 12/13/16 -  Nurse          Wound 10/30/18 1715 Left heel pressure injury (Active)   Dressing Appearance no drainage 11/9/2018 10:00 PM   Base black eschar 11/9/2018 10:00 PM   Periwound Skin Turgor firm 11/9/2018 10:00 PM   Edges callused 11/9/2018 10:00 PM   Care, Wound cleansed with;soap and water 11/9/2018 10:00 PM   Dressing Care, Wound dressing changed 11/9/2018 10:00 PM       Wound  11/06/18 0300 Left gluteal pressure injury (Active)   Dressing Appearance open to air 11/9/2018 10:00 PM   Base maroon/purple 11/9/2018 10:00 PM   Periwound blanchable;redness 11/9/2018 10:00 PM   Care, Wound barrier applied 11/9/2018 10:00 PM   Periwound Care, Wound barrier ointment applied 11/9/2018 10:00 PM       PT Rehab Goals     Row Name 11/10/18 1333             Bed Mobility Goal 1 (PT)    Activity/Assistive Device (Bed Mobility Goal 1, PT)  bed mobility activities, all  -TA      [place] Level/Cues Needed (Bed Mobility Goal 1, PT)  conditional independence  -TA      Time Frame (Bed Mobility Goal 1, PT)  short term goal (STG)  -TA      Progress/Outcomes (Bed Mobility Goal 1, PT)  goal not met  -TA         Transfer Goal 1 (PT)    Activity/Assistive Device (Transfer Goal 1, PT)  sit-to-stand/stand-to-sit  -TA      [place] Level/Cues Needed (Transfer Goal 1, PT)  supervision required  -TA      Time Frame (Transfer Goal 1, PT)  2 - 3 days  -TA      Progress/Outcome (Transfer Goal 1, PT)  goal not met  -TA         Gait Training Goal 1 (PT)    Activity/Assistive Device (Gait Training Goal 1, PT)  walker, rolling;gait (walking locomotion);assistive device use;backward stepping;decrease asymmetrical patterns;decrease fall risk;diminish gait deviation;forward stepping;improve balance and speed;increase endurance/gait distance;increase energy conservation;maintain weight bearing status;normalize weight shifts;sidestepping;turning, left;turning, right  -TA      [place] Level (Gait Training Goal 1, PT)  supervision required  -TA      Distance (Gait Goal 1, PT)  1x25 ft or more  -TA        User Key  (r) = Recorded By, (t) = Taken By, (c) = Cosigned By    Initials Name Provider Type Discipline    Slime Damian, PTA Physical Therapy Assistant PT              PT Recommendation and Plan  Anticipated Discharge Disposition (PT): skilled nursing facility, anticipate therapy at next level of care, home with  24/7 care  Therapy Frequency (PT Clinical Impression): other (see comments)(5-14x/week)  Outcome Summary/Treatment Plan (PT)  Daily Summary of Progress (PT): progress towards functional goals is fair  Plan for Continued Treatment (PT): continue per pt tolerance  Anticipated Equipment Needs at Discharge (PT): other (see comments)(difficult to assess per pt's lethargy)  Anticipated Discharge Disposition (PT): skilled nursing facility, anticipate therapy at next level of care, home with 24/7 care  Plan of Care Reviewed With: patient  Progress: improving  Outcome Summary: pt sup<>sit with min assist, pt sat @ EOB ~30 minutes with SBA & performed AROM  B LE exercises, pt will require 24/7 care & continued PT services @ D/C   Outcome Measures     Row Name 11/10/18 1600 11/09/18 1400 11/09/18 0855       How much help from another person do you currently need...    Turning from your back to your side while in flat bed without using bedrails?  3  -TA  2  -TA  --    Moving from lying on back to sitting on the side of a flat bed without bedrails?  3  -TA  2  -TA  --    Moving to and from a bed to a chair (including a wheelchair)?  2  -TA  2  -TA  --    Standing up from a chair using your arms (e.g., wheelchair, bedside chair)?  2  -TA  2  -TA  --    Climbing 3-5 steps with a railing?  1  -TA  1  -TA  --    To walk in hospital room?  1  -TA  1  -TA  --    AM-PAC 6 Clicks Score  12  -TA  10  -TA  --       How much help from another is currently needed...    Putting on and taking off regular lower body clothing?  --  --  1  -BB    Bathing (including washing, rinsing, and drying)  --  --  2  -BB    Toileting (which includes using toilet bed pan or urinal)  --  --  1  -BB    Putting on and taking off regular upper body clothing  --  --  2  -BB    Taking care of personal grooming (such as brushing teeth)  --  --  2  -BB    Eating meals  --  --  2  -BB    Score  --  --  10  -BB       Functional Assessment    Outcome Measure Options   -[facility] 6 Clicks Basic Mobility (PT)  -TA  [facility] 6 Clicks Basic Mobility (PT)  -TA  --    Row Name 11/08/18 1502 11/08/18 1501          How much help from another person do you currently need...    Turning from your back to your side while in flat bed without using bedrails?  --  2  -CW     Moving from lying on back to sitting on the side of a flat bed without bedrails?  --  2  -CW     Moving to and from a bed to a chair (including a wheelchair)?  --  2  -CW     Standing up from a chair using your arms (e.g., wheelchair, bedside chair)?  --  2  -CW     Climbing 3-5 steps with a railing?  --  1  -CW     To walk in hospital room?  --  1  -CW     -[facility] 6 Clicks Score  --  10  -CW        How much help from another is currently needed...    Putting on and taking off regular lower body clothing?  2  -AS  --     Bathing (including washing, rinsing, and drying)  2  -AS  --     Toileting (which includes using toilet bed pan or urinal)  2  -AS  --     Putting on and taking off regular upper body clothing  2  -AS  --     Taking care of personal grooming (such as brushing teeth)  3  -AS  --     Eating meals  3  -AS  --     Score  14  -AS  --        Functional Assessment    Outcome Measure Options  -[facility] 6 Clicks Daily Activity (OT)  -AS  -[facility] 6 Clicks Basic Mobility (PT)  -CW       User Key  (r) = Recorded By, (t) = Taken By, (c) = Cosigned By    Initials Name Provider Type    TA Slime Martin, PTA Physical Therapy Assistant    BB Carla Lutz, VALENCIA/L Occupational Therapy Assistant    AS Debra Corado, OT Occupational Therapist    CW Kristie Alvarez, PT Physical Therapist         Time Calculation:   PT Charges     Row Name 11/10/18 1608             Time Calculation    Start Time  1333  -TA      Stop Time  1422  -TA      Time Calculation (min)  49 min  -TA         Time Calculation- PT    Total Timed Code Minutes- PT  49 minute(s)  -TA        User Key  (r) = Recorded By, (t) = Taken By, (c) = Cosigned By     Initials Name Provider Type    Slime Damian PTA Physical Therapy Assistant        Therapy Suggested Charges     Code   Minutes Charges    None           Therapy Charges for Today     Code Description Service Date Service Provider Modifiers Qty    77835768221 HC PT THER PROC EA 15 MIN 11/9/2018 Slime Martin PTA GP 1    29553401912 HC PT THERAPEUTIC ACT EA 15 MIN 11/10/2018 Slime Martin, JOSHUA GP 2    52723750613 HC PT THER PROC EA 15 MIN 11/10/2018 Slime Martin PTA GP 1          PT G-Codes  PT Professional Judgement Used?: Yes  Outcome Measure Options: AM-PAC 6 Clicks Basic Mobility (PT)  AM-PAC 6 Clicks Score: 12  Score: 10  Functional Limitation: Mobility: Walking and moving around  Mobility: Walking and Moving Around Current Status (): At least 60 percent but less than 80 percent impaired, limited or restricted  Mobility: Walking and Moving Around Goal Status (): At least 40 percent but less than 60 percent impaired, limited or restricted    Slime Martin PTA  11/10/2018

## 2018-11-10 NOTE — PLAN OF CARE
Problem: Patient Care Overview  Goal: Plan of Care Review  Outcome: Ongoing (interventions implemented as appropriate)   11/10/18 0334   Plan of Care Review   Progress no change   OTHER   Outcome Summary Pt alert, oriented while awake; resting at this time; vital signs stable, wound care provided, monitoring pt      Goal: Individualization and Mutuality  Outcome: Ongoing (interventions implemented as appropriate)    Goal: Discharge Needs Assessment  Outcome: Ongoing (interventions implemented as appropriate)      Problem: Infection, Risk/Actual (Adult)  Goal: Infection Prevention/Resolution  Outcome: Ongoing (interventions implemented as appropriate)      Problem: Pain, Acute (Adult)  Goal: Acceptable Pain Control/Comfort Level  Outcome: Ongoing (interventions implemented as appropriate)      Problem: Wound (Includes Pressure Injury) (Adult)  Goal: Signs and Symptoms of Listed Potential Problems Will be Absent, Minimized or Managed (Wound)  Outcome: Ongoing (interventions implemented as appropriate)      Problem: Urinary Tract Infection (Adult)  Goal: Signs and Symptoms of Listed Potential Problems Will be Absent, Minimized or Managed (Urinary Tract Infection)  Outcome: Ongoing (interventions implemented as appropriate)

## 2018-11-11 NOTE — THERAPY TREATMENT NOTE
Acute Care - Occupational Therapy Treatment Note  [facility]     Patient Name: Ravi Park  : 1930  MRN: [id number]  Today's Date: 2018  Onset of Illness/Injury or Date of Surgery: 18  Date of Referral to OT: 18  Referring Physician: Troy Laura    Admit Date: 2018       ICD-10-CM ICD-9-CM   1. Acute cystitis with hematuria N30.01 595.0   2. Altered mental status, unspecified altered mental status type R41.82 780.97   3. Impaired functional mobility and endurance Z74.09 V49.89   4. Impaired mobility and activities of daily living Z74.09 799.89   5. Dysphagia, unspecified type R13.10 787.20     Patient Active Problem List   Diagnosis   • Peripheral arterial disease (CMS/HCC)   • Chronic atrial fibrillation (CMS/[facility], located within [facility])   • Backache   • Lumbar radiculopathy   • Edema   • Acute congestive heart failure (CMS/[facility]AC, located within [facility])   • Acute blood loss anemia   • Acute on chronic diastolic heart failure (CMS/HCC)   • YESENIA (acute kidney injury) (CMS/[facility]AC, located within [facility])   • (HFpEF) heart failure with preserved ejection fraction (CMS/[facility]AC, located within [facility])   • Urinary retention   • Personal history of tobacco use, presenting hazards to health   • Vitreous floaters   • Upper respiratory infection   • Sprain of shoulder and upper arm   • Shoulder pain   • Serous otitis media   • Sensorineural hearing loss   • Pure hypercholesterolemia   • Pseudophakia   • Prostatitis   • Pain in wrist   • Pain in thumb joint with movement   • Pain in joint involving right lower leg   • Pain in eye   • Otorrhea   • Open wound of toe   • Open wound of lower leg with complication   • Open wound of lower leg   • Open wound of lesser toe of left foot without damage to nail   • Olecranon bursitis   • Need for prophylactic vaccination and inoculation against influenza   • Need for immunization against influenza   • Multiple joint pain   • Malaise and fatigue   • Keratoconjunctivitis sicca (CMS/HCC)   • Insect bite   • Impacted cerumen   • Hypertensive disorder    • History of colonoscopy   • History of colon polyps   • History of artificial eye lens   • Herpes zoster   • Gout   • Exposure to communicable disease   • Dog bite of hand   • Disorder of sacroiliac joint   • Decreased platelet count (CMS/HCC)   • Cough   • Contusion of thumb   • Common cold   • Cervical arthritis   • Cerebrovascular accident (CMS/HCC)   • Cellulitis of lower leg   • Cataract   • Benign prostatic hyperplasia   • Atrial fibrillation (CMS/HCC)   • Astigmatism   • Allergic rhinitis due to pollen   • Allergic rhinitis   • Adverse reaction to drug   • Acute confusion   • Acute bronchitis   • Abnormal gait   • Urinary tract infection associated with indwelling urethral catheter (CMS/HCC)   • Hydronephrosis   • Nephrolithiasis   • Calculus of kidney   • Sepsis (CMS/HCC)   • Pulmonary hypertension (CMS/HCC)   • Tricuspid regurgitation   • CKD (chronic kidney disease) stage 3, GFR 30-59 ml/min (CMS/HCC)   • ESBL (extended spectrum beta-lactamase) producing bacteria infection   • Urinary tract infection associated with catheterization of urinary tract (CMS/HCC)   • Toxic encephalopathy   • Demand ischemia (CMS/HCC)   • Acute renal failure with tubular necrosis (CMS/HCC)   • Chronic atrial fibrillation (CMS/HCC)   • Acute cystitis with hematuria   • Sepsis (CMS/HCC)   • Decubitus ulcer of left heel, unstageable (CMS/HCC)   • Acute renal failure (CMS/HCC)   • Pneumonia due to infectious organism   • Non-healing wound of left heel   • PVD (peripheral vascular disease) with claudication (CMS/HCC)     Past Medical History:   Diagnosis Date   • Benign prostatic hyperplasia    • Cerebrovascular accident (CMS/HCC)    • Chronic anemia    • Chronic atrial fibrillation (CMS/HCC)    • CKD (chronic kidney disease) stage 3, GFR 30-59 ml/min (CMS/HCC)    • GERD (gastroesophageal reflux disease)    • Gout    • Hydronephrosis    • Hypercholesterolemia    • Hypertension    • Nephrolithiasis    • Peripheral arterial disease  (CMS/HCC)    • Pulmonary hypertension (CMS/HCC)    • Tricuspid regurgitation    • Urinary retention      Past Surgical History:   Procedure Laterality Date   • CARDIAC CATHETERIZATION     • NEPHROSTOMY         Therapy Treatment    Rehabilitation Treatment Summary     Row Name 11/11/18 0930             Treatment Time/Intention    Discipline  occupational therapy assistant  -LM      Document Type  therapy note (daily note)  -LM      Subjective Information  no complaints  -LM      Mode of Treatment  individual therapy;occupational therapy  -LM      Recorded by [LM] Marilin Miramontes VLAENCIA/L 11/11/18 1628      Row Name 11/11/18 0930             Vital Signs    Intra Systolic BP Rehab  132  -LM      Intra Treatment Diastolic BP  71  -LM      Recorded by [LM] Marilin Miramontes VALENCIA/L 11/11/18 1628      Row Name 11/11/18 0930             Cognitive Assessment/Intervention- PT/OT    Affect/Mental Status (Cognitive)  low arousal/lethargic  -LM      Recorded by [LM] Marilin Miramontes VALENCIA/L 11/11/18 1628      Row Name 11/11/18 0930             Grooming Assessment/Training    Sampson Level (Grooming)  grooming skills use of comb this date with max a   -LM      Recorded by [LM] Marilin Miramontes VALENCIA/L 11/11/18 1628      Row Name 11/11/18 0930             Therapeutic Exercise    Upper Extremity (Therapeutic Exercise)  -- aarom/prom elbowflex shouldflex chestpress max encouragment  -LM      Recorded by [LM] Marilin Miramontes VALENCIA/L 11/11/18 1628      Row Name 11/11/18 0930             Positioning and Restraints    Pre-Treatment Position  in bed  -LM      Post Treatment Position  bed  -LM      Recorded by [LM] Marilin Miramontes VALENCIA/L 11/11/18 1628      Row Name                Wound 10/30/18 1715 Left heel pressure injury    Wound - Properties Group Date first assessed: 10/30/18 [AS] Time first assessed: 1715 [AS] Present On Admission : yes;picture taken [AS] Side: Left [AS] Location: heel [AS] Type: pressure injury  [AS] Stage, Pressure Injury: unstageable [AS] Recorded by:  [AS] Giselle Alicea, RN 10/30/18 1715    Row Name                Wound 11/06/18 0300 Left gluteal pressure injury    Wound - Properties Group Date first assessed: 11/06/18 [AB] Time first assessed: 0300 [AB] Present On Admission : yes;picture taken [SR], picture taken by nightshift RN  Side: Left [SR] Location: gluteal [SR] Type: pressure injury [SR] Stage, Pressure Injury: deep tissue injury [SR] Recorded by:  [AB] Jacquie Chu RN 11/08/18 1511 [SR] Lexi Chaparro RN 11/06/18 2051    Row Name 11/11/18 0930             Outcome Summary/Treatment Plan (OT)    Daily Summary of Progress (OT)  progress toward functional goals is good  -LM      Recorded by [LM] Marilin Miramontes, VALENCIA/L 11/11/18 1628        User Key  (r) = Recorded By, (t) = Taken By, (c) = Cosigned By    Initials Name Effective Dates Discipline    LM Marilin Miramontes, VALENCIA/L 03/07/18 -  OT    AB Jacquie Chu RN 10/17/16 -  Nurse    SR Lexi Chaparro RN 10/17/16 -  Nurse    AS Giselle Alicea, RN 12/13/16 -  Nurse        Wound 10/30/18 1715 Left heel pressure injury (Active)   Dressing Appearance no drainage 11/11/2018  5:00 AM   Closure SHEREE 11/11/2018  8:00 AM   Base black eschar 11/11/2018  5:00 AM   Black (%), Wound Tissue Color 100 11/11/2018  5:00 AM   Periwound redness 11/11/2018  5:00 AM   Drainage Amount none 11/11/2018  5:00 AM   Care, Wound cleansed with;soap and water 11/11/2018  5:00 AM   Dressing Care, Wound dressing changed 11/11/2018  5:00 AM       Wound 11/06/18 0300 Left gluteal pressure injury (Active)   Dressing Appearance open to air 11/11/2018  8:00 AM   Base maroon/purple 11/11/2018  8:00 AM   Periwound blanchable;redness 11/11/2018  8:00 AM   Care, Wound barrier applied 11/10/2018  9:25 PM   Periwound Care, Wound barrier ointment applied 11/10/2018  9:25 PM     OT Rehab Goals     Row Name 11/11/18 1628             Transfer Goal 1 (OT)     Activity/Assistive Device (Transfer Goal 1, OT)  sit-to-stand/stand-to-sit;bed-to-chair/chair-to-bed;toilet  -LM      Mayes Level/Cues Needed (Transfer Goal 1, OT)  contact guard assist  -LM      Time Frame (Transfer Goal 1, OT)  long term goal (LTG);by discharge  -LM      Progress/Outcome (Transfer Goal 1, OT)  goal not met  -LM         Bathing Goal 1 (OT)    Activity/Assistive Device (Bathing Goal 1, OT)  bathing skills, all using AE PRN  -LM      Mayes Level/Cues Needed (Bathing Goal 1, OT)  minimum assist (75% or more patient effort)  -LM      Time Frame (Bathing Goal 1, OT)  long term goal (LTG);by discharge  -LM      Progress/Outcomes (Bathing Goal 1, OT)  goal not met  -LM         Dressing Goal 1 (OT)    Activity/Assistive Device (Dressing Goal 1, OT)  dressing skills, all using AE PRN  -LM      Mayes/Cues Needed (Dressing Goal 1, OT)  minimum assist (75% or more patient effort)  -LM      Time Frame (Dressing Goal 1, OT)  long term goal (LTG);by discharge  -LM      Progress/Outcome (Dressing Goal 1, OT)  goal not met  -LM         Toileting Goal 1 (OT)    Activity/Device (Toileting Goal 1, OT)  toileting skills, all  -LM      Mayes Level/Cues Needed (Toileting Goal 1, OT)  minimum assist (75% or more patient effort)  -LM      Time Frame (Toileting Goal 1, OT)  long term goal (LTG);by discharge  -LM      Progress/Outcome (Toileting Goal 1, OT)  goal not met  -LM         Strength Goal 1 (OT)    Strength Goal 1 (OT)  Pt will increase BUE strenth at least 1/2 grade level to benefit ADLs and functional transfers.   -LM      Time Frame (Strength Goal 1, OT)  long term goal (LTG);by discharge  -LM      Progress/Outcome (Strength Goal 1, OT)  goal not met  -LM        User Key  (r) = Recorded By, (t) = Taken By, (c) = Cosigned By    Initials Name Provider Type Discipline    LM Marilin Miramontes, VALENCIA/L Occupational Therapy Assistant OT        Occupational Therapy Education     Title: PT OT  SLP Therapies (Active)     Topic: Occupational Therapy (Active)     Point: ADL training (Active)     Description: Instruct learner(s) on proper safety adaptation and remediation techniques during self care or transfers.   Instruct in proper use of assistive devices.    Learning Progress Summary           Family Acceptance, E, NR by LM at 11/11/2018  4:30 PM                   Point: Home exercise program (Active)     Description: Instruct learner(s) on appropriate technique for monitoring, assisting and/or progressing therapeutic exercises/activities.    Learning Progress Summary           Family Acceptance, E, NR by LM at 11/11/2018  4:30 PM                   Point: Precautions (Active)     Description: Instruct learner(s) on prescribed precautions during self-care and functional transfers.    Learning Progress Summary           Family Acceptance, E, NR by LM at 11/11/2018  4:30 PM                   Point: Body mechanics (Active)     Description: Instruct learner(s) on proper positioning and spine alignment during self-care, functional mobility activities and/or exercises.    Learning Progress Summary           Patient Acceptance, E, NR,NL by AS at 11/8/2018  4:28 PM    Comment:  role of OT, OT POC, bed mobility   Family Acceptance, E, NR by LM at 11/11/2018  4:30 PM                               User Key     Initials Effective Dates Name Provider Type Discipline    LM 03/07/18 -  Marilin Miramontes, ERIK/L Occupational Therapy Assistant OT    AS 05/01/18 -  Debra Corado OT Occupational Therapist OT                OT Recommendation and Plan  Outcome Summary/Treatment Plan (OT)  Daily Summary of Progress (OT): progress toward functional goals is good  Daily Summary of Progress (OT): progress toward functional goals is good  Plan of Care Review  Plan of Care Reviewed With: patient  Plan of Care Reviewed With: patient  Outcome Summary: pt very lethargic this date although attempted some prom aarom and grooming  although required max a and encouragement  Outcome Measures     Row Name 11/10/18 1600 11/09/18 1400 11/09/18 0855       How much help from another person do you currently need...    Turning from your back to your side while in flat bed without using bedrails?  3  -TA  2  -TA  --    Moving from lying on back to sitting on the side of a flat bed without bedrails?  3  -TA  2  -TA  --    Moving to and from a bed to a chair (including a wheelchair)?  2  -TA  2  -TA  --    Standing up from a chair using your arms (e.g., wheelchair, bedside chair)?  2  -TA  2  -TA  --    Climbing 3-5 steps with a railing?  1  -TA  1  -TA  --    To walk in hospital room?  1  -TA  1  -TA  --    AM-PAC 6 Clicks Score  12  -TA  10  -TA  --       How much help from another is currently needed...    Putting on and taking off regular lower body clothing?  --  --  1  -BB    Bathing (including washing, rinsing, and drying)  --  --  2  -BB    Toileting (which includes using toilet bed pan or urinal)  --  --  1  -BB    Putting on and taking off regular upper body clothing  --  --  2  -BB    Taking care of personal grooming (such as brushing teeth)  --  --  2  -BB    Eating meals  --  --  2  -BB    Score  --  --  10  -BB       Functional Assessment    Outcome Measure Options  AM-PAC 6 Clicks Basic Mobility (PT)  -TA  AM-PAC 6 Clicks Basic Mobility (PT)  -TA  --      User Key  (r) = Recorded By, (t) = Taken By, (c) = Cosigned By    Initials Name Provider Type    TA Slime Martin, PTA Physical Therapy Assistant    BB Carla Lutz, VALENCIA/L Occupational Therapy Assistant           Time Calculation:   Time Calculation- OT     Row Name 11/11/18 1617             Time Calculation- OT    OT Start Time  0930  -LM      OT Stop Time  0945  -LM      OT Time Calculation (min)  15 min  -LM      Total Timed Code Minutes- OT  15 minute(s)  -LM      OT Received On  11/11/18  -LM        User Key  (r) = Recorded By, (t) = Taken By, (c) = Cosigned By    Initials  Name Provider Type     Marilin Miramontes COTA/L Occupational Therapy Assistant           Therapy Suggested Charges     Code   Minutes Charges    None           Therapy Charges for Today     Code Description Service Date Service Provider Modifiers Qty    95595182288 HC OT THERAPEUTIC ACT EA 15 MIN 11/10/2018 Marilin Miramontes COTA/L GO 4    00067677339 HC OT THERAPEUTIC ACT EA 15 MIN 11/10/2018 Marilin Miramontes COTA/L GO 3    29326638279 HC OT SELF CARE/MGMT/TRAIN EA 15 MIN 11/10/2018 Marilin Miramontes COTA/L GO 1    02735289887 HC OT THER PROC EA 15 MIN 11/11/2018 Marilin Miramontes COTA/L GO 1          OT G-codes  OT Professional Judgement Used?: Yes  OT Functional Scales Options: AM-PAC 6 Clicks Daily Activity (OT)  Score: 14  Functional Limitation: Self care  Self Care Current Status (): At least 40 percent but less than 60 percent impaired, limited or restricted  Self Care Goal Status (): At least 20 percent but less than 40 percent impaired, limited or restricted    ERIK Macario/NICOLE  11/11/2018

## 2018-11-11 NOTE — PROGRESS NOTES
"   LOS: 0 days   Patient Care Team:  Terrell Arzate MD as PCP - General  Terrell Arzate MD as PCP - Claims Attributed  Bethany Loco RN as Care Coordinator (Population Health)    Subjective     Subjective:  Symptoms:  Stable.  (SP tube site clear, some sediment. ).    Diet:  No nausea or vomiting.    Activity level: Impaired due to weakness.    Pain:  Pain is requiring pain medication.        History taken from: patient chart    Objective     Vital Signs  Temp:  [96.1 °F (35.6 °C)-98.2 °F (36.8 °C)] 96.1 °F (35.6 °C)  Heart Rate:  [] 100  Resp:  [16-18] 18  BP: (130-143)/(60-71) 132/62    Objective:  General Appearance:  In no acute distress.    Vital signs: (most recent): Blood pressure 132/62, pulse 100, temperature 96.1 °F (35.6 °C), temperature source Axillary, resp. rate 18, height 182.9 cm (72\"), weight 76.1 kg (167 lb 12.8 oz), SpO2 99 %.  Vital signs are normal.  No fever.    Output: Producing urine (Dye urine yellow with white sediment).    HEENT: Normal HEENT exam.    Lungs:  Normal effort and normal respiratory rate.  Breath sounds clear to auscultation.  He is not in respiratory distress.    Heart: Normal rate.  Regular rhythm.  S1 normal and S2 normal.  No murmur.   Chest: Symmetric chest wall expansion.   Abdomen: Abdomen is soft and non-distended.  (No redness at SP tube site).  Bowel sounds are normal.   There is no abdominal tenderness.     Extremities: Normal range of motion.    Pulses: Distal pulses are intact.    Neurological: Patient is alert.  GCS score is 15.    Pupils:  Pupils are equal, round, and reactive to light.    Skin:  Warm, dry and pale.  No rash, ecchymosis or cyanosis.             Results Review:    Lab Results (last 24 hours)     Procedure Component Value Units Date/Time    Comprehensive Metabolic Panel [[id number]]  (Abnormal) Collected:  11/11/18 0541    Specimen:  Blood Updated:  11/11/18 0811     Glucose 96 mg/dL      BUN 30 mg/dL      Creatinine 1.05 mg/dL      " Sodium 141 mmol/L      Potassium 3.4 mmol/L      Chloride 104 mmol/L      CO2 29.0 mmol/L      Calcium 8.9 mg/dL      Total Protein 7.4 g/dL      Albumin 3.50 g/dL      ALT (SGPT) 14 U/L      AST (SGOT) 34 U/L      Alkaline Phosphatase 95 U/L      Total Bilirubin 0.3 mg/dL      eGFR Non African Amer 67 mL/min/1.73      Globulin 3.9 gm/dL      A/G Ratio 0.9 g/dL      BUN/Creatinine Ratio 28.6     Anion Gap 8.0 mmol/L     Narrative:       The MDRD GFR formula is only valid for adults with stable renal function between ages 18 and 70.    CBC & Differential [[id number]] Collected:  11/11/18 0715    Specimen:  Blood Updated:  11/11/18 0810    Narrative:       The following orders were created for panel order CBC & Differential.  Procedure                               Abnormality         Status                     ---------                               -----------         ------                     CBC Auto Differential[[id number]]        Abnormal            Final result                 Please view results for these tests on the individual orders.    CBC Auto Differential [[id number]]  (Abnormal) Collected:  11/11/18 0715    Specimen:  Blood Updated:  11/11/18 0810     WBC 6.89 10*3/mm3      RBC 3.36 10*6/mm3      Hemoglobin 9.5 g/dL      Hematocrit 29.8 %      MCV 88.7 fL      MCH 28.3 pg      MCHC 31.9 g/dL      RDW 15.9 %      RDW-SD 51.3 fl      MPV 9.3 fL      Platelets 224 10*3/mm3      Neutrophil % 71.8 %      Lymphocyte % 14.8 %      Monocyte % 9.6 %      Eosinophil % 3.6 %      Basophil % 0.1 %      Immature Grans % 0.1 %      Neutrophils, Absolute 4.94 10*3/mm3      Lymphocytes, Absolute 1.02 10*3/mm3      Monocytes, Absolute 0.66 10*3/mm3      Eosinophils, Absolute 0.25 10*3/mm3      Basophils, Absolute 0.01 10*3/mm3      Immature Grans, Absolute 0.01 10*3/mm3     Blood Culture - Blood, [[id number]] Collected:  11/05/18 4079    Specimen:  Blood from Arm, Left Updated:  11/10/18 1945     Blood Culture No growth at  5 days    Blood Culture - Blood, [[id number]] Collected:  11/05/18 1936    Specimen:  Blood from Hand, Left Updated:  11/10/18 1945     Blood Culture No growth at 5 days         Imaging Results (last 24 hours)     ** No results found for the last 24 hours. **           I reviewed the patient's new clinical results.  I reviewed the patient's new imaging results and agree with the interpretation.  I reviewed the patient's other test results and agree with the interpretation      Assessment/Plan       (HFpEF) heart failure with preserved ejection fraction (CMS/HCC)    Pure hypercholesterolemia    Hypertensive disorder    Pulmonary hypertension (CMS/HCC)    Tricuspid regurgitation    CKD (chronic kidney disease) stage 3, GFR 30-59 ml/min (CMS/HCC)    Acute cystitis with hematuria    PVD (peripheral vascular disease) with claudication (CMS/HCC)      Assessment:    Condition: In stable condition.       1. Obstructive BPH causing neurogenic bladder, cystoscopy with SP tube placement in Sept due for SP tube change  -Proscar, Flomax  -Chronic SP tube, changed 11/6/18     2. UTI cystitis, complicated, history of ESBL UTI  -Urine culture 11/5/18 Klebsiella oxytoca  -Antibiotic day #7, Rocephin  -Diflucan added  -Urine culture ordered to rule out candida    Plan:   Continue Rocephin and Diflucan  Urine culture IP  SP Tube change due 12/4/18.    JUSTIN Selby  11/11/18  12:25 PM

## 2018-11-11 NOTE — PLAN OF CARE
Problem: Patient Care Overview  Goal: Plan of Care Review  Outcome: Ongoing (interventions implemented as appropriate)   11/11/18 7704   Coping/Psychosocial   Plan of Care Reviewed With patient   OTHER   Outcome Summary pt very lethargic this date although attempted some prom aarom and grooming although required max a and encouragement

## 2018-11-11 NOTE — SIGNIFICANT NOTE
11/11/18 1507   Rehab Treatment   Discipline physical therapy assistant   Reason Treatment Not Performed other (see comments)  (pt extremely lethargic this date, Tx deferred)

## 2018-11-12 NOTE — DISCHARGE PLACEMENT REQUEST
"Xavier, Rodrigues (88 y.o. Male)     Date of Birth Social Security Number Address Home Phone MRN    09/09/1930  [address] [place], [place] [id number] [phone] [id number]    Islam Marital Status          Hindu        Admission Date Admission Type Admitting Provider Attending Provider Department, Room/Bed    11/5/18 Emergency Augusto Romano MD Ahmed, Farhan, MD [address], 407/1    Discharge Date Discharge Disposition Discharge Destination                       Attending Provider:  Augusto Romano MD    Allergies:  Lipitor [Atorvastatin], Neomycin, Simvastatin    Isolation:  None   Infection:  None   Code Status:  CPR    Ht:  182.9 cm (72\")   Wt:  78.7 kg (173 lb 6.4 oz)    Admission Cmt:  None   Principal Problem:  None                Active Insurance as of 11/5/2018     Primary Coverage     Payor Plan Insurance Group Employer/Plan Group    MEDICARE RAILROAD MEDICARE      Payor Plan Address Payor Plan Phone Number Payor Plan Fax Number Effective Dates    [facility] [id number] [phone]  9/1/1995 - None Entered    [facility] [id number]       Subscriber Name Subscriber Birth Date Member ID       LALITHA PARK 9/9/1930 [id number]           Secondary Coverage     Payor Plan Insurance Group Employer/Plan Group    [employer] [place] [employer] [place] [id number]     Payor Plan Address Payor Plan Phone Number Payor Plan Fax Number Effective Dates    [employer] [phone]  9/1/2015 - None Entered    [place] [address]       Subscriber Name Subscriber Birth Date Member ID       LALITHA PARK 9/9/1930 [id number]                 Emergency Contacts      (Rel.) Home Phone Work Phone Mobile Phone    Taylor Hyman (Daughter) [phone] -- [phone]    Chano Park (Son) [phone] -- [phone]    Lisa Lynn (Grandchild) [phone] -- [phone]    Prisca Park [phone] --              "

## 2018-11-12 NOTE — PLAN OF CARE
Problem: Patient Care Overview  Goal: Plan of Care Review  Outcome: Ongoing (interventions implemented as appropriate)   11/12/18 3506   Coping/Psychosocial   Plan of Care Reviewed With patient   Plan of Care Review   Progress improving   OTHER   Outcome Summary Pt with mild cough x2 of milk toward end of meal. Pt with cues to clear oral cavity using lingual sweep. Pt benefits from cyclic eating/feeding. Pt requires feeding assistance and cues to clear mouth and monitor for s/s of aspiration.

## 2018-11-12 NOTE — PLAN OF CARE
Problem: Patient Care Overview  Goal: Plan of Care Review  Outcome: Ongoing (interventions implemented as appropriate)   11/12/18 8864   Coping/Psychosocial   Plan of Care Reviewed With patient   Plan of Care Review   Progress no change   OTHER   Outcome Summary Pt requiring feeding assistance at this time, had good intake of breakfast with DOTTIE. Pt was lethargic yesterday after Geodon administered overnight. Will note pt preferences and continue to send whole milk on all trays.

## 2018-11-12 NOTE — PLAN OF CARE
"Problem: Patient Care Overview  Goal: Plan of Care Review  Outcome: Ongoing (interventions implemented as appropriate)   11/12/18 1521   Coping/Psychosocial   Plan of Care Reviewed With patient   Plan of Care Review   Progress improving   OTHER   Outcome Summary Pt agreeable to physical therapy treatment this PM. Pt required mod assist to get from supine to sit EOB, and supervision while sitting at EOB ~20 minutes performing various LE exercises and practicing sitting balance. Pt declined trying to stand, stating he was \"not good at that\" and \"was not up for\" trying to practice standing. Pt required min A for sit to supine. Pt demonstrated confusion with call button and attempted to call daughter from it. Pt and RN assisted pt in using telephone to call daughter. Pt would benefit from skilled PT to continue to improve funcitonal mobility, edurance, balance, and strength. D/C recommendation includes SNF.         "

## 2018-11-12 NOTE — PROGRESS NOTES
[facility] Medicine Services  INPATIENT PROGRESS NOTE    Length of Stay: 0  Date of Admission: 11/5/2018  Primary Care Physician: Terrell Arzate MD    Subjective   Chief Complaint: No complaints    HPI:     11/12/2018:  The patient requires maximum assistance with ADLs.  The patient had confusion over the weekend and required [place] on 11/10/2018.  Case management is working on getting the patient placement at [facility].     11/11/2018:  The patient had confusion during the night per nursing staff.      11/10/2018:  The patient was able to assist with feeding today.  The patient home situation is being evaluated by APS and they must clear him for discharge.  Awaiting recommendation.     11/9/2018:  The patient continues to decline and requires feeding by nursing staff currently.  Speech and swallow evaluation recommends all medication be presented in [place] or applesauce.    11/8/2018:  Due to worsening fatigue and drop in hemoglobin (7.2), two units of PRBC given today.  I spoke with the patient's daughter and received consent for blood.       88 year old  male with PMH of tricuspid regurgitation, peripheral vascular disease, HLD, pulmonary hypertension, HTN, CKD stage III and HFpEF that presented to [facility] on 11/5/2018 secondary to altered mental status.  The patient was found to have a Klebsiella UTI and started on Rocephin.  He has a chronic suprapubic catheter that was changed on 11/6/2018 by urology.       Review of Systems   Constitutional: Positive for fatigue.      All pertinent negatives and positives are as above. All other systems have been reviewed and are negative unless otherwise stated.     Objective    Temp:  [96.4 °F (35.8 °C)-99.1 °F (37.3 °C)] 96.6 °F (35.9 °C)  Heart Rate:  [] 85  Resp:  [16-20] 16  BP: (126-152)/(55-80) 142/79    Physical Exam   Constitutional: He is oriented to person, place, and time. He appears  well-developed and well-nourished.   HENT:   Head: Normocephalic and atraumatic.   Eyes: EOM are normal. Pupils are equal, round, and reactive to light.   Neck: Normal range of motion. Neck supple.   Cardiovascular: Normal rate and regular rhythm.   Pulmonary/Chest: Effort normal and breath sounds normal.   Abdominal: Soft. Bowel sounds are normal.   Musculoskeletal: Normal range of motion.   Neurological: He is alert and oriented to person, place, and time.   Skin: Skin is warm and dry.   Psychiatric: He has a normal mood and affect.     Results Review:  I have reviewed the labs, radiology results, and diagnostic studies.    Laboratory Data:   Results from last 7 days   Lab Units  11/12/18   0530  11/11/18   0541  11/10/18   0601   SODIUM mmol/L  142  141  143   POTASSIUM mmol/L  3.7  3.4*  3.5   CHLORIDE mmol/L  107  104  104   CO2 mmol/L  28.0  29.0  31.0   BUN mg/dL  27*  30*  28*   CREATININE mg/dL  1.09  1.05  1.21   GLUCOSE mg/dL  98  96  95   CALCIUM mg/dL  9.0  8.9  9.2   BILIRUBIN mg/dL  0.4  0.3  0.5   ALK PHOS U/L  88  95  84   ALT (SGPT) U/L  15*  14*  8*   AST (SGOT) U/L  24  34  25   ANION GAP mmol/L  7.0  8.0  8.0     Estimated Creatinine Clearance: 52.1 mL/min (by C-G formula based on SCr of 1.09 mg/dL).  Results from last 7 days   Lab Units  11/05/18   1733   MAGNESIUM mg/dL  2.0         Results from last 7 days   Lab Units  11/12/18   0530  11/11/18   0715  11/10/18   0601  11/09/18   0550  11/08/18   1936  11/08/18   0518   WBC 10*3/mm3  8.37  6.89  7.01  6.74   --   5.36   HEMOGLOBIN g/dL  9.1*  9.5*  10.0*  9.1*  9.8*  7.2*   HEMATOCRIT %  28.8*  29.8*  31.6*  27.9*  30.4*  22.9*   PLATELETS 10*3/mm3  245  224  243  228   --   228           Culture Data:   No results found for: BLOODCX  No results found for: URINECX  No results found for: RESPCX  No results found for: WOUNDCX  No results found for: STOOLCX  No components found for: BODYFLD    Radiology Data:   Imaging Results (last 24 hours)      ** No results found for the last 24 hours. **          I have reviewed the patient's current medications.     Assessment/Plan     Active Hospital Problems    Diagnosis Date Noted   • PVD (peripheral vascular disease) with claudication (CMS/[facility]) [I73.9] 10/23/2018     Added automatically from request for surgery [id number]     • Acute cystitis with hematuria [N30.01] 09/08/2018   • Tricuspid regurgitation [I07.1] 07/16/2018   • Pulmonary hypertension (CMS/[facility]) [I27.20] 07/16/2018   • CKD (chronic kidney disease) stage 3, GFR 30-59 ml/min (CMS/[facility]) [N18.3] 07/16/2018   • (HFpEF) heart failure with preserved ejection fraction (CMS/[facility]) [I50.30] 05/21/2018   • Pure hypercholesterolemia [E78.00]    • Hypertensive disorder [I10]        Plan:    1.  Acute cystitis: Klebsiella oxytoca.  Continue Rocephin. New culture pending by urology to rule out candida.   2.  Anemia:  Oral iron started.  2 units of PRBC given 11/8/2018. Hemoglobin has remained stable since then.    3.  CKD stage III: Appear to be doing well continue to monitor.  4.  Hypertension,stable: Continue current medications.  5.  Hyperlipidemia: Statin.  6.  Heart failure with preserved ejection fraction: Patient does not appear to be in heart failure at this point we'll continue monitor.  7.  Deconditioning PT/OT.  8.  Hypokalemia:  Potassium protocol.      Case management working with APS and family about appropriate discharge.     Discharge Planning: I expect patient to be discharged to home in 1-2 days.      This document has been electronically signed by JUSTIN David on November 12, 2018 1:56 PM

## 2018-11-12 NOTE — THERAPY TREATMENT NOTE
Acute Care - Occupational Therapy Treatment Note  [facility]     Patient Name: Ravi Park  : 1930  MRN: [id number]  Today's Date: 2018  Onset of Illness/Injury or Date of Surgery: 18  Date of Referral to OT: 18  Referring Physician: Troy Laura    Admit Date: 2018       ICD-10-CM ICD-9-CM   1. Acute cystitis with hematuria N30.01 595.0   2. Altered mental status, unspecified altered mental status type R41.82 780.97   3. Impaired functional mobility and endurance Z74.09 V49.89   4. Impaired mobility and activities of daily living Z74.09 799.89   5. Dysphagia, unspecified type R13.10 787.20     Patient Active Problem List   Diagnosis   • Peripheral arterial disease (CMS/HCC)   • Chronic atrial fibrillation (CMS/[facility])   • Backache   • Lumbar radiculopathy   • Edema   • Acute congestive heart failure (CMS/[facility])   • Acute blood loss anemia   • Acute on chronic diastolic heart failure (CMS/HCC)   • YESENIA (acute kidney injury) (CMS/[facility])   • (HFpEF) heart failure with preserved ejection fraction (CMS/[facility])   • Urinary retention   • Personal history of tobacco use, presenting hazards to health   • Vitreous floaters   • Upper respiratory infection   • Sprain of shoulder and upper arm   • Shoulder pain   • Serous otitis media   • Sensorineural hearing loss   • Pure hypercholesterolemia   • Pseudophakia   • Prostatitis   • Pain in wrist   • Pain in thumb joint with movement   • Pain in joint involving right lower leg   • Pain in eye   • Otorrhea   • Open wound of toe   • Open wound of lower leg with complication   • Open wound of lower leg   • Open wound of lesser toe of left foot without damage to nail   • Olecranon bursitis   • Need for prophylactic vaccination and inoculation against influenza   • Need for immunization against influenza   • Multiple joint pain   • Malaise and fatigue   • Keratoconjunctivitis sicca (CMS/HCC)   • Insect bite   • Impacted cerumen   • Hypertensive disorder    • History of colonoscopy   • History of colon polyps   • History of artificial eye lens   • Herpes zoster   • Gout   • Exposure to communicable disease   • Dog bite of hand   • Disorder of sacroiliac joint   • Decreased platelet count (CMS/HCC)   • Cough   • Contusion of thumb   • Common cold   • Cervical arthritis   • Cerebrovascular accident (CMS/HCC)   • Cellulitis of lower leg   • Cataract   • Benign prostatic hyperplasia   • Atrial fibrillation (CMS/HCC)   • Astigmatism   • Allergic rhinitis due to pollen   • Allergic rhinitis   • Adverse reaction to drug   • Acute confusion   • Acute bronchitis   • Abnormal gait   • Urinary tract infection associated with indwelling urethral catheter (CMS/HCC)   • Hydronephrosis   • Nephrolithiasis   • Calculus of kidney   • Sepsis (CMS/HCC)   • Pulmonary hypertension (CMS/HCC)   • Tricuspid regurgitation   • CKD (chronic kidney disease) stage 3, GFR 30-59 ml/min (CMS/HCC)   • ESBL (extended spectrum beta-lactamase) producing bacteria infection   • Urinary tract infection associated with catheterization of urinary tract (CMS/HCC)   • Toxic encephalopathy   • Demand ischemia (CMS/HCC)   • Acute renal failure with tubular necrosis (CMS/HCC)   • Chronic atrial fibrillation (CMS/HCC)   • Acute cystitis with hematuria   • Sepsis (CMS/HCC)   • Decubitus ulcer of left heel, unstageable (CMS/HCC)   • Acute renal failure (CMS/HCC)   • Pneumonia due to infectious organism   • Non-healing wound of left heel   • PVD (peripheral vascular disease) with claudication (CMS/HCC)     Past Medical History:   Diagnosis Date   • Benign prostatic hyperplasia    • Cerebrovascular accident (CMS/HCC)    • Chronic anemia    • Chronic atrial fibrillation (CMS/HCC)    • CKD (chronic kidney disease) stage 3, GFR 30-59 ml/min (CMS/HCC)    • GERD (gastroesophageal reflux disease)    • Gout    • Hydronephrosis    • Hypercholesterolemia    • Hypertension    • Nephrolithiasis    • Peripheral arterial disease  (CMS/HCC)    • Pulmonary hypertension (CMS/HCC)    • Tricuspid regurgitation    • Urinary retention      Past Surgical History:   Procedure Laterality Date   • CARDIAC CATHETERIZATION     • NEPHROSTOMY         Therapy Treatment    Rehabilitation Treatment Summary     Row Name 11/12/18 0730             Treatment Time/Intention    Discipline  occupational therapy assistant  -BB      Document Type  therapy note (daily note)  -BB      Subjective Information  no complaints  -BB      Mode of Treatment  individual therapy;occupational therapy  -BB      Patient/Family Observations  no family present  -BB      Total Minutes, Occupational Therapy Treatment  70  -BB      Therapy Frequency (OT Eval)  other (see comments) 3-5x/wk  -BB      Patient Effort  fair  -BB      Existing Precautions/Restrictions  fall  -BB      Recorded by [BB] Carla Lutz COTA/L 11/12/18 1125      Row Name 11/12/18 0730             Vital Signs    Pre Systolic BP Rehab  121  -BB      Pre Treatment Diastolic BP  58  -BB      Pretreatment Heart Rate (beats/min)  71  -BB      Posttreatment Heart Rate (beats/min)  68  -BB      Pre SpO2 (%)  93  -BB      O2 Delivery Pre Treatment  supplemental O2  -BB      Post SpO2 (%)  92  -BB      O2 Delivery Post Treatment  supplemental O2  -BB      Pre Patient Position  Supine  -BB      Post Patient Position  -- long sitting  -BB      Recorded by [BB] Carla Lutz COTA/L 11/12/18 1125      Row Name 11/12/18 0730             Cognitive Assessment/Intervention- PT/OT    Orientation Status (Cognition)  oriented to;person;place  -BB      Follows Commands (Cognition)  follows one step commands  -BB      Personal Safety Interventions  fall prevention program maintained;supervised activity;nonskid shoes/slippers when out of bed  -BB      Recorded by [BB] Carla Lutz COTA/L 11/12/18 1125      Row Name 11/12/18 0730             Grooming Assessment/Training    [place] Level (Grooming)  grooming  skills;hair care, combing/brushing;oral care regimen;wash face, hands;maximum assist (25% patient effort);set up Pt had a difficult time getting dentures in mouth  -BB      Grooming Position  long sitting  -BB      Recorded by [BB] Carla Lutz COTA/L 11/12/18 1125      Row Name 11/12/18 0730             Self-Feeding Assessment/Training    [place] Level (Feeding)  liquids to mouth;scoop food and bring to mouth;set up;maximum assist (25% patient effort)  -BB      Self-Feeding Assess/Train, Spillage Amount  moderate  -BB      Position (Self-Feeding)  sitting up in bed  -BB      Recorded by [BB] Carla Lutz COTA/L 11/12/18 1125      Row Name 11/12/18 0730             Positioning and Restraints    Pre-Treatment Position  in bed  -BB      Post Treatment Position  bed  -BB      In Bed  call light within reach;encouraged to call for assist;exit alarm on long sitting  -BB      Recorded by [BB] Carla Lutz COTA/L 11/12/18 1125      Row Name 11/12/18 0730             Pain Scale: Numbers Pre/Post-Treatment    Pain Scale: Numbers, Pretreatment  6/10  -BB      Pain Scale: Numbers, Post-Treatment  0/10 - no pain  -BB      Pain Location  neck  -BB      Pain Intervention(s)  Repositioned  -BB      Recorded by [BB] Carla Lutz COTA/L 11/12/18 1125      Row Name                Wound 10/30/18 1715 Left heel pressure injury    Wound - Properties Group Date first assessed: 10/30/18 [AS] Time first assessed: 1715 [AS] Present On Admission : yes;picture taken [AS] Side: Left [AS] Location: heel [AS] Type: pressure injury [AS] Stage, Pressure Injury: unstageable [AS] Recorded by:  [AS] Giselle Alicea RN 10/30/18 1715    Row Name                Wound 11/06/18 0300 Left gluteal pressure injury    Wound - Properties Group Date first assessed: 11/06/18 [AB] Time first assessed: 0300 [AB] Present On Admission : yes;picture taken [SR], picture taken by nightshift RN  Side: Left [SR] Location: gluteal  [SR] Type: pressure injury [SR] Stage, Pressure Injury: deep tissue injury [SR] Recorded by:  [AB] Jacquie Chu RN 11/08/18 1511 [SR] Lexi Chaparro RN 11/06/18 2051    Row Name 11/12/18 0730             Plan of Care Review    Plan of Care Reviewed With  patient  -BB      Recorded by [BB] Carla Lutz COTA/L 11/12/18 1125      Row Name 11/12/18 0730             Outcome Summary/Treatment Plan (OT)    Daily Summary of Progress (OT)  progress toward functional goals is gradual  -BB      Plan for Continued Treatment (OT)  continue POC  -BB      Anticipated Discharge Disposition (OT)  skilled nursing facility;home with 24/7 care  -BB      Recorded by [BB] Carla Lutz COTA/L 11/12/18 1125        User Key  (r) = Recorded By, (t) = Taken By, (c) = Cosigned By    Initials Name Effective Dates Discipline    BB Carla Lutz COTA/L 03/07/18 -  OT    AB Jacquie Chu RN 10/17/16 -  Nurse    SR Lexi Chaparro RN 10/17/16 -  Nurse    AS Giselle Alicea RN 12/13/16 -  Nurse        Wound 10/30/18 1715 Left heel pressure injury (Active)   Dressing Appearance no drainage 11/12/2018 10:12 AM   Closure None 11/12/2018 10:12 AM   Base black eschar 11/12/2018 10:12 AM   Black (%), Wound Tissue Color 100 11/11/2018  9:28 PM   Periwound redness 11/12/2018 10:12 AM   Periwound Skin Turgor firm 11/12/2018 10:12 AM   Edges callused 11/12/2018 10:12 AM   Drainage Amount none 11/12/2018 10:12 AM   Care, Wound cleansed with;soap and water 11/12/2018 10:12 AM   Dressing Care, Wound dressing changed 11/12/2018 10:12 AM       Wound 11/06/18 0300 Left gluteal pressure injury (Active)   Dressing Appearance open to air 11/12/2018 10:12 AM   Base maroon/purple 11/12/2018 10:12 AM   Periwound blanchable;redness 11/12/2018 10:12 AM   Periwound Care, Wound barrier ointment applied 11/12/2018 10:12 AM     OT Rehab Goals     Row Name 11/12/18 0730             Transfer Goal 1 (OT)    Activity/Assistive Device  (Transfer Goal 1, OT)  sit-to-stand/stand-to-sit;bed-to-chair/chair-to-bed;toilet  -BB      [place] Level/Cues Needed (Transfer Goal 1, OT)  contact guard assist  -BB      Time Frame (Transfer Goal 1, OT)  long term goal (LTG);by discharge  -BB      Progress/Outcome (Transfer Goal 1, OT)  goal not met  -BB         Bathing Goal 1 (OT)    Activity/Assistive Device (Bathing Goal 1, OT)  bathing skills, all using AE PRN  -BB      [place] Level/Cues Needed (Bathing Goal 1, OT)  minimum assist (75% or more patient effort)  -BB      Time Frame (Bathing Goal 1, OT)  long term goal (LTG);by discharge  -BB      Progress/Outcomes (Bathing Goal 1, OT)  goal not met  -BB         Dressing Goal 1 (OT)    Activity/Assistive Device (Dressing Goal 1, OT)  dressing skills, all using AE PRN  -BB      [place]/Cues Needed (Dressing Goal 1, OT)  minimum assist (75% or more patient effort)  -BB      Time Frame (Dressing Goal 1, OT)  long term goal (LTG);by discharge  -BB      Progress/Outcome (Dressing Goal 1, OT)  goal not met  -BB         Toileting Goal 1 (OT)    Activity/Device (Toileting Goal 1, OT)  toileting skills, all  -BB      [place] Level/Cues Needed (Toileting Goal 1, OT)  minimum assist (75% or more patient effort)  -BB      Time Frame (Toileting Goal 1, OT)  long term goal (LTG);by discharge  -BB      Progress/Outcome (Toileting Goal 1, OT)  goal not met  -BB         Strength Goal 1 (OT)    Strength Goal 1 (OT)  Pt will increase BUE strenth at least 1/2 grade level to benefit ADLs and functional transfers.   -BB      Time Frame (Strength Goal 1, OT)  long term goal (LTG);by discharge  -BB      Progress/Outcome (Strength Goal 1, OT)  goal not met  -BB        User Key  (r) = Recorded By, (t) = Taken By, (c) = Cosigned By    Initials Name Provider Type Discipline    BB Carla Lutz COTA/L Occupational Therapy Assistant OT        Occupational Therapy Education     Title: PT OT SLP Therapies (Active)      Topic: Occupational Therapy (Active)     Point: ADL training (Active)     Description: Instruct learner(s) on proper safety adaptation and remediation techniques during self care or transfers.   Instruct in proper use of assistive devices.    Learning Progress Summary           Family Acceptance, E, NR by LM at 11/11/2018  4:30 PM                   Point: Home exercise program (Active)     Description: Instruct learner(s) on appropriate technique for monitoring, assisting and/or progressing therapeutic exercises/activities.    Learning Progress Summary           Family Acceptance, E, NR by LM at 11/11/2018  4:30 PM                   Point: Precautions (Active)     Description: Instruct learner(s) on prescribed precautions during self-care and functional transfers.    Learning Progress Summary           Family Acceptance, E, NR by LM at 11/11/2018  4:30 PM                   Point: Body mechanics (Active)     Description: Instruct learner(s) on proper positioning and spine alignment during self-care, functional mobility activities and/or exercises.    Learning Progress Summary           Patient Acceptance, E, NR,NL by AS at 11/8/2018  4:28 PM    Comment:  role of OT, OT POC, bed mobility   Family Acceptance, E, NR by LM at 11/11/2018  4:30 PM                               User Key     Initials Effective Dates Name Provider Type Discipline    LM 03/07/18 -  Marilin Miramontes COTA/L Occupational Therapy Assistant OT    AS 05/01/18 -  Debra Corado OT Occupational Therapist OT              Non-skid socks and gait belt in place. Toileting offered. Call light and needs within reach. Pt advised to not get up alone and call the nurse for assistance.  Bed alarm on.     OT Recommendation and Plan  Outcome Summary/Treatment Plan (OT)  Daily Summary of Progress (OT): progress toward functional goals is gradual  Plan for Continued Treatment (OT): continue POC  Anticipated Discharge Disposition (OT): skilled nursing  facility, home with 24/7 care  Therapy Frequency (OT Eval): other (see comments)(3-5x/wk)  Daily Summary of Progress (OT): progress toward functional goals is gradual  Plan of Care Review  Plan of Care Reviewed With: patient  Plan of Care Reviewed With: patient  Outcome Summary: Pt max Assist for grooming and feeding tasks with mod verbal cues. Pt required increased time and effort during today's therapy. No gaols met at this time.  Outcome Measures     Row Name 11/12/18 0730 11/10/18 1600 11/09/18 1400       How much help from another person do you currently need...    Turning from your back to your side while in flat bed without using bedrails?  --  3  -TA  2  -TA    Moving from lying on back to sitting on the side of a flat bed without bedrails?  --  3  -TA  2  -TA    Moving to and from a bed to a chair (including a wheelchair)?  --  2  -TA  2  -TA    Standing up from a chair using your arms (e.g., wheelchair, bedside chair)?  --  2  -TA  2  -TA    Climbing 3-5 steps with a railing?  --  1  -TA  1  -TA    To walk in hospital room?  --  1  -TA  1  -TA    AM-PAC 6 Clicks Score  --  12  -TA  10  -TA       How much help from another is currently needed...    Putting on and taking off regular lower body clothing?  1  -BB  --  --    Bathing (including washing, rinsing, and drying)  2  -BB  --  --    Toileting (which includes using toilet bed pan or urinal)  1  -BB  --  --    Putting on and taking off regular upper body clothing  2  -BB  --  --    Taking care of personal grooming (such as brushing teeth)  2  -BB  --  --    Eating meals  2  -BB  --  --    Score  10  -BB  --  --       Functional Assessment    Outcome Measure Options  --  AM-PAC 6 Clicks Basic Mobility (PT)  -TA  AM-PAC 6 Clicks Basic Mobility (PT)  -TA      User Key  (r) = Recorded By, (t) = Taken By, (c) = Cosigned By    Initials Name Provider Type    Slime Damian, PTA Physical Therapy Assistant    BB Carla Lutz, ERIK/NICOLE Occupational  Therapy Assistant           Time Calculation:   Time Calculation- OT     Row Name 11/12/18 1129             Time Calculation- OT    OT Start Time  0730  -BB      OT Stop Time  0840  -BB      OT Time Calculation (min)  70 min  -BB      Total Timed Code Minutes- OT  70 minute(s)  -BB      OT Received On  11/12/18  -        User Key  (r) = Recorded By, (t) = Taken By, (c) = Cosigned By    Initials Name Provider Type    Carla Ohara COTA/L Occupational Therapy Assistant           Therapy Suggested Charges     Code   Minutes Charges    None           Therapy Charges for Today     Code Description Service Date Service Provider Modifiers Qty    56984890015 HC OT SELF CARE/MGMT/TRAIN EA 15 MIN 11/12/2018 Carla Lutz COTA/L GO 5          OT G-codes  OT Professional Judgement Used?: Yes  OT Functional Scales Options: AM-PAC 6 Clicks Daily Activity (OT)  Score: 14  Functional Limitation: Self care  Self Care Current Status (): At least 40 percent but less than 60 percent impaired, limited or restricted  Self Care Goal Status (): At least 20 percent but less than 40 percent impaired, limited or restricted    VIANEY More  11/12/2018

## 2018-11-12 NOTE — PLAN OF CARE
Problem: Patient Care Overview  Goal: Plan of Care Review  Outcome: Ongoing (interventions implemented as appropriate)   11/12/18 1126   Coping/Psychosocial   Plan of Care Reviewed With patient   Plan of Care Review   Progress no change   OTHER   Outcome Summary Pt max Assist for grooming and feeding tasks with mod verbal cues. Pt required increased time and effort during today's therapy. No goals met at this time.

## 2018-11-12 NOTE — PLAN OF CARE
Problem: Patient Care Overview  Goal: Plan of Care Review  Outcome: Ongoing (interventions implemented as appropriate)   11/12/18 0052   Coping/Psychosocial   Plan of Care Reviewed With patient   Plan of Care Review   Progress no change   OTHER   Outcome Summary Pt ocntinues to be lethargic at this time. Has ersted most of this shift. No new concerns.

## 2018-11-12 NOTE — PROGRESS NOTES
"   LOS: 0 days   Patient Care Team:  Terrell Arzate MD as PCP - General  Terrell Arzate MD as PCP - Claims Attributed  LocoBethany, RN as Care Coordinator (Population Health)    Subjective     Subjective:  Symptoms:  Stable.  (SP tube site clear, REDUCED SEDIMENT  ).    Diet:  No nausea or vomiting.    Activity level: Impaired due to weakness.    Pain:  Pain is requiring pain medication.        History taken from: patient chart    Objective     Vital Signs  Temp:  [96.4 °F (35.8 °C)-98.6 °F (37 °C)] 97.2 °F (36.2 °C)  Heart Rate:  [] 81  Resp:  [16-20] 18  BP: (128-152)/(58-89) 145/89    Objective:  General Appearance:  In no acute distress.    Vital signs: (most recent): Blood pressure 145/89, pulse 81, temperature 97.2 °F (36.2 °C), temperature source Oral, resp. rate 18, height 182.9 cm (72\"), weight 78.7 kg (173 lb 6.4 oz), SpO2 97 %.  Vital signs are normal.  No fever.    Output: Producing urine (Dye urine yellow with white sediment).    HEENT: Normal HEENT exam.    Lungs:  Normal effort and normal respiratory rate.  Breath sounds clear to auscultation.  He is not in respiratory distress.    Heart: Normal rate.  Regular rhythm.  S1 normal and S2 normal.  No murmur.   Chest: Symmetric chest wall expansion.   Abdomen: Abdomen is soft and non-distended.  (No redness at SP tube site).  Bowel sounds are normal.   There is no abdominal tenderness.  There is no suprapubic area tenderness.     Extremities: Normal range of motion.    Pulses: Distal pulses are intact.    Neurological: Patient is alert.  GCS score is 15.    Pupils:  Pupils are equal, round, and reactive to light.    Skin:  Warm, dry and pale.  No rash, ecchymosis or cyanosis.             Results Review:    Lab Results (last 24 hours)     Procedure Component Value Units Date/Time    Comprehensive Metabolic Panel [[id number]]  (Abnormal) Collected:  11/12/18 0530    Specimen:  Blood Updated:  11/12/18 0626     Glucose 98 mg/dL      BUN 27 " mg/dL      Creatinine 1.09 mg/dL      Sodium 142 mmol/L      Potassium 3.7 mmol/L      Chloride 107 mmol/L      CO2 28.0 mmol/L      Calcium 9.0 mg/dL      Total Protein 7.0 g/dL      Albumin 3.30 g/dL      ALT (SGPT) 15 U/L      AST (SGOT) 24 U/L      Alkaline Phosphatase 88 U/L      Total Bilirubin 0.4 mg/dL      eGFR Non African Amer 64 mL/min/1.73      Globulin 3.7 gm/dL      A/G Ratio 0.9 g/dL      BUN/Creatinine Ratio 24.8     Anion Gap 7.0 mmol/L     Narrative:       The MDRD GFR formula is only valid for adults with stable renal function between ages 18 and 70.    CBC & Differential [[id number]] Collected:  11/12/18 0530    Specimen:  Blood Updated:  11/12/18 0606    Narrative:       The following orders were created for panel order CBC & Differential.  Procedure                               Abnormality         Status                     ---------                               -----------         ------                     CBC Auto Differential[[id number]]        Abnormal            Final result                 Please view results for these tests on the individual orders.    CBC Auto Differential [[id number]]  (Abnormal) Collected:  11/12/18 0530    Specimen:  Blood Updated:  11/12/18 0606     WBC 8.37 10*3/mm3      RBC 3.21 10*6/mm3      Hemoglobin 9.1 g/dL      Hematocrit 28.8 %      MCV 89.7 fL      MCH 28.3 pg      MCHC 31.6 g/dL      RDW 15.8 %      RDW-SD 50.9 fl      MPV 9.5 fL      Platelets 245 10*3/mm3      Neutrophil % 75.1 %      Lymphocyte % 14.6 %      Monocyte % 7.8 %      Eosinophil % 2.2 %      Basophil % 0.1 %      Immature Grans % 0.2 %      Neutrophils, Absolute 6.29 10*3/mm3      Lymphocytes, Absolute 1.22 10*3/mm3      Monocytes, Absolute 0.65 10*3/mm3      Eosinophils, Absolute 0.18 10*3/mm3      Basophils, Absolute 0.01 10*3/mm3      Immature Grans, Absolute 0.02 10*3/mm3          Imaging Results (last 24 hours)     ** No results found for the last 24 hours. **           I reviewed the  patient's new clinical results.  I reviewed the patient's new imaging results and agree with the interpretation.  I reviewed the patient's other test results and agree with the interpretation      Assessment/Plan       (HFpEF) heart failure with preserved ejection fraction (CMS/HCC)    Pure hypercholesterolemia    Hypertensive disorder    Pulmonary hypertension (CMS/HCC)    Tricuspid regurgitation    CKD (chronic kidney disease) stage 3, GFR 30-59 ml/min (CMS/HCC)    Acute cystitis with hematuria    PVD (peripheral vascular disease) with claudication (CMS/[facility])      Assessment:    Condition: In stable condition.       1. Obstructive BPH causing neurogenic bladder, cystoscopy with SP tube placement in Sept due for SP tube change  -Proscar, Flomax  -Chronic SP tube, changed 11/6/18     2. UTI cystitis, complicated, history of ESBL UTI  -Urine culture 11/5/18 Klebsiella oxytoca  -Antibiotic day #8, Rocephin  -Diflucan DAY #3  -Urine culture 11/11/18 IN PROGRESS    Plan:   Continue Rocephin and Diflucan  Urine culture IP  SP Tube change due 12/4/18.    JUSTIN Selby  11/12/18  4:39 PM

## 2018-11-12 NOTE — THERAPY TREATMENT NOTE
Acute Care - Physical Therapy Treatment Note  [facility]     Patient Name: Ravi Park  : 1930  MRN: [id number]  Today's Date: 2018  Onset of Illness/Injury or Date of Surgery: 18  Date of Referral to PT: 18  Referring Physician: Troy Laura    Admit Date: 2018    Visit Dx:    ICD-10-CM ICD-9-CM   1. Acute cystitis with hematuria N30.01 595.0   2. Altered mental status, unspecified altered mental status type R41.82 780.97   3. Impaired functional mobility and endurance Z74.09 V49.89   4. Impaired mobility and activities of daily living Z74.09 799.89   5. Dysphagia, unspecified type R13.10 787.20     Patient Active Problem List   Diagnosis   • Peripheral arterial disease (CMS/[facility])   • Chronic atrial fibrillation (CMS/[facility])   • Backache   • Lumbar radiculopathy   • Edema   • Acute congestive heart failure (CMS/[facility])   • Acute blood loss anemia   • Acute on chronic diastolic heart failure (CMS/[facility])   • YESENIA (acute kidney injury) (CMS/[facility])   • (HFpEF) heart failure with preserved ejection fraction (CMS/[facility])   • Urinary retention   • Personal history of tobacco use, presenting hazards to health   • Vitreous floaters   • Upper respiratory infection   • Sprain of shoulder and upper arm   • Shoulder pain   • Serous otitis media   • Sensorineural hearing loss   • Pure hypercholesterolemia   • Pseudophakia   • Prostatitis   • Pain in wrist   • Pain in thumb joint with movement   • Pain in joint involving right lower leg   • Pain in eye   • Otorrhea   • Open wound of toe   • Open wound of lower leg with complication   • Open wound of lower leg   • Open wound of lesser toe of left foot without damage to nail   • Olecranon bursitis   • Need for prophylactic vaccination and inoculation against influenza   • Need for immunization against influenza   • Multiple joint pain   • Malaise and fatigue   • Keratoconjunctivitis sicca (CMS/[facility])   • Insect bite   • Impacted cerumen   • Hypertensive  disorder   • History of colonoscopy   • History of colon polyps   • History of artificial eye lens   • Herpes zoster   • Gout   • Exposure to communicable disease   • Dog bite of hand   • Disorder of sacroiliac joint   • Decreased platelet count (CMS/HCC)   • Cough   • Contusion of thumb   • Common cold   • Cervical arthritis   • Cerebrovascular accident (CMS/HCC)   • Cellulitis of lower leg   • Cataract   • Benign prostatic hyperplasia   • Atrial fibrillation (CMS/HCC)   • Astigmatism   • Allergic rhinitis due to pollen   • Allergic rhinitis   • Adverse reaction to drug   • Acute confusion   • Acute bronchitis   • Abnormal gait   • Urinary tract infection associated with indwelling urethral catheter (CMS/HCC)   • Hydronephrosis   • Nephrolithiasis   • Calculus of kidney   • Sepsis (CMS/HCC)   • Pulmonary hypertension (CMS/HCC)   • Tricuspid regurgitation   • CKD (chronic kidney disease) stage 3, GFR 30-59 ml/min (CMS/HCC)   • ESBL (extended spectrum beta-lactamase) producing bacteria infection   • Urinary tract infection associated with catheterization of urinary tract (CMS/HCC)   • Toxic encephalopathy   • Demand ischemia (CMS/HCC)   • Acute renal failure with tubular necrosis (CMS/HCC)   • Chronic atrial fibrillation (CMS/HCC)   • Acute cystitis with hematuria   • Sepsis (CMS/HCC)   • Decubitus ulcer of left heel, unstageable (CMS/HCC)   • Acute renal failure (CMS/HCC)   • Pneumonia due to infectious organism   • Non-healing wound of left heel   • PVD (peripheral vascular disease) with claudication (CMS/HCC)       Therapy Treatment    Rehabilitation Treatment Summary     Row Name 11/12/18 1521 11/12/18 1136 11/12/18 0730       Treatment Time/Intention    Discipline  physical therapist  -CW  speech language pathologist  -EK  occupational therapy assistant  -BB    Document Type  therapy note (daily note)  -CW  therapy note (daily note)  -EK  therapy note (daily note)  -BB    Subjective Information  complains  of;pain  -CW  no complaints  -EK  no complaints  -BB    Mode of Treatment  physical therapy;individual therapy  -CW  individual therapy;speech-language pathology  -EK  individual therapy;occupational therapy  -BB    Patient/Family Observations  no family present  -CW2  no family present  -EK2  no family present  -BB    Total Minutes, Occupational Therapy Treatment  --  --  70  -BB    Therapy Frequency (OT Eval)  --  --  other (see comments) 3-5x/wk  -BB    Patient Effort  adequate  -CW2  good  -EK  fair  -BB    Existing Precautions/Restrictions  fall;oxygen therapy device and L/min  -CW2  --  fall  -BB    Recorded by [CW] Kristie Alvarez, PT 11/12/18 1534  [CW2] Kristie Alvarez, PT 11/12/18 1604 [EK] Cece Arnold, CCC-SLP 11/12/18 1150  [EK2] Cece Arnold CCC-SLP 11/12/18 1314 [BB] Carla Lutz COTA/L 11/12/18 1125    Row Name 11/12/18 1521 11/12/18 0730          Vital Signs    Pre Systolic BP Rehab  123  -CW  121  -BB     Pre Treatment Diastolic BP  71  -CW  58  -BB     Pretreatment Heart Rate (beats/min)  100  -CW  71  -BB     Posttreatment Heart Rate (beats/min)  --  68  -BB     Pre SpO2 (%)  92  -CW2  93  -BB     O2 Delivery Pre Treatment  --  supplemental O2  -BB     Post SpO2 (%)  --  92  -BB     O2 Delivery Post Treatment  --  supplemental O2  -BB     Pre Patient Position  --  Supine  -BB     Post Patient Position  --  -- long sitting  -BB     Recorded by [CW] Kristie Alvarez, PT 11/12/18 1534  [CW2] Kristie Alvarez, PT 11/12/18 1604 [BB] Carla Lutz VALENCIA/L 11/12/18 1125     Row Name 11/12/18 1521 11/12/18 0730          Cognitive Assessment/Intervention- PT/OT    Affect/Mental Status (Cognitive)  confused  -CW  --     Orientation Status (Cognition)  oriented to;person  -CW  oriented to;person;place  -BB     Follows Commands (Cognition)  follows one step commands;25-49% accuracy  -CW  follows one step commands  -BB     Cognitive Function (Cognitive)   memory deficit  -CW  --     Safety Deficit (Cognitive)  insight into deficits/self awareness;awareness of need for assistance;impulsivity  -CW  --     Personal Safety Interventions  --  fall prevention program maintained;supervised activity;nonskid shoes/slippers when out of bed  -BB     Recorded by [CW] Kristie Alvarez, PT 11/12/18 1604 [BB] Carla Lutz COTA/L 11/12/18 1125     Row Name 11/12/18 1521             Bed Mobility Assessment/Treatment    Supine-Sit [place] (Bed Mobility)  moderate assist (50% patient effort);verbal cues;nonverbal cues (demo/gesture)  -CW      Sit-Supine [place] (Bed Mobility)  minimum assist (75% patient effort)  -CW      Assistive Device (Bed Mobility)  bed rails;head of bed elevated  -CW      Recorded by [CW] Kristie Alvarez, PT 11/12/18 1604      Row Name 11/12/18 1521             Transfer Assessment/Treatment    Comment (Transfers)  Pt declined attempting to stand, stating he is not good at that and denied practicing  -CW      Recorded by [CW] Kristie Alvarez, PT 11/12/18 1604      Row Name 11/12/18 0730             Grooming Assessment/Training    [place] Level (Grooming)  grooming skills;hair care, combing/brushing;oral care regimen;wash face, hands;maximum assist (25% patient effort);set up Pt had a difficult time getting dentures in mouth  -BB      Grooming Position  long sitting  -BB      Recorded by [BB] Carla Lutz COTA/L 11/12/18 1125      Row Name 11/12/18 0730             Self-Feeding Assessment/Training    [place] Level (Feeding)  liquids to mouth;scoop food and bring to mouth;set up;maximum assist (25% patient effort)  -BB      Self-Feeding Assess/Train, Spillage Amount  moderate  -BB      Position (Self-Feeding)  sitting up in bed  -BB      Recorded by [BB] Carla Lutz COTA/L 11/12/18 1125      Row Name 11/12/18 1521             Therapeutic Exercise    Lower Extremity (Therapeutic Exercise)  LAQ (long arc quad),  bilateral;marching while seated  -CW      Lower Extremity Range of Motion (Therapeutic Exercise)  hip flexion/extension, bilateral;ankle dorsiflexion/plantar flexion, bilateral  -CW      Exercise Type (Therapeutic Exercise)  AROM (active range of motion)  -CW      Position (Therapeutic Exercise)  seated  -CW      Sets/Reps (Therapeutic Exercise)  2x10  -CW      Expected Outcome (Therapeutic Exercise)  improve functional stability  -CW      Recorded by [CW] Kristie Alvarez, PT 11/12/18 1604      Row Name 11/12/18 1521             Static Sitting Balance    Level of [place] (Supported Sitting, Static Balance)  supervision  -CW      Sitting Position (Supported Sitting, Static Balance)  sitting on edge of bed  -CW      Time Able to Maintain Position (Supported Sitting, Static Balance)  more than 5 minutes  -CW      Comment (Supported Sitting, Static Balance)  pt sat EOB for ~20 minutes with supervision today  -CW      Recorded by [CW] Kristie Alvarez, PT 11/12/18 1604      Row Name 11/12/18 1136 11/12/18 0730          Positioning and Restraints    Pre-Treatment Position  in bed  -EK  in bed  -BB     Post Treatment Position  bed  -EK  bed  -BB     In Bed  call light within reach  -EK  call light within reach;encouraged to call for assist;exit alarm on long sitting  -BB     Recorded by [EK] Cece Arnold CCC-SLP 11/12/18 1150 [BB] Carla Lutz COTA/L 11/12/18 1125     Row Name 11/12/18 1136 11/12/18 0730          Pain Scale: Numbers Pre/Post-Treatment    Pain Scale: Numbers, Pretreatment  0/10 - no pain  -EK  6/10  -BB     Pain Scale: Numbers, Post-Treatment  0/10 - no pain  -EK2  0/10 - no pain  -BB     Pain Location  --  neck  -BB     Pain Intervention(s)  --  Repositioned  -BB     Recorded by [EK] Cece Arnold CCC-SLP 11/12/18 1150  [EK2] Cece Arnold CCC-SLP 11/12/18 1314 [BB] Carla Lutz COTA/L 11/12/18 1125     Row Name                Wound  10/30/18 1715 Left heel pressure injury    Wound - Properties Group Date first assessed: 10/30/18 [AS] Time first assessed: 1715 [AS] Present On Admission : yes;picture taken [AS] Side: Left [AS] Location: heel [AS] Type: pressure injury [AS] Stage, Pressure Injury: unstageable [AS] Recorded by:  [AS] Giselle Alicea RN 10/30/18 1715    Row Name                Wound 11/06/18 0300 Left gluteal pressure injury    Wound - Properties Group Date first assessed: 11/06/18 [AB] Time first assessed: 0300 [AB] Present On Admission : yes;picture taken [SR], picture taken by nightshift RN  Side: Left [SR] Location: gluteal [SR] Type: pressure injury [SR] Stage, Pressure Injury: deep tissue injury [SR] Recorded by:  [AB] Jacquie Chu RN 11/08/18 1511 [SR] Lexi Chaparro RN 11/06/18 2051    Row Name 11/12/18 0730             Plan of Care Review    Plan of Care Reviewed With  patient  -BB      Recorded by [BB] Carla Lutz COTA/L 11/12/18 1125      Row Name 11/12/18 0730             Outcome Summary/Treatment Plan (OT)    Daily Summary of Progress (OT)  progress toward functional goals is gradual  -BB      Plan for Continued Treatment (OT)  continue POC  -BB      Anticipated Discharge Disposition (OT)  skilled nursing facility;home with 24/7 care  -BB      Recorded by [BB] Carla Lutz COTA/L 11/12/18 1125      Row Name 11/12/18 1136             Outcome Summary/Treatment Plan (SLP)    Daily Summary of Progress (SLP)  progress toward functional goals is good  -EK      Barriers to Overall Progress (SLP)  poor fitting dentures  -EK      Plan for Continued Treatment (SLP)  continue plan of care  -EK      Anticipated Dischage Disposition  home with 24/7 care  -EK      Recorded by [EK] Cece Arnold CCC-SLP 11/12/18 1314        User Key  (r) = Recorded By, (t) = Taken By, (c) = Cosigned By    Initials Name Effective Dates Discipline    EK Cece Arnold CCC-SLP 06/08/18 -  SLP     BB Carla Lutz COTA/L 03/07/18 -  OT    AB Jacquie Chu, RN 10/17/16 -  Nurse    SR Lexi Chaparro, RN 10/17/16 -  Nurse    AS Giselle Alicea, RN 12/13/16 -  Nurse    CW Kristie Alvarez, PT 10/04/18 -  PT          Wound 10/30/18 1715 Left heel pressure injury (Active)   Dressing Appearance no drainage 11/12/2018 10:12 AM   Closure None 11/12/2018 10:12 AM   Base black eschar 11/12/2018 10:12 AM   Black (%), Wound Tissue Color 100 11/11/2018  9:28 PM   Periwound redness 11/12/2018 10:12 AM   Periwound Skin Turgor firm 11/12/2018 10:12 AM   Edges callused 11/12/2018 10:12 AM   Drainage Amount none 11/12/2018 10:12 AM   Care, Wound cleansed with;soap and water 11/12/2018 10:12 AM   Dressing Care, Wound dressing changed 11/12/2018 10:12 AM       Wound 11/06/18 0300 Left gluteal pressure injury (Active)   Dressing Appearance open to air 11/12/2018 10:12 AM   Base maroon/purple 11/12/2018 10:12 AM   Periwound blanchable;redness 11/12/2018 10:12 AM   Periwound Care, Wound barrier ointment applied 11/12/2018 10:12 AM       PT Rehab Goals     Row Name 11/12/18 1521             Bed Mobility Goal 1 (PT)    Activity/Assistive Device (Bed Mobility Goal 1, PT)  bed mobility activities, all  -CW      [place] Level/Cues Needed (Bed Mobility Goal 1, PT)  conditional independence  -CW      Time Frame (Bed Mobility Goal 1, PT)  short term goal (STG)  -CW      Progress/Outcomes (Bed Mobility Goal 1, PT)  goal not met  -CW         Transfer Goal 1 (PT)    Activity/Assistive Device (Transfer Goal 1, PT)  sit-to-stand/stand-to-sit  -CW      [place] Level/Cues Needed (Transfer Goal 1, PT)  supervision required  -CW      Time Frame (Transfer Goal 1, PT)  2 - 3 days  -CW      Progress/Outcome (Transfer Goal 1, PT)  goal not met  -CW         Gait Training Goal 1 (PT)    Activity/Assistive Device (Gait Training Goal 1, PT)  walker, rolling;gait (walking locomotion);assistive device use;backward  stepping;decrease asymmetrical patterns;decrease fall risk;diminish gait deviation;forward stepping;improve balance and speed;increase endurance/gait distance;increase energy conservation;maintain weight bearing status;normalize weight shifts;sidestepping;turning, left;turning, right  -CW      [place] Level (Gait Training Goal 1, PT)  supervision required  -CW      Distance (Gait Goal 1, PT)  1x25 ft or more  -CW      Progress/Outcome (Gait Training Goal 1, PT)  goal not met  -CW        User Key  (r) = Recorded By, (t) = Taken By, (c) = Cosigned By    Initials Name Provider Type Discipline    CW Kristie Alvarez, PT Physical Therapist PT          Physical Therapy Education     Title: PT OT SLP Therapies (Active)     Topic: Physical Therapy (Done)     Point: Mobility training (Done)     Learning Progress Summary           Patient Acceptance, E, VU,NR by  at 11/12/2018  4:13 PM    Comment:  Pt education on use of call button and encouraged to call for assist to reduce risk of falls.                   Point: Home exercise program (Done)     Learning Progress Summary           Patient Acceptance, E, VU,NR by  at 11/12/2018  4:13 PM    Comment:  Pt education on use of call button and encouraged to call for assist to reduce risk of falls.                   Point: Body mechanics (Done)     Learning Progress Summary           Patient Acceptance, E, VU,NR by CW at 11/12/2018  4:13 PM    Comment:  Pt education on use of call button and encouraged to call for assist to reduce risk of falls.                   Point: Precautions (Done)     Learning Progress Summary           Patient Acceptance, E, VU,NR by CW at 11/12/2018  4:13 PM    Comment:  Pt education on use of call button and encouraged to call for assist to reduce risk of falls.                               User Key     Initials Effective Dates Name Provider Type Discipline     10/04/18 -  Kristie Alvarez, PT Physical Therapist PT                PT  "Recommendation and Plan  Anticipated Discharge Disposition (PT): skilled nursing facility, anticipate therapy at next level of care  Planned Therapy Interventions (PT Eval): balance training, bed mobility training, gait training, home exercise program, strengthening, stretching, transfer training, patient/family education  Therapy Frequency (PT Clinical Impression): other (see comments)(5-14x/week)  Outcome Summary/Treatment Plan (PT)  Anticipated Equipment Needs at Discharge (PT): other (see comments)(difficult to assess per pt's lethargy)  Anticipated Discharge Disposition (PT): skilled nursing facility, anticipate therapy at next level of care  Plan of Care Reviewed With: patient  Progress: improving  Outcome Summary: Pt agreeable to physical therapy treatment this PM. Pt required mod assist to get from supine to sit EOB, and supervision while sitting at EOB ~20 minutes performing various LE exercises and practicing sitting balance. Pt declined trying to stand, stating he was \"not good at that\" and \"was not up for\" trying to practice standing. Pt required min A for sit to supine. Pt demonstrated confusion with call button and attempted to call daughter from it. Pt and RN assisted pt in using telephone to call daughter. Pt would benefit from skilled PT to continue to improve funcitonal mobility, edurance, balance, and strength. D/C recommendation includes SNF.  Outcome Measures     Row Name 11/12/18 1521 11/12/18 0730 11/10/18 1600       How much help from another person do you currently need...    Turning from your back to your side while in flat bed without using bedrails?  3  -CW  --  3  -TA    Moving from lying on back to sitting on the side of a flat bed without bedrails?  3  -CW  --  3  -TA    Moving to and from a bed to a chair (including a wheelchair)?  2  -CW  --  2  -TA    Standing up from a chair using your arms (e.g., wheelchair, bedside chair)?  2  -CW  --  2  -TA    Climbing 3-5 steps with a railing? "  1  -CW  --  1  -TA    To walk in hospital room?  1  -CW  --  1  -TA    AM-PAC 6 Clicks Score  12  -CW  --  12  -TA       How much help from another is currently needed...    Putting on and taking off regular lower body clothing?  --  1  -BB  --    Bathing (including washing, rinsing, and drying)  --  2  -BB  --    Toileting (which includes using toilet bed pan or urinal)  --  1  -BB  --    Putting on and taking off regular upper body clothing  --  2  -BB  --    Taking care of personal grooming (such as brushing teeth)  --  2  -BB  --    Eating meals  --  2  -BB  --    Score  --  10  -BB  --       Functional Assessment    Outcome Measure Options  AM-PAC 6 Clicks Basic Mobility (PT)  -CW  --  AM-PAC 6 Clicks Basic Mobility (PT)  -TA      User Key  (r) = Recorded By, (t) = Taken By, (c) = Cosigned By    Initials Name Provider Type    TA Slime Martin, PTA Physical Therapy Assistant    BB Carla Lutz, ERIK/L Occupational Therapy Assistant    CW Kristie Alvarez, PT Physical Therapist         Time Calculation:   PT Charges     Row Name 11/12/18 1521             Time Calculation    Start Time  1521  -CW      Stop Time  1559  -CW      Time Calculation (min)  38 min  -CW      PT Received On  11/12/18  -CW      PT Goal Re-Cert Due Date  11/21/18  -CW         Timed Charges    21808 - PT Therapeutic Exercise Minutes  15  -CW      48125 - PT Therapeutic Activity Minutes  24  -CW        User Key  (r) = Recorded By, (t) = Taken By, (c) = Cosigned By    Initials Name Provider Type    CW Kritsie Alvarez, PT Physical Therapist        Therapy Suggested Charges     Code   Minutes Charges    92859 (CPT®) Hc Pt Neuromusc Re Education Ea 15 Min      22480 (CPT®) Hc Pt Ther Proc Ea 15 Min 15 1    27702 (CPT®) Hc Gait Training Ea 15 Min      02180 (CPT®) Hc Pt Therapeutic Act Ea 15 Min 24 2    92996 (CPT®) Hc Pt Manual Therapy Ea 15 Min      28827 (CPT®) Hc Pt Iontophoresis Ea 15 Min      32796 (CPT®) Hc Pt Elec Stim Ea-Per  15 Min      03363 (CPT®) Hc Pt Ultrasound Ea 15 Min      42274 (CPT®) Hc Pt Self Care/Mgmt/Train Ea 15 Min      17802 (CPT®) Hc Pt Prosthetic (S) Train Initial Encounter, Each 15 Min      58735 (CPT®) Hc Pt Orthotic(S)/Prosthetic(S) Encounter, Each 15 Min      67467 (CPT®) Hc Orthotic(S) Mgmt/Train Initial Encounter, Each 15min      Total  39 3        Therapy Charges for Today     Code Description Service Date Service Provider Modifiers Qty    07729870797 HC PT THERAPEUTIC ACT EA 15 MIN 11/12/2018 Kristie Alvarez, PT GP 2    09046443101 HC PT THER PROC EA 15 MIN 11/12/2018 Kristie Alvarez, PT GP 1          PT G-Codes  PT Professional Judgement Used?: Yes  Outcome Measure Options: AM-PAC 6 Clicks Basic Mobility (PT)  AM-PAC 6 Clicks Score: 12  Score: 10  Functional Limitation: Mobility: Walking and moving around  Mobility: Walking and Moving Around Current Status (): At least 60 percent but less than 80 percent impaired, limited or restricted  Mobility: Walking and Moving Around Goal Status (): At least 40 percent but less than 60 percent impaired, limited or restricted    Kristie Alvarez PT  11/12/2018

## 2018-11-12 NOTE — CONSULTS
Adult Nutrition  Assessment    Patient Name:  Ravi Park  YOB: 1930  MRN: [id number]  Admit Date:  11/5/2018    Assessment Date:  11/12/2018    Comments:  Pt was given Geodon for confusion over the weekend and did not eat yesterday due to lethargy.  Pt more alert today, but has been requiring total feeding assistance.  DOTTIE reports pt ate fairly well at breakfast.  Continues on mechanical soft diet.  RD will note preferences for fruit on trays and sausage instead of turcios.  Will continue to send whole milk on all trays.  Wt up slightly.  Bumex continues.  RD will monitor.    Reason for Assessment     Row Name 11/12/18 1226          Reason for Assessment    Reason For Assessment  follow-up protocol     Identified At Risk by Screening Criteria  large or nonhealing wound, burn or pressure injury         Nutrition/Diet History     Row Name 11/12/18 1226          Nutrition/Diet History    Typical Food/Fluid Intake  DOTTIE reports pt has been requiring feeding assistance but ate well at breakfast.  Pt was lethargic yesterday.       Food Preferences  Did not like turcios this morning, will send sausage instead.  Likes fruit.         Anthropometrics     Row Name 11/12/18 0516          Anthropometrics    Weight  78.7 kg (173 lb 6.4 oz)         Labs/Tests/Procedures/Meds     Row Name 11/12/18 1227          Labs/Procedures/Meds    Lab Results Reviewed  reviewed, pertinent     Lab Results Comments  BUN 27; alb 3.3        Medications    Pertinent Medications Reviewed  reviewed, pertinent     Pertinent Medications Comments  bumex         Physical Findings     Row Name 11/12/18 1228          Physical Findings    Oral/Mouth Cavity  poor dentition     Skin  pressure injury           Nutrition Prescription Ordered     Row Name 11/12/18 1228          Nutrition Prescription PO    Current PO Diet  Soft Texture     Texture  Ground     Fluid Consistency  Thin     Supplement  Milk     Supplement Frequency  3 times a day      Common Modifiers  Cardiac         Evaluation of Received Nutrient/Fluid Intake     Row Name 11/12/18 1229          PO Evaluation    Number of Days PO Intake Evaluated  3 days     % PO Intake  75 x 3; 0 x 3 yesterday; 75 today               Electronically signed by:  Pau Edwards RD  11/12/18 12:29 PM

## 2018-11-12 NOTE — THERAPY TREATMENT NOTE
Acute Care - Speech Language Pathology   Swallow Treatment Note [facility]     Patient Name: Ravi Park  : 1930  MRN: [id number]  Today's Date: 2018  Onset of Illness/Injury or Date of Surgery: 18     Referring Physician: Troy Laura      Admit Date: 2018    Visit Dx:      ICD-10-CM ICD-9-CM   1. Acute cystitis with hematuria N30.01 595.0   2. Altered mental status, unspecified altered mental status type R41.82 780.97   3. Impaired functional mobility and endurance Z74.09 V49.89   4. Impaired mobility and activities of daily living Z74.09 799.89   5. Dysphagia, unspecified type R13.10 787.20     Patient Active Problem List   Diagnosis   • Peripheral arterial disease (CMS/[facility])   • Chronic atrial fibrillation (CMS/[facility])   • Backache   • Lumbar radiculopathy   • Edema   • Acute congestive heart failure (CMS/[facility])   • Acute blood loss anemia   • Acute on chronic diastolic heart failure (CMS/[facility])   • YESENIA (acute kidney injury) (CMS/[facility])   • (HFpEF) heart failure with preserved ejection fraction (CMS/[facility])   • Urinary retention   • Personal history of tobacco use, presenting hazards to health   • Vitreous floaters   • Upper respiratory infection   • Sprain of shoulder and upper arm   • Shoulder pain   • Serous otitis media   • Sensorineural hearing loss   • Pure hypercholesterolemia   • Pseudophakia   • Prostatitis   • Pain in wrist   • Pain in thumb joint with movement   • Pain in joint involving right lower leg   • Pain in eye   • Otorrhea   • Open wound of toe   • Open wound of lower leg with complication   • Open wound of lower leg   • Open wound of lesser toe of left foot without damage to nail   • Olecranon bursitis   • Need for prophylactic vaccination and inoculation against influenza   • Need for immunization against influenza   • Multiple joint pain   • Malaise and fatigue   • Keratoconjunctivitis sicca (CMS/HCC)   • Insect bite   • Impacted cerumen   • Hypertensive disorder   •  History of colonoscopy   • History of colon polyps   • History of artificial eye lens   • Herpes zoster   • Gout   • Exposure to communicable disease   • Dog bite of hand   • Disorder of sacroiliac joint   • Decreased platelet count (CMS/HCC)   • Cough   • Contusion of thumb   • Common cold   • Cervical arthritis   • Cerebrovascular accident (CMS/HCC)   • Cellulitis of lower leg   • Cataract   • Benign prostatic hyperplasia   • Atrial fibrillation (CMS/HCC)   • Astigmatism   • Allergic rhinitis due to pollen   • Allergic rhinitis   • Adverse reaction to drug   • Acute confusion   • Acute bronchitis   • Abnormal gait   • Urinary tract infection associated with indwelling urethral catheter (CMS/HCC)   • Hydronephrosis   • Nephrolithiasis   • Calculus of kidney   • Sepsis (CMS/HCC)   • Pulmonary hypertension (CMS/HCC)   • Tricuspid regurgitation   • CKD (chronic kidney disease) stage 3, GFR 30-59 ml/min (CMS/HCC)   • ESBL (extended spectrum beta-lactamase) producing bacteria infection   • Urinary tract infection associated with catheterization of urinary tract (CMS/HCC)   • Toxic encephalopathy   • Demand ischemia (CMS/HCC)   • Acute renal failure with tubular necrosis (CMS/HCC)   • Chronic atrial fibrillation (CMS/HCC)   • Acute cystitis with hematuria   • Sepsis (CMS/HCC)   • Decubitus ulcer of left heel, unstageable (CMS/HCC)   • Acute renal failure (CMS/HCC)   • Pneumonia due to infectious organism   • Non-healing wound of left heel   • PVD (peripheral vascular disease) with claudication (CMS/HCC)       Therapy Treatment  Rehabilitation Treatment Summary     Row Name 11/12/18 1136 11/12/18 0730          Treatment Time/Intention    Discipline  speech language pathologist  -EK  occupational therapy assistant  -BB     Document Type  therapy note (daily note)  -EK  therapy note (daily note)  -BB     Subjective Information  no complaints  -EK  no complaints  -BB     Mode of Treatment  individual therapy;speech-language  pathology  -EK  individual therapy;occupational therapy  -BB     Patient/Family Observations  no family present  -EK2  no family present  -BB     Total Minutes, Occupational Therapy Treatment  --  70  -BB     Therapy Frequency (OT Eval)  --  other (see comments) 3-5x/wk  -BB     Patient Effort  good  -EK  fair  -BB     Existing Precautions/Restrictions  --  fall  -BB     Recorded by [EK] Cece Arnold CCC-SLP 11/12/18 1150  [EK2] Cece Arnold CCC-SLP 11/12/18 1314 [BB] Carla Lutz COTA/L 11/12/18 1125     Row Name 11/12/18 0730             Vital Signs    Pre Systolic BP Rehab  121  -BB      Pre Treatment Diastolic BP  58  -BB      Pretreatment Heart Rate (beats/min)  71  -BB      Posttreatment Heart Rate (beats/min)  68  -BB      Pre SpO2 (%)  93  -BB      O2 Delivery Pre Treatment  supplemental O2  -BB      Post SpO2 (%)  92  -BB      O2 Delivery Post Treatment  supplemental O2  -BB      Pre Patient Position  Supine  -BB      Post Patient Position  -- long sitting  -BB      Recorded by [BB] Carla Lutz COTA/L 11/12/18 1125      Row Name 11/12/18 0730             Cognitive Assessment/Intervention- PT/OT    Orientation Status (Cognition)  oriented to;person;place  -BB      Follows Commands (Cognition)  follows one step commands  -BB      Personal Safety Interventions  fall prevention program maintained;supervised activity;nonskid shoes/slippers when out of bed  -BB      Recorded by [BB] Carla Lutz COTA/L 11/12/18 1125      Row Name 11/12/18 0730             Grooming Assessment/Training    [place] Level (Grooming)  grooming skills;hair care, combing/brushing;oral care regimen;wash face, hands;maximum assist (25% patient effort);set up Pt had a difficult time getting dentures in mouth  -BB      Grooming Position  long sitting  -BB      Recorded by [BB] Carla Lutz COTA/L 11/12/18 1125      Row Name 11/12/18 0730             Self-Feeding  Assessment/Training    [place] Level (Feeding)  liquids to mouth;scoop food and bring to mouth;set up;maximum assist (25% patient effort)  -BB      Self-Feeding Assess/Train, Spillage Amount  moderate  -BB      Position (Self-Feeding)  sitting up in bed  -BB      Recorded by [BB] Carla Lutz COTA/L 11/12/18 1125      Row Name 11/12/18 1136 11/12/18 0730          Positioning and Restraints    Pre-Treatment Position  in bed  -EK  in bed  -BB     Post Treatment Position  bed  -EK  bed  -BB     In Bed  call light within reach  -EK  call light within reach;encouraged to call for assist;exit alarm on long sitting  -BB     Recorded by [EK] Cece Arnold CCC-SLP 11/12/18 1150 [BB] Carla Lutz COTA/L 11/12/18 1125     Row Name 11/12/18 1136 11/12/18 0730          Pain Scale: Numbers Pre/Post-Treatment    Pain Scale: Numbers, Pretreatment  0/10 - no pain  -EK  6/10  -BB     Pain Scale: Numbers, Post-Treatment  0/10 - no pain  -EK2  0/10 - no pain  -BB     Pain Location  --  neck  -BB     Pain Intervention(s)  --  Repositioned  -BB     Recorded by [EK] Cece Arnold CCC-SLP 11/12/18 1150  [EK2] Cece Arnold CCC-SLP 11/12/18 1314 [BB] Carla Lutz COTA/L 11/12/18 1125     Row Name                Wound 10/30/18 1715 Left heel pressure injury    Wound - Properties Group Date first assessed: 10/30/18 [AS] Time first assessed: 1715 [AS] Present On Admission : yes;picture taken [AS] Side: Left [AS] Location: heel [AS] Type: pressure injury [AS] Stage, Pressure Injury: unstageable [AS] Recorded by:  [AS] Giselle Alicea, RN 10/30/18 1715    Row Name                Wound 11/06/18 0300 Left gluteal pressure injury    Wound - Properties Group Date first assessed: 11/06/18 [AB] Time first assessed: 0300 [AB] Present On Admission : yes;picture taken [SR], picture taken by nightshift RN  Side: Left [SR] Location: gluteal [SR] Type: pressure injury [SR]  Stage, Pressure Injury: deep tissue injury [SR] Recorded by:  [AB] Jacquie Chu RN 11/08/18 1511 [SR] Lexi Chaparro RN 11/06/18 2051    Row Name 11/12/18 0730             Plan of Care Review    Plan of Care Reviewed With  patient  -BB      Recorded by [BB] Carla Lutz COTA/L 11/12/18 1125      Row Name 11/12/18 0730             Outcome Summary/Treatment Plan (OT)    Daily Summary of Progress (OT)  progress toward functional goals is gradual  -BB      Plan for Continued Treatment (OT)  continue POC  -BB      Anticipated Discharge Disposition (OT)  skilled nursing facility;home with 24/7 care  -BB      Recorded by [BB] Carla Lutz COTA/L 11/12/18 1125      Row Name 11/12/18 1136             Outcome Summary/Treatment Plan (SLP)    Daily Summary of Progress (SLP)  progress toward functional goals is good  -EK      Barriers to Overall Progress (SLP)  poor fitting dentures  -EK      Plan for Continued Treatment (SLP)  continue plan of care  -EK      Anticipated Dischage Disposition  home with 24/7 care  -EK      Recorded by [EK] Cece Arnold CCC-SLP 11/12/18 1314        User Key  (r) = Recorded By, (t) = Taken By, (c) = Cosigned By    Initials Name Effective Dates Discipline    EK Cece Arnold CCC-SLP 06/08/18 -  SLP    BB Carla Lutz COTA/L 03/07/18 -  OT    AB Jacquie Chu RN 10/17/16 -  Nurse    SR Lexi Chaparro RN 10/17/16 -  Nurse    AS Giselle Alicea RN 12/13/16 -  Nurse          Outcome Summary  Outcome Summary/Treatment Plan (SLP)  Daily Summary of Progress (SLP): progress toward functional goals is good (11/12/18 1136 : Cece Arnold CCC-SLP)  Barriers to Overall Progress (SLP): poor fitting dentures (11/12/18 1136 : Cece Arnold CCC-SLP)  Plan for Continued Treatment (SLP): continue plan of care (11/12/18 1136 : Cece Arnold CCC-SLP)  Anticipated Dischage Disposition: home with  24/7 care (11/12/18 1136 : Cece Arnold, [facility]-SLP)      SLP GOALS     Row Name 11/12/18 1136 11/10/18 0826          Oral Nutrition/Hydration Goal 1 (SLP)    Oral Nutrition/Hydration Goal 1, SLP  Pt to tolerate least restrictive diet with no s/s of aspiration for adequate nutrition and hydration.   -EK  Pt to tolerate least restrictive diet with no s/s of aspiration for adequate nutrition and hydration.   -CK     Time Frame (Oral Nutrition/Hydration Goal 1, SLP)  by discharge  -EK  by discharge  -CK     Barriers (Oral Nutrition/Hydration Goal 1, SLP)  ill fitting dentures; coughing w/thin liquids  -EK  ill fitting dentures; coughing w/thin liquids  -CK     Progress/Outcomes (Oral Nutrition/Hydration Goal 1, SLP)  goal not met Pt with mild cough x 2 toward end of meal.  -EK  goal ongoing  -CK       User Key  (r) = Recorded By, (t) = Taken By, (c) = Cosigned By    Initials Name Provider Type    Cece Cleaning, CCC-SLP Speech and Language Pathologist    Chica Orozco MS CCC-SLP Speech and Language Pathologist          EDUCATION  The patient has been educated in the following areas:   Dysphagia (Swallowing Impairment).    SLP Recommendation and Plan         Anticipated Dischage Disposition: home with 24/7 care      Plan of Care Reviewed With: patient  Plan of Care Review  Plan of Care Reviewed With: patient  Daily Summary of Progress (SLP): progress toward functional goals is good  Plan for Continued Treatment (SLP): continue plan of care  Progress: improving  Outcome Summary: Pt with mild cough x2 of milk toward end of meal. Pt with cues to clear oral cavity using lingual sweep. Pt benefits from cyclic eating/feeding. Pt requires feeding assistance and cues to clear mouth and monitor for s/s of aspiration.            Time Calculation:   Time Calculation- SLP     Row Name 11/12/18 1504             Time Calculation- SLP    SLP Start Time  1136  -EK      SLP Stop Time  1210  -EK       SLP Time Calculation (min)  34 min  -EK      SLP Received On  11/12/18  -EK      SLP Goal Re-Cert Due Date  11/25/18  -EK        User Key  (r) = Recorded By, (t) = Taken By, (c) = Cosigned By    Initials Name Provider Type    Cece Cleaning CCC-SLP Speech and Language Pathologist          Therapy Charges for Today     Code Description Service Date Service Provider Modifiers Qty    [id number]  ST TREATMENT SWALLOW 2 11/12/2018 Cece Arnold CCC-SLP GN 1          SLP G-Codes  Functional Limitations: Swallowing  Swallow Current Status (): At least 1 percent but less than 20 percent impaired, limited or restricted  Swallow Goal Status (): 0 percent impaired, limited or restricted      ALISSON Otero  11/12/2018

## 2018-11-13 NOTE — PLAN OF CARE
Problem: Fall Risk (Adult)  Goal: Absence of Fall  Outcome: Ongoing (interventions implemented as appropriate)      Problem: Patient Care Overview  Goal: Plan of Care Review  Outcome: Ongoing (interventions implemented as appropriate)  Pt is lethargic, requiring frequent stimulation to stay awake. Pt eating minimum portion of meals. Urine output is low. Vitals stable.    Problem: Infection, Risk/Actual (Adult)  Goal: Infection Prevention/Resolution  Outcome: Ongoing (interventions implemented as appropriate)      Problem: Pain, Acute (Adult)  Goal: Acceptable Pain Control/Comfort Level  Outcome: Ongoing (interventions implemented as appropriate)      Problem: Wound (Includes Pressure Injury) (Adult)  Goal: Signs and Symptoms of Listed Potential Problems Will be Absent, Minimized or Managed (Wound)  Outcome: Ongoing (interventions implemented as appropriate)      Problem: Urinary Tract Infection (Adult)  Goal: Signs and Symptoms of Listed Potential Problems Will be Absent, Minimized or Managed (Urinary Tract Infection)  Outcome: Ongoing (interventions implemented as appropriate)      Problem: Cardiac Output Decreased (Adult)  Goal: Effective Tissue Perfusion  Outcome: Ongoing (interventions implemented as appropriate)      Problem: Skin Injury Risk (Adult)  Goal: Skin Health and Integrity  Outcome: Ongoing (interventions implemented as appropriate)      Problem: Anemia (Adult)  Goal: Identify Related Risk Factors and Signs and Symptoms  Outcome: Outcome(s) achieved Date Met: 11/13/18    Goal: Symptom Improvement  Outcome: Ongoing (interventions implemented as appropriate)

## 2018-11-13 NOTE — PROGRESS NOTES
[facility] Medicine Services  INPATIENT PROGRESS NOTE    Length of Stay: 0  Date of Admission: 11/5/2018  Primary Care Physician: Terrell Arzate MD    Subjective   Chief Complaint: No complaints    HPI:     11/13/2018:  The patient is oriented to person and place.  He remains lethargic, but arouses to voice.      11/12/2018:  The patient requires maximum assistance with ADLs.  The patient had confusion over the weekend and required [place] on 11/10/2018.  Case management is working on getting the patient placement at [facility].     11/11/2018:  The patient had confusion during the night per nursing staff.      11/10/2018:  The patient was able to assist with feeding today.  The patient home situation is being evaluated by APS and they must clear him for discharge.  Awaiting recommendation.     11/9/2018:  The patient continues to decline and requires feeding by nursing staff currently.  Speech and swallow evaluation recommends all medication be presented in [place] or applesauce.    11/8/2018:  Due to worsening fatigue and drop in hemoglobin (7.2), two units of PRBC given today.  I spoke with the patient's daughter and received consent for blood.       88 year old  male with PMH of tricuspid regurgitation, peripheral vascular disease, HLD, pulmonary hypertension, HTN, CKD stage III and HFpEF that presented to [facility] on 11/5/2018 secondary to altered mental status.  The patient was found to have a Klebsiella UTI and started on Rocephin.  He has a chronic suprapubic catheter that was changed on 11/6/2018 by urology.       Review of Systems   Constitutional: Positive for fatigue.      All pertinent negatives and positives are as above. All other systems have been reviewed and are negative unless otherwise stated.     Objective    Temp:  [97.2 °F (36.2 °C)-98.7 °F (37.1 °C)] 98.7 °F (37.1 °C)  Heart Rate:  [74-97] 97  Resp:  [16-18] 18  BP: (121-165)/(63-89)  165/72    Physical Exam   Constitutional: He is oriented to person, place, and time. He appears well-developed and well-nourished.   HENT:   Head: Normocephalic and atraumatic.   Eyes: EOM are normal. Pupils are equal, round, and reactive to light.   Neck: Normal range of motion. Neck supple.   Cardiovascular: Normal rate and regular rhythm.   Pulmonary/Chest: Effort normal and breath sounds normal.   Abdominal: Soft. Bowel sounds are normal.   Musculoskeletal: Normal range of motion.   Neurological: He is alert and oriented to person, place, and time.   Skin: Skin is warm and dry.   Psychiatric: He has a normal mood and affect.     Results Review:  I have reviewed the labs, radiology results, and diagnostic studies.    Laboratory Data:   Results from last 7 days   Lab Units  11/13/18   0646  11/12/18   0530  11/11/18   0541   SODIUM mmol/L  140  142  141   POTASSIUM mmol/L  3.7  3.7  3.4*   CHLORIDE mmol/L  105  107  104   CO2 mmol/L  27.0  28.0  29.0   BUN mg/dL  30*  27*  30*   CREATININE mg/dL  1.55*  1.09  1.05   GLUCOSE mg/dL  99  98  96   CALCIUM mg/dL  8.7  9.0  8.9   BILIRUBIN mg/dL  0.4  0.4  0.3   ALK PHOS U/L  87  88  95   ALT (SGPT) U/L  7*  15*  14*   AST (SGOT) U/L  20  24  34   ANION GAP mmol/L  8.0  7.0  8.0     Estimated Creatinine Clearance: 37 mL/min (A) (by C-G formula based on SCr of 1.55 mg/dL (H)).          Results from last 7 days   Lab Units  11/13/18   0539  11/12/18   0530  11/11/18   0715  11/10/18   0601  11/09/18   0550   WBC 10*3/mm3  9.64  8.37  6.89  7.01  6.74   HEMOGLOBIN g/dL  9.8*  9.1*  9.5*  10.0*  9.1*   HEMATOCRIT %  30.7*  28.8*  29.8*  31.6*  27.9*   PLATELETS 10*3/mm3  151  245  224  243  228           Culture Data:   No results found for: BLOODCX  No results found for: URINECX  No results found for: RESPCX  No results found for: WOUNDCX  No results found for: STOOLCX  No components found for: BODYFLD    Radiology Data:   Imaging Results (last 24 hours)     ** No results  found for the last 24 hours. **          I have reviewed the patient's current medications.     Assessment/Plan     Active Hospital Problems    Diagnosis Date Noted   • PVD (peripheral vascular disease) with claudication (CMS/[facility]) [I73.9] 10/23/2018     Added automatically from request for surgery [id number]     • Acute cystitis with hematuria [N30.01] 09/08/2018   • Tricuspid regurgitation [I07.1] 07/16/2018   • Pulmonary hypertension (CMS/[facility]) [I27.20] 07/16/2018   • CKD (chronic kidney disease) stage 3, GFR 30-59 ml/min (CMS/[facility]) [N18.3] 07/16/2018   • (HFpEF) heart failure with preserved ejection fraction (CMS/[facility]) [I50.30] 05/21/2018   • Pure hypercholesterolemia [E78.00]    • Hypertensive disorder [I10]        Plan:    1.  Acute cystitis: Klebsiella oxytoca.  Patient received full antibiotic course. Continue Diflucan.    2.  Anemia:  Oral iron started.  2 units of PRBC given 11/8/2018. Hemoglobin has remained stable since then.    3.  CKD stage III: Will start gentle IVF.  Creatinine 1.55 today.   4.  Hypertension,stable: Continue current medications.  5.  Hyperlipidemia: Statin.  6.  Heart failure with preserved ejection fraction: Patient does not appear to be in heart failure at this point we'll continue monitor.  7.  Deconditioning PT/OT.  8.  Hypokalemia:  Potassium protocol.      Case management working with APS and family about appropriate discharge.     Discharge Planning: I expect patient to be discharged to home in 1-2 days.      This document has been electronically signed by JUSTIN David on November 13, 2018 2:24 PM

## 2018-11-13 NOTE — THERAPY TREATMENT NOTE
Acute Care - Occupational Therapy Treatment Note  [facility]     Patient Name: Ravi Park  : 1930  MRN: [id number]  Today's Date: 2018  Onset of Illness/Injury or Date of Surgery: 18  Date of Referral to OT: 18  Referring Physician: Troy Laura    Admit Date: 2018       ICD-10-CM ICD-9-CM   1. Acute cystitis with hematuria N30.01 595.0   2. Altered mental status, unspecified altered mental status type R41.82 780.97   3. Impaired functional mobility and endurance Z74.09 V49.89   4. Impaired mobility and activities of daily living Z74.09 799.89   5. Dysphagia, unspecified type R13.10 787.20     Patient Active Problem List   Diagnosis   • Peripheral arterial disease (CMS/HCC)   • Chronic atrial fibrillation (CMS/[facility])   • Backache   • Lumbar radiculopathy   • Edema   • Acute congestive heart failure (CMS/[facility])   • Acute blood loss anemia   • Acute on chronic diastolic heart failure (CMS/HCC)   • YESENIA (acute kidney injury) (CMS/[facility])   • (HFpEF) heart failure with preserved ejection fraction (CMS/[facility])   • Urinary retention   • Personal history of tobacco use, presenting hazards to health   • Vitreous floaters   • Upper respiratory infection   • Sprain of shoulder and upper arm   • Shoulder pain   • Serous otitis media   • Sensorineural hearing loss   • Pure hypercholesterolemia   • Pseudophakia   • Prostatitis   • Pain in wrist   • Pain in thumb joint with movement   • Pain in joint involving right lower leg   • Pain in eye   • Otorrhea   • Open wound of toe   • Open wound of lower leg with complication   • Open wound of lower leg   • Open wound of lesser toe of left foot without damage to nail   • Olecranon bursitis   • Need for prophylactic vaccination and inoculation against influenza   • Need for immunization against influenza   • Multiple joint pain   • Malaise and fatigue   • Keratoconjunctivitis sicca (CMS/HCC)   • Insect bite   • Impacted cerumen   • Hypertensive disorder    • History of colonoscopy   • History of colon polyps   • History of artificial eye lens   • Herpes zoster   • Gout   • Exposure to communicable disease   • Dog bite of hand   • Disorder of sacroiliac joint   • Decreased platelet count (CMS/HCC)   • Cough   • Contusion of thumb   • Common cold   • Cervical arthritis   • Cerebrovascular accident (CMS/HCC)   • Cellulitis of lower leg   • Cataract   • Benign prostatic hyperplasia   • Atrial fibrillation (CMS/HCC)   • Astigmatism   • Allergic rhinitis due to pollen   • Allergic rhinitis   • Adverse reaction to drug   • Acute confusion   • Acute bronchitis   • Abnormal gait   • Urinary tract infection associated with indwelling urethral catheter (CMS/HCC)   • Hydronephrosis   • Nephrolithiasis   • Calculus of kidney   • Sepsis (CMS/HCC)   • Pulmonary hypertension (CMS/HCC)   • Tricuspid regurgitation   • CKD (chronic kidney disease) stage 3, GFR 30-59 ml/min (CMS/HCC)   • ESBL (extended spectrum beta-lactamase) producing bacteria infection   • Urinary tract infection associated with catheterization of urinary tract (CMS/HCC)   • Toxic encephalopathy   • Demand ischemia (CMS/HCC)   • Acute renal failure with tubular necrosis (CMS/HCC)   • Chronic atrial fibrillation (CMS/HCC)   • Acute cystitis with hematuria   • Sepsis (CMS/HCC)   • Decubitus ulcer of left heel, unstageable (CMS/HCC)   • Acute renal failure (CMS/HCC)   • Pneumonia due to infectious organism   • Non-healing wound of left heel   • PVD (peripheral vascular disease) with claudication (CMS/HCC)     Past Medical History:   Diagnosis Date   • Benign prostatic hyperplasia    • Cerebrovascular accident (CMS/HCC)    • Chronic anemia    • Chronic atrial fibrillation (CMS/HCC)    • CKD (chronic kidney disease) stage 3, GFR 30-59 ml/min (CMS/HCC)    • GERD (gastroesophageal reflux disease)    • Gout    • Hydronephrosis    • Hypercholesterolemia    • Hypertension    • Nephrolithiasis    • Peripheral arterial disease  (CMS/HCC)    • Pulmonary hypertension (CMS/HCC)    • Tricuspid regurgitation    • Urinary retention      Past Surgical History:   Procedure Laterality Date   • CARDIAC CATHETERIZATION     • NEPHROSTOMY         Therapy Treatment    Rehabilitation Treatment Summary     Row Name 11/13/18 0910 11/13/18 0829          Treatment Time/Intention    Discipline  occupational therapy assistant  -LW  speech language pathologist  -EK     Document Type  therapy note (daily note)  -LW  therapy note (daily note)  -EK     Subjective Information  no complaints  -LW  no complaints  -EK     Mode of Treatment  occupational therapy  -LW  individual therapy;speech-language pathology  -EK     Patient/Family Observations  No family present  -LW  no family present  -EK     Care Plan Review  care plan/treatment goals reviewed  -LW  --     Total Minutes, Occupational Therapy Treatment  25  -LW  --     Therapy Frequency (OT Eval)  other (see comments) 5-7 days per week  -LW  --     Patient Effort  fair  -LW  good  -EK     Comment  Pt would fall asleep in the middle of UB exersizing  -LW  --     Existing Precautions/Restrictions  fall;oxygen therapy device and L/min  -LW  fall;oxygen therapy device and L/min  -EK     Recorded by [LW] Deisy Sullivan COTA/L 11/13/18 1427 [EK] Cece Arnold CCC-SLP 11/13/18 0938     Row Name 11/13/18 0910             Vital Signs    Pre Systolic BP Rehab  121  -LW      Pre Treatment Diastolic BP  69  -LW      Pretreatment Heart Rate (beats/min)  75  -LW      Pre SpO2 (%)  94  -LW      O2 Delivery Pre Treatment  supplemental O2  -LW      Pre Patient Position  Supine  -LW      Recorded by [LW] Deisy Sullivan COTA/L 11/13/18 1427      Row Name 11/13/18 0910             Cognitive Assessment/Intervention- PT/OT    Affect/Mental Status (Cognitive)  confused  -LW      Behavioral Issues (Cognitive)  unable/difficult to assess  -LW      Orientation Status (Cognition)  oriented to;person  -LW      Follows  Commands (Cognition)  follows one step commands  -LW      Cognitive Function (Cognitive)  memory deficit  -LW      Safety Deficit (Cognitive)  insight into deficits/self awareness  -LW      Personal Safety Interventions  fall prevention program maintained;gait belt;nonskid shoes/slippers when out of bed  -LW      Recorded by [LW] Deisy Sullivan COTA/L 11/13/18 1427      Row Name 11/13/18 0910             Therapeutic Exercise    Exercise Type (Therapeutic Exercise)  AROM (active range of motion)  -LW      Position (Therapeutic Exercise)  supine  -LW      Sets/Reps (Therapeutic Exercise)  10 maybe 15  -LW      Equipment (Therapeutic Exercise)  resistive bands yellow t-band  -LW      Expected Outcome (Therapeutic Exercise)  improve functional tolerance, self-care activity  -LW      Comment (Therapeutic Exercise)  limited ROM  -LW      Recorded by [LW] Deisy Sullivan COTA/L 11/13/18 1427      Row Name 11/13/18 0910             Positioning and Restraints    Pre-Treatment Position  in bed  -LW      Post Treatment Position  bed  -LW      In Bed  supine;call light within reach;encouraged to call for assist;exit alarm on  -LW      Recorded by [LW] Deisy Sullivan COTA/L 11/13/18 1427      Row Name 11/13/18 0910 11/13/18 0829          Pain Scale: Numbers Pre/Post-Treatment    Pain Scale: Numbers, Pretreatment  0/10 - no pain  -LW  0/10 - no pain  -EK     Pain Scale: Numbers, Post-Treatment  0/10 - no pain  -LW  0/10 - no pain  -EK     Recorded by [LW] Deisy Sullivan COTA/L 11/13/18 1427 [EK] Cece Arnold CCC-SLP 11/13/18 0938     Row Name                Wound 10/30/18 1715 Left heel pressure injury    Wound - Properties Group Date first assessed: 10/30/18 [AS] Time first assessed: 1715 [AS] Present On Admission : yes;picture taken [AS] Side: Left [AS] Location: heel [AS] Type: pressure injury [AS] Stage, Pressure Injury: unstageable [AS] Recorded by:  [AS] Giselle Alicea, RN 10/30/18 1715     Row Name                Wound 11/06/18 0300 Left gluteal pressure injury    Wound - Properties Group Date first assessed: 11/06/18 [AB] Time first assessed: 0300 [AB] Present On Admission : yes;picture taken [SR], picture taken by nightshift RN  Side: Left [SR] Location: gluteal [SR] Type: pressure injury [SR] Stage, Pressure Injury: deep tissue injury [SR] Recorded by:  [AB] Jacquie Chu RN 11/08/18 1511 [SR] Lexi Chaparro RN 11/06/18 2051    Row Name 11/13/18 0910             Coping    Observed Emotional State  calm;cooperative  -LW      Verbalized Emotional State  acceptance  -LW      Recorded by [LW] Deisy Sullivan COTA/L 11/13/18 1427      Row Name 11/13/18 0910             Plan of Care Review    Plan of Care Reviewed With  patient  -LW      Recorded by [LW] Deisy Sullivan COTA/L 11/13/18 1427      Row Name 11/13/18 0910             Outcome Summary/Treatment Plan (OT)    Daily Summary of Progress (OT)  progress toward functional goals is gradual  -LW      Plan for Continued Treatment (OT)  Continue POC  -LW      Anticipated Discharge Disposition (OT)  skilled nursing facility  -LW      Recorded by [LW] Deisy Sullivan COTA/L 11/13/18 1427      Row Name 11/13/18 0829             Outcome Summary/Treatment Plan (SLP)    Daily Summary of Progress (SLP)  progress toward functional goals is good  -EK      Plan for Continued Treatment (SLP)  continue plan of care  -EK      Anticipated Dischage Disposition  home with 24/7 care;skilled nursing facility  -EK      Recorded by [EK] Cece Arnold CCC-SLP 11/13/18 0938        User Key  (r) = Recorded By, (t) = Taken By, (c) = Cosigned By    Initials Name Effective Dates Discipline    EK Cece Arnold CCC-SLP 06/08/18 -  SLP    LW Deisy Sullivan COTA/L 03/07/18 -  OT    AB Jacquie Chu RN 10/17/16 -  Nurse    SR Lexi Chaparro RN 10/17/16 -  Nurse    AS Giselle Alicea RN 12/13/16 -  Nurse        Wound 10/30/18  1715 Left heel pressure injury (Active)   Dressing Appearance dried drainage 11/13/2018 12:23 PM   Closure None 11/13/2018 12:23 PM   Base dressing in place, unable to visualize 11/13/2018  7:36 AM   Black (%), Wound Tissue Color 100 11/13/2018 12:23 PM   Periwound redness 11/13/2018 12:23 PM   Care, Wound cleansed with;soap and water 11/13/2018 12:23 PM   Dressing Care, Wound dressing changed 11/13/2018 12:23 PM       Wound 11/06/18 0300 Left gluteal pressure injury (Active)   Dressing Appearance open to air 11/13/2018  7:36 AM   Base maroon/purple 11/13/2018  7:36 AM   Periwound blanchable;redness 11/13/2018  7:36 AM   Periwound Care, Wound barrier ointment applied 11/13/2018  7:36 AM     OT Rehab Goals     Row Name 11/13/18 0910             Transfer Goal 1 (OT)    Activity/Assistive Device (Transfer Goal 1, OT)  sit-to-stand/stand-to-sit;bed-to-chair/chair-to-bed;toilet  -LW      [place] Level/Cues Needed (Transfer Goal 1, OT)  contact guard assist  -LW      Time Frame (Transfer Goal 1, OT)  long term goal (LTG);by discharge  -LW      Progress/Outcome (Transfer Goal 1, OT)  goal not met  -LW         Bathing Goal 1 (OT)    Activity/Assistive Device (Bathing Goal 1, OT)  bathing skills, all using AE PRN  -LW      [place] Level/Cues Needed (Bathing Goal 1, OT)  minimum assist (75% or more patient effort)  -LW      Time Frame (Bathing Goal 1, OT)  long term goal (LTG);by discharge  -LW      Progress/Outcomes (Bathing Goal 1, OT)  goal not met  -LW         Dressing Goal 1 (OT)    Activity/Assistive Device (Dressing Goal 1, OT)  dressing skills, all using AE PRN  -LW      [place]/Cues Needed (Dressing Goal 1, OT)  minimum assist (75% or more patient effort)  -LW      Time Frame (Dressing Goal 1, OT)  long term goal (LTG);by discharge  -LW      Progress/Outcome (Dressing Goal 1, OT)  goal not met  -LW         Toileting Goal 1 (OT)    Activity/Device (Toileting Goal 1, OT)  toileting skills, all  -LW       [place] Level/Cues Needed (Toileting Goal 1, OT)  minimum assist (75% or more patient effort)  -LW      Time Frame (Toileting Goal 1, OT)  long term goal (LTG);by discharge  -LW      Progress/Outcome (Toileting Goal 1, OT)  goal not met  -LW         Strength Goal 1 (OT)    Strength Goal 1 (OT)  Pt will increase BUE strenth at least 1/2 grade level to benefit ADLs and functional transfers.   -LW      Time Frame (Strength Goal 1, OT)  long term goal (LTG);by discharge  -LW      Progress/Outcome (Strength Goal 1, OT)  goal not met  -LW        User Key  (r) = Recorded By, (t) = Taken By, (c) = Cosigned By    Initials Name Provider Type Discipline    Deisy Martini COTA/L Occupational Therapy Assistant OT        Occupational Therapy Education     Title: PT OT SLP Therapies (Active)     Topic: Occupational Therapy (Active)     Point: ADL training (Active)     Description: Instruct learner(s) on proper safety adaptation and remediation techniques during self care or transfers.   Instruct in proper use of assistive devices.    Learning Progress Summary           Patient Acceptance, E,TB, VU by KYUNG at 11/13/2018  2:28 PM   Family Acceptance, E, NR by DARIN at 11/11/2018  4:30 PM                   Point: Home exercise program (Active)     Description: Instruct learner(s) on appropriate technique for monitoring, assisting and/or progressing therapeutic exercises/activities.    Learning Progress Summary           Patient Acceptance, E,TB, VU by KYUNG at 11/13/2018  2:28 PM   Family Acceptance, E, NR by DARIN at 11/11/2018  4:30 PM                   Point: Precautions (Active)     Description: Instruct learner(s) on prescribed precautions during self-care and functional transfers.    Learning Progress Summary           Patient Acceptance, E,TB, VU by KYUNG at 11/13/2018  2:28 PM   Family Acceptance, E, NR by DARIN at 11/11/2018  4:30 PM                   Point: Body mechanics (Active)     Description: Instruct learner(s) on proper  positioning and spine alignment during self-care, functional mobility activities and/or exercises.    Learning Progress Summary           Patient Acceptance, E,TB, VU by LW at 11/13/2018  2:28 PM    Acceptance, E, NR,NL by AS at 11/8/2018  4:28 PM    Comment:  role of OT, OT POC, bed mobility   Family Acceptance, E, NR by DARIN at 11/11/2018  4:30 PM                               User Key     Initials Effective Dates Name Provider Type Discipline    DARIN 03/07/18 -  Marilin Miramontes COTA/L Occupational Therapy Assistant OT    LW 03/07/18 -  Deisy Sullivan COTA/L Occupational Therapy Assistant OT    AS 05/01/18 -  Debra Corado OT Occupational Therapist OT                OT Recommendation and Plan  Outcome Summary/Treatment Plan (OT)  Daily Summary of Progress (OT): progress toward functional goals is gradual  Plan for Continued Treatment (OT): Continue POC  Anticipated Discharge Disposition (OT): skilled nursing facility  Therapy Frequency (OT Eval): other (see comments)(5-7 days per week)  Daily Summary of Progress (OT): progress toward functional goals is gradual  Plan of Care Review  Plan of Care Reviewed With: patient  Plan of Care Reviewed With: patient  Outcome Summary: Pt agreed to tx. Pt supine while working with t-band on UB strengthening. Pt very lethargic.   Outcome Measures     Row Name 11/13/18 0910 11/12/18 1521 11/12/18 0730       How much help from another person do you currently need...    Turning from your back to your side while in flat bed without using bedrails?  --  3  -CW  --    Moving from lying on back to sitting on the side of a flat bed without bedrails?  --  3  -CW  --    Moving to and from a bed to a chair (including a wheelchair)?  --  2  -CW  --    Standing up from a chair using your arms (e.g., wheelchair, bedside chair)?  --  2  -CW  --    Climbing 3-5 steps with a railing?  --  1  -CW  --    To walk in hospital room?  --  1  -CW  --    AM-PAC 6 Clicks Score  --  12  -CW  --        How much help from another is currently needed...    Putting on and taking off regular lower body clothing?  1  -LW  --  1  -BB    Bathing (including washing, rinsing, and drying)  2  -LW  --  2  -BB    Toileting (which includes using toilet bed pan or urinal)  1  -LW  --  1  -BB    Putting on and taking off regular upper body clothing  2  -LW  --  2  -BB    Taking care of personal grooming (such as brushing teeth)  2  -LW  --  2  -BB    Eating meals  2  -LW  --  2  -BB    Score  10  -LW  --  10  -BB       Functional Assessment    Outcome Measure Options  --  AM-PAC 6 Clicks Basic Mobility (PT)  -CW  --    Row Name 11/10/18 1600             How much help from another person do you currently need...    Turning from your back to your side while in flat bed without using bedrails?  3  -TA      Moving from lying on back to sitting on the side of a flat bed without bedrails?  3  -TA      Moving to and from a bed to a chair (including a wheelchair)?  2  -TA      Standing up from a chair using your arms (e.g., wheelchair, bedside chair)?  2  -TA      Climbing 3-5 steps with a railing?  1  -TA      To walk in hospital room?  1  -TA      AM-PAC 6 Clicks Score  12  -TA         Functional Assessment    Outcome Measure Options  AM-PAC 6 Clicks Basic Mobility (PT)  -TA        User Key  (r) = Recorded By, (t) = Taken By, (c) = Cosigned By    Initials Name Provider Type    TA Slime Martin, PTA Physical Therapy Assistant    BB Carla Lutz, VALENCIA/L Occupational Therapy Assistant    Deisy Martini, VALENCIA/L Occupational Therapy Assistant    CW Kristie Alvarez, PT Physical Therapist           Time Calculation:   Time Calculation- OT     Row Name 11/13/18 1430             Time Calculation- OT    OT Start Time  0910  -LW      OT Stop Time  0935  -LW      OT Time Calculation (min)  25 min  -LW      Total Timed Code Minutes- OT  25 minute(s)  -LW      OT Received On  11/13/18  -LW        User Key  (r) = Recorded By, (t)  = Taken By, (c) = Cosigned By    Initials Name Provider Type    LW Deisy Sullivan COTA/L Occupational Therapy Assistant           Therapy Suggested Charges     Code   Minutes Charges    None           Therapy Charges for Today     Code Description Service Date Service Provider Modifiers Qty    [id number] HC OT THER PROC EA 15 MIN 11/13/2018 Deisy Sullivan COTA/L GO 2      Non-skid socks and gait belt in place. Toileting offered. Call light and needs within reach. Pt advised to not get up alone and call the nurse for assistance.  Bed alarm on.       OT G-codes  OT Professional Judgement Used?: Yes  OT Functional Scales Options: AM-PAC 6 Clicks Daily Activity (OT)  Score: 14  Functional Limitation: Self care  Self Care Current Status (): At least 40 percent but less than 60 percent impaired, limited or restricted  Self Care Goal Status (): At least 20 percent but less than 40 percent impaired, limited or restricted    VIANEY Evans  11/13/2018

## 2018-11-13 NOTE — PLAN OF CARE
Problem: Fall Risk (Adult)  Goal: Absence of Fall  Outcome: Ongoing (interventions implemented as appropriate)      Problem: Patient Care Overview  Goal: Plan of Care Review  Outcome: Ongoing (interventions implemented as appropriate)   11/13/18 0051   Coping/Psychosocial   Plan of Care Reviewed With patient   Plan of Care Review   Progress no change   OTHER   Outcome Summary Pt resting well at this time; No new complaints at this time; Will continue to monitor.        Problem: Infection, Risk/Actual (Adult)  Goal: Infection Prevention/Resolution  Outcome: Ongoing (interventions implemented as appropriate)      Problem: Pain, Acute (Adult)  Goal: Acceptable Pain Control/Comfort Level  Outcome: Ongoing (interventions implemented as appropriate)      Problem: Wound (Includes Pressure Injury) (Adult)  Goal: Signs and Symptoms of Listed Potential Problems Will be Absent, Minimized or Managed (Wound)  Outcome: Ongoing (interventions implemented as appropriate)      Problem: Urinary Tract Infection (Adult)  Goal: Signs and Symptoms of Listed Potential Problems Will be Absent, Minimized or Managed (Urinary Tract Infection)  Outcome: Ongoing (interventions implemented as appropriate)      Problem: Cardiac Output Decreased (Adult)  Goal: Identify Related Risk Factors and Signs and Symptoms  Outcome: Outcome(s) achieved Date Met: 11/13/18    Goal: Effective Tissue Perfusion  Outcome: Ongoing (interventions implemented as appropriate)      Problem: Skin Injury Risk (Adult)  Goal: Skin Health and Integrity  Outcome: Ongoing (interventions implemented as appropriate)

## 2018-11-13 NOTE — PLAN OF CARE
Problem: Patient Care Overview  Goal: Plan of Care Review  Outcome: Ongoing (interventions implemented as appropriate)   11/13/18 9725   Coping/Psychosocial   Plan of Care Reviewed With patient   Plan of Care Review   Progress no change   OTHER   Outcome Summary Pt agreed to tx. Pt supine while working with t-band on UB strengthening. Pt very lethargic.

## 2018-11-13 NOTE — PLAN OF CARE
Problem: Patient Care Overview  Goal: Plan of Care Review  Outcome: Ongoing (interventions implemented as appropriate)  Pt more alert than reports from over weekend. Pt still confused at times. Pt eating very little.    Problem: Infection, Risk/Actual (Adult)  Goal: Infection Prevention/Resolution  Outcome: Ongoing (interventions implemented as appropriate)      Problem: Pain, Acute (Adult)  Goal: Acceptable Pain Control/Comfort Level  Outcome: Ongoing (interventions implemented as appropriate)      Problem: Wound (Includes Pressure Injury) (Adult)  Goal: Signs and Symptoms of Listed Potential Problems Will be Absent, Minimized or Managed (Wound)  Outcome: Ongoing (interventions implemented as appropriate)      Problem: Urinary Tract Infection (Adult)  Goal: Signs and Symptoms of Listed Potential Problems Will be Absent, Minimized or Managed (Urinary Tract Infection)  Outcome: Ongoing (interventions implemented as appropriate)      Problem: Cardiac Output Decreased (Adult)  Goal: Effective Tissue Perfusion  Outcome: Ongoing (interventions implemented as appropriate)      Problem: Skin Injury Risk (Adult)  Goal: Identify Related Risk Factors and Signs and Symptoms  Outcome: Outcome(s) achieved Date Met: 11/12/18    Goal: Skin Health and Integrity  Outcome: Ongoing (interventions implemented as appropriate)

## 2018-11-13 NOTE — PROGRESS NOTES
[facility] Medicine Services  INPATIENT PROGRESS NOTE    Length of Stay: 0  Date of Admission: 11/5/2018  Primary Care Physician: Terrell Arzate MD    Subjective   Chief Complaint: No complaints    HPI:     11/13/2018:  The patient is oriented to person and place.  He remains lethargic, but arouses to voice. Creatinine elevated today.       11/12/2018:  The patient requires maximum assistance with ADLs.  The patient had confusion over the weekend and required Geodon on 11/10/2018.  Case management is working on getting the patient placement at [facility].     11/11/2018:  The patient had confusion during the night per nursing staff.      11/10/2018:  The patient was able to assist with feeding today.  The patient home situation is being evaluated by APS and they must clear him for discharge.  Awaiting recommendation.     11/9/2018:  The patient continues to decline and requires feeding by nursing staff currently.  Speech and swallow evaluation recommends all medication be presented in [employer] or [employer].    11/8/2018:  Due to worsening fatigue and drop in hemoglobin (7.2), two units of PRBC given today.  I spoke with the patient's daughter and received consent for blood.       88 year old  male with PMH of tricuspid regurgitation, peripheral vascular disease, HLD, pulmonary hypertension, HTN, CKD stage III and HFpEF that presented to [facility] on 11/5/2018 secondary to altered mental status.  The patient was found to have a Klebsiella UTI and started on Rocephin.  He has a chronic suprapubic catheter that was changed on 11/6/2018 by urology.       Review of Systems   Constitutional: Positive for fatigue.      All pertinent negatives and positives are as above. All other systems have been reviewed and are negative unless otherwise stated.     Objective    Temp:  [97.2 °F (36.2 °C)-98.7 °F (37.1 °C)] 97.9 °F (36.6 °C)  Heart Rate:  [74-97] 97  Resp:   [16-18] 18  BP: (121-165)/(63-89) 162/80    Physical Exam   Constitutional: He is oriented to person, place, and time. He appears well-developed and well-nourished.   HENT:   Head: Normocephalic and atraumatic.   Eyes: EOM are normal. Pupils are equal, round, and reactive to light.   Neck: Normal range of motion. Neck supple.   Cardiovascular: Normal rate and regular rhythm.   Pulmonary/Chest: Effort normal and breath sounds normal.   Abdominal: Soft. Bowel sounds are normal.   Musculoskeletal: Normal range of motion.   Neurological: He is alert and oriented to person, place, and time.   Skin: Skin is warm and dry.   Psychiatric: He has a normal mood and affect.     Results Review:  I have reviewed the labs, radiology results, and diagnostic studies.    Laboratory Data:   Results from last 7 days   Lab Units  11/13/18   1653  11/13/18   0646  11/12/18   0530  11/11/18   0541   SODIUM mmol/L   --   140  142  141   POTASSIUM mmol/L   --   3.7  3.7  3.4*   CHLORIDE mmol/L   --   105  107  104   CO2 mmol/L   --   27.0  28.0  29.0   BUN mg/dL   --   30*  27*  30*   CREATININE mg/dL  2.18*  1.55*  1.09  1.05   GLUCOSE mg/dL   --   99  98  96   CALCIUM mg/dL   --   8.7  9.0  8.9   BILIRUBIN mg/dL   --   0.4  0.4  0.3   ALK PHOS U/L   --   87  88  95   ALT (SGPT) U/L   --   7*  15*  14*   AST (SGOT) U/L   --   20  24  34   ANION GAP mmol/L   --   8.0  7.0  8.0     Estimated Creatinine Clearance: 26.3 mL/min (A) (by C-G formula based on SCr of 2.18 mg/dL (H)).          Results from last 7 days   Lab Units  11/13/18   0539  11/12/18   0530  11/11/18   0715  11/10/18   0601  11/09/18   0550   WBC 10*3/mm3  9.64  8.37  6.89  7.01  6.74   HEMOGLOBIN g/dL  9.8*  9.1*  9.5*  10.0*  9.1*   HEMATOCRIT %  30.7*  28.8*  29.8*  31.6*  27.9*   PLATELETS 10*3/mm3  151  245  224  243  228           Culture Data:   No results found for: BLOODCX  No results found for: URINECX  No results found for: RESPCX  No results found for: WOUNDCX  No  results found for: STOOLCX  No components found for: BODYFLD    Radiology Data:   Imaging Results (last 24 hours)     ** No results found for the last 24 hours. **          I have reviewed the patient's current medications.     Assessment/Plan     Active Hospital Problems    Diagnosis Date Noted   • PVD (peripheral vascular disease) with claudication (CMS/[facility]) [I73.9] 10/23/2018     Added automatically from request for surgery [id number]     • Acute cystitis with hematuria [N30.01] 09/08/2018   • Tricuspid regurgitation [I07.1] 07/16/2018   • Pulmonary hypertension (CMS/[facility]) [I27.20] 07/16/2018   • CKD (chronic kidney disease) stage 3, GFR 30-59 ml/min (CMS/[facility]) [N18.3] 07/16/2018   • (HFpEF) heart failure with preserved ejection fraction (CMS/[facility]) [I50.30] 05/21/2018   • Pure hypercholesterolemia [E78.00]    • Hypertensive disorder [I10]        Plan:    1.  Acute cystitis: Klebsiella oxytoca.  Patient received full antibiotic course. Continue Diflucan.    2.  Anemia:  Oral iron started.  2 units of PRBC given 11/8/2018. Hemoglobin has remained stable since then.    3.  CKD stage III: Will start gentle IVF.  Creatinine 1.55 today.  Recheck of creatinine this afternoon 2.18. Dr. Sandra consulted.   4.  Hypertension,stable: Continue current medications.  5.  Hyperlipidemia: Statin.  6.  Heart failure with preserved ejection fraction: Patient does not appear to be in heart failure at this point we'll continue monitor.  7.  Deconditioning PT/OT.  8.  Hypokalemia:  Potassium protocol.     Case management working with APS and family about appropriate discharge.     Discharge Planning: I expect patient to be discharged to home in 1-2 days.      This document has been electronically signed by JUSTIN David on November 13, 2018 5:39 PM

## 2018-11-13 NOTE — SIGNIFICANT NOTE
11/13/18 1530   Rehab Treatment   Discipline physical therapy assistant   Reason Treatment Not Performed (pt stated he needs to speak to his daughter)

## 2018-11-13 NOTE — PLAN OF CARE
Problem: Patient Care Overview  Goal: Plan of Care Review  Outcome: Ongoing (interventions implemented as appropriate)   11/13/18 4118   Coping/Psychosocial   Plan of Care Reviewed With patient   Plan of Care Review   Progress improving   OTHER   Outcome Summary Pt more confused this date; SLP to ensure pt doesn't not need downgrade to puree at next visit. No s/s of aspiration this date with regular liquids.

## 2018-11-14 NOTE — SIGNIFICANT NOTE
11/14/18 1429   Rehab Treatment   Discipline speech language pathologist   Reason Treatment Not Performed other (see comments)  (Pt declined treatment this date. Pt declined po trials. Will see in a.m. )

## 2018-11-14 NOTE — PLAN OF CARE
Problem: Patient Care Overview  Goal: Plan of Care Review   11/14/18 1638   Coping/Psychosocial   Plan of Care Reviewed With patient;caregiver   Plan of Care Review   Progress no change   OTHER   Outcome Summary Patient continues to have poor appetite and poor intake of meals. Per the nurse, patient did not want to eat breakfast due to being lethargic and out of it.        Problem: Skin Injury Risk (Adult)  Intervention: Promote/Optimize Nutrition   11/14/18 1638   Nutrition Interventions   Oral Nutrition Promotion calorie dense liquids provided

## 2018-11-14 NOTE — PLAN OF CARE
Problem: Patient Care Overview  Goal: Plan of Care Review  Outcome: Ongoing (interventions implemented as appropriate)   11/14/18 0101 11/14/18 1115   Coping/Psychosocial   Plan of Care Reviewed With patient --    Plan of Care Review   Progress no change --    OTHER   Outcome Summary --  pt sup<>sit with min assist of 1-2, pt sit<>stand with min assist, pt sat @ EOB ~20 minutes with CGA, pt will require 24/7 care @ D/C

## 2018-11-14 NOTE — PLAN OF CARE
Problem: Patient Care Overview  Goal: Plan of Care Review  Outcome: Ongoing (interventions implemented as appropriate)   11/14/18 7701   Coping/Psychosocial   Plan of Care Reviewed With patient   Plan of Care Review   Progress no change   OTHER   Outcome Summary Suprapubic cath changed this am by Peter due to low urine output. 750ml out after changing.

## 2018-11-14 NOTE — PROGRESS NOTES
[facility] Medicine Services  INPATIENT PROGRESS NOTE    Length of Stay: 0  Date of Admission: 11/5/2018  Primary Care Physician: Terrell Arzate MD    Subjective   Chief Complaint: No complaints    HPI:     11/14/2018:  Creatinine up to 2.49 today.  Peter with Urology changed his SP tube today.  It appeared to be blocked due to sediment.  Will monitor creatinine.     11/13/2018:  The patient is oriented to person and place.  He remains lethargic, but arouses to voice. Creatinine elevated today.       11/12/2018:  The patient requires maximum assistance with ADLs.  The patient had confusion over the weekend and required Geodon on 11/10/2018.  Case management is working on getting the patient placement at [facility].     11/11/2018:  The patient had confusion during the night per nursing staff.      11/10/2018:  The patient was able to assist with feeding today.  The patient home situation is being evaluated by APS and they must clear him for discharge.  Awaiting recommendation.     11/9/2018:  The patient continues to decline and requires feeding by nursing staff currently.  Speech and swallow evaluation recommends all medication be presented in [employer] or [employer].    11/8/2018:  Due to worsening fatigue and drop in hemoglobin (7.2), two units of PRBC given today.  I spoke with the patient's daughter and received consent for blood.       88 year old  male with PMH of tricuspid regurgitation, peripheral vascular disease, HLD, pulmonary hypertension, HTN, CKD stage III and HFpEF that presented to [facility] on 11/5/2018 secondary to altered mental status.  The patient was found to have a Klebsiella UTI and started on Rocephin.  He has a chronic suprapubic catheter that was changed on 11/6/2018 by urology.       Review of Systems   Constitutional: Positive for fatigue.      All pertinent negatives and positives are as above. All other systems have been  reviewed and are negative unless otherwise stated.     Objective    Temp:  [97.9 °F (36.6 °C)-99 °F (37.2 °C)] 98.2 °F (36.8 °C)  Heart Rate:  [] 88  Resp:  [18] 18  BP: (116-162)/(65-88) 116/65    Physical Exam   Constitutional: He is oriented to person, place, and time. He appears well-developed and well-nourished.   HENT:   Head: Normocephalic and atraumatic.   Eyes: EOM are normal. Pupils are equal, round, and reactive to light.   Neck: Normal range of motion. Neck supple.   Cardiovascular: Normal rate and regular rhythm.   Pulmonary/Chest: Effort normal and breath sounds normal.   Abdominal: Soft. Bowel sounds are normal.   Musculoskeletal: Normal range of motion.   Neurological: He is alert and oriented to person, place, and time.   Skin: Skin is warm and dry.   Psychiatric: He has a normal mood and affect.     Results Review:  I have reviewed the labs, radiology results, and diagnostic studies.    Laboratory Data:   Results from last 7 days   Lab Units  11/14/18   0525 11/13/18   1653  11/13/18   0646  11/12/18   0530   SODIUM mmol/L  143   --   140  142   POTASSIUM mmol/L  3.7   --   3.7  3.7   CHLORIDE mmol/L  104   --   105  107   CO2 mmol/L  30.0   --   27.0  28.0   BUN mg/dL  36*   --   30*  27*   CREATININE mg/dL  2.49*  2.18*  1.55*  1.09   GLUCOSE mg/dL  105*   --   99  98   CALCIUM mg/dL  9.1   --   8.7  9.0   BILIRUBIN mg/dL  0.3   --   0.4  0.4   ALK PHOS U/L  92   --   87  88   ALT (SGPT) U/L  8*   --   7*  15*   AST (SGOT) U/L  20   --   20  24   ANION GAP mmol/L  9.0   --   8.0  7.0     Estimated Creatinine Clearance: 22.7 mL/min (A) (by C-G formula based on SCr of 2.49 mg/dL (H)).          Results from last 7 days   Lab Units  11/14/18   0525  11/13/18   0539  11/12/18   0530  11/11/18   0715  11/10/18   0601   WBC 10*3/mm3  10.35*  9.64  8.37  6.89  7.01   HEMOGLOBIN g/dL  9.2*  9.8*  9.1*  9.5*  10.0*   HEMATOCRIT %  29.9*  30.7*  28.8*  29.8*  31.6*   PLATELETS 10*3/mm3  252  151  245   224  243           Culture Data:   No results found for: BLOODCX  Urine Culture   Date Value Ref Range Status   11/11/2018 >100,000 CFU/mL Candida tropicalis (A)  Final     No results found for: RESPCX  No results found for: WOUNDCX  No results found for: STOOLCX  No components found for: BODYFLD    Radiology Data:   Imaging Results (last 24 hours)     ** No results found for the last 24 hours. **          I have reviewed the patient's current medications.     Assessment/Plan     Active Hospital Problems    Diagnosis Date Noted   • PVD (peripheral vascular disease) with claudication (CMS/[facility]) [I73.9] 10/23/2018     Added automatically from request for surgery [id number]     • Acute cystitis with hematuria [N30.01] 09/08/2018   • Tricuspid regurgitation [I07.1] 07/16/2018   • Pulmonary hypertension (CMS/[facility]) [I27.20] 07/16/2018   • CKD (chronic kidney disease) stage 3, GFR 30-59 ml/min (CMS/[facility]) [N18.3] 07/16/2018   • (HFpEF) heart failure with preserved ejection fraction (CMS/[facility]) [I50.30] 05/21/2018   • Pure hypercholesterolemia [E78.00]    • Hypertensive disorder [I10]        Plan:    1.  Acute cystitis: Klebsiella oxytoca.  Patient received full antibiotic course. Continue Diflucan.    2.  Anemia:  Oral iron started.  2 units of PRBC given 11/8/2018. Hemoglobin has remained stable since then.    3.  CKD stage III: Will start gentle IVF.  Creatinine 2.49. Dosani consulted. Most likely due to obstruction.  SP tube changed today by urology.     4.  Hypertension,stable: Continue current medications.  5.  Hyperlipidemia: Statin.  6.  Heart failure with preserved ejection fraction: Patient does not appear to be in heart failure at this point we'll continue monitor.  7.  Deconditioning PT/OT.  8.  Hypokalemia:  Potassium protocol.     Case management working with APS and family about appropriate discharge.     Discharge Planning: I expect patient to be discharged to home in 1-2 days.      This document has been  electronically signed by JUSTIN David on November 14, 2018 1:46 PM

## 2018-11-14 NOTE — PROGRESS NOTES
"   LOS: 0 days   Patient Care Team:  Terrell Arzate MD as PCP - General  Terrell Arzate MD as PCP - Claims Attributed  Bethany Loco RN as Care Coordinator ([facility])  Lena Bianchi RN as Care Coordinator (Population Health)    Subjective     Subjective:  Symptoms:  Stable.  He reports malaise.  (Cystostomy site clear, urine clear and yellow.   ).    Diet:  No nausea or vomiting.    Activity level: Impaired due to weakness.    Pain:  Pain is requiring pain medication.        History taken from: patient chart    Objective     Vital Signs  Temp:  [97.2 °F (36.2 °C)-98.7 °F (37.1 °C)] 97.9 °F (36.6 °C)  Heart Rate:  [74-97] 97  Resp:  [16-18] 18  BP: (121-165)/(63-89) 162/80    Objective:  General Appearance:  In no acute distress.    Vital signs: (most recent): Blood pressure 162/80, pulse 97, temperature 97.9 °F (36.6 °C), temperature source Axillary, resp. rate 18, height 182.9 cm (72\"), weight 79.5 kg (175 lb 5 oz), SpO2 92 %.  Vital signs are normal.  No fever.    Output: Producing urine (Dye urine yellow clear).    HEENT: Normal HEENT exam.    Lungs:  Normal effort and normal respiratory rate.  Breath sounds clear to auscultation.  He is not in respiratory distress.    Heart: Normal rate.  Regular rhythm.  S1 normal and S2 normal.  No murmur.   Chest: Symmetric chest wall expansion.   Abdomen: Abdomen is soft and non-distended.  (No redness at SP tube site).  Bowel sounds are normal.   There is no abdominal tenderness.  There is no suprapubic area tenderness.     Extremities: Normal range of motion.    Pulses: Distal pulses are intact.    Neurological: Patient is alert and oriented to person, place and time.  GCS score is 15.  (Awake to arouse).    Pupils:  Pupils are equal, round, and reactive to light.    Skin:  Warm, dry and pale.  No rash, ecchymosis or cyanosis.             Results Review:    Lab Results (last 24 hours)     Procedure Component Value Units Date/Time    Creatinine, " Serum [[id number]]  (Abnormal) Collected:  11/13/18 1653    Specimen:  Blood Updated:  11/13/18 1732     Creatinine 2.18 mg/dL      eGFR Non African Amer 29 mL/min/1.73     Narrative:       The MDRD GFR formula is only valid for adults with stable renal function between ages 18 and 70.    Comprehensive Metabolic Panel [[id number]]  (Abnormal) Collected:  11/13/18 0646    Specimen:  Blood Updated:  11/13/18 0715     Glucose 99 mg/dL      BUN 30 mg/dL      Creatinine 1.55 mg/dL      Sodium 140 mmol/L      Potassium 3.7 mmol/L      Chloride 105 mmol/L      CO2 27.0 mmol/L      Calcium 8.7 mg/dL      Total Protein 7.1 g/dL      Albumin 3.20 g/dL      ALT (SGPT) 7 U/L      AST (SGOT) 20 U/L      Alkaline Phosphatase 87 U/L      Total Bilirubin 0.4 mg/dL      eGFR Non African Amer 43 mL/min/1.73      Globulin 3.9 gm/dL      A/G Ratio 0.8 g/dL      BUN/Creatinine Ratio 19.4     Anion Gap 8.0 mmol/L     Narrative:       The MDRD GFR formula is only valid for adults with stable renal function between ages 18 and 70.    CBC & Differential [[id number]] Collected:  11/13/18 0539    Specimen:  Blood Updated:  11/13/18 0550    Narrative:       The following orders were created for panel order CBC & Differential.  Procedure                               Abnormality         Status                     ---------                               -----------         ------                     Scan Slide[[id number]]                                                                  CBC Auto Differential[[id number]]        Abnormal            Final result                 Please view results for these tests on the individual orders.    CBC Auto Differential [[id number]]  (Abnormal) Collected:  11/13/18 0539    Specimen:  Blood Updated:  11/13/18 0549     WBC 9.64 10*3/mm3      RBC 3.47 10*6/mm3      Hemoglobin 9.8 g/dL      Hematocrit 30.7 %      MCV 88.5 fL      MCH 28.2 pg      MCHC 31.9 g/dL      RDW 16.2 %      RDW-SD 52.0 fl      MPV 9.7 fL       Platelets 151 10*3/mm3      Neutrophil % 71.0 %      Lymphocyte % 16.3 %      Monocyte % 8.3 %      Eosinophil % 3.8 %      Basophil % 0.2 %      Immature Grans % 0.4 %      Neutrophils, Absolute 6.84 10*3/mm3      Lymphocytes, Absolute 1.57 10*3/mm3      Monocytes, Absolute 0.80 10*3/mm3      Eosinophils, Absolute 0.37 10*3/mm3      Basophils, Absolute 0.02 10*3/mm3      Immature Grans, Absolute 0.04 10*3/mm3          Imaging Results (last 24 hours)     ** No results found for the last 24 hours. **           I reviewed the patient's new clinical results.  I reviewed the patient's new imaging results and agree with the interpretation.  I reviewed the patient's other test results and agree with the interpretation      Assessment/Plan       (HFpEF) heart failure with preserved ejection fraction (CMS/HCC)    Pure hypercholesterolemia    Hypertensive disorder    Pulmonary hypertension (CMS/HCC)    Tricuspid regurgitation    CKD (chronic kidney disease) stage 3, GFR 30-59 ml/min (CMS/HCC)    Acute cystitis with hematuria    PVD (peripheral vascular disease) with claudication (CMS/[facility])      Assessment:    Condition: In stable condition.       1. Obstructive BPH causing neurogenic bladder, cystoscopy with SP tube placement in Sept due for SP tube change  -Proscar, Flomax  -Chronic SP tube, changed 11/6/18     2. UTI cystitis, complicated, history of ESBL UTI  -Urine culture 11/5/18 Klebsiella oxytoca  -Antibiotic day #8, Rocephin  -Diflucan DAY #3  -Urine culture 11/11/18 IN PROGRESS    Estimated Creatinine Clearance: 26.3 mL/min (A) (by C-G formula based on SCr of 2.18 mg/dL (H)).    Plan:   Continue Rocephin and Diflucan, will decrease Rocephin dose.   SP Tube change due 12/4/18.    JUSTIN Selby  11/13/18  6:59 PM

## 2018-11-14 NOTE — PLAN OF CARE
Problem: Fall Risk (Adult)  Goal: Absence of Fall  Outcome: Ongoing (interventions implemented as appropriate)      Problem: Patient Care Overview  Goal: Plan of Care Review  Outcome: Ongoing (interventions implemented as appropriate)   11/14/18 0101   Coping/Psychosocial   Plan of Care Reviewed With patient   Plan of Care Review   Progress no change   OTHER   Outcome Summary No new complaints at this time; will continue to monitor.        Problem: Infection, Risk/Actual (Adult)  Goal: Infection Prevention/Resolution  Outcome: Ongoing (interventions implemented as appropriate)      Problem: Pain, Acute (Adult)  Goal: Acceptable Pain Control/Comfort Level  Outcome: Ongoing (interventions implemented as appropriate)      Problem: Wound (Includes Pressure Injury) (Adult)  Goal: Signs and Symptoms of Listed Potential Problems Will be Absent, Minimized or Managed (Wound)  Outcome: Ongoing (interventions implemented as appropriate)      Problem: Urinary Tract Infection (Adult)  Goal: Signs and Symptoms of Listed Potential Problems Will be Absent, Minimized or Managed (Urinary Tract Infection)  Outcome: Ongoing (interventions implemented as appropriate)      Problem: Cardiac Output Decreased (Adult)  Goal: Effective Tissue Perfusion  Outcome: Ongoing (interventions implemented as appropriate)      Problem: Skin Injury Risk (Adult)  Goal: Skin Health and Integrity  Outcome: Ongoing (interventions implemented as appropriate)      Problem: Anemia (Adult)  Goal: Symptom Improvement  Outcome: Ongoing (interventions implemented as appropriate)

## 2018-11-14 NOTE — CONSULTS
Adult Nutrition  Assessment    Patient Name:  Ravi Park  YOB: 1930  MRN: [id number]  Admit Date:  11/5/2018    Assessment Date:  11/14/2018    Comments:  Patient has poor appetite and poor intake of meals. Per SLP, patient had difficulty drinking milk towards end of meal. Per nursing, patient had difficulty with meals today d/t being lethargic and out of it. Patient had 16% intake of meals today. RDN staff will continue to monitor.     Reason for Assessment     Row Name 11/14/18 1623          Reason for Assessment    Reason For Assessment  follow-up protocol  (Pended)      Identified At Risk by Screening Criteria  large or nonhealing wound, burn or pressure injury  (Pended)          Nutrition/Diet History     Row Name 11/14/18 1109          Nutrition/Diet History    Typical Food/Fluid Intake  Patient has poor appetite and poor intake of meals. Per SLP, patient had difficulty drinking milk towards end of meal. Per nursing, patient had difficulty with meals today d/t being lethargic and out of it.   (Pended)      Supplemental Drinks/Foods/Additives  whole milk x2 at all meals   (Pended)      Factors Affecting Nutritional Intake  impaired cognitive status/motor control;inability to feed self  (Pended)            Labs/Tests/Procedures/Meds     Row Name 11/14/18 1631          Labs/Procedures/Meds    Lab Results Reviewed  reviewed, pertinent  (Pended)      Lab Results Comments  Na+-143, BUN-36, Cr. 2.49   (Pended)         Diagnostic Tests/Procedures    Diagnostic Test/Procedure Reviewed  reviewed  (Pended)         Medications    Pertinent Medications Reviewed  reviewed  (Pended)          Physical Findings     Row Name 11/14/18 1631          Physical Findings    Overall Physical Appearance  generalized wasting  (Pended)      Oral/Mouth Cavity  poor dentition  (Pended)      Skin  pressure injury  (Pended)          Estimated/Assessed Needs     Row Name 11/14/18 1639          Calculation Measurements     Weight Used For Calculations  78.4 kg (172 lb 13.5 oz)  (Pended)         KCAL/KG    14 Kcal/Kg (kcal)  1097.6  (Pended)      15 Kcal/Kg (kcal)  1176  (Pended)      18 Kcal/Kg (kcal)  1411.2  (Pended)      20 Kcal/Kg (kcal)  1568  (Pended)      25 Kcal/Kg (kcal)  1960  (Pended)      30 Kcal/Kg (kcal)  2352  (Pended)      35 Kcal/Kg (kcal)  2744  (Pended)      40 Kcal/Kg (kcal)  3136  (Pended)      45 Kcal/Kg (kcal)  3528  (Pended)      50 Kcal/Kg (kcal)  3920  (Pended)         [place]-St. Jeor Equation    RMR ([place]-St. Jeor Equation)  1492  (Pended)         Fluid Requirements    Teoodra-Segar Method (over 20 kg)  3068  (Pended)          Nutrition Prescription Ordered     Row Name 11/14/18 1631          Nutrition Prescription PO    Current PO Diet  Soft Texture  (Pended)      Texture  Ground  (Pended)      Supplement  Milk  (Pended)      Supplement Frequency  3 times a day  (Pended)      Common Modifiers  Cardiac  (Pended)          Evaluation of Received Nutrient/Fluid Intake     Row Name 11/14/18 1632          Calculation Measurements    Weight Used For Calculations  78.4 kg (172 lb 13.5 oz)  (Pended)         PO Evaluation    Number of Days PO Intake Evaluated  1 day  (Pended)      Number of Meals  3  (Pended)      % PO Intake  16%  (Pended)          Evaluation of Prescribed Nutrient/Fluid Intake     Row Name 11/14/18 1632          Calculation Measurements    Weight Used For Calculations  78.4 kg (172 lb 13.5 oz)  (Pended)              Electronically signed by:  Amalia Croft  11/14/18 4:33 PM

## 2018-11-14 NOTE — CONSULTS
[facility]  [address] [place] [address]. [id number]   - [phone]    [phone]     Consultation         PATIENT  DEMOGRAPHICS   PATIENT NAME: Ravi Park                      PHYSICIAN: JUSTIN Cortez  : 1930  MRN: [id number]    Subjective   SUBJECTIVE   Referring Provider: JUSTIN Akhtar  Reason for Consultation: YESENIA on CKD2/3  History of present illness:  Mr. Park is 89-year-old gentleman with past medical history of atrial fibrillation, hypertension, dyslipidemia, PAD, nephrolithiasis, history of CVA, chronic anemia, chronic suprapubic catheter Emergency department on 2018 with complaints of altered mental status.  He's been seen by nephrology previously in April and in September for relatively mild acute kidney injury.  Most recently his baseline creatinine is now 1.2-1.3.  On admission he was found to have a urinary tract infection.  He has been treated for this and has reportedly shown some improvement in his mental status.  Unfortunately over the last 3 days his creatinine has risen from 1.0-2.4.  Nephrology is consulted to help manage this acute kidney injury.    Past Medical History:   Diagnosis Date   • Benign prostatic hyperplasia    • Cerebrovascular accident (CMS/HCC)    • Chronic anemia    • Chronic atrial fibrillation (CMS/HCC)    • CKD (chronic kidney disease) stage 3, GFR 30-59 ml/min (CMS/HCC)    • GERD (gastroesophageal reflux disease)    • Gout    • Hydronephrosis    • Hypercholesterolemia    • Hypertension    • Nephrolithiasis    • Peripheral arterial disease (CMS/HCC)    • Pulmonary hypertension (CMS/HCC)    • Tricuspid regurgitation    • Urinary retention      Past Surgical History:   Procedure Laterality Date   • CARDIAC CATHETERIZATION     • NEPHROSTOMY       Family History   Problem Relation Age of Onset   • Hypertension Father      Social History     Tobacco Use   • Smoking status: Former Smoker   • Smokeless tobacco: Never Used  "  Substance Use Topics   • Alcohol use: No   • Drug use: No     Allergies:  Lipitor [atorvastatin]; Neomycin; and Simvastatin     REVIEW OF SYSTEMS    Review of Systems   Unable to perform ROS: Mental status change       Objective   OBJECTIVE   Vital Signs  Temp:  [97.9 °F (36.6 °C)-99 °F (37.2 °C)] 98.2 °F (36.8 °C)  Heart Rate:  [] 88  Resp:  [18] 18  BP: (116-162)/(65-88) 116/65    Flowsheet Rows      First Filed Value   Admission Height  182.9 cm (72\") Documented at 11/05/2018 1515   Admission Weight  89 kg (196 lb 4.8 oz) Documented at 11/05/2018 1515           I/O last 3 completed shifts:  In: 120 [P.O.:120]  Out: 125 [Urine:125]    PHYSICAL EXAM    Physical Exam   Constitutional: He appears well-developed.   HENT:   Head: Normocephalic and atraumatic.   Eyes: Conjunctivae and EOM are normal. Pupils are equal, round, and reactive to light.   Cardiovascular: Normal rate and regular rhythm.   Pulmonary/Chest: Effort normal and breath sounds normal.   Abdominal: Soft.   Neurological: He is alert.   Oriented to person only   Skin: Skin is warm and dry.       RESULTS   Results Review:    Results from last 7 days   Lab Units  11/14/18   0525  11/13/18   1653  11/13/18   0646  11/12/18   0530   SODIUM mmol/L  143   --   140  142   POTASSIUM mmol/L  3.7   --   3.7  3.7   CHLORIDE mmol/L  104   --   105  107   CO2 mmol/L  30.0   --   27.0  28.0   BUN mg/dL  36*   --   30*  27*   CREATININE mg/dL  2.49*  2.18*  1.55*  1.09   CALCIUM mg/dL  9.1   --   8.7  9.0   BILIRUBIN mg/dL  0.3   --   0.4  0.4   ALK PHOS U/L  92   --   87  88   ALT (SGPT) U/L  8*   --   7*  15*   AST (SGOT) U/L  20   --   20  24   GLUCOSE mg/dL  105*   --   99  98       Estimated Creatinine Clearance: 22.7 mL/min (A) (by C-G formula based on SCr of 2.49 mg/dL (H)).                Results from last 7 days   Lab Units  11/14/18   0525  11/13/18   0539  11/12/18   0530  11/11/18   0715  11/10/18   0601   WBC 10*3/mm3  10.35*  9.64  8.37  6.89  " 7.01   HEMOGLOBIN g/dL  9.2*  9.8*  9.1*  9.5*  10.0*   PLATELETS 10*3/mm3  252  151  245  224  243              MEDICATIONS      8-hydroxyquinoline sulfate  Apply externally Daily   aspirin 81 mg Oral Daily   bumetanide 1 mg Oral Daily   ceftriaxone 1 g Intravenous Q24H   cetirizine 10 mg Oral Daily   clopidogrel 75 mg Oral Daily   docusate sodium 100 mg Oral BID   famotidine 40 mg Oral Daily   ferrous sulfate 324 mg Oral TID With Meals   finasteride 5 mg Oral Daily   fluconazole 100 mg Oral Q24H   fluticasone 1 spray Each Nare Daily   folic acid 1 mg Oral Daily   levothyroxine 50 mcg Oral Q AM   magic butt ointment  Topical BID   metoprolol succinate XL 25 mg Oral Daily   polyethylene glycol 17 g Oral Daily   potassium chloride 10 mEq Oral Daily   sodium chloride 3 mL Intravenous Q12H   tamsulosin 0.8 mg Oral Nightly       sodium chloride 50 mL/hr Last Rate: 50 mL/hr (11/14/18 0949)     Medications Prior to Admission   Medication Sig Dispense Refill Last Dose   • acetaminophen (TYLENOL) 325 MG tablet Take 650 mg by mouth Every 6 (Six) Hours As Needed for Mild Pain , Headache or Fever.   Taking   • albuterol (PROVENTIL HFA;VENTOLIN HFA) 108 (90 BASE) MCG/ACT inhaler Inhale 2 puffs Every 6 (Six) Hours As Needed for Wheezing. 2 Puffs 4 times per day as needed. 1 inhaler 11 Taking   • clopidogrel (PLAVIX) 75 MG tablet Take 1 tablet by mouth Daily. 30 tablet 3 Taking   • docusate sodium 100 MG capsule Take 100 mg by mouth 2 (Two) Times a Day.   Taking   • famotidine (PEPCID) 40 MG tablet Take 1 tablet by mouth Daily.   Taking   • finasteride (PROSCAR) 5 MG tablet Take 5 mg by mouth Daily.   Taking   • fluticasone (FLONASE) 50 MCG/ACT nasal spray 1 spray by Each Nare route Daily.   Taking   • folic acid (FOLVITE) 1 MG tablet Take 1 tablet by mouth Daily. 30 tablet 1 Taking   • levothyroxine (SYNTHROID, LEVOTHROID) 50 MCG tablet Take 1 tablet by mouth Daily. 30 tablet 5 Taking   • metoprolol succinate XL (TOPROL-XL) 25  MG 24 hr tablet Take 1 tablet by mouth Daily. 30 tablet 11 Taking   • oxyCODONE-acetaminophen (PERCOCET) 7.5-325 MG per tablet Take 1 tablet by mouth Every 6 (Six) Hours As Needed for Moderate Pain . 120 tablet 0 Taking   • polyethylene glycol (MIRALAX) pack packet Take 17 g by mouth Daily.   Patient Taking Differently at Unknown time   • potassium chloride (K-DUR,KLOR-CON) 10 MEQ CR tablet Take 5 mEq by mouth Daily.   Taking   • tamsulosin (FLOMAX) 0.4 MG capsule 24 hr capsule Take 2 capsules by mouth Every Night. 30 capsule  Taking   • TiZANidine (ZANAFLEX) 2 MG capsule Take 2 mg by mouth Daily As Needed for Muscle Spasms.   Taking   • aspirin 81 MG chewable tablet Chew 1 tablet Daily.   Taking   • bumetanide (BUMEX) 1 MG tablet Take 1 tablet by mouth Daily. 30 tablet 1 Taking   • loratadine (CLARITIN) 10 MG tablet Take 10 mg by mouth Daily.   Taking   • vitamin B-12 (CYANOCOBALAMIN) 1000 MCG tablet Take 1,000 mcg by mouth Daily.   Taking   • vitamin D (ERGOCALCIFEROL) 14134 units capsule capsule Take 50,000 Units by mouth Every 7 (Seven) Days. Take 1 capsule by mouth every 7 days on Thursday   Taking     Assessment/Plan   ASSESSMENT / PLAN      (HFpEF) heart failure with preserved ejection fraction (CMS/HCC)    Pure hypercholesterolemia    Hypertensive disorder    Pulmonary hypertension (CMS/HCC)    Tricuspid regurgitation    CKD (chronic kidney disease) stage 3, GFR 30-59 ml/min (CMS/HCC)    Acute cystitis with hematuria    PVD (peripheral vascular disease) with claudication (CMS/HCC)    1.  Acute kidney injury on CKD2/3- patient has had markedly diminished urine output switch coincided with his rising creatinine. This is likely urinary retention. His catheter was changed out today with urology.  He had immediate return of 750 ml.  I believe his acute kidney injury is likely obstructive in nature and expected to improve in the morning.  I did not identify any problem medications.    -We'll check FENa,  Josh    -Continue IV fluids, observe for post obstructive diuresis     -Hold Bumex for now    2.  Urinary tract infection- on ceftriaxone and fluconazole    3.  Anemia- hemoglobin acceptable , status post 2 units packed red blood cells    4.  Hypertension- stable     5.  Hyperlipidemia    6.  HFpEF- we'll resume the Bumex when renal function is improved      Thank you for the consult, we will continue to follow this patient.         I discussed the patients findings and my recommendations with the patient.    This document has been electronically signed by JUSTIN Cortez on November 14, 2018 2:45 PM        For this patient encounter, I have reviewed the Nurse Practitioner's documentation, medical decision making, and treatment plan.

## 2018-11-14 NOTE — THERAPY TREATMENT NOTE
Acute Care - Physical Therapy Treatment Note  [facility]     Patient Name: Ravi Park  : 1930  MRN: [id number]  Today's Date: 2018  Onset of Illness/Injury or Date of Surgery: 18  Date of Referral to PT: 18  Referring Physician: Troy Laura    Admit Date: 2018    Visit Dx:    ICD-10-CM ICD-9-CM   1. Acute cystitis with hematuria N30.01 595.0   2. Altered mental status, unspecified altered mental status type R41.82 780.97   3. Impaired functional mobility and endurance Z74.09 V49.89   4. Impaired mobility and activities of daily living Z74.09 799.89   5. Dysphagia, unspecified type R13.10 787.20     Patient Active Problem List   Diagnosis   • Peripheral arterial disease (CMS/[facility])   • Chronic atrial fibrillation (CMS/[facility])   • Backache   • Lumbar radiculopathy   • Edema   • Acute congestive heart failure (CMS/[facility])   • Acute blood loss anemia   • Acute on chronic diastolic heart failure (CMS/[facility])   • YESENIA (acute kidney injury) (CMS/[facility])   • (HFpEF) heart failure with preserved ejection fraction (CMS/[facility])   • Urinary retention   • Personal history of tobacco use, presenting hazards to health   • Vitreous floaters   • Upper respiratory infection   • Sprain of shoulder and upper arm   • Shoulder pain   • Serous otitis media   • Sensorineural hearing loss   • Pure hypercholesterolemia   • Pseudophakia   • Prostatitis   • Pain in wrist   • Pain in thumb joint with movement   • Pain in joint involving right lower leg   • Pain in eye   • Otorrhea   • Open wound of toe   • Open wound of lower leg with complication   • Open wound of lower leg   • Open wound of lesser toe of left foot without damage to nail   • Olecranon bursitis   • Need for prophylactic vaccination and inoculation against influenza   • Need for immunization against influenza   • Multiple joint pain   • Malaise and fatigue   • Keratoconjunctivitis sicca (CMS/[facility])   • Insect bite   • Impacted cerumen   • Hypertensive  disorder   • History of colonoscopy   • History of colon polyps   • History of artificial eye lens   • Herpes zoster   • Gout   • Exposure to communicable disease   • Dog bite of hand   • Disorder of sacroiliac joint   • Decreased platelet count (CMS/HCC)   • Cough   • Contusion of thumb   • Common cold   • Cervical arthritis   • Cerebrovascular accident (CMS/HCC)   • Cellulitis of lower leg   • Cataract   • Benign prostatic hyperplasia   • Atrial fibrillation (CMS/HCC)   • Astigmatism   • Allergic rhinitis due to pollen   • Allergic rhinitis   • Adverse reaction to drug   • Acute confusion   • Acute bronchitis   • Abnormal gait   • Urinary tract infection associated with indwelling urethral catheter (CMS/HCC)   • Hydronephrosis   • Nephrolithiasis   • Calculus of kidney   • Sepsis (CMS/HCC)   • Pulmonary hypertension (CMS/HCC)   • Tricuspid regurgitation   • CKD (chronic kidney disease) stage 3, GFR 30-59 ml/min (CMS/HCC)   • ESBL (extended spectrum beta-lactamase) producing bacteria infection   • Urinary tract infection associated with catheterization of urinary tract (CMS/HCC)   • Toxic encephalopathy   • Demand ischemia (CMS/HCC)   • Acute renal failure with tubular necrosis (CMS/HCC)   • Chronic atrial fibrillation (CMS/HCC)   • Acute cystitis with hematuria   • Sepsis (CMS/HCC)   • Decubitus ulcer of left heel, unstageable (CMS/HCC)   • Acute renal failure (CMS/HCC)   • Pneumonia due to infectious organism   • Non-healing wound of left heel   • PVD (peripheral vascular disease) with claudication (CMS/HCC)       Therapy Treatment    Rehabilitation Treatment Summary     Row Name 11/14/18 1115             Treatment Time/Intention    Discipline  physical therapy assistant  -TA      Document Type  therapy note (daily note)  -TA      Subjective Information  complains of;pain  -TA      Mode of Treatment  physical therapy;individual therapy  -TA      Patient Effort  adequate  -TA      Existing Precautions/Restrictions   fall;oxygen therapy device and L/min  -TA      Recorded by [TA] Slime Martin PTA 11/14/18 1305      Row Name 11/14/18 1115             Vital Signs    Pre Systolic BP Rehab  104  -TA      Pre Treatment Diastolic BP  46  -TA      Post Systolic BP Rehab  116  -TA      Post Treatment Diastolic BP  65  -TA      Pretreatment Heart Rate (beats/min)  88  -TA      Posttreatment Heart Rate (beats/min)  93  -TA      Pre SpO2 (%)  99  -TA      O2 Delivery Pre Treatment  supplemental O2  -TA      Post SpO2 (%)  93  -TA      O2 Delivery Post Treatment  supplemental O2  -TA      Pre Patient Position  Supine  -TA      Intra Patient Position  Sitting  -TA      Post Patient Position  Supine  -TA      Recorded by [TA] Slime Martin PTA 11/14/18 1305      Row Name 11/14/18 1115             Cognitive Assessment/Intervention- PT/OT    Affect/Mental Status (Cognitive)  confused  -TA      Behavioral Issues (Cognitive)  unable/difficult to assess  -TA      Orientation Status (Cognition)  oriented to;person  -TA      Follows Commands (Cognition)  follows one step commands;25-49% accuracy  -TA      Cognitive Function (Cognitive)  memory deficit  -TA      Personal Safety Interventions  fall prevention program maintained;nonskid shoes/slippers when out of bed;supervised activity  -TA      Recorded by [TA] Slime Martin PTA 11/14/18 1305      Row Name 11/14/18 1115             Bed Mobility Assessment/Treatment    Scooting/Bridging [place] (Bed Mobility)  minimum assist (75% patient effort)  -TA      Supine-Sit [place] (Bed Mobility)  verbal cues;nonverbal cues (demo/gesture);minimum assist (75% patient effort)  -TA      Sit-Supine [place] (Bed Mobility)  2 person assist;moderate assist (50% patient effort)  -TA      Assistive Device (Bed Mobility)  bed rails;head of bed elevated  -TA      Comment (Bed Mobility)  pt sat @ EOB ~20 minutes with CGA, pt performed 3 scoots >HOB  -TA      Recorded by [TA] Slime Martin, PTA  11/14/18 1305      Row Name 11/14/18 1115             Functional Mobility    Functional Mobility- Comment  pt pulled @ lines  while siting @ EOB, PTA held to pts hand to prevent pt disconnecting himself  -TA      Recorded by [TA] Slime Martin, PTA 11/14/18 1305      Row Name 11/14/18 1115             Sit-Stand Transfer    Sit-Stand [place] (Transfers)  minimum assist (75% patient effort)  -TA      Assistive Device (Sit-Stand Transfers)  other (see comments) HHA  -TA      Recorded by [TA] Slime Martin, PTA 11/14/18 1305      Row Name 11/14/18 1115             Stand-Sit Transfer    Stand-Sit [place] (Transfers)  minimum assist (75% patient effort)  -TA      Assistive Device (Stand-Sit Transfers)  other (see comments) HHA  -TA      Recorded by [TA] Slime Martin, PTA 11/14/18 1305      Row Name 11/14/18 1115             Therapeutic Exercise    Lower Extremity (Therapeutic Exercise)  LAQ (long arc quad), bilateral  -TA      Lower Extremity Range of Motion (Therapeutic Exercise)  ankle dorsiflexion/plantar flexion, bilateral  -TA      Exercise Type (Therapeutic Exercise)  AROM (active range of motion)  -TA      Position (Therapeutic Exercise)  seated  -TA      Sets/Reps (Therapeutic Exercise)  10  -TA      Expected Outcome (Therapeutic Exercise)  improve functional stability;improve functional tolerance, household activity;improve functional tolerance, self-care activity;improve performance, transfer skills;improve performance, gait skills  -TA      Recorded by [TA] Slime Martin, PTA 11/14/18 1305      Row Name 11/14/18 1115             Static Sitting Balance    Level of [place] (Supported Sitting, Static Balance)  contact guard assist  -TA      Sitting Position (Supported Sitting, Static Balance)  sitting on edge of bed  -TA      Time Able to Maintain Position (Supported Sitting, Static Balance)  more than 5 minutes  -TA      Recorded by [TA] Slime Martin, PTA 11/14/18 1305      Row Name  11/14/18 1115             Positioning and Restraints    Pre-Treatment Position  in bed  -TA      Post Treatment Position  bed  -TA      In Bed  supine;call light within reach;exit alarm on  -TA      Recorded by [TA] Slime Martin PTA 11/14/18 1305      Row Name 11/14/18 1115             Pain Scale: Numbers Pre/Post-Treatment    Pain Scale: Numbers, Pretreatment  6/10  -TA      Pain Scale: Numbers, Post-Treatment  6/10  -TA      Pain Location  other (see comments) generally  -TA      Recorded by [TA] Slime Martin PTA 11/14/18 1305      Row Name                Wound 10/30/18 1715 Left heel pressure injury    Wound - Properties Group Date first assessed: 10/30/18 [AS] Time first assessed: 1715 [AS] Present On Admission : yes;picture taken [AS] Side: Left [AS] Location: heel [AS] Type: pressure injury [AS] Stage, Pressure Injury: unstageable [AS] Recorded by:  [AS] Giselle Alicea RN 10/30/18 1715    Row Name                Wound 11/06/18 0300 Left gluteal pressure injury    Wound - Properties Group Date first assessed: 11/06/18 [AB] Time first assessed: 0300 [AB] Present On Admission : yes;picture taken [SR], picture taken by nightshift RN  Side: Left [SR] Location: gluteal [SR] Type: pressure injury [SR] Stage, Pressure Injury: deep tissue injury [SR] Recorded by:  [AB] Jacquie Chu RN 11/08/18 1511 [SR] Lexi Chaparro RN 11/06/18 2051    Row Name 11/14/18 1115             Outcome Summary/Treatment Plan (PT)    Daily Summary of Progress (PT)  progress towards functional goals is fair  -TA      Plan for Continued Treatment (PT)  continue per pt tolerance  -TA      Anticipated Discharge Disposition (PT)  skilled nursing facility;home with 24/7 care  -TA      Recorded by [TA] Slime Martin PTA 11/14/18 1305        User Key  (r) = Recorded By, (t) = Taken By, (c) = Cosigned By    Initials Name Effective Dates Discipline    TA Slime Martin PTA 03/07/18 -  PT    AB Jacquie Chu RN 10/17/16 -   Nurse    SR Lexi Chaparro, RN 10/17/16 -  Nurse    AS Giselle Alicea RN 12/13/16 -  Nurse          Wound 10/30/18 1715 Left heel pressure injury (Active)   Base dressing in place, unable to visualize 11/13/2018  8:13 PM       Wound 11/06/18 0300 Left gluteal pressure injury (Active)   Dressing Appearance open to air 11/13/2018  8:13 PM   Base maroon/purple 11/13/2018  8:13 PM   Periwound blanchable;redness 11/13/2018  8:13 PM   Care, Wound barrier applied 11/13/2018  8:13 PM       PT Rehab Goals     Row Name 11/14/18 1115             Bed Mobility Goal 1 (PT)    Activity/Assistive Device (Bed Mobility Goal 1, PT)  bed mobility activities, all  -TA      [place] Level/Cues Needed (Bed Mobility Goal 1, PT)  conditional independence  -TA      Time Frame (Bed Mobility Goal 1, PT)  short term goal (STG)  -TA      Progress/Outcomes (Bed Mobility Goal 1, PT)  goal not met  -TA         Transfer Goal 1 (PT)    Activity/Assistive Device (Transfer Goal 1, PT)  sit-to-stand/stand-to-sit  -TA      [place] Level/Cues Needed (Transfer Goal 1, PT)  supervision required  -TA      Time Frame (Transfer Goal 1, PT)  2 - 3 days  -TA      Progress/Outcome (Transfer Goal 1, PT)  goal not met  -TA         Gait Training Goal 1 (PT)    Activity/Assistive Device (Gait Training Goal 1, PT)  walker, rolling;gait (walking locomotion);assistive device use;backward stepping;decrease asymmetrical patterns;decrease fall risk;diminish gait deviation;forward stepping;improve balance and speed;increase endurance/gait distance;increase energy conservation;maintain weight bearing status;normalize weight shifts;sidestepping;turning, left;turning, right  -TA      [place] Level (Gait Training Goal 1, PT)  supervision required  -TA      Distance (Gait Goal 1, PT)  1x25 ft or more  -TA      Progress/Outcome (Gait Training Goal 1, PT)  goal not met  -TA        User Key  (r) = Recorded By, (t) = Taken By, (c) = Cosigned By    Initials  Name Provider Type Discipline    Slime Damian, PTA Physical Therapy Assistant PT          Physical Therapy Education     Title: PT OT SLP Therapies (Active)     Topic: Physical Therapy (Done)     Point: Mobility training (Done)     Learning Progress Summary           Patient Acceptance, E, VU,NR by CW at 11/12/2018  4:13 PM    Comment:  Pt education on use of call button and encouraged to call for assist to reduce risk of falls.                   Point: Home exercise program (Done)     Learning Progress Summary           Patient Acceptance, E, VU,NR by CW at 11/12/2018  4:13 PM    Comment:  Pt education on use of call button and encouraged to call for assist to reduce risk of falls.                   Point: Body mechanics (Done)     Learning Progress Summary           Patient Acceptance, E, VU,NR by CW at 11/12/2018  4:13 PM    Comment:  Pt education on use of call button and encouraged to call for assist to reduce risk of falls.                   Point: Precautions (Done)     Learning Progress Summary           Patient Acceptance, E, VU,NR by CW at 11/12/2018  4:13 PM    Comment:  Pt education on use of call button and encouraged to call for assist to reduce risk of falls.                               User Key     Initials Effective Dates Name Provider Type Discipline     10/04/18 -  Kristie Alvarez, PT Physical Therapist PT                PT Recommendation and Plan  Anticipated Discharge Disposition (PT): skilled nursing facility, home with 24/7 care  Therapy Frequency (PT Clinical Impression): other (see comments)(5-14x/week)  Outcome Summary/Treatment Plan (PT)  Daily Summary of Progress (PT): progress towards functional goals is fair  Plan for Continued Treatment (PT): continue per pt tolerance  Anticipated Equipment Needs at Discharge (PT): other (see comments)(difficult to assess per pt's lethargy)  Anticipated Discharge Disposition (PT): skilled nursing facility, home with 24/7 care  Plan of Care  Reviewed With: patient  Progress: improving  Outcome Summary: pt sup<>sit with min assist of 1-2, pt sit<>stand with min assist, pt sat @ EOB ~20 minutes with CGA, pt will require 24/7 care @ D/C  Outcome Measures     Row Name 11/14/18 1300 11/13/18 0910 11/12/18 1521       How much help from another person do you currently need...    Turning from your back to your side while in flat bed without using bedrails?  3  -TA  --  3  -CW    Moving from lying on back to sitting on the side of a flat bed without bedrails?  3  -TA  --  3  -CW    Moving to and from a bed to a chair (including a wheelchair)?  2  -TA  --  2  -CW    Standing up from a chair using your arms (e.g., wheelchair, bedside chair)?  2  -TA  --  2  -CW    Climbing 3-5 steps with a railing?  1  -TA  --  1  -CW    To walk in hospital room?  1  -TA  --  1  -CW    AM-PAC 6 Clicks Score  12  -TA  --  12  -CW       How much help from another is currently needed...    Putting on and taking off regular lower body clothing?  --  1  -LW  --    Bathing (including washing, rinsing, and drying)  --  2  -LW  --    Toileting (which includes using toilet bed pan or urinal)  --  1  -LW  --    Putting on and taking off regular upper body clothing  --  2  -LW  --    Taking care of personal grooming (such as brushing teeth)  --  2  -LW  --    Eating meals  --  2  -LW  --    Score  --  10  -LW  --       Functional Assessment    Outcome Measure Options  AM-PAC 6 Clicks Basic Mobility (PT)  -TA  --  AM-PAC 6 Clicks Basic Mobility (PT)  -CW    Row Name 11/12/18 7405             How much help from another is currently needed...    Putting on and taking off regular lower body clothing?  1  -BB      Bathing (including washing, rinsing, and drying)  2  -BB      Toileting (which includes using toilet bed pan or urinal)  1  -BB      Putting on and taking off regular upper body clothing  2  -BB      Taking care of personal grooming (such as brushing teeth)  2  -BB      Eating meals  2   -BB      Score  10  -BB        User Key  (r) = Recorded By, (t) = Taken By, (c) = Cosigned By    Initials Name Provider Type    Slime Damian PTA Physical Therapy Assistant    Carla Ohara, VALENCIA/L Occupational Therapy Assistant    Deisy Martini, VALENCIA/L Occupational Therapy Assistant    CW Kristie Alvarez, PT Physical Therapist         Time Calculation:   PT Charges     Row Name 11/14/18 1308             Time Calculation    Start Time  1115  -TA      Stop Time  1144  -TA      Time Calculation (min)  29 min  -TA         Time Calculation- PT    Total Timed Code Minutes- PT  29 minute(s)  -TA        User Key  (r) = Recorded By, (t) = Taken By, (c) = Cosigned By    Initials Name Provider Type    Slime Damian PTA Physical Therapy Assistant        Therapy Suggested Charges     Code   Minutes Charges    42249 (CPT®) Hc Pt Neuromusc Re Education Ea 15 Min      94067 (CPT®) Hc Pt Ther Proc Ea 15 Min 15 1    15861 (CPT®) Hc Gait Training Ea 15 Min      49780 (CPT®) Hc Pt Therapeutic Act Ea 15 Min 24 2    25095 (CPT®) Hc Pt Manual Therapy Ea 15 Min      71147 (CPT®) Hc Pt Iontophoresis Ea 15 Min      84587 (CPT®) Hc Pt Elec Stim Ea-Per 15 Min      20219 (CPT®) Hc Pt Ultrasound Ea 15 Min      74838 (CPT®) Hc Pt Self Care/Mgmt/Train Ea 15 Min      12443 (CPT®) Hc Pt Prosthetic (S) Train Initial Encounter, Each 15 Min      96510 (CPT®) Hc Pt Orthotic(S)/Prosthetic(S) Encounter, Each 15 Min      98978 (CPT®) Hc Orthotic(S) Mgmt/Train Initial Encounter, Each 15min      Total  39 3        Therapy Charges for Today     Code Description Service Date Service Provider Modifiers Qty    34447265693 HC PT THERAPEUTIC ACT EA 15 MIN 11/14/2018 Slime Martin PTA GP 2          PT G-Codes  PT Professional Judgement Used?: Yes  Outcome Measure Options: AM-PAC 6 Clicks Basic Mobility (PT)  AM-PAC 6 Clicks Score: 12  Score: 10  Functional Limitation: Mobility: Walking and moving around  Mobility: Walking and Moving  Around Current Status (): At least 60 percent but less than 80 percent impaired, limited or restricted  Mobility: Walking and Moving Around Goal Status (): At least 40 percent but less than 60 percent impaired, limited or restricted    Slime Martin, PTA  11/14/2018

## 2018-11-15 PROBLEM — N17.9 ACUTE KIDNEY INJURY (HCC): Status: ACTIVE | Noted: 2018-01-01

## 2018-11-15 PROBLEM — B37.41 CANDIDA CYSTITIS: Status: ACTIVE | Noted: 2018-01-01

## 2018-11-15 PROBLEM — N17.9 ARF (ACUTE RENAL FAILURE) (HCC): Status: ACTIVE | Noted: 2018-01-01

## 2018-11-15 PROBLEM — D63.8 ANEMIA OF CHRONIC DISEASE: Status: ACTIVE | Noted: 2018-01-01

## 2018-11-15 NOTE — PLAN OF CARE
Problem: Patient Care Overview  Goal: Plan of Care Review  Outcome: Ongoing (interventions implemented as appropriate)   11/15/18 1210 11/15/18 9531   Coping/Psychosocial   Plan of Care Reviewed With patient --    Plan of Care Review   Progress no change --    OTHER   Outcome Summary --  pt sup<>sit with min assist, pt sat @ EOB ~30 minutes with CGA-SBA, pt will reuire 24/7 care @ D/C

## 2018-11-15 NOTE — THERAPY TREATMENT NOTE
Acute Care - Speech Language Pathology Treatment Note    [facility]       Patient Name: Ravi Park  : 1930  MRN: [id number]    Today's Date: 11/15/2018           Admit Date: 2018      Visit Dx:        ICD-10-CM ICD-9-CM   1. Acute cystitis with hematuria N30.01 595.0   2. Altered mental status, unspecified altered mental status type R41.82 780.97   3. Impaired functional mobility and endurance Z74.09 V49.89   4. Impaired mobility and activities of daily living Z74.09 799.89   5. Dysphagia, unspecified type R13.10 787.20       Patient Active Problem List   Diagnosis   • Peripheral arterial disease (CMS/HCC)   • Chronic atrial fibrillation (CMS/HCC)   • Backache   • Lumbar radiculopathy   • Edema   • Acute congestive heart failure (CMS/HCC)   • Acute blood loss anemia   • Acute on chronic diastolic heart failure (CMS/HCC)   • YESENIA (acute kidney injury) (CMS/[facility])   • (HFpEF) heart failure with preserved ejection fraction (CMS/[facility])   • Urinary retention   • Personal history of tobacco use, presenting hazards to health   • Vitreous floaters   • Upper respiratory infection   • Sprain of shoulder and upper arm   • Shoulder pain   • Serous otitis media   • Sensorineural hearing loss   • Pure hypercholesterolemia   • Pseudophakia   • Prostatitis   • Pain in wrist   • Pain in thumb joint with movement   • Pain in joint involving right lower leg   • Pain in eye   • Otorrhea   • Open wound of toe   • Open wound of lower leg with complication   • Open wound of lower leg   • Open wound of lesser toe of left foot without damage to nail   • Olecranon bursitis   • Need for prophylactic vaccination and inoculation against influenza   • Need for immunization against influenza   • Multiple joint pain   • Malaise and fatigue   • Keratoconjunctivitis sicca (CMS/HCC)   • Insect bite   • Impacted cerumen   • Hypertensive disorder   • History of colonoscopy   • History of colon polyps   • History of artificial eye lens   •  Herpes zoster   • Gout   • Exposure to communicable disease   • Dog bite of hand   • Disorder of sacroiliac joint   • Decreased platelet count (CMS/[facility])   • Cough   • Contusion of thumb   • Common cold   • Cervical arthritis   • Cerebrovascular accident (CMS/[facility])   • Cellulitis of lower leg   • Cataract   • Benign prostatic hyperplasia   • Atrial fibrillation (CMS/[facility])   • Astigmatism   • Allergic rhinitis due to pollen   • Allergic rhinitis   • Adverse reaction to drug   • Acute confusion   • Acute bronchitis   • Abnormal gait   • Urinary tract infection associated with indwelling urethral catheter (CMS/[facility])   • Hydronephrosis   • Nephrolithiasis   • Calculus of kidney   • Sepsis (CMS/[facility])   • Pulmonary hypertension (CMS/[facility])   • Tricuspid regurgitation   • CKD (chronic kidney disease) stage 3, GFR 30-59 ml/min (CMS/[facility])   • ESBL (extended spectrum beta-lactamase) producing bacteria infection   • Urinary tract infection associated with catheterization of urinary tract (CMS/[facility])   • Toxic encephalopathy   • Demand ischemia (CMS/[facility])   • Acute renal failure with tubular necrosis (CMS/[facility])   • Chronic atrial fibrillation (CMS/[facility])   • Acute cystitis with hematuria   • Sepsis (CMS/[facility])   • Decubitus ulcer of left heel, unstageable (CMS/[facility])   • Acute renal failure (CMS/[facility])   • Pneumonia due to infectious organism   • Non-healing wound of left heel   • PVD (peripheral vascular disease) with claudication (CMS/[facility])   • ARF (acute renal failure) (CMS/[facility])   • Candida cystitis   • Anemia of chronic disease   • Acute kidney injury (CMS/[facility])          Therapy Treatment    Evaluation/Coping    Evaluation/Treatment Time and Intent  Subjective Information: no complaints (11/15/18 0756 : Cece Arnold CCC-SLP)  Existing Precautions/Restrictions: fall, oxygen therapy device and L/min (11/15/18 0756 : Cece Arnold CCC-SLP)  Document Type: therapy note (daily note) (11/15/18 0756 : Catalina  ALISSON Steward)  Mode of Treatment: individual therapy, speech-language pathology (11/15/18 0756 : Cece Arnold CCC-SLP)  Patient/Family Observations: no family present (11/15/18 0756 : Cece Arnold CCC-SLP)    Vitals/Pain/Safety    Pain Scale: Numbers Pre/Post-Treatment  Pain Scale: Numbers, Pretreatment: 0/10 - no pain (11/15/18 0756 : Cece Arnold CCC-SLP)  Pain Scale: Numbers, Post-Treatment: 0/10 - no pain (11/15/18 0756 : Cece Arnold CCC-SLP)  Positioning and Restraints  Pre-Treatment Position: in bed (11/15/18 0756 : Cece Arnold CCC-SLP)  Post Treatment Position: bed (11/15/18 0756 : Cece Arnold CCC-SLP)  In Bed: call light within reach, encouraged to call for assist, exit alarm on (11/15/18 0756 : Cece Arnold CCC-SLP)    Cognition/Communication         Oral Motor/Eating         Mobility/Basic Activities/Instrumental Activities/Motor/Modality                   ROM/MMT                   Sensory/Myotome/Dermatome/Edema               Posture/Balance/Special Tests/Exercise/Transportation/Sexual Function                   Orthotics/Residual Limb/Prosthetic Management              Outcome Summary         EDUCATION    The patient has been educated in the following areas:     Dysphagia (Swallowing Impairment).    SLP Recommendation and Plan     Swallow Criteria for Skilled Therapeutic Interventions Met: demonstrates skilled criteria    Anticipated Dischage Disposition: home with /7 [facility], skilled nursing facility         Therapy Frequency (Swallow): other (see comments)(3-5 times per week)    Predicted Duration Therapy Intervention (Days): until discharge           Plan of Care Reviewed With: patient      SLP GOALS     Row Name 11/15/18 0756 11/13/18 0829          Oral Nutrition/Hydration Goal 1 (SLP)    Oral Nutrition/Hydration Goal 1, SLP  Pt to tolerate least restrictive diet  with no s/s of aspiration for adequate nutrition and hydration.   -EK  Pt to tolerate least restrictive diet with no s/s of aspiration for adequate nutrition and hydration.   -EK     Time Frame (Oral Nutrition/Hydration Goal 1, SLP)  by discharge  -EK  by discharge  -EK     Barriers (Oral Nutrition/Hydration Goal 1, SLP)  Pt very sleepy this a.m. Diet changed to puree this date.   -EK  Pt more confused this date; SLP to ensure pt doesn't not need downgrade to puree at next visit.  No s/s of aspiration this date with regular liquids.   -EK     Progress/Outcomes (Oral Nutrition/Hydration Goal 1, SLP)  goal not met  -EK  goal not met  -EK       User Key  (r) = Recorded By, (t) = Taken By, (c) = Cosigned By    Initials Name Provider Type    Cece Cleaning CCC-SLP Speech and Language Pathologist                  Time Calculation:       Time Calculation- SLP     Row Name 11/15/18 1004             Time Calculation- SLP    SLP Start Time  0756  -EK      SLP Stop Time  0812  -EK      SLP Time Calculation (min)  16 min  -EK      SLP Received On  11/15/18  -EK        User Key  (r) = Recorded By, (t) = Taken By, (c) = Cosigned By    Initials Name Provider Type    Cece Cleaning CCC-SLP Speech and Language Pathologist            Charges added for swallow treatment 1 unit.             SLP G-Codes  Functional Limitations: Swallowing  Swallow Current Status (): At least 1 percent but less than 20 percent impaired, limited or restricted  Swallow Goal Status (): 0 percent impaired, limited or restricted        ALISSON Otero  11/15/2018

## 2018-11-15 NOTE — PROGRESS NOTES
"[facility]  [facility] [place]. [address]  T - [phone]  F - [phone]     Progress Note          PATIENT  DEMOGRAPHICS   PATIENT NAME: Ravi Park                      PHYSICIAN: Denver BurtJUSTIN  : 1930  MRN: [id number]   LOS: 0 days    Patient Care Team:  Terrell Arzate MD as PCP - General  Terrell Arzate MD as PCP - Claims Attributed  Subjective   SUBJECTIVE   Still confused today.         Objective   OBJECTIVE   Vital Signs  Temp:  [97 °F (36.1 °C)-98 °F (36.7 °C)] 97.6 °F (36.4 °C)  Heart Rate:  [] 102  Resp:  [18-20] 20  BP: (104-132)/(45-84) 112/60    Flowsheet Rows      First Filed Value   Admission Height  182.9 cm (72\") Documented at 2018 1515   Admission Weight  89 kg (196 lb 4.8 oz) Documented at 2018 1515           I/O last 3 completed shifts:  In: 170 [P.O.:170]  Out:  [Urine:]    PHYSICAL EXAM    Physical Exam   Constitutional: He appears well-developed and well-nourished.   HENT:   Head: Normocephalic and atraumatic.   Eyes: Conjunctivae and EOM are normal. Pupils are equal, round, and reactive to light.   Cardiovascular: Normal rate and regular rhythm.   Pulmonary/Chest: Effort normal and breath sounds normal.   Abdominal: Soft.   Neurological: He is alert.   Oriented to person   Skin: Skin is warm and dry.       RESULTS   Results Review:    Results from last 7 days   Lab Units  11/15/18   0611  18   0525  18   1653  18   0646   SODIUM mmol/L  144  143   --   140   POTASSIUM mmol/L  3.7  3.7   --   3.7   CHLORIDE mmol/L  105  104   --   105   CO2 mmol/L  26.0  30.0   --   27.0   BUN mg/dL  37*  36*   --   30*   CREATININE mg/dL  2.21*  2.49*  2.18*  1.55*   CALCIUM mg/dL  8.9  9.1   --   8.7   BILIRUBIN mg/dL  0.4  0.3   --   0.4   ALK PHOS U/L  92  92   --   87   ALT (SGPT) U/L  10*  8*   --   7*   AST (SGOT) U/L  23  20   --   20   GLUCOSE mg/dL  83  105*   --   99       Estimated Creatinine " Clearance: 25.6 mL/min (A) (by C-G formula based on SCr of 2.21 mg/dL (H)).                Results from last 7 days   Lab Units  11/15/18   0611  11/14/18   0525  11/13/18   0539  11/12/18   0530  11/11/18   0715   WBC 10*3/mm3  9.28  10.35*  9.64  8.37  6.89   HEMOGLOBIN g/dL  9.0*  9.2*  9.8*  9.1*  9.5*   PLATELETS 10*3/mm3  269  252  151  245  224               Imaging Results (last 24 hours)     ** No results found for the last 24 hours. **           MEDICATIONS      8-hydroxyquinoline sulfate  Apply externally Daily   aspirin 81 mg Oral Daily   cetirizine 10 mg Oral Daily   clopidogrel 75 mg Oral Daily   docusate sodium 100 mg Oral BID   famotidine 40 mg Oral Daily   ferrous sulfate 324 mg Oral TID With Meals   finasteride 5 mg Oral Daily   fluconazole 100 mg Oral Q24H   fluticasone 1 spray Each Nare Daily   folic acid 1 mg Oral Daily   levothyroxine 50 mcg Oral Q AM   magic butt ointment  Topical BID   metoprolol succinate XL 25 mg Oral Daily   polyethylene glycol 17 g Oral Daily   potassium chloride 10 mEq Oral Daily   sodium chloride 3 mL Intravenous Q12H   tamsulosin 0.8 mg Oral Nightly       sodium chloride 50 mL/hr Last Rate: 50 mL/hr (11/15/18 0641)       Assessment/Plan   ASSESSMENT / PLAN      (HFpEF) heart failure with preserved ejection fraction (CMS/HCC)    Pure hypercholesterolemia    Hypertensive disorder    Pulmonary hypertension (CMS/HCC)    Tricuspid regurgitation    CKD (chronic kidney disease) stage 3, GFR 30-59 ml/min (CMS/HCC)    Acute cystitis with hematuria    PVD (peripheral vascular disease) with claudication (CMS/HCC)    1.  Acute kidney injury on CKD2/3- patient has had markedly diminished urine output switch coincided with his rising creatinine. This is likely urinary retention. His catheter was changed out yesterday by urology.  He had immediate return of 750 ml.  I believe his acute kidney injury is likely obstructive in nature and expect it to continue to improve in the morning.   I did not identify any problem medications.     -FENa >1%, Cr improving slowly     -Continue IV fluids, observe for post obstructive diuresis, UO appropriate so far     -Hold Bumex for now,      2.  Urinary tract infection- on ceftriaxone and fluconazole     3.  Anemia- hemoglobin acceptable , status post 2 units packed red blood cells     4.  Hypertension- stable      5.  Hyperlipidemia     6.  HFpEF- we'll resume the Bumex when renal function is improved              This document has been electronically signed by JUSTIN Cortez on November 15, 2018 2:38 PM

## 2018-11-15 NOTE — THERAPY TREATMENT NOTE
Acute Care - Occupational Therapy Treatment Note  [facility]     Patient Name: Ravi Park  : 1930  MRN: [id number]  Today's Date: 11/15/2018  Onset of Illness/Injury or Date of Surgery: 18  Date of Referral to OT: 18  Referring Physician: Troy Laura    Admit Date: 2018       ICD-10-CM ICD-9-CM   1. Acute cystitis with hematuria N30.01 595.0   2. Altered mental status, unspecified altered mental status type R41.82 780.97   3. Impaired functional mobility and endurance Z74.09 V49.89   4. Impaired mobility and activities of daily living Z74.09 799.89   5. Dysphagia, unspecified type R13.10 787.20     Patient Active Problem List   Diagnosis   • Peripheral arterial disease (CMS/HCC)   • Chronic atrial fibrillation (CMS/[facility])   • Backache   • Lumbar radiculopathy   • Edema   • Acute congestive heart failure (CMS/[facility])   • Acute blood loss anemia   • Acute on chronic diastolic heart failure (CMS/HCC)   • YESENIA (acute kidney injury) (CMS/[facility])   • (HFpEF) heart failure with preserved ejection fraction (CMS/[facility])   • Urinary retention   • Personal history of tobacco use, presenting hazards to health   • Vitreous floaters   • Upper respiratory infection   • Sprain of shoulder and upper arm   • Shoulder pain   • Serous otitis media   • Sensorineural hearing loss   • Pure hypercholesterolemia   • Pseudophakia   • Prostatitis   • Pain in wrist   • Pain in thumb joint with movement   • Pain in joint involving right lower leg   • Pain in eye   • Otorrhea   • Open wound of toe   • Open wound of lower leg with complication   • Open wound of lower leg   • Open wound of lesser toe of left foot without damage to nail   • Olecranon bursitis   • Need for prophylactic vaccination and inoculation against influenza   • Need for immunization against influenza   • Multiple joint pain   • Malaise and fatigue   • Keratoconjunctivitis sicca (CMS/HCC)   • Insect bite   • Impacted cerumen   • Hypertensive disorder    • History of colonoscopy   • History of colon polyps   • History of artificial eye lens   • Herpes zoster   • Gout   • Exposure to communicable disease   • Dog bite of hand   • Disorder of sacroiliac joint   • Decreased platelet count (CMS/HCC)   • Cough   • Contusion of thumb   • Common cold   • Cervical arthritis   • Cerebrovascular accident (CMS/HCC)   • Cellulitis of lower leg   • Cataract   • Benign prostatic hyperplasia   • Atrial fibrillation (CMS/HCC)   • Astigmatism   • Allergic rhinitis due to pollen   • Allergic rhinitis   • Adverse reaction to drug   • Acute confusion   • Acute bronchitis   • Abnormal gait   • Urinary tract infection associated with indwelling urethral catheter (CMS/HCC)   • Hydronephrosis   • Nephrolithiasis   • Calculus of kidney   • Sepsis (CMS/HCC)   • Pulmonary hypertension (CMS/HCC)   • Tricuspid regurgitation   • CKD (chronic kidney disease) stage 3, GFR 30-59 ml/min (CMS/HCC)   • ESBL (extended spectrum beta-lactamase) producing bacteria infection   • Urinary tract infection associated with catheterization of urinary tract (CMS/HCC)   • Toxic encephalopathy   • Demand ischemia (CMS/HCC)   • Acute renal failure with tubular necrosis (CMS/HCC)   • Chronic atrial fibrillation (CMS/HCC)   • Acute cystitis with hematuria   • Sepsis (CMS/HCC)   • Decubitus ulcer of left heel, unstageable (CMS/HCC)   • Acute renal failure (CMS/HCC)   • Pneumonia due to infectious organism   • Non-healing wound of left heel   • PVD (peripheral vascular disease) with claudication (CMS/HCC)     Past Medical History:   Diagnosis Date   • Benign prostatic hyperplasia    • Cerebrovascular accident (CMS/HCC)    • Chronic anemia    • Chronic atrial fibrillation (CMS/HCC)    • CKD (chronic kidney disease) stage 3, GFR 30-59 ml/min (CMS/HCC)    • GERD (gastroesophageal reflux disease)    • Gout    • Hydronephrosis    • Hypercholesterolemia    • Hypertension    • Nephrolithiasis    • Peripheral arterial disease  (CMS/HCC)    • Pulmonary hypertension (CMS/HCC)    • Tricuspid regurgitation    • Urinary retention      Past Surgical History:   Procedure Laterality Date   • CARDIAC CATHETERIZATION     • NEPHROSTOMY         Therapy Treatment    Rehabilitation Treatment Summary     Row Name 11/15/18 0920 11/15/18 0756          Treatment Time/Intention    Discipline  occupational therapy assistant  -BB  speech language pathologist  -EK     Document Type  therapy note (daily note)  -BB  therapy note (daily note)  -EK     Subjective Information  no complaints  -BB  no complaints  -EK     Mode of Treatment  individual therapy;occupational therapy  -BB  individual therapy;speech-language pathology  -EK     Patient/Family Observations  no family present  -BB  no family present  -EK     Total Minutes, Occupational Therapy Treatment  40  -BB  --     Therapy Frequency (OT Eval)  other (see comments) 3-5 days/wk  -BB  --     Patient Effort  poor  -BB  adequate  -EK     Existing Precautions/Restrictions  fall;oxygen therapy device and L/min  -BB  fall;oxygen therapy device and L/min  -EK     Recorded by [BB] Carla Lutz COTA/L 11/15/18 1203 [EK] Cece Arnold CCC-SLP 11/15/18 0946     Row Name 11/15/18 0920             Vital Signs    Pretreatment Heart Rate (beats/min)  91  -BB      Pre SpO2 (%)  97  -BB      O2 Delivery Pre Treatment  supplemental O2  -BB      Pre Patient Position  Supine  -BB      Post Patient Position  Side Lying  -BB      Recorded by [BB] Carla Lutz COTA/L 11/15/18 1203      Row Name 11/15/18 0920             Cognitive Assessment/Intervention- PT/OT    Affect/Mental Status (Cognitive)  confused  -BB      Orientation Status (Cognition)  oriented to;person  -BB      Follows Commands (Cognition)  does not follow one step commands  -BB      Personal Safety Interventions  fall prevention program maintained;supervised activity  -BB      Recorded by [BB] Carla Lutz COTA/NICOLE 11/15/18  1203      Row Name 11/15/18 0920             Upper Body Dressing Assessment/Training    Upper Body Dressing [place] Level  don;doff;moderate assist (50% patient effort) hospital gown  -BB      Upper Body Dressing Position  long sitting  -BB      Comment (Upper Body Dressing)  -- Pt aparently spilled water on gown & needed to change  -BB      Recorded by [BB] Carla Lutz COTA/L 11/15/18 1203      Row Name 11/15/18 0920             Grooming Assessment/Training    [place] Level (Grooming)  wash face, hands;hair care, combing/brushing;verbal cues;moderate assist (50% patient effort)  -BB      Grooming Position  long sitting  -BB      Recorded by [BB] Carla Lutz COTA/L 11/15/18 1203      Row Name 11/15/18 0920             Self-Feeding Assessment/Training    [place] Level (Feeding)  liquids to mouth;maximum assist (25% patient effort)  -BB      Self-Feeding Assess/Train, Spillage Amount  moderate  -BB      Position (Self-Feeding)  sitting up in bed  -BB      Recorded by [BB] Carla Lutz COTA/L 11/15/18 1203      Row Name 11/15/18 0920 11/15/18 0756          Positioning and Restraints    Pre-Treatment Position  in bed  -BB  in bed  -EK     Post Treatment Position  bed  -BB  bed  -EK     In Bed  side lying right;encouraged to call for assist;call light within reach;exit alarm on  -BB  call light within reach;encouraged to call for assist;exit alarm on  -EK     Recorded by [BB] Carla Lutz COTA/L 11/15/18 1203 [EK] Cece Arnold, CCC-SLP 11/15/18 0946     Row Name 11/15/18 0920 11/15/18 0756          Pain Scale: Numbers Pre/Post-Treatment    Pain Scale: Numbers, Pretreatment  -- no number given  -BB  0/10 - no pain  -EK     Pain Scale: Numbers, Post-Treatment  -- no number given  -BB  0/10 - no pain  -EK     Pain Location  neck  -BB  --     Pain Intervention(s)  Repositioned  -BB  --     Recorded by [BB] Carla Lutz COTA/L 11/15/18 1203 [EK]  Cece Arnold CCC-SLP 11/15/18 0946     Row Name                Wound 10/30/18 1715 Left heel pressure injury    Wound - Properties Group Date first assessed: 10/30/18 [AS] Time first assessed: 1715 [AS] Present On Admission : yes;picture taken [AS] Side: Left [AS] Location: heel [AS] Type: pressure injury [AS] Stage, Pressure Injury: unstageable [AS] Recorded by:  [AS] Giselle Alicea RN 10/30/18 1715    Row Name                Wound 11/06/18 0300 Left gluteal pressure injury    Wound - Properties Group Date first assessed: 11/06/18 [AB] Time first assessed: 0300 [AB] Present On Admission : yes;picture taken [SR], picture taken by nightshift RN  Side: Left [SR] Location: gluteal [SR] Type: pressure injury [SR] Stage, Pressure Injury: deep tissue injury [SR] Recorded by:  [AB] Jacquie Chu RN 11/08/18 1511 [SR] Lexi Chaparro RN 11/06/18 2051    Row Name 11/15/18 0920             Plan of Care Review    Plan of Care Reviewed With  patient  -BB      Recorded by [BB] Carla Lutz COTA/L 11/15/18 1203      Row Name 11/15/18 0920             Outcome Summary/Treatment Plan (OT)    Daily Summary of Progress (OT)  progress toward functional goals is gradual  -BB      Plan for Continued Treatment (OT)  continue POC  -BB      Anticipated Discharge Disposition (OT)  home with 24/7 care;skilled nursing facility  -BB      Recorded by [BB] Carla Lutz COTA/L 11/15/18 1203      Row Name 11/15/18 0756             Outcome Summary/Treatment Plan (SLP)    Daily Summary of Progress (SLP)  progress toward functional goals is good  -EK      Barriers to Overall Progress (SLP)  Pt more sleepy this a.m. SLP stopped session after 15 minutes due to sleepiness. Pt pocketed soft foods this am (eggs). Pt will benefit from downgrade to puree at this time. Nurse aware.   -EK      Recorded by [EK] Cece Arnold CCC-SLP 11/15/18 0946        User Key  (r) = Recorded By, (t) = Taken By, (c)  = Cosigned By    Initials Name Effective Dates Discipline    EK [place] Cece Luz, CCC-SLP 06/08/18 -  SLP    Carla Ohara COTA/L 03/07/18 -  OT    AB Jacquie Chu, RN 10/17/16 -  Nurse    Lexi Mobley RN 10/17/16 -  Nurse    Giselle Vasquez RN 12/13/16 -  Nurse        Wound 10/30/18 1715 Left heel pressure injury (Active)   Dressing Appearance dry;intact 11/14/2018  6:00 PM   Closure None 11/14/2018  6:00 PM   Base dressing in place, unable to visualize 11/14/2018  9:00 PM   Drainage Amount none 11/14/2018  6:00 PM   Dressing Care, Wound dressing changed 11/14/2018  6:00 PM       Wound 11/06/18 0300 Left gluteal pressure injury (Active)   Dressing Appearance open to air 11/14/2018  9:00 PM   Base maroon/purple 11/14/2018  9:00 PM   Periwound blanchable;redness 11/14/2018  9:00 PM     OT Rehab Goals     Row Name 11/15/18 0920             Transfer Goal 1 (OT)    Activity/Assistive Device (Transfer Goal 1, OT)  sit-to-stand/stand-to-sit;bed-to-chair/chair-to-bed;toilet  -BB      [place] Level/Cues Needed (Transfer Goal 1, OT)  contact guard assist  -BB      Time Frame (Transfer Goal 1, OT)  long term goal (LTG);by discharge  -BB      Progress/Outcome (Transfer Goal 1, OT)  goal not met  -BB         Bathing Goal 1 (OT)    Activity/Assistive Device (Bathing Goal 1, OT)  bathing skills, all using AE PRN  -BB      [place] Level/Cues Needed (Bathing Goal 1, OT)  minimum assist (75% or more patient effort)  -BB      Time Frame (Bathing Goal 1, OT)  long term goal (LTG);by discharge  -BB      Progress/Outcomes (Bathing Goal 1, OT)  goal not met  -BB         Dressing Goal 1 (OT)    Activity/Assistive Device (Dressing Goal 1, OT)  dressing skills, all using AE PRN  -BB      [place]/Cues Needed (Dressing Goal 1, OT)  minimum assist (75% or more patient effort)  -BB      Time Frame (Dressing Goal 1, OT)  long term goal (LTG);by discharge  -BB      Progress/Outcome  (Dressing Goal 1, OT)  goal not met  -BB         Toileting Goal 1 (OT)    Activity/Device (Toileting Goal 1, OT)  toileting skills, all  -BB      [place] Level/Cues Needed (Toileting Goal 1, OT)  minimum assist (75% or more patient effort)  -BB      Time Frame (Toileting Goal 1, OT)  long term goal (LTG);by discharge  -BB      Progress/Outcome (Toileting Goal 1, OT)  goal not met  -BB         Strength Goal 1 (OT)    Strength Goal 1 (OT)  Pt will increase BUE strenth at least 1/2 grade level to benefit ADLs and functional transfers.   -BB      Time Frame (Strength Goal 1, OT)  long term goal (LTG);by discharge  -BB      Progress/Outcome (Strength Goal 1, OT)  goal not met  -BB        User Key  (r) = Recorded By, (t) = Taken By, (c) = Cosigned By    Initials Name Provider Type Discipline    Carla Ohara COTA/L Occupational Therapy Assistant OT        Occupational Therapy Education     Title: PT OT SLP Therapies (Active)     Topic: Occupational Therapy (Active)     Point: ADL training (Active)     Description: Instruct learner(s) on proper safety adaptation and remediation techniques during self care or transfers.   Instruct in proper use of assistive devices.    Learning Progress Summary           Patient Acceptance, E,TB, VU by KYUNG at 11/13/2018  2:28 PM   Family Acceptance, E, NR by DARIN at 11/11/2018  4:30 PM                   Point: Home exercise program (Active)     Description: Instruct learner(s) on appropriate technique for monitoring, assisting and/or progressing therapeutic exercises/activities.    Learning Progress Summary           Patient Acceptance, E,TB, VU by KYUNG at 11/13/2018  2:28 PM   Family Acceptance, E, NR by DARIN at 11/11/2018  4:30 PM                   Point: Precautions (Active)     Description: Instruct learner(s) on prescribed precautions during self-care and functional transfers.    Learning Progress Summary           Patient Acceptance, E,TB, VU by KYUNG at 11/13/2018  2:28 PM    Family Acceptance, E, NR by LM at 11/11/2018  4:30 PM                   Point: Body mechanics (Active)     Description: Instruct learner(s) on proper positioning and spine alignment during self-care, functional mobility activities and/or exercises.    Learning Progress Summary           Patient Acceptance, E,TB, VU by LW at 11/13/2018  2:28 PM    Acceptance, E, NR,NL by AS at 11/8/2018  4:28 PM    Comment:  role of OT, OT POC, bed mobility   Family Acceptance, E, NR by DARIN at 11/11/2018  4:30 PM                               User Key     Initials Effective Dates Name Provider Type Discipline    LM 03/07/18 -  Marilin Miramontes COTA/L Occupational Therapy Assistant OT    LW 03/07/18 -  Deisy Sullivan COTA/L Occupational Therapy Assistant OT    AS 05/01/18 -  Debra Corado, OT Occupational Therapist OT              Non-skid socks and gait belt in place. Toileting offered. Call light and needs within reach. Pt advised to not get up alone and call the nurse for assistance.  Bed alarm on.     OT Recommendation and Plan  Outcome Summary/Treatment Plan (OT)  Daily Summary of Progress (OT): progress toward functional goals is gradual  Plan for Continued Treatment (OT): continue POC  Anticipated Discharge Disposition (OT): home with 24/7 care, skilled nursing facility  Therapy Frequency (OT Eval): other (see comments)(3-5 days/wk)  Daily Summary of Progress (OT): progress toward functional goals is gradual  Plan of Care Review  Plan of Care Reviewed With: patient  Plan of Care Reviewed With: patient  Outcome Summary: Pt has not any goals at this time. Pt will need 24/7 care at time of D/C.  Outcome Measures     Row Name 11/15/18 0920 11/14/18 1300 11/13/18 0910       How much help from another person do you currently need...    Turning from your back to your side while in flat bed without using bedrails?  --  3  -TA  --    Moving from lying on back to sitting on the side of a flat bed without bedrails?  --  3  -TA   --    Moving to and from a bed to a chair (including a wheelchair)?  --  2  -TA  --    Standing up from a chair using your arms (e.g., wheelchair, bedside chair)?  --  2  -TA  --    Climbing 3-5 steps with a railing?  --  1  -TA  --    To walk in hospital room?  --  1  -TA  --    AM-PAC 6 Clicks Score  --  12  -TA  --       How much help from another is currently needed...    Putting on and taking off regular lower body clothing?  1  -BB  --  1  -LW    Bathing (including washing, rinsing, and drying)  2  -BB  --  2  -LW    Toileting (which includes using toilet bed pan or urinal)  1  -BB  --  1  -LW    Putting on and taking off regular upper body clothing  2  -BB  --  2  -LW    Taking care of personal grooming (such as brushing teeth)  2  -BB  --  2  -LW    Eating meals  2  -BB  --  2  -LW    Score  10  -BB  --  10  -LW       Functional Assessment    Outcome Measure Options  --  AM-PAC 6 Clicks Basic Mobility (PT)  -TA  --    Row Name 11/12/18 1521             How much help from another person do you currently need...    Turning from your back to your side while in flat bed without using bedrails?  3  -CW      Moving from lying on back to sitting on the side of a flat bed without bedrails?  3  -CW      Moving to and from a bed to a chair (including a wheelchair)?  2  -CW      Standing up from a chair using your arms (e.g., wheelchair, bedside chair)?  2  -CW      Climbing 3-5 steps with a railing?  1  -CW      To walk in hospital room?  1  -CW      AM-PAC 6 Clicks Score  12  -CW         Functional Assessment    Outcome Measure Options  AM-PAC 6 Clicks Basic Mobility (PT)  -CW        User Key  (r) = Recorded By, (t) = Taken By, (c) = Cosigned By    Initials Name Provider Type    Slime Damian, PTA Physical Therapy Assistant    BB Carla Lutz, ERIK/L Occupational Therapy Assistant    Deisy Martini, VALENCIA/L Occupational Therapy Assistant    CW Kristie Alvarez, PT Physical Therapist           Time  Calculation:   Time Calculation- OT     Row Name 11/15/18 1211             Time Calculation- OT    OT Start Time  0920  -BB      OT Stop Time  1000  -BB      OT Time Calculation (min)  40 min  -BB      Total Timed Code Minutes- OT  40 minute(s)  -BB      OT Received On  11/15/18  -        User Key  (r) = Recorded By, (t) = Taken By, (c) = Cosigned By    Initials Name Provider Type    BB Carla Lutz COTA/L Occupational Therapy Assistant           Therapy Suggested Charges     Code   Minutes Charges    None           Therapy Charges for Today     Code Description Service Date Service Provider Modifiers Qty    71087700899 HC OT SELF CARE/MGMT/TRAIN EA 15 MIN 11/15/2018 Carla Lutz COTA/L GO 3          OT G-codes  OT Professional Judgement Used?: Yes  OT Functional Scales Options: AM-PAC 6 Clicks Daily Activity (OT)  Score: 14  Functional Limitation: Self care  Self Care Current Status (): At least 40 percent but less than 60 percent impaired, limited or restricted  Self Care Goal Status (): At least 20 percent but less than 40 percent impaired, limited or restricted    ERIK More/NICOLE  11/15/2018

## 2018-11-15 NOTE — PROGRESS NOTES
[facility] Medicine Services  INPATIENT PROGRESS NOTE    Length of Stay: 0  Date of Admission: 11/5/2018  Primary Care Physician: Terrell Arzate MD    Subjective   Chief Complaint: AMS  HPI:  88 year old male with a history of PVD, HLD, pulmonary hypertension, HTN, CKDIII, HFpEF, and obstructive BPH and neurogenic bladder with chronic SP tube who presented to the ED with altered mental status and worsening confusion.  He has a history of recurrent UTI.  CT of the head was negative for acute intracranial pathology.  He was admitted and started on IV antibiotics.  Urine culture initially grew Klebsiella oxytoca and he was managed with Rocephin.  Repeat urine culture grew candida tropicalis.  Patient had an increase in his creatinine from 1.05 on 11/11 to 2.49 on 11/14.  He had decreased urine output.  SP tube was changed by urology.  FENa was >1% which is consistent with obstructive etiology.  Nephrology consulted and is recommending continued IV fluid, strict I&O, and holding diuretics.  He is an APS case.  Family plans to take him home.  He complains of neck pain today.     Review of Systems   Constitutional: Positive for fatigue. Negative for chills and fever.   Respiratory: Negative for shortness of breath.    Cardiovascular: Negative for chest pain.   Gastrointestinal: Negative for abdominal pain, diarrhea, nausea and vomiting.   Musculoskeletal: Positive for neck pain.        All pertinent negatives and positives are as above. All other systems have been reviewed and are negative unless otherwise stated.     Objective    Temp:  [97 °F (36.1 °C)-98 °F (36.7 °C)] 98 °F (36.7 °C)  Heart Rate:  [] 102  Resp:  [18-20] 20  BP: (104-155)/(45-84) 155/65    Physical Exam   Constitutional: He is oriented to person, place, and time. He appears well-developed and well-nourished. No distress.   HENT:   Head: Normocephalic.   Eyes: Conjunctivae are normal.   Cardiovascular:  Normal rate, regular rhythm, normal heart sounds and intact distal pulses.   Pulmonary/Chest: Effort normal and breath sounds normal. No respiratory distress.   Abdominal: Soft. Bowel sounds are normal. He exhibits no distension. There is no tenderness.   Musculoskeletal: Normal range of motion. He exhibits no edema.   Neurological: He is alert and oriented to person, place, and time.   Skin: Skin is warm and dry. He is not diaphoretic.   Vitals reviewed.    Results Review:  I have reviewed the labs, radiology results, and diagnostic studies.    Laboratory Data:   Results from last 7 days   Lab Units  11/15/18   0611  11/14/18   0525  11/13/18   1653  11/13/18   0646   SODIUM mmol/L  144  143   --   140   POTASSIUM mmol/L  3.7  3.7   --   3.7   CHLORIDE mmol/L  105  104   --   105   CO2 mmol/L  26.0  30.0   --   27.0   BUN mg/dL  37*  36*   --   30*   CREATININE mg/dL  2.21*  2.49*  2.18*  1.55*   GLUCOSE mg/dL  83  105*   --   99   CALCIUM mg/dL  8.9  9.1   --   8.7   BILIRUBIN mg/dL  0.4  0.3   --   0.4   ALK PHOS U/L  92  92   --   87   ALT (SGPT) U/L  10*  8*   --   7*   AST (SGOT) U/L  23  20   --   20   ANION GAP mmol/L  13.0  9.0   --   8.0     Estimated Creatinine Clearance: 25.6 mL/min (A) (by C-G formula based on SCr of 2.21 mg/dL (H)).          Results from last 7 days   Lab Units  11/15/18   0611  11/14/18   0525  11/13/18   0539  11/12/18   0530  11/11/18   0715   WBC 10*3/mm3  9.28  10.35*  9.64  8.37  6.89   HEMOGLOBIN g/dL  9.0*  9.2*  9.8*  9.1*  9.5*   HEMATOCRIT %  29.5*  29.9*  30.7*  28.8*  29.8*   PLATELETS 10*3/mm3  269  252  151  245  224           Culture Data:   No results found for: BLOODCX  Urine Culture   Date Value Ref Range Status   11/14/2018 Mixed Ashley Isolated  Final     No results found for: RESPCX  No results found for: WOUNDCX  No results found for: STOOLCX  No components found for: BODYFLD    Radiology Data:   Imaging Results (last 24 hours)     ** No results found for the last  24 hours. **          I have reviewed the patient's current medications.     Assessment/Plan     Active Hospital Problems    Diagnosis   • **ARF (acute renal failure) (CMS/[facility])   • Candida cystitis   • Anemia of chronic disease   • PVD (peripheral vascular disease) with claudication (CMS/[facility])     Added automatically from request for surgery [id number]     • Acute cystitis with hematuria   • Tricuspid regurgitation   • Pulmonary hypertension (CMS/[facility])   • CKD (chronic kidney disease) stage 3, GFR 30-59 ml/min (CMS/[facility])   • (HFpEF) heart failure with preserved ejection fraction (CMS/[facility])   • Pure hypercholesterolemia   • Hypertensive disorder       Plan:     Received 9 days of Rocephin  Continue Diflucan, renally dosed  Gentle hydration, hold diuretics, strict I&O, follow creatinine  FE Na >1%, check FE Urea as he had been on diuretics  S/P 2 units PRBC, PO iron, monitor H&H  Urology and nephrology consultations appreciated  PT/OT          This document has been electronically signed by JUSTIN Mcmanus on November 15, 2018 3:14 PM

## 2018-11-15 NOTE — PLAN OF CARE
Problem: Fall Risk (Adult)  Goal: Absence of Fall  Outcome: Ongoing (interventions implemented as appropriate)   11/15/18 0553   Fall Risk (Adult)   Absence of Fall achieves outcome       Problem: Patient Care Overview  Goal: Individualization and Mutuality  Outcome: Ongoing (interventions implemented as appropriate)    Goal: Discharge Needs Assessment  Outcome: Ongoing (interventions implemented as appropriate)   11/06/18 1021 11/10/18 0334   Discharge Needs Assessment   Readmission Within the Last 30 Days no previous admission in last 30 days --    Concerns to be Addressed --  adjustment to diagnosis/illness;medication;discharge planning   Patient/Family Anticipates Transition to home with help/services --    Patient/Family Anticipated Services at Transition home health care --    Transportation Concerns car, none --    Transportation Anticipated family or friend will provide --    Anticipated Changes Related to Illness inability to care for self --    Equipment Needed After Discharge none --    Outpatient/Agency/Support Group Needs homecare agency --    Discharge Facility/Level of Care Needs home with home health --    Offered/Gave Vendor List yes --    Patient's Choice of Community Agency(s) Caretenders --    Current Discharge Risk abuse (physical, emotional, sexual, negligence);dependent with mobility/activities of daily living --    Disability   Equipment Currently Used at Home walker, rolling;shower chair --      Goal: Interprofessional Rounds/Family Conf  Outcome: Ongoing (interventions implemented as appropriate)   11/15/18 0553   Interdisciplinary Rounds/Family Conf   Participants nursing       Problem: Infection, Risk/Actual (Adult)  Goal: Infection Prevention/Resolution  Outcome: Ongoing (interventions implemented as appropriate)   11/15/18 0553   Infection, Risk/Actual (Adult)   Infection Prevention/Resolution making progress toward outcome       Problem: Pain, Acute (Adult)  Goal: Acceptable Pain  Control/Comfort Level  Outcome: Ongoing (interventions implemented as appropriate)   11/15/18 0553   Pain, Acute (Adult)   Acceptable Pain Control/Comfort Level making progress toward outcome       Problem: Urinary Tract Infection (Adult)  Goal: Signs and Symptoms of Listed Potential Problems Will be Absent, Minimized or Managed (Urinary Tract Infection)  Outcome: Ongoing (interventions implemented as appropriate)   11/15/18 0553   Goal/Outcome Evaluation   Problems Assessed (Urinary Tract Infection) all   Problems Present (UTI) infection progression       Problem: Cardiac Output Decreased (Adult)  Goal: Effective Tissue Perfusion  Outcome: Ongoing (interventions implemented as appropriate)   11/15/18 0553   Cardiac Output Decreased (Adult)   Effective Tissue Perfusion making progress toward outcome       Problem: Skin Injury Risk (Adult)  Goal: Skin Health and Integrity  Outcome: Ongoing (interventions implemented as appropriate)   11/15/18 0553   Skin Injury Risk (Adult)   Skin Health and Integrity making progress toward outcome       Problem: Anemia (Adult)  Goal: Symptom Improvement  Outcome: Ongoing (interventions implemented as appropriate)   11/15/18 0553   Anemia (Adult)   Symptom Improvement making progress toward outcome

## 2018-11-15 NOTE — PROGRESS NOTES
"   LOS: 0 days   Patient Care Team:  Terrell Arzate MD as PCP - General  Terrell Arzate MD as PCP - Claims Attributed    Subjective     Subjective:  Symptoms:  Stable.  He reports malaise.  (Cystostomy site clear, urine is yellow, some sediment again, bladder scan > 500 mL and nursing reports almost no output from night shift. He is more awake today.  ).    Diet:  No nausea or vomiting.    Activity level: Impaired due to weakness.    Pain:  Pain is requiring pain medication.        History taken from: patient chart    Objective     Vital Signs  Temp:  [97.9 °F (36.6 °C)-99 °F (37.2 °C)] 98 °F (36.7 °C)  Heart Rate:  [] 79  Resp:  [18] 18  BP: (116-155)/(65-88) 128/80    Objective:  General Appearance:  In no acute distress.    Vital signs: (most recent): Blood pressure 128/80, pulse 79, temperature 98 °F (36.7 °C), temperature source Axillary, resp. rate 18, height 182.9 cm (72\"), weight 78.4 kg (172 lb 14.4 oz), SpO2 99 %.  Vital signs are normal.  No fever.    Output: Producing urine (SP tube).    HEENT: Normal HEENT exam.    Lungs:  Normal effort and normal respiratory rate.  Breath sounds clear to auscultation.  He is not in respiratory distress.    Heart: Normal rate.  Regular rhythm.  S1 normal and S2 normal.  No murmur.   Chest: Symmetric chest wall expansion.   Abdomen: Abdomen is soft and non-distended.  (No redness at SP tube site).  Bowel sounds are normal.   There is no abdominal tenderness.  There is no suprapubic area tenderness.     Extremities: Normal range of motion.    Pulses: Distal pulses are intact.    Neurological: Patient is alert and oriented to person, place and time.  GCS score is 15.  (Awake to arouse).    Pupils:  Pupils are equal, round, and reactive to light.    Skin:  Warm, dry and pale.  No rash, ecchymosis or cyanosis.             Results Review:    Lab Results (last 24 hours)     Procedure Component Value Units Date/Time    Sodium, Urine, Random - Urine, Clean Catch " [[id number]]  (Normal) Collected:  11/14/18 1050    Specimen:  Urine, Clean Catch Updated:  11/14/18 1537     Sodium, Urine 81 mmol/L     Creatinine, Urine, Random - Urine, Clean Catch [[id number]] Collected:  11/14/18 1050    Specimen:  Urine, Clean Catch Updated:  11/14/18 1537     Creatinine, Urine 57.9 mg/dL     Urine Eosinophils, Josh's Stain - Urine, Clean Catch [[id number]] Collected:  11/14/18 1050    Specimen:  Urine, Clean Catch Updated:  11/14/18 1516    Antifungal AST 9 Drug Panel - Isolate, Isolate [[id number]] Collected:  11/11/18 1450    Specimen:  Isolate Updated:  11/14/18 1315    Urine Culture - Urine, Urine, Catheter [[id number]]  (Abnormal) Collected:  11/11/18 1450    Specimen:  Urine, Catheter Updated:  11/14/18 1144     Urine Culture >100,000 CFU/mL Candida tropicalis    Urinalysis, Microscopic Only - Urine, Clean Catch [[id number]]  (Abnormal) Collected:  11/14/18 1050    Specimen:  Urine, Clean Catch Updated:  11/14/18 1133     RBC, UA 21-30 /HPF      WBC, UA 13-20 /HPF      Bacteria, UA Trace /HPF      Squamous Epithelial Cells, UA 3-5 /HPF      Yeast, UA Moderate/2+ Budding Yeast /HPF      Hyaline Casts, UA 3-6 /LPF      Methodology Manual Light Microscopy    Urine Culture - Urine, Urine, Clean Catch [[id number]] Collected:  11/14/18 1050    Specimen:  Urine, Clean Catch Updated:  11/14/18 1133    Urinalysis With Culture If Indicated - Urine, Clean Catch [[id number]]  (Abnormal) Collected:  11/14/18 1050    Specimen:  Urine, Clean Catch Updated:  11/14/18 1058     Color, UA Yellow     Appearance, UA Cloudy     pH, UA 6.0     Specific Gravity, UA 1.011     Glucose, UA Negative     Ketones, UA Negative     Bilirubin, UA Negative     Blood, UA Large (3+)     Protein, UA 30 mg/dL (1+)     Leuk Esterase, UA Moderate (2+)     Nitrite, UA Negative     Urobilinogen, UA 0.2 E.U./dL    Comprehensive Metabolic Panel [[id number]]  (Abnormal) Collected:  11/14/18 0525    Specimen:  Blood Updated:  11/14/18  0612     Glucose 105 mg/dL      BUN 36 mg/dL      Creatinine 2.49 mg/dL      Sodium 143 mmol/L      Potassium 3.7 mmol/L      Chloride 104 mmol/L      CO2 30.0 mmol/L      Calcium 9.1 mg/dL      Total Protein 7.0 g/dL      Albumin 3.10 g/dL      ALT (SGPT) 8 U/L      AST (SGOT) 20 U/L      Alkaline Phosphatase 92 U/L      Total Bilirubin 0.3 mg/dL      eGFR Non African Amer 25 mL/min/1.73      Globulin 3.9 gm/dL      A/G Ratio 0.8 g/dL      BUN/Creatinine Ratio 14.5     Anion Gap 9.0 mmol/L     Narrative:       The MDRD GFR formula is only valid for adults with stable renal function between ages 18 and 70.    CBC & Differential [[id number]] Collected:  11/14/18 0525    Specimen:  Blood Updated:  11/14/18 0600    Narrative:       The following orders were created for panel order CBC & Differential.  Procedure                               Abnormality         Status                     ---------                               -----------         ------                     CBC Auto Differential[[id number]]        Abnormal            Final result                 Please view results for these tests on the individual orders.    CBC Auto Differential [[id number]]  (Abnormal) Collected:  11/14/18 0525    Specimen:  Blood Updated:  11/14/18 0600     WBC 10.35 10*3/mm3      RBC 3.32 10*6/mm3      Hemoglobin 9.2 g/dL      Hematocrit 29.9 %      MCV 90.1 fL      MCH 27.7 pg      MCHC 30.8 g/dL      RDW 15.7 %      RDW-SD 51.9 fl      MPV 9.9 fL      Platelets 252 10*3/mm3      Neutrophil % 77.3 %      Lymphocyte % 12.6 %      Monocyte % 7.5 %      Eosinophil % 2.4 %      Basophil % 0.0 %      Immature Grans % 0.2 %      Neutrophils, Absolute 8.00 10*3/mm3      Lymphocytes, Absolute 1.30 10*3/mm3      Monocytes, Absolute 0.78 10*3/mm3      Eosinophils, Absolute 0.25 10*3/mm3      Basophils, Absolute 0.00 10*3/mm3      Immature Grans, Absolute 0.02 10*3/mm3          Imaging Results (last 24 hours)     ** No results found for the last  24 hours. **           I reviewed the patient's new clinical results.  I reviewed the patient's new imaging results and agree with the interpretation.  I reviewed the patient's other test results and agree with the interpretation      Assessment/Plan       (HFpEF) heart failure with preserved ejection fraction (CMS/[facility])    Pure hypercholesterolemia    Hypertensive disorder    Pulmonary hypertension (CMS/HCC)    Tricuspid regurgitation    CKD (chronic kidney disease) stage 3, GFR 30-59 ml/min (CMS/[facility])    Acute cystitis with hematuria    PVD (peripheral vascular disease) with claudication (CMS/[facility])      Assessment:    Condition: In stable condition.       1. Obstructive BPH causing neurogenic bladder, chronic SP tube-->SP tube is obstructed with sediment today  -Proscar, Flomax  -Chronic SP tube, changed 11/6/18     2. UTI cystitis, complicated  -Urine culture 11/5/18 Klebsiella oxytoca  -Antibiotic day #9, Rocephin  -Diflucan DAY #4  -Urine culture 11/11/18 candida tropicalis    Estimated Creatinine Clearance: 22.7 mL/min (A) (by C-G formula based on SCr of 2.49 mg/dL (H)).    Plan:   SP tube changed today.   Will send of 11/11/18 urine culture for 9-panel antifungal sensitivity.     PRE DIAGNOSIS:  NGB     POST DIAGNOSIS:  NGB     PROCEDURE DETAILS:  I have reviewed the diagnosis and treatment options with patient. Risks and benefits explained and patient wishes to proceed with catheter change. Supra pubic site prepped in sterile technique. Existing catheter removed with 16 catheter inserted with 10 cc  sterile water in balloon. Supra pubic site clear with minimal amount of redness and no drainage noted. Placement was checked. Catheter connected to gravity bag, closed system.      COMPLICATIONS:  No Complications.     FINDINGS: Urine yellow with sediment.      INSTRUCTIONS: Appropriate post procedure instructions given. Verbalized understanding    JUSTIN Selby  11/14/18  6:26 PM

## 2018-11-15 NOTE — THERAPY TREATMENT NOTE
Acute Care - Physical Therapy Treatment Note  [facility]     Patient Name: Ravi Park  : 1930  MRN: [id number]  Today's Date: 11/15/2018  Onset of Illness/Injury or Date of Surgery: 18  Date of Referral to PT: 18  Referring Physician: Troy Laura    Admit Date: 2018    Visit Dx:    ICD-10-CM ICD-9-CM   1. Acute cystitis with hematuria N30.01 595.0   2. Altered mental status, unspecified altered mental status type R41.82 780.97   3. Impaired functional mobility and endurance Z74.09 V49.89   4. Impaired mobility and activities of daily living Z74.09 799.89   5. Dysphagia, unspecified type R13.10 787.20     Patient Active Problem List   Diagnosis   • Peripheral arterial disease (CMS/[facility])   • Chronic atrial fibrillation (CMS/[facility])   • Backache   • Lumbar radiculopathy   • Edema   • Acute congestive heart failure (CMS/[facility])   • Acute blood loss anemia   • Acute on chronic diastolic heart failure (CMS/[facility])   • YESENIA (acute kidney injury) (CMS/[facility])   • (HFpEF) heart failure with preserved ejection fraction (CMS/[facility])   • Urinary retention   • Personal history of tobacco use, presenting hazards to health   • Vitreous floaters   • Upper respiratory infection   • Sprain of shoulder and upper arm   • Shoulder pain   • Serous otitis media   • Sensorineural hearing loss   • Pure hypercholesterolemia   • Pseudophakia   • Prostatitis   • Pain in wrist   • Pain in thumb joint with movement   • Pain in joint involving right lower leg   • Pain in eye   • Otorrhea   • Open wound of toe   • Open wound of lower leg with complication   • Open wound of lower leg   • Open wound of lesser toe of left foot without damage to nail   • Olecranon bursitis   • Need for prophylactic vaccination and inoculation against influenza   • Need for immunization against influenza   • Multiple joint pain   • Malaise and fatigue   • Keratoconjunctivitis sicca (CMS/[facility])   • Insect bite   • Impacted cerumen   • Hypertensive  disorder   • History of colonoscopy   • History of colon polyps   • History of artificial eye lens   • Herpes zoster   • Gout   • Exposure to communicable disease   • Dog bite of hand   • Disorder of sacroiliac joint   • Decreased platelet count (CMS/HCC)   • Cough   • Contusion of thumb   • Common cold   • Cervical arthritis   • Cerebrovascular accident (CMS/HCC)   • Cellulitis of lower leg   • Cataract   • Benign prostatic hyperplasia   • Atrial fibrillation (CMS/HCC)   • Astigmatism   • Allergic rhinitis due to pollen   • Allergic rhinitis   • Adverse reaction to drug   • Acute confusion   • Acute bronchitis   • Abnormal gait   • Urinary tract infection associated with indwelling urethral catheter (CMS/HCC)   • Hydronephrosis   • Nephrolithiasis   • Calculus of kidney   • Sepsis (CMS/HCC)   • Pulmonary hypertension (CMS/HCC)   • Tricuspid regurgitation   • CKD (chronic kidney disease) stage 3, GFR 30-59 ml/min (CMS/HCC)   • ESBL (extended spectrum beta-lactamase) producing bacteria infection   • Urinary tract infection associated with catheterization of urinary tract (CMS/HCC)   • Toxic encephalopathy   • Demand ischemia (CMS/HCC)   • Acute renal failure with tubular necrosis (CMS/HCC)   • Chronic atrial fibrillation (CMS/HCC)   • Acute cystitis with hematuria   • Sepsis (CMS/HCC)   • Decubitus ulcer of left heel, unstageable (CMS/HCC)   • Acute renal failure (CMS/HCC)   • Pneumonia due to infectious organism   • Non-healing wound of left heel   • PVD (peripheral vascular disease) with claudication (CMS/HCC)   • ARF (acute renal failure) (CMS/HCC)   • Candida cystitis   • Anemia of chronic disease   • Acute kidney injury (CMS/HCC)       Therapy Treatment    Rehabilitation Treatment Summary     Row Name 11/15/18 1357 11/15/18 0920 11/15/18 0756       Treatment Time/Intention    Discipline  physical therapy assistant  -TA  occupational therapy assistant  -BB  speech language pathologist  -EK    Document Type   therapy note (daily note)  -TA  therapy note (daily note)  -BB  therapy note (daily note)  -EK    Subjective Information  no complaints  -TA  no complaints  -BB  no complaints  -EK    Mode of Treatment  physical therapy;individual therapy  -TA  individual therapy;occupational therapy  -BB  individual therapy;speech-language pathology  -EK    Patient/Family Observations  --  no family present  -BB  no family present  -EK    Total Minutes, Occupational Therapy Treatment  --  40  -BB  --    Therapy Frequency (OT Eval)  --  other (see comments) 3-5 days/wk  -BB  --    Patient Effort  adequate  -TA  poor  -BB  adequate  -EK    Existing Precautions/Restrictions  fall;oxygen therapy device and L/min  -TA  fall;oxygen therapy device and L/min  -BB  fall;oxygen therapy device and L/min  -EK    Recorded by [TA] Slime Martin, PTA 11/15/18 1550 [BB] Carla Lutz VALENCIA/L 11/15/18 1203 [EK] Cece Arnold, CCC-SLP 11/15/18 0946    Row Name 11/15/18 1357 11/15/18 0920          Vital Signs    Pre Systolic BP Rehab  110  -TA  --     Pre Treatment Diastolic BP  50  -TA  --     Pretreatment Heart Rate (beats/min)  101  -TA  91  -BB     Pre SpO2 (%)  97  -TA  97  -BB     O2 Delivery Pre Treatment  supplemental O2  -TA  supplemental O2  -BB     Pre Patient Position  --  Supine  -BB     Post Patient Position  --  Side Lying  -BB     Recorded by [TA] Slime Martin, PTA 11/15/18 1550 [BB] Carla Lutz VALENCIA/L 11/15/18 1203     Row Name 11/15/18 1357 11/15/18 0920          Cognitive Assessment/Intervention- PT/OT    Affect/Mental Status (Cognitive)  confused  -TA  confused  -BB     Behavioral Issues (Cognitive)  unable/difficult to assess  -TA  --     Orientation Status (Cognition)  oriented to;person  -TA  oriented to;person  -BB     Follows Commands (Cognition)  follows one step commands;25-49% accuracy  -TA  does not follow one step commands  -BB     Cognitive Function (Cognitive)  memory deficit  -TA  --      Personal Safety Interventions  fall prevention program maintained;nonskid shoes/slippers when out of bed;supervised activity  -TA  fall prevention program maintained;supervised activity  -BB     Recorded by [TA] Slime Martin, PTA 11/15/18 1550 [BB] Carla Lutz COTA/L 11/15/18 1203     Row Name 11/15/18 1357             Bed Mobility Assessment/Treatment    Scooting/Bridging [place] (Bed Mobility)  minimum assist (75% patient effort)  -TA      Supine-Sit [place] (Bed Mobility)  verbal cues;nonverbal cues (demo/gesture);minimum assist (75% patient effort)  -TA      Sit-Supine [place] (Bed Mobility)  moderate assist (50% patient effort)  -TA      Assistive Device (Bed Mobility)  bed rails;head of bed elevated  -TA      Comment (Bed Mobility)  pt sat @ EOB ~30 minutes with CGA-SBA, pt insisted on calling his daughter, pt spoke with his daughter on the phone. pt requested to drink his milk, pt required assist to hold milk while drinking 2* to shakiness.  -TA2      Recorded by [TA] Slime Martin, PTA 11/15/18 1550  [TA2] Slime Martin, PTA 11/15/18 1553      Row Name 11/15/18 135             Sit-Stand Transfer    Sit-Stand [place] (Transfers)  minimum assist (75% patient effort)  -TA      Assistive Device (Sit-Stand Transfers)  other (see comments) HHA  -TA      Recorded by [TA] Slime Martin, PTA 11/15/18 1550      Row Name 11/15/18 1350             Stand-Sit Transfer    Stand-Sit [place] (Transfers)  minimum assist (75% patient effort)  -TA      Assistive Device (Stand-Sit Transfers)  other (see comments) HHA  -TA      Recorded by [TA] Slime Martin, PTA 11/15/18 1550      Row Name 11/15/18 0920             Upper Body Dressing Assessment/Training    Upper Body Dressing [place] Level  don;doff;moderate assist (50% patient effort) hospital gown  -BB      Upper Body Dressing Position  long sitting  -BB      Comment (Upper Body Dressing)  -- Pt aparently spilled water on  gown & needed to change  -BB      Recorded by [BB] Carla Lutz COTA/L 11/15/18 1203      Row Name 11/15/18 0920             Grooming Assessment/Training    [place] Level (Grooming)  wash face, hands;hair care, combing/brushing;verbal cues;moderate assist (50% patient effort)  -BB      Grooming Position  long sitting  -BB      Recorded by [BB] Carla Lutz COTA/L 11/15/18 1203      Row Name 11/15/18 0920             Self-Feeding Assessment/Training    [place] Level (Feeding)  liquids to mouth;maximum assist (25% patient effort)  -BB      Self-Feeding Assess/Train, Spillage Amount  moderate  -BB      Position (Self-Feeding)  sitting up in bed  -BB      Recorded by [BB] Carla Lutz COTA/L 11/15/18 1203      Row Name 11/15/18 1357 11/15/18 0920 11/15/18 0756       Positioning and Restraints    Pre-Treatment Position  in bed  -TA  in bed  -BB  in bed  -EK    Post Treatment Position  bed  -TA  bed  -BB  bed  -EK    In Bed  side lying left  -TA  side lying right;encouraged to call for assist;call light within reach;exit alarm on  -BB  call light within reach;encouraged to call for assist;exit alarm on  -EK    Recorded by [TA] Slime Martin, PTA 11/15/18 1550 [BB] Carla Lutz COTA/L 11/15/18 1203 [EK] Cece Arnold, CCC-SLP 11/15/18 0946    Row Name 11/15/18 1357 11/15/18 0920 11/15/18 0756       Pain Scale: Numbers Pre/Post-Treatment    Pain Scale: Numbers, Pretreatment  0/10 - no pain  -TA  -- no number given  -BB  0/10 - no pain  -EK    Pain Scale: Numbers, Post-Treatment  0/10 - no pain  -TA  -- no number given  -BB  0/10 - no pain  -EK    Pain Location  --  neck  -BB  --    Pain Intervention(s)  --  Repositioned  -BB  --    Recorded by [TA] Slime Martin, PTA 11/15/18 1550 [BB] Carla Lutz COTA/L 11/15/18 1203 [EK] Cece Arnold, CCC-SLP 11/15/18 0946    Row Name                Wound 10/30/18 171 Left heel pressure injury    Wound  - Properties Group Date first assessed: 10/30/18 [AS] Time first assessed: 1715 [AS] Present On Admission : yes;picture taken [AS] Side: Left [AS] Location: heel [AS] Type: pressure injury [AS] Stage, Pressure Injury: unstageable [AS] Recorded by:  [AS] Giselle Alicea RN 10/30/18 1715    Row Name                Wound 11/06/18 0300 Left gluteal pressure injury    Wound - Properties Group Date first assessed: 11/06/18 [AB] Time first assessed: 0300 [AB] Present On Admission : yes;picture taken [SR], picture taken by nightshift RN  Side: Left [SR] Location: gluteal [SR] Type: pressure injury [SR] Stage, Pressure Injury: deep tissue injury [SR] Recorded by:  [AB] Jacquie Chu RN 11/08/18 1511 [SR] Lexi Chaparro RN 11/06/18 2051    Row Name 11/15/18 0920             Plan of Care Review    Plan of Care Reviewed With  patient  -BB      Recorded by [BB] Carla Lutz COTA/L 11/15/18 1203      Row Name 11/15/18 0920             Outcome Summary/Treatment Plan (OT)    Daily Summary of Progress (OT)  progress toward functional goals is gradual  -BB      Plan for Continued Treatment (OT)  continue POC  -BB      Anticipated Discharge Disposition (OT)  home with 24/7 care;skilled nursing facility  -BB      Recorded by [BB] Carla Lutz COTA/L 11/15/18 1203      Row Name 11/15/18 1357             Outcome Summary/Treatment Plan (PT)    Daily Summary of Progress (PT)  progress toward functional goals is gradual  -TA      Plan for Continued Treatment (PT)  continue per pt tolerance  -TA      Anticipated Discharge Disposition (PT)  skilled nursing facility;home with 24/7 care  -TA      Recorded by [TA] Slime Martin PTA 11/15/18 1550      Row Name 11/15/18 0756             Outcome Summary/Treatment Plan (SLP)    Daily Summary of Progress (SLP)  progress toward functional goals is good  -EK      Barriers to Overall Progress (SLP)  Pt more sleepy this a.m. SLP stopped session after 15 minutes due to  sleepiness. Pt pocketed soft foods this am (eggs). Pt will benefit from downgrade to puree at this time. Nurse aware.   -EK      Recorded by [EK] Cece Arnold CCC-SLP 11/15/18 0960        User Key  (r) = Recorded By, (t) = Taken By, (c) = Cosigned By    Initials Name Effective Dates Discipline    EK Cece Arnold CCC-SLP 06/08/18 -  SLP    TA Slime Martin, PTA 03/07/18 -  PT    BB Carla Lutz, VALENCIA/L 03/07/18 -  OT    Jacquie Dukes, RN 10/17/16 -  Nurse    SR Lexi Chaparro RN 10/17/16 -  Nurse    Giselle Vasquez, RN 12/13/16 -  Nurse          Wound 10/30/18 1715 Left heel pressure injury (Active)   Dressing Appearance dry;intact 11/14/2018  6:00 PM   Closure None 11/14/2018  6:00 PM   Base dressing in place, unable to visualize 11/14/2018  9:00 PM   Drainage Amount none 11/14/2018  6:00 PM   Dressing Care, Wound dressing changed 11/14/2018  6:00 PM       Wound 11/06/18 0300 Left gluteal pressure injury (Active)   Dressing Appearance open to air 11/14/2018  9:00 PM   Base maroon/purple 11/14/2018  9:00 PM   Periwound blanchable;redness 11/14/2018  9:00 PM       PT Rehab Goals     Row Name 11/15/18 1357             Bed Mobility Goal 1 (PT)    Activity/Assistive Device (Bed Mobility Goal 1, PT)  bed mobility activities, all  -TA      [place] Level/Cues Needed (Bed Mobility Goal 1, PT)  conditional independence  -TA      Time Frame (Bed Mobility Goal 1, PT)  short term goal (STG)  -TA      Progress/Outcomes (Bed Mobility Goal 1, PT)  goal not met  -TA         Transfer Goal 1 (PT)    Activity/Assistive Device (Transfer Goal 1, PT)  sit-to-stand/stand-to-sit  -TA      [place] Level/Cues Needed (Transfer Goal 1, PT)  supervision required  -TA      Time Frame (Transfer Goal 1, PT)  2 - 3 days  -TA      Progress/Outcome (Transfer Goal 1, PT)  goal not met  -TA         Gait Training Goal 1 (PT)    Activity/Assistive Device (Gait Training Goal 1, PT)   walker, rolling;gait (walking locomotion);assistive device use;backward stepping;decrease asymmetrical patterns;decrease fall risk;diminish gait deviation;forward stepping;improve balance and speed;increase endurance/gait distance;increase energy conservation;maintain weight bearing status;normalize weight shifts;sidestepping;turning, left;turning, right  -TA      [place] Level (Gait Training Goal 1, PT)  supervision required  -TA      Distance (Gait Goal 1, PT)  1x25 ft or more  -TA      Progress/Outcome (Gait Training Goal 1, PT)  goal not met  -TA        User Key  (r) = Recorded By, (t) = Taken By, (c) = Cosigned By    Initials Name Provider Type Discipline    Slime Damian, PTA Physical Therapy Assistant PT          Physical Therapy Education     Title: PT OT SLP Therapies (Active)     Topic: Physical Therapy (Done)     Point: Mobility training (Done)     Learning Progress Summary           Patient Acceptance, E, VU,NR by CW at 11/12/2018  4:13 PM    Comment:  Pt education on use of call button and encouraged to call for assist to reduce risk of falls.                   Point: Home exercise program (Done)     Learning Progress Summary           Patient Acceptance, E, VU,NR by CW at 11/12/2018  4:13 PM    Comment:  Pt education on use of call button and encouraged to call for assist to reduce risk of falls.                   Point: Body mechanics (Done)     Learning Progress Summary           Patient Acceptance, E, VU,NR by CW at 11/12/2018  4:13 PM    Comment:  Pt education on use of call button and encouraged to call for assist to reduce risk of falls.                   Point: Precautions (Done)     Learning Progress Summary           Patient Acceptance, E, VU,NR by CW at 11/12/2018  4:13 PM    Comment:  Pt education on use of call button and encouraged to call for assist to reduce risk of falls.                               User Key     Initials Effective Dates Name Provider Type Discipline    CW  10/04/18 -  Kristie Alvarez, PT Physical Therapist PT                PT Recommendation and Plan  Anticipated Discharge Disposition (PT): skilled nursing facility, home with 24/7 care  Therapy Frequency (PT Clinical Impression): other (see comments)(5-14x/week)  Outcome Summary/Treatment Plan (PT)  Daily Summary of Progress (PT): progress toward functional goals is gradual  Plan for Continued Treatment (PT): continue per pt tolerance  Anticipated Equipment Needs at Discharge (PT): other (see comments)(difficult to assess per pt's lethargy)  Anticipated Discharge Disposition (PT): skilled nursing facility, home with 24/7 care  Plan of Care Reviewed With: patient  Progress: improving  Outcome Summary: pt sup<>sit with min assist, pt sat @ EOB ~30 minutes with CGA-SBA, pt will reuire 24/7 care @ D/C  Outcome Measures     Row Name 11/15/18 1500 11/15/18 0920 11/14/18 1300       How much help from another person do you currently need...    Turning from your back to your side while in flat bed without using bedrails?  3  -TA  --  3  -TA    Moving from lying on back to sitting on the side of a flat bed without bedrails?  3  -TA  --  3  -TA    Moving to and from a bed to a chair (including a wheelchair)?  2  -TA  --  2  -TA    Standing up from a chair using your arms (e.g., wheelchair, bedside chair)?  2  -TA  --  2  -TA    Climbing 3-5 steps with a railing?  1  -TA  --  1  -TA    To walk in hospital room?  1  -TA  --  1  -TA    AM-PAC 6 Clicks Score  12  -TA  --  12  -TA       How much help from another is currently needed...    Putting on and taking off regular lower body clothing?  --  1  -BB  --    Bathing (including washing, rinsing, and drying)  --  2  -BB  --    Toileting (which includes using toilet bed pan or urinal)  --  1  -BB  --    Putting on and taking off regular upper body clothing  --  2  -BB  --    Taking care of personal grooming (such as brushing teeth)  --  2  -BB  --    Eating meals  --  2  -BB  --     Score  --  10  -BB  --       Functional Assessment    Outcome Measure Options  AM-PAC 6 Clicks Basic Mobility (PT)  -TA  --  AM-PAC 6 Clicks Basic Mobility (PT)  -TA    Row Name 11/13/18 0910             How much help from another is currently needed...    Putting on and taking off regular lower body clothing?  1  -LW      Bathing (including washing, rinsing, and drying)  2  -LW      Toileting (which includes using toilet bed pan or urinal)  1  -LW      Putting on and taking off regular upper body clothing  2  -LW      Taking care of personal grooming (such as brushing teeth)  2  -LW      Eating meals  2  -LW      Score  10  -LW        User Key  (r) = Recorded By, (t) = Taken By, (c) = Cosigned By    Initials Name Provider Type    Slime Damian PTA Physical Therapy Assistant    Carla Ohara, VALENCIA/L Occupational Therapy Assistant    Deisy Martini, VALENCIA/L Occupational Therapy Assistant         Time Calculation:   PT Charges     Row Name 11/15/18 1553             Time Calculation    Start Time  1357  -TA      Stop Time  1444  -TA      Time Calculation (min)  47 min  -TA         Time Calculation- PT    Total Timed Code Minutes- PT  47 minute(s)  -TA        User Key  (r) = Recorded By, (t) = Taken By, (c) = Cosigned By    Initials Name Provider Type    Slime Damian PTA Physical Therapy Assistant        Therapy Suggested Charges     Code   Minutes Charges    45736 (CPT®) Hc Pt Neuromusc Re Education Ea 15 Min      82114 (CPT®) Hc Pt Ther Proc Ea 15 Min 15 1    20904 (CPT®) Hc Gait Training Ea 15 Min      80113 (CPT®) Hc Pt Therapeutic Act Ea 15 Min 24 2    63206 (CPT®) Hc Pt Manual Therapy Ea 15 Min      26561 (CPT®) Hc Pt Iontophoresis Ea 15 Min      38996 (CPT®) Hc Pt Elec Stim Ea-Per 15 Min      61584 (CPT®) Hc Pt Ultrasound Ea 15 Min      73044 (CPT®) Hc Pt Self Care/Mgmt/Train Ea 15 Min      24455 (CPT®) Hc Pt Prosthetic (S) Train Initial Encounter, Each 15 Min      46290 (CPT®) Hc Pt  Orthotic(S)/Prosthetic(S) Encounter, Each 15 Min      97499 (CPT®) Hc Orthotic(S) Mgmt/Train Initial Encounter, Each 15min      Total  39 3        Therapy Charges for Today     Code Description Service Date Service Provider Modifiers Qty    69281328056 HC PT THERAPEUTIC ACT EA 15 MIN 11/14/2018 Slime Martin, JOSHUA GP 2    24438377535 HC PT THERAPEUTIC ACT EA 15 MIN 11/15/2018 Slime Martin PTA GP 3          PT G-Codes  PT Professional Judgement Used?: Yes  Outcome Measure Options: AM-PAC 6 Clicks Basic Mobility (PT)  AM-PAC 6 Clicks Score: 12  Score: 10  Functional Limitation: Mobility: Walking and moving around  Mobility: Walking and Moving Around Current Status (): At least 60 percent but less than 80 percent impaired, limited or restricted  Mobility: Walking and Moving Around Goal Status (): At least 40 percent but less than 60 percent impaired, limited or restricted    Slime Martin PTA  11/15/2018

## 2018-11-15 NOTE — PROGRESS NOTES
"   LOS: 0 days   Patient Care Team:  Terrell Arzate MD as PCP - General  Terrell Arzate MD as PCP - Claims Attributed    Subjective     Subjective:  Symptoms:  Stable.  He reports malaise.  (No sediment in SP tubing. ).    Diet:  No nausea or vomiting.    Activity level: Impaired due to weakness.    Pain:  Pain is requiring pain medication.        History taken from: patient chart    Objective     Vital Signs  Temp:  [97 °F (36.1 °C)-98 °F (36.7 °C)] 98 °F (36.7 °C)  Heart Rate:  [] 75  Resp:  [18-20] 20  BP: (104-155)/(45-84) 155/65    Objective:  General Appearance:  In no acute distress.    Vital signs: (most recent): Blood pressure 155/65, pulse 75, temperature 98 °F (36.7 °C), temperature source Axillary, resp. rate 20, height 182.9 cm (72\"), weight 78.4 kg (172 lb 14.4 oz), SpO2 95 %.  Vital signs are normal.  No fever.    Output: Producing urine (SP tube).    HEENT: Normal HEENT exam.    Lungs:  Normal effort and normal respiratory rate.  Breath sounds clear to auscultation.  He is not in respiratory distress.    Heart: Normal rate.  Regular rhythm.  S1 normal and S2 normal.  No murmur.   Chest: Symmetric chest wall expansion.   Abdomen: Abdomen is soft and non-distended.  (No redness at SP tube site).  Bowel sounds are normal.   There is no abdominal tenderness.  There is no suprapubic area tenderness.     Extremities: Normal range of motion.    Pulses: Distal pulses are intact.    Neurological: Patient is alert and oriented to person, place and time.  GCS score is 15.  (Awake to arouse).    Pupils:  Pupils are equal, round, and reactive to light.    Skin:  Warm, dry and pale.  No rash, ecchymosis or cyanosis.             Results Review:    Lab Results (last 24 hours)     Procedure Component Value Units Date/Time    Sodium, Urine, Random - Urine, Clean Catch [[id number]]  (Normal) Collected:  11/15/18 1226    Specimen:  Urine, Clean Catch Updated:  11/15/18 1239     Sodium, Urine 61 mmol/L     " Antifungal AST 9 Drug Panel - Isolate, Isolate [[id number]] Collected:  11/11/18 1450    Specimen:  Isolate Updated:  11/15/18 1113     Please note Comment     Comment: CLSI does not have established guidelines for interpretation of  these organism-drug combinations.  For research use only.        BROMINE-TOTAL,SERUM/PLASMA Comment     Comment: Results for this test are for research purposes only by the assay's  .  The performance characteristics of this product have  not been established.  Results should not be used as a diagnostic  procedure without confirmation of the diagnosis by another medically  established diagnostic product or procedure.       Narrative:       Performed at:  [address] [place] [address]  [id number]  : Bella Gómez MD, Phone:  [phone]    Comprehensive Metabolic Panel [[id number]]  (Abnormal) Collected:  11/15/18 0611    Specimen:  Blood Updated:  11/15/18 0641     Glucose 83 mg/dL      BUN 37 mg/dL      Creatinine 2.21 mg/dL      Sodium 144 mmol/L      Potassium 3.7 mmol/L      Chloride 105 mmol/L      CO2 26.0 mmol/L      Calcium 8.9 mg/dL      Total Protein 7.2 g/dL      Albumin 3.30 g/dL      ALT (SGPT) 10 U/L      AST (SGOT) 23 U/L      Alkaline Phosphatase 92 U/L      Total Bilirubin 0.4 mg/dL      eGFR Non African Amer 28 mL/min/1.73      Globulin 3.9 gm/dL      A/G Ratio 0.8 g/dL      BUN/Creatinine Ratio 16.7     Anion Gap 13.0 mmol/L     Narrative:       The MDRD GFR formula is only valid for adults with stable renal function between ages 18 and 70.    Urine Culture - Urine, Urine, Clean Catch [[id number]] Collected:  11/14/18 1050    Specimen:  Urine, Clean Catch Updated:  11/15/18 0625     Urine Culture Mixed Frantz Isolated    Narrative:       Specimen contains mixed organisms of questionable pathogenicity which indicates contamination with commensal frantz.  Further identification is unlikely to provide clinically useful  information.  Suggest recollection.    CBC & Differential [[id number]] Collected:  11/15/18 0611    Specimen:  Blood Updated:  11/15/18 0624    Narrative:       The following orders were created for panel order CBC & Differential.  Procedure                               Abnormality         Status                     ---------                               -----------         ------                     CBC Auto Differential[[id number]]        Abnormal            Final result                 Please view results for these tests on the individual orders.    CBC Auto Differential [[id number]]  (Abnormal) Collected:  11/15/18 0611    Specimen:  Blood Updated:  11/15/18 0624     WBC 9.28 10*3/mm3      RBC 3.24 10*6/mm3      Hemoglobin 9.0 g/dL      Hematocrit 29.5 %      MCV 91.0 fL      MCH 27.8 pg      MCHC 30.5 g/dL      RDW 16.3 %      RDW-SD 54.5 fl      MPV 9.0 fL      Platelets 269 10*3/mm3      Neutrophil % 75.7 %      Lymphocyte % 12.1 %      Monocyte % 7.7 %      Eosinophil % 4.1 %      Basophil % 0.1 %      Immature Grans % 0.3 %      Neutrophils, Absolute 7.03 10*3/mm3      Lymphocytes, Absolute 1.12 10*3/mm3      Monocytes, Absolute 0.71 10*3/mm3      Eosinophils, Absolute 0.38 10*3/mm3      Basophils, Absolute 0.01 10*3/mm3      Immature Grans, Absolute 0.03 10*3/mm3      nRBC 0.0 /100 WBC     Urine Eosinophils, Josh's Stain - Urine, Clean Catch [[id number]]  (Normal) Collected:  11/14/18 1050    Specimen:  Urine, Clean Catch Updated:  11/15/18 0606     Josh Stain Negative         Imaging Results (last 24 hours)     ** No results found for the last 24 hours. **           I reviewed the patient's new clinical results.  I reviewed the patient's new imaging results and agree with the interpretation.  I reviewed the patient's other test results and agree with the interpretation      Assessment/Plan       ARF (acute renal failure) (CMS/HCC)    (HFpEF) heart failure with preserved ejection fraction (CMS/HCC)     Pure hypercholesterolemia    Hypertensive disorder    Pulmonary hypertension (CMS/[facility])    Tricuspid regurgitation    CKD (chronic kidney disease) stage 3, GFR 30-59 ml/min (CMS/[facility])    Acute cystitis with hematuria    PVD (peripheral vascular disease) with claudication (CMS/[facility])    Candida cystitis    Anemia of chronic disease    Acute kidney injury (CMS/[facility])      Assessment:    Condition: In stable condition.       1. Obstructive BPH causing neurogenic bladder, chronic SP tube-->SP tube changed 11/14/18, caused YESENIA related to obstruction of catheter.   -Proscar, Flomax  -Chronic SP tube, changed 11/14/18     2. UTI cystitis, complicated  -Urine culture 11/5/18 Klebsiella oxytoca  -Antibiotic day #10, Rocephin  -Diflucan DAY #5  -Urine culture 11/11/18 candida tropicalis    Estimated Creatinine Clearance: 25.6 mL/min (A) (by C-G formula based on SCr of 2.21 mg/dL (H)).    Plan:   SP tube changed yesterday.   Continue Diflucan & Rocephin.     JUSTIN Selby  11/15/18  5:03 PM

## 2018-11-15 NOTE — PLAN OF CARE
Problem: Patient Care Overview  Goal: Plan of Care Review  Outcome: Ongoing (interventions implemented as appropriate)   11/15/18 1210   Coping/Psychosocial   Plan of Care Reviewed With patient   Plan of Care Review   Progress no change   OTHER   Outcome Summary Pt has not any goals at this time. Pt will need 24/7 care at time of D/C.

## 2018-11-16 PROBLEM — L89.616 PRESSURE INJURY OF DEEP TISSUE OF RIGHT HEEL: Status: ACTIVE | Noted: 2018-01-01

## 2018-11-16 NOTE — PLAN OF CARE
Problem: Patient Care Overview  Goal: Plan of Care Review  Outcome: Ongoing (interventions implemented as appropriate)   11/16/18 4793   Coping/Psychosocial   Plan of Care Reviewed With patient   Plan of Care Review   Progress improving   OTHER   Outcome Summary Pt with no s/s of aspiration on puree diet and regular liquids. Pt has met goals for diet tolerance and diet safety. SLP posted updated swallow protocol at head of bed for puree and regular liquids, no straw, small sips and bites, check for mouth for pocketing, and will need feeding assistance.

## 2018-11-16 NOTE — PROGRESS NOTES
Malnutrition Severity Assessment    Patient Name:  Ravi Park  YOB: 1930  MRN: [id number]  Admit Date:  11/5/2018    Patient meets criteria for : Moderate malnutrition    Comments:  Moderate malnutrition related to decreased appetite/intakes with advanced age, acute illness, weakness, and dysphagia evidenced by need for pureed diet, PO intakes <25% over past 5 days, muscle wasting and fat loss noted in temporal, orbital, clavicle, and triceps regions.  Wt loss could be masked by fluid status as pt receiving IVF and also has hx of CHF.    Malnutrition Type: Acute Illness/Injury Malnutrition     Malnutrition Type (last 8 hours)      Malnutrition Severity Assessment     Row Name 11/16/18 1218       Malnutrition Severity Assessment    Malnutrition Type  Acute Illness/Injury Malnutrition    Row Name 11/16/18 1218       Physical Signs of Malnutrition (Acute)    Muscle Wasting  Mild    Fat Loss  Mild    Row Name 11/16/18 1218       Energy Intake Status (Acute)    Energy Intake  Severe (< or equal to 50% / > or equal to 5d)    Row Name 11/16/18 1218       Criteria Met (Must meet criteria for severity in at least 2 of these categories: M Wasting, Fat Loss, Fluid, Secondary Signs, Wt. Status, Intake)    Patient meets criteria for   Moderate malnutrition          Electronically signed by:  Pau Edwards RD  11/16/18 12:23 PM

## 2018-11-16 NOTE — SIGNIFICANT NOTE
11/16/18 1422   Rehab Treatment   Discipline physical therapy assistant   Reason Treatment Not Performed unavailable for treatment  (pt asleep, not easily aroused)

## 2018-11-16 NOTE — THERAPY DISCHARGE NOTE
Acute Care - Speech Language Pathology   Swallow Treatment Note/Discharge   [facility]     Patient Name: Ravi Park  : 1930  MRN: [id number]  Today's Date: 2018  Onset of Illness/Injury or Date of Surgery: 18     Referring Physician: Troy Laura      Admit Date: 2018    Visit Dx:      ICD-10-CM ICD-9-CM   1. Acute cystitis with hematuria N30.01 595.0   2. Altered mental status, unspecified altered mental status type R41.82 780.97   3. Impaired functional mobility and endurance Z74.09 V49.89   4. Impaired mobility and activities of daily living Z74.09 799.89   5. Dysphagia, unspecified type R13.10 787.20     Patient Active Problem List   Diagnosis   • Peripheral arterial disease (CMS/[facility])   • Chronic atrial fibrillation (CMS/[facility])   • Backache   • Lumbar radiculopathy   • Edema   • Acute congestive heart failure (CMS/[facility])   • Acute blood loss anemia   • Acute on chronic diastolic heart failure (CMS/[facility])   • YESENIA (acute kidney injury) (CMS/[facility])   • (HFpEF) heart failure with preserved ejection fraction (CMS/[facility])   • Urinary retention   • Personal history of tobacco use, presenting hazards to health   • Vitreous floaters   • Upper respiratory infection   • Sprain of shoulder and upper arm   • Shoulder pain   • Serous otitis media   • Sensorineural hearing loss   • Pure hypercholesterolemia   • Pseudophakia   • Prostatitis   • Pain in wrist   • Pain in thumb joint with movement   • Pain in joint involving right lower leg   • Pain in eye   • Otorrhea   • Open wound of toe   • Open wound of lower leg with complication   • Open wound of lower leg   • Open wound of lesser toe of left foot without damage to nail   • Olecranon bursitis   • Need for prophylactic vaccination and inoculation against influenza   • Need for immunization against influenza   • Multiple joint pain   • Malaise and fatigue   • Keratoconjunctivitis sicca (CMS/[facility])   • Insect bite   • Impacted cerumen   • Hypertensive  disorder   • History of colonoscopy   • History of colon polyps   • History of artificial eye lens   • Herpes zoster   • Gout   • Exposure to communicable disease   • Dog bite of hand   • Disorder of sacroiliac joint   • Decreased platelet count (CMS/HCC)   • Cough   • Contusion of thumb   • Common cold   • Cervical arthritis   • Cerebrovascular accident (CMS/HCC)   • Cellulitis of lower leg   • Cataract   • Benign prostatic hyperplasia   • Atrial fibrillation (CMS/HCC)   • Astigmatism   • Allergic rhinitis due to pollen   • Allergic rhinitis   • Adverse reaction to drug   • Acute confusion   • Acute bronchitis   • Abnormal gait   • Urinary tract infection associated with indwelling urethral catheter (CMS/HCC)   • Hydronephrosis   • Nephrolithiasis   • Calculus of kidney   • Sepsis (CMS/HCC)   • Pulmonary hypertension (CMS/HCC)   • Tricuspid regurgitation   • CKD (chronic kidney disease) stage 3, GFR 30-59 ml/min (CMS/HCC)   • ESBL (extended spectrum beta-lactamase) producing bacteria infection   • Urinary tract infection associated with catheterization of urinary tract (CMS/HCC)   • Toxic encephalopathy   • Demand ischemia (CMS/HCC)   • Acute renal failure with tubular necrosis (CMS/HCC)   • Chronic atrial fibrillation (CMS/HCC)   • Acute cystitis with hematuria   • Sepsis (CMS/HCC)   • Decubitus ulcer of left heel, unstageable (CMS/HCC)   • Acute renal failure (CMS/HCC)   • Pneumonia due to infectious organism   • Non-healing wound of left heel   • PVD (peripheral vascular disease) with claudication (CMS/HCC)   • ARF (acute renal failure) (CMS/HCC)   • Candida cystitis   • Anemia of chronic disease   • Acute kidney injury (CMS/HCC)   • Pressure injury of deep tissue of right heel       Therapy Treatment  Rehabilitation Treatment Summary     Row Name 11/16/18 1120             Treatment Time/Intention    Discipline  speech language pathologist  -EK      Document Type  therapy note (daily note)  -EK      Subjective  Information  no complaints  -EK      Mode of Treatment  individual therapy;speech-language pathology  -EK      Patient/Family Observations  no family present  -EK      Existing Precautions/Restrictions  fall;oxygen therapy device and L/min  -EK      Recorded by [EK] Cece Arnold CCC-SLP 11/16/18 1406      Row Name 11/16/18 1120             Positioning and Restraints    Pre-Treatment Position  in bed  -EK      Post Treatment Position  bed  -EK      In Bed  call light within reach;encouraged to call for assist;exit alarm on  -EK      Recorded by [EK] Cece Arnold CCC-SLP 11/16/18 1406      Row Name 11/16/18 1120             Pain Scale: Numbers Pre/Post-Treatment    Pain Scale: Numbers, Pretreatment  0/10 - no pain  -EK      Pain Scale: Numbers, Post-Treatment  0/10 - no pain  -EK      Recorded by [EK] Cece Arnold CCC-SLP 11/16/18 1406      Row Name                Wound 10/30/18 1715 Left heel pressure injury    Wound - Properties Group Date first assessed: 10/30/18 [AS] Time first assessed: 1715 [AS] Present On Admission : yes;picture taken [AS] Side: Left [AS] Location: heel [AS] Type: pressure injury [AS] Stage, Pressure Injury: unstageable [AS] Recorded by:  [AS] Giselle Alicea RN 10/30/18 1715    Row Name                Wound 11/06/18 0300 Left gluteal pressure injury    Wound - Properties Group Date first assessed: 11/06/18 [AB] Time first assessed: 0300 [AB] Present On Admission : yes;picture taken [SR], picture taken by nightshift RN  Side: Left [SR] Location: gluteal [SR] Type: pressure injury [SR] Stage, Pressure Injury: deep tissue injury [SR] Recorded by:  [AB] Jacquie Chu RN 11/08/18 1511 [SR] Lexi Chaparro RN 11/06/18 2051    Row Name 11/16/18 1120             Outcome Summary/Treatment Plan (SLP)    Daily Summary of Progress (SLP)  progress toward functional goals is good  -EK      Barriers to Overall Progress (SLP)  downgraded to geovanna   -EK      Plan for Continued Treatment (SLP)  Pt has met goals.   -EK      Anticipated Dischage Disposition  home with 24/7 care;skilled nursing facility  -EK      Recorded by [EK] Cece Arnold CCC-SLP 11/16/18 1406        User Key  (r) = Recorded By, (t) = Taken By, (c) = Cosigned By    Initials Name Effective Dates Discipline    EK Cece Arnold CCC-SLP 06/08/18 -  SLP    Jacquie Dukes RN 10/17/16 -  Nurse    Lexi Mobley RN 10/17/16 -  Nurse    Giselle Vasquez RN 12/13/16 -  Nurse        Outcome Summary  Outcome Summary/Treatment Plan (SLP)  Daily Summary of Progress (SLP): progress toward functional goals is good (11/16/18 1120 : Cece Arnold CCC-SLP)  Barriers to Overall Progress (SLP): downgraded to puree (11/16/18 1120 : Cece Aronld CCC-SLP)  Plan for Continued Treatment (SLP): Pt has met goals.  (11/16/18 1120 : Cece Arnold CCC-SLP)  Anticipated Dischage Disposition: home with 24/7 care, skilled nursing facility (11/16/18 1120 : Cece Arnold [facility])  SLP GOALS     Row Name 11/16/18 1120 11/15/18 0756          Oral Nutrition/Hydration Goal 1 (SLP)    Oral Nutrition/Hydration Goal 1, SLP  Pt to tolerate least restrictive diet with no s/s of aspiration for adequate nutrition and hydration.   -EK  Pt to tolerate least restrictive diet with no s/s of aspiration for adequate nutrition and hydration.   -EK     Time Frame (Oral Nutrition/Hydration Goal 1, SLP)  by discharge  -EK  by discharge  -EK     Barriers (Oral Nutrition/Hydration Goal 1, SLP)  downgraded to puree  -EK  Pt very sleepy this a.m. Diet changed to puree this date.   -EK     Progress/Outcomes (Oral Nutrition/Hydration Goal 1, SLP)  goal met  -EK  goal not met  -EK       User Key  (r) = Recorded By, (t) = Taken By, (c) = Cosigned By    Initials Name Provider Type    EK Catalina Cece Luz, CCC-SLP Speech and Language  Pathologist          EDUCATION  The patient has been educated in the following areas:   Dysphagia (Swallowing Impairment).    SLP Recommendation and Plan         Anticipated Dischage Disposition: home with 24/7 [facility], skilled nursing facility     Daily Summary of Progress (SLP): progress toward functional goals is good  Plan for Continued Treatment (SLP): Pt has met goals.   Plan of Care Reviewed With: patient  Progress: improving  Plan of Care Reviewed With: patient           Time Calculation:   Time Calculation- SLP     Row Name 11/16/18 1412             Time Calculation- SLP    SLP Start Time  1120  -EK      SLP Stop Time  1135  -EK      SLP Time Calculation (min)  15 min  -EK      SLP Received On  11/16/18  -EK        User Key  (r) = Recorded By, (t) = Taken By, (c) = Cosigned By    Initials Name Provider Type    Cece Cleaning CCC-SLP Speech and Language Pathologist          Therapy Charges for Today     Code Description Service Date Service Provider Modifiers Qty    00223280405 HC ST TREATMENT SWALLOW 1 11/15/2018 Cece Arnold CCC-SLP GN 1    12610367113 HC ST TREATMENT SWALLOW 1 11/16/2018 Cece Arnold CCC-SLP GN 1          SLP G-Codes  Functional Limitations: Swallowing  Swallow Current Status (): At least 1 percent but less than 20 percent impaired, limited or restricted  Swallow Goal Status (): 0 percent impaired, limited or restricted    SLP Discharge Summary  Anticipated Dischage Disposition: home with /[address] [place] [address]    ALISSON Otero  11/16/2018

## 2018-11-16 NOTE — PLAN OF CARE
Problem: Patient Care Overview  Goal: Plan of Care Review  Outcome: Ongoing (interventions implemented as appropriate)   11/16/18 0456   Coping/Psychosocial   Plan of Care Reviewed With patient   Plan of Care Review   Progress no change   OTHER   Outcome Summary Pt has had no complaints this shift, v/s stable, will continue to monitor

## 2018-11-16 NOTE — CONSULTS
Adult Nutrition  Assessment    Patient Name:  Ravi Park  YOB: 1930  MRN: [id number]  Admit Date:  11/5/2018    Assessment Date:  11/16/2018    Comments:  Pt continues to require feeding assistance and tires easily.  SLP downgraded to pureed diet with thin liquids, no straws.  PO intakes have been bites-25% over past 5 days.  Muscle wasting and fat loss noted.  Wt stable.  Bumex d/c and IVF changed to D5W.  RD will continue to send whole milk on trays and add icecream and magic cups in attempt to increase protein/maciej intake.  Will monitor.    Reason for Assessment     Row Name 11/16/18 1211          Reason for Assessment    Reason For Assessment  follow-up protocol     Identified At Risk by Screening Criteria  difficulty chewing/swallowing         Nutrition/Diet History     Row Name 11/16/18 1211          Nutrition/Diet History    Typical Food/Fluid Intake  SLP downgraded to pureed diet w/thin liquids.  Pt lethargic and requires feeding assistance.  Ate bites at breakfast/lunch.           Labs/Tests/Procedures/Meds     Row Name 11/16/18 1211          Labs/Procedures/Meds    Lab Results Reviewed  reviewed, pertinent        Medications    Pertinent Medications Reviewed  reviewed, pertinent         Physical Findings     Row Name 11/16/18 1212          Physical Findings    Overall Physical Appearance  generalized wasting;loss of muscle mass;loss of subcutaneous fat     Oral/Mouth Cavity  poor dentition     Skin  pressure injury           Nutrition Prescription Ordered     Row Name 11/16/18 1212          Nutrition Prescription PO    Current PO Diet  Pureed     Supplement  Milk     Supplement Frequency  3 times a day     Common Modifiers  Cardiac;Consistent Carbohydrate         Evaluation of Received Nutrient/Fluid Intake     Row Name 11/16/18 1212          PO Evaluation    Number of Days PO Intake Evaluated  2 days     % PO Intake  0-25               Electronically signed by:  Pau Edwards  RD  11/16/18 12:12 PM

## 2018-11-16 NOTE — THERAPY TREATMENT NOTE
Acute Care - Occupational Therapy Treatment Note  [facility]     Patient Name: Ravi Park  : 1930  MRN: [id number]  Today's Date: 2018  Onset of Illness/Injury or Date of Surgery: 18  Date of Referral to OT: 18  Referring Physician: Troy Laura    Admit Date: 2018       ICD-10-CM ICD-9-CM   1. Acute cystitis with hematuria N30.01 595.0   2. Altered mental status, unspecified altered mental status type R41.82 780.97   3. Impaired functional mobility and endurance Z74.09 V49.89   4. Impaired mobility and activities of daily living Z74.09 799.89   5. Dysphagia, unspecified type R13.10 787.20     Patient Active Problem List   Diagnosis   • Peripheral arterial disease (CMS/HCC)   • Chronic atrial fibrillation (CMS/[facility])   • Backache   • Lumbar radiculopathy   • Edema   • Acute congestive heart failure (CMS/[facility])   • Acute blood loss anemia   • Acute on chronic diastolic heart failure (CMS/HCC)   • YESENIA (acute kidney injury) (CMS/[facility])   • (HFpEF) heart failure with preserved ejection fraction (CMS/[facility])   • Urinary retention   • Personal history of tobacco use, presenting hazards to health   • Vitreous floaters   • Upper respiratory infection   • Sprain of shoulder and upper arm   • Shoulder pain   • Serous otitis media   • Sensorineural hearing loss   • Pure hypercholesterolemia   • Pseudophakia   • Prostatitis   • Pain in wrist   • Pain in thumb joint with movement   • Pain in joint involving right lower leg   • Pain in eye   • Otorrhea   • Open wound of toe   • Open wound of lower leg with complication   • Open wound of lower leg   • Open wound of lesser toe of left foot without damage to nail   • Olecranon bursitis   • Need for prophylactic vaccination and inoculation against influenza   • Need for immunization against influenza   • Multiple joint pain   • Malaise and fatigue   • Keratoconjunctivitis sicca (CMS/HCC)   • Insect bite   • Impacted cerumen   • Hypertensive disorder    • History of colonoscopy   • History of colon polyps   • History of artificial eye lens   • Herpes zoster   • Gout   • Exposure to communicable disease   • Dog bite of hand   • Disorder of sacroiliac joint   • Decreased platelet count (CMS/HCC)   • Cough   • Contusion of thumb   • Common cold   • Cervical arthritis   • Cerebrovascular accident (CMS/HCC)   • Cellulitis of lower leg   • Cataract   • Benign prostatic hyperplasia   • Atrial fibrillation (CMS/HCC)   • Astigmatism   • Allergic rhinitis due to pollen   • Allergic rhinitis   • Adverse reaction to drug   • Acute confusion   • Acute bronchitis   • Abnormal gait   • Urinary tract infection associated with indwelling urethral catheter (CMS/HCC)   • Hydronephrosis   • Nephrolithiasis   • Calculus of kidney   • Sepsis (CMS/HCC)   • Pulmonary hypertension (CMS/HCC)   • Tricuspid regurgitation   • CKD (chronic kidney disease) stage 3, GFR 30-59 ml/min (CMS/HCC)   • ESBL (extended spectrum beta-lactamase) producing bacteria infection   • Urinary tract infection associated with catheterization of urinary tract (CMS/HCC)   • Toxic encephalopathy   • Demand ischemia (CMS/HCC)   • Acute renal failure with tubular necrosis (CMS/HCC)   • Chronic atrial fibrillation (CMS/HCC)   • Acute cystitis with hematuria   • Sepsis (CMS/HCC)   • Decubitus ulcer of left heel, unstageable (CMS/HCC)   • Acute renal failure (CMS/HCC)   • Pneumonia due to infectious organism   • Non-healing wound of left heel   • PVD (peripheral vascular disease) with claudication (CMS/HCC)   • ARF (acute renal failure) (CMS/HCC)   • Candida cystitis   • Anemia of chronic disease   • Acute kidney injury (CMS/HCC)   • Pressure injury of deep tissue of right heel     Past Medical History:   Diagnosis Date   • Benign prostatic hyperplasia    • Cerebrovascular accident (CMS/HCC)    • Chronic anemia    • Chronic atrial fibrillation (CMS/HCC)    • CKD (chronic kidney disease) stage 3, GFR 30-59 ml/min  (CMS/HCC)    • GERD (gastroesophageal reflux disease)    • Gout    • Hydronephrosis    • Hypercholesterolemia    • Hypertension    • Nephrolithiasis    • Peripheral arterial disease (CMS/HCC)    • Pulmonary hypertension (CMS/HCC)    • Tricuspid regurgitation    • Urinary retention      Past Surgical History:   Procedure Laterality Date   • CARDIAC CATHETERIZATION     • NEPHROSTOMY         Therapy Treatment    Rehabilitation Treatment Summary     Row Name 11/16/18 1120 11/16/18 1110          Treatment Time/Intention    Discipline  speech language pathologist  -EK  occupational therapy assistant  -LW     Document Type  therapy note (daily note)  -EK  therapy note (daily note)  -LW     Subjective Information  no complaints  -EK  no complaints  -LW     Mode of Treatment  individual therapy;speech-language pathology  -EK  occupational therapy  -LW     Patient/Family Observations  no family present  -EK  No family present  -LW     Care Plan Review  --  care plan/treatment goals reviewed  -LW     Total Minutes, Occupational Therapy Treatment  --  10  -LW     Therapy Frequency (OT Eval)  --  other (see comments) 3-5 days per week  -LW     Patient Effort  --  fair  -LW     Existing Precautions/Restrictions  fall;oxygen therapy device and L/min  -EK  oxygen therapy device and L/min;fall  -LW     Recorded by [EK] Cece Arnold CCC-SLP 11/16/18 1406 [LW] Deisy Sullivan VALENCIA/L 11/16/18 1436     Row Name 11/16/18 1110             Vital Signs    Posttreatment Heart Rate (beats/min)  96  -LW      Pre SpO2 (%)  98  -LW      O2 Delivery Pre Treatment  supplemental O2  -LW      Pre Patient Position  Supine  -LW      Recorded by [LW] Deisy Sullivan VALENCIA/L 11/16/18 1436      Row Name 11/16/18 1110             Cognitive Assessment/Intervention- PT/OT    Affect/Mental Status (Cognitive)  confused  -LW      Behavioral Issues (Cognitive)  unable/difficult to assess  -LW      Orientation Status (Cognition)  oriented  to;person  -LW      Follows Commands (Cognition)  follows one step commands  -LW      Cognitive Function (Cognitive)  memory deficit  -LW      Attention Deficit (Cognitive)  moderate deficit  -LW      Safety Deficit (Cognitive)  insight into deficits/self awareness  -LW      Personal Safety Interventions  fall prevention program maintained;gait belt;nonskid shoes/slippers when out of bed  -LW      Recorded by [LW] Deisy Sullivan COTA/L 11/16/18 1436      Row Name 11/16/18 1110             Safety Issues, Functional Mobility    Safety Issues Affecting Function (Mobility)  ability to follow commands  -LW      Impairments Affecting Function (Mobility)  balance;cognition;endurance/activity tolerance  -LW      Recorded by [LW] Deisy Sullivan COTA/L 11/16/18 1436      Row Name 11/16/18 1110             ADL Assessment/Intervention    BADL Assessment/Intervention  grooming  -LW      Recorded by [LW] Deisy Sullivan COTA/L 11/16/18 1436      Row Name 11/16/18 1110             Grooming Assessment/Training    [place] Level (Grooming)  grooming skills;hair care, combing/brushing;oral care regimen;wash face, hands  -LW      Grooming Position  long sitting  -LW      Recorded by [LW] Deisy Sullivan COTA/L 11/16/18 1436      Row Name 11/16/18 1120 11/16/18 1110          Positioning and Restraints    Pre-Treatment Position  in bed  -EK  in bed  -LW     Post Treatment Position  bed  -EK  bed  -LW     In Bed  call light within reach;encouraged to call for assist;exit alarm on  -EK  call light within reach;encouraged to call for assist;exit alarm on  -LW     Recorded by [EK] Cece Arnold, CCC-SLP 11/16/18 1406 [LW] Deisy Sullivan VALENCIA/L 11/16/18 1436     Row Name 11/16/18 1120 11/16/18 1110          Pain Scale: Numbers Pre/Post-Treatment    Pain Scale: Numbers, Pretreatment  0/10 - no pain  -EK  0/10 - no pain  -LW     Pain Scale: Numbers, Post-Treatment  0/10 - no pain  -EK  0/10 - no pain  -LW      Recorded by [EK] Cece Arnold, CCC-SLP 11/16/18 1406 [LW] Deisy Sullivan COTA/L 11/16/18 1436     Row Name                Wound 10/30/18 1715 Left heel pressure injury    Wound - Properties Group Date first assessed: 10/30/18 [AS] Time first assessed: 1715 [AS] Present On Admission : yes;picture taken [AS] Side: Left [AS] Location: heel [AS] Type: pressure injury [AS] Stage, Pressure Injury: unstageable [AS] Recorded by:  [AS] Giselle Alicea RN 10/30/18 1715    Row Name                Wound 11/06/18 0300 Left gluteal pressure injury    Wound - Properties Group Date first assessed: 11/06/18 [AB] Time first assessed: 0300 [AB] Present On Admission : yes;picture taken [SR], picture taken by nightshift RN  Side: Left [SR] Location: gluteal [SR] Type: pressure injury [SR] Stage, Pressure Injury: deep tissue injury [SR] Recorded by:  [AB] Jacquie Chu, RN 11/08/18 1511 [SR] Leix Chaparro RN 11/06/18 2051    Row Name 11/16/18 1110             Coping    Observed Emotional State  calm;cooperative  -LW      Verbalized Emotional State  acceptance  -LW      Recorded by [LW] Deiys Sullivan COTA/L 11/16/18 1436      Row Name 11/16/18 1110             Plan of Care Review    Plan of Care Reviewed With  patient  -LW      Recorded by [LW] Deisy Sullivan COTA/L 11/16/18 1436      Row Name 11/16/18 1110             Outcome Summary/Treatment Plan (OT)    Daily Summary of Progress (OT)  progress toward functional goals is gradual  -LW      Plan for Continued Treatment (OT)  Continue POC  -LW      Anticipated Discharge Disposition (OT)  home with 24/7 care;skilled nursing facility  -LW      Recorded by [LW] Deisy Sullivan COTA/L 11/16/18 1436      Row Name 11/16/18 1120             Outcome Summary/Treatment Plan (SLP)    Daily Summary of Progress (SLP)  progress toward functional goals is good  -EK      Barriers to Overall Progress (SLP)  downgraded to puree  -EK      Plan for Continued Treatment  (SLP)  Pt has met goals.   -EK      Anticipated Dischage Disposition  home with 24/7 care;skilled nursing facility  -EK      Recorded by [EK] Cece Arnold CCC-SLP 11/16/18 1406        User Key  (r) = Recorded By, (t) = Taken By, (c) = Cosigned By    Initials Name Effective Dates Discipline    EK Cece Arnold CCC-SLP 06/08/18 -  SLP    LW Deisy Sullivan COTA/L 03/07/18 -  OT    AB Jacquie Chu, RN 10/17/16 -  Nurse    Lexi Mobley RN 10/17/16 -  Nurse    AS Giselle Alicea, RN 12/13/16 -  Nurse        Wound 10/30/18 1715 Left heel pressure injury (Active)   Base dressing in place, unable to visualize 11/16/2018  8:56 AM   Care, Wound cleansed with 11/16/2018  5:00 AM   Dressing Care, Wound dressing changed 11/16/2018  5:00 AM       Wound 11/06/18 0300 Left gluteal pressure injury (Active)   Dressing Appearance open to air 11/16/2018  8:56 AM   Base maroon/purple 11/16/2018  8:56 AM   Periwound blanchable;redness 11/16/2018  8:56 AM   Care, Wound barrier applied 11/15/2018  8:33 PM     OT Rehab Goals     Row Name 11/16/18 1436             Transfer Goal 1 (OT)    Activity/Assistive Device (Transfer Goal 1, OT)  sit-to-stand/stand-to-sit;bed-to-chair/chair-to-bed;toilet  -LW      [place] Level/Cues Needed (Transfer Goal 1, OT)  contact guard assist  -LW      Time Frame (Transfer Goal 1, OT)  long term goal (LTG);by discharge  -LW      Progress/Outcome (Transfer Goal 1, OT)  goal not met  -LW         Bathing Goal 1 (OT)    Activity/Assistive Device (Bathing Goal 1, OT)  bathing skills, all using AE PRN  -LW      [place] Level/Cues Needed (Bathing Goal 1, OT)  minimum assist (75% or more patient effort)  -LW      Time Frame (Bathing Goal 1, OT)  long term goal (LTG);by discharge  -LW      Progress/Outcomes (Bathing Goal 1, OT)  goal not met  -LW         Dressing Goal 1 (OT)    Activity/Assistive Device (Dressing Goal 1, OT)  dressing skills, all using AE PRN   -LW      [place]/Cues Needed (Dressing Goal 1, OT)  minimum assist (75% or more patient effort)  -LW      Time Frame (Dressing Goal 1, OT)  long term goal (LTG);by discharge  -LW      Progress/Outcome (Dressing Goal 1, OT)  goal not met  -LW         Toileting Goal 1 (OT)    Activity/Device (Toileting Goal 1, OT)  toileting skills, all  -LW      [place] Level/Cues Needed (Toileting Goal 1, OT)  minimum assist (75% or more patient effort)  -LW      Time Frame (Toileting Goal 1, OT)  long term goal (LTG);by discharge  -LW      Progress/Outcome (Toileting Goal 1, OT)  goal not met  -LW         Strength Goal 1 (OT)    Strength Goal 1 (OT)  Pt will increase BUE strenth at least 1/2 grade level to benefit ADLs and functional transfers.   -LW      Time Frame (Strength Goal 1, OT)  long term goal (LTG);by discharge  -LW      Progress/Outcome (Strength Goal 1, OT)  goal not met  -LW        User Key  (r) = Recorded By, (t) = Taken By, (c) = Cosigned By    Initials Name Provider Type Discipline    eDisy Martini COTA/L Occupational Therapy Assistant OT        Occupational Therapy Education     Title: PT OT SLP Therapies (Active)     Topic: Occupational Therapy (Active)     Point: ADL training (Active)     Description: Instruct learner(s) on proper safety adaptation and remediation techniques during self care or transfers.   Instruct in proper use of assistive devices.    Learning Progress Summary           Patient Acceptance, E,TB, VU by KYUNG at 11/16/2018  2:37 PM    Acceptance, E,TB, VU by KYUNG at 11/13/2018  2:28 PM   Family Acceptance, E, NR by DARIN at 11/11/2018  4:30 PM                   Point: Home exercise program (Active)     Description: Instruct learner(s) on appropriate technique for monitoring, assisting and/or progressing therapeutic exercises/activities.    Learning Progress Summary           Patient Acceptance, E,TB, VU by KYUNG at 11/16/2018  2:37 PM    Acceptance, E,TB, VU by KYUNG at 11/13/2018  2:28 PM    Family Acceptance, E, NR by LM at 11/11/2018  4:30 PM                   Point: Precautions (Active)     Description: Instruct learner(s) on prescribed precautions during self-care and functional transfers.    Learning Progress Summary           Patient Acceptance, E,TB, VU by LW at 11/16/2018  2:37 PM    Acceptance, E,TB, VU by LW at 11/13/2018  2:28 PM   Family Acceptance, E, NR by LM at 11/11/2018  4:30 PM                   Point: Body mechanics (Active)     Description: Instruct learner(s) on proper positioning and spine alignment during self-care, functional mobility activities and/or exercises.    Learning Progress Summary           Patient Acceptance, E,TB, VU by LW at 11/16/2018  2:37 PM    Acceptance, E,TB, VU by LW at 11/13/2018  2:28 PM    Acceptance, E, NR,NL by AS at 11/8/2018  4:28 PM    Comment:  role of OT, OT POC, bed mobility   Family Acceptance, E, NR by  at 11/11/2018  4:30 PM                               User Key     Initials Effective Dates Name Provider Type Discipline     03/07/18 -  Marilin Miramontes COTA/L Occupational Therapy Assistant OT    LW 03/07/18 -  Deisy Sullivan COTA/L Occupational Therapy Assistant OT    AS 05/01/18 -  Debra Corado, OT Occupational Therapist OT                OT Recommendation and Plan  Outcome Summary/Treatment Plan (OT)  Daily Summary of Progress (OT): progress toward functional goals is gradual  Plan for Continued Treatment (OT): Continue POC  Anticipated Discharge Disposition (OT): home with 24/7 care, skilled nursing facility  Therapy Frequency (OT Eval): other (see comments)(3-5 days per week)  Daily Summary of Progress (OT): progress toward functional goals is gradual  Plan of Care Review  Plan of Care Reviewed With: patient  Plan of Care Reviewed With: patient  Outcome Summary: Pt confused today. Agreed to comb hair and wash face with Oral care. Pt needing vc's and set up.  Outcome Measures     Row Name 11/16/18 1110 11/15/18 1500 11/15/18  0920       How much help from another person do you currently need...    Turning from your back to your side while in flat bed without using bedrails?  --  3  -TA  --    Moving from lying on back to sitting on the side of a flat bed without bedrails?  --  3  -TA  --    Moving to and from a bed to a chair (including a wheelchair)?  --  2  -TA  --    Standing up from a chair using your arms (e.g., wheelchair, bedside chair)?  --  2  -TA  --    Climbing 3-5 steps with a railing?  --  1  -TA  --    To walk in hospital room?  --  1  -TA  --    AM-PAC 6 Clicks Score  --  12  -TA  --       How much help from another is currently needed...    Putting on and taking off regular lower body clothing?  1  -LW  --  1  -BB    Bathing (including washing, rinsing, and drying)  2  -LW  --  2  -BB    Toileting (which includes using toilet bed pan or urinal)  1  -LW  --  1  -BB    Putting on and taking off regular upper body clothing  2  -LW  --  2  -BB    Taking care of personal grooming (such as brushing teeth)  2  -LW  --  2  -BB    Eating meals  2  -LW  --  2  -BB    Score  10  -LW  --  10  -BB       Functional Assessment    Outcome Measure Options  --  AM-PAC 6 Clicks Basic Mobility (PT)  -TA  --    Row Name 11/14/18 1300             How much help from another person do you currently need...    Turning from your back to your side while in flat bed without using bedrails?  3  -TA      Moving from lying on back to sitting on the side of a flat bed without bedrails?  3  -TA      Moving to and from a bed to a chair (including a wheelchair)?  2  -TA      Standing up from a chair using your arms (e.g., wheelchair, bedside chair)?  2  -TA      Climbing 3-5 steps with a railing?  1  -TA      To walk in hospital room?  1  -TA      AM-PAC 6 Clicks Score  12  -TA         Functional Assessment    Outcome Measure Options  AM-PAC 6 Clicks Basic Mobility (PT)  -TA        User Key  (r) = Recorded By, (t) = Taken By, (c) = Cosigned By    Initials  Name Provider Type    TA Slime Martin, PTA Physical Therapy Assistant    BB Carla Lutz, VALENCIA/L Occupational Therapy Assistant    Deisy Martini COTA/L Occupational Therapy Assistant           Time Calculation:   Time Calculation- OT     Row Name 11/16/18 1439             Time Calculation- OT    OT Start Time  1110  -LW      OT Stop Time  1120  -LW      OT Time Calculation (min)  10 min  -LW      Total Timed Code Minutes- OT  10 minute(s)  -LW      OT Received On  11/16/18  -LW        User Key  (r) = Recorded By, (t) = Taken By, (c) = Cosigned By    Initials Name Provider Type    Deisy Martini COTA/L Occupational Therapy Assistant           Therapy Suggested Charges     Code   Minutes Charges    None           Therapy Charges for Today     Code Description Service Date Service Provider Modifiers Qty    [id number] HC OT SELF CARE/MGMT/TRAIN EA 15 MIN 11/16/2018 Deisy Sullivan COTA/L GO 1      Non-skid socks and gait belt in place. Toileting offered. Call light and needs within reach. Pt advised to not get up alone and call the nurse for assistance.  Bed alarm on.       OT G-codes  OT Professional Judgement Used?: Yes  OT Functional Scales Options: AM-PAC 6 Clicks Daily Activity (OT)  Score: 14  Functional Limitation: Self care  Self Care Current Status (): At least 40 percent but less than 60 percent impaired, limited or restricted  Self Care Goal Status (): At least 20 percent but less than 40 percent impaired, limited or restricted    VIANEY Evans  11/16/2018

## 2018-11-16 NOTE — PLAN OF CARE
Problem: Patient Care Overview  Goal: Individualization and Mutuality  Outcome: Ongoing (interventions implemented as appropriate)      Problem: Wound (Includes Pressure Injury) (Adult)  Goal: Signs and Symptoms of Listed Potential Problems Will be Absent, Minimized or Managed (Wound)  Outcome: Ongoing (interventions implemented as appropriate)

## 2018-11-16 NOTE — PROGRESS NOTES
"[facility]  [address] [place] [address]. [phone]  T - [phone]  F - [phone]     Progress Note          PATIENT  DEMOGRAPHICS   PATIENT NAME: Ravi Park                      PHYSICIAN: Buzz Sandra MD  : 1930  MRN: [id number]   LOS: 1 day    Patient Care Team:  Terrell Arzate MD as PCP - General  Terrell Arzate MD as PCP - Claims Attributed  Subjective   SUBJECTIVE   More alert today good UO         Objective   OBJECTIVE   Vital Signs  Temp:  [97.8 °F (36.6 °C)-98.8 °F (37.1 °C)] 97.8 °F (36.6 °C)  Heart Rate:  [] 83  Resp:  [16-20] 16  BP: (102-155)/(62-70) 102/70    Flowsheet Rows      First Filed Value   Admission Height  182.9 cm (72\") Documented at 2018 1515   Admission Weight  89 kg (196 lb 4.8 oz) Documented at 2018 1515           I/O last 3 completed shifts:  In: 70 [P.O.:70]  Out: 2800 [Urine:2800]    PHYSICAL EXAM    Physical Exam   Constitutional: He appears well-developed and well-nourished.   HENT:   Head: Normocephalic and atraumatic.   Eyes: Conjunctivae and EOM are normal. Pupils are equal, round, and reactive to light.   Cardiovascular: Normal rate and regular rhythm.   Pulmonary/Chest: Effort normal and breath sounds normal.   Abdominal: Soft.   Neurological: He is alert.   Oriented to person   Skin: Skin is warm and dry.       RESULTS   Results Review:    Results from last 7 days   Lab Units  18   0542  11/15/18   0611  18   0525   SODIUM mmol/L  146*  144  143   POTASSIUM mmol/L  3.6  3.7  3.7   CHLORIDE mmol/L  110  105  104   CO2 mmol/L  25.0  26.0  30.0   BUN mg/dL  34*  37*  36*   CREATININE mg/dL  1.67*  2.21*  2.49*   CALCIUM mg/dL  8.6  8.9  9.1   BILIRUBIN mg/dL  0.3  0.4  0.3   ALK PHOS U/L  92  92  92   ALT (SGPT) U/L  12*  10*  8*   AST (SGOT) U/L  20  23  20   GLUCOSE mg/dL  82  83  105*       Estimated Creatinine Clearance: 33.9 mL/min (A) (by C-G formula based on SCr of 1.67 mg/dL (H)).            "     Results from last 7 days   Lab Units  11/16/18   0542  11/15/18   0611  11/14/18   0525  11/13/18   0539  11/12/18   0530   WBC 10*3/mm3  12.14*  9.28  10.35*  9.64  8.37   HEMOGLOBIN g/dL  8.8*  9.0*  9.2*  9.8*  9.1*   PLATELETS 10*3/mm3  242  269  252  151  245               Imaging Results (last 24 hours)     ** No results found for the last 24 hours. **           MEDICATIONS      8-hydroxyquinoline sulfate  Apply externally Daily   aspirin 81 mg Oral Daily   cetirizine 10 mg Oral Daily   clopidogrel 75 mg Oral Daily   docusate sodium 100 mg Oral BID   famotidine 40 mg Oral Daily   ferrous sulfate 324 mg Oral TID With Meals   finasteride 5 mg Oral Daily   fluconazole 100 mg Oral Q24H   fluticasone 1 spray Each Nare Daily   folic acid 1 mg Oral Daily   levothyroxine 50 mcg Oral Q AM   magic butt ointment  Topical BID   metoprolol succinate XL 25 mg Oral Daily   polyethylene glycol 17 g Oral Daily   potassium chloride 10 mEq Oral Daily   sodium chloride 3 mL Intravenous Q12H   tamsulosin 0.8 mg Oral Nightly       sodium chloride 50 mL/hr Last Rate: 50 mL/hr (11/15/18 0641)       Assessment/Plan   ASSESSMENT / PLAN      ARF (acute renal failure) (CMS/HCC)    (HFpEF) heart failure with preserved ejection fraction (CMS/HCC)    Pure hypercholesterolemia    Hypertensive disorder    Pulmonary hypertension (CMS/HCC)    Tricuspid regurgitation    CKD (chronic kidney disease) stage 3, GFR 30-59 ml/min (CMS/[facility])    Acute cystitis with hematuria    PVD (peripheral vascular disease) with claudication (CMS/[facility])    Candida cystitis    Anemia of chronic disease    Acute kidney injury (CMS/HCC)    1.  Acute kidney injury on CKD2/3- patient has had markedly diminished urine output switch coincided with his rising creatinine. This is likely urinary retention. His catheter was changed out yesterday by urology.  He had immediate return of 750 ml.  I believe his acute kidney injury is likely obstructive in nature. Cr is better on  daily basis, na is high and will give d5w to correct it     -FENa >1%,      -Hold Bumex for now,      2.  Urinary tract infection- on ceftriaxone and fluconazole, candida tropicalis     3.  Anemia- hemoglobin acceptable , status post 2 units packed red blood cells     4.  Hypertension- stable      5.  Hyperlipidemia     6.  HFpEF- we'll resume the Bumex when renal function is improved              This document has been electronically signed by Buzz Sandra MD on November 16, 2018 11:18 AM

## 2018-11-16 NOTE — PLAN OF CARE
Problem: Patient Care Overview  Goal: Plan of Care Review  Outcome: Ongoing (interventions implemented as appropriate)   11/16/18 6135   Coping/Psychosocial   Plan of Care Reviewed With patient   Plan of Care Review   Progress no change   OTHER   Outcome Summary Pt confused today. Agreed to comb hair and wash face with Oral care. Pt needing vc's and set up.

## 2018-11-16 NOTE — PLAN OF CARE
Problem: Patient Care Overview  Goal: Plan of Care Review  Outcome: Ongoing (interventions implemented as appropriate)   11/16/18 1222   Coping/Psychosocial   Plan of Care Reviewed With patient   Plan of Care Review   Progress no change   OTHER   Outcome Summary Pt downgraded by SLP to pureed diet. Remains weak and requires feeding assist with intakes bites-25%. Good intake of milk, will add icecream and magic cup to trays.

## 2018-11-16 NOTE — PROGRESS NOTES
[facility] Medicine Services  INPATIENT PROGRESS NOTE    Length of Stay: 1  Date of Admission: 11/5/2018  Primary Care Physician: Terrell Arzate MD    Subjective   Chief Complaint: AMS  HPI:  88 year old male with a history of PVD, HLD, pulmonary hypertension, HTN, CKDIII, HFpEF, and obstructive BPH and neurogenic bladder with chronic SP tube who presented to the ED with altered mental status and worsening confusion.  He has a history of recurrent UTI.  CT of the head was negative for acute intracranial pathology.  He was admitted and started on IV antibiotics.  Urine culture initially grew Klebsiella oxytoca and he was managed with Rocephin.  Repeat urine culture grew candida tropicalis.  Patient had an increase in his creatinine from 1.05 on 11/11 to 2.49 on 11/14.  He had decreased urine output.  SP tube was changed by urology.  FENa was >1% which is consistent with obstructive etiology.  Nephrology consulted and is recommending continued IV fluid, strict I&O, and holding diuretics.  He is an APS case.  Family plans to take him home.  He is more awake today.  He complains of pain everywhere. Creatinine is improving.     Review of Systems   Constitutional: Positive for fatigue. Negative for chills and fever.   Respiratory: Negative for shortness of breath.    Cardiovascular: Negative for chest pain.   Gastrointestinal: Negative for abdominal pain, diarrhea, nausea and vomiting.   Musculoskeletal: Positive for neck pain.        All pertinent negatives and positives are as above. All other systems have been reviewed and are negative unless otherwise stated.     Objective    Temp:  [97.8 °F (36.6 °C)-98.8 °F (37.1 °C)] 97.8 °F (36.6 °C)  Heart Rate:  [] 83  Resp:  [16-20] 16  BP: (102-155)/(62-70) 102/70    Physical Exam   Constitutional: He is oriented to person, place, and time. He appears well-developed and well-nourished. No distress.   HENT:   Head:  Normocephalic.   Eyes: Conjunctivae are normal.   Cardiovascular: Normal rate, regular rhythm, normal heart sounds and intact distal pulses.   Pulmonary/Chest: Effort normal and breath sounds normal. No respiratory distress.   Abdominal: Soft. Bowel sounds are normal. He exhibits no distension. There is no tenderness.   Musculoskeletal: Normal range of motion. He exhibits no edema.   Neurological: He is alert and oriented to person, place, and time.   Skin: Skin is warm and dry. He is not diaphoretic.   Vitals reviewed.    Results Review:  I have reviewed the labs, radiology results, and diagnostic studies.    Laboratory Data:   Results from last 7 days   Lab Units  11/16/18   0542  11/15/18   0611  11/14/18   0525   SODIUM mmol/L  146*  144  143   POTASSIUM mmol/L  3.6  3.7  3.7   CHLORIDE mmol/L  110  105  104   CO2 mmol/L  25.0  26.0  30.0   BUN mg/dL  34*  37*  36*   CREATININE mg/dL  1.67*  2.21*  2.49*   GLUCOSE mg/dL  82  83  105*   CALCIUM mg/dL  8.6  8.9  9.1   BILIRUBIN mg/dL  0.3  0.4  0.3   ALK PHOS U/L  92  92  92   ALT (SGPT) U/L  12*  10*  8*   AST (SGOT) U/L  20  23  20   ANION GAP mmol/L  11.0  13.0  9.0     Estimated Creatinine Clearance: 33.9 mL/min (A) (by C-G formula based on SCr of 1.67 mg/dL (H)).          Results from last 7 days   Lab Units  11/16/18   0542  11/15/18   0611  11/14/18   0525  11/13/18   0539  11/12/18   0530   WBC 10*3/mm3  12.14*  9.28  10.35*  9.64  8.37   HEMOGLOBIN g/dL  8.8*  9.0*  9.2*  9.8*  9.1*   HEMATOCRIT %  28.6*  29.5*  29.9*  30.7*  28.8*   PLATELETS 10*3/mm3  242  269  252  151  245           Culture Data:   No results found for: BLOODCX  Urine Culture   Date Value Ref Range Status   11/14/2018 Mixed Ashley Isolated  Final     No results found for: RESPCX  No results found for: WOUNDCX  No results found for: STOOLCX  No components found for: BODYFLD    Radiology Data:   Imaging Results (last 24 hours)     ** No results found for the last 24 hours. **          I  have reviewed the patient's current medications.     Assessment/Plan     Active Hospital Problems    Diagnosis   • **ARF (acute renal failure) (CMS/[facility])   • Pressure injury of deep tissue of right heel     Present on admission     • Candida cystitis   • Anemia of chronic disease   • Acute kidney injury (CMS/[facility])   • PVD (peripheral vascular disease) with claudication (CMS/[facility])     Added automatically from request for surgery [id number]     • Acute cystitis with hematuria   • Tricuspid regurgitation   • Pulmonary hypertension (CMS/[facility])   • CKD (chronic kidney disease) stage 3, GFR 30-59 ml/min (CMS/[facility])   • (HFpEF) heart failure with preserved ejection fraction (CMS/[facility])   • Pure hypercholesterolemia   • Hypertensive disorder       Plan:     Received 9 days of Rocephin  Continue Diflucan, renally dosed  Gentle hydration, hold diuretics, strict I&O, follow creatinine  IV Fluid: D5W @ 75 ml/hr for mild free water deficit correction  FE Na >1%, check FE Urea as he had been on diuretics  S/P 2 units PRBC, PO iron, monitor H&H  Urology and nephrology consultations appreciated  PT/OT  Discharge planning: plan for home with home health with daughter, will need to assess DME needs        This document has been electronically signed by JUSTIN Mcmanus on November 16, 2018 1:18 PM

## 2018-11-16 NOTE — PLAN OF CARE
Problem: Patient Care Overview  Goal: Plan of Care Review  Outcome: Ongoing (interventions implemented as appropriate)   11/15/18 1581   Coping/Psychosocial   Plan of Care Reviewed With patient   Plan of Care Review   Progress no change   OTHER   Outcome Summary Pt resting. VSS. Received prn pain meds as ordered. No new issues noted. Continue to monitor.      Goal: Individualization and Mutuality  Outcome: Ongoing (interventions implemented as appropriate)    Goal: Discharge Needs Assessment  Outcome: Ongoing (interventions implemented as appropriate)      Problem: Infection, Risk/Actual (Adult)  Goal: Infection Prevention/Resolution  Outcome: Ongoing (interventions implemented as appropriate)      Problem: Pain, Acute (Adult)  Goal: Acceptable Pain Control/Comfort Level  Outcome: Ongoing (interventions implemented as appropriate)      Problem: Wound (Includes Pressure Injury) (Adult)  Goal: Signs and Symptoms of Listed Potential Problems Will be Absent, Minimized or Managed (Wound)  Outcome: Ongoing (interventions implemented as appropriate)      Problem: Urinary Tract Infection (Adult)  Goal: Signs and Symptoms of Listed Potential Problems Will be Absent, Minimized or Managed (Urinary Tract Infection)  Outcome: Ongoing (interventions implemented as appropriate)      Problem: Cardiac Output Decreased (Adult)  Goal: Effective Tissue Perfusion  Outcome: Ongoing (interventions implemented as appropriate)      Problem: Skin Injury Risk (Adult)  Goal: Skin Health and Integrity  Outcome: Ongoing (interventions implemented as appropriate)      Problem: Anemia (Adult)  Goal: Symptom Improvement  Outcome: Ongoing (interventions implemented as appropriate)

## 2018-11-17 NOTE — PLAN OF CARE
Problem: Patient Care Overview  Goal: Plan of Care Review  Outcome: Ongoing (interventions implemented as appropriate)   11/16/18 2232 11/17/18 160   Coping/Psychosocial   Plan of Care Reviewed With patient --    Plan of Care Review   Progress declining --    OTHER   Outcome Summary --  Pt very lethargic this tx patricia tx. Pt t/f sup-sit-sup with depx1. Pt sat EOB 8' with CGA/min Ax1 due to posterior jerks. Pt unable to comprehend instructions for B LE therex at EOB. Pt will benefit from SNF placement upon discharge at this time.

## 2018-11-17 NOTE — PROGRESS NOTES
"[facility]  [address] [place] [address]. [address]   - [phone]  F - [phone]     Progress Note          PATIENT  DEMOGRAPHICS   PATIENT NAME: Ravi Park                      PHYSICIAN: Buzz Sandra MD  : 1930  MRN: [id number]   LOS: 2 days    Patient Care Team:  Terrell Arzate MD as PCP - General  Terrell Arzate MD as PCP - Claims Attributed  Subjective   SUBJECTIVE   No distress doing well          Objective   OBJECTIVE   Vital Signs  Temp:  [97 °F (36.1 °C)-97.8 °F (36.6 °C)] 97.2 °F (36.2 °C)  Heart Rate:  [] 77  Resp:  [16-18] 16  BP: (118-128)/(60-72) 128/72    Flowsheet Rows      First Filed Value   Admission Height  182.9 cm (72\") Documented at 2018 1515   Admission Weight  89 kg (196 lb 4.8 oz) Documented at 2018 1515           I/O last 3 completed shifts:  In: 440 [P.O.:440]  Out: 2200 [Urine:2200]    PHYSICAL EXAM    Physical Exam   Constitutional: He appears well-developed and well-nourished.   HENT:   Head: Normocephalic and atraumatic.   Eyes: Conjunctivae and EOM are normal. Pupils are equal, round, and reactive to light.   Cardiovascular: Normal rate and regular rhythm.   Pulmonary/Chest: Effort normal and breath sounds normal.   Abdominal: Soft.   Neurological: He is alert.   Oriented to person   Skin: Skin is warm and dry.       RESULTS   Results Review:    Results from last 7 days   Lab Units  18   0549  18   0542  11/15/18   0611   SODIUM mmol/L  142  146*  144   POTASSIUM mmol/L  3.2*  3.6  3.7   CHLORIDE mmol/L  110  110  105   CO2 mmol/L  23.0  25.0  26.0   BUN mg/dL  27*  34*  37*   CREATININE mg/dL  1.37*  1.67*  2.21*   CALCIUM mg/dL  8.5  8.6  8.9   BILIRUBIN mg/dL  0.3  0.3  0.4   ALK PHOS U/L  95  92  92   ALT (SGPT) U/L  10*  12*  10*   AST (SGOT) U/L  14*  20  23   GLUCOSE mg/dL  115*  82  83       Estimated Creatinine Clearance: 41.3 mL/min (A) (by C-G formula based on SCr of 1.37 mg/dL (H)).            "     Results from last 7 days   Lab Units  11/17/18   0549  11/16/18   0542  11/15/18   0611  11/14/18   0525  11/13/18   0539   WBC 10*3/mm3  15.11*  12.14*  9.28  10.35*  9.64   HEMOGLOBIN g/dL  8.8*  8.8*  9.0*  9.2*  9.8*   PLATELETS 10*3/mm3  248  242  269  252  151               Imaging Results (last 24 hours)     ** No results found for the last 24 hours. **           MEDICATIONS      8-hydroxyquinoline sulfate  Apply externally Daily   aspirin 81 mg Oral Daily   cetirizine 10 mg Oral Daily   clopidogrel 75 mg Oral Daily   docusate sodium 100 mg Oral BID   famotidine 40 mg Oral Daily   ferrous sulfate 324 mg Oral TID With Meals   finasteride 5 mg Oral Daily   fluconazole 100 mg Oral Q24H   fluticasone 1 spray Each Nare Daily   folic acid 1 mg Oral Daily   levothyroxine 50 mcg Oral Q AM   magic butt ointment  Topical BID   metoprolol succinate XL 25 mg Oral Daily   polyethylene glycol 17 g Oral Daily   potassium chloride 40 mEq Oral Daily   sodium chloride 3 mL Intravenous Q12H   tamsulosin 0.8 mg Oral Nightly       dextrose 75 mL/hr Last Rate: 75 mL/hr (11/16/18 1217)       Assessment/Plan   ASSESSMENT / PLAN      ARF (acute renal failure) (CMS/HCC)    (HFpEF) heart failure with preserved ejection fraction (CMS/HCC)    Pure hypercholesterolemia    Hypertensive disorder    Pulmonary hypertension (CMS/HCC)    Tricuspid regurgitation    CKD (chronic kidney disease) stage 3, GFR 30-59 ml/min (CMS/HCC)    Acute cystitis with hematuria    PVD (peripheral vascular disease) with claudication (CMS/HCC)    Candida cystitis    Anemia of chronic disease    Acute kidney injury (CMS/HCC)    Pressure injury of deep tissue of right heel    1.  Acute kidney injury on CKD2/3- patient has had markedly diminished urine output switch coincided with his rising creatinine. This is likely urinary retention. His catheter was changed and had immediate return of 750 ml.cr improving and down to 1.3. Can resume bumex on  discharge     -FENa >1%,     2.  Urinary tract infection- on fluconazole, candida tropicalis     3.  Anemia- hemoglobin acceptable , status post 2 units packed red blood cells     4.  Hypertension- stable      5.  Hyperlipidemia     6.  HFpEF- plan as above    7. Hypernatremia improved with d5w. Plan to get for another 10-12 hours    If all stable in am will sign off and f/u in office in 3-4 w              This document has been electronically signed by Buzz Sandra MD on November 17, 2018 9:43 AM

## 2018-11-17 NOTE — PLAN OF CARE
Problem: Fall Risk (Adult)  Goal: Absence of Fall  Outcome: Ongoing (interventions implemented as appropriate)      Problem: Patient Care Overview  Goal: Plan of Care Review  Outcome: Ongoing (interventions implemented as appropriate)   11/16/18 2232   Coping/Psychosocial   Plan of Care Reviewed With patient   Plan of Care Review   Progress declining   OTHER   Outcome Summary Pt lethargic. Received prn pain medication r/t c/o pain. VSS. No signs of distress at this time. Continue to monitor.      Goal: Individualization and Mutuality  Outcome: Ongoing (interventions implemented as appropriate)    Goal: Discharge Needs Assessment  Outcome: Ongoing (interventions implemented as appropriate)      Problem: Infection, Risk/Actual (Adult)  Goal: Infection Prevention/Resolution  Outcome: Ongoing (interventions implemented as appropriate)      Problem: Pain, Acute (Adult)  Goal: Acceptable Pain Control/Comfort Level  Outcome: Ongoing (interventions implemented as appropriate)      Problem: Wound (Includes Pressure Injury) (Adult)  Goal: Signs and Symptoms of Listed Potential Problems Will be Absent, Minimized or Managed (Wound)  Outcome: Ongoing (interventions implemented as appropriate)      Problem: Urinary Tract Infection (Adult)  Goal: Signs and Symptoms of Listed Potential Problems Will be Absent, Minimized or Managed (Urinary Tract Infection)  Outcome: Ongoing (interventions implemented as appropriate)      Problem: Cardiac Output Decreased (Adult)  Goal: Effective Tissue Perfusion  Outcome: Ongoing (interventions implemented as appropriate)      Problem: Skin Injury Risk (Adult)  Goal: Skin Health and Integrity  Outcome: Ongoing (interventions implemented as appropriate)      Problem: Anemia (Adult)  Goal: Symptom Improvement  Outcome: Ongoing (interventions implemented as appropriate)

## 2018-11-17 NOTE — PROGRESS NOTES
"   LOS: 1 day   Patient Care Team:  Terrell Arzate MD as PCP - General  Terrell Arzate MD as PCP - Claims Attributed    Subjective     Subjective:  Symptoms:  Stable.  He reports malaise and weakness.  (No sediment in SP tubing. Sleeping. Awake to arouse. Dinner uneaten. ).    Diet:  Poor intake.  No nausea or vomiting.    Activity level: Impaired due to weakness.        History taken from: patient chart    Objective     Vital Signs  Temp:  [97.8 °F (36.6 °C)-98.8 °F (37.1 °C)] 97.8 °F (36.6 °C)  Heart Rate:  [] 104  Resp:  [16-20] 16  BP: (102-134)/(62-70) 102/70    Objective:  General Appearance:  In no acute distress.    Vital signs: (most recent): Blood pressure 102/70, pulse 104, temperature 97.8 °F (36.6 °C), temperature source Axillary, resp. rate 16, height 182.9 cm (72\"), weight 78.4 kg (172 lb 14.4 oz), SpO2 92 %.  Vital signs are normal.  No fever.    Output: Producing urine (SP tube to gravity).    HEENT: Normal HEENT exam.    Lungs:  Normal effort and normal respiratory rate.  Breath sounds clear to auscultation.  He is not in respiratory distress.    Heart: Normal rate.  Regular rhythm.  S1 normal and S2 normal.  No murmur.   Chest: Symmetric chest wall expansion.   Abdomen: Abdomen is soft and non-distended.  (No redness at SP tube site).  Bowel sounds are normal.   There is no abdominal tenderness.  There is no suprapubic area tenderness.     Extremities: Normal range of motion.    Pulses: Distal pulses are intact.    Neurological: Patient is alert.  GCS score is 15.  (Awake to arouse).    Pupils:  Pupils are equal, round, and reactive to light.    Skin:  Warm, dry and pale.  No rash, ecchymosis or cyanosis.           Results Review:    Lab Results (last 24 hours)     Procedure Component Value Units Date/Time    Comprehensive Metabolic Panel [[id number]]  (Abnormal) Collected:  11/16/18 0542    Specimen:  Blood Updated:  11/16/18 0655     Glucose 82 mg/dL      BUN 34 mg/dL      Creatinine " 1.67 mg/dL      Sodium 146 mmol/L      Potassium 3.6 mmol/L      Chloride 110 mmol/L      CO2 25.0 mmol/L      Calcium 8.6 mg/dL      Total Protein 6.8 g/dL      Albumin 3.10 g/dL      ALT (SGPT) 12 U/L      AST (SGOT) 20 U/L      Alkaline Phosphatase 92 U/L      Total Bilirubin 0.3 mg/dL      eGFR Non African Amer 39 mL/min/1.73      Globulin 3.7 gm/dL      A/G Ratio 0.8 g/dL      BUN/Creatinine Ratio 20.4     Anion Gap 11.0 mmol/L     Narrative:       The MDRD GFR formula is only valid for adults with stable renal function between ages 18 and 70.    CBC & Differential [[id number]] Collected:  11/16/18 0542    Specimen:  Blood Updated:  11/16/18 0628    Narrative:       The following orders were created for panel order CBC & Differential.  Procedure                               Abnormality         Status                     ---------                               -----------         ------                     CBC Auto Differential[[id number]]        Abnormal            Final result                 Please view results for these tests on the individual orders.    CBC Auto Differential [[id number]]  (Abnormal) Collected:  11/16/18 0542    Specimen:  Blood Updated:  11/16/18 0628     WBC 12.14 10*3/mm3      RBC 3.18 10*6/mm3      Hemoglobin 8.8 g/dL      Hematocrit 28.6 %      MCV 89.9 fL      MCH 27.7 pg      MCHC 30.8 g/dL      RDW 16.0 %      RDW-SD 52.7 fl      MPV 9.6 fL      Platelets 242 10*3/mm3      Neutrophil % 80.4 %      Lymphocyte % 9.9 %      Monocyte % 6.8 %      Eosinophil % 2.5 %      Basophil % 0.1 %      Immature Grans % 0.3 %      Neutrophils, Absolute 9.77 10*3/mm3      Lymphocytes, Absolute 1.20 10*3/mm3      Monocytes, Absolute 0.82 10*3/mm3      Eosinophils, Absolute 0.30 10*3/mm3      Basophils, Absolute 0.01 10*3/mm3      Immature Grans, Absolute 0.04 10*3/mm3          Imaging Results (last 24 hours)     ** No results found for the last 24 hours. **           I reviewed the patient's new  clinical results.  I reviewed the patient's new imaging results and agree with the interpretation.  I reviewed the patient's other test results and agree with the interpretation      Assessment/Plan       ARF (acute renal failure) (CMS/[facility])    (HFpEF) heart failure with preserved ejection fraction (CMS/[facility])    Pure hypercholesterolemia    Hypertensive disorder    Pulmonary hypertension (CMS/[facility])    Tricuspid regurgitation    CKD (chronic kidney disease) stage 3, GFR 30-59 ml/min (CMS/[facility])    Acute cystitis with hematuria    PVD (peripheral vascular disease) with claudication (CMS/[facility])    Candida cystitis    Anemia of chronic disease    Acute kidney injury (CMS/[facility])    Pressure injury of deep tissue of right heel      Assessment:    Condition: In stable condition.       1. Obstructive BPH causing neurogenic bladder, chronic SP tube-->SP tube changed 11/14/18, caused YESENIA related to obstruction of catheter.   -Proscar, Flomax  -Chronic SP tube, changed 11/14/18  -Urine output 2,000 mL last 24 hours.      2. UTI cystitis, complicated  -Urine culture 11/5/18 Klebsiella oxytoca  -Antibiotic 10 days, Rocephin complete.   -Diflucan DAY #6  -Urine culture 11/11/18 candida tropicalis    Estimated Creatinine Clearance: 33.9 mL/min (A) (by C-G formula based on SCr of 1.67 mg/dL (H)).   -Cr 1.55-->2.18-->2.49-->2.21-->1.67, improving, baseline Cr 1.2-1.3    Plan:   SP tube changed 11/14/18  Continue Diflucan.    JUSTIN Selby  11/16/18  6:04 PM

## 2018-11-17 NOTE — THERAPY TREATMENT NOTE
Acute Care - Physical Therapy Treatment Note  [facility]     Patient Name: Ravi Park  : 1930  MRN: [id number]  Today's Date: 2018  Onset of Illness/Injury or Date of Surgery: 18  Date of Referral to PT: 18  Referring Physician: Troy Laura    Admit Date: 2018    Visit Dx:    ICD-10-CM ICD-9-CM   1. Acute cystitis with hematuria N30.01 595.0   2. Altered mental status, unspecified altered mental status type R41.82 780.97   3. Impaired functional mobility and endurance Z74.09 V49.89   4. Impaired mobility and activities of daily living Z74.09 799.89   5. Dysphagia, unspecified type R13.10 787.20   6. (HFpEF) heart failure with preserved ejection fraction (CMS/[facility]) I50.30 428.9   7. Acute renal failure with acute cortical necrosis (CMS/[facility]) N17.1 584.6     Patient Active Problem List   Diagnosis   • Peripheral arterial disease (CMS/[facility])   • Chronic atrial fibrillation (CMS/[facility])   • Backache   • Lumbar radiculopathy   • Edema   • Acute congestive heart failure (CMS/[facility])   • Acute blood loss anemia   • Acute on chronic diastolic heart failure (CMS/[facility])   • YESENIA (acute kidney injury) (CMS/[facility])   • (HFpEF) heart failure with preserved ejection fraction (CMS/[facility])   • Urinary retention   • Personal history of tobacco use, presenting hazards to health   • Vitreous floaters   • Upper respiratory infection   • Sprain of shoulder and upper arm   • Shoulder pain   • Serous otitis media   • Sensorineural hearing loss   • Pure hypercholesterolemia   • Pseudophakia   • Prostatitis   • Pain in wrist   • Pain in thumb joint with movement   • Pain in joint involving right lower leg   • Pain in eye   • Otorrhea   • Open wound of toe   • Open wound of lower leg with complication   • Open wound of lower leg   • Open wound of lesser toe of left foot without damage to nail   • Olecranon bursitis   • Need for prophylactic vaccination and inoculation against influenza   • Need for immunization  against influenza   • Multiple joint pain   • Malaise and fatigue   • Keratoconjunctivitis sicca (CMS/HCC)   • Insect bite   • Impacted cerumen   • Hypertensive disorder   • History of colonoscopy   • History of colon polyps   • History of artificial eye lens   • Herpes zoster   • Gout   • Exposure to communicable disease   • Dog bite of hand   • Disorder of sacroiliac joint   • Decreased platelet count (CMS/HCC)   • Cough   • Contusion of thumb   • Common cold   • Cervical arthritis   • Cerebrovascular accident (CMS/HCC)   • Cellulitis of lower leg   • Cataract   • Benign prostatic hyperplasia   • Atrial fibrillation (CMS/HCC)   • Astigmatism   • Allergic rhinitis due to pollen   • Allergic rhinitis   • Adverse reaction to drug   • Acute confusion   • Acute bronchitis   • Abnormal gait   • Urinary tract infection associated with indwelling urethral catheter (CMS/HCC)   • Hydronephrosis   • Nephrolithiasis   • Calculus of kidney   • Sepsis (CMS/HCC)   • Pulmonary hypertension (CMS/HCC)   • Tricuspid regurgitation   • CKD (chronic kidney disease) stage 3, GFR 30-59 ml/min (CMS/HCC)   • ESBL (extended spectrum beta-lactamase) producing bacteria infection   • Urinary tract infection associated with catheterization of urinary tract (CMS/HCC)   • Toxic encephalopathy   • Demand ischemia (CMS/HCC)   • Acute renal failure with tubular necrosis (CMS/HCC)   • Chronic atrial fibrillation (CMS/HCC)   • Acute cystitis with hematuria   • Sepsis (CMS/HCC)   • Decubitus ulcer of left heel, unstageable (CMS/HCC)   • Acute renal failure (CMS/HCC)   • Pneumonia due to infectious organism   • Non-healing wound of left heel   • PVD (peripheral vascular disease) with claudication (CMS/HCC)   • ARF (acute renal failure) (CMS/HCC)   • Candida cystitis   • Anemia of chronic disease   • Acute kidney injury (CMS/HCC)   • Pressure injury of deep tissue of right heel       Therapy Treatment    Rehabilitation Treatment Summary     Row Name  11/17/18 1353             Treatment Time/Intention    Discipline  physical therapy assistant  -EM      Document Type  therapy note (daily note)  -EM      Subjective Information  complains of  -EM      Mode of Treatment  physical therapy;individual therapy  -EM      Patient Effort  poor  -EM      Comment  Pt very lethargic and difficult to arouse and keep awake  -EM      Recorded by [EM] Sergey Cabrera, PTA 11/17/18 1602      Row Name 11/17/18 1353             Vital Signs    Pre Systolic BP Rehab  104  -EM      Pre Treatment Diastolic BP  72  -EM      Post Systolic BP Rehab  117  -EM      Post Treatment Diastolic BP  68  -EM      Pretreatment Heart Rate (beats/min)  92  -EM      Posttreatment Heart Rate (beats/min)  84  -EM      Pre SpO2 (%)  98  -EM      O2 Delivery Pre Treatment  supplemental O2  -EM      Post SpO2 (%)  100  -EM      O2 Delivery Post Treatment  supplemental O2  -EM      Pre Patient Position  Supine  -EM      Intra Patient Position  Sitting  -EM      Post Patient Position  Supine  -EM      Recorded by [EM] Sergey Cabrera, PTA 11/17/18 1602      Row Name 11/17/18 1353             Cognitive Assessment/Intervention- PT/OT    Affect/Mental Status (Cognitive)  confused;low arousal/lethargic  -EM      Behavioral Issues (Cognitive)  unable/difficult to assess  -EM      Orientation Status (Cognition)  oriented to;person  -EM      Follows Commands (Cognition)  does not follow one step commands  -EM      Cognitive Function (Cognitive)  memory deficit  -EM      Attention Deficit (Cognitive)  moderate deficit  -EM      Personal Safety Interventions  nonskid shoes/slippers when out of bed;supervised activity  -EM      Recorded by [EM] Sergey Cabrera, PTA 11/17/18 1602      Row Name 11/17/18 1351             Bed Mobility Assessment/Treatment    Supine-Sit [place] (Bed Mobility)  dependent (less than 25% patient effort)  -EM      Sit-Supine [place] (Bed Mobility)  dependent (less than 25% patient effort)   -EM      Comment (Bed Mobility)  Pt sat EOB 8' with CGA/min Ax1 due to a posterior jerk. Attempted to perform therex but pt too lethargic and didnt comprehend instructions.  -EM      Recorded by [EM] Sregey Cabrera, PTA 11/17/18 1602      Row Name 11/17/18 1353             Positioning and Restraints    Pre-Treatment Position  in bed  -EM      Post Treatment Position  bed  -EM      In Bed  supine;call light within reach;encouraged to call for assist;exit alarm on  -EM      Recorded by [EM] Sergey Cabrera, PTA 11/17/18 1602      Row Name 11/17/18 1354             Pain Scale: Numbers Pre/Post-Treatment    Pain Scale: Numbers, Pretreatment  -- unable to assess  -EM      Pain Scale: Numbers, Post-Treatment  -- unable to assess  -EM      Recorded by [EM] Sergey Cabrera, PTA 11/17/18 1602      Row Name                Wound 10/30/18 1715 Left heel pressure injury    Wound - Properties Group Date first assessed: 10/30/18 [AS] Time first assessed: 1715 [AS] Present On Admission : yes;picture taken [AS] Side: Left [AS] Location: heel [AS] Type: pressure injury [AS] Stage, Pressure Injury: unstageable [AS] Recorded by:  [AS] Giselle Alicea, RN 10/30/18 1715    Row Name                Wound 11/06/18 0300 Left gluteal pressure injury    Wound - Properties Group Date first assessed: 11/06/18 [AB] Time first assessed: 0300 [AB] Present On Admission : yes;picture taken [SR], picture taken by nightshift RN  Side: Left [SR] Location: gluteal [SR] Type: pressure injury [SR] Stage, Pressure Injury: deep tissue injury [SR] Recorded by:  [AB] Jacquie Chu, RN 11/08/18 1511 [SR] Lexi Chaparro RN 11/06/18 2051    Row Name 11/17/18 1353             Outcome Summary/Treatment Plan (PT)    Plan for Continued Treatment (PT)  Cont as able  -EM      Anticipated Discharge Disposition (PT)  skilled nursing facility  -EM      Recorded by [EM] Sergey Cabrera, PTA 11/17/18 1602        User Key  (r) = Recorded By, (t) = Taken By, (c) =  Cosigned By    Initials Name Effective Dates Discipline    EM Sergey Cabrera, PTA 08/11/15 -  PT    AB Jacquie Chu, RN 10/17/16 -  Nurse    SR Lexi Chaparro RN 10/17/16 -  Nurse    AS Giselle Alicea RN 12/13/16 -  Nurse          Wound 10/30/18 1715 Left heel pressure injury (Active)   Base dressing in place, unable to visualize 11/17/2018  8:44 AM   Care, Wound cleansed with 11/17/2018  3:30 AM   Dressing Care, Wound dressing changed 11/17/2018  3:30 AM       Wound 11/06/18 0300 Left gluteal pressure injury (Active)   Dressing Appearance open to air 11/17/2018  8:44 AM   Base maroon/purple 11/17/2018  8:44 AM   Periwound blanchable;redness 11/17/2018  8:44 AM   Periwound Care, Wound barrier ointment applied 11/16/2018 10:04 PM       PT Rehab Goals     Row Name 11/17/18 1353             Bed Mobility Goal 1 (PT)    Activity/Assistive Device (Bed Mobility Goal 1, PT)  bed mobility activities, all  -EM      [place] Level/Cues Needed (Bed Mobility Goal 1, PT)  conditional independence  -EM      Time Frame (Bed Mobility Goal 1, PT)  short term goal (STG)  -EM      Progress/Outcomes (Bed Mobility Goal 1, PT)  goal not met  -EM         Transfer Goal 1 (PT)    Activity/Assistive Device (Transfer Goal 1, PT)  sit-to-stand/stand-to-sit  -EM      [place] Level/Cues Needed (Transfer Goal 1, PT)  supervision required  -EM      Time Frame (Transfer Goal 1, PT)  2 - 3 days  -EM      Progress/Outcome (Transfer Goal 1, PT)  goal not met  -EM         Gait Training Goal 1 (PT)    Activity/Assistive Device (Gait Training Goal 1, PT)  walker, rolling;gait (walking locomotion);assistive device use;backward stepping;decrease asymmetrical patterns;decrease fall risk;diminish gait deviation;forward stepping;improve balance and speed;increase endurance/gait distance;increase energy conservation;maintain weight bearing status;normalize weight shifts;sidestepping;turning, left;turning, right  -EM      [place] Level  (Gait Training Goal 1, PT)  supervision required  -EM      Distance (Gait Goal 1, PT)  1x25 ft or more  -EM      Progress/Outcome (Gait Training Goal 1, PT)  goal not met  -EM        User Key  (r) = Recorded By, (t) = Taken By, (c) = Cosigned By    Initials Name Provider Type Discipline    EM Sergey Cabrera, PTA Physical Therapy Assistant PT          Physical Therapy Education     Title: PT OT SLP Therapies (Active)     Topic: Physical Therapy (Done)     Point: Mobility training (Done)     Learning Progress Summary           Patient Acceptance, E, VU,NR by  at 11/12/2018  4:13 PM    Comment:  Pt education on use of call button and encouraged to call for assist to reduce risk of falls.                   Point: Home exercise program (Done)     Learning Progress Summary           Patient Acceptance, E, VU,NR by  at 11/12/2018  4:13 PM    Comment:  Pt education on use of call button and encouraged to call for assist to reduce risk of falls.                   Point: Body mechanics (Done)     Learning Progress Summary           Patient Acceptance, E, VU,NR by  at 11/12/2018  4:13 PM    Comment:  Pt education on use of call button and encouraged to call for assist to reduce risk of falls.                   Point: Precautions (Done)     Learning Progress Summary           Patient Acceptance, E, VU,NR by  at 11/12/2018  4:13 PM    Comment:  Pt education on use of call button and encouraged to call for assist to reduce risk of falls.                               User Key     Initials Effective Dates Name Provider Type Discipline     10/04/18 -  Kristie Alvarez, PT Physical Therapist PT                PT Recommendation and Plan  Anticipated Discharge Disposition (PT): skilled nursing facility  Outcome Summary/Treatment Plan (PT)  Plan for Continued Treatment (PT): Cont as able  Anticipated Discharge Disposition (PT): skilled nursing facility  Outcome Summary: Pt very lethargic this tx patricia tx. Pt t/f  sup-sit-sup with depx1. Pt sat EOB 8' with CGA/min Ax1 due to posterior jerks. Pt unable to comprehend instructions for B LE therex at EOB.  Pt will benefit from SNF placement upon discharge at this time.   Outcome Measures     Row Name 11/17/18 1353 11/16/18 1110 11/15/18 1500       How much help from another person do you currently need...    Turning from your back to your side while in flat bed without using bedrails?  2  -EM  --  3  -TA    Moving from lying on back to sitting on the side of a flat bed without bedrails?  2  -EM  --  3  -TA    Moving to and from a bed to a chair (including a wheelchair)?  1  -EM  --  2  -TA    Standing up from a chair using your arms (e.g., wheelchair, bedside chair)?  1  -EM  --  2  -TA    Climbing 3-5 steps with a railing?  1  -EM  --  1  -TA    To walk in hospital room?  1  -EM  --  1  -TA    AM-PAC 6 Clicks Score  8  -EM  --  12  -TA       How much help from another is currently needed...    Putting on and taking off regular lower body clothing?  --  1  -LW  --    Bathing (including washing, rinsing, and drying)  --  2  -LW  --    Toileting (which includes using toilet bed pan or urinal)  --  1  -LW  --    Putting on and taking off regular upper body clothing  --  2  -LW  --    Taking care of personal grooming (such as brushing teeth)  --  2  -LW  --    Eating meals  --  2  -LW  --    Score  --  10  -LW  --       Functional Assessment    Outcome Measure Options  AM-PAC 6 Clicks Basic Mobility (PT)  -EM  --  AM-PAC 6 Clicks Basic Mobility (PT)  -TA    Row Name 11/15/18 0920             How much help from another is currently needed...    Putting on and taking off regular lower body clothing?  1  -BB      Bathing (including washing, rinsing, and drying)  2  -BB      Toileting (which includes using toilet bed pan or urinal)  1  -BB      Putting on and taking off regular upper body clothing  2  -BB      Taking care of personal grooming (such as brushing teeth)  2  -BB      Eating  meals  2  -BB      Score  10  -BB        User Key  (r) = Recorded By, (t) = Taken By, (c) = Cosigned By    Initials Name Provider Type    EM Sergey Cabrera, PTA Physical Therapy Assistant    Slime Damian, PTA Physical Therapy Assistant    Carla Ohara, VALENCIA/L Occupational Therapy Assistant    Deisy Martini, VALENCIA/L Occupational Therapy Assistant         Time Calculation:   PT Charges     Row Name 11/17/18 1605             Time Calculation    Start Time  1353  -EM      Stop Time  1426  -EM      Time Calculation (min)  33 min  -EM         Time Calculation- PT    Total Timed Code Minutes- PT  33 minute(s)  -EM        User Key  (r) = Recorded By, (t) = Taken By, (c) = Cosigned By    Initials Name Provider Type    EM Sergey Cabrera, PTA Physical Therapy Assistant        Therapy Suggested Charges     Code   Minutes Charges    87756 (CPT®) Hc Pt Neuromusc Re Education Ea 15 Min      57743 (CPT®) Hc Pt Ther Proc Ea 15 Min 15 1    67128 (CPT®) Hc Gait Training Ea 15 Min      91494 (CPT®) Hc Pt Therapeutic Act Ea 15 Min 24 2    87038 (CPT®) Hc Pt Manual Therapy Ea 15 Min      95671 (CPT®) Hc Pt Iontophoresis Ea 15 Min      78996 (CPT®) Hc Pt Elec Stim Ea-Per 15 Min      49661 (CPT®) Hc Pt Ultrasound Ea 15 Min      58406 (CPT®) Hc Pt Self Care/Mgmt/Train Ea 15 Min      29363 (CPT®) Hc Pt Prosthetic (S) Train Initial Encounter, Each 15 Min      60906 (CPT®) Hc Pt Orthotic(S)/Prosthetic(S) Encounter, Each 15 Min      98500 (CPT®) Hc Orthotic(S) Mgmt/Train Initial Encounter, Each 15min      Total  39 3        Therapy Charges for Today     Code Description Service Date Service Provider Modifiers Qty    90408521559 HC PT THERAPEUTIC ACT EA 15 MIN 11/17/2018 Sergey Cabrera, PTA GP 2          PT G-Codes  PT Professional Judgement Used?: Yes  Outcome Measure Options: AM-PAC 6 Clicks Basic Mobility (PT)  AM-PAC 6 Clicks Score: 8  Score: 10  Functional Limitation: Mobility: Walking and moving around  Mobility:  Walking and Moving Around Current Status (): At least 60 percent but less than 80 percent impaired, limited or restricted  Mobility: Walking and Moving Around Goal Status (): At least 40 percent but less than 60 percent impaired, limited or restricted    Sergey Cabrera, PTA  11/17/2018

## 2018-11-17 NOTE — PROGRESS NOTES
[facility] Medicine Services  INPATIENT PROGRESS NOTE    Length of Stay: 2  Date of Admission: 11/5/2018  Primary Care Physician: Terrell Arzate MD    Subjective   Chief Complaint: AMS  HPI:  88 year old male with a history of PVD, HLD, pulmonary hypertension, HTN, CKDIII, HFpEF, and obstructive BPH and neurogenic bladder with chronic SP tube who presented to the ED with altered mental status and worsening confusion.  He has a history of recurrent UTI.  CT of the head was negative for acute intracranial pathology.  He was admitted and started on IV antibiotics.  Urine culture initially grew Klebsiella oxytoca and he was managed with Rocephin.  Repeat urine culture grew candida tropicalis.  Patient had an increase in his creatinine from 1.05 on 11/11 to 2.49 on 11/14.  He had decreased urine output.  SP tube was changed by urology.  FENa was >1% which is consistent with obstructive etiology.  Nephrology consulted and is recommending continued IV fluid, strict I&O, and holding diuretics.  He is an APS case.  Family plans to take him home.  He is more awake today.  He denies pain.  Renal function continues to improve.     Review of Systems   Constitutional: Positive for fatigue. Negative for chills and fever.   Respiratory: Negative for shortness of breath.    Cardiovascular: Negative for chest pain.   Gastrointestinal: Negative for abdominal pain, diarrhea, nausea and vomiting.        All pertinent negatives and positives are as above. All other systems have been reviewed and are negative unless otherwise stated.     Objective    Temp:  [97 °F (36.1 °C)-97.8 °F (36.6 °C)] 97.2 °F (36.2 °C)  Heart Rate:  [] 77  Resp:  [16-18] 16  BP: (118-128)/(60-72) 128/72    Physical Exam   Constitutional: He is oriented to person, place, and time. He appears well-developed and well-nourished. No distress.   HENT:   Head: Normocephalic.   Eyes: Conjunctivae are normal.    Cardiovascular: Normal rate, regular rhythm, normal heart sounds and intact distal pulses.   Pulmonary/Chest: Effort normal and breath sounds normal. No respiratory distress.   Abdominal: Soft. Bowel sounds are normal. He exhibits no distension. There is no tenderness.   Musculoskeletal: Normal range of motion. He exhibits no edema.   Neurological: He is alert and oriented to person, place, and time.   Skin: Skin is warm and dry. He is not diaphoretic.   Vitals reviewed.    Results Review:  I have reviewed the labs, radiology results, and diagnostic studies.    Laboratory Data:   Results from last 7 days   Lab Units  11/17/18   0549  11/16/18   0542  11/15/18   0611   SODIUM mmol/L  142  146*  144   POTASSIUM mmol/L  3.2*  3.6  3.7   CHLORIDE mmol/L  110  110  105   CO2 mmol/L  23.0  25.0  26.0   BUN mg/dL  27*  34*  37*   CREATININE mg/dL  1.37*  1.67*  2.21*   GLUCOSE mg/dL  115*  82  83   CALCIUM mg/dL  8.5  8.6  8.9   BILIRUBIN mg/dL  0.3  0.3  0.4   ALK PHOS U/L  95  92  92   ALT (SGPT) U/L  10*  12*  10*   AST (SGOT) U/L  14*  20  23   ANION GAP mmol/L  9.0  11.0  13.0     Estimated Creatinine Clearance: 41.3 mL/min (A) (by C-G formula based on SCr of 1.37 mg/dL (H)).          Results from last 7 days   Lab Units  11/17/18   0549  11/16/18   0542  11/15/18   0611  11/14/18   0525  11/13/18   0539   WBC 10*3/mm3  15.11*  12.14*  9.28  10.35*  9.64   HEMOGLOBIN g/dL  8.8*  8.8*  9.0*  9.2*  9.8*   HEMATOCRIT %  28.4*  28.6*  29.5*  29.9*  30.7*   PLATELETS 10*3/mm3  248  242  269  252  151           Culture Data:   No results found for: BLOODCX  No results found for: URINECX  No results found for: RESPCX  No results found for: WOUNDCX  No results found for: STOOLCX  No components found for: BODYFLD    Radiology Data:   Imaging Results (last 24 hours)     ** No results found for the last 24 hours. **          I have reviewed the patient's current medications.     Assessment/Plan     Active Hospital Problems     Diagnosis   • **ARF (acute renal failure) (CMS/[facility])   • Pressure injury of deep tissue of right heel     Present on admission     • Candida cystitis   • Anemia of chronic disease   • Acute kidney injury (CMS/[facility])   • PVD (peripheral vascular disease) with claudication (CMS/[facility])     Added automatically from request for surgery [id number]     • Acute cystitis with hematuria   • Tricuspid regurgitation   • Pulmonary hypertension (CMS/[facility])   • CKD (chronic kidney disease) stage 3, GFR 30-59 ml/min (CMS/[facility])   • (HFpEF) heart failure with preserved ejection fraction (CMS/[facility])   • Pure hypercholesterolemia   • Hypertensive disorder       Plan:     Received 9 days of Rocephin  Continue Diflucan, renally dosed  Gentle hydration, hold diuretics, strict I&O, follow creatinine  IV Fluid: D5W @ 75 ml/hr for mild free water deficit correction  FE Na >1%, check FE Urea as he had been on diuretics  S/P 2 units PRBC, PO iron, monitor H&H  Urology and nephrology consultations appreciated  Replace potassium  PT/OT  Discharge planning: plan for home with home health with daughter, will need to assess DME needs        This document has been electronically signed by JUSTIN Mcmanus on November 17, 2018 12:32 PM

## 2018-11-17 NOTE — PROGRESS NOTES
"   LOS: 2 days   Patient Care Team:  Terrell Arzate MD as PCP - General  Terrell Arzate MD as PCP - Claims Attributed    Subjective     Subjective:  Symptoms:  Stable.  He reports malaise and weakness.  (No sediment in SP tubing. Sleeping. Awake to arouse. Dinner uneaten. ).    Diet:  Poor intake.  No nausea or vomiting.    Activity level: Impaired due to weakness.        History taken from: patient chart    Objective     Vital Signs  Temp:  [97 °F (36.1 °C)-97.8 °F (36.6 °C)] 97.2 °F (36.2 °C)  Heart Rate:  [] 77  Resp:  [16-18] 16  BP: (118-128)/(60-72) 128/72    Objective:  General Appearance:  In no acute distress.    Vital signs: (most recent): Blood pressure 128/72, pulse 77, temperature 97.2 °F (36.2 °C), temperature source Axillary, resp. rate 16, height 182.9 cm (72\"), weight 78.4 kg (172 lb 14.4 oz), SpO2 95 %.  Vital signs are normal.  No fever.    Output: Producing urine (SP tube to gravity).    HEENT: Normal HEENT exam.    Lungs:  Normal effort and normal respiratory rate.  Breath sounds clear to auscultation.  He is not in respiratory distress.    Heart: Normal rate.  Regular rhythm.  S1 normal and S2 normal.  No murmur.   Chest: Symmetric chest wall expansion.   Abdomen: Abdomen is soft and non-distended.  (No redness at SP tube site).  Bowel sounds are normal.   There is no abdominal tenderness.  There is no suprapubic area tenderness.     Extremities: Normal range of motion.    Pulses: Distal pulses are intact.    Neurological: Patient is alert.  GCS score is 15.  (Awake to arouse).    Pupils:  Pupils are equal, round, and reactive to light.    Skin:  Warm, dry and pale.  No rash, ecchymosis or cyanosis.             Results Review:    Lab Results (last 24 hours)     Procedure Component Value Units Date/Time    Antifungal AST 9 Drug Panel - Isolate, Isolate [[id number]] Collected:  11/11/18 1450    Specimen:  Isolate Updated:  11/17/18 7347     Organism ID, Yeast Preliminary report     " Comment: Specimen has been received and testing has been initiated.        Please note Comment     Comment: [employer] does not have established guidelines for interpretation of  these organism-drug combinations.  For research use only.        BROMINE-TOTAL,SERUM/PLASMA Comment     Comment: Results for this test are for research purposes only by the assay's  .  The performance characteristics of this product have  not been established.  Results should not be used as a diagnostic  procedure without confirmation of the diagnosis by another medically  established diagnostic product or procedure.       Narrative:       Performed at:  [address] [place] [address]  [id number]  : Bella Gómez MD, Phone:  [phone]    Comprehensive Metabolic Panel [[id number]]  (Abnormal) Collected:  11/17/18 0549    Specimen:  Blood Updated:  11/17/18 0642     Glucose 115 mg/dL      BUN 27 mg/dL      Creatinine 1.37 mg/dL      Sodium 142 mmol/L      Potassium 3.2 mmol/L      Chloride 110 mmol/L      CO2 23.0 mmol/L      Calcium 8.5 mg/dL      Total Protein 6.4 g/dL      Albumin 2.80 g/dL      ALT (SGPT) 10 U/L      AST (SGOT) 14 U/L      Alkaline Phosphatase 95 U/L      Total Bilirubin 0.3 mg/dL      eGFR Non African Amer 49 mL/min/1.73      Globulin 3.6 gm/dL      A/G Ratio 0.8 g/dL      BUN/Creatinine Ratio 19.7     Anion Gap 9.0 mmol/L     Narrative:       The MDRD GFR formula is only valid for adults with stable renal function between ages 18 and 70.    CBC & Differential [[id number]] Collected:  11/17/18 0549    Specimen:  Blood Updated:  11/17/18 0624    Narrative:       The following orders were created for panel order CBC & Differential.  Procedure                               Abnormality         Status                     ---------                               -----------         ------                     CBC Auto Differential[[id number]]        Abnormal            Final result                  Please view results for these tests on the individual orders.    CBC Auto Differential [[id number]]  (Abnormal) Collected:  11/17/18 0549    Specimen:  Blood Updated:  11/17/18 0624     WBC 15.11 10*3/mm3      RBC 3.18 10*6/mm3      Hemoglobin 8.8 g/dL      Hematocrit 28.4 %      MCV 89.3 fL      MCH 27.7 pg      MCHC 31.0 g/dL      RDW 16.2 %      RDW-SD 53.2 fl      MPV 9.6 fL      Platelets 248 10*3/mm3      Neutrophil % 79.7 %      Lymphocyte % 10.5 %      Monocyte % 7.0 %      Eosinophil % 2.2 %      Basophil % 0.1 %      Immature Grans % 0.5 %      Neutrophils, Absolute 12.04 10*3/mm3      Lymphocytes, Absolute 1.59 10*3/mm3      Monocytes, Absolute 1.06 10*3/mm3      Eosinophils, Absolute 0.33 10*3/mm3      Basophils, Absolute 0.01 10*3/mm3      Immature Grans, Absolute 0.08 10*3/mm3     Urea Nitrogen, Urine - Urine, Catheter [[id number]] Collected:  11/16/18 2228    Specimen:  Urine, Catheter Updated:  11/16/18 2349     Urea Nitrogen, Urine 800 mg/dL     Creatinine, Urine, Random - Urine, Catheter [[id number]] Collected:  11/16/18 2228    Specimen:  Urine, Catheter Updated:  11/16/18 2349     Creatinine, Urine 127.9 mg/dL          Imaging Results (last 24 hours)     ** No results found for the last 24 hours. **           I reviewed the patient's new clinical results.  I reviewed the patient's new imaging results and agree with the interpretation.  I reviewed the patient's other test results and agree with the interpretation      Assessment/Plan       ARF (acute renal failure) (CMS/[facility])    (HFpEF) heart failure with preserved ejection fraction (CMS/[facility])    Pure hypercholesterolemia    Hypertensive disorder    Pulmonary hypertension (CMS/[facility])    Tricuspid regurgitation    CKD (chronic kidney disease) stage 3, GFR 30-59 ml/min (CMS/[facility])    Acute cystitis with hematuria    PVD (peripheral vascular disease) with claudication (CMS/[facility])    Candida cystitis    Anemia of chronic disease    Acute kidney injury  (CMS/[facility])    Pressure injury of deep tissue of right heel      Assessment:    Condition: In stable condition.       1. Obstructive BPH causing neurogenic bladder, chronic SP tube-->SP tube changed 11/14/18, caused YESENIA related to obstruction of catheter.   -Proscar, Flomax  -Chronic SP tube, changed 11/14/18  -Urine output 1,600 mL last 24 hours.      2. UTI cystitis, complicated  -WBC 15.11  -Urine culture 11/5/18 Klebsiella oxytoca  -Antibiotic 10 days, Rocephin complete.   -Diflucan Day #7  -Urine culture 11/11/18 candida tropicalis    Estimated Creatinine Clearance: 41.3 mL/min (A) (by C-G formula based on SCr of 1.37 mg/dL (H)).   -Cr 1.55-->2.18-->2.49-->2.21-->1.67-->1.37, improving, baseline Cr 1.2-1.3    Plan:   SP tube changed 11/14/18  Continue Diflucan.    JUSTIN Selby  11/17/18  11:32 AM

## 2018-11-18 NOTE — PLAN OF CARE
Problem: Fall Risk (Adult)  Goal: Absence of Fall  Outcome: Ongoing (interventions implemented as appropriate)      Problem: Patient Care Overview  Goal: Plan of Care Review  Outcome: Ongoing (interventions implemented as appropriate)   11/18/18 1535   Coping/Psychosocial   Plan of Care Reviewed With patient   Plan of Care Review   Progress no change   OTHER   Outcome Summary Pt has been lethargic all day and unable to wake up for PO intake, APRN made aware and ABGs drawn, v/s stable, will continue to monitor      Goal: Individualization and Mutuality  Outcome: Ongoing (interventions implemented as appropriate)    Goal: Discharge Needs Assessment  Outcome: Ongoing (interventions implemented as appropriate)    Goal: Interprofessional Rounds/Family Conf  Outcome: Ongoing (interventions implemented as appropriate)      Problem: Infection, Risk/Actual (Adult)  Goal: Infection Prevention/Resolution  Outcome: Ongoing (interventions implemented as appropriate)      Problem: Pain, Acute (Adult)  Goal: Acceptable Pain Control/Comfort Level  Outcome: Ongoing (interventions implemented as appropriate)      Problem: Wound (Includes Pressure Injury) (Adult)  Goal: Signs and Symptoms of Listed Potential Problems Will be Absent, Minimized or Managed (Wound)  Outcome: Ongoing (interventions implemented as appropriate)      Problem: Urinary Tract Infection (Adult)  Goal: Signs and Symptoms of Listed Potential Problems Will be Absent, Minimized or Managed (Urinary Tract Infection)  Outcome: Ongoing (interventions implemented as appropriate)      Problem: Cardiac Output Decreased (Adult)  Goal: Effective Tissue Perfusion  Outcome: Ongoing (interventions implemented as appropriate)      Problem: Skin Injury Risk (Adult)  Goal: Skin Health and Integrity  Outcome: Ongoing (interventions implemented as appropriate)      Problem: Anemia (Adult)  Goal: Symptom Improvement  Outcome: Ongoing (interventions implemented as appropriate)

## 2018-11-18 NOTE — THERAPY TREATMENT NOTE
Acute Care - Physical Therapy Treatment Note  [facility]     Patient Name: Ravi Park  : 1930  MRN: [id number]  Today's Date: 2018  Onset of Illness/Injury or Date of Surgery: 18  Date of Referral to PT: 18  Referring Physician: Troy Laura    Admit Date: 2018    Visit Dx:    ICD-10-CM ICD-9-CM   1. Acute cystitis with hematuria N30.01 595.0   2. Altered mental status, unspecified altered mental status type R41.82 780.97   3. Impaired functional mobility and endurance Z74.09 V49.89   4. Impaired mobility and activities of daily living Z74.09 799.89   5. Dysphagia, unspecified type R13.10 787.20   6. (HFpEF) heart failure with preserved ejection fraction (CMS/[facility]) I50.30 428.9   7. Acute renal failure with acute cortical necrosis (CMS/[facility]) N17.1 584.6     Patient Active Problem List   Diagnosis   • Peripheral arterial disease (CMS/[facility])   • Chronic atrial fibrillation (CMS/[facility])   • Backache   • Lumbar radiculopathy   • Edema   • Acute congestive heart failure (CMS/[facility])   • Acute blood loss anemia   • Acute on chronic diastolic heart failure (CMS/[facility])   • YESENIA (acute kidney injury) (CMS/[facility])   • (HFpEF) heart failure with preserved ejection fraction (CMS/[facility])   • Urinary retention   • Personal history of tobacco use, presenting hazards to health   • Vitreous floaters   • Upper respiratory infection   • Sprain of shoulder and upper arm   • Shoulder pain   • Serous otitis media   • Sensorineural hearing loss   • Pure hypercholesterolemia   • Pseudophakia   • Prostatitis   • Pain in wrist   • Pain in thumb joint with movement   • Pain in joint involving right lower leg   • Pain in eye   • Otorrhea   • Open wound of toe   • Open wound of lower leg with complication   • Open wound of lower leg   • Open wound of lesser toe of left foot without damage to nail   • Olecranon bursitis   • Need for prophylactic vaccination and inoculation against influenza   • Need for immunization  against influenza   • Multiple joint pain   • Malaise and fatigue   • Keratoconjunctivitis sicca (CMS/HCC)   • Insect bite   • Impacted cerumen   • Hypertensive disorder   • History of colonoscopy   • History of colon polyps   • History of artificial eye lens   • Herpes zoster   • Gout   • Exposure to communicable disease   • Dog bite of hand   • Disorder of sacroiliac joint   • Decreased platelet count (CMS/HCC)   • Cough   • Contusion of thumb   • Common cold   • Cervical arthritis   • Cerebrovascular accident (CMS/HCC)   • Cellulitis of lower leg   • Cataract   • Benign prostatic hyperplasia   • Atrial fibrillation (CMS/HCC)   • Astigmatism   • Allergic rhinitis due to pollen   • Allergic rhinitis   • Adverse reaction to drug   • Acute confusion   • Acute bronchitis   • Abnormal gait   • Urinary tract infection associated with indwelling urethral catheter (CMS/HCC)   • Hydronephrosis   • Nephrolithiasis   • Calculus of kidney   • Sepsis (CMS/HCC)   • Pulmonary hypertension (CMS/HCC)   • Tricuspid regurgitation   • CKD (chronic kidney disease) stage 3, GFR 30-59 ml/min (CMS/HCC)   • ESBL (extended spectrum beta-lactamase) producing bacteria infection   • Urinary tract infection associated with catheterization of urinary tract (CMS/HCC)   • Toxic encephalopathy   • Demand ischemia (CMS/HCC)   • Acute renal failure with tubular necrosis (CMS/HCC)   • Chronic atrial fibrillation (CMS/HCC)   • Acute cystitis with hematuria   • Sepsis (CMS/HCC)   • Decubitus ulcer of left heel, unstageable (CMS/HCC)   • Acute renal failure (CMS/HCC)   • Pneumonia due to infectious organism   • Non-healing wound of left heel   • PVD (peripheral vascular disease) with claudication (CMS/HCC)   • ARF (acute renal failure) (CMS/HCC)   • Candida cystitis   • Anemia of chronic disease   • Acute kidney injury (CMS/HCC)   • Pressure injury of deep tissue of right heel       Therapy Treatment    Rehabilitation Treatment Summary     Row Name  11/18/18 1350 11/18/18 1115          Treatment Time/Intention    Discipline  physical therapy assistant  -EM  occupational therapy assistant  -BB     Document Type  therapy note (daily note)  -EM  therapy note (daily note)  -BB     Subjective Information  --  no complaints  -BB     Mode of Treatment  physical therapy;individual therapy  -EM  individual therapy;occupational therapy  -BB     Total Minutes, Occupational Therapy Treatment  --  25  -BB     Therapy Frequency (OT Eval)  --  other (see comments) 3-5x/wk  -BB     Patient Effort  --  poor  -BB     Comment  Pt very lethargic and diffiulty arousing pt. Pt confused when alert-nsg aware.   -EM  --     Existing Precautions/Restrictions  fall;oxygen therapy device and L/min  -EM  fall;oxygen therapy device and L/min  -BB     Patient Response to Treatment  Pt very lethargic  -EM  --     Recorded by [EM] Sergey Cabrera, PTA 11/18/18 1516 [BB] Carla Lutz VALENCIA/L 11/18/18 1451     Row Name 11/18/18 1115             Vital Signs    Pretreatment Heart Rate (beats/min)  86  -BB      Pre SpO2 (%)  96  -BB      O2 Delivery Pre Treatment  supplemental O2  -BB      Pre Patient Position  Supine  -BB      Recorded by [BB] Carla Lutz VALENCIA/L 11/18/18 1451      Row Name 11/18/18 1350 11/18/18 1115          Cognitive Assessment/Intervention- PT/OT    Affect/Mental Status (Cognitive)  confused;low arousal/lethargic  -EM  confused  -BB     Behavioral Issues (Cognitive)  unable/difficult to assess  -EM  --     Orientation Status (Cognition)  unable/difficult to assess  -EM  unable/difficult to assess  -BB     Follows Commands (Cognition)  does not follow one step commands  -EM  does not follow one step commands  -BB     Cognitive Function (Cognitive)  memory deficit  -EM  --     Attention Deficit (Cognitive)  severe deficit  -EM  --     Personal Safety Interventions  --  supervised activity;fall prevention program maintained;nonskid shoes/slippers when out of bed   -BB     Recorded by [EM] Sergey Cabrera, PTA 11/18/18 1516 [BB] Carla Lutz VALENCIA/L 11/18/18 1451     Row Name 11/18/18 1350             Bed Mobility Assessment/Treatment    Supine-Sit [place] (Bed Mobility)  dependent (less than 25% patient effort) Pt sat EOB ~6' with mod/max Ax1 due to posterior jerks.   -EM      Sit-Supine [place] (Bed Mobility)  dependent (less than 25% patient effort)  -EM      Comment (Bed Mobility)  Pt difficulty to maintain EOB due to being very lethargic, pt would talk intermittently but was unable to answer questions or respond to direction. Pt somewhat resistive to seated EOB due to posterior jerks.   -EM      Recorded by [EM] Sergey Cabrera, PTA 11/18/18 1516      Row Name 11/18/18 1115             Upper Body Dressing Assessment/Training    Upper Body Dressing [place] Level  don;dependent (less than 25% patient effort) hospital gown  -BB      Upper Body Dressing Position  long sitting  -BB      Recorded by [BB] Carla Lutz COTA/L 11/18/18 1451      Row Name 11/18/18 1115             Grooming Assessment/Training    [place] Level (Grooming)  grooming skills;oral care regimen;hair care, combing/brushing;wash face, hands  -BB      Grooming Position  long sitting  -BB      Recorded by [BB] Carla Lutz VALENCIA/L 11/18/18 1451      Row Name 11/18/18 1350             Therapeutic Exercise    Comment (Therapeutic Exercise)  unable to comprehend directions/instructions for therex, also limited due to being lethargic  -EM      Recorded by [EM] Sergey Cabrera, PTA 11/18/18 1516      Row Name 11/18/18 1350 11/18/18 1115          Positioning and Restraints    Pre-Treatment Position  in bed  -EM  in bed  -BB     Post Treatment Position  bed  -EM  bed  -BB     In Bed  supine;heels elevated;call light within reach;encouraged to call for assist;exit alarm on;notified nsg  -EM  supine;call light within reach;encouraged to call for assist;exit alarm on  -BB      Recorded by [EM] Sergey Cabrera, PTA 11/18/18 1516 [BB] Carla Lutz COTA/L 11/18/18 1451     Row Name 11/18/18 1350             Pain Scale: Numbers Pre/Post-Treatment    Pain Scale: Numbers, Pretreatment  -- unable to assess  -EM      Pain Scale: Numbers, Post-Treatment  -- unable to assess  -EM      Recorded by [EM] Sergey Cabrera, PTA 11/18/18 1516      Row Name                Wound 10/30/18 1715 Left heel pressure injury    Wound - Properties Group Date first assessed: 10/30/18 [AS] Time first assessed: 1715 [AS] Present On Admission : yes;picture taken [AS] Side: Left [AS] Location: heel [AS] Type: pressure injury [AS] Stage, Pressure Injury: unstageable [AS] Recorded by:  [AS] Giselle Alicea RN 10/30/18 1715    Row Name                Wound 11/06/18 0300 Left gluteal pressure injury    Wound - Properties Group Date first assessed: 11/06/18 [AB] Time first assessed: 0300 [AB] Present On Admission : yes;picture taken [SR], picture taken by nightshift RN  Side: Left [SR] Location: gluteal [SR] Type: pressure injury [SR] Stage, Pressure Injury: deep tissue injury [SR] Recorded by:  [AB] Jacquie Chu RN 11/08/18 1511 [SR] Lexi Chaparro RN 11/06/18 2051    Row Name 11/18/18 1115             Plan of Care Review    Plan of Care Reviewed With  patient  -BB      Recorded by [BB] Carla Lutz COTA/L 11/18/18 1451      Row Name 11/18/18 1115             Outcome Summary/Treatment Plan (OT)    Daily Summary of Progress (OT)  progress toward functional goals is gradual  -BB      Plan for Continued Treatment (OT)  continue POC  -BB      Anticipated Discharge Disposition (OT)  home with 24/7 care;skilled nursing facility  -BB      Recorded by [BB] Carla Lutz COTA/L 11/18/18 1451      Row Name 11/18/18 1350             Outcome Summary/Treatment Plan (PT)    Daily Summary of Progress (PT)  unable to show any progress toward functional goals  -EM      Plan for Continued Treatment (PT)   Cont as able  -EM      Anticipated Discharge Disposition (PT)  skilled nursing facility  -EM      Recorded by [EM] Sergey Cabrera, PTA 11/18/18 1516        User Key  (r) = Recorded By, (t) = Taken By, (c) = Cosigned By    Initials Name Effective Dates Discipline    EM Sergey Cabrera, PTA 08/11/15 -  PT    BB Carla Lutz, VALENCIA/L 03/07/18 -  OT    AB Jacquie Chu, RN 10/17/16 -  Nurse    SR Lexi Chaparro RN 10/17/16 -  Nurse    AS Giselle Alicea RN 12/13/16 -  Nurse          Wound 10/30/18 1715 Left heel pressure injury (Active)   Base dressing in place, unable to visualize 11/18/2018  9:46 AM   Care, Wound cleansed with 11/18/2018  4:00 AM   Dressing Care, Wound dressing changed 11/18/2018  4:00 AM       Wound 11/06/18 0300 Left gluteal pressure injury (Active)   Dressing Appearance open to air 11/18/2018  9:46 AM   Base maroon/purple 11/18/2018  9:46 AM   Periwound blanchable;redness 11/18/2018  9:46 AM   Care, Wound barrier applied 11/17/2018  8:36 PM   Periwound Care, Wound barrier ointment applied 11/17/2018  8:36 PM       PT Rehab Goals     Row Name 11/18/18 1350             Bed Mobility Goal 1 (PT)    Activity/Assistive Device (Bed Mobility Goal 1, PT)  bed mobility activities, all  -EM      [place] Level/Cues Needed (Bed Mobility Goal 1, PT)  conditional independence  -EM      Time Frame (Bed Mobility Goal 1, PT)  short term goal (STG)  -EM      Progress/Outcomes (Bed Mobility Goal 1, PT)  goal not met  -EM         Transfer Goal 1 (PT)    Activity/Assistive Device (Transfer Goal 1, PT)  sit-to-stand/stand-to-sit  -EM      [place] Level/Cues Needed (Transfer Goal 1, PT)  supervision required  -EM      Time Frame (Transfer Goal 1, PT)  2 - 3 days  -EM      Progress/Outcome (Transfer Goal 1, PT)  goal not met  -EM         Gait Training Goal 1 (PT)    Activity/Assistive Device (Gait Training Goal 1, PT)  walker, rolling;gait (walking locomotion);assistive device use;backward  stepping;decrease asymmetrical patterns;decrease fall risk;diminish gait deviation;forward stepping;improve balance and speed;increase endurance/gait distance;increase energy conservation;maintain weight bearing status;normalize weight shifts;sidestepping;turning, left;turning, right  -EM      [place] Level (Gait Training Goal 1, PT)  supervision required  -EM      Distance (Gait Goal 1, PT)  1x25 ft or more  -EM      Progress/Outcome (Gait Training Goal 1, PT)  goal not met  -EM        User Key  (r) = Recorded By, (t) = Taken By, (c) = Cosigned By    Initials Name Provider Type Discipline    EM Sergey Cabrera, PTA Physical Therapy Assistant PT          Physical Therapy Education     Title: PT OT SLP Therapies (Active)     Topic: Physical Therapy (Done)     Point: Mobility training (Done)     Learning Progress Summary           Patient Acceptance, E, VU,NR by  at 11/12/2018  4:13 PM    Comment:  Pt education on use of call button and encouraged to call for assist to reduce risk of falls.                   Point: Home exercise program (Done)     Learning Progress Summary           Patient Acceptance, E, VU,NR by  at 11/12/2018  4:13 PM    Comment:  Pt education on use of call button and encouraged to call for assist to reduce risk of falls.                   Point: Body mechanics (Done)     Learning Progress Summary           Patient Acceptance, E, VU,NR by  at 11/12/2018  4:13 PM    Comment:  Pt education on use of call button and encouraged to call for assist to reduce risk of falls.                   Point: Precautions (Done)     Learning Progress Summary           Patient Acceptance, E, VU,NR by  at 11/12/2018  4:13 PM    Comment:  Pt education on use of call button and encouraged to call for assist to reduce risk of falls.                               User Key     Initials Effective Dates Name Provider Type Discipline     10/04/18 -  Kristie Alvarez, PT Physical Therapist PT                PT  Recommendation and Plan  Anticipated Discharge Disposition (PT): skilled nursing facility  Outcome Summary/Treatment Plan (PT)  Daily Summary of Progress (PT): unable to show any progress toward functional goals  Plan for Continued Treatment (PT): Cont as able  Anticipated Discharge Disposition (PT): skilled nursing facility  Outcome Summary: Pt very lethargic today limiting tx significantly -nsg made aware. Pt t/f sup-sit-sup dep and sat EOB ~6' with mod/max Ax1 due to posterior lean jerks. No goals met this tx. Pt will require SNF upon discharge at this time.   Outcome Measures     Row Name 11/18/18 1350 11/17/18 1353 11/16/18 1110       How much help from another person do you currently need...    Turning from your back to your side while in flat bed without using bedrails?  1  -EM  2  -EM  --    Moving from lying on back to sitting on the side of a flat bed without bedrails?  1  -EM  2  -EM  --    Moving to and from a bed to a chair (including a wheelchair)?  1  -EM  1  -EM  --    Standing up from a chair using your arms (e.g., wheelchair, bedside chair)?  1  -EM  1  -EM  --    Climbing 3-5 steps with a railing?  1  -EM  1  -EM  --    To walk in hospital room?  1  -EM  1  -EM  --    AM-PAC 6 Clicks Score  6  -EM  8  -EM  --       How much help from another is currently needed...    Putting on and taking off regular lower body clothing?  --  --  1  -LW    Bathing (including washing, rinsing, and drying)  --  --  2  -LW    Toileting (which includes using toilet bed pan or urinal)  --  --  1  -LW    Putting on and taking off regular upper body clothing  --  --  2  -LW    Taking care of personal grooming (such as brushing teeth)  --  --  2  -LW    Eating meals  --  --  2  -LW    Score  --  --  10  -LW       Functional Assessment    Outcome Measure Options  AM-PAC 6 Clicks Basic Mobility (PT)  -EM  AM-PAC 6 Clicks Basic Mobility (PT)  -EM  --      User Key  (r) = Recorded By, (t) = Taken By, (c) = Cosigned By     Initials Name Provider Type    EM Sergey Cabrera PTA Physical Therapy Assistant    Deisy Martini, VALENCIA/L Occupational Therapy Assistant         Time Calculation:   PT Charges     Row Name 11/18/18 1522             Time Calculation    Start Time  1350  -EM      Stop Time  1358  -EM      Time Calculation (min)  8 min  -EM         Time Calculation- PT    Total Timed Code Minutes- PT  8 minute(s)  -EM        User Key  (r) = Recorded By, (t) = Taken By, (c) = Cosigned By    Initials Name Provider Type    EM Sergey Cabrera PTA Physical Therapy Assistant        Therapy Suggested Charges     Code   Minutes Charges    68583 (CPT®) Hc Pt Neuromusc Re Education Ea 15 Min      79273 (CPT®) Hc Pt Ther Proc Ea 15 Min 15 1    46089 (CPT®) Hc Gait Training Ea 15 Min      31396 (CPT®) Hc Pt Therapeutic Act Ea 15 Min 24 2    69903 (CPT®) Hc Pt Manual Therapy Ea 15 Min      61451 (CPT®) Hc Pt Iontophoresis Ea 15 Min      54058 (CPT®) Hc Pt Elec Stim Ea-Per 15 Min      12794 (CPT®) Hc Pt Ultrasound Ea 15 Min      21027 (CPT®) Hc Pt Self Care/Mgmt/Train Ea 15 Min      47806 (CPT®) Hc Pt Prosthetic (S) Train Initial Encounter, Each 15 Min      32828 (CPT®) Hc Pt Orthotic(S)/Prosthetic(S) Encounter, Each 15 Min      97343 (CPT®) Hc Orthotic(S) Mgmt/Train Initial Encounter, Each 15min      Total  39 3        Therapy Charges for Today     Code Description Service Date Service Provider Modifiers Qty    44998399897 HC PT THERAPEUTIC ACT EA 15 MIN 11/17/2018 Sergey Cabrera PTA GP 2    10476062964 HC PT THERAPEUTIC ACT EA 15 MIN 11/18/2018 Sergey Cabrera PTA GP 1          PT G-Codes  PT Professional Judgement Used?: Yes  Outcome Measure Options: AM-PAC 6 Clicks Basic Mobility (PT)  AM-PAC 6 Clicks Score: 6  Score: 10  Functional Limitation: Mobility: Walking and moving around  Mobility: Walking and Moving Around Current Status (): At least 60 percent but less than 80 percent impaired, limited or restricted  Mobility: Walking and  Moving Around Goal Status (): At least 40 percent but less than 60 percent impaired, limited or restricted    Sergey Cabrera, PTA  11/18/2018

## 2018-11-18 NOTE — THERAPY TREATMENT NOTE
Acute Care - Occupational Therapy Treatment Note  [facility]     Patient Name: Ravi Park  : 1930  MRN: [id number]  Today's Date: 2018  Onset of Illness/Injury or Date of Surgery: 18  Date of Referral to OT: 18  Referring Physician: Troy Laura    Admit Date: 2018       ICD-10-CM ICD-9-CM   1. Acute cystitis with hematuria N30.01 595.0   2. Altered mental status, unspecified altered mental status type R41.82 780.97   3. Impaired functional mobility and endurance Z74.09 V49.89   4. Impaired mobility and activities of daily living Z74.09 799.89   5. Dysphagia, unspecified type R13.10 787.20   6. (HFpEF) heart failure with preserved ejection fraction (CMS/HCC) I50.30 428.9   7. Acute renal failure with acute cortical necrosis (CMS/Formerly [facility]) N17.1 584.6     Patient Active Problem List   Diagnosis   • Peripheral arterial disease (CMS/HCC)   • Chronic atrial fibrillation (CMS/HCC)   • Backache   • Lumbar radiculopathy   • Edema   • Acute congestive heart failure (CMS/HCC)   • Acute blood loss anemia   • Acute on chronic diastolic heart failure (CMS/Formerly [facility])   • YESENIA (acute kidney injury) (CMS/HCC)   • (HFpEF) heart failure with preserved ejection fraction (CMS/Formerly [facility])   • Urinary retention   • Personal history of tobacco use, presenting hazards to health   • Vitreous floaters   • Upper respiratory infection   • Sprain of shoulder and upper arm   • Shoulder pain   • Serous otitis media   • Sensorineural hearing loss   • Pure hypercholesterolemia   • Pseudophakia   • Prostatitis   • Pain in wrist   • Pain in thumb joint with movement   • Pain in joint involving right lower leg   • Pain in eye   • Otorrhea   • Open wound of toe   • Open wound of lower leg with complication   • Open wound of lower leg   • Open wound of lesser toe of left foot without damage to nail   • Olecranon bursitis   • Need for prophylactic vaccination and inoculation against influenza   • Need for immunization against  influenza   • Multiple joint pain   • Malaise and fatigue   • Keratoconjunctivitis sicca (CMS/HCC)   • Insect bite   • Impacted cerumen   • Hypertensive disorder   • History of colonoscopy   • History of colon polyps   • History of artificial eye lens   • Herpes zoster   • Gout   • Exposure to communicable disease   • Dog bite of hand   • Disorder of sacroiliac joint   • Decreased platelet count (CMS/HCC)   • Cough   • Contusion of thumb   • Common cold   • Cervical arthritis   • Cerebrovascular accident (CMS/HCC)   • Cellulitis of lower leg   • Cataract   • Benign prostatic hyperplasia   • Atrial fibrillation (CMS/HCC)   • Astigmatism   • Allergic rhinitis due to pollen   • Allergic rhinitis   • Adverse reaction to drug   • Acute confusion   • Acute bronchitis   • Abnormal gait   • Urinary tract infection associated with indwelling urethral catheter (CMS/HCC)   • Hydronephrosis   • Nephrolithiasis   • Calculus of kidney   • Sepsis (CMS/HCC)   • Pulmonary hypertension (CMS/HCC)   • Tricuspid regurgitation   • CKD (chronic kidney disease) stage 3, GFR 30-59 ml/min (CMS/HCC)   • ESBL (extended spectrum beta-lactamase) producing bacteria infection   • Urinary tract infection associated with catheterization of urinary tract (CMS/HCC)   • Toxic encephalopathy   • Demand ischemia (CMS/HCC)   • Acute renal failure with tubular necrosis (CMS/HCC)   • Chronic atrial fibrillation (CMS/HCC)   • Acute cystitis with hematuria   • Sepsis (CMS/HCC)   • Decubitus ulcer of left heel, unstageable (CMS/HCC)   • Acute renal failure (CMS/HCC)   • Pneumonia due to infectious organism   • Non-healing wound of left heel   • PVD (peripheral vascular disease) with claudication (CMS/HCC)   • ARF (acute renal failure) (CMS/HCC)   • Candida cystitis   • Anemia of chronic disease   • Acute kidney injury (CMS/HCC)   • Pressure injury of deep tissue of right heel     Past Medical History:   Diagnosis Date   • Benign prostatic hyperplasia    •  Cerebrovascular accident (CMS/HCC)    • Chronic anemia    • Chronic atrial fibrillation (CMS/HCC)    • CKD (chronic kidney disease) stage 3, GFR 30-59 ml/min (CMS/HCC)    • GERD (gastroesophageal reflux disease)    • Gout    • Hydronephrosis    • Hypercholesterolemia    • Hypertension    • Nephrolithiasis    • Peripheral arterial disease (CMS/HCC)    • Pulmonary hypertension (CMS/HCC)    • Tricuspid regurgitation    • Urinary retention      Past Surgical History:   Procedure Laterality Date   • CARDIAC CATHETERIZATION     • NEPHROSTOMY         Therapy Treatment    Rehabilitation Treatment Summary     Row Name 11/18/18 1115             Treatment Time/Intention    Discipline  occupational therapy assistant  -BB      Document Type  therapy note (daily note)  -BB      Subjective Information  no complaints  -BB      Mode of Treatment  individual therapy;occupational therapy  -BB      Total Minutes, Occupational Therapy Treatment  25  -BB      Therapy Frequency (OT Eval)  other (see comments) 3-5x/wk  -BB      Patient Effort  poor  -BB      Existing Precautions/Restrictions  fall;oxygen therapy device and L/min  -BB      Recorded by [BB] Carla Lutz COTA/L 11/18/18 1451      Row Name 11/18/18 1115             Vital Signs    Pretreatment Heart Rate (beats/min)  86  -BB      Pre SpO2 (%)  96  -BB      O2 Delivery Pre Treatment  supplemental O2  -BB      Pre Patient Position  Supine  -BB      Recorded by [BB] Carla Lutz COTA/L 11/18/18 1451      Row Name 11/18/18 111             Cognitive Assessment/Intervention- PT/OT    Affect/Mental Status (Cognitive)  confused  -BB      Orientation Status (Cognition)  unable/difficult to assess  -BB      Follows Commands (Cognition)  does not follow one step commands  -BB      Personal Safety Interventions  supervised activity;fall prevention program maintained;nonskid shoes/slippers when out of bed  -BB      Recorded by [BB] Carla Lutz VALENCIA/L 11/18/18 1548       Row Name 11/18/18 1115             Upper Body Dressing Assessment/Training    Upper Body Dressing [place] Level  don;dependent (less than 25% patient effort) hospital gown  -BB      Upper Body Dressing Position  long sitting  -BB      Recorded by [BB] Carla Lutz COTA/L 11/18/18 1451      Row Name 11/18/18 1115             Grooming Assessment/Training    Bryan Level (Grooming)  grooming skills;oral care regimen;hair care, combing/brushing;wash face, hands  -BB      Grooming Position  long sitting  -BB      Recorded by [BB] Carla Lutz COTA/L 11/18/18 1451      Row Name 11/18/18 1115             Positioning and Restraints    Pre-Treatment Position  in bed  -BB      Post Treatment Position  bed  -BB      In Bed  supine;call light within reach;encouraged to call for assist;exit alarm on  -BB      Recorded by [BB] Carla Lutz COTA/L 11/18/18 1451      Row Name                Wound 10/30/18 1715 Left heel pressure injury    Wound - Properties Group Date first assessed: 10/30/18 [AS] Time first assessed: 1715 [AS] Present On Admission : yes;picture taken [AS] Side: Left [AS] Location: heel [AS] Type: pressure injury [AS] Stage, Pressure Injury: unstageable [AS] Recorded by:  [AS] Giselle Alicea RN 10/30/18 1715    Row Name                Wound 11/06/18 0300 Left gluteal pressure injury    Wound - Properties Group Date first assessed: 11/06/18 [AB] Time first assessed: 0300 [AB] Present On Admission : yes;picture taken [SR], picture taken by nightshift RN  Side: Left [SR] Location: gluteal [SR] Type: pressure injury [SR] Stage, Pressure Injury: deep tissue injury [SR] Recorded by:  [AB] Jacquie Chu RN 11/08/18 1511 [SR] Lexi Chaparro RN 11/06/18 2051    Row Name 11/18/18 1115             Plan of Care Review    Plan of Care Reviewed With  patient  -BB      Recorded by [BB] Carla Lutz COTA/L 11/18/18 1451      Row Name 11/18/18 1115             Outcome  Summary/Treatment Plan (OT)    Daily Summary of Progress (OT)  progress toward functional goals is gradual  -BB      Plan for Continued Treatment (OT)  continue POC  -BB      Anticipated Discharge Disposition (OT)  home with 24/7 care;skilled nursing facility  -BB      Recorded by [BB] Carla Lutz COTA/L 11/18/18 1451        User Key  (r) = Recorded By, (t) = Taken By, (c) = Cosigned By    Initials Name Effective Dates Discipline    BB Carla Lutz COTA/L 03/07/18 -  OT    AB Jacquie Chu, RN 10/17/16 -  Nurse    SR Lexi Chaparro RN 10/17/16 -  Nurse    AS Giselle Alicea, RN 12/13/16 -  Nurse        Wound 10/30/18 1715 Left heel pressure injury (Active)   Base dressing in place, unable to visualize 11/18/2018  9:46 AM   Care, Wound cleansed with 11/18/2018  4:00 AM   Dressing Care, Wound dressing changed 11/18/2018  4:00 AM       Wound 11/06/18 0300 Left gluteal pressure injury (Active)   Dressing Appearance open to air 11/18/2018  9:46 AM   Base maroon/purple 11/18/2018  9:46 AM   Periwound blanchable;redness 11/18/2018  9:46 AM   Care, Wound barrier applied 11/17/2018  8:36 PM   Periwound Care, Wound barrier ointment applied 11/17/2018  8:36 PM     OT Rehab Goals     Row Name 11/18/18 1115             Transfer Goal 1 (OT)    Activity/Assistive Device (Transfer Goal 1, OT)  sit-to-stand/stand-to-sit;bed-to-chair/chair-to-bed;toilet  -BB      [place] Level/Cues Needed (Transfer Goal 1, OT)  contact guard assist  -BB      Time Frame (Transfer Goal 1, OT)  long term goal (LTG);by discharge  -BB      Progress/Outcome (Transfer Goal 1, OT)  goal not met  -BB         Bathing Goal 1 (OT)    Activity/Assistive Device (Bathing Goal 1, OT)  bathing skills, all using AE PRN  -BB      [place] Level/Cues Needed (Bathing Goal 1, OT)  minimum assist (75% or more patient effort)  -BB      Time Frame (Bathing Goal 1, OT)  long term goal (LTG);by discharge  -BB      Progress/Outcomes (Bathing  Goal 1, OT)  goal not met  -BB         Dressing Goal 1 (OT)    Activity/Assistive Device (Dressing Goal 1, OT)  dressing skills, all using AE PRN  -BB      [place]/Cues Needed (Dressing Goal 1, OT)  minimum assist (75% or more patient effort)  -BB      Time Frame (Dressing Goal 1, OT)  long term goal (LTG);by discharge  -BB      Progress/Outcome (Dressing Goal 1, OT)  goal not met  -BB         Toileting Goal 1 (OT)    Activity/Device (Toileting Goal 1, OT)  toileting skills, all  -BB      [place] Level/Cues Needed (Toileting Goal 1, OT)  minimum assist (75% or more patient effort)  -BB      Time Frame (Toileting Goal 1, OT)  long term goal (LTG);by discharge  -BB      Progress/Outcome (Toileting Goal 1, OT)  goal not met  -BB         Strength Goal 1 (OT)    Strength Goal 1 (OT)  Pt will increase BUE strenth at least 1/2 grade level to benefit ADLs and functional transfers.   -BB      Time Frame (Strength Goal 1, OT)  long term goal (LTG);by discharge  -BB      Progress/Outcome (Strength Goal 1, OT)  goal not met  -BB        User Key  (r) = Recorded By, (t) = Taken By, (c) = Cosigned By    Initials Name Provider Type Discipline    Carla Ohara COTA/L Occupational Therapy Assistant OT        Occupational Therapy Education     Title: PT OT SLP Therapies (Active)     Topic: Occupational Therapy (Active)     Point: ADL training (Active)     Description: Instruct learner(s) on proper safety adaptation and remediation techniques during self care or transfers.   Instruct in proper use of assistive devices.    Learning Progress Summary           Patient Acceptance, E,TB, VU by KYUNG at 11/16/2018  2:37 PM    Acceptance, E,TB, VU by KYUNG at 11/13/2018  2:28 PM   Family Acceptance, E, NR by DARIN at 11/11/2018  4:30 PM                   Point: Home exercise program (Active)     Description: Instruct learner(s) on appropriate technique for monitoring, assisting and/or progressing therapeutic exercises/activities.     Learning Progress Summary           Patient Acceptance, E,TB, VU by LW at 11/16/2018  2:37 PM    Acceptance, E,TB, VU by LW at 11/13/2018  2:28 PM   Family Acceptance, E, NR by LM at 11/11/2018  4:30 PM                   Point: Precautions (Active)     Description: Instruct learner(s) on prescribed precautions during self-care and functional transfers.    Learning Progress Summary           Patient Acceptance, E,TB, VU by LW at 11/16/2018  2:37 PM    Acceptance, E,TB, VU by LW at 11/13/2018  2:28 PM   Family Acceptance, E, NR by LM at 11/11/2018  4:30 PM                   Point: Body mechanics (Active)     Description: Instruct learner(s) on proper positioning and spine alignment during self-care, functional mobility activities and/or exercises.    Learning Progress Summary           Patient Acceptance, E,TB, VU by LW at 11/16/2018  2:37 PM    Acceptance, E,TB, VU by LW at 11/13/2018  2:28 PM    Acceptance, E, NR,NL by AS at 11/8/2018  4:28 PM    Comment:  role of OT, OT POC, bed mobility   Family Acceptance, E, NR by LM at 11/11/2018  4:30 PM                               User Key     Initials Effective Dates Name Provider Type Discipline     03/07/18 -  Marilin Miramontes COTA/L Occupational Therapy Assistant OT    LW 03/07/18 -  Deisy Sullivan COTA/L Occupational Therapy Assistant OT    AS 05/01/18 -  Debra Corado, OT Occupational Therapist OT              Non-skid socks and gait belt in place. Toileting offered. Call light and needs within reach. Pt advised to not get up alone and call the nurse for assistance.  Bed alarm on.   OT Recommendation and Plan  Outcome Summary/Treatment Plan (OT)  Daily Summary of Progress (OT): progress toward functional goals is gradual  Plan for Continued Treatment (OT): continue POC  Anticipated Discharge Disposition (OT): home with 24/7 care, skilled nursing facility  Therapy Frequency (OT Eval): other (see comments)(3-5x/wk)  Daily Summary of Progress (OT): progress  toward functional goals is gradual  Plan of Care Review  Plan of Care Reviewed With: patient  Plan of Care Reviewed With: patient  Outcome Summary: Pt is max/dep for grooming activity. No goals met this tx.  Outcome Measures     Row Name 11/17/18 1353 11/16/18 1110 11/15/18 1500       How much help from another person do you currently need...    Turning from your back to your side while in flat bed without using bedrails?  2  -EM  --  3  -TA    Moving from lying on back to sitting on the side of a flat bed without bedrails?  2  -EM  --  3  -TA    Moving to and from a bed to a chair (including a wheelchair)?  1  -EM  --  2  -TA    Standing up from a chair using your arms (e.g., wheelchair, bedside chair)?  1  -EM  --  2  -TA    Climbing 3-5 steps with a railing?  1  -EM  --  1  -TA    To walk in hospital room?  1  -EM  --  1  -TA    AM-PAC 6 Clicks Score  8  -EM  --  12  -TA       How much help from another is currently needed...    Putting on and taking off regular lower body clothing?  --  1  -LW  --    Bathing (including washing, rinsing, and drying)  --  2  -LW  --    Toileting (which includes using toilet bed pan or urinal)  --  1  -LW  --    Putting on and taking off regular upper body clothing  --  2  -LW  --    Taking care of personal grooming (such as brushing teeth)  --  2  -LW  --    Eating meals  --  2  -LW  --    Score  --  10  -LW  --       Functional Assessment    Outcome Measure Options  AM-PAC 6 Clicks Basic Mobility (PT)  -EM  --  AM-PAC 6 Clicks Basic Mobility (PT)  -TA      User Key  (r) = Recorded By, (t) = Taken By, (c) = Cosigned By    Initials Name Provider Type    EM Sergey Cabrera, PTA Physical Therapy Assistant    Slime Damian, PTA Physical Therapy Assistant    Deisy Martini, ERIK/L Occupational Therapy Assistant           Time Calculation:   Time Calculation- OT     Row Name 11/18/18 1453             Time Calculation- OT    OT Start Time  1115  -BB      OT Stop Time  1140  -BB       OT Time Calculation (min)  25 min  -BB      Total Timed Code Minutes- OT  25 minute(s)  -BB      OT Received On  11/18/18  -BB        User Key  (r) = Recorded By, (t) = Taken By, (c) = Cosigned By    Initials Name Provider Type    Carla Ohara COTA/L Occupational Therapy Assistant           Therapy Suggested Charges     Code   Minutes Charges    None           Therapy Charges for Today     Code Description Service Date Service Provider Modifiers Qty    56752531119 HC OT SELF CARE/MGMT/TRAIN EA 15 MIN 11/18/2018 aCrla Lutz COTA/L GO 2          OT G-codes  OT Professional Judgement Used?: Yes  OT Functional Scales Options: AM-PAC 6 Clicks Daily Activity (OT)  Score: 14  Functional Limitation: Self care  Self Care Current Status (): At least 40 percent but less than 60 percent impaired, limited or restricted  Self Care Goal Status (): At least 20 percent but less than 40 percent impaired, limited or restricted    VIANEY More  11/18/2018

## 2018-11-18 NOTE — PROGRESS NOTES
"  Pharmacokinetics by Pharmacy - Vancomycin    Ravi Park is a 88 y.o. male   [Ht: 182.9 cm (72\"); Wt: 73.5 kg (162 lb 1.6 oz)]    Estimated Creatinine Clearance: 37.9 mL/min (A) (by C-G formula based on SCr of 1.4 mg/dL (H)).   Lab Results   Component Value Date    CREATININE 1.40 (H) 11/18/2018    CREATININE 1.37 (H) 11/17/2018    CREATININE 1.67 (H) 11/16/2018      Lab Results   Component Value Date    WBC 17.81 (H) 11/18/2018    WBC 15.11 (H) 11/17/2018    WBC 12.14 (H) 11/16/2018      Temp Readings from Last 1 Encounters:   11/18/18 99.2 °F (37.3 °C) (Oral)      Lab Results   Component Value Date    LACTATE 1.7 11/05/2018    LACTATE 3.9 (C) 09/08/2018    LACTATE 6.5 (C) 09/08/2018       Indication for use: pneumonia     Baseline culture results:  Microbiology Results (last 10 days)       Procedure Component Value - Date/Time    Urine Culture - Urine, Urine, Clean Catch [[id number]] Collected:  11/14/18 1050    Lab Status:  Final result Specimen:  Urine, Clean Catch Updated:  11/15/18 0625     Urine Culture Mixed Ashley Isolated    Narrative:       Specimen contains mixed organisms of questionable pathogenicity which indicates contamination with commensal ashley.  Further identification is unlikely to provide clinically useful information.  Suggest recollection.    Urine Culture - Urine, Urine, Catheter [[id number]]  (Abnormal) Collected:  11/11/18 1450    Lab Status:  Final result Specimen:  Urine, Catheter Updated:  11/14/18 1144     Urine Culture >100,000 CFU/mL Candida tropicalis    Antifungal AST 9 Drug Panel - Isolate, Isolate [[id number]] Collected:  11/11/18 1450    Lab Status:  Preliminary result Specimen:  Isolate Updated:  11/17/18 0816     Organism ID, Yeast Preliminary report     Comment: Specimen has been received and testing has been initiated.        Please note Comment     Comment: CLSI does not have established guidelines for interpretation of  these organism-drug combinations.  For research use " only.        BROMINE-TOTAL,SERUM/PLASMA Comment     Comment: Results for this test are for research purposes only by the assay's  .  The performance characteristics of this product have  not been established.  Results should not be used as a diagnostic  procedure without confirmation of the diagnosis by another medically  established diagnostic product or procedure.       Narrative:       Performed at:  [facility]  [id number]  : Bella Gómez MD, Phone:  [phone]        Pt is also on Levaquin and Cefepime (rx renally adjusted)    Assessment/Plan  Initiated Vancomycin 1000 mg IVPB Q24H. Will order Vancomycin peak level 11/20 at 1430 and  trough level 11/21 at 1230.  Calculated AUC is 541.79 (goal 400-600), calculated trough level is 14.49  (goal 10-20) and calculated peak level is 33.08 (goal 30-40).  Pharmacy will monitor renal function and adjust dose accordingly.    Lisa Erickson [facility]  11/18/18 10:55 AM

## 2018-11-18 NOTE — PLAN OF CARE
Problem: Fall Risk (Adult)  Goal: Absence of Fall  Outcome: Ongoing (interventions implemented as appropriate)      Problem: Patient Care Overview  Goal: Plan of Care Review  Outcome: Ongoing (interventions implemented as appropriate)   11/17/18 5911   Coping/Psychosocial   Plan of Care Reviewed With patient   Plan of Care Review   Progress declining   OTHER   Outcome Summary Pt sleeping at this time. VSS. Received prn pain meds for pain. Pt very lethargic. Will continue to monitor.      Goal: Individualization and Mutuality  Outcome: Ongoing (interventions implemented as appropriate)    Goal: Discharge Needs Assessment  Outcome: Ongoing (interventions implemented as appropriate)      Problem: Infection, Risk/Actual (Adult)  Goal: Infection Prevention/Resolution  Outcome: Ongoing (interventions implemented as appropriate)      Problem: Pain, Acute (Adult)  Goal: Acceptable Pain Control/Comfort Level  Outcome: Ongoing (interventions implemented as appropriate)      Problem: Wound (Includes Pressure Injury) (Adult)  Goal: Signs and Symptoms of Listed Potential Problems Will be Absent, Minimized or Managed (Wound)  Outcome: Ongoing (interventions implemented as appropriate)      Problem: Urinary Tract Infection (Adult)  Goal: Signs and Symptoms of Listed Potential Problems Will be Absent, Minimized or Managed (Urinary Tract Infection)  Outcome: Ongoing (interventions implemented as appropriate)      Problem: Cardiac Output Decreased (Adult)  Goal: Effective Tissue Perfusion  Outcome: Ongoing (interventions implemented as appropriate)      Problem: Skin Injury Risk (Adult)  Goal: Skin Health and Integrity  Outcome: Ongoing (interventions implemented as appropriate)      Problem: Anemia (Adult)  Goal: Symptom Improvement  Outcome: Ongoing (interventions implemented as appropriate)

## 2018-11-18 NOTE — PLAN OF CARE
Problem: Patient Care Overview  Goal: Plan of Care Review  Outcome: Ongoing (interventions implemented as appropriate)   11/18/18 6783   Coping/Psychosocial   Plan of Care Reviewed With patient   Plan of Care Review   Progress declining   OTHER   Outcome Summary Pt is max/dep for grooming activity. No goals met this tx.

## 2018-11-18 NOTE — PLAN OF CARE
Problem: Patient Care Overview  Goal: Plan of Care Review  Outcome: Ongoing (interventions implemented as appropriate)   11/18/18 1520   OTHER   Outcome Summary Pt very lethargic today limiting tx significantly -nsg made aware. Pt t/f sup-sit-sup dep and sat EOB ~6' with mod/max Ax1 due to posterior lean jerks. No goals met this tx. Pt will require SNF upon discharge at this time.

## 2018-11-18 NOTE — PROGRESS NOTES
[facility] Medicine Services  INPATIENT PROGRESS NOTE    Length of Stay: 3  Date of Admission: 11/5/2018  Primary Care Physician: Terrell Arzate MD    Subjective   Chief Complaint: AMS  HPI:  88 year old male with a history of PVD, HLD, pulmonary hypertension, HTN, CKDIII, HFpEF, and obstructive BPH and neurogenic bladder with chronic SP tube who presented to the ED with altered mental status and worsening confusion.  He has a history of recurrent UTI.  CT of the head was negative for acute intracranial pathology.  He was admitted and started on IV antibiotics.  Urine culture initially grew Klebsiella oxytoca and he was managed with Rocephin.  Repeat urine culture grew candida tropicalis.  Patient had an increase in his creatinine from 1.05 on 11/11 to 2.49 on 11/14.  He had decreased urine output.  SP tube was changed by urology.  FENa was >1% which is consistent with obstructive etiology.  Nephrology consulted and is recommending continued IV fluid, strict I&O, and holding diuretics.  He is an APS case.  Family plans to take him home.  Today he is more lethargic, WBC have increased to 17.  Chest x-ray reveals right lung base opacity and bilateral pleural effusions. Renal function is stable.     Review of Systems   Unable to perform ROS: Mental status change        All pertinent negatives and positives are as above. All other systems have been reviewed and are negative unless otherwise stated.     Objective    Temp:  [97.7 °F (36.5 °C)-99.2 °F (37.3 °C)] 99.2 °F (37.3 °C)  Heart Rate:  [76-98] 90  Resp:  [16-20] 16  BP: (122-140)/(60-79) 140/65    Physical Exam   Constitutional: He is oriented to person, place, and time. He appears well-developed and well-nourished. No distress.   HENT:   Head: Normocephalic.   Eyes: Conjunctivae are normal.   Cardiovascular: Normal rate, regular rhythm, normal heart sounds and intact distal pulses.   Pulmonary/Chest: Effort normal and  breath sounds normal. No respiratory distress.   Abdominal: Soft. Bowel sounds are normal. He exhibits no distension. There is no tenderness.   Musculoskeletal: Normal range of motion. He exhibits no edema.   Neurological: He is alert and oriented to person, place, and time.   Skin: Skin is warm and dry. He is not diaphoretic.   Vitals reviewed.    Results Review:  I have reviewed the labs, radiology results, and diagnostic studies.    Laboratory Data:   Results from last 7 days   Lab Units  11/18/18   0602  11/17/18   0549  11/16/18   0542   SODIUM mmol/L  140  142  146*   POTASSIUM mmol/L  3.7  3.2*  3.6   CHLORIDE mmol/L  107  110  110   CO2 mmol/L  25.0  23.0  25.0   BUN mg/dL  25*  27*  34*   CREATININE mg/dL  1.40*  1.37*  1.67*   GLUCOSE mg/dL  103*  115*  82   CALCIUM mg/dL  8.6  8.5  8.6   BILIRUBIN mg/dL  0.3  0.3  0.3   ALK PHOS U/L  95  95  92   ALT (SGPT) U/L  <6*  10*  12*   AST (SGOT) U/L  16*  14*  20   ANION GAP mmol/L  8.0  9.0  11.0     Estimated Creatinine Clearance: 37.9 mL/min (A) (by C-G formula based on SCr of 1.4 mg/dL (H)).          Results from last 7 days   Lab Units  11/18/18   0602  11/17/18   0549  11/16/18   0542  11/15/18   0611  11/14/18   0525   WBC 10*3/mm3  17.81*  15.11*  12.14*  9.28  10.35*   HEMOGLOBIN g/dL  9.2*  8.8*  8.8*  9.0*  9.2*   HEMATOCRIT %  29.9*  28.4*  28.6*  29.5*  29.9*   PLATELETS 10*3/mm3  250  248  242  269  252           Culture Data:   No results found for: BLOODCX  No results found for: URINECX  No results found for: RESPCX  No results found for: WOUNDCX  No results found for: STOOLCX  No components found for: BODYFLD    Radiology Data:   Imaging Results (last 24 hours)     Procedure Component Value Units Date/Time    XR Chest PA & Lateral [[id number]] Collected:  11/18/18 0826     Updated:  11/18/18 0903    Narrative:           PROCEDURE: Chest PA and lateral    REASON FOR EXAM: leukocytosis, N30.01 Acute cystitis with  hematuria R41.82 Altered mental  status, unspecified Z74.09 Other  reduced mobility Z74.09 Other reduced mobility R13.10 Dysphagia,  unspecified I50.30 Unspecified diastolic (congestive) heart  failure N17.1 Acute kidney failure with acute cortical necrosis    FINDINGS: Comparison study dated November 5, 2018. . Cardiac and  pulmonary vasculature are normal . Right lung base small patchy  opacity. Lungs are otherwise clear. Small bilateral pleural  effusions. No acute osseous abnormality.      Impression:       1.  Right lung base small patchy opacity suspicious for pneumonia  versus subsegmental atelectasis.  2.  Small bilateral pleural effusions.    Electronically signed by:  Mikael Lundberg MD  11/18/2018 9:02 AM CST  Workstation: [id number]          I have reviewed the patient's current medications.     Assessment/Plan     Active Hospital Problems    Diagnosis   • **ARF (acute renal failure) (CMS/HCC)   • Pressure injury of deep tissue of right heel     Present on admission     • Candida cystitis   • Anemia of chronic disease   • Acute kidney injury (CMS/HCC)   • PVD (peripheral vascular disease) with claudication (CMS/HCC)     Added automatically from request for surgery [id number]     • Acute cystitis with hematuria   • Tricuspid regurgitation   • Pulmonary hypertension (CMS/HCC)   • CKD (chronic kidney disease) stage 3, GFR 30-59 ml/min (CMS/HCC)   • (HFpEF) heart failure with preserved ejection fraction (CMS/HCC)   • Pure hypercholesterolemia   • Hypertensive disorder       Plan:     HAP coverage: Cefepime, Levaquin, Vancomycin; renally dosed by pharmacy  Blood and sputum cultures  Continue Diflucan, renally dosed  Gentle hydration, hold diuretics, strict I&O, follow creatinine  IV Fluid: NS @ 75 ml/hr  S/P 2 units PRBC, PO iron, monitor H&H  Urology and nephrology consultations appreciated  PT/OT  Discharge planning: plan for home with home health with daughter, will need to assess DME needs        This document has been electronically signed by Andres  JUSTIN Suárez on November 18, 2018 10:59 AM

## 2018-11-19 PROBLEM — J18.9 HAP (HOSPITAL-ACQUIRED PNEUMONIA): Status: ACTIVE | Noted: 2018-01-01

## 2018-11-19 PROBLEM — R65.20 SEPSIS WITH ORGAN DYSFUNCTION (HCC): Status: ACTIVE | Noted: 2018-01-01

## 2018-11-19 PROBLEM — A41.9 SEPSIS WITH ORGAN DYSFUNCTION (HCC): Status: ACTIVE | Noted: 2018-01-01
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
## 2018-11-19 NOTE — PLAN OF CARE
Problem: Fall Risk (Adult)  Goal: Absence of Fall  Outcome: Ongoing (interventions implemented as appropriate)      Problem: Patient Care Overview  Goal: Plan of Care Review  Outcome: Ongoing (interventions implemented as appropriate)   11/19/18 8186   Coping/Psychosocial   Plan of Care Reviewed With patient   Plan of Care Review   Progress no change   OTHER   Outcome Summary Patient has been a little more alert today. Did wake up and take some of his medications. Vitals are stable. Lactic acid is increasing. Checking every 6 hours and will give bolus if lactic is above 4. Will continue to monitor.       Problem: Pain, Acute (Adult)  Goal: Acceptable Pain Control/Comfort Level  Outcome: Ongoing (interventions implemented as appropriate)      Problem: Urinary Tract Infection (Adult)  Goal: Signs and Symptoms of Listed Potential Problems Will be Absent, Minimized or Managed (Urinary Tract Infection)  Outcome: Ongoing (interventions implemented as appropriate)      Problem: Skin Injury Risk (Adult)  Goal: Skin Health and Integrity  Outcome: Ongoing (interventions implemented as appropriate)      Problem: Anemia (Adult)  Goal: Symptom Improvement  Outcome: Ongoing (interventions implemented as appropriate)

## 2018-11-19 NOTE — CONSULTS
Adult Nutrition  Assessment    Patient Name:  Ravi Park  YOB: 1930  MRN: [id number]  Admit Date:  11/5/2018    Assessment Date:  11/19/2018    Comments:  Pt remains lethargic and sleeping at most times.  PO intakes of pureed diet only bites at most meals.  Pt has some intake of whole milk and icecream at times.  Currently receiving IVF and diuretics held.  Wt down since admit.  APRN to order fluid bolus.  RD will monitor.    Reason for Assessment     Row Name 11/19/18 1642          Reason for Assessment    Reason For Assessment  follow-up protocol         Nutrition/Diet History     Row Name 11/19/18 1642          Nutrition/Diet History    Typical Food/Fluid Intake  Pt lethargic and sleeping soundly.  Minimal PO intake with only bites at lunch.  Some intake of milk and icecream/applesauce.           Labs/Tests/Procedures/Meds     Row Name 11/19/18 1643          Labs/Procedures/Meds    Lab Results Reviewed  reviewed, pertinent     Lab Results Comments  BUN 24; Cr 1.43        Medications    Pertinent Medications Reviewed  reviewed, pertinent         Physical Findings     Row Name 11/19/18 1645          Physical Findings    Overall Physical Appearance  generalized wasting;loss of muscle mass;loss of subcutaneous fat     Oral/Mouth Cavity  poor dentition     Skin  pressure injury           Nutrition Prescription Ordered     Row Name 11/19/18 1645          Nutrition Prescription PO    Current PO Diet  Pureed     Fluid Consistency  Thin     Supplement  Magic Cup;Milk;Ice Cream     Supplement Frequency  3 times a day     Common Modifiers  Cardiac;Consistent Carbohydrate         Evaluation of Received Nutrient/Fluid Intake     Row Name 11/19/18 1649          PO Evaluation    Number of Days PO Intake Evaluated  3 days     % PO Intake  0-25               Electronically signed by:  Pau Edwards RD  11/19/18 4:45 PM

## 2018-11-19 NOTE — THERAPY PROGRESS REPORT/RE-CERT
Acute Care - Occupational Therapy Progress Note  [facility]     Patient Name: Ravi Park  : 1930  MRN: [id number]  Today's Date: 2018  Onset of Illness/Injury or Date of Surgery: 18  Date of Referral to OT: 18  Referring Physician: Troy Laura    Admit Date: 2018       ICD-10-CM ICD-9-CM   1. Acute cystitis with hematuria N30.01 595.0   2. Altered mental status, unspecified altered mental status type R41.82 780.97   3. Impaired functional mobility and endurance Z74.09 V49.89   4. Impaired mobility and activities of daily living Z74.09 799.89   5. Dysphagia, unspecified type R13.10 787.20   6. (HFpEF) heart failure with preserved ejection fraction (CMS/HCC) I50.30 428.9   7. Acute renal failure with acute cortical necrosis (CMS/[facility]) N17.1 584.6     Patient Active Problem List   Diagnosis   • Peripheral arterial disease (CMS/HCC)   • Chronic atrial fibrillation (CMS/HCC)   • Backache   • Lumbar radiculopathy   • Edema   • Acute congestive heart failure (CMS/HCC)   • Acute blood loss anemia   • Acute on chronic diastolic heart failure (CMS/[facility])   • YESENIA (acute kidney injury) (CMS/HCC)   • (HFpEF) heart failure with preserved ejection fraction (CMS/[facility])   • Urinary retention   • Personal history of tobacco use, presenting hazards to health   • Vitreous floaters   • Upper respiratory infection   • Sprain of shoulder and upper arm   • Shoulder pain   • Serous otitis media   • Sensorineural hearing loss   • Pure hypercholesterolemia   • Pseudophakia   • Prostatitis   • Pain in wrist   • Pain in thumb joint with movement   • Pain in joint involving right lower leg   • Pain in eye   • Otorrhea   • Open wound of toe   • Open wound of lower leg with complication   • Open wound of lower leg   • Open wound of lesser toe of left foot without damage to nail   • Olecranon bursitis   • Need for prophylactic vaccination and inoculation against influenza   • Need for immunization against  influenza   • Multiple joint pain   • Malaise and fatigue   • Keratoconjunctivitis sicca (CMS/HCC)   • Insect bite   • Impacted cerumen   • Hypertensive disorder   • History of colonoscopy   • History of colon polyps   • History of artificial eye lens   • Herpes zoster   • Gout   • Exposure to communicable disease   • Dog bite of hand   • Disorder of sacroiliac joint   • Decreased platelet count (CMS/HCC)   • Cough   • Contusion of thumb   • Common cold   • Cervical arthritis   • Cerebrovascular accident (CMS/[facility])   • Cellulitis of lower leg   • Cataract   • Benign prostatic hyperplasia   • Atrial fibrillation (CMS/HCC)   • Astigmatism   • Allergic rhinitis due to pollen   • Allergic rhinitis   • Adverse reaction to drug   • Acute confusion   • Acute bronchitis   • Abnormal gait   • Urinary tract infection associated with indwelling urethral catheter (CMS/HCC)   • Hydronephrosis   • Nephrolithiasis   • Calculus of kidney   • Sepsis (CMS/HCC)   • Pulmonary hypertension (CMS/HCC)   • Tricuspid regurgitation   • CKD (chronic kidney disease) stage 3, GFR 30-59 ml/min (CMS/[facility])   • ESBL (extended spectrum beta-lactamase) producing bacteria infection   • Urinary tract infection associated with catheterization of urinary tract (CMS/HCC)   • Toxic encephalopathy   • Demand ischemia (CMS/HCC)   • Acute renal failure with tubular necrosis (CMS/HCC)   • Chronic atrial fibrillation (CMS/HCC)   • Acute cystitis with hematuria   • Sepsis (CMS/[facility])   • Decubitus ulcer of left heel, unstageable (CMS/HCC)   • Acute renal failure (CMS/HCC)   • Pneumonia due to infectious organism   • Non-healing wound of left heel   • PVD (peripheral vascular disease) with claudication (CMS/[facility])   • ARF (acute renal failure) (CMS/[facility])   • Candida cystitis   • Anemia of chronic disease   • Acute kidney injury (CMS/HCC)   • Pressure injury of deep tissue of right heel   • Sepsis with organ dysfunction (CMS/HCC)   • HAP (hospital-acquired pneumonia)      Past Medical History:   Diagnosis Date   • Benign prostatic hyperplasia    • Cerebrovascular accident (CMS/HCC)    • Chronic anemia    • Chronic atrial fibrillation (CMS/HCC)    • CKD (chronic kidney disease) stage 3, GFR 30-59 ml/min (CMS/HCC)    • GERD (gastroesophageal reflux disease)    • Gout    • Hydronephrosis    • Hypercholesterolemia    • Hypertension    • Nephrolithiasis    • Peripheral arterial disease (CMS/HCC)    • Pulmonary hypertension (CMS/HCC)    • Tricuspid regurgitation    • Urinary retention      Past Surgical History:   Procedure Laterality Date   • CARDIAC CATHETERIZATION     • NEPHROSTOMY         Therapy Treatment    Rehabilitation Treatment Summary     Row Name 11/19/18 1100             Treatment Time/Intention    Discipline  occupational therapist  -AS      Document Type  progress note/recertification  -AS      Subjective Information  no complaints  -AS      Mode of Treatment  individual therapy;occupational therapy  -AS      Patient/Family Observations  pt dghts and son intially present however left during session and arrived at completion. Family edu on pt progress, barriers (i.e. lethargy), and f/u recs  -AS      Care Plan Review  care plan/treatment goals reviewed;risks/benefits reviewed;current/potential barriers reviewed;patient/other agree to care plan;evaluation/treatment results reviewed  -AS      Total Minutes, Occupational Therapy Treatment  46  -AS2      Therapy Frequency (OT Eval)  other (see comments) 5-7days/wk  -AS2      Patient Effort  good  -AS2      Existing Precautions/Restrictions  fall;oxygen therapy device and L/min  -AS2      Patient Response to Treatment  Pt alert and oriented. Pt rec'd with legs partially hanging off bed requesting to get OOB. Pt able to sustain alertness throughout entirity of session  -AS2      Recorded by [AS] Debra Corado, OT 11/19/18 1200  [AS2] Debra Corado, OT 11/19/18 1311      Row Name 11/19/18 1100             Cognitive  Assessment/Intervention- PT/OT    Affect/Mental Status (Cognitive)  WFL  -AS      Orientation Status (Cognition)  oriented to;person;place;time  -AS      Follows Commands (Cognition)  50-74% accuracy;increased processing time needed;physical/tactile prompts required;repetition of directions required;verbal cues/prompting required  -AS      Cognitive Function (Cognitive)  memory deficit  -AS      Safety Deficit (Cognitive)  safety precautions awareness;safety precautions follow-through/compliance;insight into deficits/self awareness  -AS      Recorded by [AS] Debra Corado OT 11/19/18 1311      Row Name 11/19/18 1100             Safety Issues, Functional Mobility    Safety Issues Affecting Function (Mobility)  ability to follow commands;safety precaution awareness;safety precautions follow-through/compliance  -AS      Impairments Affecting Function (Mobility)  balance;cognition;endurance/activity tolerance;strength  -AS      Recorded by [AS] Debra Corado OT 11/19/18 1311      Row Name 11/19/18 1100             Bed Mobility Assessment/Treatment    Bed Mobility Assessment/Treatment  supine-sit;sit-supine  -AS      Supine-Sit [place] (Bed Mobility)  minimum assist (75% patient effort)  -AS      Sit-Supine [place] (Bed Mobility)  moderate assist (50% patient effort)  -AS      Bed Mobility, Safety Issues  decreased use of arms for pushing/pulling;decreased use of legs for bridging/pushing  -AS      Assistive Device (Bed Mobility)  bed rails;head of bed elevated  -AS      Recorded by [AS] Debra Corado OT 11/19/18 1311      Row Name 11/19/18 1100             Transfer Assessment/Treatment    Transfer Assessment/Treatment  sit-stand transfer;stand-sit transfer;toilet transfer  -AS      Recorded by [AS] Debra Corado OT 11/19/18 1311      Row Name 11/19/18 1100             Sit-Stand Transfer    Sit-Stand [place] (Transfers)  moderate assist (50% patient effort)  -AS      Assistive Device  (Sit-Stand Transfers)  other (see comments) HHA  -AS      Recorded by [AS] Debra Corado, OT 11/19/18 1311      Row Name 11/19/18 1100             Stand-Sit Transfer    Stand-Sit [place] (Transfers)  moderate assist (50% patient effort)  -AS      Assistive Device (Stand-Sit Transfers)  other (see comments) HHA  -AS      Recorded by [AS] Debra Corado, OT 11/19/18 1311      Row Name 11/19/18 1100             Toilet Transfer    Type (Toilet Transfer)  sit-stand;stand-sit;stand pivot/stand step  -AS      [place] Level (Toilet Transfer)  maximum assist (25% patient effort);2 person assist  -AS2      Recorded by [AS] Debra Corado, OT 11/19/18 1311  [AS2] Debra Corado, OT 11/19/18 1330      Row Name 11/19/18 1100             ADL Assessment/Intervention    BADL Assessment/Intervention  toileting;lower body dressing  -AS      Recorded by [AS] Debra Corado OT 11/19/18 1330      Row Name 11/19/18 1100             Lower Body Dressing Assessment/Training    Lower Body Dressing [place] Level  don;socks;dependent (less than 25% patient effort) L sock  -AS      Lower Body Dressing Position  edge of bed sitting  -AS      Recorded by [AS] Debra Corado, OT 11/19/18 1330      Row Name 11/19/18 1100             Toileting Assessment/Training    [place] Level (Toileting)  dependent (less than 25% patient effort);two person assist required  -AS      Assistive Devices (Toileting)  commode, 3-in-1  -AS      Toileting Position  unsupported sitting;supported standing  -AS      Comment (Toileting)  Pt required 2 person assist to safely complete toileting task. Pt max A of 1 person for standing while second person assisted with lindsay hygiene  -AS      Recorded by [AS] Debra Corado, OT 11/19/18 1330      Row Name 11/19/18 1100             BADL Safety/Performance    Impairments, BADL Safety/Performance  cognition;balance;endurance/activity tolerance;strength  -AS      Recorded by [AS] Debra Corado  ALEJA, OT 11/19/18 1330      Row Name 11/19/18 1100             General ROM    GENERAL ROM COMMENTS  BUE grossly WFL; L shld limited approx 90 deg shld flex/abd  -AS      Recorded by [AS] Debra Corado OT 11/19/18 1330      Row Name 11/19/18 1100             MMT (Manual Muscle Testing)    General MMT Comments  R shld 3+/5, L shld 3-/5; all else grossly 4/5   -AS      Recorded by [AS] Debra Corado OT 11/19/18 1330      Row Name 11/19/18 1100             Static Sitting Balance    Level of [place] (Supported Sitting, Static Balance)  standby assist  -AS      Sitting Position (Supported Sitting, Static Balance)  sitting on edge of bed  -AS      Time Able to Maintain Position (Supported Sitting, Static Balance)  other (see comments);more than 5 minutes  -AS      Comment (Supported Sitting, Static Balance)  pt tolerated sitting EOB approx 20 minutes  -AS      Recorded by [AS] Debra Corado OT 11/19/18 1330      Row Name 11/19/18 1100             Positioning and Restraints    Pre-Treatment Position  in bed  -AS      Post Treatment Position  bed  -AS      In Bed  notified nsg;side lying left;call light within reach;encouraged to call for assist;exit alarm on;with family/caregiver  -AS      Recorded by [AS] Debra Corado OT 11/19/18 1330      Row Name 11/19/18 1100             Pain Scale: Numbers Pre/Post-Treatment    Pain Scale: Numbers, Pretreatment  0/10 - no pain  -AS      Pain Scale: Numbers, Post-Treatment  0/10 - no pain  -AS      Recorded by [AS] Debra Corado OT 11/19/18 1330      Row Name                Wound 10/30/18 1715 Left heel pressure injury    Wound - Properties Group Date first assessed: 10/30/18 [ASA] Time first assessed: 1715 [ASA] Present On Admission : yes;picture taken [ASA] Side: Left [ASA] Location: heel [ASA] Type: pressure injury [ASA] Stage, Pressure Injury: unstageable [ASA] Recorded by:  [ASA] Giselle Alicea RN 10/30/18 1715    Row Name                Wound 11/06/18  0300 Left gluteal pressure injury    Wound - Properties Group Date first assessed: 11/06/18 [AB] Time first assessed: 0300 [AB] Present On Admission : yes;picture taken [SR], picture taken by nightshift RN  Side: Left [SR] Location: gluteal [SR] Type: pressure injury [SR] Stage, Pressure Injury: deep tissue injury [SR] Recorded by:  [AB] Jacquie Chu RN 11/08/18 1511 [SR] Lexi Chaparro RN 11/06/18 2051    Row Name 11/19/18 1100             Plan of Care Review    Plan of Care Reviewed With  patient  -AS      Recorded by [AS] Debra Corado, OT 11/19/18 1330      Row Name 11/19/18 1100             Outcome Summary/Treatment Plan (OT)    Daily Summary of Progress (OT)  progress toward functional goals is gradual  -AS      Barriers to Overall Progress (OT)  lethargy  -AS      Plan for Continued Treatment (OT)  contiue POC  -AS      Anticipated Discharge Disposition (OT)  skilled nursing facility  -AS      Recorded by [AS] Debra Corado, OT 11/19/18 1330        User Key  (r) = Recorded By, (t) = Taken By, (c) = Cosigned By    Initials Name Effective Dates Discipline    AB Jacquie Chu RN 10/17/16 -  Nurse    Lexi Mobley RN 10/17/16 -  Nurse    Giselle Marie RN 12/13/16 -  Nurse    AS Debra Corado, OT 05/01/18 -  OT        Wound 10/30/18 1715 Left heel pressure injury (Active)   Dressing Appearance dry;intact 11/19/2018  8:55 AM   Base dressing in place, unable to visualize 11/19/2018  8:55 AM   Care, Wound cleansed with 11/19/2018  1:27 AM   Dressing Care, Wound dressing changed 11/19/2018  1:27 AM       Wound 11/06/18 0300 Left gluteal pressure injury (Active)   Dressing Appearance open to air 11/18/2018  7:58 PM   Base maroon/purple 11/19/2018  8:55 AM   Periwound blanchable;redness 11/18/2018  7:58 PM   Periwound Care, Wound barrier ointment applied 11/18/2018  7:58 PM     OT Rehab Goals     Row Name 11/19/18 1100             Transfer Goal 1 (OT)    Activity/Assistive Device  (Transfer Goal 1, OT)  sit-to-stand/stand-to-sit;bed-to-chair/chair-to-bed;toilet  -AS      [place] Level/Cues Needed (Transfer Goal 1, OT)  contact guard assist  -AS      Time Frame (Transfer Goal 1, OT)  long term goal (LTG);by discharge  -AS      Progress/Outcome (Transfer Goal 1, OT)  goal not met  -AS         Bathing Goal 1 (OT)    Activity/Assistive Device (Bathing Goal 1, OT)  bathing skills, all using AE PRN  -AS      [place] Level/Cues Needed (Bathing Goal 1, OT)  minimum assist (75% or more patient effort)  -AS      Time Frame (Bathing Goal 1, OT)  long term goal (LTG);by discharge  -AS      Progress/Outcomes (Bathing Goal 1, OT)  goal not met  -AS         Dressing Goal 1 (OT)    Activity/Assistive Device (Dressing Goal 1, OT)  dressing skills, all using AE PRN  -AS      [place]/Cues Needed (Dressing Goal 1, OT)  minimum assist (75% or more patient effort)  -AS      Time Frame (Dressing Goal 1, OT)  long term goal (LTG);by discharge  -AS      Progress/Outcome (Dressing Goal 1, OT)  goal not met  -AS         Toileting Goal 1 (OT)    Activity/Device (Toileting Goal 1, OT)  toileting skills, all  -AS      [place] Level/Cues Needed (Toileting Goal 1, OT)  minimum assist (75% or more patient effort)  -AS      Time Frame (Toileting Goal 1, OT)  long term goal (LTG);by discharge  -AS      Progress/Outcome (Toileting Goal 1, OT)  goal not met  -AS         Strength Goal 1 (OT)    Strength Goal 1 (OT)  Pt will increase BUE strength at least 1/2 grade level to benefit ADLs and functional transfers.   -AS      Time Frame (Strength Goal 1, OT)  long term goal (LTG);by discharge  -AS      Progress/Outcome (Strength Goal 1, OT)  goal not met  -AS        User Key  (r) = Recorded By, (t) = Taken By, (c) = Cosigned By    Initials Name Provider Type Discipline    AS Debra Corado OT Occupational Therapist OT        Occupational Therapy Education     Title: PT OT SLP Therapies (Active)     Topic:  Occupational Therapy (Active)     Point: ADL training (Done)     Description: Instruct learner(s) on proper safety adaptation and remediation techniques during self care or transfers.   Instruct in proper use of assistive devices.    Learning Progress Summary           Patient Acceptance, E, VU,NR by AS at 11/19/2018  1:37 PM    Comment:  pt progress, goals, and recs; bed mobility; transfer training; ADL training    Acceptance, E,TB, VU by LW at 11/16/2018  2:37 PM    Acceptance, E,TB, VU by LW at 11/13/2018  2:28 PM   Family Acceptance, E, VU,NR by AS at 11/19/2018  1:37 PM    Comment:  pt progress, goals, and recs; bed mobility; transfer training; ADL training    Acceptance, E, NR by LM at 11/11/2018  4:30 PM                   Point: Home exercise program (Active)     Description: Instruct learner(s) on appropriate technique for monitoring, assisting and/or progressing therapeutic exercises/activities.    Learning Progress Summary           Patient Acceptance, E,TB, VU by LW at 11/16/2018  2:37 PM    Acceptance, E,TB, VU by LW at 11/13/2018  2:28 PM   Family Acceptance, E, NR by LM at 11/11/2018  4:30 PM                   Point: Precautions (Active)     Description: Instruct learner(s) on prescribed precautions during self-care and functional transfers.    Learning Progress Summary           Patient Acceptance, E,TB, VU by LW at 11/16/2018  2:37 PM    Acceptance, E,TB, VU by LW at 11/13/2018  2:28 PM   Family Acceptance, E, NR by LM at 11/11/2018  4:30 PM                   Point: Body mechanics (Active)     Description: Instruct learner(s) on proper positioning and spine alignment during self-care, functional mobility activities and/or exercises.    Learning Progress Summary           Patient Acceptance, E,TB, VU by LW at 11/16/2018  2:37 PM    Acceptance, E,TB, VU by LW at 11/13/2018  2:28 PM    Acceptance, E, NR,NL by AS at 11/8/2018  4:28 PM    Comment:  role of OT, OT POC, bed mobility   Family Acceptance, E,  NR by DARIN at 11/11/2018  4:30 PM                               User Key     Initials Effective Dates Name Provider Type Discipline    DARIN 03/07/18 -  Marilin Miramontes COTA/L Occupational Therapy Assistant OT    LW 03/07/18 -  Deisy Sullivan COTA/L Occupational Therapy Assistant OT    AS 05/01/18 -  Debra Corado, OT Occupational Therapist OT                OT Recommendation and Plan  Outcome Summary/Treatment Plan (OT)  Daily Summary of Progress (OT): progress toward functional goals is gradual  Barriers to Overall Progress (OT): lethargy  Plan for Continued Treatment (OT): contiue POC  Anticipated Discharge Disposition (OT): skilled nursing facility  Planned Therapy Interventions (OT Eval): activity tolerance training, adaptive equipment training, BADL retraining, functional balance retraining, occupation/activity based interventions, patient/caregiver education/training, ROM/therapeutic exercise, strengthening exercise, transfer/mobility retraining  Therapy Frequency (OT Eval): other (see comments)(5-7days/wk)  Daily Summary of Progress (OT): progress toward functional goals is gradual  Plan of Care Review  Plan of Care Reviewed With: patient  Plan of Care Reviewed With: patient  Outcome Summary: OT re-cert completed on this date. Pt agreeable to OT. Pt family present initally during session and then at completion. Pt performed supine>sit with min A, sit>supine with mod A, sit<>stand from EOB with mod A, SPT>BSC with max A (2 person for safety). Pt required (D) for toileting tasks and 2 person for safety. Pt able to tolerate sitting EOB approx 20 min with SBA. Pt presents with decreased strength, activity tolerance, balance, and mobility limiting participation in ADLs. Pt would benefit from continued skilled OT services to address deficits and promote highest level of functioning. Pt progress and POC reviewed and modified as appropriate. Pt is making gradual progress towards goals however does fluctuate due  to lethargy. Pt family participated in discussion regarding pt level of assist, progress, goals, barriers, and recs after d/c. Rec SNF at d/c for continued rehab prior to return home (family aware of these recs).    Outcome Measures     Row Name 11/19/18 1100 11/18/18 1350 11/17/18 1353       How much help from another person do you currently need...    Turning from your back to your side while in flat bed without using bedrails?  --  1  -EM  2  -EM    Moving from lying on back to sitting on the side of a flat bed without bedrails?  --  1  -EM  2  -EM    Moving to and from a bed to a chair (including a wheelchair)?  --  1  -EM  1  -EM    Standing up from a chair using your arms (e.g., wheelchair, bedside chair)?  --  1  -EM  1  -EM    Climbing 3-5 steps with a railing?  --  1  -EM  1  -EM    To walk in hospital room?  --  1  -EM  1  -EM    AM-PAC 6 Clicks Score  --  6  -EM  8  -EM       How much help from another is currently needed...    Putting on and taking off regular lower body clothing?  1  -AS  --  --    Bathing (including washing, rinsing, and drying)  2  -AS  --  --    Toileting (which includes using toilet bed pan or urinal)  1  -AS  --  --    Putting on and taking off regular upper body clothing  2  -AS  --  --    Taking care of personal grooming (such as brushing teeth)  2  -AS  --  --    Eating meals  4  -AS  --  --    Score  12  -AS  --  --       Functional Assessment    Outcome Measure Options  AM-PAC 6 Clicks Daily Activity (OT)  -AS  AM-PAC 6 Clicks Basic Mobility (PT)  -EM  AM-PAC 6 Clicks Basic Mobility (PT)  -EM      User Key  (r) = Recorded By, (t) = Taken By, (c) = Cosigned By    Initials Name Provider Type    EM Sergey Cabrera, PTA Physical Therapy Assistant    AS Debra Corado, OT Occupational Therapist           Time Calculation:   Time Calculation- OT     Row Name 11/19/18 1344             Time Calculation- OT    OT Start Time  1058  -AS      OT Stop Time  1144  -AS      OT Time  Calculation (min)  46 min  -AS      Total Timed Code Minutes- OT  46 minute(s)  -AS      OT Received On  11/19/18  -AS      OT Goal Re-Cert Due Date  12/02/18  -AS        User Key  (r) = Recorded By, (t) = Taken By, (c) = Cosigned By    Initials Name Provider Type    AS Debra Corado OT Occupational Therapist           Therapy Suggested Charges     Code   Minutes Charges    None           Therapy Charges for Today     Code Description Service Date Service Provider Modifiers Qty    [id number]  OT SELFCARE CURRENT 11/19/2018 Debra Corado OT GO, CL 1    [id number] HC OT SELFCARE PROJECTED 11/19/2018 Debra Corado OT GO, CK 1    [id number]  OT SELF CARE/MGMT/TRAIN EA 15 MIN 11/19/2018 Debra Corado OT GO 2    [id number]  OT THERAPEUTIC ACT EA 15 MIN 11/19/2018 Debra Corado OT GO 1          OT G-codes  OT Professional Judgement Used?: Yes  OT Functional Scales Options: AM-PAC 6 Clicks Daily Activity (OT)  Score: 12  Functional Limitation: Self care  Self Care Current Status (): At least 60 percent but less than 80 percent impaired, limited or restricted  Self Care Goal Status (): At least 40 percent but less than 60 percent impaired, limited or restricted    Debra Corado OT  11/19/2018

## 2018-11-19 NOTE — PROGRESS NOTES
[facility] Medicine Services  INPATIENT PROGRESS NOTE    Length of Stay: 4  Date of Admission: 11/5/2018  Primary Care Physician: Terrell Arzate MD    Subjective   Chief Complaint: AMS  HPI:  88 year old male with a history of PVD, HLD, pulmonary hypertension, HTN, CKDIII, HFpEF, and obstructive BPH and neurogenic bladder with chronic SP tube who presented to the ED with altered mental status and worsening confusion.  He has a history of recurrent UTI.  CT of the head was negative for acute intracranial pathology.  He was admitted and started on IV antibiotics.  Urine culture initially grew Klebsiella oxytoca and he was managed with Rocephin.  Repeat urine culture grew candida tropicalis.  Patient had an increase in his creatinine from 1.05 on 11/11 to 2.49 on 11/14.  He had decreased urine output.  SP tube was changed by urology.  FENa was >1% which is consistent with obstructive etiology.  Nephrology consulted and is recommending continued IV fluid, strict I&O, and holding diuretics.  He is an APS case.  Family plans to take him home.  He developed alteration of his mental status and WBC increased.  Chest x-ray reveals right lung base opacity suspicious for pneumonia and small bilateral effusion.  He was initiated on broad spectrum antibiotics for HAP.  He subsequently developed fever, tachycardia, and WBC increased to 26.8.      Review of Systems   Unable to perform ROS: Mental status change        All pertinent negatives and positives are as above. All other systems have been reviewed and are negative unless otherwise stated.     Objective    Temp:  [98.7 °F (37.1 °C)-101.4 °F (38.6 °C)] 98.7 °F (37.1 °C)  Heart Rate:  [] 102  Resp:  [18-20] 20  BP: (123-149)/(55-60) 133/60    Physical Exam   Constitutional: He is oriented to person, place, and time. He appears well-developed and well-nourished. No distress.   HENT:   Head: Normocephalic.   Eyes: Conjunctivae are  normal.   Cardiovascular: Normal rate, regular rhythm, normal heart sounds and intact distal pulses.   Pulmonary/Chest: Effort normal and breath sounds normal. No respiratory distress.   Abdominal: Soft. Bowel sounds are normal. He exhibits no distension. There is no tenderness.   Musculoskeletal: Normal range of motion. He exhibits no edema.   Neurological: He is alert and oriented to person, place, and time.   Skin: Skin is warm and dry. He is not diaphoretic.   Vitals reviewed.    Results Review:  I have reviewed the labs, radiology results, and diagnostic studies.    Laboratory Data:   Results from last 7 days   Lab Units  11/19/18   0653  11/18/18   0602  11/17/18   0549   SODIUM mmol/L  141  140  142   POTASSIUM mmol/L  3.8  3.7  3.2*   CHLORIDE mmol/L  107  107  110   CO2 mmol/L  20.0*  25.0  23.0   BUN mg/dL  24*  25*  27*   CREATININE mg/dL  1.43*  1.40*  1.37*   GLUCOSE mg/dL  99  103*  115*   CALCIUM mg/dL  8.4  8.6  8.5   BILIRUBIN mg/dL  0.5  0.3  0.3   ALK PHOS U/L  105  95  95   ALT (SGPT) U/L  <6*  <6*  10*   AST (SGOT) U/L  14*  16*  14*   ANION GAP mmol/L  14.0  8.0  9.0     Estimated Creatinine Clearance: 37.1 mL/min (A) (by C-G formula based on SCr of 1.43 mg/dL (H)).          Results from last 7 days   Lab Units  11/19/18   0653  11/18/18   0602  11/17/18   0549  11/16/18   0542  11/15/18   0611   WBC 10*3/mm3  26.85*  17.81*  15.11*  12.14*  9.28   HEMOGLOBIN g/dL  9.9*  9.2*  8.8*  8.8*  9.0*   HEMATOCRIT %  31.2*  29.9*  28.4*  28.6*  29.5*   PLATELETS 10*3/mm3  250  250  248  242  269           Culture Data:   Blood Culture   Date Value Ref Range Status   11/18/2018 No growth at 24 hours  Preliminary   11/18/2018 No growth at 24 hours  Preliminary     Urine Culture   Date Value Ref Range Status   11/18/2018 Culture in progress  Preliminary     No results found for: RESPCX  No results found for: WOUNDCX  No results found for: STOOLCX  No components found for: BODYFLD    Radiology Data:    Imaging Results (last 24 hours)     ** No results found for the last 24 hours. **          I have reviewed the patient's current medications.     Assessment/Plan     Active Hospital Problems    Diagnosis   • **Sepsis with organ dysfunction (CMS/[facility])   • HAP (hospital-acquired pneumonia)   • Pressure injury of deep tissue of right heel     Present on admission     • ARF (acute renal failure) (CMS/[facility])   • Candida cystitis   • Anemia of chronic disease   • Acute kidney injury (CMS/[facility])   • PVD (peripheral vascular disease) with claudication (CMS/[facility])     Added automatically from request for surgery [id number]     • Acute cystitis with hematuria   • Tricuspid regurgitation   • Pulmonary hypertension (CMS/[facility])   • CKD (chronic kidney disease) stage 3, GFR 30-59 ml/min (CMS/[facility])   • (HFpEF) heart failure with preserved ejection fraction (CMS/[facility])   • Pure hypercholesterolemia   • Hypertensive disorder       Plan:     HAP coverage: Cefepime, Levaquin, Vancomycin; renally dosed by pharmacy day 2/7  Follow cultures  Continue Diflucan, renally dosed  Diuretics on hold  IV Fluid: NS @ 75 ml/hr  Follow lactic acid  S/P 2 units PRBC, PO iron, monitor H&H  Urology and nephrology consultations appreciated  PT/OT  FULL CODE    Family meeting was held with the patient's daughter, son, and daughter-in-law.  They were informed of his worsening condition, development on sepsis from pneumonia with organ dysfunction, and poor prognosis and high probability of death.  Code status was discussed including the process and possible complications of resuscitation.  The patient's daughter stated it was his wishes to be resuscitated, and this was confirmed by other family members.  Family members agreed without objections that Taylor Hyman will be the primary point of contact for medical decisions.          This document has been electronically signed by JUSTIN Mcmanus on November 19, 2018 12:42 PM

## 2018-11-19 NOTE — PROGRESS NOTES
"   LOS: 4 days   Patient Care Team:  Terrell Arzate MD as PCP - General  Terrell Arzate MD as PCP - Claims Attributed    Subjective     Subjective:  Symptoms:  Worsening.  He reports malaise and weakness.  (Some sediment in tubing. Confused. ).    Diet:  Poor intake.  No nausea or vomiting.    Activity level: Impaired due to weakness.        History taken from: patient chart    Objective     Vital Signs  Temp:  [98.7 °F (37.1 °C)-101.4 °F (38.6 °C)] 98.7 °F (37.1 °C)  Heart Rate:  [] 102  Resp:  [18-20] 20  BP: (123-149)/(55-60) 133/60    Objective:  General Appearance:  In no acute distress.    Vital signs: (most recent): Blood pressure 133/60, pulse 102, temperature 98.7 °F (37.1 °C), temperature source Oral, resp. rate 20, height 182.9 cm (72\"), weight 73.5 kg (162 lb), SpO2 94 %.  Vital signs are normal.  No fever.    Output: Producing urine (SP tube to gravity. ).    HEENT: Normal HEENT exam.    Lungs:  Normal effort and normal respiratory rate.  Breath sounds clear to auscultation.  He is not in respiratory distress.  There are decreased breath sounds.    Heart: Normal rate.  Regular rhythm.  S1 normal and S2 normal.  Positive for murmur.    Chest: Symmetric chest wall expansion.   Abdomen: Abdomen is soft and non-distended.  (No redness at SP tube site).  Bowel sounds are normal.   There is no abdominal tenderness.  There is no suprapubic area tenderness.     Extremities: Normal range of motion.    Pulses: Distal pulses are intact.    Neurological: Patient is alert.  GCS score is 15.  (Awake. Confused. ).    Pupils:  Pupils are equal, round, and reactive to light.    Skin:  Warm, dry and pale.  No rash, ecchymosis or cyanosis.             Results Review:    Lab Results (last 24 hours)     Procedure Component Value Units Date/Time    MRSA Screen, PCR - Swab, Nares [[id number]]  (Abnormal) Collected:  11/19/18 1044    Specimen:  Swab from Nares Updated:  11/19/18 1316     MRSA, PCR Positive    " Narrative:       Performed by real-time polymerase chain reaction (qPCR).    Lactic Acid, Reflex [[id number]]  (Abnormal) Collected:  11/19/18 1236    Specimen:  Blood Updated:  11/19/18 1300     Lactate 3.0 mmol/L     Lactic Acid, Reflex Timer (This will reflex a repeat order 3-3:15 hours after ordered.) [[id number]] Collected:  11/19/18 0838    Specimen:  Blood Updated:  11/19/18 1215     Extra Tube Hold for add-ons.     Comment: Auto resulted.       Blood Culture - Blood, Arm, Left [[id number]] Collected:  11/18/18 0926    Specimen:  Blood from Arm, Left Updated:  11/19/18 1015     Blood Culture No growth at 24 hours    Lactic Acid, Plasma [[id number]]  (Abnormal) Collected:  11/19/18 0838    Specimen:  Blood Updated:  11/19/18 0911     Lactate 2.3 mmol/L     Blood Culture - Blood, Arm, Left [[id number]] Collected:  11/18/18 0817    Specimen:  Blood from Arm, Left Updated:  11/19/18 0900     Blood Culture No growth at 24 hours    Comprehensive Metabolic Panel [[id number]]  (Abnormal) Collected:  11/19/18 0653    Specimen:  Blood Updated:  11/19/18 0741     Glucose 99 mg/dL      BUN 24 mg/dL      Creatinine 1.43 mg/dL      Sodium 141 mmol/L      Potassium 3.8 mmol/L      Chloride 107 mmol/L      CO2 20.0 mmol/L      Calcium 8.4 mg/dL      Total Protein 6.7 g/dL      Albumin 3.00 g/dL      ALT (SGPT) <6 U/L      AST (SGOT) 14 U/L      Alkaline Phosphatase 105 U/L      Total Bilirubin 0.5 mg/dL      eGFR Non African Amer 47 mL/min/1.73      Globulin 3.7 gm/dL      A/G Ratio 0.8 g/dL      BUN/Creatinine Ratio 16.8     Anion Gap 14.0 mmol/L     Narrative:       The MDRD GFR formula is only valid for adults with stable renal function between ages 18 and 70.    CBC & Differential [[id number]] Collected:  11/19/18 0653    Specimen:  Blood Updated:  11/19/18 0711    Narrative:       The following orders were created for panel order CBC & Differential.  Procedure                               Abnormality         Status                      ---------                               -----------         ------                     CBC Auto Differential[[id number]]        Abnormal            Final result                 Please view results for these tests on the individual orders.    CBC Auto Differential [[id number]]  (Abnormal) Collected:  11/19/18 0653    Specimen:  Blood Updated:  11/19/18 0711     WBC 26.85 10*3/mm3      RBC 3.54 10*6/mm3      Hemoglobin 9.9 g/dL      Hematocrit 31.2 %      MCV 88.1 fL      MCH 28.0 pg      MCHC 31.7 g/dL      RDW 16.0 %      RDW-SD 52.3 fl      MPV 9.8 fL      Platelets 250 10*3/mm3      Neutrophil % 90.4 %      Lymphocyte % 3.6 %      Monocyte % 4.9 %      Eosinophil % 0.2 %      Basophil % 0.1 %      Immature Grans % 0.8 %      Neutrophils, Absolute 24.28 10*3/mm3      Lymphocytes, Absolute 0.96 10*3/mm3      Monocytes, Absolute 1.32 10*3/mm3      Eosinophils, Absolute 0.05 10*3/mm3      Basophils, Absolute 0.02 10*3/mm3      Immature Grans, Absolute 0.22 10*3/mm3     Urine Culture - Urine, Urine, Catheter [[id number]]  (Normal) Collected:  11/18/18 1616    Specimen:  Urine, Catheter Updated:  11/19/18 0611     Urine Culture Culture in progress    Urinalysis, Microscopic Only - Urine, Catheter [[id number]]  (Abnormal) Collected:  11/18/18 1616    Specimen:  Urine, Catheter Updated:  11/18/18 1641     RBC, UA None Seen /HPF      WBC, UA 13-20 /HPF      Bacteria, UA 2+ /HPF      Squamous Epithelial Cells, UA 0-2 /HPF      Hyaline Casts, UA 0-2 /LPF      Granular Casts, UA 0-2 /LPF      Amorphous Crystals, UA Small/1+ /HPF      Methodology Manual Light Microscopy    Urinalysis With Culture If Indicated - Urine, Catheter [[id number]]  (Abnormal) Collected:  11/18/18 1616    Specimen:  Urine, Catheter Updated:  11/18/18 1624     Color, UA Yellow     Appearance, UA Cloudy     pH, UA 5.5     Specific Gravity, UA 1.017     Glucose, UA Negative     Ketones, UA Negative     Bilirubin, UA Negative     Blood, UA Negative      Protein, UA 30 mg/dL (1+)     Leuk Esterase, UA Small (1+)     Nitrite, UA Negative     Urobilinogen, UA 0.2 E.U./dL         Imaging Results (last 24 hours)     ** No results found for the last 24 hours. **           I reviewed the patient's new clinical results.  I reviewed the patient's new imaging results and agree with the interpretation.  I reviewed the patient's other test results and agree with the interpretation      Assessment/Plan       Sepsis with organ dysfunction (CMS/HCC)    (HFpEF) heart failure with preserved ejection fraction (CMS/[facility])    Pure hypercholesterolemia    Hypertensive disorder    Pulmonary hypertension (CMS/HCC)    Tricuspid regurgitation    CKD (chronic kidney disease) stage 3, GFR 30-59 ml/min (CMS/[facility])    Acute cystitis with hematuria    PVD (peripheral vascular disease) with claudication (CMS/[facility])    ARF (acute renal failure) (CMS/[facility])    Candida cystitis    Anemia of chronic disease    Acute kidney injury (CMS/[facility])    Pressure injury of deep tissue of right heel    HAP (hospital-acquired pneumonia)      Assessment:    Condition: In stable condition.       1. Obstructive BPH causing neurogenic bladder, chronic SP tube-->SP tube changed 11/14/18, caused YESENIA related to obstruction of catheter.   -Proscar, Flomax  -Chronic SP tube, changed 11/14/18     2. UTI cystitis, complicated now with hospital acquired pneumonia, cefepime, Levaquin, Vancomycin  -WBC 15.11-->17.81-->26.85  -Urine culture 11/5/18 Klebsiella oxytoca  -Antibiotic 10 days, Rocephin complete.   -Diflucan Day 9  -On   -Urine culture 11/11/18 candida tropicalis    Estimated Creatinine Clearance: 37.1 mL/min (A) (by C-G formula based on SCr of 1.43 mg/dL (H)).   -Cr 1.55-->2.18-->2.49-->2.21-->1.67-->1.37-->1.40-->1.43, stable, baseline Cr 1.2-1.3    Plan:   SP tube changed 11/14/18  Continue antibiotics and Diflucan.   Will follow.     JUSTIN Selby  11/19/18  1:47 PM

## 2018-11-19 NOTE — PLAN OF CARE
Problem: Patient Care Overview  Goal: Plan of Care Review  Outcome: Ongoing (interventions implemented as appropriate)   11/19/18 7482   Coping/Psychosocial   Plan of Care Reviewed With patient   OTHER   Outcome Summary OT re-cert completed on this date. Pt agreeable to OT. Pt family present initally during session and then at completion. Pt performed supine>sit with min A, sit>supine with mod A, sit<>stand from EOB with mod A, SPT>BSC with max A (2 person for safety). Pt required (D) for toileting tasks and 2 person for safety. Pt able to tolerate sitting EOB approx 20 min with SBA. Pt presents with decreased strength, activity tolerance, balance, and mobility limiting participation in ADLs. Pt would benefit from continued skilled OT services to address deficits and promote highest level of functioning. Pt progress and POC reviewed and modified as appropriate. Pt is making gradual progress towards goals however does fluctuate due to lethargy. Pt family participated in discussion regarding pt level of assist, progress, goals, barriers, and recs after d/c. Rec SNF at d/c for continued rehab prior to return home (family aware of these recs).

## 2018-11-19 NOTE — PLAN OF CARE
Problem: Patient Care Overview  Goal: Plan of Care Review  Outcome: Ongoing (interventions implemented as appropriate)   11/19/18 8691   Coping/Psychosocial   Plan of Care Reviewed With patient   Plan of Care Review   Progress no change   OTHER   Outcome Summary Pt remains lethargic and sleepy most times. Pt ate only bites for CNA at lunch. PO intakes only bites at most meals over past 3 days. Pt has some intake of whole milk, icecream, applesauce.

## 2018-11-19 NOTE — SIGNIFICANT NOTE
"   11/19/18 6575   Rehab Treatment   Discipline physical therapy assistant   Reason Treatment Not Performed patient/family declined treatment  (pt stated, \"I don't feel like it\")     "

## 2018-11-19 NOTE — PROGRESS NOTES
"Pharmacokinetics by Pharmacy - Vancomycin    Ravi Park is a 88 y.o. male receiving vancomycin 1000mg IV Q24 day 2/7 for HAP    Patient is also receiving cefepime and levaquin    Objective:  [Ht: 182.9 cm (72\"); Wt: 73.5 kg (162 lb)]     Lab Results   Component Value Date    WBC 26.85 (H) 11/19/2018    WBC 17.81 (H) 11/18/2018    WBC 15.11 (H) 11/17/2018      Lab Results   Component Value Date    LACTATE 2.3 (C) 11/19/2018    LACTATE 1.7 11/05/2018    LACTATE 3.9 (C) 09/08/2018      Temp Readings from Last 1 Encounters:   11/19/18 98.7 °F (37.1 °C) (Oral)     Estimated Creatinine Clearance: 37.1 mL/min (A) (by C-G formula based on SCr of 1.43 mg/dL (H)).   Lab Results   Component Value Date    CREATININE 1.43 (H) 11/19/2018    CREATININE 1.40 (H) 11/18/2018    CREATININE 1.37 (H) 11/17/2018       Lab Results   Component Value Date    VANCOPEAK 25.56 (L) 09/09/2018    VANCOTROUGH <5.00 (L) 06/21/2018       Culture Results:  Microbiology Results (last 10 days)       Procedure Component Value - Date/Time    Urine Culture - Urine, Urine, Catheter [[id number]]  (Normal) Collected:  11/18/18 1616    Lab Status:  Preliminary result Specimen:  Urine, Catheter Updated:  11/19/18 0611     Urine Culture Culture in progress    Blood Culture - Blood, Arm, Left [[id number]] Collected:  11/18/18 0926    Lab Status:  Preliminary result Specimen:  Blood from Arm, Left Updated:  11/19/18 1015     Blood Culture No growth at 24 hours    Blood Culture - Blood, Arm, Left [[id number]] Collected:  11/18/18 0817    Lab Status:  Preliminary result Specimen:  Blood from Arm, Left Updated:  11/19/18 0900     Blood Culture No growth at 24 hours    Urine Culture - Urine, Urine, Clean Catch [[id number]] Collected:  11/14/18 1050    Lab Status:  Final result Specimen:  Urine, Clean Catch Updated:  11/15/18 0625     Urine Culture Mixed Ashley Isolated    Narrative:       Specimen contains mixed organisms of questionable pathogenicity which indicates " contamination with commensal frantz.  Further identification is unlikely to provide clinically useful information.  Suggest recollection.    Urine Culture - Urine, Urine, Catheter [[id number]]  (Abnormal) Collected:  11/11/18 1450    Lab Status:  Final result Specimen:  Urine, Catheter Updated:  11/14/18 1144     Urine Culture >100,000 CFU/mL Candida tropicalis    Antifungal AST 9 Drug Panel - Isolate, Isolate [[id number]] Collected:  11/11/18 1450    Lab Status:  Preliminary result Specimen:  Isolate Updated:  11/17/18 0816     Organism ID, Yeast Preliminary report     Comment: Specimen has been received and testing has been initiated.        Please note Comment     Comment: [employer]SI does not have established guidelines for interpretation of  these organism-drug combinations.  For research use only.        BROMINE-TOTAL,SERUM/PLASMA Comment     Comment: Results for this test are for research purposes only by the assay's  .  The performance characteristics of this product have  not been established.  Results should not be used as a diagnostic  procedure without confirmation of the diagnosis by another medically  established diagnostic product or procedure.       Narrative:       Performed at:  [address] [place] [address]  [id number]  : Bella Gómez MD, Phone:  [phone]                 Assessment:  Patient has had a recent YESENIA; however, current renal function is close to baseline.  Patient received several days of Abx for UTI treatment prior to the development of PNA  Previously started on 1g every 24 hours    Plan:  1. Continue vancomycin 1000mg IV Q24h  2. Peak ordered for 11/20 at 1430, trough ordered for 11/21 at 1230.  3. Pharmacy will monitor renal function and adjust dose accordingly.      Eron Anderson [facility]   11/19/18 12:49 PM

## 2018-11-19 NOTE — PLAN OF CARE
Problem: Fall Risk (Adult)  Goal: Absence of Fall  Outcome: Ongoing (interventions implemented as appropriate)      Problem: Patient Care Overview  Goal: Plan of Care Review  Outcome: Ongoing (interventions implemented as appropriate)   11/18/18 4151   Coping/Psychosocial   Plan of Care Reviewed With patient   Plan of Care Review   Progress no change   OTHER   Outcome Summary Pt lethargic. Pt unable to swallow pills. VSS. No signs of distress. Will continue to monitor.      Goal: Individualization and Mutuality  Outcome: Ongoing (interventions implemented as appropriate)    Goal: Discharge Needs Assessment  Outcome: Ongoing (interventions implemented as appropriate)      Problem: Infection, Risk/Actual (Adult)  Goal: Infection Prevention/Resolution  Outcome: Ongoing (interventions implemented as appropriate)      Problem: Pain, Acute (Adult)  Goal: Acceptable Pain Control/Comfort Level  Outcome: Ongoing (interventions implemented as appropriate)      Problem: Wound (Includes Pressure Injury) (Adult)  Goal: Signs and Symptoms of Listed Potential Problems Will be Absent, Minimized or Managed (Wound)  Outcome: Ongoing (interventions implemented as appropriate)      Problem: Urinary Tract Infection (Adult)  Goal: Signs and Symptoms of Listed Potential Problems Will be Absent, Minimized or Managed (Urinary Tract Infection)  Outcome: Ongoing (interventions implemented as appropriate)      Problem: Cardiac Output Decreased (Adult)  Goal: Effective Tissue Perfusion  Outcome: Ongoing (interventions implemented as appropriate)      Problem: Skin Injury Risk (Adult)  Goal: Skin Health and Integrity  Outcome: Ongoing (interventions implemented as appropriate)

## 2018-11-19 NOTE — NURSING NOTE
Informed Andres APRN of lactic acid being 2.3 and patient having 16 beats of wide QRS. Stated to continue fluids and monitor.

## 2018-11-20 NOTE — PROGRESS NOTES
[facility] Medicine Services  INPATIENT PROGRESS NOTE    Length of Stay: 5  Date of Admission: 11/5/2018  Primary Care Physician: Terrell Arzate MD    Subjective   Chief Complaint: AMS  HPI:  88 year old male with a history of PVD, HLD, pulmonary hypertension, HTN, CKDIII, HFpEF, and obstructive BPH and neurogenic bladder with chronic SP tube who presented to the ED with altered mental status and worsening confusion.  He has a history of recurrent UTI.  CT of the head was negative for acute intracranial pathology.  He was admitted and started on IV antibiotics.  Urine culture initially grew Klebsiella oxytoca and he was managed with Rocephin.  Repeat urine culture grew candida tropicalis.  Patient had an increase in his creatinine from 1.05 on 11/11 to 2.49 on 11/14.  He had decreased urine output.  SP tube was changed by urology.  FENa was >1% which is consistent with obstructive etiology.  Nephrology consulted and is recommending continued IV fluid, strict I&O, and holding diuretics.  He is an APS case.  Family plans to take him home.  He developed alteration of his mental status and WBC increased.  Chest x-ray reveals right lung base opacity suspicious for pneumonia and small bilateral effusion.  He was initiated on broad spectrum antibiotics for HAP.  He subsequently developed fever, tachycardia, and leukocytosis.  Lactic acid improved with hydration.  WBC increased to 33.  He is confused this morning.       Family meeting was held with the patient's daughter, son, and daughter-in-law on 11/19.  They were informed of his worsening condition, development on sepsis from pneumonia with organ dysfunction, and poor prognosis and high probability of death.  Code status was discussed including the process and possible complications of resuscitation.  The patient's daughter stated it was his wishes to be resuscitated, and this was confirmed by other family members.  Family  members agreed without objections that Taylor Hyman will be the primary point of contact for medical decisions.      Review of Systems   Unable to perform ROS: Mental status change        All pertinent negatives and positives are as above. All other systems have been reviewed and are negative unless otherwise stated.     Objective    Temp:  [97.8 °F (36.6 °C)-98.7 °F (37.1 °C)] 97.8 °F (36.6 °C)  Heart Rate:  [] 89  Resp:  [18-20] 18  BP: (129-142)/(62-65) 129/63    Physical Exam   Constitutional: He appears well-developed and well-nourished. No distress.   HENT:   Head: Normocephalic.   Eyes: Conjunctivae are normal.   Cardiovascular: Normal rate, regular rhythm, normal heart sounds and intact distal pulses.   Pulmonary/Chest: Effort normal and breath sounds normal. No respiratory distress.   Abdominal: Soft. Bowel sounds are normal. He exhibits no distension. There is no tenderness.   Musculoskeletal: Normal range of motion. He exhibits no edema.   Neurological: He is alert. He is disoriented.   Skin: Skin is warm and dry. He is not diaphoretic.   Vitals reviewed.    Results Review:  I have reviewed the labs, radiology results, and diagnostic studies.    Laboratory Data:   Results from last 7 days   Lab Units  11/20/18   0554  11/19/18   0653  11/18/18   0602   SODIUM mmol/L  144  141  140   POTASSIUM mmol/L  3.6  3.8  3.7   CHLORIDE mmol/L  115*  107  107   CO2 mmol/L  20.0*  20.0*  25.0   BUN mg/dL  28*  24*  25*   CREATININE mg/dL  1.33*  1.43*  1.40*   GLUCOSE mg/dL  108*  99  103*   CALCIUM mg/dL  8.3*  8.4  8.6   BILIRUBIN mg/dL  0.3  0.5  0.3   ALK PHOS U/L  96  105  95   ALT (SGPT) U/L  13*  <6*  <6*   AST (SGOT) U/L  20  14*  16*   ANION GAP mmol/L  9.0  14.0  8.0     Estimated Creatinine Clearance: 41.5 mL/min (A) (by C-G formula based on SCr of 1.33 mg/dL (H)).          Results from last 7 days   Lab Units  11/20/18   0554  11/19/18   0653  11/18/18   0602  11/17/18   0549  11/16/18   0542   WBC  10*3/mm3  33.72*  26.85*  17.81*  15.11*  12.14*   HEMOGLOBIN g/dL  9.2*  9.9*  9.2*  8.8*  8.8*   HEMATOCRIT %  29.2*  31.2*  29.9*  28.4*  28.6*   PLATELETS 10*3/mm3  276  250  250  248  242           Culture Data:   Blood Culture   Date Value Ref Range Status   11/18/2018 No growth at 24 hours  Preliminary   11/18/2018 No growth at 2 days  Preliminary     Urine Culture   Date Value Ref Range Status   11/18/2018 No growth at 24 hours  Final     No results found for: RESPCX  No results found for: WOUNDCX  No results found for: STOOLCX  No components found for: BODYFLD    Radiology Data:   Imaging Results (last 24 hours)     ** No results found for the last 24 hours. **          I have reviewed the patient's current medications.     Assessment/Plan     Active Hospital Problems    Diagnosis   • **Sepsis with organ dysfunction (CMS/HCC)   • HAP (hospital-acquired pneumonia)   • Pressure injury of deep tissue of right heel     Present on admission     • ARF (acute renal failure) (CMS/HCC)   • Candida cystitis   • Anemia of chronic disease   • Acute kidney injury (CMS/HCC)   • PVD (peripheral vascular disease) with claudication (CMS/HCC)     Added automatically from request for surgery [id number]     • Acute cystitis with hematuria   • Tricuspid regurgitation   • Pulmonary hypertension (CMS/HCC)   • CKD (chronic kidney disease) stage 3, GFR 30-59 ml/min (CMS/HCC)   • (HFpEF) heart failure with preserved ejection fraction (CMS/HCC)   • Pure hypercholesterolemia   • Hypertensive disorder       Plan:     HAP coverage: Cefepime, Levaquin, Vancomycin; renally dosed by pharmacy day 3/7  Follow cultures  Continue Diflucan, renally dosed  Diuretics on hold  IV Fluid: NS @ 125 ml/hr  S/P 2 units PRBC, PO iron, monitor H&H  Urology and nephrology consultations appreciated  PT/OT  FULL CODE          This document has been electronically signed by JUSTIN Mcmanus on November 20, 2018 9:46 AM

## 2018-11-20 NOTE — DISCHARGE SUMMARY
[facility] Medicine Services  DISCHARGE SUMMARY       Date of Admission: 11/5/2018  Date of Discharge:  11/20/2018  Primary Care Physician: Terrell Arzate MD    Presenting Problem/History of Present Illness:  Acute cystitis with hematuria [N30.01]  Altered mental status, unspecified altered mental status type [R41.82]  Acute kidney injury (CMS/HCC) [N17.9]     Final Discharge Diagnoses:    Sepsis with organ dysfunction (CMS/HCC)    HAP (hospital-acquired pneumonia)    (HFpEF) heart failure with preserved ejection fraction (CMS/HCC)    Pure hypercholesterolemia    Hypertensive disorder    Pulmonary hypertension (CMS/HCC)    Tricuspid regurgitation    CKD (chronic kidney disease) stage 3, GFR 30-59 ml/min (CMS/HCC)    Acute cystitis with hematuria    PVD (peripheral vascular disease) with claudication (CMS/HCC)    ARF (acute renal failure) (CMS/HCC)    Candida cystitis    Anemia of chronic disease    Acute kidney injury (CMS/HCC)    Pressure injury of deep tissue of right heel      Consults:   Consults     Date and Time Order Name Status Description    11/13/2018 1743 Inpatient Nephrology Consult Completed     11/5/2018 2050 Inpatient Urology Consult Completed           Pertinent Test Results:   Xr Chest 2 View    Result Date: 11/5/2018  PROCEDURE: Chest PA and lateral REASON FOR EXAM: CHF/COPD Protocol FINDINGS: Comparison study dated September 15, 2018. . Cardiac and pulmonary vasculature are normal . Lungs are clear. Pleural spaces are normal . No acute osseous abnormality.     No acute cardiopulmonary abnormality. Electronically signed by:  Mikael Lundberg MD  11/5/2018 4:31 PM CST Workstation: [id number]    Ct Head Without Contrast    Result Date: 11/5/2018  .    EXAMINATION:  Computed Tomography    REGION:  Head         INDICATION:  altered mental status  HISTORY: CORRELATIVE IMAGING:    CT head 9/8/18  TECHNIQUE:  iv contrast:  no  This exam was performed according to the  departmental dose-optimization program which includes automated exposure control, adjustment of the mA and/or kV according to patient size and/or use of iterative reconstruction technique.          COMMENTS:            - atrophy:              wnl for age   - cortex: age related degenerated changes    - deep white mat: age related degenerated changes     - hemorrhage:       none     - fluid collection: no intra/extra axial fluid collection   - mass / lesion:     no focal parenchymal lesion(s)     - gray/white jxn:   borders preserved       - brain stem:         wnl     - cerebellum:        wnl     - globes / retro:     wnl     - ventricles:          normal size / configuration     - midline shift:      no     - sinuses:              wnl     - mastoids:           wnl      - osseous:             wnl     - misc.: .       CONCLUSION:  1.  Negative examination for acute intracranial pathology.       If signs or symptoms persist beyond reasonable expectations, a MRI examination is suggested as is deemed clinically appropriate.    Electronically signed by:  RUMA Vaca MD  11/5/2018 6:45 PM CST Workstation: [id number]    Xr Chest Pa & Lateral    Result Date: 11/18/2018  PROCEDURE: Chest PA and lateral REASON FOR EXAM: leukocytosis, N30.01 Acute cystitis with hematuria R41.82 Altered mental status, unspecified Z74.09 Other reduced mobility Z74.09 Other reduced mobility R13.10 Dysphagia, unspecified I50.30 Unspecified diastolic (congestive) heart failure N17.1 Acute kidney failure with acute cortical necrosis FINDINGS: Comparison study dated November 5, 2018. . Cardiac and pulmonary vasculature are normal . Right lung base small patchy opacity. Lungs are otherwise clear. Small bilateral pleural effusions. No acute osseous abnormality.     1.  Right lung base small patchy opacity suspicious for pneumonia versus subsegmental atelectasis. 2.  Small bilateral pleural effusions. Electronically signed by:  Mikael Lundberg MD   11/18/2018 9:02 AM [facility] Workstation: [id number]      HPI/Hospital Course:  The patient is a 88 y.o. male with a history of PVD, HLD, pulmonary hypertension, HTN, CKDIII, HFpEF, and obstructive BPH and neurogenic bladder with chronic SP tube who presented to [facility] with altered mental status and worsening confusion.  He has a history of recurrent UTI.  CT of the head was negative for acute intracranial pathology.  He was admitted and started on IV antibiotics.  Urine culture initially grew Klebsiella oxytoca and he was managed with Rocephin.  Repeat urine culture grew candida tropicalis.  Patient had an increase in his creatinine from 1.05 on 11/11 to 2.49 on 11/14.  He had decreased urine output.  SP tube was changed by urology.  FENa was >1% which is consistent with obstructive etiology.  Nephrology consulted and is recommending continued IV fluid, strict I&O, and holding diuretics. His renal function improved and has stabilized at approximately 1.3.  He developed a new alteration of his mental status and WBC increased. Chest x-ray reveals right lung base opacity suspicious for pneumonia and small bilateral effusion.  He was initiated on broad spectrum antibiotics for HAP with cefepime, levaquin, and vancomycin.  MRSA screen is positive.  He subsequently developed fever, tachycardia, worsening leukocytosis, and lactic acid elevation.  Lactic acid improved with IV fluid resuscitation.  WBC continues to increase and he remains confused.  Family meeting was held with the patient's daughter, son, and daughter-in-law on 11/19.  They were informed of his worsening condition, development on sepsis from pneumonia with organ dysfunction, and poor prognosis and high probability of death.  Code status was discussed including the process and possible complications of resuscitation.  The patient's daughter stated it was his wishes to be resuscitated, and this was confirmed by other family members.  The  "patient's daughter this morning stated she wished for him to be transferred to another facility.  She was informed that such a transfer would be a lateral transfer and transportation was unlikely to be covered by insurance.  She verbalized understanding and stated she just needed to know she had done everything.  He has been accepted at [facility] in [place].        Condition on Discharge:  Guarded    Physical Exam on Discharge:  /60 (BP Location: Left arm)   Pulse 78   Temp 98 °F (36.7 °C) (Oral)   Resp 20   Ht 182.9 cm (72\")   Wt 76.4 kg (168 lb 6.4 oz)   SpO2 97%   BMI 22.84 kg/m²   Physical Exam  Constitutional: He appears well-developed and well-nourished. No distress.   HENT:   Head: Normocephalic.   Eyes: Conjunctivae are normal.   Cardiovascular: Normal rate, regular rhythm, normal heart sounds and intact distal pulses.   Pulmonary/Chest: Effort normal. Breath sounds diminished. No respiratory distress.   Abdominal: Soft. Bowel sounds are normal. He exhibits no distension. There is no tenderness. SP tube.  Musculoskeletal: Normal range of motion. He exhibits no edema.   Neurological: He is alert. He is disoriented.   Skin: Skin is warm and dry. He is not diaphoretic.   Vitals reviewed.       Discharge Disposition:  Short Term Hospital (DC - External)    Discharge Medications:     Discharge Medications      New Medications      Instructions Start Date   cefepime 2 G/ ML solution  Commonly known as:  MAXIPIME   2 g, Intravenous, Every 12 Hours      enoxaparin 40 MG/0.4ML solution syringe  Commonly known as:  LOVENOX   40 mg, Subcutaneous, Daily      fluconazole 100 MG tablet  Commonly known as:  DIFLUCAN   100 mg, Oral, Every 24 Hours      PHARMACY TO DOSE LEVOFLOXACIN   Does not apply, Continuous PRN      sodium chloride 0.9 % solution   125 mL/hr, Intravenous, Continuous      vancomycin   1 g, Intravenous, Every 24 Hours         Continue These Medications      Instructions Start Date "   acetaminophen 325 MG tablet  Commonly known as:  TYLENOL  Notes to patient:  As needed   650 mg, Oral, Every 6 Hours PRN      albuterol 108 (90 Base) MCG/ACT inhaler  Commonly known as:  PROVENTIL HFA;VENTOLIN HFA  Notes to patient:  As needed   2 puffs, Inhalation, Every 6 Hours PRN, 2 Puffs 4 times per day as needed.       aspirin 81 MG chewable tablet   81 mg, Oral, Daily      bumetanide 1 MG tablet  Commonly known as:  BUMEX   1 mg, Oral, Daily      CLARITIN 10 MG tablet  Generic drug:  loratadine   10 mg, Oral, Daily      clopidogrel 75 MG tablet  Commonly known as:  PLAVIX   75 mg, Oral, Daily      docusate sodium 100 MG capsule   100 mg, Oral, 2 Times Daily      famotidine 40 MG tablet  Commonly known as:  PEPCID   40 mg, Oral, Daily      finasteride 5 MG tablet  Commonly known as:  PROSCAR   5 mg, Oral, Daily      fluticasone 50 MCG/ACT nasal spray  Commonly known as:  FLONASE   1 spray, Each Nare, Daily      folic acid 1 MG tablet  Commonly known as:  FOLVITE   1 mg, Oral, Daily      levothyroxine 50 MCG tablet  Commonly known as:  SYNTHROID, LEVOTHROID   50 mcg, Oral, Daily      metoprolol succinate XL 25 MG 24 hr tablet  Commonly known as:  TOPROL-XL   25 mg, Oral, Daily      oxyCODONE-acetaminophen 7.5-325 MG per tablet  Commonly known as:  PERCOCET  Notes to patient:  As needed   1 tablet, Oral, Every 6 Hours PRN      polyethylene glycol pack packet  Commonly known as:  MIRALAX   17 g, Oral, Daily      potassium chloride 10 MEQ CR tablet  Commonly known as:  K-DUR,KLOR-CON   5 mEq, Oral, Daily      tamsulosin 0.4 MG capsule 24 hr capsule  Commonly known as:  FLOMAX   0.8 mg, Oral, Nightly      TiZANidine 2 MG capsule  Commonly known as:  ZANAFLEX  Notes to patient:  As needed   2 mg, Oral, Daily PRN      vitamin B-12 1000 MCG tablet  Commonly known as:  CYANOCOBALAMIN   1,000 mcg, Oral, Daily      vitamin D 18240 units capsule capsule  Commonly known as:  ERGOCALCIFEROL   50,000 Units, Oral, Every 7  Days, Take 1 capsule by mouth every 7 days on Thursday              Discharge Care Plan/Instructions: Transfer to [facility]     Follow-up Appointments:   Future Appointments   Date Time Provider Department Center   12/5/2018  3:00 PM Mikey Hernández MD [facility] None   2/6/2019  3:00 PM Viet Brooks MD [facility] None   4/23/2019  3:15 PM Mikey Hernández MD [facility] None       Test Results Pending at Discharge:    Order Current Status    Antifungal AST 9 Drug Panel - Isolate, Isolate Preliminary result    Blood Culture - Blood, Arm, Left Preliminary result    Blood Culture - Blood, Arm, Left Preliminary result          Andres Pop, APRN  11/20/18  1:08 PM    Time: Discharge planning and teaching took greater than 30 minutes.

## 2018-11-20 NOTE — THERAPY DISCHARGE NOTE
Acute Care - Physical Therapy Discharge Summary  [facility]       Patient Name: Ravi Park  : 1930  MRN: [id number]    Today's Date: 2018  Onset of Illness/Injury or Date of Surgery: 18    Date of Referral to PT: 18  Referring Physician: Troy Laura      Admit Date: 2018      PT Recommendation and Plan    Visit Dx:    ICD-10-CM ICD-9-CM   1. Acute cystitis with hematuria N30.01 595.0   2. Altered mental status, unspecified altered mental status type R41.82 780.97   3. Impaired functional mobility and endurance Z74.09 V49.89   4. Impaired mobility and activities of daily living Z74.09 799.89   5. Dysphagia, unspecified type R13.10 787.20   6. (HFpEF) heart failure with preserved ejection fraction (CMS/HCC) I50.30 428.9   7. Acute renal failure with acute cortical necrosis (CMS/HCC) N17.1 584.6       Outcome Measures     Row Name 18 1100 18 1350          How much help from another person do you currently need...    Turning from your back to your side while in flat bed without using bedrails?  --  1  -EM     Moving from lying on back to sitting on the side of a flat bed without bedrails?  --  1  -EM     Moving to and from a bed to a chair (including a wheelchair)?  --  1  -EM     Standing up from a chair using your arms (e.g., wheelchair, bedside chair)?  --  1  -EM     Climbing 3-5 steps with a railing?  --  1  -EM     To walk in hospital room?  --  1  -EM     AM-PAC 6 Clicks Score  --  6  -EM        How much help from another is currently needed...    Putting on and taking off regular lower body clothing?  1  -AS  --     Bathing (including washing, rinsing, and drying)  2  -AS  --     Toileting (which includes using toilet bed pan or urinal)  1  -AS  --     Putting on and taking off regular upper body clothing  2  -AS  --     Taking care of personal grooming (such as brushing teeth)  2  -AS  --     Eating meals  4  -AS  --     Score  12  -AS  --         Functional Assessment    Outcome Measure Options  AM-PAC 6 Clicks Daily Activity (OT)  -AS  AM-PAC 6 Clicks Basic Mobility (PT)  -EM       User Key  (r) = Recorded By, (t) = Taken By, (c) = Cosigned By    Initials Name Provider Type    EM Sergey Cabrera, PTA Physical Therapy Assistant    AS Debra Corado, OT Occupational Therapist            Therapy Suggested Charges     Code   Minutes Charges    75444 (CPT®) Hc Pt Neuromusc Re Education Ea 15 Min      60450 (CPT®) Hc Pt Ther Proc Ea 15 Min 15 1    43300 (CPT®) Hc Gait Training Ea 15 Min      37895 (CPT®) Hc Pt Therapeutic Act Ea 15 Min 24 2    84232 (CPT®) Hc Pt Manual Therapy Ea 15 Min      73133 (CPT®) Hc Pt Iontophoresis Ea 15 Min      08621 (CPT®) Hc Pt Elec Stim Ea-Per 15 Min      13329 (CPT®) Hc Pt Ultrasound Ea 15 Min      97407 (CPT®) Hc Pt Self Care/Mgmt/Train Ea 15 Min      28959 (CPT®) Hc Pt Prosthetic (S) Train Initial Encounter, Each 15 Min      60939 (CPT®) Hc Pt Orthotic(S)/Prosthetic(S) Encounter, Each 15 Min      61973 (CPT®) Hc Orthotic(S) Mgmt/Train Initial Encounter, Each 15min      Total  39 3                  PT Discharge Summary  Anticipated Discharge Disposition (PT): skilled nursing facility  Reason for Discharge: Discharge from facility, Per MD order  Outcomes Achieved: Unable to make functional progress toward goals at this time  Discharge Destination: other (comment)(Pt transferred to [facility] in [place])      Kristie Alvarez, PT   11/20/2018

## 2018-11-20 NOTE — PROGRESS NOTES
"Pharmacokinetics by Pharmacy - Vancomycin    Ravi Park is a 88 y.o. male receiving vancomycin 1000mg IV Q24 day 3 for pneumonia    Patient is also receiving levaquin and cefepime    Objective:  [Ht: 182.9 cm (72\"); Wt: 76.4 kg (168 lb 6.4 oz)]     Lab Results   Component Value Date    WBC 33.72 (H) 11/20/2018    WBC 26.85 (H) 11/19/2018    WBC 17.81 (H) 11/18/2018      Lab Results   Component Value Date    LACTATE 1.5 11/20/2018    LACTATE 2.0 11/20/2018    LACTATE 2.8 (C) 11/19/2018      Temp Readings from Last 1 Encounters:   11/20/18 98 °F (36.7 °C) (Oral)     Estimated Creatinine Clearance: 41.5 mL/min (A) (by C-G formula based on SCr of 1.33 mg/dL (H)).   Lab Results   Component Value Date    CREATININE 1.33 (H) 11/20/2018    CREATININE 1.43 (H) 11/19/2018    CREATININE 1.40 (H) 11/18/2018       Lab Results   Component Value Date    VANCOPEAK 25.56 (L) 09/09/2018    VANCOTROUGH <5.00 (L) 06/21/2018       Culture Results:  Microbiology Results (last 10 days)       Procedure Component Value - Date/Time    MRSA Screen, PCR - Swab, Nares [[id number]]  (Abnormal) Collected:  11/19/18 1044    Lab Status:  Final result Specimen:  Swab from Nares Updated:  11/19/18 1316     MRSA, PCR Positive    Narrative:       Performed by real-time polymerase chain reaction (qPCR).    Urine Culture - Urine, Urine, Catheter [[id number]]  (Normal) Collected:  11/18/18 1616    Lab Status:  Final result Specimen:  Urine, Catheter Updated:  11/20/18 0559     Urine Culture No growth at 24 hours    Blood Culture - Blood, Arm, Left [[id number]] Collected:  11/18/18 0926    Lab Status:  Preliminary result Specimen:  Blood from Arm, Left Updated:  11/20/18 1015     Blood Culture No growth at 2 days    Blood Culture - Blood, Arm, Left [[id number]] Collected:  11/18/18 0817    Lab Status:  Preliminary result Specimen:  Blood from Arm, Left Updated:  11/20/18 0900     Blood Culture No growth at 2 days    Urine Culture - Urine, Urine, Clean Catch " [[id number]] Collected:  11/14/18 1050    Lab Status:  Final result Specimen:  Urine, Clean Catch Updated:  11/15/18 0625     Urine Culture Mixed Frantz Isolated    Narrative:       Specimen contains mixed organisms of questionable pathogenicity which indicates contamination with commensal frantz.  Further identification is unlikely to provide clinically useful information.  Suggest recollection.    Urine Culture - Urine, Urine, Catheter [[id number]]  (Abnormal) Collected:  11/11/18 1450    Lab Status:  Final result Specimen:  Urine, Catheter Updated:  11/14/18 1144     Urine Culture >100,000 CFU/mL Candida tropicalis    Antifungal AST 9 Drug Panel - Isolate, Isolate [[id number]] Collected:  11/11/18 1450    Lab Status:  Preliminary result Specimen:  Isolate Updated:  11/17/18 0816     Organism ID, Yeast Preliminary report     Comment: Specimen has been received and testing has been initiated.        Please note Comment     Comment: CLSI does not have established guidelines for interpretation of  these organism-drug combinations.  For research use only.        BROMINE-TOTAL,SERUM/PLASMA Comment     Comment: Results for this test are for research purposes only by the assay's  .  The performance characteristics of this product have  not been established.  Results should not be used as a diagnostic  procedure without confirmation of the diagnosis by another medically  established diagnostic product or procedure.       Narrative:       Performed at:  [address] [place] [address]  [id number]  : Bella Gómez MD, Phone:  [phone]                 Assessment:  Patient's renal function is stable at this time  Patient is afebrile since 11/18  WBC continues to trend up  Levels due tonight - dosing/note may be adjusted by pharmacist at that time  Goal AUC is 400-600    Plan:  1. Continue vancomycin 1000mg IV Q24  2. Peak ordered for 11/20 at 1430, trough ordered for 11/21 at  1230.  3. Pharmacy will monitor renal function and adjust dose accordingly.      Eron Anderson, [facility]   11/20/18 12:18 PM

## 2018-11-20 NOTE — NURSING NOTE
Called report to Jo Ann at [facility] and faxed information and orders to their fax number. Patient is going to room 5M16. Family aware.

## 2018-11-20 NOTE — PLAN OF CARE
Problem: Fall Risk (Adult)  Goal: Absence of Fall  Outcome: Outcome(s) achieved Date Met: 11/20/18      Problem: Patient Care Overview  Goal: Plan of Care Review  Outcome: Ongoing (interventions implemented as appropriate)   11/20/18 0030   Coping/Psychosocial   Plan of Care Reviewed With patient   Plan of Care Review   Progress no change   OTHER   Outcome Summary Patient lethargic most of the time but I was able to rouse him enough to administer nightly meds. Patient currently resting with no complaints. VSS. Will continue to monitor.      Goal: Individualization and Mutuality  Outcome: Ongoing (interventions implemented as appropriate)    Goal: Discharge Needs Assessment  Outcome: Ongoing (interventions implemented as appropriate)    Goal: Interprofessional Rounds/Family Conf  Outcome: Ongoing (interventions implemented as appropriate)      Problem: Infection, Risk/Actual (Adult)  Goal: Infection Prevention/Resolution  Outcome: Ongoing (interventions implemented as appropriate)      Problem: Pain, Acute (Adult)  Goal: Acceptable Pain Control/Comfort Level  Outcome: Outcome(s) achieved Date Met: 11/20/18      Problem: Urinary Tract Infection (Adult)  Goal: Signs and Symptoms of Listed Potential Problems Will be Absent, Minimized or Managed (Urinary Tract Infection)  Outcome: Ongoing (interventions implemented as appropriate)      Problem: Cardiac Output Decreased (Adult)  Goal: Effective Tissue Perfusion  Outcome: Ongoing (interventions implemented as appropriate)      Problem: Skin Injury Risk (Adult)  Goal: Skin Health and Integrity  Outcome: Outcome(s) achieved Date Met: 11/20/18      Problem: Anemia (Adult)  Goal: Symptom Improvement  Outcome: Outcome(s) achieved Date Met: 11/20/18

## 2018-11-20 NOTE — PLAN OF CARE
Problem: Patient Care Overview  Goal: Plan of Care Review  Outcome: Ongoing (interventions implemented as appropriate)   11/20/18 1857   Coping/Psychosocial   Plan of Care Reviewed With patient   Plan of Care Review   Progress no change   OTHER   Outcome Summary Patient is being transferred to [facility].

## 2018-11-20 NOTE — PLAN OF CARE
Problem: Patient Care Overview  Goal: Plan of Care Review  Outcome: Ongoing (interventions implemented as appropriate)   11/20/18 1021   Coping/Psychosocial   Plan of Care Reviewed With patient   OTHER   Outcome Summary OT treatment this date. Pt pleasant and engaged well with encouragement but confused. Pt was min assist with sup to sit to sup. Pt sat approx 20 minutes with redirection with focus on core and UE ROM and strength. Pt required multiple rest breaks from UE reaching activities. Pt scooted sitting EOB with CGA. Pt could benefit from further skilled OT to reach maximum level of independence with ADL, t/f and mobility. Pt would benefit from further skilled OT and recommend rehab facility. OT noticed discharge orders or currently signed for pt to transfer to [facility] in [place].

## 2018-11-20 NOTE — THERAPY DISCHARGE NOTE
Acute Care - Occupational Therapy Treatment Note/Discharge  [facility]     Patient Name: Ravi Park  : 1930  MRN: [id number]  Today's Date: 2018  Onset of Illness/Injury or Date of Surgery: 18  Date of Referral to OT: 18  Referring Physician: Troy Laura      Admit Date: 2018    Visit Dx:     ICD-10-CM ICD-9-CM   1. Acute cystitis with hematuria N30.01 595.0   2. Altered mental status, unspecified altered mental status type R41.82 780.97   3. Impaired functional mobility and endurance Z74.09 V49.89   4. Impaired mobility and activities of daily living Z74.09 799.89   5. Dysphagia, unspecified type R13.10 787.20   6. (HFpEF) heart failure with preserved ejection fraction (CMS/[facility]) I50.30 428.9   7. Acute renal failure with acute cortical necrosis (CMS/[facility]) N17.1 584.6     Patient Active Problem List   Diagnosis   • Peripheral arterial disease (CMS/[facility])   • Chronic atrial fibrillation (CMS/[facility])   • Backache   • Lumbar radiculopathy   • Edema   • Acute congestive heart failure (CMS/[facility])   • Acute blood loss anemia   • Acute on chronic diastolic heart failure (CMS/[facility])   • YESENIA (acute kidney injury) (CMS/[facility])   • (HFpEF) heart failure with preserved ejection fraction (CMS/[facility])   • Urinary retention   • Personal history of tobacco use, presenting hazards to health   • Vitreous floaters   • Upper respiratory infection   • Sprain of shoulder and upper arm   • Shoulder pain   • Serous otitis media   • Sensorineural hearing loss   • Pure hypercholesterolemia   • Pseudophakia   • Prostatitis   • Pain in wrist   • Pain in thumb joint with movement   • Pain in joint involving right lower leg   • Pain in eye   • Otorrhea   • Open wound of toe   • Open wound of lower leg with complication   • Open wound of lower leg   • Open wound of lesser toe of left foot without damage to nail   • Olecranon bursitis   • Need for prophylactic vaccination and inoculation against influenza   • Need for  immunization against influenza   • Multiple joint pain   • Malaise and fatigue   • Keratoconjunctivitis sicca (CMS/HCC)   • Insect bite   • Impacted cerumen   • Hypertensive disorder   • History of colonoscopy   • History of colon polyps   • History of artificial eye lens   • Herpes zoster   • Gout   • Exposure to communicable disease   • Dog bite of hand   • Disorder of sacroiliac joint   • Decreased platelet count (CMS/HCC)   • Cough   • Contusion of thumb   • Common cold   • Cervical arthritis   • Cerebrovascular accident (CMS/HCC)   • Cellulitis of lower leg   • Cataract   • Benign prostatic hyperplasia   • Atrial fibrillation (CMS/HCC)   • Astigmatism   • Allergic rhinitis due to pollen   • Allergic rhinitis   • Adverse reaction to drug   • Acute confusion   • Acute bronchitis   • Abnormal gait   • Urinary tract infection associated with indwelling urethral catheter (CMS/HCC)   • Hydronephrosis   • Nephrolithiasis   • Calculus of kidney   • Sepsis (CMS/HCC)   • Pulmonary hypertension (CMS/HCC)   • Tricuspid regurgitation   • CKD (chronic kidney disease) stage 3, GFR 30-59 ml/min (CMS/HCC)   • ESBL (extended spectrum beta-lactamase) producing bacteria infection   • Urinary tract infection associated with catheterization of urinary tract (CMS/HCC)   • Toxic encephalopathy   • Demand ischemia (CMS/HCC)   • Acute renal failure with tubular necrosis (CMS/HCC)   • Chronic atrial fibrillation (CMS/HCC)   • Acute cystitis with hematuria   • Sepsis (CMS/HCC)   • Decubitus ulcer of left heel, unstageable (CMS/HCC)   • Acute renal failure (CMS/HCC)   • Pneumonia due to infectious organism   • Non-healing wound of left heel   • PVD (peripheral vascular disease) with claudication (CMS/HCC)   • ARF (acute renal failure) (CMS/HCC)   • Candida cystitis   • Anemia of chronic disease   • Acute kidney injury (CMS/HCC)   • Pressure injury of deep tissue of right heel   • Sepsis with organ dysfunction (CMS/HCC)   • HAP  (hospital-acquired pneumonia)       Therapy Treatment    Rehabilitation Treatment Summary     Row Name 11/20/18 1021             Treatment Time/Intention    Discipline  occupational therapist  -      Document Type  therapy note (daily note)  -      Subjective Information  complains of;pain  -      Mode of Treatment  individual therapy;occupational therapy  -      Patient/Family Observations  no family present pt supine HOB up on O2 2.5L, bed alarm, tele, catheter   -2      Care Plan Review  care plan/treatment goals reviewed  -[facility]      Total Minutes, Occupational Therapy Treatment  48  -BH3      Therapy Frequency (OT Eval)  other (see comments) 3-5x a week  -[facility]      Patient Effort  good  -[facility]      Comment  pt confused but good engagement, pt fatigued quickly with tasks.   -[facility]      Existing Precautions/Restrictions  fall;oxygen therapy device and L/min  -[facility]      Recorded by [] Nadia Pfeiffer OTR/L 11/20/18 1037  [BH2] Nadia Pfeiffer OTR/L 11/20/18 1047  [BH3] Nadia Pfeiffer, OTR/L 11/20/18 1152      Row Name 11/20/18 1021             Cognitive Assessment/Intervention- PT/OT    Affect/Mental Status (Cognitive)  confused  -      Orientation Status (Cognition)  oriented to;person;place assist with time  -      Follows Commands (Cognition)  follows one step commands;50-74% accuracy;75-90% accuracy;delayed response/completion;increased processing time needed;verbal cues/prompting required;repetition of directions required;physical/tactile prompts required  -2      Attention Deficit (Cognitive)  mild deficit;moderate deficit  -2      Safety Deficit (Cognitive)  mild deficit;moderate deficit  -2      Recorded by [] Nadia Pfeiffer OTR/L 11/20/18 1047  [BH2] Nadia Pfeiffer, OTR/L 11/20/18 1152      Row Name 11/20/18 1021             Bed Mobility Assessment/Treatment    Scooting/Bridging [place] (Bed Mobility)  contact guard;verbal cues sitting on EOB  -      Supine-Sit [place]  (Bed Mobility)  minimum assist (75% patient effort)  -      Sit-Supine [place] (Bed Mobility)  minimum assist (75% patient effort)  -      Bed Mobility, Safety Issues  decreased use of arms for pushing/pulling;decreased use of legs for bridging/pushing  -      Recorded by [] Nadia Pfeiffer OTR/L 11/20/18 1157      Row Name 11/20/18 1021             Functional Mobility    Functional Mobility- Comment  defer  -      Recorded by [] Nadia Pfeiffer OTR/L 11/20/18 1157      Row Name 11/20/18 1021             Transfer Assessment/Treatment    Comment (Transfers)  defer this date, pt fatigued with sititng EOB and wanted to get back into bed.   -      Recorded by [] Nadia Pfeiffer OTR/L 11/20/18 1157      Row Name 11/20/18 1021             ADL Assessment/Intervention    BADL Assessment/Intervention  feeding  -      Recorded by [] Nadia Pfeiffer OTR/L 11/20/18 1037      Row Name 11/20/18 1021             Grooming Assessment/Training    Comment (Grooming)  pt set up assist and redirection to wash off his face   -      Recorded by [] Nadia Pfeiffer OTR/L 11/20/18 1047      Row Name 11/20/18 1021             Self-Feeding Assessment/Training    Comment (Feeding)  pt min assist to hold a cup and drink liquid with cues to swallow and small sips At times pt was CGA to hold cup. Pt took extended time and effort for drinking with drinking both milk and water throughout the session.   -      Recorded by [] Nadia Pfeiffer OTR/L 11/20/18 1157      Row Name 11/20/18 1021             BADL Safety/Performance    Impairments, BADL Safety/Performance  balance;cognition;endurance/activity tolerance;strength;shortness of breath;trunk/postural control;pain;motor control;motor planning;range of motion;coordination  -      Recorded by [] Nadia Pfeiffer OTR/L 11/20/18 1157      Row Name 11/20/18 1021             General ROM    GENERAL ROM COMMENTS  pt has decreased LUE shoulder movement  -       Recorded by [] Nadia Pfeiffer OTR/L 11/20/18 1157      Row Name 11/20/18 1021             Motor Skills Assessment/Interventions    Additional Documentation  Balance (Group)  -      Recorded by [] Nadia Pfeiffer OTR/L 11/20/18 1157      Row Name 11/20/18 1021             Balance    Balance  dynamic balance activity  -BH      Recorded by [] Nadia Pfeiffer OTR/L 11/20/18 1157      Row Name 11/20/18 1021             Dynamic Balance Activity    Comment (Dynamic Balance Training)  Pt engaged in sitting EOB for UE reaching, ROM, stretching. Pt fatigued quickly sat approx 20 minutes required rest breaks with arm reaching forward and to the side. LUE had limited range with limited reaching ability. RUE did better but still not full range approx 90 degrees. Pt required rest breaks with arms and min vc for posture. Pt sat EOB and with cues and CGA was able to scoot himself up towards the HOB.   -      Recorded by [] Nadia Pfeiffer OTR/L 11/20/18 1206      Row Name 11/20/18 1021             Positioning and Restraints    Pre-Treatment Position  in bed  -BH      Post Treatment Position  bed  -BH      In Bed  notified nsg;supine;call light within reach;encouraged to call for assist;exit alarm on;side rails up x2  -      Recorded by [] Nadia Pfeiffer OTR/L 11/20/18 1206      Row Name 11/20/18 1021             Pain Assessment    Additional Documentation  Pain Scale: Numbers Pre/Post-Treatment (Group)  -      Recorded by [] Nadia Pfeiffer OTR/L 11/20/18 1037      Row Name 11/20/18 1021             Pain Scale: Numbers Pre/Post-Treatment    Pain Scale: Numbers, Pretreatment  7/10  -      Pain Scale: Numbers, Post-Treatment  7/10  -BH2      Pain Location  foot neck  -2      Pain Intervention(s)  Repositioned RN notified  -[facility]      Recorded by [] Nadia Pfeiffer OTR/L 11/20/18 1037  [BH2] Nadia Pfeiffer OTR/L 11/20/18 1109  [BH3] Nadia Pfeiffer OTR/L 11/20/18 1206      Row Name                 Wound 10/30/18 1715 Left heel pressure injury    Wound - Properties Group Date first assessed: 10/30/18 [AS] Time first assessed: 1715 [AS] Present On Admission : yes;picture taken [AS] Side: Left [AS] Location: heel [AS] Type: pressure injury [AS] Stage, Pressure Injury: unstageable [AS] Recorded by:  [AS] Giselle Alicea, RN 10/30/18 1715    Row Name                Wound 11/06/18 0300 Left gluteal pressure injury    Wound - Properties Group Date first assessed: 11/06/18 [AB] Time first assessed: 0300 [AB] Present On Admission : yes;picture taken [SR], picture taken by nightshift RN  Side: Left [SR] Location: gluteal [SR] Type: pressure injury [SR] Stage, Pressure Injury: deep tissue injury [SR] Recorded by:  [AB] Jacquie Chu RN 11/08/18 1511 [SR] Lexi Chaparro RN 11/06/18 2051    Row Name 11/20/18 1021             Plan of Care Review    Plan of Care Reviewed With  patient  -      Recorded by [] Nadia Pfeiffer OTR/L 11/20/18 1037      Row Name 11/20/18 1021             Outcome Summary/Treatment Plan (OT)    Daily Summary of Progress (OT)  progress toward functional goals is gradual  -      Plan for Continued Treatment (OT)  continue OT POC  -      Anticipated Discharge Disposition (OT)  skilled nursing facility  -      Recorded by [] Nadia Pfeiffer, OTR/L 11/20/18 1037        User Key  (r) = Recorded By, (t) = Taken By, (c) = Cosigned By    Initials Name Effective Dates Discipline     Nadia Pfeiffer, OTR/L 06/08/18 -  OT    AB Jacquie Chu RN 10/17/16 -  Nurse    SR Lexi Chaparro RN 10/17/16 -  Nurse    AS Giselle Alicea, RN 12/13/16 -  Nurse        Wound 10/30/18 1715 Left heel pressure injury (Active)   Dressing Appearance dry;intact 11/20/2018 10:04 AM   Base dressing in place, unable to visualize 11/19/2018  8:30 PM       Wound 11/06/18 0300 Left gluteal pressure injury (Active)   Dressing Appearance open to air 11/20/2018 10:04 AM   Base maroon/purple 11/19/2018   8:30 PM   Periwound redness;blanchable 11/19/2018  8:30 PM   Care, Wound barrier applied 11/20/2018 10:04 AM   Periwound Care, Wound barrier ointment applied 11/19/2018  8:30 PM       OT Rehab Goals     Row Name 11/20/18 1021             Transfer Goal 1 (OT)    Activity/Assistive Device (Transfer Goal 1, OT)  sit-to-stand/stand-to-sit;bed-to-chair/chair-to-bed;toilet  -      [place] Level/Cues Needed (Transfer Goal 1, OT)  contact guard assist  -      Time Frame (Transfer Goal 1, OT)  long term goal (LTG);by discharge  -      Progress/Outcome (Transfer Goal 1, OT)  goal not met  -         Bathing Goal 1 (OT)    Activity/Assistive Device (Bathing Goal 1, OT)  bathing skills, all using AE PRN  -      [place] Level/Cues Needed (Bathing Goal 1, OT)  minimum assist (75% or more patient effort)  -      Time Frame (Bathing Goal 1, OT)  long term goal (LTG);by discharge  -      Progress/Outcomes (Bathing Goal 1, OT)  goal not met  -         Dressing Goal 1 (OT)    Activity/Assistive Device (Dressing Goal 1, OT)  dressing skills, all using AE PRN  -      [place]/Cues Needed (Dressing Goal 1, OT)  minimum assist (75% or more patient effort)  -      Time Frame (Dressing Goal 1, OT)  long term goal (LTG);by discharge  -      Progress/Outcome (Dressing Goal 1, OT)  goal not met  -         Toileting Goal 1 (OT)    Activity/Device (Toileting Goal 1, OT)  toileting skills, all  -      [place] Level/Cues Needed (Toileting Goal 1, OT)  minimum assist (75% or more patient effort)  -      Time Frame (Toileting Goal 1, OT)  long term goal (LTG);by discharge  -      Progress/Outcome (Toileting Goal 1, OT)  goal not met  -         Strength Goal 1 (OT)    Strength Goal 1 (OT)  Pt will increase BUE strength at least 1/2 grade level to benefit ADLs and functional transfers.   -      Time Frame (Strength Goal 1, OT)  long term goal (LTG);by discharge  -      Progress/Outcome (Strength  Goal 1, OT)  goal not met  -        User Key  (r) = Recorded By, (t) = Taken By, (c) = Cosigned By    Initials Name Provider Type Discipline     Nadia Pfeiffer, OTR/L Occupational Therapist OT          Occupational Therapy Education     Title: PT OT SLP Therapies (Resolved)     Topic: Occupational Therapy (Resolved)     Point: ADL training (Resolved)     Description: Instruct learner(s) on proper safety adaptation and remediation techniques during self care or transfers.   Instruct in proper use of assistive devices.    Learning Progress Summary           Patient Acceptance, E, NR by  at 11/20/2018 12:07 PM    Comment:  Educated about OT. Educated on safety with bed mobility. Educated to call for assist.    Acceptance, E, VU,NR by AS at 11/19/2018  1:37 PM    Comment:  pt progress, goals, and recs; bed mobility; transfer training; ADL training    Acceptance, E,TB, VU by KYUNG at 11/16/2018  2:37 PM    Acceptance, E,TB, VU by KYUNG at 11/13/2018  2:28 PM   Family Acceptance, E, VU,NR by AS at 11/19/2018  1:37 PM    Comment:  pt progress, goals, and recs; bed mobility; transfer training; ADL training    Acceptance, E, NR by DARIN at 11/11/2018  4:30 PM                   Point: Home exercise program (Resolved)     Description: Instruct learner(s) on appropriate technique for monitoring, assisting and/or progressing therapeutic exercises/activities.    Learning Progress Summary           Patient Acceptance, E,TB, VU by KYUNG at 11/16/2018  2:37 PM    Acceptance, E,TB, VU by KYUNG at 11/13/2018  2:28 PM   Family Acceptance, E, NR by DARIN at 11/11/2018  4:30 PM                   Point: Precautions (Resolved)     Description: Instruct learner(s) on prescribed precautions during self-care and functional transfers.    Learning Progress Summary           Patient Acceptance, E, NR by  at 11/20/2018 12:07 PM    Comment:  Educated about OT. Educated on safety with bed mobility. Educated to call for assist.    Acceptance, E,TB, VU by KYUNG  at 11/16/2018  2:37 PM    Acceptance, E,TB, VU by LW at 11/13/2018  2:28 PM   Family Acceptance, E, NR by LM at 11/11/2018  4:30 PM                   Point: Body mechanics (Resolved)     Description: Instruct learner(s) on proper positioning and spine alignment during self-care, functional mobility activities and/or exercises.    Learning Progress Summary           Patient Acceptance, E,TB, VU by LW at 11/16/2018  2:37 PM    Acceptance, E,TB, VU by LW at 11/13/2018  2:28 PM    Acceptance, E, NR,NL by AS at 11/8/2018  4:28 PM    Comment:  role of OT, OT POC, bed mobility   Family Acceptance, E, NR by LM at 11/11/2018  4:30 PM                               User Key     Initials Effective Dates Name Provider Type Discipline     06/08/18 -  Nadia Pfeiffer OTR/L Occupational Therapist OT    LM 03/07/18 -  Marilin Miramontes VALENCIA/L Occupational Therapy Assistant OT    LW 03/07/18 -  Deisy Sullivan VALENCIA/L Occupational Therapy Assistant OT    AS 05/01/18 -  Debra Corado, OT Occupational Therapist OT                OT Recommendation and Plan  Outcome Summary/Treatment Plan (OT)  Daily Summary of Progress (OT): progress toward functional goals is gradual  Plan for Continued Treatment (OT): continue OT POC  Anticipated Discharge Disposition (OT): skilled nursing facility  Therapy Frequency (OT Eval): other (see comments)(3-5x a week)  Daily Summary of Progress (OT): progress toward functional goals is gradual  Plan of Care Review  Plan of Care Reviewed With: patient  Plan of Care Reviewed With: patient  Outcome Summary: OT treatment this date. Pt pleasant and engaged well with encouragement but confused. Pt was min assist with sup to sit to sup. Pt sat approx 20 minutes with redirection with focus on core and UE ROM and strength. Pt required multiple rest breaks from UE reaching activities. Pt scooted sitting EOB with CGA. Pt could benefit from further skilled OT to reach maximum level of independence with ADL, t/f  and mobility. Pt would benefit from further skilled OT and recommend rehab facility. OT noticed discharge orders or currently signed for pt to transfer to [facility] in [place].     Outcome Measures     Row Name 11/19/18 1100 11/18/18 1350 11/17/18 1353       How much help from another person do you currently need...    Turning from your back to your side while in flat bed without using bedrails?  --  1  -EM  2  -EM    Moving from lying on back to sitting on the side of a flat bed without bedrails?  --  1  -EM  2  -EM    Moving to and from a bed to a chair (including a wheelchair)?  --  1  -EM  1  -EM    Standing up from a chair using your arms (e.g., wheelchair, bedside chair)?  --  1  -EM  1  -EM    Climbing 3-5 steps with a railing?  --  1  -EM  1  -EM    To walk in hospital room?  --  1  -EM  1  -EM    AM-PAC 6 Clicks Score  --  6  -EM  8  -EM       How much help from another is currently needed...    Putting on and taking off regular lower body clothing?  1  -AS  --  --    Bathing (including washing, rinsing, and drying)  2  -AS  --  --    Toileting (which includes using toilet bed pan or urinal)  1  -AS  --  --    Putting on and taking off regular upper body clothing  2  -AS  --  --    Taking care of personal grooming (such as brushing teeth)  2  -AS  --  --    Eating meals  4  -AS  --  --    Score  12  -AS  --  --       Functional Assessment    Outcome Measure Options  AM-PAC 6 Clicks Daily Activity (OT)  -AS  AM-PAC 6 Clicks Basic Mobility (PT)  -EM  AM-PAC 6 Clicks Basic Mobility (PT)  -EM      User Key  (r) = Recorded By, (t) = Taken By, (c) = Cosigned By    Initials Name Provider Type    EM Sergey Cabrera, PTA Physical Therapy Assistant    AS Debra Corado, OT Occupational Therapist           Time Calculation:    Time Calculation- OT     Row Name 11/20/18 1211             Time Calculation- OT    OT Start Time  1021  -BH      OT Stop Time  1109  -BH      OT Time Calculation (min)  48 min  -      Total  Timed Code Minutes- OT  48 minute(s)  -      OT Received On  11/20/18  -        User Key  (r) = Recorded By, (t) = Taken By, (c) = Cosigned By    Initials Name Provider Type    Nadia Gonsalez OTR/L Occupational Therapist        Therapy Suggested Charges     Code   Minutes Charges    None           Therapy Charges for Today     Code Description Service Date Service Provider Modifiers Qty    [id number]  OT SELF CARE/MGMT/TRAIN EA 15 MIN 11/20/2018 Nadia Pfeiffer OTR/L GO 1    [id number]  OT THERAPEUTIC ACT EA 15 MIN 11/20/2018 Nadia Pfeiffer OTR/L GO 2          OT G-codes  OT Professional Judgement Used?: Yes  OT Functional Scales Options: AM-PAC 6 Clicks Daily Activity (OT)  Score: 12  Functional Limitation: Self care  Self Care Current Status (): At least 60 percent but less than 80 percent impaired, limited or restricted  Self Care Goal Status (): At least 40 percent but less than 60 percent impaired, limited or restricted    OT Discharge Summary  Anticipated Discharge Disposition (OT): skilled nursing facility  Reason for Discharge: Discharge from facility  Outcomes Achieved: Refer to plan of care for updates on goals achieved(making progress no goals met)  Discharge Destination: other (comment)([facility] )    LASHON Sherman/NICOLE  11/20/2018

## 2018-11-20 NOTE — NURSING NOTE
Called report to Jo Ann at [facility] and faxed information and orders to their fax number. Patient is going to room 5M16. Family aware.

## 2024-06-25 NOTE — DISCHARGE INSTR - APPOINTMENTS
CALL Monday AND MAKE FOLLOW UP APPOINTMENT WITH DR YOUNG [phone]   What Type Of Note Output Would You Prefer (Optional)?: Standard Output Hpi Title: Evaluation of Skin Lesions How Severe Are Your Spot(S)?: moderate

## (undated) DEVICE — FIBR LASR FLEXIVA 365 HIPOWR 1P/U

## (undated) DEVICE — SYR LL TP 10ML STRL

## (undated) DEVICE — VIPERSLIDE, 100ML BAG, 10 PK: Brand: VIPERSLIDE

## (undated) DEVICE — PK CYSTO LF 60

## (undated) DEVICE — SOL IRR H2O BTL 1000ML STRL

## (undated) DEVICE — SOL IRR NACL 0.9PCT 3000ML

## (undated) DEVICE — DIAMONDBACK PERIPHERAL, SOLID CROWN, 2.00MM, 145CM SHAFT: Brand: DIAMONDBACK PERIPHERAL

## (undated) DEVICE — A2000 MULTI-USE SYRINGE KIT, P/N 701277-003KIT CONTENTS: 100ML CONTRAST RESERVOIR AND TUBING WITH CONTRAST SPIKE AND CLAMP: Brand: A2000 MULTI-USE SYRINGE KIT

## (undated) DEVICE — GW SUP AMPLATZ ULTR/STFF/STR PTFE SS 0.35IN 145CM

## (undated) DEVICE — GW PERIPH GUIDERIGHT STD/J/TP PTFE/PCOAT SS 0.038IN 5X150CM

## (undated) DEVICE — ARMADA 14 PTA CATHETER 2.5 MM X 120 MM X 150 CM / OVER-THE-WIRE: Brand: ARMADA

## (undated) DEVICE — KT INTRO MINISTICK MAX W/GW NITNL/TUNG ECHO 4F 21G 7CM

## (undated) DEVICE — RADIFOCUS GLIDECATH: Brand: GLIDECATH

## (undated) DEVICE — GLV SURG SENSICARE GREEN W/ALOE PF LF 8 STRL

## (undated) DEVICE — EVAC BLDR UROVAC W ADAPT

## (undated) DEVICE — CATHETER,FOLEY,100%SILICONE,16FR,10ML,LF: Brand: MEDLINE

## (undated) DEVICE — SOL IRRG H2O PL/BG 1000ML STRL

## (undated) DEVICE — CONTAINER,SPECIMEN,OR STERILE,4OZ: Brand: MEDLINE

## (undated) DEVICE — SOL PVPI SPRY BETADINE 3OZ

## (undated) DEVICE — STERILE POLYISOPRENE POWDER-FREE SURGICAL GLOVES WITH EMOLLIENT COATING: Brand: PROTEXIS

## (undated) DEVICE — INTRO CATH SUPRAPUB LAWRENCE 16FORNG

## (undated) DEVICE — ST CVR PROB PULLUP ULTRASND 5X48IN

## (undated) DEVICE — CATH URETRL OPN/END 5F70CM

## (undated) DEVICE — PK CATH LAB 60

## (undated) DEVICE — NITINOL WIRE WITH HYDROPHILIC TIP: Brand: SENSOR

## (undated) DEVICE — GLV SURG NEOLON 2G PF LF 7.5 STRL

## (undated) DEVICE — SPNG GZ WOVN 4X4IN 12PLY 10/BX STRL

## (undated) DEVICE — MODEL BT2000 P/N 700287-012KIT CONTENTS: MANIFOLD WITH SALINE AND CONTRAST PORTS, SALINE TUBING WITH SPIKE AND HAND SYRINGE, TRANSDUCER: Brand: BT2000 AUTOMATED MANIFOLD KIT

## (undated) DEVICE — Device

## (undated) DEVICE — HI-TORQUE VERSACORE MODIFIED J GUIDE WIRE SYSTEM 145 CM: Brand: HI-TORQUE VERSACORE

## (undated) DEVICE — DRAINBAG,ANTI-REFLUX TOWER,L/F,2000ML,LL: Brand: MEDLINE

## (undated) DEVICE — GLV SURG NEOLON 2G PF LF 6.5 STRL

## (undated) DEVICE — SYS IRR PUMP SGL ACTN VAC SYR 10CC

## (undated) DEVICE — URETERAL ACCESS SHEATH SET: Brand: NAVIGATOR HD

## (undated) DEVICE — HI-TORQUE COMMAND ES GUIDE WIRE .014" 300 CM: Brand: HI-TORQUE COMMAND

## (undated) DEVICE — DESTINATION PERIPHERAL GUIDING SHEATH: Brand: DESTINATION

## (undated) DEVICE — INTRO SHEATH ART/FEM ENGAGE .038 6F12CM

## (undated) DEVICE — VIPERWIRE, ADVANCE PERIPHERAL, .014" DIA SPRING TIP, 335CM, 5 PACK: Brand: VIPERWIRE, ADVANCE

## (undated) DEVICE — ELECTRODE,RT,STRESS,FOAM,50PK: Brand: MEDLINE

## (undated) DEVICE — GW PTFE FIX/CORE FLXTIP .038 3X150CM

## (undated) DEVICE — COPILOT KIT INCLUDES BLEEDBACK CONTROL VALVE / GUIDE WIRE INTRODUCER / TORQUE DEVICE: Brand: ACCESSORIES

## (undated) DEVICE — CATH URETRL OPEN END W/CONNECT 5F 70CM

## (undated) DEVICE — MODEL AT P65, P/N 701554-001KIT CONTENTS: HAND CONTROLLER, 3-WAY HIGH-PRESSURE STOPCOCK WITH ROTATING END AND PREMIUM HIGH-PRESSURE TUBING: Brand: ANGIOTOUCH® KIT

## (undated) DEVICE — CATH GUIDE SOFTVU SELECT/V HT OMNI .038 5F 65CM

## (undated) DEVICE — TAD II TAPERED GUIDE WIRE SYSTEM WITH MICROGLIDE COATING .035 260 CM: Brand: TAD

## (undated) DEVICE — METER,URINE,400ML,DRAIN BAG,L/F,LL,SLIDE: Brand: MEDLINE